# Patient Record
Sex: MALE | Race: BLACK OR AFRICAN AMERICAN | NOT HISPANIC OR LATINO | Employment: OTHER | URBAN - METROPOLITAN AREA
[De-identification: names, ages, dates, MRNs, and addresses within clinical notes are randomized per-mention and may not be internally consistent; named-entity substitution may affect disease eponyms.]

---

## 2017-01-05 ENCOUNTER — DOCUMENTATION ONLY (OUTPATIENT)
Dept: FAMILY MEDICINE | Facility: CLINIC | Age: 77
End: 2017-01-05

## 2017-01-05 NOTE — PROGRESS NOTES
Pre-Visit Chart Review  For Appointment Scheduled on 1/6/2017    Health Maintenance Due   Topic Date Due    TETANUS VACCINE  12/17/1958    Zoster Vaccine  12/17/2000

## 2017-01-06 ENCOUNTER — OFFICE VISIT (OUTPATIENT)
Dept: FAMILY MEDICINE | Facility: CLINIC | Age: 77
End: 2017-01-06
Payer: MEDICARE

## 2017-01-06 VITALS
DIASTOLIC BLOOD PRESSURE: 69 MMHG | TEMPERATURE: 98 F | WEIGHT: 182.75 LBS | SYSTOLIC BLOOD PRESSURE: 121 MMHG | BODY MASS INDEX: 28.68 KG/M2 | HEIGHT: 67 IN | HEART RATE: 85 BPM | OXYGEN SATURATION: 98 %

## 2017-01-06 DIAGNOSIS — Z00.00 ENCOUNTER FOR PREVENTIVE HEALTH EXAMINATION: Primary | ICD-10-CM

## 2017-01-06 DIAGNOSIS — D45 POLYCYTHEMIA VERA: ICD-10-CM

## 2017-01-06 DIAGNOSIS — I10 ESSENTIAL HYPERTENSION: ICD-10-CM

## 2017-01-06 DIAGNOSIS — J84.10 CALCIFIED GRANULOMA OF LUNG: ICD-10-CM

## 2017-01-06 DIAGNOSIS — N40.1 BENIGN PROSTATIC HYPERPLASIA WITH LOWER URINARY TRACT SYMPTOMS, UNSPECIFIED MORPHOLOGY: ICD-10-CM

## 2017-01-06 DIAGNOSIS — K21.9 GASTROESOPHAGEAL REFLUX DISEASE, ESOPHAGITIS PRESENCE NOT SPECIFIED: ICD-10-CM

## 2017-01-06 DIAGNOSIS — R91.8 LUNG MASS: ICD-10-CM

## 2017-01-06 DIAGNOSIS — I70.209 ATHEROSCLEROSIS OF NATIVE ARTERIES OF THE EXTREMITIES, UNSPECIFIED: ICD-10-CM

## 2017-01-06 PROCEDURE — 3074F SYST BP LT 130 MM HG: CPT | Mod: S$GLB,,, | Performed by: PHYSICIAN ASSISTANT

## 2017-01-06 PROCEDURE — 99499 UNLISTED E&M SERVICE: CPT | Mod: S$GLB,,, | Performed by: PHYSICIAN ASSISTANT

## 2017-01-06 PROCEDURE — 99999 PR PBB SHADOW E&M-EST. PATIENT-LVL IV: CPT | Mod: PBBFAC,,, | Performed by: PHYSICIAN ASSISTANT

## 2017-01-06 PROCEDURE — 3078F DIAST BP <80 MM HG: CPT | Mod: S$GLB,,, | Performed by: PHYSICIAN ASSISTANT

## 2017-01-06 PROCEDURE — G0439 PPPS, SUBSEQ VISIT: HCPCS | Mod: S$GLB,,, | Performed by: PHYSICIAN ASSISTANT

## 2017-01-06 NOTE — Clinical Note
Primary Care Providers: Yaniv Escobar MD, MD (General)  Your patient was seen today for a HRA visit. Gap(s) in care (HEDIS gaps) have been identified during this visit that require additional testing and possible follow up.  No orders of the defined types were placed in this encounter.   These orders were placed using [facility] approved protocol and any results will be forwarded to your office for appropriate follow up. I have included a copy of my visit note; please review the note and feel free to contact me with any questions.   Thank you for allowing me to participate in the care of your patients. Corie Sinha PA-C

## 2017-01-06 NOTE — PATIENT INSTRUCTIONS
Counseling and Referral of Other Preventative  (Italic type indicates deductible and co-insurance are waived)    Patient Name: Morro Sharma  Today's Date: 1/6/2017      SERVICE LIMITATIONS RECOMMENDATION    Vaccines    · Pneumococcal (once after 65)    · Influenza (annually)    · Hepatitis B (if medium/high risk)    · Prevnar 13      Hepatitis B medium/high risk factors:       - End-stage renal disease       - Hemophiliacs who received Factor VII or         IX concentrates       - Clients of institutions for the mentally             retarded       - Persons who live in the same house as          a HepB carrier       - Homosexual men       - Illicit injectable drug abusers     Pneumococcal: Done, no repeat necessary     Influenza: Done, repeat in one year     Hepatitis B: N/A Not indicated.     Prevnar 13: Completed, no need to repeat    Prostate cancer screening (annually to age 75)     Prostate specific antigen (PSA) Shared decision making with Provider. Sometimes a co-pay may be required if the patient decides to have this test. The USPSTF no longer recommends prostate cancer screening routinely in medicine: not to follow    Colorectal cancer screening (to age 75)    · Fecal occult blood test (annual)  · Flexible sigmoidoscopy (5y)  · Screening colonoscopy (10y)  · Barium enema   Last done 2014, recommend to repeat every 5  years    Diabetes self-management training (no USPSTF recommendations)  Requires referral by treating physician for patient with diabetes or renal disease. 10 hours of initial DSMT sessions of no less than 30 minutes each in a continuous 12-month period. 2 hours of follow-up DSMT in subsequent years.  N/A Not indicated.    Glaucoma screening (no USPSTF recommendation)  Diabetes mellitus, family history   , age 50 or over    American, age 65 or over  N/A Not indicated.    Medical nutrition therapy for diabetes or renal disease (no recommended schedule)  Requires  referral by treating physician for patient with diabetes or renal disease or kidney transplant within the past 3 years.  Can be provided in same year as diabetes self-management training (DSMT), and CMS recommends medical nutrition therapy take place after DSMT. Up to 3 hours for initial year and 2 hours in subsequent years.  N/A Not indicated.    Cardiovascular screening blood tests (every 5 years)  · Fasting lipid panel  Order as a panel if possible  Done this year, repeat every year    Diabetes screening tests (at least every 3 years, Medicare covers annually or at 6-month intervals for prediabetic patients)  · Fasting blood sugar (FBS) or glucose tolerance test (GTT)  Patient must be diagnosed with one of the following:       - Hypertension       - Dyslipidemia       - Obesity (BMI 30kg/m2)       - Previous elevated impaired FBS or GTT       ... or any two of the following:       - Overweight (BMI 25 but <30)       - Family history of diabetes       - Age 65 or older       - History of gestational diabetes or birth of baby weighing more than 9 pounds  N/A Not indicated.    Abdominal aortic aneurysm screening (once)  · Sonogram   Limited to patients who meet one of the following criteria:       - Men who are 65-75 years old and have smoked more than 100 cigarette in their lifetime       - Anyone with a family history of abdominal aortic aneurysm       - Anyone recommended for screening by the USPSTF  Last done 2014, recommend no further repeat testing, US was normal.    HIV screening (annually for increased risk patients)  · HIV-1 and HIV-2 by EIA, or ELO, rapid antibody test or oral mucosa transudate  Patients must be at increased risk for HIV infection per USPSTF guidelines or pregnant. Tests covered annually for patient at increased risk or as requested by the patient. Pregnant patients may receive up to 3 tests during pregnancy.  Risks discussed, screening is not recommended    Smoking cessation counseling  (up to 8 sessions per year)  Patients must be asymptomatic of tobacco-related conditions to receive as a preventative service.  Former smoker    Subsequent annual wellness visit  At least 12 months since last AWV  Return in one year     The following information is provided to all patients.  This information is to help you find resources for any of the problems found today that may be affecting your health:                Living healthy guide: www.[facility].[place].[facility]      Understanding Diabetes: www.diabetes.org      Eating healthy: www.cdc.gov/healthyweight      [facility] home safety checklist: www.cdc.gov/steadi/patient.html      Agency on Aging: www.goea.[place].[facility]      Alcoholics anonymous (AA): www.aa.org      Physical Activity: www.stepan.nih.gov/md1azwq      Tobacco use: www.quitwithusla.org

## 2017-01-06 NOTE — PROGRESS NOTES
Subjective:       Patient ID: Morro Sharma is here for   Chief Complaint   Patient presents with    HRA       Morro Sharma was seen today in a face to face encounter for a comprehensive Health Risk Assessment. This visit included a review of her total medical record available at the time of this visit.        Past Medical, Family, and Surgical History:      This information was reviewed and reconciled today during this encounter. Medications were reviewed and reconciled today. The active problem list has been reviewed/updated and reconciled today. These active problems are listed in the diagnosis list below.    **See completed HRA forms/Questionnaires/Flowsheets for ROS information.    Physical Exam   Constitutional: He is oriented to person, place, and time. He appears well-developed and well-nourished.   Eyes: EOM are normal. Pupils are equal, round, and reactive to light.   Cardiovascular: Normal rate, regular rhythm, normal heart sounds and intact distal pulses.    No lower ext edema   Pulmonary/Chest: Effort normal and breath sounds normal.   Neurological: He is alert and oriented to person, place, and time.   Skin: Skin is warm and dry.   Psychiatric: He has a normal mood and affect. His behavior is normal.   Vitals reviewed.        Diagnoses (identified today) and assoicated plan for each diagnosis:         Encounter for preventive health examination    Lung mass  Comments:  Stable, CT 6/13: Prior granulomatous infection continued improvement in the scarring and atelectasis in the left base relative to 11/13/12. Cont f/u with PCP.    Essential hypertension  Comments:  Controlled, continue lotrel 5/20 mg daily. DASH diet encouraged.    Benign prostatic hyperplasia with lower urinary tract symptoms, unspecified morphology  Comments:  Stable, continue to monitor.     Polycythemia vera  Comments:  Stable, continue to follow with Hem/Onc every 6 months. Philippe on hydrea 500 mg BID and ASA 81 mg  daily.    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    Advised patient to follow up with tetanus booster and shingles vaccine at insurance approved pharmacy.   Follow up with PCP for annual physical exam in January 2017.

## 2017-01-06 NOTE — MR AVS SNAPSHOT
[facility]  [address]  [place] [address]  Phone: [phone]  Fax: [phone]                  Morro Sharma   2017 1:00 PM   Office Visit    Description:  Male : 1940   Provider:  Corie Sinha PA-C   Department:  [facility]           Reason for Visit     HRA           Diagnoses this Visit        Comments    Encounter for preventive health examination    -  Primary     Lung mass         Essential hypertension         Benign prostatic hyperplasia with lower urinary tract symptoms, unspecified morphology         Polycythemia vera         Gastroesophageal reflux disease, esophagitis presence not specified                To Do List           Future Appointments        Provider Department Dept Phone    2017 8:00 AM Yaniv Escobar MD [facility] [phone]    3/24/2017 10:30 AM [facility] - Lab [phone]    3/28/2017 1:40 PM Xiang Khan NP [facility] H[facility]ology [phone]      Goals (5 Years of Data)     None      [facility] On Call     [facility] On [facility] Nurse [facility] -  Assistance  Registered nurses in the [facility] On Call Center provide clinical advisement, health education, appointment booking, and other advisory services.  Call for this free service at [phone].             Medications           Message regarding Medications     Verify the changes and/or additions to your medication regime listed below are the same as discussed with your clinician today.  If any of these changes or additions are incorrect, please notify your healthcare provider.        STOP taking these medications     opqha-ieqbd-rqhidzs-pramoxine 3.5-500-10,000 mg-unit-unit/g Oint            Verify that the below list of medications is an accurate representation of the medications you are currently taking.  If none reported, the list may be blank. If incorrect, please contact your healthcare provider. Carry this list with  "you in case of emergency.           Current Medications     amlodipine-benazepril 5-20 mg (LOTREL) 5-20 mg per capsule TAKE 1 CAPSULE EVERY DAY    aspirin (ECOTRIN) 81 MG EC tablet Take 81 mg by mouth once daily.     fluticasone (FLONASE) 50 mcg/actuation nasal spray 2 sprays by Each Nare route once daily.    hydroxyurea (HYDREA) 500 mg Cap TAKE 1 CAPSULE TWICE DAILY    xr-rn-BZ-lycopene-lut-#178herb (OCHOA MULTIVITAMIN FOR MEN) 200-175-250 mcg Tab Take 1 tablet by mouth once daily.    NEXIUM 20 mg capsule     tadalafil (CIALIS) 5 MG tablet Take 1 tablet (5 mg total) by mouth daily as needed for Erectile Dysfunction.           Clinical Reference Information           Vital Signs - Last Recorded  Most recent update: 1/6/2017  1:04 PM by Lacey Rdz MA    BP Pulse Temp Ht Wt SpO2    121/69 (BP Location: Right arm, Patient Position: Sitting, BP Method: Automatic) 85 98.2 °F (36.8 °C) (Oral) 5' 7" (1.702 m) 82.9 kg (182 lb 12.2 oz) 98%    BMI                28.62 kg/m2          Blood Pressure          Most Recent Value    BP  121/69      Allergies as of 1/6/2017     No Known Allergies      Immunizations Administered on Date of Encounter - 1/6/2017     None      Instructions      Counseling and Referral of Other Preventative  (Italic type indicates deductible and co-insurance are waived)    Patient Name: Morro Sharma  Today's Date: 1/6/2017      SERVICE LIMITATIONS RECOMMENDATION    Vaccines    · Pneumococcal (once after 65)    · Influenza (annually)    · Hepatitis B (if medium/high risk)    · Prevnar 13      Hepatitis B medium/high risk factors:       - End-stage renal disease       - Hemophiliacs who received Factor VII or         IX concentrates       - Clients of institutions for the mentally             retarded       - Persons who live in the same house as          a HepB carrier       - Homosexual men       - Illicit injectable drug abusers     Pneumococcal: Done, no repeat necessary     Influenza: Done, " repeat in one year     Hepatitis B: N/A Not indicated.     Prevnar 13: Completed, no need to repeat    Prostate cancer screening (annually to age 75)     Prostate specific antigen (PSA) Shared decision making with Provider. Sometimes a co-pay may be required if the patient decides to have this test. The USPSTF no longer recommends prostate cancer screening routinely in medicine: not to follow    Colorectal cancer screening (to age 75)    · Fecal occult blood test (annual)  · Flexible sigmoidoscopy (5y)  · Screening colonoscopy (10y)  · Barium enema   Last done 2014, recommend to repeat every 5  years    Diabetes self-management training (no USPSTF recommendations)  Requires referral by treating physician for patient with diabetes or renal disease. 10 hours of initial DSMT sessions of no less than 30 minutes each in a continuous 12-month period. 2 hours of follow-up DSMT in subsequent years.  N/A Not indicated.    Glaucoma screening (no USPSTF recommendation)  Diabetes mellitus, family history   , age 50 or over    American, age 65 or over  N/A Not indicated.    Medical nutrition therapy for diabetes or renal disease (no recommended schedule)  Requires referral by treating physician for patient with diabetes or renal disease or kidney transplant within the past 3 years.  Can be provided in same year as diabetes self-management training (DSMT), and CMS recommends medical nutrition therapy take place after DSMT. Up to 3 hours for initial year and 2 hours in subsequent years.  N/A Not indicated.    Cardiovascular screening blood tests (every 5 years)  · Fasting lipid panel  Order as a panel if possible  Done this year, repeat every year    Diabetes screening tests (at least every 3 years, Medicare covers annually or at 6-month intervals for prediabetic patients)  · Fasting blood sugar (FBS) or glucose tolerance test (GTT)  Patient must be diagnosed with one of the following:       -  Hypertension       - Dyslipidemia       - Obesity (BMI 30kg/m2)       - Previous elevated impaired FBS or GTT       ... or any two of the following:       - Overweight (BMI 25 but <30)       - Family history of diabetes       - Age 65 or older       - History of gestational diabetes or birth of baby weighing more than 9 pounds  N/A Not indicated.    Abdominal aortic aneurysm screening (once)  · Sonogram   Limited to patients who meet one of the following criteria:       - Men who are 65-75 years old and have smoked more than 100 cigarette in their lifetime       - Anyone with a family history of abdominal aortic aneurysm       - Anyone recommended for screening by the USPSTF  Last done 2014, recommend no further repeat testing, US was normal.    HIV screening (annually for increased risk patients)  · HIV-1 and HIV-2 by EIA, or ELO, rapid antibody test or oral mucosa transudate  Patients must be at increased risk for HIV infection per USPSTF guidelines or pregnant. Tests covered annually for patient at increased risk or as requested by the patient. Pregnant patients may receive up to 3 tests during pregnancy.  Risks discussed, screening is not recommended    Smoking cessation counseling (up to 8 sessions per year)  Patients must be asymptomatic of tobacco-related conditions to receive as a preventative service.  Former smoker    Subsequent annual wellness visit  At least 12 months since last AWV  Return in one year     The following information is provided to all patients.  This information is to help you find resources for any of the problems found today that may be affecting your health:                Living healthy guide: www.[facility].[place].gov      Understanding Diabetes: www.diabetes.org      Eating healthy: www.cdc.gov/healthyweight      CDC home safety checklist: www.cdc.gov/steadi/patient.html      Agency on Aging: www.goea.[place].gov      Alcoholics anonymous (AA): www.aa.org      Physical Activity:  www.stepan.nih.gov/cl6zxbw      Tobacco use: www.quitwithusla.org

## 2017-01-25 ENCOUNTER — DOCUMENTATION ONLY (OUTPATIENT)
Dept: FAMILY MEDICINE | Facility: CLINIC | Age: 77
End: 2017-01-25

## 2017-01-25 NOTE — PROGRESS NOTES
Pre-Visit Chart Review  For Appointment Scheduled on 1-30-17    Health Maintenance Due   Topic Date Due    TETANUS VACCINE  12/17/1958    Zoster Vaccine  12/17/2000

## 2017-01-30 ENCOUNTER — LAB VISIT (OUTPATIENT)
Dept: LAB | Facility: HOSPITAL | Age: 77
End: 2017-01-30
Attending: FAMILY MEDICINE
Payer: MEDICARE

## 2017-01-30 ENCOUNTER — OFFICE VISIT (OUTPATIENT)
Dept: FAMILY MEDICINE | Facility: CLINIC | Age: 77
End: 2017-01-30
Payer: MEDICARE

## 2017-01-30 ENCOUNTER — PATIENT MESSAGE (OUTPATIENT)
Dept: FAMILY MEDICINE | Facility: CLINIC | Age: 77
End: 2017-01-30

## 2017-01-30 VITALS
HEART RATE: 63 BPM | OXYGEN SATURATION: 97 % | RESPIRATION RATE: 16 BRPM | BODY MASS INDEX: 28.41 KG/M2 | WEIGHT: 181 LBS | SYSTOLIC BLOOD PRESSURE: 130 MMHG | HEIGHT: 67 IN | DIASTOLIC BLOOD PRESSURE: 64 MMHG | TEMPERATURE: 98 F

## 2017-01-30 DIAGNOSIS — I10 GOOD HYPERTENSION CONTROL: ICD-10-CM

## 2017-01-30 DIAGNOSIS — K63.5 POLYP OF COLON, UNSPECIFIED PART OF COLON, UNSPECIFIED TYPE: ICD-10-CM

## 2017-01-30 DIAGNOSIS — Z12.5 PROSTATE CANCER SCREENING: ICD-10-CM

## 2017-01-30 DIAGNOSIS — D45 POLYCYTHEMIA VERA: ICD-10-CM

## 2017-01-30 DIAGNOSIS — E53.8 B12 DEFICIENCY: ICD-10-CM

## 2017-01-30 DIAGNOSIS — Z00.00 ANNUAL PHYSICAL EXAM: ICD-10-CM

## 2017-01-30 DIAGNOSIS — Z00.00 ANNUAL PHYSICAL EXAM: Primary | ICD-10-CM

## 2017-01-30 DIAGNOSIS — J84.10 CALCIFIED GRANULOMA OF LUNG: ICD-10-CM

## 2017-01-30 DIAGNOSIS — K21.9 GASTROESOPHAGEAL REFLUX DISEASE, ESOPHAGITIS PRESENCE NOT SPECIFIED: ICD-10-CM

## 2017-01-30 DIAGNOSIS — N40.1 BENIGN PROSTATIC HYPERPLASIA WITH LOWER URINARY TRACT SYMPTOMS, UNSPECIFIED MORPHOLOGY: ICD-10-CM

## 2017-01-30 LAB
ALBUMIN SERPL BCP-MCNC: 4 G/DL
ALP SERPL-CCNC: 75 U/L
ALT SERPL W/O P-5'-P-CCNC: 16 U/L
ANION GAP SERPL CALC-SCNC: 12 MMOL/L
AST SERPL-CCNC: 23 U/L
BASOPHILS # BLD AUTO: 0.05 K/UL
BASOPHILS NFR BLD: 0.9 %
BILIRUB SERPL-MCNC: 1 MG/DL
BUN SERPL-MCNC: 12 MG/DL
CALCIUM SERPL-MCNC: 9.9 MG/DL
CHLORIDE SERPL-SCNC: 107 MMOL/L
CO2 SERPL-SCNC: 22 MMOL/L
COMPLEXED PSA SERPL-MCNC: 1.6 NG/ML
CREAT SERPL-MCNC: 1.5 MG/DL
DIFFERENTIAL METHOD: ABNORMAL
EOSINOPHIL # BLD AUTO: 0.1 K/UL
EOSINOPHIL NFR BLD: 1.1 %
ERYTHROCYTE [DISTWIDTH] IN BLOOD BY AUTOMATED COUNT: 13.4 %
EST. GFR  (AFRICAN AMERICAN): 51.5 ML/MIN/1.73 M^2
EST. GFR  (NON AFRICAN AMERICAN): 44.6 ML/MIN/1.73 M^2
GLUCOSE SERPL-MCNC: 87 MG/DL
HCT VFR BLD AUTO: 40.4 %
HGB BLD-MCNC: 13.4 G/DL
LYMPHOCYTES # BLD AUTO: 0.7 K/UL
LYMPHOCYTES NFR BLD: 12.7 %
MCH RBC QN AUTO: 36.2 PG
MCHC RBC AUTO-ENTMCNC: 33.2 %
MCV RBC AUTO: 109 FL
MONOCYTES # BLD AUTO: 0.2 K/UL
MONOCYTES NFR BLD: 3.6 %
NEUTROPHILS # BLD AUTO: 4.6 K/UL
NEUTROPHILS NFR BLD: 81.5 %
PLATELET # BLD AUTO: 451 K/UL
PMV BLD AUTO: 10.5 FL
POTASSIUM SERPL-SCNC: 4.4 MMOL/L
PROT SERPL-MCNC: 7 G/DL
RBC # BLD AUTO: 3.7 M/UL
SODIUM SERPL-SCNC: 141 MMOL/L
VIT B12 SERPL-MCNC: 1078 PG/ML
WBC # BLD AUTO: 5.6 K/UL

## 2017-01-30 PROCEDURE — 80053 COMPREHEN METABOLIC PANEL: CPT

## 2017-01-30 PROCEDURE — 3075F SYST BP GE 130 - 139MM HG: CPT | Mod: S$GLB,,, | Performed by: FAMILY MEDICINE

## 2017-01-30 PROCEDURE — 3078F DIAST BP <80 MM HG: CPT | Mod: S$GLB,,, | Performed by: FAMILY MEDICINE

## 2017-01-30 PROCEDURE — 99999 PR PBB SHADOW E&M-EST. PATIENT-LVL IV: CPT | Mod: PBBFAC,,, | Performed by: FAMILY MEDICINE

## 2017-01-30 PROCEDURE — 85025 COMPLETE CBC W/AUTO DIFF WBC: CPT

## 2017-01-30 PROCEDURE — 99397 PER PM REEVAL EST PAT 65+ YR: CPT | Mod: S$GLB,,, | Performed by: FAMILY MEDICINE

## 2017-01-30 PROCEDURE — 84153 ASSAY OF PSA TOTAL: CPT

## 2017-01-30 PROCEDURE — 36415 COLL VENOUS BLD VENIPUNCTURE: CPT | Mod: PO

## 2017-01-30 PROCEDURE — 82607 VITAMIN B-12: CPT

## 2017-01-30 PROCEDURE — 99499 UNLISTED E&M SERVICE: CPT | Mod: S$GLB,,, | Performed by: FAMILY MEDICINE

## 2017-01-30 RX ORDER — ALFUZOSIN HYDROCHLORIDE 10 MG/1
10 TABLET, EXTENDED RELEASE ORAL
COMMUNITY
End: 2017-08-17 | Stop reason: SDUPTHER

## 2017-01-30 NOTE — MR AVS SNAPSHOT
[facility]  [address]  [place] [address]  Phone: [phone]  Fax: [phone]                  Morro Sharma   2017 8:00 AM   Office Visit    Description:  Male : 1940   Provider:  Yaniv Escobar MD   Department:  [facility]           Reason for Visit     Annual Exam           Diagnoses this Visit        Comments    Annual physical exam    -  Primary     Good hypertension control         Polycythemia vera         Calcified granuloma of lung         Benign prostatic hyperplasia with lower urinary tract symptoms, unspecified morphology         Gastroesophageal reflux disease, esophagitis presence not specified         Polyp of colon, unspecified part of colon, unspecified type         Prostate cancer screening         B12 deficiency                To Do List           Future Appointments        Provider Department Dept Phone    2017 12:30 PM LAB, [facility] - Lab [phone]    3/24/2017 10:30 AM LAB, [facility] - Lab [phone]    3/28/2017 1:40 PM Xiang Khan NP [facility] Hematology [phone]      Goals (5 Years of Data)     None      [facility]     [facility] -  Assistance  Registered nurses in the [facility] On Call Center provide clinical advisement, health education, appointment booking, and other advisory services.  Call for this free service at [phone].             Medications           Message regarding Medications     Verify the changes and/or additions to your medication regime listed below are the same as discussed with your clinician today.  If any of these changes or additions are incorrect, please notify your healthcare provider.        STOP taking these medications     uf-mo-BZ-lycopene-lut-#178herb (OCHOA MULTIVITAMIN FOR MEN) 200-175-250 mcg Tab Take 1 tablet by mouth once daily.           Verify that the below list of medications is an accurate  "representation of the medications you are currently taking.  If none reported, the list may be blank. If incorrect, please contact your healthcare provider. Carry this list with you in case of emergency.           Current Medications     alfuzosin (UROXATRAL) 10 mg Tb24 Take 10 mg by mouth daily with breakfast.    amlodipine-benazepril 5-20 mg (LOTREL) 5-20 mg per capsule TAKE 1 CAPSULE EVERY DAY    aspirin (ECOTRIN) 81 MG EC tablet Take 81 mg by mouth once daily.     fluticasone (FLONASE) 50 mcg/actuation nasal spray 2 sprays by Each Nare route once daily.    hydroxyurea (HYDREA) 500 mg Cap TAKE 1 CAPSULE TWICE DAILY    NEXIUM 20 mg capsule Take 22.3 mg by mouth once daily.     omega-3 fatty acids-fish oil 300-1,000 mg CpDR Take 1 capsule by mouth once daily.    tadalafil (CIALIS) 5 MG tablet Take 1 tablet (5 mg total) by mouth daily as needed for Erectile Dysfunction.           Clinical Reference Information           Vital Signs - Last Recorded  Most recent update: 1/30/2017  8:25 AM by Yaniv Escobar MD    BP Pulse Temp Resp Ht Wt    130/64 (BP Location: Right arm, Patient Position: Sitting, BP Method: Manual) 63 97.9 °F (36.6 °C) (Oral) 16 5' 6.5" (1.689 m) 82.1 kg (181 lb)    SpO2 BMI             97% 28.78 kg/m2         Blood Pressure          Most Recent Value    BP  130/64      Allergies as of 1/30/2017     No Known Allergies      Immunizations Administered on Date of Encounter - 1/30/2017     None      Orders Placed During Today's Visit     Future Labs/Procedures Expected by Expires    CBC auto differential  1/30/2017 1/31/2018    Comprehensive metabolic panel  1/30/2017 1/31/2018    PSA, Screening  1/30/2017 1/31/2018    Vitamin B12  1/30/2017 3/31/2018      Instructions      Controlling High Blood Pressure  High blood pressure (hypertension) is often called the silent killer. This is because many people who have it dont know it. High blood pressure is defined as 140/90 mm Hg or higher. Know your blood " pressure and remember to check it regularly. Doing so can save your life. Here are some things you can do to help control your blood pressure.    Choose heart-healthy foods  · Select low-salt, low-fat foods. Limit sodium intake to 2,400 mg per day or the amount suggested by your healthcare provider.  · Limit canned, dried, cured, packaged, and fast foods. These can contain a lot of salt.  · Eat 8 to 10 servings of fruits and vegetables every day.  · Choose lean meats, fish, or chicken.  · Eat whole-grain pasta, brown rice, and beans.  · Eat 2 to 3 servings of low-fat or fat-free dairy products.  · Ask your doctor about the DASH eating plan. This plan helps reduce blood pressure.  · When you go to a restaurant, ask that your meal be prepared with no added salt.  Maintain a healthy weight  · Ask your healthcare provider how many calories to eat a day. Then stick to that number.  · Ask your healthcare provider what weight range is healthiest for you. If you are overweight, a weight loss of only 3% to 5% of your body weight can help lower blood pressure. Generally, a good weight loss goal is to lose 10% of your body weight in a year.  · Limit snacks and sweets.  · Get regular exercise.  Get up and get active  · Choose activities you enjoy. Find ones you can do with friends or family. This includes bicycling, dancing, walking, and jogging.  · Park farther away from building entrances.  · Use stairs instead of the elevator.  · When you can, walk or bike instead of driving.  · [place] leaves, garden, or do household repairs.  · Be active at a moderate to vigorous level of physical activity for at least 40 minutes for a minimum of 3 to 4 days a week.   Manage stress  · Make time to relax and enjoy life. Find time to laugh.  · Communicate your concerns with your loved ones and your healthcare provider.  · Visit with family and friends, and keep up with hobbies.  Limit alcohol and quit smoking  · Men should have no more than 2  drinks per day.  · Women should have no more than 1 drink per day.  · Talk with your healthcare provider about quitting smoking. Smoking significantly increases your risk for heart disease and stroke. Ask your healthcare provider about community smoking cessation programs and other options.  Medicines  If lifestyle changes arent enough, your healthcare provider may prescribe high blood pressure medicine. Take all medicines as prescribed. If you have any questions about your medicines, ask your healthcare provider before stopping or changing them.   © 5183-2574 [employer]. [address] [place] [address], [place] [address]. All rights reserved. This information is not intended as a substitute for professional medical care. Always follow your healthcare professional's instructions.

## 2017-01-30 NOTE — PROGRESS NOTES
Subjective:       Patient ID: Morro Sharma is a 76 y.o. male.    Chief Complaint: Annual Exam    HPI Comments: 76 year old male in for annual exam.  He has been having more gerd problems lately and is taking nexium regularly.  He has questions about safety.  He does have his head of bed elevated.  He is followed by hematology for polycythemia vera and it is controlled as of last lab.  He has no other complaints.    Past Medical History:    BPH (benign prostatic hypertrophy)                            Colon polyps                                                  Erectile dysfunction                                          GERD (gastroesophageal reflux disease)                        Hypertension                                                  Pneumothorax on left                                          Polycythemia vera                                             Pulmonary nodules                                             Past Surgical History:    COLONOSCOPY                                      9/15/14         Comment:Dr. Mcdaniels, 5 year recheck    LUNG SURGERY                                                     Comment:LEFT - CHEST TUBE    ESOPHAGOGASTRODUODENOSCOPY                                     Review of patient's family history indicates:    Hypertension                   Mother                    Heart disease                  Mother                    Hypertension                   Maternal Aunt             Heart disease                  Maternal Aunt             Hypertension                   Maternal Uncle            Heart disease                  Maternal Uncle            Early death                    Father                    No Known Problems              Daughter                  No Known Problems              Paternal Aunt             No Known Problems              Paternal Uncle            No Known Problems              Maternal Grandmother      No Known Problems              Maternal  Grandfather      No Known Problems              Paternal Grandmother      No Known Problems              Paternal Grandfather      Prostate cancer                Neg Hx                    Kidney cancer                  Neg Hx                    Cancer                         Neg Hx                    Urolithiasis                   Neg Hx                    Social History    Marital status:              Spouse name:                       Years of education:                 Number of children:               Social History Main Topics    Smoking status: Former Smoker                                                                Packs/day: 1.50      Years: 25.00          Quit date: 10/2/1982    Smokeless status: Never Used                        Alcohol use: No              Drug use: No                Current Outpatient Prescriptions:     alfuzosin (UROXATRAL) 10 mg Tb24, Take 10 mg by mouth daily with breakfast., Disp: , Rfl:     amlodipine-benazepril 5-20 mg (LOTREL) 5-20 mg per capsule, TAKE 1 CAPSULE EVERY DAY, Disp: 90 capsule, Rfl: 3    aspirin (ECOTRIN) 81 MG EC tablet, Take 81 mg by mouth once daily. , Disp: , Rfl:     fluticasone (FLONASE) 50 mcg/actuation nasal spray, 2 sprays by Each Nare route once daily., Disp: 16 g, Rfl: 0    hydroxyurea (HYDREA) 500 mg Cap, TAKE 1 CAPSULE TWICE DAILY, Disp: 180 capsule, Rfl: 1    NEXIUM 20 mg capsule, Take 22.3 mg by mouth once daily. , Disp: , Rfl:     omega-3 fatty acids-fish oil 300-1,000 mg CpDR, Take 1 capsule by mouth once daily., Disp: , Rfl:     tadalafil (CIALIS) 5 MG tablet, Take 1 tablet (5 mg total) by mouth daily as needed for Erectile Dysfunction., Disp: 30 tablet, Rfl: 12    TETANUS VACCINE due on 12/17/1958  Zoster Vaccine due on 12/17/2000      Review of Systems   Constitutional: Negative for activity change, chills, fatigue and fever.   HENT: Negative for congestion, ear pain, rhinorrhea and sore throat.    Eyes: Negative for visual  disturbance.   Respiratory: Negative for cough, chest tightness and shortness of breath.    Cardiovascular: Negative for chest pain and palpitations.   Gastrointestinal: Positive for abdominal pain (gerd). Negative for blood in stool, constipation, diarrhea, nausea and vomiting.   Genitourinary: Negative for difficulty urinating, dysuria and hematuria.   Musculoskeletal: Negative for arthralgias and neck pain.   Skin: Negative for rash.   Neurological: Negative for dizziness and headaches.   Psychiatric/Behavioral: Negative for sleep disturbance.       Objective:      Physical Exam   Constitutional: He is oriented to person, place, and time. He appears well-developed and well-nourished. No distress.   Good blood pressure control  Patient is normal weight with bmi of 28.8.   Weight is increased by 2lb since last visit of 1/27/16.     HENT:   Head: Normocephalic and atraumatic.   Right Ear: External ear normal.   Left Ear: External ear normal.   Nose: Nose normal.   Mouth/Throat: Oropharynx is clear and moist. No oropharyngeal exudate.   Eyes: Conjunctivae and EOM are normal. Pupils are equal, round, and reactive to light. No scleral icterus.   Neck: Normal range of motion. Neck supple. No JVD present. No tracheal deviation present. No thyromegaly present.   Cardiovascular: Normal rate, regular rhythm and normal heart sounds.  Exam reveals no gallop and no friction rub.    No murmur heard.  Pulmonary/Chest: Effort normal and breath sounds normal. No respiratory distress. He has no wheezes. He has no rales. He exhibits no tenderness.   Abdominal: Soft. Bowel sounds are normal. He exhibits no distension and no mass. There is no tenderness. There is no rebound and no guarding. No hernia.   Genitourinary:   Genitourinary Comments: Declined prostate exam but does want psa testing   Musculoskeletal: Normal range of motion. He exhibits no edema.   Lymphadenopathy:     He has no cervical adenopathy.   Neurological: He is  alert and oriented to person, place, and time. He has normal reflexes. He displays normal reflexes. No cranial nerve deficit. He exhibits normal muscle tone.   Skin: Skin is warm and dry. No rash noted. He is not diaphoretic.   Psychiatric: He has a normal mood and affect. His behavior is normal. Judgment and thought content normal.   Nursing note and vitals reviewed.      Assessment:       1. Annual physical exam    2. Good hypertension control    3. Polycythemia vera    4. Calcified granuloma of lung    5. Benign prostatic hyperplasia with lower urinary tract symptoms, unspecified morphology    6. Gastroesophageal reflux disease, esophagitis presence not specified    7. Polyp of colon, unspecified part of colon, unspecified type    8. Prostate cancer screening    9. B12 deficiency    10. BMI 28.0-28.9,adult        Plan:       1. Annual physical exam  - Comprehensive metabolic panel; Future  - CBC auto differential; Future  - PSA, Screening; Future    2. Good hypertension control  No changes needed  - Comprehensive metabolic panel; Future  - CBC auto differential; Future    3. Polycythemia vera  Followed by hematology  - CBC auto differential; Future    4. Calcified granuloma of lung  Scar tissue likely related to prior pneumothorax and chest tube    5. Benign prostatic hyperplasia with lower urinary tract symptoms, unspecified morphology    6. Gastroesophageal reflux disease, esophagitis presence not specified  Gaviscon, okay to use nexium, discussed deficiencies of calcium, magnesium, B12.  Recommend sublingual B12 supplement    7. Polyp of colon, unspecified part of colon, unspecified type  Due for repeat in 2019    8. Prostate cancer screening  - PSA, Screening; Future    9. B12 deficiency  - Vitamin B12; Future    10. BMI 28.0-28.9,adult

## 2017-01-30 NOTE — PATIENT INSTRUCTIONS
Controlling High Blood Pressure  High blood pressure (hypertension) is often called the silent killer. This is because many people who have it dont know it. High blood pressure is defined as 140/90 mm Hg or higher. Know your blood pressure and remember to check it regularly. Doing so can save your life. Here are some things you can do to help control your blood pressure.    Choose heart-healthy foods  · Select low-salt, low-fat foods. Limit sodium intake to 2,400 mg per day or the amount suggested by your healthcare provider.  · Limit canned, dried, cured, packaged, and fast foods. These can contain a lot of salt.  · Eat 8 to 10 servings of fruits and vegetables every day.  · Choose lean meats, fish, or chicken.  · Eat whole-grain pasta, brown rice, and beans.  · Eat 2 to 3 servings of low-fat or fat-free dairy products.  · Ask your doctor about the DASH eating plan. This plan helps reduce blood pressure.  · When you go to a restaurant, ask that your meal be prepared with no added salt.  Maintain a healthy weight  · Ask your healthcare provider how many calories to eat a day. Then stick to that number.  · Ask your healthcare provider what weight range is healthiest for you. If you are overweight, a weight loss of only 3% to 5% of your body weight can help lower blood pressure. Generally, a good weight loss goal is to lose 10% of your body weight in a year.  · Limit snacks and sweets.  · Get regular exercise.  Get up and get active  · Choose activities you enjoy. Find ones you can do with friends or family. This includes bicycling, dancing, walking, and jogging.  · Park farther away from building entrances.  · Use stairs instead of the elevator.  · When you can, walk or bike instead of driving.  · [place] leaves, garden, or do household repairs.  · Be active at a moderate to vigorous level of physical activity for at least 40 minutes for a minimum of 3 to 4 days a week.   Manage stress  · Make time to relax and enjoy  life. Find time to laugh.  · Communicate your concerns with your loved ones and your healthcare provider.  · Visit with family and friends, and keep up with hobbies.  Limit alcohol and quit smoking  · Men should have no more than 2 drinks per day.  · Women should have no more than 1 drink per day.  · Talk with your healthcare provider about quitting smoking. Smoking significantly increases your risk for heart disease and stroke. Ask your healthcare provider about community smoking cessation programs and other options.  Medicines  If lifestyle changes arent enough, your healthcare provider may prescribe high blood pressure medicine. Take all medicines as prescribed. If you have any questions about your medicines, ask your healthcare provider before stopping or changing them.   © 8477-4551 [employer] [address] [place] [address], [place] [address]. All rights reserved. This information is not intended as a substitute for professional medical care. Always follow your healthcare professional's instructions.

## 2017-03-24 ENCOUNTER — LAB VISIT (OUTPATIENT)
Dept: LAB | Facility: HOSPITAL | Age: 77
End: 2017-03-24
Attending: NURSE PRACTITIONER
Payer: MEDICARE

## 2017-03-24 DIAGNOSIS — D45 POLYCYTHEMIA VERA: ICD-10-CM

## 2017-03-24 LAB
ALBUMIN SERPL BCP-MCNC: 4.1 G/DL
ALP SERPL-CCNC: 77 U/L
ALT SERPL W/O P-5'-P-CCNC: 19 U/L
ANION GAP SERPL CALC-SCNC: 8 MMOL/L
AST SERPL-CCNC: 24 U/L
BASOPHILS # BLD AUTO: 0.07 K/UL
BASOPHILS NFR BLD: 1 %
BILIRUB SERPL-MCNC: 1 MG/DL
BUN SERPL-MCNC: 18 MG/DL
CALCIUM SERPL-MCNC: 10 MG/DL
CHLORIDE SERPL-SCNC: 107 MMOL/L
CO2 SERPL-SCNC: 26 MMOL/L
CREAT SERPL-MCNC: 1.4 MG/DL
DIFFERENTIAL METHOD: ABNORMAL
EOSINOPHIL # BLD AUTO: 0.1 K/UL
EOSINOPHIL NFR BLD: 1 %
ERYTHROCYTE [DISTWIDTH] IN BLOOD BY AUTOMATED COUNT: 12.1 %
EST. GFR  (AFRICAN AMERICAN): 56 ML/MIN/1.73 M^2
EST. GFR  (NON AFRICAN AMERICAN): 48.5 ML/MIN/1.73 M^2
GLUCOSE SERPL-MCNC: 95 MG/DL
HCT VFR BLD AUTO: 42.2 %
HGB BLD-MCNC: 14.1 G/DL
LDH SERPL L TO P-CCNC: 284 U/L
LYMPHOCYTES # BLD AUTO: 0.9 K/UL
LYMPHOCYTES NFR BLD: 13.3 %
MCH RBC QN AUTO: 36.2 PG
MCHC RBC AUTO-ENTMCNC: 33.4 %
MCV RBC AUTO: 108 FL
MONOCYTES # BLD AUTO: 0.3 K/UL
MONOCYTES NFR BLD: 4.4 %
NEUTROPHILS # BLD AUTO: 5.4 K/UL
NEUTROPHILS NFR BLD: 80.3 %
PLATELET # BLD AUTO: 440 K/UL
PMV BLD AUTO: 9.4 FL
POTASSIUM SERPL-SCNC: 4.7 MMOL/L
PROT SERPL-MCNC: 7.4 G/DL
RBC # BLD AUTO: 3.9 M/UL
SODIUM SERPL-SCNC: 141 MMOL/L
WBC # BLD AUTO: 6.77 K/UL

## 2017-03-24 PROCEDURE — 85025 COMPLETE CBC W/AUTO DIFF WBC: CPT | Mod: PO

## 2017-03-24 PROCEDURE — 36415 COLL VENOUS BLD VENIPUNCTURE: CPT | Mod: PO

## 2017-03-24 PROCEDURE — 80053 COMPREHEN METABOLIC PANEL: CPT

## 2017-03-24 PROCEDURE — 83615 LACTATE (LD) (LDH) ENZYME: CPT

## 2017-03-28 ENCOUNTER — TELEPHONE (OUTPATIENT)
Dept: HEMATOLOGY/ONCOLOGY | Facility: CLINIC | Age: 77
End: 2017-03-28

## 2017-03-28 NOTE — TELEPHONE ENCOUNTER
"I called pt's mobile number to see what happened, he has not made it here for his appt.  He was at the [facility] on [address] [facility] [address] earlier and was told he could come to his appt at [facility] office. I got a recording that his "mailbox is full and can not accept any messages. Cm  His home number listed is the same as his cell phone. Cm   "

## 2017-03-28 NOTE — TELEPHONE ENCOUNTER
----- Message from Lily Bucio sent at 3/28/2017  2:58 PM CDT -----  Contact: Mrs. Sharma (wife)  Mrs. Simpson called and stated the patient got lost on his way to his appointment today and he wanted the nurse to know. He will call back to r/s.  He can be contacted at [phone].    Thanks,  Lily

## 2017-04-03 ENCOUNTER — OFFICE VISIT (OUTPATIENT)
Dept: HEMATOLOGY/ONCOLOGY | Facility: CLINIC | Age: 77
End: 2017-04-03
Payer: MEDICARE

## 2017-04-03 VITALS
TEMPERATURE: 99 F | SYSTOLIC BLOOD PRESSURE: 133 MMHG | HEIGHT: 67 IN | HEART RATE: 78 BPM | WEIGHT: 178.13 LBS | DIASTOLIC BLOOD PRESSURE: 62 MMHG | BODY MASS INDEX: 27.96 KG/M2 | RESPIRATION RATE: 17 BRPM

## 2017-04-03 DIAGNOSIS — D47.3 ESSENTIAL THROMBOCYTOSIS: ICD-10-CM

## 2017-04-03 DIAGNOSIS — D45 PV (POLYCYTHEMIA VERA): Primary | ICD-10-CM

## 2017-04-03 PROCEDURE — 1126F AMNT PAIN NOTED NONE PRSNT: CPT | Mod: S$GLB,,, | Performed by: NURSE PRACTITIONER

## 2017-04-03 PROCEDURE — 3078F DIAST BP <80 MM HG: CPT | Mod: S$GLB,,, | Performed by: NURSE PRACTITIONER

## 2017-04-03 PROCEDURE — 99999 PR PBB SHADOW E&M-EST. PATIENT-LVL III: CPT | Mod: PBBFAC,,, | Performed by: NURSE PRACTITIONER

## 2017-04-03 PROCEDURE — 99499 UNLISTED E&M SERVICE: CPT | Mod: S$GLB,,, | Performed by: NURSE PRACTITIONER

## 2017-04-03 PROCEDURE — 1160F RVW MEDS BY RX/DR IN RCRD: CPT | Mod: S$GLB,,, | Performed by: NURSE PRACTITIONER

## 2017-04-03 PROCEDURE — 99213 OFFICE O/P EST LOW 20 MIN: CPT | Mod: S$GLB,,, | Performed by: NURSE PRACTITIONER

## 2017-04-03 PROCEDURE — 1157F ADVNC CARE PLAN IN RCRD: CPT | Mod: S$GLB,,, | Performed by: NURSE PRACTITIONER

## 2017-04-03 PROCEDURE — 3075F SYST BP GE 130 - 139MM HG: CPT | Mod: S$GLB,,, | Performed by: NURSE PRACTITIONER

## 2017-04-03 PROCEDURE — 1159F MED LIST DOCD IN RCRD: CPT | Mod: S$GLB,,, | Performed by: NURSE PRACTITIONER

## 2017-04-03 RX ORDER — HYDROXYUREA 500 MG/1
500 CAPSULE ORAL 2 TIMES DAILY
Qty: 180 CAPSULE | Refills: 1 | Status: SHIPPED | OUTPATIENT
Start: 2017-04-03 | End: 2017-11-08 | Stop reason: SDUPTHER

## 2017-04-03 NOTE — MR AVS SNAPSHOT
[facility]  [address] [place] [address] [address] [place] [id number]  Phone: [phone]  Fax: [phone]                  Morro Sharma   4/3/2017 9:40 AM   Office Visit    Description:  Male : 1940   Provider:  Xiang Khan NP   Department:  [facility]           Diagnoses this Visit        Comments    PV (polycythemia vera)    -  Primary            To Do List           Goals (5 Years of Data)     None      Follow-Up and Disposition     Return in about 6 months (around 10/3/2017) for evaluation with interval CBC, CMP, LDH.    Follow-up and Disposition History      [facility]     [facility] On Call Nurse Care Line -  Assistance  Unless otherwise directed by your provider, please contact [facility], our nurse care line that is available for  assistance.     Registered nurses in the [facility] On Call Center provide: appointment scheduling, clinical advisement, health education, and other advisory services.  Call: [phone] (toll free)               Medications           Message regarding Medications     Verify the changes and/or additions to your medication regime listed below are the same as discussed with your clinician today.  If any of these changes or additions are incorrect, please notify your healthcare provider.             Verify that the below list of medications is an accurate representation of the medications you are currently taking.  If none reported, the list may be blank. If incorrect, please contact your healthcare provider. Carry this list with you in case of emergency.           Current Medications     alfuzosin (UROXATRAL) 10 mg Tb24 Take 10 mg by mouth daily with breakfast.    amlodipine-benazepril 5-20 mg (LOTREL) 5-20 mg per capsule TAKE 1 CAPSULE EVERY DAY    aspirin (ECOTRIN) 81 MG EC tablet Take 81 mg by mouth once daily.     fluticasone (FLONASE) 50 mcg/actuation nasal spray 2 sprays by Each Nare route once daily.    NEXIUM 20 mg  "capsule Take 22.3 mg by mouth once daily.     omega-3 fatty acids-fish oil 300-1,000 mg CpDR Take 1 capsule by mouth once daily.    tadalafil (CIALIS) 5 MG tablet Take 1 tablet (5 mg total) by mouth daily as needed for Erectile Dysfunction.    hydroxyurea (HYDREA) 500 mg Cap Take 1 capsule (500 mg total) by mouth 2 (two) times daily.           Clinical Reference Information           Your Vitals Were     BP Pulse Temp Resp Height Weight    133/62 78 98.5 °F (36.9 °C) 17 5' 7" (1.702 m) 80.8 kg (178 lb 2.1 oz)    BMI                27.9 kg/m2          Blood Pressure          Most Recent Value    BP  133/62      Allergies as of 4/3/2017     No Known Allergies      Immunizations Administered on Date of Encounter - 4/3/2017     None      Orders Placed During Today's Visit     Future Labs/Procedures Expected by Expires    CBC w/ DIFF  4/3/2017 6/2/2018    CMP  4/3/2017 6/2/2018    LDH  4/3/2017 6/2/2018      Instructions    z       Language Assistance Services     ATTENTION: Language assistance services are available, free of charge. Please call [phone].      ATENCIÓN: Si habla shanel, tiene a singh disposición servicios gratuitos de asistencia lingüística. Llame al [phone].     [facility] Ý: N?u b?n nói Ti?ng Vi?t, có các d?ch v? h? tr? ngôn ng? mi?n phí dành cho b?n. G?i s? [phone].         [facility] complies with applicable Federal civil rights laws and does not discriminate on the basis of race, color, national origin, age, disability, or sex.        "

## 2017-04-03 NOTE — PROGRESS NOTES
HISTORY OF PRESENT ILLNESS:  This is a 76-year-old black gentleman known to Dr. Cole for polycythemia vera.  He is managed with Hydrea 500 mg twice a day as   well as 81 mg of aspirin.  He presents to the clinic today for his interval   six-month evaluation with no complaints.  He states he is active walking, cutting   grass and watching his diet in attempts to lose weight.  He denies any   difficulties with fevers, chills, drenching night sweats, unexplained weight   loss, painful lymphadenopathy, rashes, pruritus, changes in the color of his   stool/urine , jaundice, chest pain, irregular heartbeat, nausea, vomiting,   constipation, diarrhea, etc.  No other new complaints or pertinent findings on a   10-point review of systems.    PHYSICAL EXAMINATION:  GENERAL:  Well-developed, well-nourished black gentleman in no acute distress.    Alert and oriented x4.  VITAL SIGNS:  Weight 178.1 pounds (decrease of 5.5 pounds in six months),   /62, pulse 78 regular, respirations 17, temp 98.5.  HEENT:  Normocephalic, atraumatic.  Oral mucosa pink and moist.  Lips without   lesions.  Tongue midline.  Oropharynx clear.  Nonicteric sclerae.  NECK:  Supple.  No adenopathy.  HEART:  Regular rate and rhythm without murmur, gallop or rub.  LUNGS:  Clear to auscultation bilaterally.  ABDOMEN:  Soft, nontender and nondistended with positive normoactive bowel   sounds.  No hepatosplenomegaly.  EXTREMITIES:  No cyanosis, clubbing or edema.  Distal pulses are intact.  AXILLAE AND GROIN:  No palpable pathologic lymphadenopathy is appreciated.    LABORATORY:  Dated 03/24/2017:  WBCs 6.77, hemoglobin 14.1, hematocrit 42.2,   platelets 440, differential remarkable for 80.3% grans, 13.3% lymphs.    Chemistry:  Sodium 141, potassium 4.7, chloride 107, CO2 of 26, BUN 18,   creatinine 1.4, EGFR 56, glucose 95, calcium 10.0.  Alk phos and liver enzymes   within normal limits.   (276, 248).    IMPRESSION:  Polycythemia vera - at  target with hematocrit < 45.    PLAN:  1.  We will continue our course of observation and have the patient return to   clinic in six months with interval CBC, CMP and LDH prior.  2.  Refill sent for Hydrea.  3.  The patient will continue Hydrea 500 mg b.i.d. as well as 81 mg of aspirin.  4.  The patient will maintain good daily hydration of 2 L per day.    Assessment/plan reviewed and approved by Dr. Cole.      RENETTA/AUDREY  dd: 04/03/2017 09:38:17 (CDT)  td: 04/03/2017 12:35:31 (CDT)  Doc ID   #[id number]  Job ID #[id number]    CC:

## 2017-05-17 ENCOUNTER — TELEPHONE (OUTPATIENT)
Dept: FAMILY MEDICINE | Facility: CLINIC | Age: 77
End: 2017-05-17

## 2017-05-17 NOTE — TELEPHONE ENCOUNTER
----- Message from Rose Krause sent at 5/16/2017  4:46 PM CDT -----  Patient needs to be seen after being released from [facility] on 5/10/17 for afib. He wants to see the doctor. Please call with availability [phone] or [phone].

## 2017-05-31 ENCOUNTER — DOCUMENTATION ONLY (OUTPATIENT)
Dept: FAMILY MEDICINE | Facility: CLINIC | Age: 77
End: 2017-05-31

## 2017-05-31 NOTE — PROGRESS NOTES
Pre-Visit Chart Review  For Appointment Scheduled on 6-6-17    Health Maintenance Due   Topic Date Due    TETANUS VACCINE  12/17/1958    Zoster Vaccine  12/17/2000

## 2017-06-02 ENCOUNTER — LAB VISIT (OUTPATIENT)
Dept: LAB | Facility: HOSPITAL | Age: 77
End: 2017-06-02
Attending: INTERNAL MEDICINE
Payer: MEDICARE

## 2017-06-02 DIAGNOSIS — I10 ESSENTIAL HYPERTENSION, MALIGNANT: ICD-10-CM

## 2017-06-02 DIAGNOSIS — I48.0 PAROXYSMAL ATRIAL FIBRILLATION: Primary | ICD-10-CM

## 2017-06-02 DIAGNOSIS — J44.89 OBSTRUCTIVE CHRONIC BRONCHITIS WITHOUT EXACERBATION: ICD-10-CM

## 2017-06-02 DIAGNOSIS — I48.0 PAROXYSMAL ATRIAL FIBRILLATION: ICD-10-CM

## 2017-06-02 DIAGNOSIS — E78.5 OTHER AND UNSPECIFIED HYPERLIPIDEMIA: ICD-10-CM

## 2017-06-02 LAB
ALBUMIN SERPL BCP-MCNC: 3.9 G/DL
ALP SERPL-CCNC: 86 U/L
ALT SERPL W/O P-5'-P-CCNC: 25 U/L
ANION GAP SERPL CALC-SCNC: 8 MMOL/L
AST SERPL-CCNC: 26 U/L
BASOPHILS # BLD AUTO: 0.06 K/UL
BASOPHILS NFR BLD: 0.7 %
BILIRUB SERPL-MCNC: 0.9 MG/DL
BUN SERPL-MCNC: 19 MG/DL
CALCIUM SERPL-MCNC: 9.8 MG/DL
CHLORIDE SERPL-SCNC: 107 MMOL/L
CHOLEST/HDLC SERPL: 5.9 {RATIO}
CO2 SERPL-SCNC: 24 MMOL/L
CREAT SERPL-MCNC: 1.5 MG/DL
DIFFERENTIAL METHOD: ABNORMAL
EOSINOPHIL # BLD AUTO: 0.1 K/UL
EOSINOPHIL NFR BLD: 1.1 %
ERYTHROCYTE [DISTWIDTH] IN BLOOD BY AUTOMATED COUNT: 13.9 %
EST. GFR  (AFRICAN AMERICAN): 51.5 ML/MIN/1.73 M^2
EST. GFR  (NON AFRICAN AMERICAN): 44.6 ML/MIN/1.73 M^2
GLUCOSE SERPL-MCNC: 105 MG/DL
HCT VFR BLD AUTO: 45.6 %
HDL/CHOLESTEROL RATIO: 17 %
HDLC SERPL-MCNC: 171 MG/DL
HDLC SERPL-MCNC: 29 MG/DL
HGB BLD-MCNC: 14.7 G/DL
LDLC SERPL CALC-MCNC: 118.2 MG/DL
LYMPHOCYTES # BLD AUTO: 1.2 K/UL
LYMPHOCYTES NFR BLD: 13.7 %
MCH RBC QN AUTO: 34.1 PG
MCHC RBC AUTO-ENTMCNC: 32.2 %
MCV RBC AUTO: 106 FL
MONOCYTES # BLD AUTO: 0.5 K/UL
MONOCYTES NFR BLD: 5.8 %
NEUTROPHILS # BLD AUTO: 6.7 K/UL
NEUTROPHILS NFR BLD: 78.5 %
NONHDLC SERPL-MCNC: 142 MG/DL
PLATELET # BLD AUTO: 476 K/UL
PMV BLD AUTO: 10.6 FL
POTASSIUM SERPL-SCNC: 4.6 MMOL/L
PROT SERPL-MCNC: 7.2 G/DL
RBC # BLD AUTO: 4.31 M/UL
SODIUM SERPL-SCNC: 139 MMOL/L
TRIGL SERPL-MCNC: 119 MG/DL
WBC # BLD AUTO: 8.47 K/UL

## 2017-06-02 PROCEDURE — 36415 COLL VENOUS BLD VENIPUNCTURE: CPT | Mod: PO

## 2017-06-02 PROCEDURE — 85025 COMPLETE CBC W/AUTO DIFF WBC: CPT

## 2017-06-02 PROCEDURE — 80053 COMPREHEN METABOLIC PANEL: CPT

## 2017-06-02 PROCEDURE — 80061 LIPID PANEL: CPT

## 2017-06-06 ENCOUNTER — OFFICE VISIT (OUTPATIENT)
Dept: FAMILY MEDICINE | Facility: CLINIC | Age: 77
End: 2017-06-06
Payer: MEDICARE

## 2017-06-06 VITALS
OXYGEN SATURATION: 96 % | BODY MASS INDEX: 27.96 KG/M2 | RESPIRATION RATE: 12 BRPM | DIASTOLIC BLOOD PRESSURE: 71 MMHG | HEIGHT: 67 IN | SYSTOLIC BLOOD PRESSURE: 135 MMHG | WEIGHT: 178.13 LBS | TEMPERATURE: 98 F | HEART RATE: 60 BPM

## 2017-06-06 DIAGNOSIS — R91.8 PULMONARY NODULES/LESIONS, MULTIPLE: ICD-10-CM

## 2017-06-06 DIAGNOSIS — I48.0 PAF (PAROXYSMAL ATRIAL FIBRILLATION): Primary | ICD-10-CM

## 2017-06-06 DIAGNOSIS — I10 GOOD HYPERTENSION CONTROL: ICD-10-CM

## 2017-06-06 DIAGNOSIS — E04.2 MULTIPLE THYROID NODULES: ICD-10-CM

## 2017-06-06 PROCEDURE — 99999 PR PBB SHADOW E&M-EST. PATIENT-LVL III: CPT | Mod: PBBFAC,,, | Performed by: FAMILY MEDICINE

## 2017-06-06 PROCEDURE — 1126F AMNT PAIN NOTED NONE PRSNT: CPT | Mod: S$GLB,,, | Performed by: FAMILY MEDICINE

## 2017-06-06 PROCEDURE — 99499 UNLISTED E&M SERVICE: CPT | Mod: S$GLB,,, | Performed by: FAMILY MEDICINE

## 2017-06-06 PROCEDURE — 99214 OFFICE O/P EST MOD 30 MIN: CPT | Mod: S$GLB,,, | Performed by: FAMILY MEDICINE

## 2017-06-06 PROCEDURE — 1159F MED LIST DOCD IN RCRD: CPT | Mod: S$GLB,,, | Performed by: FAMILY MEDICINE

## 2017-06-06 RX ORDER — METOPROLOL TARTRATE 50 MG/1
50 TABLET ORAL 2 TIMES DAILY
Qty: 180 TABLET | Refills: 3 | Status: SHIPPED | OUTPATIENT
Start: 2017-06-06 | End: 2017-06-06 | Stop reason: SDUPTHER

## 2017-06-06 RX ORDER — RIVAROXABAN 20 MG/1
20 TABLET, FILM COATED ORAL DAILY
Qty: 30 TABLET | Refills: 3 | Status: SHIPPED | OUTPATIENT
Start: 2017-06-06 | End: 2018-05-24 | Stop reason: DRUGHIGH

## 2017-06-06 RX ORDER — METOPROLOL TARTRATE 50 MG/1
50 TABLET ORAL 2 TIMES DAILY
Qty: 60 TABLET | Refills: 3 | Status: SHIPPED | OUTPATIENT
Start: 2017-06-06 | End: 2018-05-24 | Stop reason: DRUGHIGH

## 2017-06-06 RX ORDER — DIGOXIN 125 UG/1
125 TABLET ORAL DAILY
Qty: 90 TABLET | Refills: 3 | Status: SHIPPED | OUTPATIENT
Start: 2017-06-06 | End: 2017-06-06 | Stop reason: SDUPTHER

## 2017-06-06 RX ORDER — POLYETHYLENE GLYCOL 3350 17 G/17G
17 POWDER, FOR SOLUTION ORAL DAILY
COMMUNITY
End: 2020-03-17 | Stop reason: ALTCHOICE

## 2017-06-06 RX ORDER — MULTIVITAMIN
1 TABLET ORAL DAILY
COMMUNITY
End: 2021-08-28 | Stop reason: ALTCHOICE

## 2017-06-06 RX ORDER — DIGOXIN 125 UG/1
125 TABLET ORAL DAILY
Qty: 30 TABLET | Refills: 3 | Status: SHIPPED | OUTPATIENT
Start: 2017-06-06 | End: 2017-08-17 | Stop reason: SDUPTHER

## 2017-06-06 RX ORDER — DIGOXIN 125 UG/1
TABLET ORAL
Refills: 0 | COMMUNITY
Start: 2017-05-10 | End: 2017-06-06 | Stop reason: SDUPTHER

## 2017-06-06 RX ORDER — RIVAROXABAN 20 MG/1
TABLET, FILM COATED ORAL
Refills: 0 | COMMUNITY
Start: 2017-05-10 | End: 2017-06-06 | Stop reason: SDUPTHER

## 2017-06-06 RX ORDER — RIVAROXABAN 20 MG/1
20 TABLET, FILM COATED ORAL DAILY
Qty: 90 TABLET | Refills: 3 | Status: SHIPPED | OUTPATIENT
Start: 2017-06-06 | End: 2017-06-06 | Stop reason: SDUPTHER

## 2017-06-06 RX ORDER — METOPROLOL TARTRATE 50 MG/1
TABLET ORAL
Refills: 0 | COMMUNITY
Start: 2017-05-10 | End: 2017-06-06 | Stop reason: SDUPTHER

## 2017-06-06 NOTE — PROGRESS NOTES
Subjective:       Patient ID: Morro Sharma is a 76 y.o. male.    Chief Complaint: Hospital Follow Up ([facility])    76 year old male recently hospitalized at [facility] May 7-10 following a syncopal episode and was found to be in Atrial fibrillation with rapid ventricular rate.  He was placed on a cardizem drip and converted to sinus rhythm and was then placed on metoprolol but while undergoing evaluation during a nuclear stress test he went back into atrial fib with rapid rate.  At that point digoxin was added and he was started on xarelto.  During hospitalization and workup multiple 3mm pulmonary nodules were found, he was seen by pulmonary, and the recommendation was to repeat CT chest in one year.  He was also found to have subcentimeter thyroid nodule with normal TSH.  No biopsy was needed.  He feels well but needs refills of his metoprolol, digoxin, and xarleto (taking 20 qd).    Past Medical History:  05/07/2017: Atrial fibrillation      Comment: [facility] records sent to scanning  No date: BPH (benign prostatic hypertrophy)  No date: Colon polyps  No date: Erectile dysfunction  No date: GERD (gastroesophageal reflux disease)  No date: Hypertension  No date: Pneumothorax on left  No date: Polycythemia vera  No date: Pulmonary nodules    Past Surgical History:  9/15/14: COLONOSCOPY      Comment: Dr. Mcdaniels, 5 year recheck  No date: ESOPHAGOGASTRODUODENOSCOPY  No date: LUNG SURGERY      Comment: LEFT - CHEST TUBE    Review of patient's family history indicates:    Hypertension                   Mother                    Heart disease                  Mother                    Hypertension                   Maternal Aunt             Heart disease                  Maternal Aunt             Hypertension                   Maternal Uncle            Heart disease                  Maternal Uncle            Early death                    Father                    No Known Problems              Daughter                  Prostate cancer                 Neg Hx                    Kidney cancer                  Neg Hx                    Cancer                         Neg Hx                    Urolithiasis                   Neg Hx                    Social History    Marital status:              Spouse name:                       Years of education:                 Number of children:               Social History Main Topics    Smoking status: Former Smoker                                                                Packs/day: 1.50      Years: 25.00          Quit date: 10/2/1982    Smokeless tobacco: Never Used                        Alcohol use: No              Drug use: No                Current Outpatient Prescriptions:     alfuzosin (UROXATRAL) 10 mg Tb24, Take 10 mg by mouth daily with breakfast., Disp: , Rfl:     aluminum hydrox-magnesium carb (GAVISCON EXTRA STRENGTH) 254-237.5 mg/5 mL Susp, Take 1 Dose by mouth daily as needed., Disp: , Rfl:     amlodipine-benazepril 5-20 mg (LOTREL) 5-20 mg per capsule, TAKE 1 CAPSULE EVERY DAY, Disp: 90 capsule, Rfl: 3    aspirin (ECOTRIN) 81 MG EC tablet, Take 81 mg by mouth once daily. , Disp: , Rfl:     DIGOX 125 mcg tablet, Take 1 tablet (125 mcg total) by mouth once daily., Disp: 30 tablet, Rfl: 3    esomeprazole magnesium (NEXIUM 24HR) 22.3 mg CpDR, Take 1 tablet by mouth daily as needed., Disp: , Rfl:     fluticasone (FLONASE) 50 mcg/actuation nasal spray, 2 sprays by Each Nare route once daily. (Patient taking differently: 2 sprays by Each Nare route daily as needed. ), Disp: 16 g, Rfl: 0    hydroxyurea (HYDREA) 500 mg Cap, Take 1 capsule (500 mg total) by mouth 2 (two) times daily., Disp: 180 capsule, Rfl: 1    metoprolol tartrate (LOPRESSOR) 50 MG tablet, Take 1 tablet (50 mg total) by mouth 2 (two) times daily., Disp: 60 tablet, Rfl: 3    multivitamin (ONE DAILY MULTIVITAMIN) per tablet, Take 1 tablet by mouth once daily., Disp: , Rfl:     polyethylene glycol (MIRALAX) 17  gram/dose powder, Take 17 g by mouth once daily., Disp: , Rfl:     XARELTO 20 mg Tab, Take 1 tablet (20 mg total) by mouth once daily., Disp: 30 tablet, Rfl: 3    tadalafil (CIALIS) 5 MG tablet, Take 1 tablet (5 mg total) by mouth daily as needed for Erectile Dysfunction., Disp: 30 tablet, Rfl: 12    TETANUS VACCINE due on 12/17/1958-DECLINES  Zoster Vaccine due on 12/17/2000-DECLINES        Review of Systems   Constitutional: Negative for activity change and unexpected weight change.   HENT: Negative for hearing loss, rhinorrhea and trouble swallowing.    Eyes: Negative for discharge and visual disturbance.   Respiratory: Negative for chest tightness and wheezing.    Cardiovascular: Positive for palpitations. Negative for chest pain.   Gastrointestinal: Negative for blood in stool, constipation, diarrhea and vomiting.   Endocrine: Negative for polydipsia and polyuria.   Genitourinary: Negative for difficulty urinating, hematuria and urgency.   Musculoskeletal: Positive for arthralgias. Negative for joint swelling and neck pain.   Neurological: Positive for headaches. Negative for weakness.   Psychiatric/Behavioral: Negative for confusion and dysphoric mood.       Objective:      Physical Exam   Constitutional: He is oriented to person, place, and time. He appears well-developed and well-nourished. No distress.   Good blood pressure control  Patient is normal weight with bmi of 27.9.   Weight is decreased by 2.9lb since last visit of 1/30/17.     HENT:   Head: Normocephalic and atraumatic.   Right Ear: External ear normal.   Left Ear: External ear normal.   Nose: Nose normal.   Mouth/Throat: Oropharynx is clear and moist. No oropharyngeal exudate.   Eyes: EOM are normal. Pupils are equal, round, and reactive to light. No scleral icterus.   Neck: Normal range of motion. Neck supple. No JVD present. No tracheal deviation present. No thyromegaly (no palpable nodules ) present.   Cardiovascular: Normal rate, regular  rhythm and normal heart sounds.  Exam reveals no gallop and no friction rub.    No murmur heard.  Carotid pulses 2+ no bruit   Pulmonary/Chest: Effort normal and breath sounds normal. No stridor. No respiratory distress. He has no wheezes. He has no rales. He exhibits no tenderness.   Abdominal: Soft. Bowel sounds are normal. He exhibits no distension and no mass. There is no tenderness. There is no rebound and no guarding. No hernia.   Lymphadenopathy:     He has no cervical adenopathy.   Neurological: He is alert and oriented to person, place, and time.   Skin: Skin is warm and dry. He is not diaphoretic.   Psychiatric: He has a normal mood and affect. His behavior is normal. Judgment and thought content normal.   Nursing note and vitals reviewed.      Assessment:       1. PAF (paroxysmal atrial fibrillation)    2. Pulmonary nodules/lesions, multiple    3. Multiple thyroid nodules    4. Good hypertension control    5. BMI 27.0-27.9,adult        Plan:       1. PAF (paroxysmal atrial fibrillation)  No changes needed, NSR with good rate.  Sees Dr. Hubbard tomorrow.  - DIGOX 125 mcg tablet; Take 1 tablet (125 mcg total) by mouth once daily.  Dispense: 30 tablet; Refill: 3  - metoprolol tartrate (LOPRESSOR) 50 MG tablet; Take 1 tablet (50 mg total) by mouth 2 (two) times daily.  Dispense: 60 tablet; Refill: 3  - XARELTO 20 mg Tab; Take 1 tablet (20 mg total) by mouth once daily.  Dispense: 30 tablet; Refill: 3    2. Pulmonary nodules/lesions, multiple  Repeat CT chest one year    3. Multiple thyroid nodules  Repeat ultrasound and TSH one year    4. Good hypertension control  No changes needed    5. BMI 27.0-27.9,adult  No changes needed         Patient readiness: acceptance and barriers:none    During the course of the visit the patient was educated and counseled about the following:     Hypertension:   Medication: no change.  Dietary sodium restriction.  Regular aerobic exercise.    Goals: Hypertension: Reduce Blood  Pressure    Did patient meet goals/outcomes: Yes    The following self management tools provided: blood pressure log    Patient Instructions (the written plan) was given to the patient/family.     Time spent with patient: 45 minutes

## 2017-07-17 DIAGNOSIS — R94.31 NONSPECIFIC ABNORMAL ELECTROCARDIOGRAM (ECG) (EKG): ICD-10-CM

## 2017-07-17 DIAGNOSIS — E04.9 UNSPECIFIED NONTOXIC NODULAR GOITER: ICD-10-CM

## 2017-07-17 DIAGNOSIS — R73.9 BLOOD GLUCOSE ELEVATED: ICD-10-CM

## 2017-07-17 DIAGNOSIS — E78.5 OTHER AND UNSPECIFIED HYPERLIPIDEMIA: ICD-10-CM

## 2017-07-17 DIAGNOSIS — I48.0 PAROXYSMAL ATRIAL FIBRILLATION: Primary | ICD-10-CM

## 2017-07-17 DIAGNOSIS — D64.89 OTHER SPECIFIED ANEMIAS: ICD-10-CM

## 2017-07-17 DIAGNOSIS — J44.89 OBSTRUCTIVE CHRONIC BRONCHITIS WITHOUT EXACERBATION: ICD-10-CM

## 2017-07-21 ENCOUNTER — LAB VISIT (OUTPATIENT)
Dept: LAB | Facility: HOSPITAL | Age: 77
End: 2017-07-21
Attending: INTERNAL MEDICINE
Payer: MEDICARE

## 2017-07-21 DIAGNOSIS — J44.89 OBSTRUCTIVE CHRONIC BRONCHITIS WITHOUT EXACERBATION: ICD-10-CM

## 2017-07-21 DIAGNOSIS — D64.89 OTHER SPECIFIED ANEMIAS: Primary | ICD-10-CM

## 2017-07-21 DIAGNOSIS — R94.31 NONSPECIFIC ABNORMAL ELECTROCARDIOGRAM (ECG) (EKG): ICD-10-CM

## 2017-07-21 DIAGNOSIS — I48.0 PAROXYSMAL ATRIAL FIBRILLATION: ICD-10-CM

## 2017-07-21 DIAGNOSIS — E78.5 OTHER AND UNSPECIFIED HYPERLIPIDEMIA: ICD-10-CM

## 2017-07-21 DIAGNOSIS — R73.9 BLOOD GLUCOSE ELEVATED: ICD-10-CM

## 2017-07-21 DIAGNOSIS — N17.8 ACUTE RENAL FAILURE WITH PATHOLOGICAL LESION IN KIDNEY: ICD-10-CM

## 2017-07-21 DIAGNOSIS — D64.89 OTHER SPECIFIED ANEMIAS: ICD-10-CM

## 2017-07-21 LAB — DIGOXIN SERPL-MCNC: 0.9 NG/ML

## 2017-07-21 PROCEDURE — 80162 ASSAY OF DIGOXIN TOTAL: CPT

## 2017-07-21 PROCEDURE — 36415 COLL VENOUS BLD VENIPUNCTURE: CPT | Mod: PO

## 2017-08-17 ENCOUNTER — HOSPITAL ENCOUNTER (OUTPATIENT)
Dept: RADIOLOGY | Facility: CLINIC | Age: 77
Discharge: HOME | End: 2017-08-17
Attending: INTERNAL MEDICINE
Payer: MEDICARE

## 2017-08-17 ENCOUNTER — OFFICE VISIT (OUTPATIENT)
Dept: ENDOCRINOLOGY | Facility: CLINIC | Age: 77
End: 2017-08-17
Payer: MEDICARE

## 2017-08-17 VITALS
WEIGHT: 182.38 LBS | RESPIRATION RATE: 18 BRPM | SYSTOLIC BLOOD PRESSURE: 98 MMHG | HEART RATE: 76 BPM | DIASTOLIC BLOOD PRESSURE: 68 MMHG | HEIGHT: 67 IN | BODY MASS INDEX: 28.63 KG/M2

## 2017-08-17 DIAGNOSIS — R61 DIAPHORESIS: ICD-10-CM

## 2017-08-17 DIAGNOSIS — E04.9 GOITER: Primary | ICD-10-CM

## 2017-08-17 DIAGNOSIS — E04.9 GOITER: ICD-10-CM

## 2017-08-17 PROCEDURE — 3074F SYST BP LT 130 MM HG: CPT | Mod: S$GLB,,, | Performed by: INTERNAL MEDICINE

## 2017-08-17 PROCEDURE — 99999 PR PBB SHADOW E&M-EST. PATIENT-LVL III: CPT | Mod: PBBFAC,,, | Performed by: INTERNAL MEDICINE

## 2017-08-17 PROCEDURE — 76536 US EXAM OF HEAD AND NECK: CPT | Mod: 26,,, | Performed by: RADIOLOGY

## 2017-08-17 PROCEDURE — 99499 UNLISTED E&M SERVICE: CPT | Mod: S$GLB,,, | Performed by: INTERNAL MEDICINE

## 2017-08-17 PROCEDURE — 76536 US EXAM OF HEAD AND NECK: CPT | Mod: TC,PO

## 2017-08-17 PROCEDURE — 3008F BODY MASS INDEX DOCD: CPT | Mod: S$GLB,,, | Performed by: INTERNAL MEDICINE

## 2017-08-17 PROCEDURE — 3078F DIAST BP <80 MM HG: CPT | Mod: S$GLB,,, | Performed by: INTERNAL MEDICINE

## 2017-08-17 PROCEDURE — 1126F AMNT PAIN NOTED NONE PRSNT: CPT | Mod: S$GLB,,, | Performed by: INTERNAL MEDICINE

## 2017-08-17 PROCEDURE — 99204 OFFICE O/P NEW MOD 45 MIN: CPT | Mod: S$GLB,,, | Performed by: INTERNAL MEDICINE

## 2017-08-17 PROCEDURE — 1159F MED LIST DOCD IN RCRD: CPT | Mod: S$GLB,,, | Performed by: INTERNAL MEDICINE

## 2017-08-17 RX ORDER — ATORVASTATIN CALCIUM 40 MG/1
40 TABLET, FILM COATED ORAL NIGHTLY
COMMUNITY
End: 2017-08-17 | Stop reason: SDUPTHER

## 2017-08-17 NOTE — PROGRESS NOTES
CHIEF COMPLAINT: Thyroid Nodules  76 year old being seen as a new patient. States that has been having increased diaphoresis for 10 years approx. States that his cardiologist told him that he had thyroid nodules. Does not appear he had an US. No flushing or wheezing. No difficulty swallowing.       PAST MEDICAL HISTORY/PAST SURGICAL HISTORY:  Reviewed in [facility]    SOCIAL HISTORY: No T/A    FAMILY HISTORY:  No known thyroid disorders. No DM    MEDICATIONS/ALLERGIES: The patient's MedCard has been updated and reviewed.      ROS:   Constitutional: No recent significant weight change  Eyes: No recent visual changes  ENT: No dysphagia  Cardiovascular: Denies current anginal symptoms  Respiratory: Denies current respiratory difficulty  Gastrointestinal: Denies recent bowel disturbances  GenitoUrinary - No dysuria  Skin: No new skin rash  Neurologic: No focal neurologic complaints  Remainder ROS negative        PE:    GENERAL: Well developed, well nourished.  PSYCH:  appropriate mood and affect  EYES:  PERRL, EOM intact.  ENT: Nares patent, oropharynx clear, mucosa pink,   NECK: Supple, trachea midline, Thyroid enlarged and irreg. No distinct nodule palpable.   CHEST: Resp even and unlabored, CTA bilateral.  CARDIAC: RRR, S1, S2 heard, no murmurs, rubs, S3, or S4  ABDOMEN: Soft, non-tender, non-distended;  No organomegaly  VASCULAR:  DP pulses +2/4 bilaterally, no edema  NEURO: Gait steady, CN II-VII grossly intact  SKIN: No areas of breakdown, no acanthosis nigracans.        ASSESSMENT/PLAN:  1. Goiter- Check Thyroid US.     2. Diaphoresis- See labs below. States has been going for many years.     FOLLOWUP  TSH, metanephrines, Thyroid US

## 2017-08-31 ENCOUNTER — PATIENT MESSAGE (OUTPATIENT)
Dept: ENDOCRINOLOGY | Facility: CLINIC | Age: 77
End: 2017-08-31

## 2017-08-31 DIAGNOSIS — R68.89 ABNORMAL ENDOCRINE LABORATORY TEST FINDING: Primary | ICD-10-CM

## 2017-09-01 ENCOUNTER — PATIENT MESSAGE (OUTPATIENT)
Dept: ENDOCRINOLOGY | Facility: CLINIC | Age: 77
End: 2017-09-01

## 2017-09-01 NOTE — TELEPHONE ENCOUNTER
Let him know is metanephrines came back elevated. Will need to do more testing. Will need 24 hour urine metanephrines

## 2017-09-05 ENCOUNTER — LAB VISIT (OUTPATIENT)
Dept: LAB | Facility: HOSPITAL | Age: 77
End: 2017-09-05
Attending: INTERNAL MEDICINE
Payer: MEDICARE

## 2017-09-05 DIAGNOSIS — R68.89 ABNORMAL ENDOCRINE LABORATORY TEST FINDING: ICD-10-CM

## 2017-09-05 PROCEDURE — 83835 ASSAY OF METANEPHRINES: CPT

## 2017-09-08 ENCOUNTER — TELEPHONE (OUTPATIENT)
Dept: ENDOCRINOLOGY | Facility: CLINIC | Age: 77
End: 2017-09-08

## 2017-09-08 DIAGNOSIS — R68.89 ABNORMAL ENDOCRINE LABORATORY TEST FINDING: Primary | ICD-10-CM

## 2017-09-08 LAB
COLLECT DURATION TIME SPEC: NORMAL HR
CREAT 24H UR-MRATE: NORMAL MG/D (ref 800–2100)
CREAT UR-MCNC: 182 MG/DL
METANEPH 24H UR-MCNC: 339 UG/L
METANEPH 24H UR-MRATE: NORMAL UG/D (ref 62–207)
METANEPH+NORMETANEPH UR-IMP: NORMAL
METANEPH/CREAT 24H UR: 186 UG/G CRT (ref 0–300)
NORMETANEPHRINE 24H UR-MCNC: 375 UG/L
NORMETANEPHRINE 24H UR-MRATE: NORMAL UG/D (ref 125–510)
NORMETANEPHRINE/CREAT 24H UR: 206 UG/G CRT (ref 0–400)
SPECIMEN VOL ?TM UR: NORMAL ML

## 2017-09-08 NOTE — TELEPHONE ENCOUNTER
Per pt he did not do the urine sample for 24 hours he did it one time and gave to the lab. He has the container and understands the instructions and will bring to lab when complete. Please enter a new order

## 2017-09-12 ENCOUNTER — LAB VISIT (OUTPATIENT)
Dept: LAB | Facility: HOSPITAL | Age: 77
End: 2017-09-12
Attending: INTERNAL MEDICINE
Payer: MEDICARE

## 2017-09-12 DIAGNOSIS — R68.89 ABNORMAL ENDOCRINE LABORATORY TEST FINDING: ICD-10-CM

## 2017-09-12 PROCEDURE — 83835 ASSAY OF METANEPHRINES: CPT

## 2017-09-15 ENCOUNTER — PATIENT MESSAGE (OUTPATIENT)
Dept: ENDOCRINOLOGY | Facility: CLINIC | Age: 77
End: 2017-09-15

## 2017-09-16 LAB
COLLECT DURATION TIME SPEC: 24 HR
CREAT 24H UR-MRATE: 1881 MG/D (ref 800–2100)
CREAT UR-MCNC: 114 MG/DL
METANEPH 24H UR-MCNC: 222 UG/L
METANEPH 24H UR-MRATE: 366 UG/D (ref 62–207)
METANEPH+NORMETANEPH UR-IMP: ABNORMAL
METANEPH/CREAT 24H UR: 195 UG/G CRT (ref 0–300)
NORMETANEPHRINE 24H UR-MCNC: 279 UG/L
NORMETANEPHRINE 24H UR-MRATE: 460 UG/D (ref 125–510)
NORMETANEPHRINE/CREAT 24H UR: 245 UG/G CRT (ref 0–400)
SPECIMEN VOL ?TM UR: 1650 ML

## 2017-09-18 ENCOUNTER — TELEPHONE (OUTPATIENT)
Dept: ENDOCRINOLOGY | Facility: CLINIC | Age: 77
End: 2017-09-18

## 2017-09-18 DIAGNOSIS — R68.89 ABNORMAL ENDOCRINE LABORATORY TEST FINDING: Primary | ICD-10-CM

## 2017-09-18 NOTE — TELEPHONE ENCOUNTER
Let him know his adrenal test is still coming back abnormal. Will need MRI ABD to check adrenal. Can you verify no metal implants so I can put the order in

## 2017-09-22 ENCOUNTER — TELEPHONE (OUTPATIENT)
Dept: ENDOCRINOLOGY | Facility: CLINIC | Age: 77
End: 2017-09-22

## 2017-09-22 NOTE — TELEPHONE ENCOUNTER
----- Message from Iris Nunn sent at 9/22/2017  3:29 PM CDT -----  Contact: Patient  Morro, patient [phone], returning the nurse's call. Please advise. Thanks

## 2017-09-25 ENCOUNTER — PATIENT MESSAGE (OUTPATIENT)
Dept: HEMATOLOGY/ONCOLOGY | Facility: CLINIC | Age: 77
End: 2017-09-25

## 2017-09-29 ENCOUNTER — HOSPITAL ENCOUNTER (OUTPATIENT)
Dept: RADIOLOGY | Facility: HOSPITAL | Age: 77
Discharge: HOME | End: 2017-09-29
Attending: INTERNAL MEDICINE
Payer: MEDICARE

## 2017-09-29 DIAGNOSIS — R68.89 ABNORMAL ENDOCRINE LABORATORY TEST FINDING: ICD-10-CM

## 2017-09-29 PROCEDURE — 74183 MRI ABD W/O CNTR FLWD CNTR: CPT | Mod: 26,,, | Performed by: RADIOLOGY

## 2017-09-29 PROCEDURE — 25500020 PHARM REV CODE 255: Performed by: INTERNAL MEDICINE

## 2017-09-29 PROCEDURE — 74183 MRI ABD W/O CNTR FLWD CNTR: CPT | Mod: TC

## 2017-09-29 PROCEDURE — A9585 GADOBUTROL INJECTION: HCPCS | Performed by: INTERNAL MEDICINE

## 2017-09-29 RX ORDER — GADOBUTROL 604.72 MG/ML
INJECTION INTRAVENOUS
Status: DISPENSED
Start: 2017-09-29 | End: 2017-09-29

## 2017-09-29 RX ORDER — GADOBUTROL 604.72 MG/ML
8 INJECTION INTRAVENOUS
Status: COMPLETED | OUTPATIENT
Start: 2017-09-29 | End: 2017-09-29

## 2017-09-29 RX ADMIN — GADOBUTROL 8 ML: 604.72 INJECTION INTRAVENOUS at 09:09

## 2017-10-10 ENCOUNTER — TELEPHONE (OUTPATIENT)
Dept: ENDOCRINOLOGY | Facility: CLINIC | Age: 77
End: 2017-10-10

## 2017-10-10 NOTE — TELEPHONE ENCOUNTER
Dr. No please review his meds and let me know if you want any of them held prior to the Clonidine Suppression Test

## 2017-10-11 ENCOUNTER — OFFICE VISIT (OUTPATIENT)
Dept: HEMATOLOGY/ONCOLOGY | Facility: CLINIC | Age: 77
End: 2017-10-11
Payer: MEDICARE

## 2017-10-11 ENCOUNTER — LAB VISIT (OUTPATIENT)
Dept: LAB | Facility: HOSPITAL | Age: 77
End: 2017-10-11
Attending: NURSE PRACTITIONER
Payer: MEDICARE

## 2017-10-11 VITALS
TEMPERATURE: 97 F | DIASTOLIC BLOOD PRESSURE: 75 MMHG | SYSTOLIC BLOOD PRESSURE: 135 MMHG | HEIGHT: 67 IN | RESPIRATION RATE: 18 BRPM | WEIGHT: 178.56 LBS | BODY MASS INDEX: 28.02 KG/M2 | HEART RATE: 50 BPM

## 2017-10-11 DIAGNOSIS — D45 POLYCYTHEMIA VERA: Primary | ICD-10-CM

## 2017-10-11 DIAGNOSIS — D45 PV (POLYCYTHEMIA VERA): ICD-10-CM

## 2017-10-11 LAB
ALBUMIN SERPL BCP-MCNC: 4.2 G/DL
ALP SERPL-CCNC: 76 U/L
ALT SERPL W/O P-5'-P-CCNC: 41 U/L
ANION GAP SERPL CALC-SCNC: 9 MMOL/L
AST SERPL-CCNC: 30 U/L
BASOPHILS # BLD AUTO: 0.08 K/UL
BASOPHILS NFR BLD: 1.3 %
BILIRUB SERPL-MCNC: 0.9 MG/DL
BUN SERPL-MCNC: 16 MG/DL
CALCIUM SERPL-MCNC: 9.9 MG/DL
CHLORIDE SERPL-SCNC: 104 MMOL/L
CO2 SERPL-SCNC: 29 MMOL/L
CREAT SERPL-MCNC: 1.22 MG/DL
DIFFERENTIAL METHOD: ABNORMAL
EOSINOPHIL # BLD AUTO: 0 K/UL
EOSINOPHIL NFR BLD: 0.6 %
ERYTHROCYTE [DISTWIDTH] IN BLOOD BY AUTOMATED COUNT: 13.1 %
EST. GFR  (AFRICAN AMERICAN): >60 ML/MIN/1.73 M^2
EST. GFR  (NON AFRICAN AMERICAN): 57 ML/MIN/1.73 M^2
GLUCOSE SERPL-MCNC: 107 MG/DL
HCT VFR BLD AUTO: 44.3 %
HGB BLD-MCNC: 14.2 G/DL
LDH SERPL L TO P-CCNC: 665 U/L
LYMPHOCYTES # BLD AUTO: 0.9 K/UL
LYMPHOCYTES NFR BLD: 13.8 %
MCH RBC QN AUTO: 34.7 PG
MCHC RBC AUTO-ENTMCNC: 32.1 G/DL
MCV RBC AUTO: 108 FL
MONOCYTES # BLD AUTO: 0.4 K/UL
MONOCYTES NFR BLD: 6.1 %
NEUTROPHILS # BLD AUTO: 4.9 K/UL
NEUTROPHILS NFR BLD: 78.2 %
NRBC BLD-RTO: 0 /100 WBC
PLATELET # BLD AUTO: 384 K/UL
PMV BLD AUTO: 10 FL
POTASSIUM SERPL-SCNC: 4.6 MMOL/L
PROT SERPL-MCNC: 7 G/DL
RBC # BLD AUTO: 4.09 M/UL
SODIUM SERPL-SCNC: 142 MMOL/L
WBC # BLD AUTO: 6.22 K/UL

## 2017-10-11 PROCEDURE — 83615 LACTATE (LD) (LDH) ENZYME: CPT

## 2017-10-11 PROCEDURE — 99499 UNLISTED E&M SERVICE: CPT | Mod: S$GLB,,, | Performed by: NURSE PRACTITIONER

## 2017-10-11 PROCEDURE — 83615 LACTATE (LD) (LDH) ENZYME: CPT | Mod: PN

## 2017-10-11 PROCEDURE — 85025 COMPLETE CBC W/AUTO DIFF WBC: CPT

## 2017-10-11 PROCEDURE — 80053 COMPREHEN METABOLIC PANEL: CPT

## 2017-10-11 PROCEDURE — 85025 COMPLETE CBC W/AUTO DIFF WBC: CPT | Mod: PN

## 2017-10-11 PROCEDURE — 99213 OFFICE O/P EST LOW 20 MIN: CPT | Mod: S$GLB,,, | Performed by: NURSE PRACTITIONER

## 2017-10-11 PROCEDURE — 99999 PR PBB SHADOW E&M-EST. PATIENT-LVL III: CPT | Mod: PBBFAC,,, | Performed by: NURSE PRACTITIONER

## 2017-10-11 PROCEDURE — 80053 COMPREHEN METABOLIC PANEL: CPT | Mod: PN

## 2017-10-11 PROCEDURE — 36415 COLL VENOUS BLD VENIPUNCTURE: CPT | Mod: PN

## 2017-10-11 RX ORDER — ATORVASTATIN CALCIUM 40 MG/1
40 TABLET, FILM COATED ORAL DAILY
COMMUNITY
End: 2019-01-11 | Stop reason: SDUPTHER

## 2017-10-11 RX ORDER — DIGOXIN 125 MCG
125 TABLET ORAL DAILY
COMMUNITY
End: 2018-05-05 | Stop reason: SDUPTHER

## 2017-10-11 NOTE — PROGRESS NOTES
HISTORY OF PRESENT ILLNESS:  This is a 76-year-old black gentleman known to Dr. Cole for polycythemia vera.  He is managed with Hydrea 500 mg twice a day as   well as 81 mg of aspirin.  He presents to the clinic today for his interval   six-month evaluation.  He reports that he was diagnosed with PAF in June/2017 by   Dr. Escobar.  He is off ASA and now on Xarelto.  He remains compliant with Hydrea.   He reports that he developed excessive sweating from his head.  He is seeing Dr. No   for this.  He denies any difficulties with fevers, chills, drenching night sweats, unexplained weight   loss, painful lymphadenopathy, rashes, pruritus, changes in the color of his stool/urine , jaundice,  chest pain, irregular heartbeat, nausea, vomiting, constipation, diarrhea, etc.  No other new   complaints or pertinent findings on a 10-point review of systems.    PHYSICAL EXAMINATION:  GENERAL:  Well-developed, well-nourished black gentleman in no acute distress.    Alert and oriented x4.  VITAL SIGNS:  Weight 178.6 pounds (gain of 1/2 pound in six months),   /75, pulse 50 regular, respirations 18, temp 97.4.  HEENT:  Normocephalic, atraumatic.  Oral mucosa pink and moist.  Lips without   lesions.  Tongue midline.  Oropharynx clear.  Nonicteric sclerae.  NECK:  Supple.  No adenopathy.  HEART:  Regular rate and rhythm without murmur, gallop or rub.  LUNGS:  Clear to auscultation bilaterally.  ABDOMEN:  Soft, nontender and nondistended with positive normoactive bowel   sounds.  No hepatosplenomegaly.  EXTREMITIES:  No cyanosis, clubbing or edema.  Distal pulses are intact.  AXILLAE AND GROIN:  No palpable pathologic lymphadenopathy is appreciated.    LABORATORY:    Lab Results   Component Value Date    WBC 6.22 10/11/2017    HGB 14.2 10/11/2017    HCT 44.3 10/11/2017     (H) 10/11/2017     (H) 10/11/2017     Differential remarkable for 78.2% grans, 13.8% lymph    CMP  Sodium   Date Value Ref Range Status    10/11/2017 142 136 - 145 mmol/L Final     Potassium   Date Value Ref Range Status   10/11/2017 4.6 3.5 - 5.1 mmol/L Final     Chloride   Date Value Ref Range Status   10/11/2017 104 95 - 110 mmol/L Final     CO2   Date Value Ref Range Status   10/11/2017 29 22 - 31 mmol/L Final     Glucose   Date Value Ref Range Status   10/11/2017 107 70 - 110 mg/dL Final     Comment:     The ADA recommends the following guidelines for fasting glucose:  Normal:       less than 100 mg/dL  Prediabetes:  100 mg/dL to 125 mg/dL  Diabetes:     126 mg/dL or higher       BUN, Bld   Date Value Ref Range Status   10/11/2017 16 9 - 21 mg/dL Final     Creatinine   Date Value Ref Range Status   10/11/2017 1.22 0.50 - 1.40 mg/dL Final   10/02/2012 1.3 0.2 - 1.4 mg/dl Final     Calcium   Date Value Ref Range Status   10/11/2017 9.9 8.4 - 10.2 mg/dL Final   10/02/2012 10.2 8.6 - 10.2 mg/dl Final     Total Protein   Date Value Ref Range Status   10/11/2017 7.0 6.0 - 8.4 g/dL Final     Albumin   Date Value Ref Range Status   10/11/2017 4.2 3.5 - 5.2 g/dL Final     Total Bilirubin   Date Value Ref Range Status   10/11/2017 0.9 0.2 - 1.3 mg/dL Final     Alkaline Phosphatase   Date Value Ref Range Status   10/11/2017 76 38 - 145 U/L Final     AST   Date Value Ref Range Status   10/11/2017 30 17 - 59 U/L Final     ALT   Date Value Ref Range Status   10/11/2017 41 10 - 44 U/L Final     Anion Gap   Date Value Ref Range Status   10/11/2017 9 8 - 16 mmol/L Final   10/02/2012 7 5 - 15 meq/L Final     eGFR if    Date Value Ref Range Status   10/11/2017 >60 >60 mL/min/1.73 m^2 Final     eGFR if non    Date Value Ref Range Status   10/11/2017 57 (A) >60 mL/min/1.73 m^2 Final     Comment:     Calculation used to obtain the estimated glomerular filtration  rate (eGFR) is the CKD-EPI equation. Since race is unknown   in our information system, the eGFR values for   -American and Non--American patients are given    for each creatinine result.           IMPRESSION:    1.  Polycythemia vera - at target with hematocrit < 45; at goal, no phlebotomy.  2.  New onset AFib (0606/2017); on Metoprolol, Digoxin & Xarelto. - followed by Dr. Escobar.    PLAN:  1.  Continue our course of observation and have the patient return to   clinic in six months with interval CBC, CMP and LDH prior.  2.  The patient will continue Hydrea 500 mg b.i.d.  Presently on Xarelto.  3.  The patient will maintain good daily hydration of 2 L per day.    Assessment/plan reviewed and approved by Dr. Cole.

## 2017-10-23 NOTE — TELEPHONE ENCOUNTER
I left a message for the patient to call me back re: holding the Metoprolol for 48 hours prior to the Clonidine Suppression Test scheduled for 11/9/17.

## 2017-11-08 ENCOUNTER — PATIENT MESSAGE (OUTPATIENT)
Dept: HEMATOLOGY/ONCOLOGY | Facility: CLINIC | Age: 77
End: 2017-11-08

## 2017-11-08 ENCOUNTER — TELEPHONE (OUTPATIENT)
Dept: HEMATOLOGY/ONCOLOGY | Facility: CLINIC | Age: 77
End: 2017-11-08

## 2017-11-08 ENCOUNTER — TELEPHONE (OUTPATIENT)
Dept: ENDOCRINOLOGY | Facility: CLINIC | Age: 77
End: 2017-11-08

## 2017-11-08 DIAGNOSIS — D47.3 ESSENTIAL THROMBOCYTOSIS: ICD-10-CM

## 2017-11-08 NOTE — TELEPHONE ENCOUNTER
LM reminding pt of tomorrow's appt. Instructions for procedure given. Encouraged to call clinic if there are any concerns or questions

## 2017-11-08 NOTE — TELEPHONE ENCOUNTER
----- Message from Klarissa Ibrahim sent at 11/8/2017 12:53 PM CST -----  Contact: wife, Xi Sharma  Patient called requesting a medication refill. Please see details below.  Medication Name:Hydrea  Medication Strength:500 mg  Preferred pharmacy:[facility]  First time calling about this refill (y/n):y  Call Back Number:[phone]   [facility] - [place], [place] - [address]  [address] [address]  Phone: [phone] Fax: [phone]

## 2017-11-09 ENCOUNTER — CLINICAL SUPPORT (OUTPATIENT)
Dept: ENDOCRINOLOGY | Facility: CLINIC | Age: 77
End: 2017-11-09
Payer: MEDICARE

## 2017-11-09 ENCOUNTER — LAB VISIT (OUTPATIENT)
Dept: LAB | Facility: HOSPITAL | Age: 77
End: 2017-11-09
Attending: INTERNAL MEDICINE
Payer: MEDICARE

## 2017-11-09 DIAGNOSIS — R73.9 BLOOD GLUCOSE ELEVATED: ICD-10-CM

## 2017-11-09 DIAGNOSIS — R61 DIAPHORESIS: Primary | ICD-10-CM

## 2017-11-09 DIAGNOSIS — I13.10 BENIGN HYPERTENSIVE HEART AND RENAL DISEASE: ICD-10-CM

## 2017-11-09 DIAGNOSIS — R94.31 NONSPECIFIC ABNORMAL ELECTROCARDIOGRAM (ECG) (EKG): ICD-10-CM

## 2017-11-09 DIAGNOSIS — E34.9 ANEMIA OF ENDOCRINE DISORDER: Primary | ICD-10-CM

## 2017-11-09 DIAGNOSIS — R61 DIAPHORESIS: ICD-10-CM

## 2017-11-09 DIAGNOSIS — D64.89 ANEMIA OF ENDOCRINE DISORDER: Primary | ICD-10-CM

## 2017-11-09 DIAGNOSIS — E78.5 HYPERLIPEMIA: ICD-10-CM

## 2017-11-09 LAB
ALBUMIN SERPL BCP-MCNC: 3.5 G/DL
ALP SERPL-CCNC: 80 U/L
ALT SERPL W/O P-5'-P-CCNC: 26 U/L
ANION GAP SERPL CALC-SCNC: 6 MMOL/L
AST SERPL-CCNC: 27 U/L
BASOPHILS # BLD AUTO: 0.07 K/UL
BASOPHILS NFR BLD: 1.4 %
BILIRUB SERPL-MCNC: 1.2 MG/DL
BUN SERPL-MCNC: 15 MG/DL
CALCIUM SERPL-MCNC: 9.5 MG/DL
CHLORIDE SERPL-SCNC: 109 MMOL/L
CHOLEST SERPL-MCNC: 90 MG/DL
CHOLEST/HDLC SERPL: 3.6 {RATIO}
CO2 SERPL-SCNC: 27 MMOL/L
CREAT SERPL-MCNC: 1.3 MG/DL
DIFFERENTIAL METHOD: ABNORMAL
DIGOXIN SERPL-MCNC: 0.5 NG/ML
EOSINOPHIL # BLD AUTO: 0 K/UL
EOSINOPHIL NFR BLD: 0.8 %
ERYTHROCYTE [DISTWIDTH] IN BLOOD BY AUTOMATED COUNT: 13.1 %
EST. GFR  (AFRICAN AMERICAN): >60 ML/MIN/1.73 M^2
EST. GFR  (NON AFRICAN AMERICAN): 53 ML/MIN/1.73 M^2
GLUCOSE SERPL-MCNC: 100 MG/DL
HCT VFR BLD AUTO: 39.1 %
HDLC SERPL-MCNC: 25 MG/DL
HDLC SERPL: 27.8 %
HGB BLD-MCNC: 12.4 G/DL
IMM GRANULOCYTES # BLD AUTO: 0.01 K/UL
IMM GRANULOCYTES NFR BLD AUTO: 0.2 %
LDLC SERPL CALC-MCNC: 49.4 MG/DL
LYMPHOCYTES # BLD AUTO: 0.5 K/UL
LYMPHOCYTES NFR BLD: 10.6 %
MCH RBC QN AUTO: 34.3 PG
MCHC RBC AUTO-ENTMCNC: 31.7 G/DL
MCV RBC AUTO: 108 FL
MONOCYTES # BLD AUTO: 0.3 K/UL
MONOCYTES NFR BLD: 4.9 %
NEUTROPHILS # BLD AUTO: 4.2 K/UL
NEUTROPHILS NFR BLD: 82.1 %
NONHDLC SERPL-MCNC: 65 MG/DL
NRBC BLD-RTO: 0 /100 WBC
PLATELET # BLD AUTO: 404 K/UL
PMV BLD AUTO: 10.6 FL
POTASSIUM SERPL-SCNC: 4.3 MMOL/L
PROT SERPL-MCNC: 6.3 G/DL
RBC # BLD AUTO: 3.62 M/UL
SODIUM SERPL-SCNC: 142 MMOL/L
TRIGL SERPL-MCNC: 78 MG/DL
WBC # BLD AUTO: 5.08 K/UL

## 2017-11-09 PROCEDURE — 99999 PR PBB SHADOW E&M-EST. PATIENT-LVL I: CPT | Mod: PBBFAC,,,

## 2017-11-09 PROCEDURE — 80053 COMPREHEN METABOLIC PANEL: CPT

## 2017-11-09 PROCEDURE — 82384 ASSAY THREE CATECHOLAMINES: CPT

## 2017-11-09 PROCEDURE — 80061 LIPID PANEL: CPT

## 2017-11-09 PROCEDURE — 83835 ASSAY OF METANEPHRINES: CPT | Mod: 91

## 2017-11-09 PROCEDURE — 85025 COMPLETE CBC W/AUTO DIFF WBC: CPT

## 2017-11-09 PROCEDURE — 36415 COLL VENOUS BLD VENIPUNCTURE: CPT | Mod: PO

## 2017-11-09 PROCEDURE — 80162 ASSAY OF DIGOXIN TOTAL: CPT

## 2017-11-09 RX ORDER — CLONIDINE HYDROCHLORIDE 0.1 MG/1
0.3 TABLET ORAL
Status: COMPLETED | OUTPATIENT
Start: 2017-11-09 | End: 2017-11-09

## 2017-11-09 RX ORDER — HYDROXYUREA 500 MG/1
500 CAPSULE ORAL 2 TIMES DAILY
Qty: 180 CAPSULE | Refills: 1 | Status: SHIPPED | OUTPATIENT
Start: 2017-11-09 | End: 2017-11-10 | Stop reason: SDUPTHER

## 2017-11-09 RX ADMIN — CLONIDINE HYDROCHLORIDE 0.3 MG: 0.1 TABLET ORAL at 09:11

## 2017-11-09 NOTE — PROGRESS NOTES
Pt arrived in room at 8:05. Verbalized d/c of all morning meds this AM (and d/c metoprolol x 48 hrs). Supine with rest period from 8:15-8:45. Initial 3 sets of vitals 5 min apart were: 122/73, 119/69, 112/72.   IV Intima 22G started in Lt AC at 9:10 with baseline labs drawn. Clonidine 0.3 mg administered at 9:12. Vital signs done q 30 min (starting at 9:25): 131/80, 116/72, 98/64, 95/60, 106/68, 101/65, 111/74. Subsequent Lab draws on the hour at 10:12, then 11:12, then 12:12. At 12:25 IV d/c with tip intact. Pt ambulated out of clinic with belongings and family at side. Instructed on increasing fluids to help B/P reach baseline limits. Instructed on  Fall Precautions. Pt verbalized understanding that Dr. No's office will call to notify of procedure results when available.

## 2017-11-10 DIAGNOSIS — D47.3 ESSENTIAL THROMBOCYTOSIS: ICD-10-CM

## 2017-11-10 RX ORDER — HYDROXYUREA 500 MG/1
500 CAPSULE ORAL 2 TIMES DAILY
Qty: 180 CAPSULE | Refills: 1 | Status: SHIPPED | OUTPATIENT
Start: 2017-11-10 | End: 2018-03-27 | Stop reason: SDUPTHER

## 2017-11-14 LAB
METANEPH FREE SERPL-MCNC: 109 PG/ML
METANEPH FREE SERPL-MCNC: 56 PG/ML
METANEPHS SERPL-MCNC: 100 PG/ML
METANEPHS SERPL-MCNC: 221 PG/ML
NORMETANEPH FREE SERPL-MCNC: 112 PG/ML
NORMETANEPH FREE SERPL-MCNC: 44 PG/ML

## 2017-11-15 LAB
CATECHOLS PLAS-MCNC: 541 PG/ML
DOPAMINE SERPL-MCNC: NORMAL PG/ML
EPINEPH PLAS-MCNC: 94 PG/ML
NOREPINEPH PLAS-MCNC: 447 PG/ML

## 2017-11-16 LAB
CATECHOLS PLAS-MCNC: 116 PG/ML
CATECHOLS PLAS-MCNC: 179 PG/ML
CATECHOLS PLAS-MCNC: 237 PG/ML
DOPAMINE SERPL-MCNC: 12 PG/ML
DOPAMINE SERPL-MCNC: 15 PG/ML
DOPAMINE SERPL-MCNC: 16 PG/ML
EPINEPH PLAS-MCNC: 28 PG/ML
EPINEPH PLAS-MCNC: ABNORMAL PG/ML
EPINEPH PLAS-MCNC: ABNORMAL PG/ML
NOREPINEPH PLAS-MCNC: 100 PG/ML
NOREPINEPH PLAS-MCNC: 164 PG/ML
NOREPINEPH PLAS-MCNC: 197 PG/ML

## 2017-11-17 LAB
METANEPH FREE SERPL-MCNC: 111 PG/ML
METANEPH FREE SERPL-MCNC: 123 PG/ML
METANEPHS SERPL-MCNC: 182 PG/ML
METANEPHS SERPL-MCNC: 227 PG/ML
NORMETANEPH FREE SERPL-MCNC: 104 PG/ML
NORMETANEPH FREE SERPL-MCNC: 71 PG/ML

## 2017-12-11 ENCOUNTER — IMMUNIZATION (OUTPATIENT)
Dept: FAMILY MEDICINE | Facility: CLINIC | Age: 77
End: 2017-12-11
Payer: MEDICARE

## 2017-12-11 PROCEDURE — 90662 IIV NO PRSV INCREASED AG IM: CPT | Mod: S$GLB,,, | Performed by: INTERNAL MEDICINE

## 2017-12-11 PROCEDURE — G0008 ADMIN INFLUENZA VIRUS VAC: HCPCS | Mod: S$GLB,,, | Performed by: INTERNAL MEDICINE

## 2018-01-11 ENCOUNTER — PES CALL (OUTPATIENT)
Dept: ADMINISTRATIVE | Facility: CLINIC | Age: 78
End: 2018-01-11

## 2018-01-15 ENCOUNTER — LAB VISIT (OUTPATIENT)
Dept: LAB | Facility: HOSPITAL | Age: 78
End: 2018-01-15
Attending: INTERNAL MEDICINE
Payer: MEDICARE

## 2018-01-15 DIAGNOSIS — Z79.01 LONG TERM (CURRENT) USE OF ANTICOAGULANTS: ICD-10-CM

## 2018-01-15 DIAGNOSIS — E78.5 HYPERLIPEMIA: ICD-10-CM

## 2018-01-15 DIAGNOSIS — E04.9 ENLARGEMENT OF THYROID: ICD-10-CM

## 2018-01-15 DIAGNOSIS — D64.89 OTHER SPECIFIED ANEMIAS: ICD-10-CM

## 2018-01-15 DIAGNOSIS — N17.8 ACUTE RENAL FAILURE WITH PATHOLOGICAL LESION IN KIDNEY: ICD-10-CM

## 2018-01-15 DIAGNOSIS — I48.0 PAROXYSMAL ATRIAL FIBRILLATION: ICD-10-CM

## 2018-01-15 DIAGNOSIS — I48.0 PAROXYSMAL ATRIAL FIBRILLATION: Primary | ICD-10-CM

## 2018-01-15 LAB
ALBUMIN SERPL BCP-MCNC: 3.8 G/DL
ALP SERPL-CCNC: 93 U/L
ALT SERPL W/O P-5'-P-CCNC: 28 U/L
ANION GAP SERPL CALC-SCNC: 8 MMOL/L
ANISOCYTOSIS BLD QL SMEAR: SLIGHT
AST SERPL-CCNC: 31 U/L
BASOPHILS # BLD AUTO: 0.09 K/UL
BASOPHILS NFR BLD: 1.2 %
BILIRUB SERPL-MCNC: 1.2 MG/DL
BUN SERPL-MCNC: 16 MG/DL
CALCIUM SERPL-MCNC: 10 MG/DL
CHLORIDE SERPL-SCNC: 104 MMOL/L
CO2 SERPL-SCNC: 27 MMOL/L
CREAT SERPL-MCNC: 1.6 MG/DL
DACRYOCYTES BLD QL SMEAR: ABNORMAL
DIFFERENTIAL METHOD: ABNORMAL
DIGOXIN SERPL-MCNC: 0.3 NG/ML
EOSINOPHIL # BLD AUTO: 0.1 K/UL
EOSINOPHIL NFR BLD: 1.1 %
ERYTHROCYTE [DISTWIDTH] IN BLOOD BY AUTOMATED COUNT: 12.2 %
EST. GFR  (AFRICAN AMERICAN): 47.3 ML/MIN/1.73 M^2
EST. GFR  (NON AFRICAN AMERICAN): 40.9 ML/MIN/1.73 M^2
GLUCOSE SERPL-MCNC: 134 MG/DL
HCT VFR BLD AUTO: 44.4 %
HGB BLD-MCNC: 14.2 G/DL
IMM GRANULOCYTES # BLD AUTO: 0.04 K/UL
IMM GRANULOCYTES NFR BLD AUTO: 0.5 %
LYMPHOCYTES # BLD AUTO: 1 K/UL
LYMPHOCYTES NFR BLD: 13.5 %
MCH RBC QN AUTO: 34.1 PG
MCHC RBC AUTO-ENTMCNC: 32 G/DL
MCV RBC AUTO: 107 FL
MONOCYTES # BLD AUTO: 0.3 K/UL
MONOCYTES NFR BLD: 3.9 %
NEUTROPHILS # BLD AUTO: 5.9 K/UL
NEUTROPHILS NFR BLD: 79.8 %
NRBC BLD-RTO: 0 /100 WBC
OVALOCYTES BLD QL SMEAR: ABNORMAL
PLATELET # BLD AUTO: 459 K/UL
PMV BLD AUTO: 10.2 FL
POIKILOCYTOSIS BLD QL SMEAR: SLIGHT
POTASSIUM SERPL-SCNC: 3.9 MMOL/L
PROT SERPL-MCNC: 6.9 G/DL
RBC # BLD AUTO: 4.17 M/UL
SODIUM SERPL-SCNC: 139 MMOL/L
WBC # BLD AUTO: 7.35 K/UL

## 2018-01-15 PROCEDURE — 36415 COLL VENOUS BLD VENIPUNCTURE: CPT | Mod: PO

## 2018-01-15 PROCEDURE — 80053 COMPREHEN METABOLIC PANEL: CPT

## 2018-01-15 PROCEDURE — 85025 COMPLETE CBC W/AUTO DIFF WBC: CPT

## 2018-01-15 PROCEDURE — 80162 ASSAY OF DIGOXIN TOTAL: CPT

## 2018-01-25 ENCOUNTER — OFFICE VISIT (OUTPATIENT)
Dept: FAMILY MEDICINE | Facility: CLINIC | Age: 78
End: 2018-01-25
Payer: MEDICARE

## 2018-01-25 VITALS
WEIGHT: 180 LBS | HEART RATE: 76 BPM | BODY MASS INDEX: 28.25 KG/M2 | TEMPERATURE: 98 F | SYSTOLIC BLOOD PRESSURE: 135 MMHG | HEIGHT: 67 IN | DIASTOLIC BLOOD PRESSURE: 74 MMHG

## 2018-01-25 DIAGNOSIS — R73.03 PRE-DIABETES: ICD-10-CM

## 2018-01-25 DIAGNOSIS — Z79.01 ANTICOAGULANT LONG-TERM USE: ICD-10-CM

## 2018-01-25 DIAGNOSIS — D45 POLYCYTHEMIA VERA: ICD-10-CM

## 2018-01-25 DIAGNOSIS — N40.1 BENIGN PROSTATIC HYPERPLASIA WITH NOCTURIA: ICD-10-CM

## 2018-01-25 DIAGNOSIS — M19.012 PRIMARY OSTEOARTHRITIS OF BOTH SHOULDERS: ICD-10-CM

## 2018-01-25 DIAGNOSIS — N18.30 CKD (CHRONIC KIDNEY DISEASE) STAGE 3, GFR 30-59 ML/MIN: ICD-10-CM

## 2018-01-25 DIAGNOSIS — J84.10 CALCIFIED GRANULOMA OF LUNG: ICD-10-CM

## 2018-01-25 DIAGNOSIS — R35.1 BENIGN PROSTATIC HYPERPLASIA WITH NOCTURIA: ICD-10-CM

## 2018-01-25 DIAGNOSIS — M19.011 PRIMARY OSTEOARTHRITIS OF BOTH SHOULDERS: ICD-10-CM

## 2018-01-25 DIAGNOSIS — I48.0 PAROXYSMAL ATRIAL FIBRILLATION: ICD-10-CM

## 2018-01-25 DIAGNOSIS — Z00.00 ANNUAL PHYSICAL EXAM: Primary | ICD-10-CM

## 2018-01-25 DIAGNOSIS — I70.0 ATHEROSCLEROSIS OF ABDOMINAL AORTA: ICD-10-CM

## 2018-01-25 DIAGNOSIS — I10 ESSENTIAL HYPERTENSION: ICD-10-CM

## 2018-01-25 PROCEDURE — 99499 UNLISTED E&M SERVICE: CPT | Mod: S$GLB,,, | Performed by: NURSE PRACTITIONER

## 2018-01-25 PROCEDURE — 99999 PR PBB SHADOW E&M-EST. PATIENT-LVL IV: CPT | Mod: PBBFAC,,, | Performed by: NURSE PRACTITIONER

## 2018-01-25 PROCEDURE — 99397 PER PM REEVAL EST PAT 65+ YR: CPT | Mod: S$GLB,,, | Performed by: NURSE PRACTITIONER

## 2018-01-25 RX ORDER — ACETAMINOPHEN 500 MG
500 TABLET ORAL 2 TIMES DAILY
Qty: 60 TABLET | Refills: 11 | Status: ON HOLD | COMMUNITY
Start: 2018-01-25 | End: 2021-09-09 | Stop reason: HOSPADM

## 2018-01-25 RX ORDER — ALFUZOSIN HYDROCHLORIDE 10 MG/1
10 TABLET, EXTENDED RELEASE ORAL
COMMUNITY
End: 2018-03-23 | Stop reason: CLARIF

## 2018-01-25 RX ORDER — ASPIRIN 81 MG/1
81 TABLET ORAL DAILY
COMMUNITY
End: 2018-03-23 | Stop reason: ALTCHOICE

## 2018-01-25 NOTE — PROGRESS NOTES
Subjective:       Patient ID: Morro Sharma is a 77 y.o. male.    Chief Complaint: Annual Exam    HPI   Chief Complaint  Chief Complaint   Patient presents with    Annual Exam       HPI  Morro Sharma is a 77 y.o. male with medical diagnoses as listed in the medical history and problem list that presents for his annual exam.  Patient is treated for atrial fibrillation and is taking digoxin and xarelto.  Patient is followed by cardiology Dr. Hubbard who also orders regular blood work.  Also treated for hypertension and blood pressure today is controlled 135/74.   Patient has polycythemia vera and platelet count on 1/15/18 was 459, followed by hematology.  Patient with CKD 3 with most recent GFR 47.3.  Glucose also elevated to 134 with last lab but was not fasting, prior glucose levels between 100 and 108, pre-diabetes. Patient has enlarged prostate followed by Dr. Cleveland with upcoming appointment. Ophthalmologist is Dr. Cordon, wears glasses. BMI today is 28.19 and patient has gained 2 pounds since last visit.     PAST MEDICAL HISTORY:  Past Medical History:   Diagnosis Date    Atrial fibrillation 05/07/2017    [facility] records sent to scanning    BPH (benign prostatic hypertrophy)     Colon polyps     Erectile dysfunction     GERD (gastroesophageal reflux disease)     Hypertension     Pneumothorax on left     Polycythemia vera     Pulmonary nodules        PAST SURGICAL HISTORY:  Past Surgical History:   Procedure Laterality Date    COLONOSCOPY  9/15/14    Dr. Mcdaniels, 5 year recheck    ESOPHAGOGASTRODUODENOSCOPY      LUNG SURGERY      LEFT - CHEST TUBE       SOCIAL HISTORY:  Social History     Social History    Marital status:      Spouse name: N/A    Number of children: N/A    Years of education: N/A     Occupational History    Not on file.     Social History Main Topics    Smoking status: Former Smoker     Packs/day: 1.50     Years: 25.00     Quit date: 10/2/1982    Smokeless tobacco: Never Used     Alcohol use No    Drug use: No    Sexual activity: Not on file     Other Topics Concern    Not on file     Social History Narrative    No narrative on file       FAMILY HISTORY:  Family History   Problem Relation Age of Onset    Hypertension Mother     Heart disease Mother     Hypertension Maternal Aunt     Heart disease Maternal Aunt     Hypertension Maternal Uncle     Heart disease Maternal Uncle     Early death Father     No Known Problems Daughter     Prostate cancer Neg Hx     Kidney cancer Neg Hx     Cancer Neg Hx     Urolithiasis Neg Hx        ALLERGIES AND MEDICATIONS: updated and reviewed.  Review of patient's allergies indicates:  No Known Allergies  Current Outpatient Prescriptions   Medication Sig Dispense Refill    alfuzosin (UROXATRAL) 10 mg Tb24 Take 10 mg by mouth daily with breakfast.      aspirin (ECOTRIN) 81 MG EC tablet Take 81 mg by mouth once daily.      atorvastatin (LIPITOR) 40 MG tablet Take 40 mg by mouth once daily.      digoxin (LANOXIN) 125 mcg tablet Take 125 mcg by mouth once daily.      hydroxyurea (HYDREA) 500 mg Cap Take 1 capsule (500 mg total) by mouth 2 (two) times daily. 180 capsule 1    metoprolol tartrate (LOPRESSOR) 50 MG tablet Take 1 tablet (50 mg total) by mouth 2 (two) times daily. 60 tablet 3    multivitamin (ONE DAILY MULTIVITAMIN) per tablet Take 1 tablet by mouth once daily.      polyethylene glycol (MIRALAX) 17 gram/dose powder Take 17 g by mouth once daily.      XARELTO 20 mg Tab Take 1 tablet (20 mg total) by mouth once daily. 30 tablet 3    acetaminophen (TYLENOL) 500 MG tablet Take 1 tablet (500 mg total) by mouth 2 (two) times daily. For shoulder pain 60 tablet 11     No current facility-administered medications for this visit.      Review of Systems   Constitutional: Negative for activity change, chills, fatigue, fever and unexpected weight change.   HENT: Negative for hearing loss, rhinorrhea and trouble swallowing.    Eyes:  "Negative for discharge and visual disturbance.        Wears glasses and is due for check up.    Respiratory: Negative for cough, chest tightness, shortness of breath and wheezing.    Cardiovascular: Negative for chest pain, palpitations and leg swelling.   Gastrointestinal: Positive for constipation. Negative for abdominal pain, blood in stool, diarrhea and vomiting.        Does have constipation, takes miralax every other day and still has some constipation, advised OK to take every day.   Endocrine: Positive for polyuria. Negative for polydipsia.   Genitourinary: Negative for difficulty urinating, hematuria and urgency.        Nocturia x 3 and frequent urination during the day. Drinks a lot of water   Musculoskeletal: Positive for arthralgias. Negative for joint swelling and neck pain.        Some shoulder and pain to upper arms bilateral   Skin: Negative for rash and wound.   Neurological: Negative for dizziness, weakness, numbness and headaches.   Hematological: Bruises/bleeds easily.        Bleeds a bit easier with xarelto   Psychiatric/Behavioral: Negative for confusion, dysphoric mood, sleep disturbance and suicidal ideas.       Objective:       Vitals:    01/25/18 1129   BP: 135/74   BP Location: Right arm   Patient Position: Sitting   BP Method: Large (Automatic)   Pulse: 76   Temp: 98.1 °F (36.7 °C)   TempSrc: Oral   Weight: 81.6 kg (180 lb)   Height: 5' 7" (1.702 m)     Physical Exam   Constitutional: He is oriented to person, place, and time. Vital signs are normal. He appears well-developed and well-nourished. No distress.   HENT:   Head: Normocephalic and atraumatic.   Right Ear: Tympanic membrane, external ear and ear canal normal.   Left Ear: Tympanic membrane, external ear and ear canal normal.   Nose: Nose normal. No mucosal edema. Right sinus exhibits no maxillary sinus tenderness and no frontal sinus tenderness. Left sinus exhibits no maxillary sinus tenderness and no frontal sinus tenderness. "   Mouth/Throat: Oropharynx is clear and moist and mucous membranes are normal. No oropharyngeal exudate.   Eyes: Conjunctivae and lids are normal. Pupils are equal, round, and reactive to light. Right eye exhibits no discharge. Left eye exhibits no discharge. Right conjunctiva is not injected. Left conjunctiva is not injected.   Neck: Normal range of motion. Neck supple. Normal carotid pulses present. Carotid bruit is not present.   Cardiovascular: Normal rate, regular rhythm, S1 normal, S2 normal, intact distal pulses and normal pulses.    Murmur heard.   Systolic murmur is present with a grade of 2/6   Pulses:       Carotid pulses are 2+ on the right side, and 2+ on the left side.       Radial pulses are 2+ on the right side, and 2+ on the left side.        Posterior tibial pulses are 2+ on the right side, and 2+ on the left side.   2/6 systolic murmur to RUSB. No edema   Pulmonary/Chest: Effort normal and breath sounds normal. No respiratory distress. He has no decreased breath sounds. He has no wheezes. He has no rhonchi. He has no rales.   Abdominal: Soft. Bowel sounds are normal. He exhibits no abdominal bruit, no pulsatile midline mass and no mass. There is no hepatosplenomegaly. There is no tenderness. No hernia.   Musculoskeletal: He exhibits no edema, tenderness or deformity.        Right shoulder: He exhibits pain. He exhibits normal range of motion and no tenderness.        Left shoulder: He exhibits decreased range of motion and pain. He exhibits no tenderness.   Right shoulder with full ROM with some pain with abduction against resistance, Left shoulder with pain with abduction against resistance, decreased ROM with scratch test, able to get thumb just above belt line   Lymphadenopathy:     He has no cervical adenopathy.   Neurological: He is alert and oriented to person, place, and time. He has normal strength.   Skin: Skin is warm, dry and intact. Capillary refill takes less than 2 seconds. No rash  noted. He is not diaphoretic. No pallor.   Psychiatric: He has a normal mood and affect. His speech is normal and behavior is normal. Judgment and thought content normal. His mood appears not anxious. Cognition and memory are normal. He does not exhibit a depressed mood.   Nursing note and vitals reviewed.      Assessment:       1. Annual physical exam    2. Essential hypertension    3. Benign prostatic hyperplasia with nocturia    4. Polycythemia vera    5. Paroxysmal atrial fibrillation    6. Anticoagulant long-term use    7. Primary osteoarthritis of both shoulders    8. CKD (chronic kidney disease) stage 3, GFR 30-59 ml/min    9. Pre-diabetes    10. BMI 28.0-28.9,adult    11. Atherosclerosis of abdominal aorta    12. Calcified granuloma of lung        Plan:   Morro was seen today for annual exam.  Discussed healthy diet, regular exercise, necessary labs, age appropriate cancer screening, and routine vaccinations.  Patient only health maintenance due is Zostavax which would be given at pharmacy and patient has chosen not to have at this time.   Diagnoses and all orders for this visit:    Annual physical exam   Discussed healthy diet, regular exercise, necessary labs, age appropriate cancer screening, and routine vaccinations.     Educational handouts provided. Preventive Guidelines men 65 and older   Essential hypertension   Controlled 135/74, continue current medication and follow up with Dr. Hubbard  Benign prostatic hyperplasia with nocturia  Comments:  Patient has appointment with Dr. Cleveland who will check prostate and PSA  Continue uroxatral 10 mg daily    Polycythemia vera   Continue follow up hematology   Platelet count 459 on 1/15/18  Paroxysmal atrial fibrillation   auscultated sinus rhythm in office, continue digoxin, xarelto   Anticoagulant long-term use   Stable on xarelto  Primary osteoarthritis of both shoulders  -     acetaminophen (TYLENOL) 500 MG tablet; Take 1 tablet (500 mg total) by mouth 2 (two)  times daily. For shoulder pain  - Demonstrated shoulder exercises for patient    CKD (chronic kidney disease) stage 3, GFR 30-59 ml/min   Discussed with patient, advised no OTC NSAIDs  Pre-diabetes   At this time lifestyle and diet changes are recommended.  You should increase exercise and lose weight by eating a portion controlled well-balanced diet.  Avoid concentrated sweets like cookies, cakes, and candy.  Do not drink sugar sweetened soft drinks.  Eat fewer white carbohydrates like potatoes, rice, bread, and pasta.  Choose sweet potatoes, brown rice, whole wheat bread and wheat pasta.     Discussed with patient   Educational handouts provided. Pre-diabetes    BMI 28.0-28.9,adult   Healthy weight for patient age, regular exercise, walking recommended  Atherosclerosis of abdominal aorta  Comments:  CT 9/1/12 result: Aortoiliac vascular calcifications and degenerative changes are seen.    Continue lipitor 40 mg daily, LDL 49.4 on 11/9/17    Calcified granuloma of lung   Stable    Follow-up in about 1 year (around 1/25/2019) for Annual exam.     Patient readiness: acceptance and barriers:none    During the course of the visit the patient was educated and counseled about the following:     Hypertension:   Medication: no change.  Dietary sodium restriction.  Regular aerobic exercise.    Goals: Hypertension: Reduce Blood Pressure    Did patient meet goals/outcomes: Yes    The following self management tools provided: declined    Patient Instructions (the written plan) was given to the patient/family.     Time spent with patient: 45 minutes    Barriers to medications present (no )    Adverse reactions to current medications (no)    Over the counter medications reviewed (Yes)

## 2018-01-25 NOTE — PATIENT INSTRUCTIONS
Prediabetes  You have been diagnosed with prediabetes. This means that the level of sugar (glucose) in your blood is too high. If you have prediabetes, you are at risk for developing type 2 diabetes. Type 2 diabetes is diagnosed when the level of glucose in the blood reaches a certain high level. With prediabetes, it hasnt reached this point yet, but it is higher than normal. It is vital to make lifestyle changes to lower your blood sugar, improve your health, and prevent diabetes. This sheet will tell you more.      Why worry about prediabetes?  Prediabetes is a disease where the bodys cells have trouble using glucose in the blood for energy. As a result, too much glucose stays in the blood and can affect how your heart and blood vessels work. Without changes in diet and lifestyle, the problem can get worse. Once you have type 2 diabetes, it is chronic (ongoing) and needs to be managed for the rest of your life. Diabetes can harm the body and your health by damaging organs, such as your eyes and kidneys. It makes you more likely to have heart disease. And it can damage nerves and blood vessels.  Who is a risk for prediabetes?  The exact cause of prediabetes is not clear. But certain risk factors make a person more likely to have it. These include:  · A family history of type 2 diabetes  · Being overweight  · Being over age 45  · Have hypertension or elevated cholesterol   · Having had gestational diabetes  · Not being physically active  · Being ,  American, , , , or   Diagnosing prediabetes  Prediabetes may have no symptoms or you may have some of the symptoms of diabetes. The diagnosis is made with a blood test. You may have one or more of these blood tests:   · Fasting glucose test. Blood is taken and tested after you have fasted (not eaten) for at least 8 hours. A normal test result is 99 milligrams per deciliter (mg/dL) or lower.  Prediabetes is 100 mg/dL to 125 mg/dL. Diabetes is 126 mg/dL or higher.  · Glucose tolerance test. Your blood sugar is measured before and after you drink a very sugary liquid. A normal test result is 139 milligrams per deciliter (mg/dL) or lower. Prediabetes is 140 mg/dL to 199 mg/dL. Diabetes is 200 mg/dL or higher.  · Hemoglobin A1c (HbA1c). Your HbA1c is normal if it is below 5.7%. Prediabetes is 5.7% to 6.4%. Diabetes is 6.5% or higher.   Treating prediabetes  The best way to treat prediabetes is to lose at least 5% to 7% of your current weight and be more physically active by getting at least 150 minutes a week of physical activity. When sitting for long periods of time, get up for short sessions of light activity every 30 minutes. These changes help the bodys cells use blood sugar better. Even a small amount of weight loss can help. Work with your healthcare provider to make a plan to eat well and be more active. Keep in mind that small changes can add up. Other changes in your lifestyle (or even taking certain medicines, such as metformin) may make you less likely to develop diabetes. Your healthcare provider can talk with you about these.  Follow-up  If it is untreated, prediabetes can turn into diabetes. This is a serious health condition. Take steps to stop this from happening. Follow the treatment plan you have been given. You may have your blood glucose tested again in about 12 to 18 months.  Symptoms of diabetes  Let your healthcare provider know if you have any of the following:  · Always feel very tired  · Feel very thirsty or hungry much of the time  · Have to urinate often  · Lose weight for no reason  · Feel numbness or tingling in your fingers or toes  · Have cuts or bruises that dont seem to heal  · Have blurry vision   Date Last Reviewed: 5/1/2016  © 3716-5824 [employer]. [address] [place] [address], [place] [address]. All rights reserved. This information is not intended as a  substitute for professional medical care. Always follow your healthcare professional's instructions.        Prevention Guidelines, Men Ages 65 and Older  Screening tests and vaccines are an important part of managing your health. Health counseling is essential, too. Below are guidelines for these, for men ages 65 and older. Talk with your healthcare provider to make sure youre up-to-date on what you need.  Screening Who needs it How often   Abdominal aortic aneurysm Men ages 65 to 75 who have ever smoked 1 ultrasound   Alcohol misuse All men in this age group At routine exams   Blood pressure All men in this age group Every 2 years if your blood pressure is less than 120/80 mm Hg; yearly if your systolic blood pressure is 120 to 139 mm Hg, or your diastolic blood pressure reading is 80 to 89 mm Hg   Colorectal cancer All men in this age group Flexible sigmoidoscopy every 5 years, or colonoscopy every 10 years, or double-contrast barium enema every 5 years; yearly fecal occult blood test or fecal immunochemical test; or a stool DNA test as often as your healthcare provider advises; talk with your healthcare provider about which tests are best for you and when you no longer need colonoscopies (generally after age 75)   Depression All men in this age group At routine exams   Type 2 diabetes or prediabetes All adults beginning at age 45 and adults without symptoms at any age who are overweight or obese and have 1 or more other risk factors for diabetes At least every 3 years (yearly if your blood sugar has already begun to rise)   Hepatitis C Men at increased risk for infection - talk with your healthcare provider At routine exams   High cholesterol or triglycerides All men in this age group At least every 5 years   HIV Men at increased risk for infection - talk with your healthcare provider At routine exams   Lung cancer Adults ages 55 to 80 who have smoked Yearly screening in smokers with 30 pack-year history of  smoking or who quit within 15 years   Obesity All men in this age group At routine exams   Prostate cancer All men in this age group, talk to healthcare provider about risks and benefits of digital rectal exam (CHEMA) and prostate-specific antigen (PSA) screening1 At routine exams   Syphilis Men at increased risk for infection - talk with your healthcare provider At routine exams   Tuberculosis Men at increased risk for infection - talk with your healthcare provider Ask your healthcare provider   Vision All men in this age group Every 1 to 2 years; if you have a chronic health condition, ask your healthcare provider if you needs exams more often   Vaccine Who needs it How often   Chickenpox (varicella) All men in this age group who have no record of this infection or vaccine 2 doses; second dose should be given at least 4 weeks after the first dose   Hepatitis A Men at increased risk for infection - talk with your healthcare provider 2 doses given at least 6 months apart   Hepatitis B Men at increased risk for infection - talk with your healthcare provider 3 doses over 6 months; second dose should be given 1 month after the first dose; the third dose should be given at least 2 months after the second dose and at least 4 months after the first dose   Haemophilus influenzae Type B (HIB) Men at increased risk for infection - talk with your healthcare provider 1 to 3 doses   Influenza (flu) All men in this age group  Once a year   Meningococcal Men at increased risk for infection - talk with your healthcare provider 1 or more doses   Pneumococcal conjugate vaccine (PCV13) and pneumococcal polysaccharide vaccine (PPSV23) All men in this age group 1 dose of each vaccine   Tetanus/diphtheria/  pertussis (Td/Tdap) booster All men in this age group Td every 10 years, or Tdap if you will have contact with a child younger than 12 months old   Zoster All men in this age group 1 dose   Counseling Who needs it How often   Diet and  exercise Men who are overweight or obese When diagnosed, and then at routine exams   Fall prevention (exercise, vitamin D supplements) All men in this age group At routine exams   Sexually transmitted infection Men at increased risk for infection - talk with your healthcare provider At routine exams   Use of daily aspirin Men ages 45 to 79 at risk for cardiovascular health problems At routine exams   Use of tobacco and the health effects it can cause All men in this age group Every visit   [address] [place] [address] [facility]   Date Last Reviewed: 2/1/2017 © 2000-2017 [employer]. [address] [place] [address], [place] [address]. All rights reserved. This information is not intended as a substitute for professional medical care. Always follow your healthcare professional's instructions.      Avoid over the counter anti-inflammatory medications like ibuprofen, advil, motrin aleve napoxen and naprosyn.

## 2018-01-30 ENCOUNTER — TELEPHONE (OUTPATIENT)
Dept: HEMATOLOGY/ONCOLOGY | Facility: CLINIC | Age: 78
End: 2018-01-30

## 2018-01-30 NOTE — TELEPHONE ENCOUNTER
----- Message from Sarah Echevarria sent at 1/30/2018 12:24 PM CST -----  Contact: self  Patient [phone] is calling to speak with nurse/Per patient's cardiologist - can patient be taken off the low dose Young Aspirin?/please advise

## 2018-01-30 NOTE — TELEPHONE ENCOUNTER
Spoke with patient, per patient cardiologist would like to know if its ok to stop aspirin  please advise

## 2018-01-31 NOTE — TELEPHONE ENCOUNTER
Returned call to patient, no answer message left. Patient ok to stop aspirin but needs to stay on xarelto

## 2018-01-31 NOTE — TELEPHONE ENCOUNTER
----- Message from Theresa Marshall sent at 1/31/2018 10:27 AM CST -----  Contact: patient   Patient is returning a missed call. Call to pod. Call connected to pod. Warm transferred. Thanks!

## 2018-02-05 ENCOUNTER — PES CALL (OUTPATIENT)
Dept: ADMINISTRATIVE | Facility: CLINIC | Age: 78
End: 2018-02-05

## 2018-02-09 ENCOUNTER — TELEPHONE (OUTPATIENT)
Dept: FAMILY MEDICINE | Facility: CLINIC | Age: 78
End: 2018-02-09

## 2018-02-09 NOTE — TELEPHONE ENCOUNTER
----- Message from Malou Brabour sent at 2/9/2018  7:58 AM CST -----  Wife (Xi) stated patient is experiencing flu-like symptoms/requesting medication be ordered/stated that patient does not want to come in/please call back at [phone] (cellphone)to advise.

## 2018-02-09 NOTE — TELEPHONE ENCOUNTER
Patient had nausea without vomiting and diarrhea yesterday- much improved today- denies cough, fever or body aches- advised on treatment of probable viral illness with clear fluids and bland diet for a couple days then resume regular diet as tolerated.

## 2018-03-23 ENCOUNTER — OFFICE VISIT (OUTPATIENT)
Dept: UROLOGY | Facility: CLINIC | Age: 78
End: 2018-03-23
Payer: MEDICARE

## 2018-03-23 VITALS
TEMPERATURE: 98 F | SYSTOLIC BLOOD PRESSURE: 141 MMHG | DIASTOLIC BLOOD PRESSURE: 82 MMHG | HEART RATE: 77 BPM | BODY MASS INDEX: 28.41 KG/M2 | HEIGHT: 67 IN | WEIGHT: 181 LBS

## 2018-03-23 DIAGNOSIS — N40.0 BENIGN PROSTATIC HYPERPLASIA, UNSPECIFIED WHETHER LOWER URINARY TRACT SYMPTOMS PRESENT: Primary | ICD-10-CM

## 2018-03-23 LAB
BILIRUB SERPL-MCNC: NEGATIVE MG/DL
BLOOD URINE, POC: NEGATIVE
COLOR, POC UA: NORMAL
GLUCOSE UR QL STRIP: NEGATIVE
KETONES UR QL STRIP: NEGATIVE
LEUKOCYTE ESTERASE URINE, POC: NEGATIVE
NITRITE, POC UA: NEGATIVE
PH, POC UA: 5
PROTEIN, POC: NEGATIVE
SPECIFIC GRAVITY, POC UA: 1.01
UROBILINOGEN, POC UA: NEGATIVE

## 2018-03-23 PROCEDURE — 99999 PR PBB SHADOW E&M-EST. PATIENT-LVL III: CPT | Mod: PBBFAC,,, | Performed by: UROLOGY

## 2018-03-23 PROCEDURE — 3077F SYST BP >= 140 MM HG: CPT | Mod: CPTII,S$GLB,, | Performed by: UROLOGY

## 2018-03-23 PROCEDURE — 99214 OFFICE O/P EST MOD 30 MIN: CPT | Mod: 25,S$GLB,, | Performed by: UROLOGY

## 2018-03-23 PROCEDURE — 81002 URINALYSIS NONAUTO W/O SCOPE: CPT | Mod: S$GLB,,, | Performed by: UROLOGY

## 2018-03-23 PROCEDURE — 3079F DIAST BP 80-89 MM HG: CPT | Mod: CPTII,S$GLB,, | Performed by: UROLOGY

## 2018-03-23 RX ORDER — TAMSULOSIN HYDROCHLORIDE 0.4 MG/1
0.4 CAPSULE ORAL DAILY
Qty: 90 CAPSULE | Refills: 3
Start: 2018-03-23 | End: 2019-01-28

## 2018-03-23 NOTE — PROGRESS NOTES
Subjective:       Patient ID: Morro Sharma is a 77 y.o. male.    Chief Complaint:   OFFICE NOTE    CHIEF COMPLAINT:  BPH.    HISTORY OF PRESENT ILLNESS:  Mr. Sharma is a 77-year-old male who has a history   of lower urinary tract symptoms secondary to BPH.  The patient referred that he   is doing well.  At the present time, he is being medicated with tamsulosin 0.4   mg p.o. daily.  The patient referred to have nocturia x1.  No dysuria.  No   hematuria.  The flow is adequate and he feels that he empties the bladder   satisfactorily.  Denies any side effects from the current medications.    FAMILY HISTORY:  A couple of cousins of him have developed prostate cancer.    The past medical history and past surgical histories have changed in May 2017.    He had a fainting episode and was taken to the Emergency Room and he was   diagnosed of having atrial fibrillation and since then, the patient has been   anticoagulated with Xarelto.    The urinalysis today is within normal limits.  The last PSA was on 01/30/2017,   1.6.    The patient is satisfied in the way that Flomax is controlling his lower urinary   tract symptoms.      EOR/HN  dd: 03/23/2018 11:51:53 (CDT)  td: 03/23/2018 23:28:07 (CDT)  Doc ID   #[id number]  Job ID #[id number]    CC:       HPI  Review of Systems   Constitutional: Negative for activity change and appetite change.   HENT: Negative.    Eyes: Negative for discharge.   Respiratory: Negative for cough and shortness of breath.    Cardiovascular: Negative for chest pain and palpitations.   Gastrointestinal: Negative for abdominal distention, abdominal pain, constipation and vomiting.   Genitourinary: Negative for discharge, dysuria, flank pain, frequency, hematuria, testicular pain and urgency.   Musculoskeletal: Negative for arthralgias.   Skin: Negative for rash.   Neurological: Negative for dizziness.   Psychiatric/Behavioral: The patient is not nervous/anxious.        Objective:      Physical Exam    Constitutional: He appears well-developed and well-nourished.   HENT:   Head: Normocephalic.   Eyes: Pupils are equal, round, and reactive to light.   Neck: Normal range of motion.   Cardiovascular: Normal rate.    Pulmonary/Chest: Effort normal.   Abdominal: Soft. He exhibits no distension and no mass. There is no tenderness.   Genitourinary: Rectum normal, prostate normal and penis normal. Rectal exam shows no external hemorrhoid, no mass and no tenderness. Prostate is not enlarged and not tender. Right testis shows no mass and no tenderness. Left testis shows no mass and no tenderness. No discharge found.   Musculoskeletal: Normal range of motion.   Neurological: He is alert.   Skin: Skin is warm.     Psychiatric: He has a normal mood and affect.       Assessment:       1. Benign prostatic hyperplasia, unspecified whether lower urinary tract symptoms present        Plan:       Benign prostatic hyperplasia, unspecified whether lower urinary tract symptoms present  -     POCT URINE DIPSTICK WITHOUT MICROSCOPE  -     Prostate Specific Antigen, Diagnostic; Future; Expected date: 03/23/2018    Other orders  -     tamsulosin (FLOMAX) 0.4 mg Cp24; Take 1 capsule (0.4 mg total) by mouth once daily.  Dispense: 90 capsule; Refill: 3    RTC 1 yr.

## 2018-03-24 ENCOUNTER — LAB VISIT (OUTPATIENT)
Dept: LAB | Facility: HOSPITAL | Age: 78
End: 2018-03-24
Attending: INTERNAL MEDICINE
Payer: MEDICARE

## 2018-03-24 DIAGNOSIS — R94.31 NONSPECIFIC ABNORMAL ELECTROCARDIOGRAM (ECG) (EKG): ICD-10-CM

## 2018-03-24 DIAGNOSIS — E04.9 ENLARGEMENT OF THYROID: ICD-10-CM

## 2018-03-24 DIAGNOSIS — N18.9 CHRONIC KIDNEY DISEASE, UNSPECIFIED: ICD-10-CM

## 2018-03-24 DIAGNOSIS — I48.0 PAROXYSMAL ATRIAL FIBRILLATION: Primary | ICD-10-CM

## 2018-03-24 DIAGNOSIS — I48.0 PAROXYSMAL ATRIAL FIBRILLATION: ICD-10-CM

## 2018-03-24 DIAGNOSIS — E78.5 HYPERLIPEMIA: ICD-10-CM

## 2018-03-24 DIAGNOSIS — D64.89 OTHER SPECIFIED ANEMIAS: ICD-10-CM

## 2018-03-24 LAB
ALBUMIN SERPL BCP-MCNC: 4 G/DL
ALP SERPL-CCNC: 86 U/L
ALT SERPL W/O P-5'-P-CCNC: 31 U/L
ANION GAP SERPL CALC-SCNC: 6 MMOL/L
ANISOCYTOSIS BLD QL SMEAR: SLIGHT
AST SERPL-CCNC: 26 U/L
BASOPHILS # BLD AUTO: 0.11 K/UL
BASOPHILS NFR BLD: 1.5 %
BILIRUB SERPL-MCNC: 1.5 MG/DL
BUN SERPL-MCNC: 12 MG/DL
CALCIUM SERPL-MCNC: 9.8 MG/DL
CHLORIDE SERPL-SCNC: 106 MMOL/L
CHOLEST SERPL-MCNC: 98 MG/DL
CHOLEST/HDLC SERPL: 3.9 {RATIO}
CO2 SERPL-SCNC: 25 MMOL/L
CREAT SERPL-MCNC: 1.2 MG/DL
DACRYOCYTES BLD QL SMEAR: ABNORMAL
DIFFERENTIAL METHOD: ABNORMAL
EOSINOPHIL # BLD AUTO: 0.1 K/UL
EOSINOPHIL NFR BLD: 1.1 %
ERYTHROCYTE [DISTWIDTH] IN BLOOD BY AUTOMATED COUNT: 12.7 %
EST. GFR  (AFRICAN AMERICAN): >60 ML/MIN/1.73 M^2
EST. GFR  (NON AFRICAN AMERICAN): 58 ML/MIN/1.73 M^2
GLUCOSE SERPL-MCNC: 96 MG/DL
HCT VFR BLD AUTO: 45.6 %
HDLC SERPL-MCNC: 25 MG/DL
HDLC SERPL: 25.5 %
HGB BLD-MCNC: 14.5 G/DL
IMM GRANULOCYTES # BLD AUTO: 0.04 K/UL
IMM GRANULOCYTES NFR BLD AUTO: 0.5 %
LDLC SERPL CALC-MCNC: 56.4 MG/DL
LYMPHOCYTES # BLD AUTO: 0.8 K/UL
LYMPHOCYTES NFR BLD: 11.1 %
MCH RBC QN AUTO: 33.9 PG
MCHC RBC AUTO-ENTMCNC: 31.8 G/DL
MCV RBC AUTO: 107 FL
MONOCYTES # BLD AUTO: 0.3 K/UL
MONOCYTES NFR BLD: 4.1 %
NEUTROPHILS # BLD AUTO: 6 K/UL
NEUTROPHILS NFR BLD: 81.7 %
NONHDLC SERPL-MCNC: 73 MG/DL
NRBC BLD-RTO: 0 /100 WBC
OVALOCYTES BLD QL SMEAR: ABNORMAL
PLATELET # BLD AUTO: 436 K/UL
PLATELET BLD QL SMEAR: ABNORMAL
PMV BLD AUTO: 10.5 FL
POIKILOCYTOSIS BLD QL SMEAR: ABNORMAL
POLYCHROMASIA BLD QL SMEAR: ABNORMAL
POTASSIUM SERPL-SCNC: 4.1 MMOL/L
PROT SERPL-MCNC: 6.9 G/DL
RBC # BLD AUTO: 4.28 M/UL
SCHISTOCYTES BLD QL SMEAR: ABNORMAL
SODIUM SERPL-SCNC: 137 MMOL/L
TRIGL SERPL-MCNC: 83 MG/DL
WBC # BLD AUTO: 7.3 K/UL

## 2018-03-24 PROCEDURE — 36415 COLL VENOUS BLD VENIPUNCTURE: CPT | Mod: PO

## 2018-03-24 PROCEDURE — 85025 COMPLETE CBC W/AUTO DIFF WBC: CPT

## 2018-03-24 PROCEDURE — 80053 COMPREHEN METABOLIC PANEL: CPT

## 2018-03-24 PROCEDURE — 80061 LIPID PANEL: CPT

## 2018-03-27 DIAGNOSIS — D47.3 ESSENTIAL THROMBOCYTOSIS: ICD-10-CM

## 2018-03-27 RX ORDER — HYDROXYUREA 500 MG/1
500 CAPSULE ORAL 2 TIMES DAILY
Qty: 180 CAPSULE | Refills: 1 | Status: SHIPPED | OUTPATIENT
Start: 2018-03-27 | End: 2018-11-09 | Stop reason: SDUPTHER

## 2018-04-11 ENCOUNTER — LAB VISIT (OUTPATIENT)
Dept: LAB | Facility: HOSPITAL | Age: 78
End: 2018-04-11
Attending: NURSE PRACTITIONER
Payer: MEDICARE

## 2018-04-11 ENCOUNTER — OFFICE VISIT (OUTPATIENT)
Dept: HEMATOLOGY/ONCOLOGY | Facility: CLINIC | Age: 78
End: 2018-04-11
Payer: MEDICARE

## 2018-04-11 VITALS
HEART RATE: 70 BPM | HEIGHT: 67 IN | TEMPERATURE: 98 F | SYSTOLIC BLOOD PRESSURE: 128 MMHG | BODY MASS INDEX: 28 KG/M2 | WEIGHT: 178.38 LBS | RESPIRATION RATE: 20 BRPM | DIASTOLIC BLOOD PRESSURE: 64 MMHG

## 2018-04-11 DIAGNOSIS — D45 POLYCYTHEMIA VERA: ICD-10-CM

## 2018-04-11 DIAGNOSIS — D45 POLYCYTHEMIA VERA: Primary | ICD-10-CM

## 2018-04-11 LAB
ALBUMIN SERPL BCP-MCNC: 4.2 G/DL
ALP SERPL-CCNC: 88 U/L
ALT SERPL W/O P-5'-P-CCNC: 44 U/L
ANION GAP SERPL CALC-SCNC: 13 MMOL/L
AST SERPL-CCNC: 35 U/L
BASOPHILS # BLD AUTO: 0.1 K/UL
BASOPHILS NFR BLD: 1.4 %
BILIRUB SERPL-MCNC: 0.8 MG/DL
BUN SERPL-MCNC: 15 MG/DL
CALCIUM SERPL-MCNC: 9.8 MG/DL
CHLORIDE SERPL-SCNC: 103 MMOL/L
CO2 SERPL-SCNC: 26 MMOL/L
CREAT SERPL-MCNC: 1.07 MG/DL
DIFFERENTIAL METHOD: ABNORMAL
EOSINOPHIL # BLD AUTO: 0.1 K/UL
EOSINOPHIL NFR BLD: 1 %
ERYTHROCYTE [DISTWIDTH] IN BLOOD BY AUTOMATED COUNT: 12.8 %
EST. GFR  (AFRICAN AMERICAN): >60 ML/MIN/1.73 M^2
EST. GFR  (NON AFRICAN AMERICAN): >60 ML/MIN/1.73 M^2
GLUCOSE SERPL-MCNC: 94 MG/DL
HCT VFR BLD AUTO: 45.2 %
HGB BLD-MCNC: 14.3 G/DL
LDH SERPL L TO P-CCNC: 759 U/L
LYMPHOCYTES # BLD AUTO: 0.8 K/UL
LYMPHOCYTES NFR BLD: 11.2 %
MCH RBC QN AUTO: 33.9 PG
MCHC RBC AUTO-ENTMCNC: 31.6 G/DL
MCV RBC AUTO: 107 FL
MONOCYTES # BLD AUTO: 0.3 K/UL
MONOCYTES NFR BLD: 4.4 %
NEUTROPHILS # BLD AUTO: 5.7 K/UL
NEUTROPHILS NFR BLD: 82 %
NRBC BLD-RTO: 0 /100 WBC
PLATELET # BLD AUTO: 441 K/UL
PMV BLD AUTO: 10.4 FL
POTASSIUM SERPL-SCNC: 3.9 MMOL/L
PROT SERPL-MCNC: 6.9 G/DL
RBC # BLD AUTO: 4.22 M/UL
SODIUM SERPL-SCNC: 142 MMOL/L
WBC # BLD AUTO: 6.99 K/UL

## 2018-04-11 PROCEDURE — 3074F SYST BP LT 130 MM HG: CPT | Mod: CPTII,S$GLB,, | Performed by: NURSE PRACTITIONER

## 2018-04-11 PROCEDURE — 85025 COMPLETE CBC W/AUTO DIFF WBC: CPT

## 2018-04-11 PROCEDURE — 83615 LACTATE (LD) (LDH) ENZYME: CPT | Mod: PN

## 2018-04-11 PROCEDURE — 80053 COMPREHEN METABOLIC PANEL: CPT | Mod: PN

## 2018-04-11 PROCEDURE — 99213 OFFICE O/P EST LOW 20 MIN: CPT | Mod: S$GLB,,, | Performed by: NURSE PRACTITIONER

## 2018-04-11 PROCEDURE — 36415 COLL VENOUS BLD VENIPUNCTURE: CPT | Mod: PN

## 2018-04-11 PROCEDURE — 3078F DIAST BP <80 MM HG: CPT | Mod: CPTII,S$GLB,, | Performed by: NURSE PRACTITIONER

## 2018-04-11 PROCEDURE — 83615 LACTATE (LD) (LDH) ENZYME: CPT

## 2018-04-11 PROCEDURE — 99499 UNLISTED E&M SERVICE: CPT | Mod: S$GLB,,, | Performed by: NURSE PRACTITIONER

## 2018-04-11 PROCEDURE — 99999 PR PBB SHADOW E&M-EST. PATIENT-LVL III: CPT | Mod: PBBFAC,,, | Performed by: NURSE PRACTITIONER

## 2018-04-11 PROCEDURE — 85025 COMPLETE CBC W/AUTO DIFF WBC: CPT | Mod: PN

## 2018-04-11 PROCEDURE — 80053 COMPREHEN METABOLIC PANEL: CPT

## 2018-04-11 NOTE — PROGRESS NOTES
HISTORY OF PRESENT ILLNESS:  This is a 77-year-old black gentleman known to Dr. Cole for polycythemia vera.  He is managed with Hydrea 500 mg twice a day as   well as Plavix (Afib).  To note:  He was diagnosed with PAF in June/2017 by Dr. Escobar.  He presents to the clinic today for interval evaluation with no complaints.  He remains   compliant with Hydrea.  He denies any difficulties with fevers, chills, drenching night sweats,   unexplained weight loss, painful lymphadenopathy, rashes, pruritus, changes in the color   of his stool/urine , jaundice, chest pain, irregular heartbeat, nausea, vomiting, constipation,   diarrhea, etc.  No other new complaints or pertinent findings on a 10-point review of systems.    PHYSICAL EXAMINATION:  GENERAL:  Well-developed, well-nourished black gentleman in no acute distress.    Alert and oriented x4.  VITAL SIGNS:    Wt Readings from Last 3 Encounters:   04/11/18 80.9 kg (178 lb 5.6 oz)   03/23/18 82.1 kg (181 lb)   01/25/18 81.6 kg (180 lb)     Temp Readings from Last 3 Encounters:   04/11/18 97.9 °F (36.6 °C)   03/23/18 97.7 °F (36.5 °C) (Oral)   01/25/18 98.1 °F (36.7 °C) (Oral)     BP Readings from Last 3 Encounters:   04/11/18 128/64   03/23/18 (!) 141/82   01/25/18 135/74     Pulse Readings from Last 3 Encounters:   04/11/18 70   03/23/18 77   01/25/18 76     HEENT:  Normocephalic, atraumatic.  Oral mucosa pink and moist.  Lips without   lesions.  Tongue midline.  Oropharynx clear.  Nonicteric sclerae.  NECK:  Supple.  No adenopathy.  HEART:  Regular rate and rhythm without murmur, gallop or rub.  LUNGS:  Clear to auscultation bilaterally.  ABDOMEN:  Soft, nontender and nondistended with positive normoactive bowel   sounds.  No hepatosplenomegaly.  EXTREMITIES:  No cyanosis, clubbing or edema.  Distal pulses are intact.  AXILLAE AND GROIN:  No palpable pathologic lymphadenopathy is appreciated.    LABORATORY:    Lab Results   Component Value Date    WBC 6.99  04/11/2018    HGB 14.3 04/11/2018    HCT 45.2 04/11/2018     (H) 04/11/2018     (H) 04/11/2018     Differential remarkable for 82% grans, 11.2% lymph    CMP  Sodium   Date Value Ref Range Status   04/11/2018 142 136 - 145 mmol/L Final     Potassium   Date Value Ref Range Status   04/11/2018 3.9 3.5 - 5.1 mmol/L Final     Chloride   Date Value Ref Range Status   04/11/2018 103 95 - 110 mmol/L Final     CO2   Date Value Ref Range Status   04/11/2018 26 22 - 31 mmol/L Final     Glucose   Date Value Ref Range Status   04/11/2018 94 70 - 110 mg/dL Final     Comment:     The ADA recommends the following guidelines for fasting glucose:  Normal:       less than 100 mg/dL  Prediabetes:  100 mg/dL to 125 mg/dL  Diabetes:     126 mg/dL or higher       BUN, Bld   Date Value Ref Range Status   04/11/2018 15 9 - 21 mg/dL Final     Creatinine   Date Value Ref Range Status   04/11/2018 1.07 0.50 - 1.40 mg/dL Final   10/02/2012 1.3 0.2 - 1.4 mg/dl Final     Calcium   Date Value Ref Range Status   04/11/2018 9.8 8.4 - 10.2 mg/dL Final   10/02/2012 10.2 8.6 - 10.2 mg/dl Final     Total Protein   Date Value Ref Range Status   04/11/2018 6.9 6.0 - 8.4 g/dL Final     Albumin   Date Value Ref Range Status   04/11/2018 4.2 3.5 - 5.2 g/dL Final     Total Bilirubin   Date Value Ref Range Status   04/11/2018 0.8 0.2 - 1.3 mg/dL Final     Alkaline Phosphatase   Date Value Ref Range Status   04/11/2018 88 38 - 145 U/L Final     AST   Date Value Ref Range Status   04/11/2018 35 17 - 59 U/L Final     ALT   Date Value Ref Range Status   04/11/2018 44 10 - 44 U/L Final     Anion Gap   Date Value Ref Range Status   04/11/2018 13 8 - 16 mmol/L Final   10/02/2012 7 5 - 15 meq/L Final     eGFR if    Date Value Ref Range Status   04/11/2018 >60 >60 mL/min/1.73 m^2 Final     eGFR if non    Date Value Ref Range Status   04/11/2018 >60 >60 mL/min/1.73 m^2 Final     Comment:     Calculation used to obtain the  estimated glomerular filtration  rate (eGFR) is the CKD-EPI equation.            IMPRESSION:    1.  Polycythemia vera - at target with hematocrit < 45; above goal  2.  New onset AFib (0606/2017); on Metoprolol, Digoxin & Xarelto. - followed by Dr. Escobar.    PLAN:  1.  Patient is to have one unit of whole blood phlebotomized; Rx given.  2.  Continue our course of observation and have the patient return to   clinic in six months with interval CBC, CMP and LDH prior.  3.  Continue Hydrea 500 mg b.i.d.  Presently on Xarelto.  4.  Maintain good daily hydration of 2 L per day.    Assessment/plan reviewed and approved by Dr. Cole.

## 2018-05-06 RX ORDER — DIGOXIN 125 MCG
TABLET ORAL
Qty: 90 TABLET | Refills: 1 | Status: SHIPPED | OUTPATIENT
Start: 2018-05-06 | End: 2018-11-05 | Stop reason: SDUPTHER

## 2018-05-18 ENCOUNTER — PES CALL (OUTPATIENT)
Dept: ADMINISTRATIVE | Facility: CLINIC | Age: 78
End: 2018-05-18

## 2018-05-22 ENCOUNTER — DOCUMENTATION ONLY (OUTPATIENT)
Dept: FAMILY MEDICINE | Facility: CLINIC | Age: 78
End: 2018-05-22

## 2018-05-22 NOTE — PROGRESS NOTES
Pre-Visit Chart Review  For Appointment Scheduled on 05/24/2018    Health Maintenance Due   Topic Date Due    TETANUS VACCINE  12/17/1958    Zoster Vaccine  12/17/2000

## 2018-05-24 ENCOUNTER — OFFICE VISIT (OUTPATIENT)
Dept: FAMILY MEDICINE | Facility: CLINIC | Age: 78
End: 2018-05-24
Payer: MEDICARE

## 2018-05-24 VITALS
HEART RATE: 71 BPM | SYSTOLIC BLOOD PRESSURE: 130 MMHG | DIASTOLIC BLOOD PRESSURE: 70 MMHG | BODY MASS INDEX: 28.79 KG/M2 | WEIGHT: 183.44 LBS | HEIGHT: 67 IN | TEMPERATURE: 98 F

## 2018-05-24 DIAGNOSIS — R35.1 BENIGN PROSTATIC HYPERPLASIA WITH NOCTURIA: ICD-10-CM

## 2018-05-24 DIAGNOSIS — I10 ESSENTIAL HYPERTENSION: ICD-10-CM

## 2018-05-24 DIAGNOSIS — J84.10 CALCIFIED GRANULOMA OF LUNG: ICD-10-CM

## 2018-05-24 DIAGNOSIS — I48.20 CHRONIC ATRIAL FIBRILLATION: ICD-10-CM

## 2018-05-24 DIAGNOSIS — D45 POLYCYTHEMIA VERA: ICD-10-CM

## 2018-05-24 DIAGNOSIS — N40.1 BENIGN PROSTATIC HYPERPLASIA WITH NOCTURIA: ICD-10-CM

## 2018-05-24 DIAGNOSIS — K21.9 GASTROESOPHAGEAL REFLUX DISEASE, ESOPHAGITIS PRESENCE NOT SPECIFIED: ICD-10-CM

## 2018-05-24 DIAGNOSIS — I70.209 ATHEROSCLEROSIS OF NATIVE ARTERY OF EXTREMITY, UNSPECIFIED EXTREMITY, WITH UNSPECIFIED PRESENCE OF CLINICAL MANIFESTATION: ICD-10-CM

## 2018-05-24 DIAGNOSIS — N52.9 ERECTILE DYSFUNCTION, UNSPECIFIED ERECTILE DYSFUNCTION TYPE: ICD-10-CM

## 2018-05-24 DIAGNOSIS — Z00.00 ENCOUNTER FOR PREVENTIVE HEALTH EXAMINATION: Primary | ICD-10-CM

## 2018-05-24 PROBLEM — I48.91 ATRIAL FIBRILLATION: Status: ACTIVE | Noted: 2018-05-24

## 2018-05-24 PROCEDURE — 99999 PR PBB SHADOW E&M-EST. PATIENT-LVL III: CPT | Mod: PBBFAC,,, | Performed by: PHYSICIAN ASSISTANT

## 2018-05-24 PROCEDURE — 3078F DIAST BP <80 MM HG: CPT | Mod: CPTII,S$GLB,, | Performed by: PHYSICIAN ASSISTANT

## 2018-05-24 PROCEDURE — 3075F SYST BP GE 130 - 139MM HG: CPT | Mod: CPTII,S$GLB,, | Performed by: PHYSICIAN ASSISTANT

## 2018-05-24 PROCEDURE — G0439 PPPS, SUBSEQ VISIT: HCPCS | Mod: S$GLB,,, | Performed by: PHYSICIAN ASSISTANT

## 2018-05-24 PROCEDURE — 99499 UNLISTED E&M SERVICE: CPT | Mod: S$GLB,,, | Performed by: PHYSICIAN ASSISTANT

## 2018-05-24 RX ORDER — ALFUZOSIN HYDROCHLORIDE 10 MG/1
10 TABLET, EXTENDED RELEASE ORAL
COMMUNITY
End: 2018-06-05 | Stop reason: SDUPTHER

## 2018-05-24 RX ORDER — METOPROLOL TARTRATE 50 MG/1
50 TABLET ORAL DAILY
COMMUNITY
End: 2019-01-11 | Stop reason: SDUPTHER

## 2018-05-24 NOTE — PROGRESS NOTES
"Morro Sharma presented for a  Medicare AWV and comprehensive Health Risk Assessment today. The following components were reviewed and updated:    · Medical history  · Family History  · Social history  · Allergies and Current Medications  · Health Risk Assessment  · Health Maintenance  · Care Team     ** See Completed Assessments for Annual Wellness Visit within the encounter summary.**       The following assessments were completed:  · Living Situation  · CAGE  · Depression Screening  · Timed Get Up and Go  · Whisper Test  · Cognitive Function Screening  · Nutrition Screening  · ADL Screening  · PAQ Screening    Vitals:    05/24/18 1305   BP: 130/70   BP Location: Right arm   Patient Position: Sitting   BP Method: Large (Manual)   Pulse: 71   Temp: 98.1 °F (36.7 °C)   TempSrc: Oral   Weight: 83.2 kg (183 lb 6.8 oz)   Height: 5' 7" (1.702 m)     Body mass index is 28.73 kg/m².  Physical Exam   Constitutional: He is oriented to person, place, and time. He appears well-developed and well-nourished. No distress.   HENT:   Head: Normocephalic and atraumatic.   Neck: Normal range of motion. Neck supple. No JVD present.   Pulmonary/Chest: Effort normal.   Neurological: He is alert and oriented to person, place, and time.   Skin: He is not diaphoretic.                 Diagnoses and health risks identified today and associated recommendations/orders:    Morro was seen today for health risk assessment.    Diagnoses and all orders for this visit:    Encounter for preventive health examination    Atherosclerosis of native artery of extremity, unspecified extremity, with unspecified presence of clinical manifestation  Comments:  stable, continue to monitor    Calcified granuloma of lung  Comments:  stable, continue to monitor    Polycythemia vera  Comments:  stable, continue to monitor    Chronic atrial fibrillation  Comments:  stable, continue meds    Benign prostatic hyperplasia with nocturia  Comments:  stable, continue to " monitor    Essential hypertension  Comments:  controlled, continue meds    Gastroesophageal reflux disease, esophagitis presence not specified  Comments:  stable, continue meds    Erectile dysfunction, unspecified erectile dysfunction type  Comments:  stable, meds if needed        Provided Morro with a 5-10 year written screening schedule and personal prevention plan. Recommendations were developed using the USPSTF age appropriate recommendations. Education, counseling, and referrals were provided as needed. After Visit Summary printed and given to patient which includes a list of additional screenings\tests needed.    No Follow-up on file.    CLIFF Beard     Patient readiness: acceptance and barriers:none    During the course of the visit the patient was educated and counseled about the following:     Hypertension:   Regular aerobic exercise.    Goals: Hypertension: Reduce Blood Pressure    Did patient meet goals/outcomes: Yes    The following self management tools provided: blood pressure log    Patient Instructions (the written plan) was given to the patient/family.     Time spent with patient: 55 minutes    Barriers to medications present (no )    Adverse reactions to current medications (no)    Over the counter medications reviewed (Yes)

## 2018-05-24 NOTE — Clinical Note
Primary Care Providers: Yaniv Escobar MD, MD (General)  Your patient was seen today for a HRA visit. Gap(s) in care (HEDIS gaps) have been identified during this visit that require additional testing and possible follow up.  No orders of the defined types were placed in this encounter.   These orders were placed using [facility] approved protocol and any results will be forwarded to your office for appropriate follow up. I have included a copy of my visit note; please review the note and feel free to contact me with any questions.   Thank you for allowing me to participate in the care of your patients. CLIFF Beard

## 2018-05-24 NOTE — PATIENT INSTRUCTIONS
Counseling and Referral of Other Preventative  (Italic type indicates deductible and co-insurance are waived)    Patient Name: Morro Sharma  Today's Date: 5/24/2018    Health Maintenance       Date Due Completion Date    TETANUS VACCINE 05/06/2019 (Originally 12/17/1958) ---    Influenza Vaccine 08/01/2018 12/11/2017    Colonoscopy 09/15/2019 9/15/2014 (Done)    Override on 9/15/2014: Done    Override on 3/30/2011: Done (Dr. Mcdaniels, 3-5 year recheck)    Lipid Panel 03/24/2023 3/24/2018        No orders of the defined types were placed in this encounter.    The following information is provided to all patients.  This information is to help you find resources for any of the problems found today that may be affecting your health:                Living healthy guide: www.[facility].[place].gov      Understanding Diabetes: www.diabetes.org      Eating healthy: www.cdc.gov/healthyweight      CDC home safety checklist: www.cdc.gov/steadi/patient.html      Agency on Aging: www.goea.[place].[facility]      Alcoholics anonymous (AA): www.aa.org      Physical Activity: www.stepan.nih.gov/xd0uivv      Tobacco use: www.quitwithusla.org

## 2018-06-07 RX ORDER — ALFUZOSIN HYDROCHLORIDE 10 MG/1
10 TABLET, EXTENDED RELEASE ORAL
Qty: 90 TABLET | Refills: 3 | Status: SHIPPED | OUTPATIENT
Start: 2018-06-07 | End: 2020-02-27 | Stop reason: ALTCHOICE

## 2018-07-23 ENCOUNTER — LAB VISIT (OUTPATIENT)
Dept: LAB | Facility: HOSPITAL | Age: 78
End: 2018-07-23
Attending: INTERNAL MEDICINE
Payer: MEDICARE

## 2018-07-23 DIAGNOSIS — N18.9 CHRONIC KIDNEY DISEASE, UNSPECIFIED: ICD-10-CM

## 2018-07-23 DIAGNOSIS — R94.31 NONSPECIFIC ABNORMAL ELECTROCARDIOGRAM (ECG) (EKG): ICD-10-CM

## 2018-07-23 DIAGNOSIS — Z79.01 LONG TERM (CURRENT) USE OF ANTICOAGULANTS: ICD-10-CM

## 2018-07-23 DIAGNOSIS — E04.9 ENLARGEMENT OF THYROID: ICD-10-CM

## 2018-07-23 DIAGNOSIS — I10 ESSENTIAL HYPERTENSION, MALIGNANT: ICD-10-CM

## 2018-07-23 DIAGNOSIS — D64.9 ANEMIA, UNSPECIFIED: ICD-10-CM

## 2018-07-23 DIAGNOSIS — E78.5 HYPERLIPEMIA: Primary | ICD-10-CM

## 2018-07-23 DIAGNOSIS — I48.0 PAROXYSMAL ATRIAL FIBRILLATION: ICD-10-CM

## 2018-07-23 DIAGNOSIS — E78.5 HYPERLIPEMIA: ICD-10-CM

## 2018-07-23 LAB
ALBUMIN SERPL BCP-MCNC: 4.2 G/DL
ALP SERPL-CCNC: 91 U/L
ALT SERPL W/O P-5'-P-CCNC: 29 U/L
ANION GAP SERPL CALC-SCNC: 12 MMOL/L
AST SERPL-CCNC: 33 U/L
BASOPHILS # BLD AUTO: 0.11 K/UL
BASOPHILS NFR BLD: 1.5 %
BILIRUB SERPL-MCNC: 1.6 MG/DL
BUN SERPL-MCNC: 14 MG/DL
CALCIUM SERPL-MCNC: 10.4 MG/DL
CHLORIDE SERPL-SCNC: 107 MMOL/L
CHOLEST SERPL-MCNC: 82 MG/DL
CHOLEST/HDLC SERPL: 3.6 {RATIO}
CO2 SERPL-SCNC: 22 MMOL/L
CREAT SERPL-MCNC: 1.4 MG/DL
DIFFERENTIAL METHOD: ABNORMAL
DIGOXIN SERPL-MCNC: 0.5 NG/ML
EOSINOPHIL # BLD AUTO: 0.1 K/UL
EOSINOPHIL NFR BLD: 0.8 %
ERYTHROCYTE [DISTWIDTH] IN BLOOD BY AUTOMATED COUNT: 12.9 %
EST. GFR  (AFRICAN AMERICAN): 55.6 ML/MIN/1.73 M^2
EST. GFR  (NON AFRICAN AMERICAN): 48.1 ML/MIN/1.73 M^2
GLUCOSE SERPL-MCNC: 95 MG/DL
HCT VFR BLD AUTO: 51.7 %
HDLC SERPL-MCNC: 23 MG/DL
HDLC SERPL: 28 %
HGB BLD-MCNC: 16.3 G/DL
IMM GRANULOCYTES # BLD AUTO: 0.03 K/UL
IMM GRANULOCYTES NFR BLD AUTO: 0.4 %
LDLC SERPL CALC-MCNC: 43.6 MG/DL
LYMPHOCYTES # BLD AUTO: 0.8 K/UL
LYMPHOCYTES NFR BLD: 10.4 %
MCH RBC QN AUTO: 32.9 PG
MCHC RBC AUTO-ENTMCNC: 31.5 G/DL
MCV RBC AUTO: 104 FL
MONOCYTES # BLD AUTO: 0.3 K/UL
MONOCYTES NFR BLD: 4.7 %
NEUTROPHILS # BLD AUTO: 6 K/UL
NEUTROPHILS NFR BLD: 82.2 %
NONHDLC SERPL-MCNC: 59 MG/DL
NRBC BLD-RTO: 0 /100 WBC
PLATELET # BLD AUTO: 528 K/UL
PLATELET BLD QL SMEAR: ABNORMAL
PMV BLD AUTO: 10.7 FL
POTASSIUM SERPL-SCNC: 4.4 MMOL/L
PROT SERPL-MCNC: 7 G/DL
RBC # BLD AUTO: 4.96 M/UL
SODIUM SERPL-SCNC: 141 MMOL/L
TRIGL SERPL-MCNC: 77 MG/DL
WBC # BLD AUTO: 7.28 K/UL

## 2018-07-23 PROCEDURE — 85025 COMPLETE CBC W/AUTO DIFF WBC: CPT

## 2018-07-23 PROCEDURE — 80053 COMPREHEN METABOLIC PANEL: CPT

## 2018-07-23 PROCEDURE — 36415 COLL VENOUS BLD VENIPUNCTURE: CPT | Mod: PO

## 2018-07-23 PROCEDURE — 80061 LIPID PANEL: CPT

## 2018-07-23 PROCEDURE — 80162 ASSAY OF DIGOXIN TOTAL: CPT

## 2018-07-30 ENCOUNTER — PATIENT MESSAGE (OUTPATIENT)
Dept: FAMILY MEDICINE | Facility: CLINIC | Age: 78
End: 2018-07-30

## 2018-07-30 DIAGNOSIS — N18.9 CHRONIC KIDNEY DISEASE, UNSPECIFIED CKD STAGE: Primary | ICD-10-CM

## 2018-09-13 DIAGNOSIS — I10 ESSENTIAL HYPERTENSION, MALIGNANT: Primary | ICD-10-CM

## 2018-09-13 DIAGNOSIS — N40.0 BENIGN ENLARGEMENT OF PROSTATE: ICD-10-CM

## 2018-09-13 DIAGNOSIS — D45 CHRONIC ERYTHREMIA IN REMISSION: ICD-10-CM

## 2018-09-13 DIAGNOSIS — I48.20 CHRONIC ATRIAL FIBRILLATION: ICD-10-CM

## 2018-09-13 DIAGNOSIS — N18.30 CHRONIC KIDNEY DISEASE, STAGE III (MODERATE): ICD-10-CM

## 2018-09-19 ENCOUNTER — HOSPITAL ENCOUNTER (OUTPATIENT)
Dept: RADIOLOGY | Facility: HOSPITAL | Age: 78
Discharge: HOME | End: 2018-09-19
Attending: INTERNAL MEDICINE
Payer: MEDICARE

## 2018-09-19 DIAGNOSIS — D45 CHRONIC ERYTHREMIA IN REMISSION: ICD-10-CM

## 2018-09-19 PROCEDURE — 76770 US EXAM ABDO BACK WALL COMP: CPT | Mod: 26,,, | Performed by: RADIOLOGY

## 2018-09-19 PROCEDURE — 76770 US EXAM ABDO BACK WALL COMP: CPT | Mod: TC

## 2018-10-11 ENCOUNTER — OFFICE VISIT (OUTPATIENT)
Dept: HEMATOLOGY/ONCOLOGY | Facility: CLINIC | Age: 78
End: 2018-10-11
Payer: MEDICARE

## 2018-10-11 ENCOUNTER — LAB VISIT (OUTPATIENT)
Dept: LAB | Facility: HOSPITAL | Age: 78
End: 2018-10-11
Attending: NURSE PRACTITIONER
Payer: MEDICARE

## 2018-10-11 VITALS
BODY MASS INDEX: 28.41 KG/M2 | HEART RATE: 68 BPM | HEIGHT: 67 IN | RESPIRATION RATE: 18 BRPM | WEIGHT: 181 LBS | DIASTOLIC BLOOD PRESSURE: 87 MMHG | TEMPERATURE: 98 F | SYSTOLIC BLOOD PRESSURE: 148 MMHG

## 2018-10-11 DIAGNOSIS — D45 POLYCYTHEMIA VERA: ICD-10-CM

## 2018-10-11 DIAGNOSIS — D45 POLYCYTHEMIA VERA: Primary | ICD-10-CM

## 2018-10-11 LAB
ALBUMIN SERPL BCP-MCNC: 4.2 G/DL
ALP SERPL-CCNC: 102 U/L
ALT SERPL W/O P-5'-P-CCNC: 40 U/L
ANION GAP SERPL CALC-SCNC: 10 MMOL/L
AST SERPL-CCNC: 28 U/L
BASOPHILS # BLD AUTO: 0.09 K/UL
BASOPHILS NFR BLD: 1.1 %
BILIRUB SERPL-MCNC: 1.1 MG/DL
BUN SERPL-MCNC: 14 MG/DL
CALCIUM SERPL-MCNC: 9.9 MG/DL
CHLORIDE SERPL-SCNC: 103 MMOL/L
CO2 SERPL-SCNC: 27 MMOL/L
CREAT SERPL-MCNC: 1.06 MG/DL
DIFFERENTIAL METHOD: ABNORMAL
EOSINOPHIL # BLD AUTO: 0.1 K/UL
EOSINOPHIL NFR BLD: 0.9 %
ERYTHROCYTE [DISTWIDTH] IN BLOOD BY AUTOMATED COUNT: 13.2 %
EST. GFR  (AFRICAN AMERICAN): >60 ML/MIN/1.73 M^2
EST. GFR  (NON AFRICAN AMERICAN): >60 ML/MIN/1.73 M^2
GLUCOSE SERPL-MCNC: 96 MG/DL
HCT VFR BLD AUTO: 47.8 %
HGB BLD-MCNC: 14.8 G/DL
LDH SERPL L TO P-CCNC: 822 U/L
LYMPHOCYTES # BLD AUTO: 0.9 K/UL
LYMPHOCYTES NFR BLD: 11.9 %
MCH RBC QN AUTO: 32.7 PG
MCHC RBC AUTO-ENTMCNC: 31 G/DL
MCV RBC AUTO: 106 FL
MONOCYTES # BLD AUTO: 0.3 K/UL
MONOCYTES NFR BLD: 3.9 %
NEUTROPHILS # BLD AUTO: 6.5 K/UL
NEUTROPHILS NFR BLD: 82.2 %
NRBC BLD-RTO: 0 /100 WBC
PLATELET # BLD AUTO: 500 K/UL
PMV BLD AUTO: 9.5 FL
POTASSIUM SERPL-SCNC: 3.6 MMOL/L
PROT SERPL-MCNC: 6.9 G/DL
RBC # BLD AUTO: 4.52 M/UL
SODIUM SERPL-SCNC: 140 MMOL/L
WBC # BLD AUTO: 7.89 K/UL

## 2018-10-11 PROCEDURE — 99999 PR PBB SHADOW E&M-EST. PATIENT-LVL IV: CPT | Mod: PBBFAC,,, | Performed by: NURSE PRACTITIONER

## 2018-10-11 PROCEDURE — 3079F DIAST BP 80-89 MM HG: CPT | Mod: CPTII,,, | Performed by: NURSE PRACTITIONER

## 2018-10-11 PROCEDURE — 1101F PT FALLS ASSESS-DOCD LE1/YR: CPT | Mod: CPTII,,, | Performed by: NURSE PRACTITIONER

## 2018-10-11 PROCEDURE — 36415 COLL VENOUS BLD VENIPUNCTURE: CPT | Mod: PN

## 2018-10-11 PROCEDURE — 80053 COMPREHEN METABOLIC PANEL: CPT

## 2018-10-11 PROCEDURE — 80053 COMPREHEN METABOLIC PANEL: CPT | Mod: PN

## 2018-10-11 PROCEDURE — 85025 COMPLETE CBC W/AUTO DIFF WBC: CPT | Mod: PN

## 2018-10-11 PROCEDURE — 83615 LACTATE (LD) (LDH) ENZYME: CPT | Mod: PN

## 2018-10-11 PROCEDURE — 99214 OFFICE O/P EST MOD 30 MIN: CPT | Mod: PBBFAC,PN | Performed by: NURSE PRACTITIONER

## 2018-10-11 PROCEDURE — 85025 COMPLETE CBC W/AUTO DIFF WBC: CPT

## 2018-10-11 PROCEDURE — 83615 LACTATE (LD) (LDH) ENZYME: CPT

## 2018-10-11 PROCEDURE — 99213 OFFICE O/P EST LOW 20 MIN: CPT | Mod: S$PBB,,, | Performed by: NURSE PRACTITIONER

## 2018-10-11 PROCEDURE — 3077F SYST BP >= 140 MM HG: CPT | Mod: CPTII,,, | Performed by: NURSE PRACTITIONER

## 2018-10-11 PROCEDURE — 99499 UNLISTED E&M SERVICE: CPT | Mod: S$GLB,,, | Performed by: NURSE PRACTITIONER

## 2018-10-11 NOTE — PROGRESS NOTES
HISTORY OF PRESENT ILLNESS:  This is a 77-year-old black gentleman known to   Dr. Cole for polycythemia vera.  He is managed with Hydrea 500 mg twice a day as   well as Xarelto (Afib).  To note:  He was diagnosed with PAF in June/2017 by Dr. Escobar.    He presents to the clinic today for interval evaluation with no complaints.  He remains   compliant with Hydrea.  He reports completion of phlebotomy since last visit.  He   denies any difficulties with fevers, chills, drenching night sweats, unexplained weight   loss, painful lymphadenopathy, rashes, pruritus, changes in the color of his stool/urine ,   jaundice, chest pain, irregular heartbeat, nausea, vomiting, constipation, diarrhea, etc.    No other new complaints or pertinent findings on a 10-point review of systems.    PHYSICAL EXAMINATION:  GENERAL:  Well-developed, well-nourished black gentleman in no acute distress.    Alert and oriented x4.  VITAL SIGNS:  Weight:  Gain of 3 pounds in 6 months  Wt Readings from Last 3 Encounters:   10/11/18 82.1 kg (180 lb 16 oz)   05/24/18 83.2 kg (183 lb 6.8 oz)   04/11/18 80.9 kg (178 lb 5.6 oz)     Temp Readings from Last 3 Encounters:   10/11/18 97.9 °F (36.6 °C)   05/24/18 98.1 °F (36.7 °C) (Oral)   04/11/18 97.9 °F (36.6 °C)     BP Readings from Last 3 Encounters:   10/11/18 (!) 148/87   05/24/18 130/70   04/11/18 128/64     Pulse Readings from Last 3 Encounters:   10/11/18 68   05/24/18 71   04/11/18 70     HEENT:  Normocephalic, atraumatic.  Oral mucosa pink and moist.  Lips without   lesions.  Tongue midline.  Oropharynx clear.  Nonicteric sclerae.  NECK:  Supple.  No adenopathy.  HEART:  Regular rate and rhythm without murmur, gallop or rub.  LUNGS:  Clear to auscultation bilaterally.  ABDOMEN:  Soft, nontender and nondistended with positive normoactive bowel   sounds.  No hepatosplenomegaly.  EXTREMITIES:  No cyanosis, clubbing or edema.  Distal pulses are intact.  AXILLAE AND GROIN:  No palpable pathologic  lymphadenopathy is appreciated.    LABORATORY:    Lab Results   Component Value Date    WBC 7.89 10/11/2018    HGB 14.8 10/11/2018    HCT 47.8 10/11/2018     (H) 10/11/2018     (H) 10/11/2018     Differential remarkable for 82.2% grans, 11.9% lymph, 3.9% monos    CMP  Sodium   Date Value Ref Range Status   10/11/2018 140 136 - 145 mmol/L Final     Potassium   Date Value Ref Range Status   10/11/2018 3.6 3.5 - 5.1 mmol/L Final     Chloride   Date Value Ref Range Status   10/11/2018 103 95 - 110 mmol/L Final     CO2   Date Value Ref Range Status   10/11/2018 27 22 - 31 mmol/L Final     Glucose   Date Value Ref Range Status   10/11/2018 96 70 - 110 mg/dL Final     Comment:     The ADA recommends the following guidelines for fasting glucose:  Normal:       less than 100 mg/dL  Prediabetes:  100 mg/dL to 125 mg/dL  Diabetes:     126 mg/dL or higher       BUN, Bld   Date Value Ref Range Status   10/11/2018 14 9 - 21 mg/dL Final     Creatinine   Date Value Ref Range Status   10/11/2018 1.06 0.50 - 1.40 mg/dL Final   10/02/2012 1.3 0.2 - 1.4 mg/dl Final     Calcium   Date Value Ref Range Status   10/11/2018 9.9 8.4 - 10.2 mg/dL Final   10/02/2012 10.2 8.6 - 10.2 mg/dl Final     Total Protein   Date Value Ref Range Status   10/11/2018 6.9 6.0 - 8.4 g/dL Final     Albumin   Date Value Ref Range Status   10/11/2018 4.2 3.5 - 5.2 g/dL Final     Total Bilirubin   Date Value Ref Range Status   10/11/2018 1.1 0.2 - 1.3 mg/dL Final     Alkaline Phosphatase   Date Value Ref Range Status   10/11/2018 102 38 - 145 U/L Final     AST   Date Value Ref Range Status   10/11/2018 28 17 - 59 U/L Final     ALT   Date Value Ref Range Status   10/11/2018 40 10 - 44 U/L Final     Anion Gap   Date Value Ref Range Status   10/11/2018 10 8 - 16 mmol/L Final   10/02/2012 7 5 - 15 meq/L Final     eGFR if    Date Value Ref Range Status   10/11/2018 >60 >60 mL/min/1.73 m^2 Final     eGFR if non    Date  Value Ref Range Status   10/11/2018 >60 >60 mL/min/1.73 m^2 Final     Comment:     Calculation used to obtain the estimated glomerular filtration  rate (eGFR) is the CKD-EPI equation.            IMPRESSION:    1.  Polycythemia vera -  Target hematocrit < 45; above goal  2.  New onset AFib (0606/2017); on Metoprolol, Digoxin & Xarelto. - followed by Dr. Escobar.    PLAN:  1.  Patient is to have one unit of whole blood phlebotomized every other week x 2; Rx given.  2.  Continue our course of observation and have the patient return to clinic in 4 months with   interval CBC, CMP and LDH prior.  3.  Continue Hydrea 500 mg b.i.d.  Presently on Xarelto.  4.  Maintain good daily hydration of 2 L per day, as well as pre & post phlebotomy.    Assessment/plan reviewed and approved by Dr. Vieyra.

## 2018-11-05 RX ORDER — DIGOXIN 125 MCG
TABLET ORAL
Qty: 90 TABLET | Refills: 0 | Status: SHIPPED | OUTPATIENT
Start: 2018-11-05 | End: 2018-12-17 | Stop reason: ALTCHOICE

## 2018-11-09 DIAGNOSIS — D47.3 ESSENTIAL THROMBOCYTOSIS: ICD-10-CM

## 2018-11-09 RX ORDER — HYDROXYUREA 500 MG/1
500 CAPSULE ORAL 2 TIMES DAILY
Qty: 180 CAPSULE | Refills: 1 | Status: SHIPPED | OUTPATIENT
Start: 2018-11-09 | End: 2019-02-27 | Stop reason: SDUPTHER

## 2018-11-14 RX ORDER — DIGOXIN 125 MCG
TABLET ORAL
Qty: 90 TABLET | Refills: 0 | OUTPATIENT
Start: 2018-11-14

## 2018-11-27 ENCOUNTER — DOCUMENTATION ONLY (OUTPATIENT)
Dept: FAMILY MEDICINE | Facility: CLINIC | Age: 78
End: 2018-11-27

## 2018-11-27 ENCOUNTER — PATIENT MESSAGE (OUTPATIENT)
Dept: FAMILY MEDICINE | Facility: CLINIC | Age: 78
End: 2018-11-27

## 2018-11-27 DIAGNOSIS — I48.20 CHRONIC ATRIAL FIBRILLATION: Primary | ICD-10-CM

## 2018-11-27 DIAGNOSIS — I10 ESSENTIAL HYPERTENSION: ICD-10-CM

## 2018-11-27 NOTE — PROGRESS NOTES
Pre-Visit Chart Review  For Appointment Scheduled on 1-28-19    Health Maintenance Due   Topic Date Due    Influenza Vaccine  08/01/2018

## 2018-12-17 ENCOUNTER — OFFICE VISIT (OUTPATIENT)
Dept: CARDIOLOGY | Facility: CLINIC | Age: 78
End: 2018-12-17
Payer: MEDICARE

## 2018-12-17 VITALS
HEIGHT: 67 IN | OXYGEN SATURATION: 97 % | BODY MASS INDEX: 29.1 KG/M2 | WEIGHT: 185.44 LBS | DIASTOLIC BLOOD PRESSURE: 75 MMHG | HEART RATE: 75 BPM | SYSTOLIC BLOOD PRESSURE: 128 MMHG

## 2018-12-17 DIAGNOSIS — I10 ESSENTIAL HYPERTENSION: Primary | ICD-10-CM

## 2018-12-17 DIAGNOSIS — D45 POLYCYTHEMIA VERA: ICD-10-CM

## 2018-12-17 DIAGNOSIS — I48.91 ATRIAL FIBRILLATION, UNSPECIFIED TYPE: ICD-10-CM

## 2018-12-17 DIAGNOSIS — I48.91 ATRIAL FIBRILLATION, UNSPECIFIED TYPE: Primary | ICD-10-CM

## 2018-12-17 PROCEDURE — 1101F PT FALLS ASSESS-DOCD LE1/YR: CPT | Mod: CPTII,S$GLB,, | Performed by: INTERNAL MEDICINE

## 2018-12-17 PROCEDURE — 99999 PR PBB SHADOW E&M-EST. PATIENT-LVL III: CPT | Mod: PBBFAC,,, | Performed by: INTERNAL MEDICINE

## 2018-12-17 PROCEDURE — 99204 OFFICE O/P NEW MOD 45 MIN: CPT | Mod: S$GLB,,, | Performed by: INTERNAL MEDICINE

## 2018-12-17 PROCEDURE — 93000 ELECTROCARDIOGRAM COMPLETE: CPT | Mod: S$GLB,,, | Performed by: INTERNAL MEDICINE

## 2018-12-17 PROCEDURE — 3078F DIAST BP <80 MM HG: CPT | Mod: CPTII,S$GLB,, | Performed by: INTERNAL MEDICINE

## 2018-12-17 PROCEDURE — 3074F SYST BP LT 130 MM HG: CPT | Mod: CPTII,S$GLB,, | Performed by: INTERNAL MEDICINE

## 2018-12-17 NOTE — LETTER
December 17, 2018      Celeste Gonzalez, NP  [address]  [place] [address]           [facility]  [address]  [place] [address]  Phone: [phone]          Patient: Morro Sharma   MR Number: [id number]   YOB: 1940   Date of Visit: 12/17/2018       Dear Celeste Gonzalez:    Thank you for referring Morro Sharma to me for evaluation. Attached you will find relevant portions of my assessment and plan of care.    If you have questions, please do not hesitate to call me. I look forward to following Morro Sharma along with you.    Sincerely,    Ryan Kingston MD    Enclosure  CC:  No Recipients    If you would like to receive this communication electronically, please contact [email or web address] or [phone] to request more information on [employer] access.    For providers and/or their staff who would like to refer a patient to [facility], please contact us through our one-stop-shop provider referral line, [facility], at [phone].    If you feel you have received this communication in error or would no longer like to receive these types of communications, please e-mail [email or web address]

## 2018-12-17 NOTE — PROGRESS NOTES
[facility]    CC:  Paroxysmal atrial fibrillation  Chief Complaint   Patient presents with    Advice Only     A- Fib, hypertension       Patient ID: Morro Sharma is a 78 y.o. male with a past medical history of paroxysmal atrial fibrillation, hypertension  HPI  Patient was previously treated by another cardiologist in the community.  He wanted to establish care in the [facility] system and hence he was here to see me.  He is doing well.  Denies any chest pain, shortness of breath, orthopnea, PND, palpitations, syncope or presyncope.  Today he was in sinus rhythm.    Past Medical History:   Diagnosis Date    Atrial fibrillation 2017    [facility] records sent to scanning    BPH (benign prostatic hypertrophy)     Colon polyps     Erectile dysfunction     GERD (gastroesophageal reflux disease)     Hypertension     Pneumothorax on left     Polycythemia vera     Pulmonary nodules      Past Surgical History:   Procedure Laterality Date    BRONCHOSCOPY N/A 2013    [facility]    COLONOSCOPY  9/15/14    Dr. Mcdaniels, 5 year recheck    ESOPHAGOGASTRODUODENOSCOPY      LUNG SURGERY      LEFT - CHEST TUBE     Social History     Socioeconomic History    Marital status:      Spouse name: Not on file    Number of children: Not on file    Years of education: Not on file    Highest education level: Not on file   Social Needs    Financial resource strain: Not on file    Food insecurity - worry: Not on file    Food insecurity - inability: Not on file    Transportation needs - medical: Not on file    Transportation needs - non-medical: Not on file   Occupational History    Not on file   Tobacco Use    Smoking status: Former Smoker     Packs/day: 1.50     Years: 25.00     Pack years: 37.50     Last attempt to quit: 10/2/1982     Years since quittin.2    Smokeless tobacco: Never Used   Substance and Sexual Activity    Alcohol use: No    Drug  use: No    Sexual activity: Yes   Other Topics Concern    Not on file   Social History Narrative    Not on file     Family History   Problem Relation Age of Onset    Hypertension Mother     Heart disease Mother     Hypertension Maternal Aunt     Heart disease Maternal Aunt     Hypertension Maternal Uncle     Heart disease Maternal Uncle     Early death Father     No Known Problems Daughter     No Known Problems Daughter     No Known Problems Daughter     No Known Problems Daughter     Prostate cancer Neg Hx     Kidney cancer Neg Hx     Cancer Neg Hx     Urolithiasis Neg Hx        Review of patient's allergies indicates:  No Known Allergies       Medication List           Accurate as of 12/17/18  3:47 PM. If you have any questions, ask your nurse or doctor.               CONTINUE taking these medications    acetaminophen 500 MG tablet  Commonly known as:  TYLENOL  Take 1 tablet (500 mg total) by mouth 2 (two) times daily. For shoulder pain     alfuzosin 10 mg Tb24  Commonly known as:  UROXATRAL  Take 1 tablet (10 mg total) by mouth daily with breakfast.     atorvastatin 40 MG tablet  Commonly known as:  LIPITOR     hydroxyurea 500 mg Cap  Commonly known as:  HYDREA  Take 1 capsule (500 mg total) by mouth 2 (two) times daily.     metoprolol tartrate 50 MG tablet  Commonly known as:  LOPRESSOR     MIRALAX 17 gram/dose powder  Generic drug:  polyethylene glycol     ONE DAILY MULTIVITAMIN per tablet  Generic drug:  multivitamin     tamsulosin 0.4 mg Cap  Commonly known as:  FLOMAX  Take 1 capsule (0.4 mg total) by mouth once daily.     XARELTO 15 mg Tab  Generic drug:  rivaroxaban        STOP taking these medications    digoxin 125 mcg tablet  Commonly known as:  LANOXIN  Stopped by:  Ryan Kingston MD            Review of Systems  Constitution: Denies chills, fever, and sweats.  HENT: Denies headaches or blurry vision.  Cardiovascular: Denies chest pain or irregular heart beat.  Respiratory: Denies  "cough or shortness of breath.  Gastrointestinal: Denies abdominal pain, nausea, or vomiting.  Musculoskeletal: Denies muscle cramps.  Neurological: Denies dizziness or focal weakness.  Psychiatric/Behavioral: Normal mental status.  Hematologic/Lymphatic: Denies bleeding problem or easy bruising/bleeding.  Skin: Denies rash or suspicious lesions    Physical Examination  /75 (BP Location: Right arm, Patient Position: Sitting, BP Method: Medium (Automatic))   Pulse 75   Ht 5' 7" (1.702 m)   Wt 84.1 kg (185 lb 6.5 oz)   SpO2 97%   BMI 29.04 kg/m²     Constitutional: No acute distress, conversant  HEENT: Sclera anicteric, Pupils equal, round and reactive to light, extraocular motions intact, Oropharynx clear  Neck: No JVD, no carotid bruits  Cardiovascular: regular rate and rhythm, no murmur, rubs or gallops, normal S1/S2  Pulmonary: Clear to auscultation bilaterally  Abdominal: Abdomen soft, nontender, nondistended, positive bowel sounds  Extremities: No lower extremity edema,   Pulses:  Carotid pulses are 2+ on the right side, and 2+ on the left side.  Radial pulses are 2+ on the right side, and 2+ on the left side.       Skin: No ecchymosis, erythema, or ulcers  Psych: Alert and oriented x 3, appropriate affect  Neuro: CNII-XII intact, no focal deficits    Labs:  Most Recent Data  CBC:   Lab Results   Component Value Date    WBC 7.89 10/11/2018    HGB 14.8 10/11/2018    HCT 47.8 10/11/2018     (H) 10/11/2018     (H) 10/11/2018    RDW 13.2 10/11/2018     BMP:   Lab Results   Component Value Date     10/11/2018    K 3.6 10/11/2018     10/11/2018    CO2 27 10/11/2018    BUN 14 10/11/2018    CREATININE 1.06 10/11/2018    GLU 96 10/11/2018    CALCIUM 9.9 10/11/2018    MG 2.5 11/14/2009    PHOS 2.9 09/19/2018     LFTS;   Lab Results   Component Value Date    PROT 6.9 10/11/2018    ALBUMIN 4.2 10/11/2018    BILITOT 1.1 10/11/2018    AST 28 10/11/2018    ALKPHOS 102 10/11/2018    ALT 40 " 10/11/2018     COAGS:   Lab Results   Component Value Date    INR 1.02 10/02/2012     FLP:   Lab Results   Component Value Date    CHOL 82 (L) 07/23/2018    HDL 23 (L) 07/23/2018    LDLCALC 43.6 (L) 07/23/2018    TRIG 77 07/23/2018    CHOLHDL 28.0 07/23/2018     CARDIAC: No results found for: TROPONINI, CKMB, BNP    Imaging:    EKG:  Normal sinus rhythm.    Echo:CONCLUSIONS     1 - Hyperdynamic left ventricular systolic function (EF 65-70%).     2 - Normal left ventricular diastolic function.     3 - Normal right ventricular systolic function .     4 - The estimated PA systolic pressure is 22 mmHg.       I have personally reviewed these images and echo data    Assessment/Plan:  Morro was seen today for advice only.    Diagnoses and all orders for this visit:    Essential hypertension    Atrial fibrillation, unspecified type  -     EKG 12-lead    Polycythemia vera     chadsvas- 3.    Patient is currently stable from his cardiovascular some perspective.  I have requested all his old notes from Dr. Hubbard's office.  Continue medical management for now.  I have discontinued his digoxin since digoxin is not really helpful in paroxysmal atrial fibrillation.    Follow-up in about 1 year (around 12/17/2019).                Ryan Kingston MD,MRCP,RPVI,FACC,FSCAI.  Interventional Cardiology   Phone [phone]

## 2019-01-07 ENCOUNTER — TELEPHONE (OUTPATIENT)
Dept: FAMILY MEDICINE | Facility: CLINIC | Age: 79
End: 2019-01-07

## 2019-01-07 NOTE — TELEPHONE ENCOUNTER
Patient states his insurnace changed to [employer] and mail order to [facility] list faxed at patient request.

## 2019-01-07 NOTE — TELEPHONE ENCOUNTER
----- Message from Rex Hand sent at 1/7/2019 11:00 AM CST -----  Type:  Patient Call Back    Who Called: pt    Does the patient know what this is regarding?: pt needs a list of his medication(s) sent to [facility]  [phone] fax [phone]  Best Call Back Number:  [phone]  Additional Information:  Please call pt and leave a detailed message if there is no answer.

## 2019-01-09 ENCOUNTER — PATIENT MESSAGE (OUTPATIENT)
Dept: UROLOGY | Facility: CLINIC | Age: 79
End: 2019-01-09

## 2019-01-10 ENCOUNTER — PATIENT MESSAGE (OUTPATIENT)
Dept: FAMILY MEDICINE | Facility: CLINIC | Age: 79
End: 2019-01-10

## 2019-01-11 RX ORDER — METOPROLOL TARTRATE 50 MG/1
50 TABLET ORAL DAILY
Qty: 90 TABLET | Refills: 3 | Status: SHIPPED | OUTPATIENT
Start: 2019-01-11 | End: 2019-01-28

## 2019-01-11 RX ORDER — ATORVASTATIN CALCIUM 40 MG/1
40 TABLET, FILM COATED ORAL DAILY
Qty: 90 TABLET | Refills: 3 | Status: SHIPPED | OUTPATIENT
Start: 2019-01-11 | End: 2020-01-21 | Stop reason: SDUPTHER

## 2019-01-28 ENCOUNTER — DOCUMENTATION ONLY (OUTPATIENT)
Dept: FAMILY MEDICINE | Facility: CLINIC | Age: 79
End: 2019-01-28

## 2019-01-28 ENCOUNTER — LAB VISIT (OUTPATIENT)
Dept: LAB | Facility: HOSPITAL | Age: 79
End: 2019-01-28
Attending: FAMILY MEDICINE
Payer: MEDICARE

## 2019-01-28 ENCOUNTER — OFFICE VISIT (OUTPATIENT)
Dept: FAMILY MEDICINE | Facility: CLINIC | Age: 79
End: 2019-01-28
Attending: FAMILY MEDICINE
Payer: MEDICARE

## 2019-01-28 VITALS
DIASTOLIC BLOOD PRESSURE: 80 MMHG | SYSTOLIC BLOOD PRESSURE: 158 MMHG | WEIGHT: 183.19 LBS | HEART RATE: 74 BPM | RESPIRATION RATE: 20 BRPM | TEMPERATURE: 98 F | HEIGHT: 67 IN | OXYGEN SATURATION: 98 % | BODY MASS INDEX: 28.75 KG/M2

## 2019-01-28 DIAGNOSIS — J84.10 CALCIFIED GRANULOMA OF LUNG: ICD-10-CM

## 2019-01-28 DIAGNOSIS — I10 ESSENTIAL HYPERTENSION: ICD-10-CM

## 2019-01-28 DIAGNOSIS — I48.20 CHRONIC ATRIAL FIBRILLATION: ICD-10-CM

## 2019-01-28 DIAGNOSIS — Z12.5 PROSTATE CANCER SCREENING: ICD-10-CM

## 2019-01-28 DIAGNOSIS — Z00.00 ENCOUNTER FOR PREVENTIVE HEALTH EXAMINATION: Primary | ICD-10-CM

## 2019-01-28 DIAGNOSIS — R35.1 BENIGN PROSTATIC HYPERPLASIA WITH NOCTURIA: ICD-10-CM

## 2019-01-28 DIAGNOSIS — N52.9 ERECTILE DYSFUNCTION, UNSPECIFIED ERECTILE DYSFUNCTION TYPE: ICD-10-CM

## 2019-01-28 DIAGNOSIS — D45 POLYCYTHEMIA VERA: ICD-10-CM

## 2019-01-28 DIAGNOSIS — Z00.00 ENCOUNTER FOR PREVENTIVE HEALTH EXAMINATION: ICD-10-CM

## 2019-01-28 DIAGNOSIS — Z13.6 SCREENING FOR CARDIOVASCULAR CONDITION: ICD-10-CM

## 2019-01-28 DIAGNOSIS — N40.1 BENIGN PROSTATIC HYPERPLASIA WITH NOCTURIA: ICD-10-CM

## 2019-01-28 DIAGNOSIS — I70.209 ATHEROSCLEROSIS OF NATIVE ARTERY OF EXTREMITY, UNSPECIFIED EXTREMITY, WITH UNSPECIFIED PRESENCE OF CLINICAL MANIFESTATION: ICD-10-CM

## 2019-01-28 DIAGNOSIS — K21.9 GASTROESOPHAGEAL REFLUX DISEASE, ESOPHAGITIS PRESENCE NOT SPECIFIED: ICD-10-CM

## 2019-01-28 LAB
ALBUMIN SERPL BCP-MCNC: 4 G/DL
ALP SERPL-CCNC: 86 U/L
ALT SERPL W/O P-5'-P-CCNC: 30 U/L
ANION GAP SERPL CALC-SCNC: 7 MMOL/L
AST SERPL-CCNC: 31 U/L
BASOPHILS # BLD AUTO: 0.07 K/UL
BASOPHILS NFR BLD: 1.2 %
BILIRUB SERPL-MCNC: 1.2 MG/DL
BUN SERPL-MCNC: 11 MG/DL
CALCIUM SERPL-MCNC: 10.4 MG/DL
CHLORIDE SERPL-SCNC: 105 MMOL/L
CHOLEST SERPL-MCNC: 98 MG/DL
CHOLEST/HDLC SERPL: 3.9 {RATIO}
CO2 SERPL-SCNC: 28 MMOL/L
COMPLEXED PSA SERPL-MCNC: 1 NG/ML
CREAT SERPL-MCNC: 1.3 MG/DL
DIFFERENTIAL METHOD: ABNORMAL
EOSINOPHIL # BLD AUTO: 0.1 K/UL
EOSINOPHIL NFR BLD: 1 %
ERYTHROCYTE [DISTWIDTH] IN BLOOD BY AUTOMATED COUNT: 13.2 %
EST. GFR  (AFRICAN AMERICAN): >60 ML/MIN/1.73 M^2
EST. GFR  (NON AFRICAN AMERICAN): 52.3 ML/MIN/1.73 M^2
GLUCOSE SERPL-MCNC: 106 MG/DL
HCT VFR BLD AUTO: 48.8 %
HDLC SERPL-MCNC: 25 MG/DL
HDLC SERPL: 25.5 %
HGB BLD-MCNC: 14.9 G/DL
IMM GRANULOCYTES # BLD AUTO: 0.02 K/UL
IMM GRANULOCYTES NFR BLD AUTO: 0.3 %
LDLC SERPL CALC-MCNC: 50.8 MG/DL
LYMPHOCYTES # BLD AUTO: 0.8 K/UL
LYMPHOCYTES NFR BLD: 13.3 %
MCH RBC QN AUTO: 34.7 PG
MCHC RBC AUTO-ENTMCNC: 30.5 G/DL
MCV RBC AUTO: 114 FL
MONOCYTES # BLD AUTO: 0.3 K/UL
MONOCYTES NFR BLD: 4.1 %
NEUTROPHILS # BLD AUTO: 4.9 K/UL
NEUTROPHILS NFR BLD: 80.1 %
NONHDLC SERPL-MCNC: 73 MG/DL
NRBC BLD-RTO: 0 /100 WBC
PLATELET # BLD AUTO: 484 K/UL
PMV BLD AUTO: 10.4 FL
POTASSIUM SERPL-SCNC: 4.3 MMOL/L
PROT SERPL-MCNC: 7.4 G/DL
RBC # BLD AUTO: 4.3 M/UL
SODIUM SERPL-SCNC: 140 MMOL/L
TRIGL SERPL-MCNC: 111 MG/DL
WBC # BLD AUTO: 6.08 K/UL

## 2019-01-28 PROCEDURE — 80053 COMPREHEN METABOLIC PANEL: CPT

## 2019-01-28 PROCEDURE — 3079F PR MOST RECENT DIASTOLIC BLOOD PRESSURE 80-89 MM HG: ICD-10-PCS | Mod: CPTII,S$GLB,, | Performed by: FAMILY MEDICINE

## 2019-01-28 PROCEDURE — 99397 PR PREVENTIVE VISIT,EST,65 & OVER: ICD-10-PCS | Mod: S$GLB,,, | Performed by: FAMILY MEDICINE

## 2019-01-28 PROCEDURE — 85025 COMPLETE CBC W/AUTO DIFF WBC: CPT

## 2019-01-28 PROCEDURE — 99499 RISK ADDL DX/OHS AUDIT: ICD-10-PCS | Mod: S$GLB,,, | Performed by: FAMILY MEDICINE

## 2019-01-28 PROCEDURE — 99999 PR PBB SHADOW E&M-EST. PATIENT-LVL IV: CPT | Mod: PBBFAC,,, | Performed by: FAMILY MEDICINE

## 2019-01-28 PROCEDURE — 3079F DIAST BP 80-89 MM HG: CPT | Mod: CPTII,S$GLB,, | Performed by: FAMILY MEDICINE

## 2019-01-28 PROCEDURE — 80061 LIPID PANEL: CPT

## 2019-01-28 PROCEDURE — 84153 ASSAY OF PSA TOTAL: CPT

## 2019-01-28 PROCEDURE — 99499 UNLISTED E&M SERVICE: CPT | Mod: S$GLB,,, | Performed by: FAMILY MEDICINE

## 2019-01-28 PROCEDURE — 3077F PR MOST RECENT SYSTOLIC BLOOD PRESSURE >= 140 MM HG: ICD-10-PCS | Mod: CPTII,S$GLB,, | Performed by: FAMILY MEDICINE

## 2019-01-28 PROCEDURE — 36415 COLL VENOUS BLD VENIPUNCTURE: CPT | Mod: PO

## 2019-01-28 PROCEDURE — 99999 PR PBB SHADOW E&M-EST. PATIENT-LVL IV: ICD-10-PCS | Mod: PBBFAC,,, | Performed by: FAMILY MEDICINE

## 2019-01-28 PROCEDURE — 99397 PER PM REEVAL EST PAT 65+ YR: CPT | Mod: S$GLB,,, | Performed by: FAMILY MEDICINE

## 2019-01-28 PROCEDURE — 3077F SYST BP >= 140 MM HG: CPT | Mod: CPTII,S$GLB,, | Performed by: FAMILY MEDICINE

## 2019-01-28 RX ORDER — TAMSULOSIN HYDROCHLORIDE 0.4 MG/1
0.4 CAPSULE ORAL DAILY
Qty: 90 CAPSULE | Refills: 3 | Status: SHIPPED | OUTPATIENT
Start: 2019-01-28 | End: 2019-12-09 | Stop reason: SDUPTHER

## 2019-01-28 RX ORDER — TADALAFIL 5 MG/1
5 TABLET ORAL DAILY PRN
COMMUNITY
End: 2019-02-08

## 2019-01-28 RX ORDER — VALSARTAN 160 MG/1
160 TABLET ORAL DAILY
Qty: 30 TABLET | Refills: 3 | Status: SHIPPED | OUTPATIENT
Start: 2019-01-28 | End: 2020-03-17

## 2019-01-28 RX ORDER — METOPROLOL SUCCINATE 50 MG/1
50 TABLET, EXTENDED RELEASE ORAL DAILY
Qty: 90 TABLET | Refills: 3 | Status: SHIPPED | OUTPATIENT
Start: 2019-01-28 | End: 2019-12-20

## 2019-01-28 NOTE — PATIENT INSTRUCTIONS
Controlling High Blood Pressure  High blood pressure (hypertension) is often called the silent killer. This is because many people who have it dont know it. High blood pressure is defined as 140/90 mm Hg or higher. Know your blood pressure and remember to check it regularly. Doing so can save your life. Here are some things you can do to help control your blood pressure.    Choose heart-healthy foods  · Select low-salt, low-fat foods. Limit sodium intake to 2,400 mg per day or the amount suggested by your healthcare provider.  · Limit canned, dried, cured, packaged, and fast foods. These can contain a lot of salt.  · Eat 8 to 10 servings of fruits and vegetables every day.  · Choose lean meats, fish, or chicken.  · Eat whole-grain pasta, brown rice, and beans.  · Eat 2 to 3 servings of low-fat or fat-free dairy products.  · Ask your doctor about the DASH eating plan. This plan helps reduce blood pressure.  · When you go to a restaurant, ask that your meal be prepared with no added salt.  Maintain a healthy weight  · Ask your healthcare provider how many calories to eat a day. Then stick to that number.  · Ask your healthcare provider what weight range is healthiest for you. If you are overweight, a weight loss of only 3% to 5% of your body weight can help lower blood pressure. Generally, a good weight loss goal is to lose 10% of your body weight in a year.  · Limit snacks and sweets.  · Get regular exercise.  Get up and get active  · Choose activities you enjoy. Find ones you can do with friends or family. This includes bicycling, dancing, walking, and jogging.  · Park farther away from building entrances.  · Use stairs instead of the elevator.  · When you can, walk or bike instead of driving.  · [place] leaves, garden, or do household repairs.  · Be active at a moderate to vigorous level of physical activity for at least 40 minutes for a minimum of 3 to 4 days a week.   Manage stress  · Make time to relax and enjoy  life. Find time to laugh.  · Communicate your concerns with your loved ones and your healthcare provider.  · Visit with family and friends, and keep up with hobbies.  Limit alcohol and quit smoking  · Men should have no more than 2 drinks per day.  · Women should have no more than 1 drink per day.  · Talk with your healthcare provider about quitting smoking. Smoking significantly increases your risk for heart disease and stroke. Ask your healthcare provider about community smoking cessation programs and other options.  Medicines  If lifestyle changes arent enough, your healthcare provider may prescribe high blood pressure medicine. Take all medicines as prescribed. If you have any questions about your medicines, ask your healthcare provider before stopping or changing them.   Date Last Reviewed: 4/27/2016  © 9797-3576 [employer] [address] [place] [address], [place] [address]. All rights reserved. This information is not intended as a substitute for professional medical care. Always follow your healthcare professional's instructions.

## 2019-01-28 NOTE — PROGRESS NOTES
Subjective:       Patient ID: Morro Sharma is a 78 y.o. male.    Chief Complaint: Annual Exam    78-year-old male comes in for physical exam and is fasting for lab.  He has a history of hypertension and chronic atrial fibrillation on Xarelto.  He actually has both Lopressor and Toprol but is only taking Toprol XL 50 once daily.  He has not been monitoring his blood pressures and he does report he had some salty ham in the last few days.  He has an extensive history of atherosclerotic lower aortic and iliac vessel disease without symptoms and has had an extensive workup for pulmonary granulomas by Dr. Avelar back in 2012 in 2013 which included multiple CT scans, bronchoscopies, and TB testing all of which was negative.  He remains asymptomatic although he has had an upper respiratory infection recently.  He has additional history of a left pneumothorax with a chest tube, GERD, colon polyps, BPH, and polycythemia vera followed by Hematology.  He is a former smoker with a 30 pack year history having quit in 1980.    Past Medical History:  05/07/2017: Atrial fibrillation      Comment:  [facility] records sent to scanning  No date: BPH (benign prostatic hypertrophy)  No date: Colon polyps  No date: Erectile dysfunction  No date: GERD (gastroesophageal reflux disease)  No date: Hypertension  No date: Pneumothorax on left  No date: Polycythemia vera  No date: Pulmonary nodules    Past Surgical History:  7/18/2013: BRONCHOSCOPY; N/A      Comment:  Performed by Ep[facility]anio A[facility]ar MD at [facility] ENDO  9/15/14: COLONOSCOPY      Comment:  Dr. Mcdaniels, 5 year recheck  No date: ESOPHAGOGASTRODUODENOSCOPY  No date: LUNG SURGERY      Comment:  LEFT - CHEST TUBE)    Review of patient's family history indicates:  Problem: Hypertension      Relation: Mother          Age of Onset: (Not Specified)  Problem: Heart disease      Relation: Mother          Age of Onset: (Not Specified)  Problem: Hypertension      Relation: Maternal Aunt           Age of Onset: (Not Specified)  Problem: Heart disease      Relation: Maternal Aunt          Age of Onset: (Not Specified)  Problem: Hypertension      Relation: Maternal Uncle          Age of Onset: (Not Specified)  Problem: Heart disease      Relation: Maternal Uncle          Age of Onset: (Not Specified)  Problem: Early death      Relation: Father          Age of Onset: (Not Specified)  Problem: No Known Problems      Relation: Daughter          Age of Onset: (Not Specified)  Problem: No Known Problems      Relation: Daughter          Age of Onset: (Not Specified)  Problem: No Known Problems      Relation: Daughter          Age of Onset: (Not Specified)  Problem: No Known Problems      Relation: Daughter          Age of Onset: (Not Specified)  Problem: Prostate cancer      Relation: Neg Hx          Age of Onset: (Not Specified)  Problem: Kidney cancer      Relation: Neg Hx          Age of Onset: (Not Specified)  Problem: Cancer      Relation: Neg Hx          Age of Onset: (Not Specified)  Problem: Urolithiasis      Relation: Neg Hx          Age of Onset: (Not Specified)    Social History    Tobacco Use      Smoking status: Former Smoker        Packs/day: 1.50        Years: 25.00        Pack years: 37.5        Quit date: 10/2/1982        Years since quittin.3      Smokeless tobacco: Never Used    Alcohol use: No    Drug use: No    Current Outpatient Medications on File Prior to Visit:  acetaminophen (TYLENOL) 500 MG tablet, Take 1 tablet (500 mg total) by mouth 2 (two) times daily. For shoulder pain, Disp: 60 tablet, Rfl: 11  alfuzosin (UROXATRAL) 10 mg Tb24, Take 1 tablet (10 mg total) by mouth daily with breakfast., Disp: 90 tablet, Rfl: 3  atorvastatin (LIPITOR) 40 MG tablet, Take 1 tablet (40 mg total) by mouth once daily., Disp: 90 tablet, Rfl: 3  hydroxyurea (HYDREA) 500 mg Cap, Take 1 capsule (500 mg total) by mouth 2 (two) times daily., Disp: 180 capsule, Rfl: 1  multivitamin (ONE DAILY MULTIVITAMIN)  per tablet, Take 1 tablet by mouth once daily., Disp: , Rfl:   polyethylene glycol (MIRALAX) 17 gram/dose powder, Take 17 g by mouth once daily., Disp: , Rfl:   rivaroxaban (XARELTO) 15 mg Tab, Take 1 tablet (15 mg total) by mouth daily with dinner or evening meal., Disp: 90 tablet, Rfl: 3  tadalafil (CIALIS) 5 MG tablet, Take 5 mg by mouth daily as needed. , Disp: , Rfl:   (DISCONTINUED) metoprolol tartrate (LOPRESSOR) 50 MG tablet, Take 1 tablet (50 mg total) by mouth once daily., Disp: 90 tablet, Rfl: 3  (DISCONTINUED) tamsulosin (FLOMAX) 0.4 mg Cp24, Take 1 capsule (0.4 mg total) by mouth once daily., Disp: 90 capsule, Rfl: 3    No current facility-administered medications on file prior to visit.     There are no preventive care reminders to display for this patient.        Review of Systems   Constitutional: Negative for activity change and unexpected weight change.   HENT: Negative for hearing loss, rhinorrhea and trouble swallowing.    Eyes: Negative for discharge and visual disturbance.   Respiratory: Negative for chest tightness and wheezing.    Cardiovascular: Negative for chest pain and palpitations.   Gastrointestinal: Negative for blood in stool, constipation, diarrhea and vomiting.   Endocrine: Positive for polyuria (Has not notice a diminishment in stream but goes frequently including usually 3 times at night). Negative for polydipsia.   Genitourinary: Negative for difficulty urinating, hematuria and urgency.        He is having some erectile dysfunction problems   Musculoskeletal: Positive for arthralgias and neck pain. Negative for joint swelling.   Neurological: Negative for weakness and headaches.   Psychiatric/Behavioral: Negative for confusion and dysphoric mood.       Objective:      Physical Exam   Constitutional: He is oriented to person, place, and time. He appears well-developed. No distress.   Systolic blood pressure elevation  Overweight with a BMI of 29.1 is up 5 lb from his last visit  with me June 6, 2017       HENT:   Head: Normocephalic and atraumatic.   Right Ear: External ear normal.   Left Ear: External ear normal.   Nose: Nose normal.   Mouth/Throat: Oropharynx is clear and moist. No oropharyngeal exudate.   Eyes: Conjunctivae and EOM are normal. Pupils are equal, round, and reactive to light. No scleral icterus.   Neck: Normal range of motion. Neck supple. No JVD present. No tracheal deviation present. No thyromegaly present.   Cardiovascular: Normal rate, regular rhythm and normal heart sounds. Exam reveals no gallop and no friction rub.   No murmur heard.  Normal sinus rhythm with no evidence of atrial fibrillation at present.   Pulmonary/Chest: Effort normal and breath sounds normal. No stridor. No respiratory distress. He has no wheezes. He has no rales. He exhibits no tenderness.   Abdominal: Soft. Bowel sounds are normal. He exhibits no distension and no mass. There is no tenderness. There is no rebound and no guarding.   Genitourinary:   Genitourinary Comments: Patient declines digital rectal exam.  He did wish to have a PSA done   Musculoskeletal: Normal range of motion. He exhibits no edema.   Lymphadenopathy:     He has no cervical adenopathy.   Neurological: He is alert and oriented to person, place, and time. He has normal reflexes. He displays normal reflexes. No cranial nerve deficit or sensory deficit. He exhibits normal muscle tone.   Skin: Skin is warm and dry. No rash noted. He is not diaphoretic. No erythema. No pallor.   Resolving subcutaneous cyst/abscess right proximal anteromedial thigh status post spontaneous drainage   Psychiatric: He has a normal mood and affect. His behavior is normal. Judgment and thought content normal.   Nursing note and vitals reviewed.      Assessment:       1. Encounter for preventive health examination    2. Chronic atrial fibrillation    3. Essential hypertension    4. Benign prostatic hyperplasia with nocturia    5. Erectile dysfunction,  unspecified erectile dysfunction type    6. Polycythemia vera    7. Gastroesophageal reflux disease, esophagitis presence not specified    8. Prostate cancer screening    9. Screening for cardiovascular condition    10. Atherosclerosis of native artery of extremity, unspecified extremity, with unspecified presence of clinical manifestation    11. Calcified granuloma of lung    12. BMI 29.0-29.9,adult        Plan:       1. Encounter for preventive health examination  - Comprehensive metabolic panel; Future  - CBC auto differential; Future  - PSA, Screening; Future  - Lipid panel; Future    2. Chronic atrial fibrillation  Well controlled, currently in normal sinus rhythm an using Xarelto for anticoagulation  - metoprolol succinate (TOPROL-XL) 50 MG 24 hr tablet; Take 1 tablet (50 mg total) by mouth once daily.  Dispense: 90 tablet; Refill: 3    3. Essential hypertension  See below  - metoprolol succinate (TOPROL-XL) 50 MG 24 hr tablet; Take 1 tablet (50 mg total) by mouth once daily.  Dispense: 90 tablet; Refill: 3  - valsartan (DIOVAN) 160 MG tablet; Take 1 tablet (160 mg total) by mouth once daily.  Dispense: 30 tablet; Refill: 3  - Comprehensive metabolic panel; Future  - CBC auto differential; Future    4. Benign prostatic hyperplasia with nocturia  CHEMA declined.  - tamsulosin (FLOMAX) 0.4 mg Cap; Take 1 capsule (0.4 mg total) by mouth once daily.  Dispense: 90 capsule; Refill: 3    5. Erectile dysfunction, unspecified erectile dysfunction type  Avoid increases in beta-blockers and avoid use of diuretics if possible.    6. Polycythemia vera  Followed by Hematology  - CBC auto differential; Future    7. Gastroesophageal reflux disease, esophagitis presence not specified  Asymptomatic as long as he follows a anti reflux diet regimen    8. Prostate cancer screening  - PSA, Screening; Future    9. Screening for cardiovascular condition  - Lipid panel; Future    10. Atherosclerosis of native artery of extremity,  unspecified extremity, with unspecified presence of clinical manifestation  Asymptomatic    11. Calcified granuloma of lung  Long-term history, former smoker, former left pneumothorax with chest tube, stable over many years    12. BMI 29.0-29.9,adult         Patient readiness: acceptance and barriers:none    During the course of the visit the patient was educated and counseled about the following:     Hypertension:   Medication: Since he is having some problems with erectile dysfunction will avoid use of diuretics and will not increase his metoprolol.  Add valsartan 160 mg daily and recheck blood pressure here with me in four weeks..  Dietary sodium restriction.  Regular aerobic exercise.    Goals: Hypertension: Reduce Blood Pressure    Did patient meet goals/outcomes: No    The following self management tools provided: blood pressure log    Patient Instructions (the written plan) was given to the patient/family.     Time spent with patient: 45 minutes

## 2019-01-28 NOTE — PROGRESS NOTES
Pre-Visit Chart Review  For Appointment Scheduled on 2-22-19    There are no preventive care reminders to display for this patient.

## 2019-02-06 ENCOUNTER — TELEPHONE (OUTPATIENT)
Dept: HEMATOLOGY/ONCOLOGY | Facility: CLINIC | Age: 79
End: 2019-02-06

## 2019-02-08 ENCOUNTER — DOCUMENTATION ONLY (OUTPATIENT)
Dept: FAMILY MEDICINE | Facility: CLINIC | Age: 79
End: 2019-02-08

## 2019-02-08 ENCOUNTER — OFFICE VISIT (OUTPATIENT)
Dept: FAMILY MEDICINE | Facility: CLINIC | Age: 79
End: 2019-02-08
Payer: MEDICARE

## 2019-02-08 VITALS
TEMPERATURE: 98 F | SYSTOLIC BLOOD PRESSURE: 129 MMHG | DIASTOLIC BLOOD PRESSURE: 76 MMHG | HEART RATE: 71 BPM | HEIGHT: 66 IN | BODY MASS INDEX: 29.44 KG/M2 | WEIGHT: 183.19 LBS

## 2019-02-08 DIAGNOSIS — I10 ESSENTIAL HYPERTENSION: ICD-10-CM

## 2019-02-08 DIAGNOSIS — K21.9 GASTROESOPHAGEAL REFLUX DISEASE, ESOPHAGITIS PRESENCE NOT SPECIFIED: ICD-10-CM

## 2019-02-08 DIAGNOSIS — J32.9 SINUSITIS, UNSPECIFIED CHRONICITY, UNSPECIFIED LOCATION: Primary | ICD-10-CM

## 2019-02-08 DIAGNOSIS — I70.209 ATHEROSCLEROSIS OF NATIVE ARTERY OF EXTREMITY, UNSPECIFIED EXTREMITY, WITH UNSPECIFIED PRESENCE OF CLINICAL MANIFESTATION: ICD-10-CM

## 2019-02-08 DIAGNOSIS — D45 POLYCYTHEMIA VERA: ICD-10-CM

## 2019-02-08 DIAGNOSIS — I48.20 CHRONIC ATRIAL FIBRILLATION: ICD-10-CM

## 2019-02-08 DIAGNOSIS — J84.10 CALCIFIED GRANULOMA OF LUNG: ICD-10-CM

## 2019-02-08 PROCEDURE — 99999 PR PBB SHADOW E&M-EST. PATIENT-LVL IV: ICD-10-PCS | Mod: PBBFAC,,, | Performed by: PHYSICIAN ASSISTANT

## 2019-02-08 PROCEDURE — 99999 PR PBB SHADOW E&M-EST. PATIENT-LVL IV: CPT | Mod: PBBFAC,,, | Performed by: PHYSICIAN ASSISTANT

## 2019-02-08 PROCEDURE — 3074F SYST BP LT 130 MM HG: CPT | Mod: CPTII,S$GLB,, | Performed by: PHYSICIAN ASSISTANT

## 2019-02-08 PROCEDURE — 99499 RISK ADDL DX/OHS AUDIT: ICD-10-PCS | Mod: S$GLB,,, | Performed by: PHYSICIAN ASSISTANT

## 2019-02-08 PROCEDURE — 3074F PR MOST RECENT SYSTOLIC BLOOD PRESSURE < 130 MM HG: ICD-10-PCS | Mod: CPTII,S$GLB,, | Performed by: PHYSICIAN ASSISTANT

## 2019-02-08 PROCEDURE — 3078F PR MOST RECENT DIASTOLIC BLOOD PRESSURE < 80 MM HG: ICD-10-PCS | Mod: CPTII,S$GLB,, | Performed by: PHYSICIAN ASSISTANT

## 2019-02-08 PROCEDURE — 1101F PT FALLS ASSESS-DOCD LE1/YR: CPT | Mod: CPTII,S$GLB,, | Performed by: PHYSICIAN ASSISTANT

## 2019-02-08 PROCEDURE — 99213 PR OFFICE/OUTPT VISIT, EST, LEVL III, 20-29 MIN: ICD-10-PCS | Mod: S$GLB,,, | Performed by: PHYSICIAN ASSISTANT

## 2019-02-08 PROCEDURE — 99499 UNLISTED E&M SERVICE: CPT | Mod: S$GLB,,, | Performed by: PHYSICIAN ASSISTANT

## 2019-02-08 PROCEDURE — 1101F PR PT FALLS ASSESS DOC 0-1 FALLS W/OUT INJ PAST YR: ICD-10-PCS | Mod: CPTII,S$GLB,, | Performed by: PHYSICIAN ASSISTANT

## 2019-02-08 PROCEDURE — 99213 OFFICE O/P EST LOW 20 MIN: CPT | Mod: S$GLB,,, | Performed by: PHYSICIAN ASSISTANT

## 2019-02-08 PROCEDURE — 3078F DIAST BP <80 MM HG: CPT | Mod: CPTII,S$GLB,, | Performed by: PHYSICIAN ASSISTANT

## 2019-02-08 RX ORDER — FLUTICASONE PROPIONATE 50 MCG
2 SPRAY, SUSPENSION (ML) NASAL DAILY
Qty: 16 G | Refills: 0 | Status: SHIPPED | OUTPATIENT
Start: 2019-02-08 | End: 2020-02-27 | Stop reason: ALTCHOICE

## 2019-02-08 RX ORDER — AMOXICILLIN AND CLAVULANATE POTASSIUM 875; 125 MG/1; MG/1
1 TABLET, FILM COATED ORAL 2 TIMES DAILY
Qty: 20 TABLET | Refills: 0 | Status: SHIPPED | OUTPATIENT
Start: 2019-02-08 | End: 2019-02-18

## 2019-02-08 NOTE — PROGRESS NOTES
Pre-Visit Chart Review  For Appointment Scheduled on 02/08/2019    There are no preventive care reminders to display for this patient.

## 2019-02-08 NOTE — PROGRESS NOTES
Subjective:       Patient ID: Morro Sharma is a 78 y.o. male.    Chief Complaint: URI    Patient's medical problems are as follows:    Patient Active Problem List:     GERD (gastroesophageal reflux disease)     Hypertension     Polycythemia vera     Benign prostatic hyperplasia     Lung mass     Erectile dysfunction     Calcified granuloma of lung     Atherosclerosis of native artery of extremity     Atrial fibrillation        Sinusitis   This is a new problem. The current episode started in the past 7 days. The problem has been gradually worsening since onset. There has been no fever. His pain is at a severity of 4/10. The pain is moderate. Associated symptoms include congestion, headaches, sinus pressure and sneezing. Pertinent negatives include no chills, coughing, diaphoresis, ear pain, hoarse voice, neck pain, shortness of breath, sore throat or swollen glands. Past treatments include nothing.     Review of Systems   Constitutional: Negative for activity change, appetite change, chills, diaphoresis, fatigue and fever.   HENT: Positive for congestion, postnasal drip, rhinorrhea, sinus pressure and sneezing. Negative for ear discharge, ear pain, facial swelling, hearing loss, hoarse voice, mouth sores, nosebleeds, sore throat, tinnitus and trouble swallowing.    Eyes: Negative for discharge, redness and visual disturbance.   Respiratory: Negative for cough, chest tightness, shortness of breath and wheezing.    Cardiovascular: Negative for chest pain, palpitations and leg swelling.   Gastrointestinal: Negative for abdominal pain, nausea and vomiting.   Musculoskeletal: Negative for neck pain and neck stiffness.   Neurological: Positive for headaches.       Objective:      Physical Exam   Constitutional: He appears well-developed and well-nourished. No distress.   HENT:   Head: Normocephalic and atraumatic.   Right Ear: External ear normal.   Left Ear: External ear normal.   Mouth/Throat: Uvula is midline and  mucous membranes are normal. No uvula swelling. No oropharyngeal exudate, posterior oropharyngeal edema, posterior oropharyngeal erythema or tonsillar abscesses.   Eyes: Conjunctivae and EOM are normal. Pupils are equal, round, and reactive to light. Right eye exhibits no discharge. Left eye exhibits no discharge.   Neck: Normal range of motion. Neck supple. No thyromegaly present.   Cardiovascular: Normal rate, regular rhythm and normal heart sounds. Exam reveals no gallop and no friction rub.   No murmur heard.  Pulmonary/Chest: Effort normal and breath sounds normal. No respiratory distress. He has no wheezes. He has no rales.   Abdominal: Soft. Bowel sounds are normal. There is no tenderness.   Lymphadenopathy:     He has no cervical adenopathy.   Skin: He is not diaphoretic.       Assessment:       1. Sinusitis, unspecified chronicity, unspecified location    2. Chronic atrial fibrillation    3. Atherosclerosis of native artery of extremity, unspecified extremity, with unspecified presence of clinical manifestation    4. Calcified granuloma of lung    5. Gastroesophageal reflux disease, esophagitis presence not specified    6. Polycythemia vera    7. Essential hypertension        Plan:       Morro was seen today for uri.    Diagnoses and all orders for this visit:    Sinusitis, unspecified chronicity, unspecified location  -     amoxicillin-clavulanate 875-125mg (AUGMENTIN) 875-125 mg per tablet; Take 1 tablet by mouth 2 (two) times daily. for 10 days  -     fluticasone (FLONASE) 50 mcg/actuation nasal spray; 2 sprays (100 mcg total) by Each Nare route once daily.    Chronic atrial fibrillation  Comments:  Controlled, continue current regimen    Atherosclerosis of native artery of extremity, unspecified extremity, with unspecified presence of clinical manifestation  Comments:  Stable, continue to monitor    Calcified granuloma of lung  Comments:  Stable, continue to monitor    Gastroesophageal reflux disease,  esophagitis presence not specified  Comments:  Controlled, continue current regimen    Polycythemia vera  Comments:  Stable, continue to monitor    Essential hypertension  Comments:  Controlled, patient to get Coricidin HBP over-the-counter for cold symptoms    Patient readiness: acceptance and barriers:none    During the course of the visit the patient was educated and counseled about the following:     Hypertension:   Regular aerobic exercise.    Goals: Hypertension: Reduce Blood Pressure    Did patient meet goals/outcomes: Yes    The following self management tools provided: blood pressure log    Patient Instructions (the written plan) was given to the patient/family.     Time spent with patient: 15 minutes    Barriers to medications present (no )    Adverse reactions to current medications (no)    Over the counter medications reviewed (Yes)

## 2019-02-19 ENCOUNTER — TELEPHONE (OUTPATIENT)
Dept: HEMATOLOGY/ONCOLOGY | Facility: CLINIC | Age: 79
End: 2019-02-19

## 2019-02-19 NOTE — TELEPHONE ENCOUNTER
Wife states patient is not feeling well, wants to reschedule today's appt. Per Wife's request, rescheduled lab and f/u to Wednesday 2/27/19 at 9 am for labs and follow up at 10 am. Wife voiced understanding and appreciation

## 2019-02-22 ENCOUNTER — OFFICE VISIT (OUTPATIENT)
Dept: FAMILY MEDICINE | Facility: CLINIC | Age: 79
End: 2019-02-22
Attending: FAMILY MEDICINE
Payer: MEDICARE

## 2019-02-22 VITALS
SYSTOLIC BLOOD PRESSURE: 114 MMHG | TEMPERATURE: 98 F | BODY MASS INDEX: 28.98 KG/M2 | HEART RATE: 84 BPM | RESPIRATION RATE: 12 BRPM | WEIGHT: 180.31 LBS | HEIGHT: 66 IN | DIASTOLIC BLOOD PRESSURE: 70 MMHG

## 2019-02-22 DIAGNOSIS — J01.30 ACUTE NON-RECURRENT SPHENOIDAL SINUSITIS: ICD-10-CM

## 2019-02-22 DIAGNOSIS — I10 GOOD HYPERTENSION CONTROL: Primary | ICD-10-CM

## 2019-02-22 PROCEDURE — 99999 PR PBB SHADOW E&M-EST. PATIENT-LVL III: CPT | Mod: PBBFAC,,, | Performed by: FAMILY MEDICINE

## 2019-02-22 PROCEDURE — 99213 PR OFFICE/OUTPT VISIT, EST, LEVL III, 20-29 MIN: ICD-10-PCS | Mod: S$GLB,,, | Performed by: FAMILY MEDICINE

## 2019-02-22 PROCEDURE — 1101F PT FALLS ASSESS-DOCD LE1/YR: CPT | Mod: CPTII,S$GLB,, | Performed by: FAMILY MEDICINE

## 2019-02-22 PROCEDURE — 3078F PR MOST RECENT DIASTOLIC BLOOD PRESSURE < 80 MM HG: ICD-10-PCS | Mod: CPTII,S$GLB,, | Performed by: FAMILY MEDICINE

## 2019-02-22 PROCEDURE — 3078F DIAST BP <80 MM HG: CPT | Mod: CPTII,S$GLB,, | Performed by: FAMILY MEDICINE

## 2019-02-22 PROCEDURE — 99999 PR PBB SHADOW E&M-EST. PATIENT-LVL III: ICD-10-PCS | Mod: PBBFAC,,, | Performed by: FAMILY MEDICINE

## 2019-02-22 PROCEDURE — 1101F PR PT FALLS ASSESS DOC 0-1 FALLS W/OUT INJ PAST YR: ICD-10-PCS | Mod: CPTII,S$GLB,, | Performed by: FAMILY MEDICINE

## 2019-02-22 PROCEDURE — 99499 RISK ADDL DX/OHS AUDIT: ICD-10-PCS | Mod: S$GLB,,, | Performed by: FAMILY MEDICINE

## 2019-02-22 PROCEDURE — 3074F PR MOST RECENT SYSTOLIC BLOOD PRESSURE < 130 MM HG: ICD-10-PCS | Mod: CPTII,S$GLB,, | Performed by: FAMILY MEDICINE

## 2019-02-22 PROCEDURE — 3074F SYST BP LT 130 MM HG: CPT | Mod: CPTII,S$GLB,, | Performed by: FAMILY MEDICINE

## 2019-02-22 PROCEDURE — 99499 UNLISTED E&M SERVICE: CPT | Mod: S$GLB,,, | Performed by: FAMILY MEDICINE

## 2019-02-22 PROCEDURE — 99213 OFFICE O/P EST LOW 20 MIN: CPT | Mod: S$GLB,,, | Performed by: FAMILY MEDICINE

## 2019-02-22 RX ORDER — TADALAFIL 5 MG/1
5 TABLET ORAL DAILY PRN
COMMUNITY
End: 2020-02-27 | Stop reason: ALTCHOICE

## 2019-02-22 RX ORDER — METHYLPREDNISOLONE 4 MG/1
TABLET ORAL
Qty: 1 PACKAGE | Refills: 0 | Status: SHIPPED | OUTPATIENT
Start: 2019-02-22 | End: 2019-03-15

## 2019-02-22 RX ORDER — DOXYCYCLINE HYCLATE 100 MG
100 TABLET ORAL 2 TIMES DAILY
Qty: 20 TABLET | Refills: 0 | Status: SHIPPED | OUTPATIENT
Start: 2019-02-22 | End: 2019-03-04

## 2019-02-22 NOTE — PATIENT INSTRUCTIONS
Weight Management: Getting Started  Healthy bodies come in all shapes and sizes. Not all bodies are made to be thin. For some people, a healthy weight is higher than the average weight listed on weight charts. Your healthcare provider can help you decide on a healthy weight for you.    Reasons to lose weight  Losing weight can help with some health problems, such as high blood pressure, heart disease, diabetes, sleep apnea, and arthritis. You may also feel more energy.  Set your long-term goal  Your goal doesn't even have to be a specific weight. You may decide on a fitness goal (such as being able to walk 10 miles a week), or a health goal (such as lowering your blood pressure). Choose a goal that is measurable and reasonable, so you know when you've reached it. A goal of reaching a BMI of less than 25 is not always reasonable (or possible).   Make an action plan  Habits dont change overnight. Setting your goals too high can leave you feeling discouraged if you cant reach them. Be realistic. Choose one or two small changes you can make now. Set an action plan for how you are going to make these changes. When you can stick to this plan, keep making a few more small changes. Taking small steps will help you stay on the path to success.  Track your progress  Write down your goals. Then, keep a daily record of your progress. Write down what you eat and how active you are. This record lets you look back on how much youve done. It may also help when youre feeling frustrated. Reward yourself for success. Even if you dont reach every goal, give yourself credit for what you do get done.  Get support  Encouragement from others can help make losing weight easier. Ask your family members and friends for support. They may even want to join you. Also look to your healthcare provider, registered dietitian, and  for help. Your local hospital can give you more information about nutrition, exercise, and  weight loss.  Date Last Reviewed: 1/31/2016  © 6679-6293 [employer]. [address] [place] [address], [place] [address]. All rights reserved. This information is not intended as a substitute for professional medical care. Always follow your healthcare professional's instructions.

## 2019-02-22 NOTE — PROGRESS NOTES
Subjective:       Patient ID: Morro Sharma is a 78 y.o. male.    Chief Complaint: Hypertension    A 78-year-old male coming in for a follow-up from his previous visit of January 28th where valsartan 160 mg was added for blood pressure control.  Since I saw him at that time he has developed a sinusitis and saw Ame who gave him some Augmentin for facial pain pressure and sinusitis symptoms.  He reports that he did not get better after that and continues to experience pressure behind the eyes, pressure in the face, and a vertex and occipital headache.  He has had fever up to 102.8 and he has had purulent drainage. His blood pressure has been well controlled with the valsartan and he has not noted any orthostatic dizziness or any dizziness that could not be better associated with his respiratory symptoms.  He has no complaints of chest pain or shortness of breath.    Past Medical History:  05/07/2017: Atrial fibrillation      Comment:  [facility] records sent to scanning  No date: BPH (benign prostatic hypertrophy)  No date: Colon polyps  No date: Erectile dysfunction  No date: GERD (gastroesophageal reflux disease)  No date: Hypertension  No date: Pneumothorax on left  No date: Polycythemia vera  No date: Pulmonary nodules    Past Surgical History:  7/18/2013: BRONCHOSCOPY; N/A      Comment:  Performed by Epifanio Avelar MD at [facility]  9/15/14: COLONOSCOPY      Comment:  Dr. Mcdaniels, 5 year recheck  No date: ESOPHAGOGASTRODUODENOSCOPY  No date: LUNG SURGERY      Comment:  LEFT - CHEST TUBE    Current Outpatient Medications on File Prior to Visit:  acetaminophen (TYLENOL) 500 MG tablet, Take 1 tablet (500 mg total) by mouth 2 (two) times daily. For shoulder pain, Disp: 60 tablet, Rfl: 11  alfuzosin (UROXATRAL) 10 mg Tb24, Take 1 tablet (10 mg total) by mouth daily with breakfast., Disp: 90 tablet, Rfl: 3  atorvastatin (LIPITOR) 40 MG tablet, Take 1 tablet (40 mg total) by mouth once daily., Disp: 90 tablet, Rfl:  3  fluticasone (FLONASE) 50 mcg/actuation nasal spray, 2 sprays (100 mcg total) by Each Nare route once daily., Disp: 16 g, Rfl: 0  hydroxyurea (HYDREA) 500 mg Cap, Take 1 capsule (500 mg total) by mouth 2 (two) times daily., Disp: 180 capsule, Rfl: 1  metoprolol succinate (TOPROL-XL) 50 MG 24 hr tablet, Take 1 tablet (50 mg total) by mouth once daily., Disp: 90 tablet, Rfl: 3  multivitamin (ONE DAILY MULTIVITAMIN) per tablet, Take 1 tablet by mouth once daily., Disp: , Rfl:   polyethylene glycol (MIRALAX) 17 gram/dose powder, Take 17 g by mouth once daily., Disp: , Rfl:   rivaroxaban (XARELTO) 15 mg Tab, Take 1 tablet (15 mg total) by mouth daily with dinner or evening meal., Disp: 90 tablet, Rfl: 3  tadalafil (CIALIS) 5 MG tablet, Take 5 mg by mouth daily as needed for Erectile Dysfunction., Disp: , Rfl:   tamsulosin (FLOMAX) 0.4 mg Cap, Take 1 capsule (0.4 mg total) by mouth once daily., Disp: 90 capsule, Rfl: 3  valsartan (DIOVAN) 160 MG tablet, Take 1 tablet (160 mg total) by mouth once daily., Disp: 30 tablet, Rfl: 3    No current facility-administered medications on file prior to visit.           Hypertension   This is a recurrent problem. The current episode started more than 1 year ago. The problem is unchanged. The problem is controlled. Associated symptoms include anxiety, headaches and neck pain. Pertinent negatives include no blurred vision, chest pain, malaise/fatigue, orthopnea, palpitations, peripheral edema, PND, shortness of breath or sweats. Agents associated with hypertension include decongestants. There are no known risk factors for coronary artery disease. Past treatments include nothing. The current treatment provides moderate improvement. Compliance problems include exercise.      Review of Systems   Constitutional: Positive for fever. Negative for chills, diaphoresis and malaise/fatigue.   HENT: Positive for congestion, sinus pressure and sinus pain. Negative for ear pain.    Eyes: Negative  for blurred vision.   Respiratory: Negative for chest tightness and shortness of breath.    Cardiovascular: Negative for chest pain, palpitations, orthopnea and PND.   Musculoskeletal: Positive for neck pain.   Neurological: Positive for headaches. Negative for dizziness and light-headedness.       Objective:      Physical Exam   Constitutional: He appears well-developed and well-nourished. No distress.   Good blood pressure control  Normal weight with a BMI of 29.6 he is down 2.9 lb from his January 28, 2019 visit with me   HENT:   Head: Normocephalic and atraumatic.   Right Ear: Hearing, external ear and ear canal normal. Tympanic membrane is bulging. Tympanic membrane is not injected and not erythematous. Tympanic membrane mobility is abnormal. No middle ear effusion.   Left Ear: Hearing, external ear and ear canal normal. Tympanic membrane is bulging. Tympanic membrane is not injected and not erythematous. Tympanic membrane mobility is abnormal.  No middle ear effusion.   Nose: Mucosal edema and rhinorrhea present.   Eyes: EOM are normal. Pupils are equal, round, and reactive to light. No scleral icterus.   Neck: Normal range of motion. Neck supple. No JVD present. No tracheal deviation present. No thyromegaly present.   Cardiovascular: Normal rate, regular rhythm and normal heart sounds. Exam reveals no gallop and no friction rub.   No murmur heard.  Pulmonary/Chest: Effort normal and breath sounds normal. No stridor. No respiratory distress. He has no wheezes. He has no rales. He exhibits no tenderness.   Lymphadenopathy:     He has no cervical adenopathy.   Skin: He is not diaphoretic.   Nursing note and vitals reviewed.      Assessment:       1. Good hypertension control    2. Acute non-recurrent sphenoidal sinusitis        Plan:       1. Good hypertension control  Good control with no changes needed, continue the Diovan 160 mg daily    2. Acute non-recurrent sphenoidal sinusitis  Failure of Augmentin.  Will  start doxycycline with a Medrol Dosepak to assist with drainage. Cautioned how to take the doxycycline to avoid GI upset.  - doxycycline (VIBRA-TABS) 100 MG tablet; Take 1 tablet (100 mg total) by mouth 2 (two) times daily. for 10 days  Dispense: 20 tablet; Refill: 0  - methylPREDNISolone (MEDROL DOSEPACK) 4 mg tablet; use as directed  Dispense: 1 Package; Refill: 0

## 2019-02-25 ENCOUNTER — LAB VISIT (OUTPATIENT)
Dept: LAB | Facility: HOSPITAL | Age: 79
End: 2019-02-25
Attending: INTERNAL MEDICINE
Payer: MEDICARE

## 2019-02-25 DIAGNOSIS — D45 CHRONIC ERYTHREMIA IN REMISSION: ICD-10-CM

## 2019-02-25 DIAGNOSIS — N18.30 CHRONIC KIDNEY DISEASE, STAGE III (MODERATE): ICD-10-CM

## 2019-02-25 DIAGNOSIS — N40.0 BENIGN ENLARGEMENT OF PROSTATE: ICD-10-CM

## 2019-02-25 DIAGNOSIS — I48.20 CHRONIC ATRIAL FIBRILLATION: Primary | ICD-10-CM

## 2019-02-25 LAB
ALBUMIN SERPL BCP-MCNC: 4.1 G/DL
ANION GAP SERPL CALC-SCNC: 8 MMOL/L
BASOPHILS # BLD AUTO: 0.06 K/UL
BASOPHILS NFR BLD: 0.6 %
BUN SERPL-MCNC: 16 MG/DL
CALCIUM SERPL-MCNC: 10.3 MG/DL
CHLORIDE SERPL-SCNC: 104 MMOL/L
CO2 SERPL-SCNC: 27 MMOL/L
CREAT SERPL-MCNC: 1.1 MG/DL
DIFFERENTIAL METHOD: ABNORMAL
EOSINOPHIL # BLD AUTO: 0 K/UL
EOSINOPHIL NFR BLD: 0.1 %
ERYTHROCYTE [DISTWIDTH] IN BLOOD BY AUTOMATED COUNT: 12.6 %
EST. GFR  (AFRICAN AMERICAN): >60 ML/MIN/1.73 M^2
EST. GFR  (NON AFRICAN AMERICAN): >60 ML/MIN/1.73 M^2
GLUCOSE SERPL-MCNC: 100 MG/DL
HCT VFR BLD AUTO: 46.2 %
HGB BLD-MCNC: 14.4 G/DL
IMM GRANULOCYTES # BLD AUTO: 0.06 K/UL
IMM GRANULOCYTES NFR BLD AUTO: 0.6 %
LYMPHOCYTES # BLD AUTO: 0.9 K/UL
LYMPHOCYTES NFR BLD: 8.8 %
MCH RBC QN AUTO: 34.5 PG
MCHC RBC AUTO-ENTMCNC: 31.2 G/DL
MCV RBC AUTO: 111 FL
MONOCYTES # BLD AUTO: 0.4 K/UL
MONOCYTES NFR BLD: 4.2 %
NEUTROPHILS # BLD AUTO: 8.7 K/UL
NEUTROPHILS NFR BLD: 85.7 %
NRBC BLD-RTO: 0 /100 WBC
PHOSPHATE SERPL-MCNC: 2.9 MG/DL
PLATELET # BLD AUTO: 510 K/UL
PMV BLD AUTO: 10.6 FL
POTASSIUM SERPL-SCNC: 4.2 MMOL/L
RBC # BLD AUTO: 4.17 M/UL
SODIUM SERPL-SCNC: 139 MMOL/L
WBC # BLD AUTO: 10.17 K/UL

## 2019-02-25 PROCEDURE — 80069 RENAL FUNCTION PANEL: CPT

## 2019-02-25 PROCEDURE — 36415 COLL VENOUS BLD VENIPUNCTURE: CPT | Mod: PO

## 2019-02-25 PROCEDURE — 85025 COMPLETE CBC W/AUTO DIFF WBC: CPT

## 2019-02-27 ENCOUNTER — OFFICE VISIT (OUTPATIENT)
Dept: HEMATOLOGY/ONCOLOGY | Facility: CLINIC | Age: 79
End: 2019-02-27
Payer: MEDICARE

## 2019-02-27 ENCOUNTER — LAB VISIT (OUTPATIENT)
Dept: LAB | Facility: HOSPITAL | Age: 79
End: 2019-02-27
Attending: NURSE PRACTITIONER
Payer: MEDICARE

## 2019-02-27 VITALS
HEIGHT: 66 IN | WEIGHT: 177.69 LBS | BODY MASS INDEX: 28.56 KG/M2 | HEART RATE: 80 BPM | RESPIRATION RATE: 18 BRPM | DIASTOLIC BLOOD PRESSURE: 72 MMHG | SYSTOLIC BLOOD PRESSURE: 119 MMHG | TEMPERATURE: 99 F

## 2019-02-27 DIAGNOSIS — D47.3 ESSENTIAL THROMBOCYTOSIS: ICD-10-CM

## 2019-02-27 DIAGNOSIS — D45 POLYCYTHEMIA VERA: Primary | ICD-10-CM

## 2019-02-27 DIAGNOSIS — D45 POLYCYTHEMIA VERA: ICD-10-CM

## 2019-02-27 LAB
ALBUMIN SERPL BCP-MCNC: 4.5 G/DL
ALP SERPL-CCNC: 83 U/L
ALT SERPL W/O P-5'-P-CCNC: 40 U/L
ANION GAP SERPL CALC-SCNC: 9 MMOL/L
AST SERPL-CCNC: 45 U/L
BASOPHILS # BLD AUTO: 0.07 K/UL
BASOPHILS NFR BLD: 0.7 %
BILIRUB SERPL-MCNC: 1.1 MG/DL
BUN SERPL-MCNC: 18 MG/DL
CALCIUM SERPL-MCNC: 10.1 MG/DL
CHLORIDE SERPL-SCNC: 104 MMOL/L
CO2 SERPL-SCNC: 30 MMOL/L
CREAT SERPL-MCNC: 1.06 MG/DL
DIFFERENTIAL METHOD: ABNORMAL
EOSINOPHIL # BLD AUTO: 0 K/UL
EOSINOPHIL NFR BLD: 0.4 %
ERYTHROCYTE [DISTWIDTH] IN BLOOD BY AUTOMATED COUNT: 12.6 %
EST. GFR  (AFRICAN AMERICAN): >60 ML/MIN/1.73 M^2
EST. GFR  (NON AFRICAN AMERICAN): >60 ML/MIN/1.73 M^2
GLUCOSE SERPL-MCNC: 92 MG/DL
HCT VFR BLD AUTO: 45.7 %
HGB BLD-MCNC: 14.6 G/DL
IMM GRANULOCYTES # BLD AUTO: 0.15 K/UL
IMM GRANULOCYTES NFR BLD AUTO: 1.5 %
LDH SERPL L TO P-CCNC: 887 U/L
LYMPHOCYTES # BLD AUTO: 0.9 K/UL
LYMPHOCYTES NFR BLD: 9.2 %
MCH RBC QN AUTO: 34.8 PG
MCHC RBC AUTO-ENTMCNC: 31.9 G/DL
MCV RBC AUTO: 109 FL
MONOCYTES # BLD AUTO: 0.6 K/UL
MONOCYTES NFR BLD: 5.9 %
NEUTROPHILS # BLD AUTO: 8.4 K/UL
NEUTROPHILS NFR BLD: 82.3 %
NRBC BLD-RTO: 0 /100 WBC
PLATELET # BLD AUTO: 577 K/UL
PMV BLD AUTO: 10.2 FL
POTASSIUM SERPL-SCNC: 4.8 MMOL/L
PROT SERPL-MCNC: 7.6 G/DL
RBC # BLD AUTO: 4.2 M/UL
SODIUM SERPL-SCNC: 143 MMOL/L
WBC # BLD AUTO: 10.21 K/UL

## 2019-02-27 PROCEDURE — 80053 COMPREHEN METABOLIC PANEL: CPT | Mod: PN

## 2019-02-27 PROCEDURE — 1101F PR PT FALLS ASSESS DOC 0-1 FALLS W/OUT INJ PAST YR: ICD-10-PCS | Mod: CPTII,S$GLB,, | Performed by: NURSE PRACTITIONER

## 2019-02-27 PROCEDURE — 36415 COLL VENOUS BLD VENIPUNCTURE: CPT | Mod: PN

## 2019-02-27 PROCEDURE — 83615 LACTATE (LD) (LDH) ENZYME: CPT | Mod: PN

## 2019-02-27 PROCEDURE — 99499 RISK ADDL DX/OHS AUDIT: ICD-10-PCS | Mod: S$GLB,,, | Performed by: NURSE PRACTITIONER

## 2019-02-27 PROCEDURE — 3078F PR MOST RECENT DIASTOLIC BLOOD PRESSURE < 80 MM HG: ICD-10-PCS | Mod: CPTII,S$GLB,, | Performed by: NURSE PRACTITIONER

## 2019-02-27 PROCEDURE — 99999 PR PBB SHADOW E&M-EST. PATIENT-LVL IV: CPT | Mod: PBBFAC,,, | Performed by: NURSE PRACTITIONER

## 2019-02-27 PROCEDURE — 83615 LACTATE (LD) (LDH) ENZYME: CPT

## 2019-02-27 PROCEDURE — 99213 OFFICE O/P EST LOW 20 MIN: CPT | Mod: S$GLB,,, | Performed by: NURSE PRACTITIONER

## 2019-02-27 PROCEDURE — 1101F PT FALLS ASSESS-DOCD LE1/YR: CPT | Mod: CPTII,S$GLB,, | Performed by: NURSE PRACTITIONER

## 2019-02-27 PROCEDURE — 85025 COMPLETE CBC W/AUTO DIFF WBC: CPT

## 2019-02-27 PROCEDURE — 99499 UNLISTED E&M SERVICE: CPT | Mod: S$GLB,,, | Performed by: NURSE PRACTITIONER

## 2019-02-27 PROCEDURE — 3074F PR MOST RECENT SYSTOLIC BLOOD PRESSURE < 130 MM HG: ICD-10-PCS | Mod: CPTII,S$GLB,, | Performed by: NURSE PRACTITIONER

## 2019-02-27 PROCEDURE — 80053 COMPREHEN METABOLIC PANEL: CPT

## 2019-02-27 PROCEDURE — 99999 PR PBB SHADOW E&M-EST. PATIENT-LVL IV: ICD-10-PCS | Mod: PBBFAC,,, | Performed by: NURSE PRACTITIONER

## 2019-02-27 PROCEDURE — 3074F SYST BP LT 130 MM HG: CPT | Mod: CPTII,S$GLB,, | Performed by: NURSE PRACTITIONER

## 2019-02-27 PROCEDURE — 99213 PR OFFICE/OUTPT VISIT, EST, LEVL III, 20-29 MIN: ICD-10-PCS | Mod: S$GLB,,, | Performed by: NURSE PRACTITIONER

## 2019-02-27 PROCEDURE — 85025 COMPLETE CBC W/AUTO DIFF WBC: CPT | Mod: PN

## 2019-02-27 PROCEDURE — 3078F DIAST BP <80 MM HG: CPT | Mod: CPTII,S$GLB,, | Performed by: NURSE PRACTITIONER

## 2019-02-27 RX ORDER — HYDROXYUREA 500 MG/1
500 CAPSULE ORAL 2 TIMES DAILY
Qty: 180 CAPSULE | Refills: 1 | Status: SHIPPED | OUTPATIENT
Start: 2019-02-27 | End: 2019-09-17 | Stop reason: SDUPTHER

## 2019-02-27 NOTE — PROGRESS NOTES
HISTORY OF PRESENT ILLNESS:  This is a 77-year-old black gentleman known to   Dr. Cole for polycythemia vera.  He is managed with Hydrea 500 mg twice a day as   well as Xarelto (Afib).  To note:  He was diagnosed with PAF in June/2017 by Dr. Escobar.    He presents to the clinic today for interval evaluation with no complaints.  He remains   compliant with Hydrea.  He reports completion of phlebotomy since last visit.  He   denies any difficulties with fevers, chills, drenching night sweats, unexplained weight   loss, painful lymphadenopathy, rashes, pruritus, changes in the color of his stool/urine ,   jaundice, chest pain, irregular heartbeat, nausea, vomiting, constipation, diarrhea, etc.    No other new complaints or pertinent findings on a 10-point review of systems.    PHYSICAL EXAMINATION:  GENERAL:  Well-developed, well-nourished black gentleman in no acute distress.    Alert and oriented x4.  VITAL SIGNS:  Weight:  Loss of 3 pounds in 4 months  Wt Readings from Last 3 Encounters:   02/27/19 80.6 kg (177 lb 11.1 oz)   02/22/19 81.8 kg (180 lb 5.4 oz)   02/08/19 83.1 kg (183 lb 3.2 oz)     Temp Readings from Last 3 Encounters:   02/27/19 98.5 °F (36.9 °C)   02/22/19 98.2 °F (36.8 °C) (Oral)   02/08/19 98 °F (36.7 °C) (Oral)     BP Readings from Last 3 Encounters:   02/27/19 119/72   02/22/19 114/70   02/08/19 129/76     Pulse Readings from Last 3 Encounters:   02/27/19 80   02/22/19 84   02/08/19 71     HEENT:  Normocephalic, atraumatic.  Oral mucosa pink and moist.  Lips without   lesions.  Tongue midline.  Oropharynx clear.  Nonicteric sclerae.  NECK:  Supple.  No adenopathy.  HEART:  Regular rate and rhythm without murmur, gallop or rub.  LUNGS:  Clear to auscultation bilaterally.  ABDOMEN:  Soft, nontender and nondistended with positive normoactive bowel   sounds.  No hepatosplenomegaly.  EXTREMITIES:  No cyanosis, clubbing or edema.  Distal pulses are intact.  AXILLAE AND GROIN:  No palpable pathologic  lymphadenopathy is appreciated.    LABORATORY:    Lab Results   Component Value Date    WBC 10.21 02/27/2019    HGB 14.6 02/27/2019    HCT 45.7 02/27/2019     (H) 02/27/2019     (H) 02/27/2019     Differential remarkable for 82.3% grans, 9.2% lymph    CMP  Sodium   Date Value Ref Range Status   02/27/2019 143 136 - 145 mmol/L Final     Potassium   Date Value Ref Range Status   02/27/2019 4.8 3.5 - 5.1 mmol/L Final     Comment:     Specimen slightly hemolyzed     Chloride   Date Value Ref Range Status   02/27/2019 104 95 - 110 mmol/L Final     CO2   Date Value Ref Range Status   02/27/2019 30 22 - 31 mmol/L Final     Glucose   Date Value Ref Range Status   02/27/2019 92 70 - 110 mg/dL Final     Comment:     The ADA recommends the following guidelines for fasting glucose:  Normal:       less than 100 mg/dL  Prediabetes:  100 mg/dL to 125 mg/dL  Diabetes:     126 mg/dL or higher       BUN, Bld   Date Value Ref Range Status   02/27/2019 18 9 - 21 mg/dL Final     Creatinine   Date Value Ref Range Status   02/27/2019 1.06 0.50 - 1.40 mg/dL Final   10/02/2012 1.3 0.2 - 1.4 mg/dl Final     Calcium   Date Value Ref Range Status   02/27/2019 10.1 8.4 - 10.2 mg/dL Final   10/02/2012 10.2 8.6 - 10.2 mg/dl Final     Total Protein   Date Value Ref Range Status   02/27/2019 7.6 6.0 - 8.4 g/dL Final     Albumin   Date Value Ref Range Status   02/27/2019 4.5 3.5 - 5.2 g/dL Final     Total Bilirubin   Date Value Ref Range Status   02/27/2019 1.1 0.2 - 1.3 mg/dL Final     Alkaline Phosphatase   Date Value Ref Range Status   02/27/2019 83 38 - 145 U/L Final     AST   Date Value Ref Range Status   02/27/2019 45 17 - 59 U/L Final     ALT   Date Value Ref Range Status   02/27/2019 40 10 - 44 U/L Final     Anion Gap   Date Value Ref Range Status   02/27/2019 9 8 - 16 mmol/L Final   10/02/2012 7 5 - 15 meq/L Final     eGFR if    Date Value Ref Range Status   02/27/2019 >60 >60 mL/min/1.73 m^2 Final     eGFR if  non    Date Value Ref Range Status   02/27/2019 >60 >60 mL/min/1.73 m^2 Final     Comment:     Calculation used to obtain the estimated glomerular filtration  rate (eGFR) is the CKD-EPI equation.            IMPRESSION:    1.  Polycythemia vera -  Target hematocrit < 45; above goal  2.  New onset AFib (0606/2017); on Metoprolol, Digoxin & Xarelto. - followed by Dr. Escobar; sinus rhythm  3.  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  Patient is to have one unit of whole blood phlebotomized weekly x 2; Rx given.  2.  Continue our course of observation and have the patient return to clinic in 4 months with   interval CBC, CMP and LDH prior.  3.  Continue Hydrea 500 mg b.i.d.  Presently on Xarelto.  4.  Maintain good daily hydration of 2 L per day, as well as pre & post phlebotomy.    Assessment/plan reviewed and approved by Dr. Cole.

## 2019-03-22 ENCOUNTER — OFFICE VISIT (OUTPATIENT)
Dept: CARDIOLOGY | Facility: CLINIC | Age: 79
End: 2019-03-22
Payer: MEDICARE

## 2019-03-22 VITALS
HEIGHT: 67 IN | WEIGHT: 183.88 LBS | BODY MASS INDEX: 28.86 KG/M2 | SYSTOLIC BLOOD PRESSURE: 110 MMHG | DIASTOLIC BLOOD PRESSURE: 60 MMHG | HEART RATE: 82 BPM | OXYGEN SATURATION: 93 %

## 2019-03-22 DIAGNOSIS — D45 POLYCYTHEMIA VERA: ICD-10-CM

## 2019-03-22 DIAGNOSIS — I10 ESSENTIAL HYPERTENSION: ICD-10-CM

## 2019-03-22 DIAGNOSIS — I48.0 PAF (PAROXYSMAL ATRIAL FIBRILLATION): ICD-10-CM

## 2019-03-22 DIAGNOSIS — G47.19 EXCESSIVE DAYTIME SLEEPINESS: ICD-10-CM

## 2019-03-22 DIAGNOSIS — Z79.01 ANTICOAGULANT LONG-TERM USE: ICD-10-CM

## 2019-03-22 DIAGNOSIS — Z87.891 HISTORY OF SMOKING 25-50 PACK YEARS: ICD-10-CM

## 2019-03-22 DIAGNOSIS — E78.5 DYSLIPIDEMIA (HIGH LDL; LOW HDL): ICD-10-CM

## 2019-03-22 DIAGNOSIS — I70.209 ATHEROSCLEROSIS OF NATIVE ARTERY OF EXTREMITY, UNSPECIFIED EXTREMITY, WITH UNSPECIFIED PRESENCE OF CLINICAL MANIFESTATION: Primary | ICD-10-CM

## 2019-03-22 DIAGNOSIS — I48.0 PAROXYSMAL ATRIAL FIBRILLATION: ICD-10-CM

## 2019-03-22 DIAGNOSIS — E65 ABDOMINAL OBESITY: ICD-10-CM

## 2019-03-22 PROCEDURE — 1101F PT FALLS ASSESS-DOCD LE1/YR: CPT | Mod: CPTII,S$GLB,, | Performed by: INTERNAL MEDICINE

## 2019-03-22 PROCEDURE — 99499 UNLISTED E&M SERVICE: CPT | Mod: S$GLB,,, | Performed by: INTERNAL MEDICINE

## 2019-03-22 PROCEDURE — 99499 RISK ADDL DX/OHS AUDIT: ICD-10-PCS | Mod: S$GLB,,, | Performed by: INTERNAL MEDICINE

## 2019-03-22 PROCEDURE — 99215 PR OFFICE/OUTPT VISIT, EST, LEVL V, 40-54 MIN: ICD-10-PCS | Mod: S$GLB,,, | Performed by: INTERNAL MEDICINE

## 2019-03-22 PROCEDURE — 99215 OFFICE O/P EST HI 40 MIN: CPT | Mod: S$GLB,,, | Performed by: INTERNAL MEDICINE

## 2019-03-22 PROCEDURE — 99999 PR PBB SHADOW E&M-EST. PATIENT-LVL III: CPT | Mod: PBBFAC,,, | Performed by: INTERNAL MEDICINE

## 2019-03-22 PROCEDURE — 3078F DIAST BP <80 MM HG: CPT | Mod: CPTII,S$GLB,, | Performed by: INTERNAL MEDICINE

## 2019-03-22 PROCEDURE — 3078F PR MOST RECENT DIASTOLIC BLOOD PRESSURE < 80 MM HG: ICD-10-PCS | Mod: CPTII,S$GLB,, | Performed by: INTERNAL MEDICINE

## 2019-03-22 PROCEDURE — 3074F SYST BP LT 130 MM HG: CPT | Mod: CPTII,S$GLB,, | Performed by: INTERNAL MEDICINE

## 2019-03-22 PROCEDURE — 3074F PR MOST RECENT SYSTOLIC BLOOD PRESSURE < 130 MM HG: ICD-10-PCS | Mod: CPTII,S$GLB,, | Performed by: INTERNAL MEDICINE

## 2019-03-22 PROCEDURE — 99999 PR PBB SHADOW E&M-EST. PATIENT-LVL III: ICD-10-PCS | Mod: PBBFAC,,, | Performed by: INTERNAL MEDICINE

## 2019-03-22 PROCEDURE — 93000 EKG 12-LEAD: ICD-10-PCS | Mod: S$GLB,,, | Performed by: INTERNAL MEDICINE

## 2019-03-22 PROCEDURE — 1101F PR PT FALLS ASSESS DOC 0-1 FALLS W/OUT INJ PAST YR: ICD-10-PCS | Mod: CPTII,S$GLB,, | Performed by: INTERNAL MEDICINE

## 2019-03-22 PROCEDURE — 93000 ELECTROCARDIOGRAM COMPLETE: CPT | Mod: S$GLB,,, | Performed by: INTERNAL MEDICINE

## 2019-03-22 NOTE — PROGRESS NOTES
"Subjective:    Patient ID:  Morro Sharma is a 78 y.o. male who presents for evaluation of 3 month follow-up (hx of a-fib)  On low dose Xarelto 15 mg for past 18 months without clear indication and normal renal function.  PCP: Dr. Escobar, see annually, does labs  Prior cardiologist: Dr. Kingston, last seen 12/2018  Lives with wife, Xi, non-smoker  Retired  for [employer]    Patient is a new patient to me.    Activities: walk twice a week, do yard work once a week, no regular exercise, no limitation.  Nicotine: quit 1982  Alcohol: none  Illicit drugs: none  Cardiac symptoms: none  Home BP: 115/75  Medication compliance: yes  Diet: regular  Caffeine: 1 cpd  Labs: 1/2019, LDL 50.8 on 40 mg of atorvastatin, normal CMP, CBC, urine negative for protein.  Last Echo: 5/2016  Last stress test: 5/2016 at [facility]  Prior cardiovascular angiogram: No  ECG: NSR, right axis, rate 85    AAM here to establish cardiology services. No heart worries.     Dr. Kingston noted "78 y.o. male with a past medical history of paroxysmal atrial fibrillation, hypertension    Patient was previously treated by another cardiologist in the community.  He wanted to establish care in the [facility] system and hence he was here to see me.  He is doing well.  Denies any chest pain, shortness of breath, orthopnea, PND, palpitations, syncope or presyncope.  Today he was in sinus rhythm.    Patient is currently stable from his cardiovascular some perspective.  I have requested all his old notes from Dr. Hubbard's office.  Continue medical management for now.  I have discontinued his digoxin since digoxin is not really helpful in paroxysmal atrial fibrillation.  Follow-up in about 1 year (around 12/17/2019)."    Echo 5/2016            Review of Systems   Constitution: Negative for diaphoresis, fever, weakness, malaise/fatigue, night sweats and weight gain.   HENT: Negative for nosebleeds and tinnitus.    Eyes: Negative for visual disturbance. " "  Cardiovascular: Negative for chest pain, claudication, cyanosis, dyspnea on exertion, irregular heartbeat, leg swelling, near-syncope, orthopnea, palpitations and paroxysmal nocturnal dyspnea.   Respiratory: Negative for cough, shortness of breath, sleep disturbances due to breathing, snoring and wheezing.         Blain score 10, never testd   Endocrine: Negative for polydipsia and polyuria.   Hematologic/Lymphatic: Does not bruise/bleed easily.   Skin: Positive for dry skin and itching. Negative for color change, flushing, nail changes, poor wound healing and suspicious lesions.   Musculoskeletal: Negative for arthritis, falls, gout, joint pain, joint swelling, muscle cramps, muscle weakness and myalgias.   Gastrointestinal: Positive for constipation and heartburn. Negative for hematemesis, hematochezia, melena and nausea.   Genitourinary: Positive for frequency and nocturia.   Neurological: Negative for disturbances in coordination, excessive daytime sleepiness, dizziness, focal weakness, headaches, light-headedness, loss of balance, numbness and vertigo.   Psychiatric/Behavioral: Negative for depression and substance abuse. The patient does not have insomnia and is not nervous/anxious.         Objective:    Physical Exam   Constitutional: He is oriented to person, place, and time. He appears well-developed and well-nourished.   HENT:   Head: Normocephalic.   Eyes: Conjunctivae and EOM are normal. Pupils are equal, round, and reactive to light.   Neck: Normal range of motion. Neck supple. No JVD present. No thyromegaly present.   Circumference 16"   Cardiovascular: Normal rate, regular rhythm and intact distal pulses. Exam reveals no gallop and no friction rub.   Murmur heard.   Medium-pitched machinery early systolic murmur is present with a grade of 2/6 at the upper right sternal border and upper left sternal border.  Pulses:       Carotid pulses are 2+ on the right side, and 2+ on the left side.       " "Radial pulses are 2+ on the right side, and 2+ on the left side.        Femoral pulses are 2+ on the right side, and 2+ on the left side.       Popliteal pulses are 2+ on the right side, and 2+ on the left side.        Dorsalis pedis pulses are 2+ on the right side, and 2+ on the left side.        Posterior tibial pulses are 2+ on the right side, and 2+ on the left side.   Pulmonary/Chest: Effort normal and breath sounds normal. He has no rales. He exhibits no tenderness.   Abdominal: Soft. Bowel sounds are normal. There is no tenderness.   Waist 40", hip 39.5"   Musculoskeletal: Normal range of motion. He exhibits no edema.   Lymphadenopathy:     He has no cervical adenopathy.   Neurological: He is alert and oriented to person, place, and time.   Skin: Skin is warm and dry. No rash noted.         Assessment:       1. Atherosclerosis of native artery of extremity, unspecified extremity, with unspecified presence of clinical manifestation    2. History of smoking 25-50 pack years, quit 1985, 37.5 py    3. Paroxysmal atrial fibrillation, onset 5/2017, CHADS-VAS score 4    4. Anticoagulant long-term use    5. Essential hypertension    6. Polycythemia vera    7. Dyslipidemia (high LDL; low HDL), baseline     8. Excessive daytime sleepiness    9. Abdominal obesity    10. BMI 29.0-29.9,adult         Plan:       Morro was seen today for 3 month follow-up.    Diagnoses and all orders for this visit:    Atherosclerosis of native artery of extremity, unspecified extremity, with unspecified presence of clinical manifestation  -     Echocardiogram stress test with CFD (Cupid Only); Future    History of smoking 25-50 pack years, quit 1985, 37.5 py    Paroxysmal atrial fibrillation, onset 5/2017, CHADS-VAS score 4  -     Echocardiogram stress test with CFD (Cupid Only); Future  -     Holter monitor - 48 hour (Cupid Only); Future  -     rivaroxaban (XARELTO) 20 mg Tab; Take 1 tablet (20 mg total) by mouth daily with dinner " or evening meal.    Anticoagulant long-term use  -     rivaroxaban (XARELTO) 20 mg Tab; Take 1 tablet (20 mg total) by mouth daily with dinner or evening meal.    Essential hypertension  -     Echocardiogram stress test with CFD (Cupid Only); Future    Polycythemia vera    Dyslipidemia (high LDL; low HDL), baseline     Excessive daytime sleepiness  -     Holter monitor - 48 hour (Cupid Only); Future    Abdominal obesity    BMI 29.0-29.9,adult    - All medical issues reviewed, continue current Rx.  - CV status stable, continue current Rx, all medications reviewed, patient acknowledge good understanding.  - Have to do some abdominal exercise to rid belly fat  - Recommend healthy living: no nicotine, moderate alcohol, healthy diet and regular exercise aiming for fitness, and weight control  - Instruction for Mediterranean diet and heart healthy exercise given.  - Check home blood pressure, 2 days weekly, do 2 readings within 5 minutes in AM and PM, keep log for review.  - Weigh twice weekly, try to lose 1-2 lbs per week. Target weight loss of 5%-10%.  - Highly recommend 30 minutes of exercise / activities daily, can have Sunday off, with 2-3 sessions of muscle strengthening weekly. A  would be very helpful.  - Recommend at least annual cardiovascular evaluation in view of patient's significant risk factors.  - Phone review / encourage use of [email or web address]      Total face-to-face time with the patient was 40 minutes and greater than 50% was spent in counseling and coordination of care. The above assessment and plan have been discussed at length. Prior physician's note reviewed. Labs and procedure over the last 6 months reviewed. Problem List updated. Asked to bring in all active medications / pills bottles with next visit.

## 2019-03-22 NOTE — PATIENT INSTRUCTIONS
Recommended Mediterranean dietEating Heart-Healthy Food: Using the DASH Plan  Eating for your heart doesnt have to be hard or boring. You just need to know how to make healthier choices. The DASH eating plan has been developed to help you do just that. DASH stands for Dietary Approaches to Stop Hypertension. It is a plan that has been proven to be healthier for your heart and to lower your risk for high blood pressure. It can also help lower your risk for cancer, heart disease, osteoporosis, and diabetes.  Choosing from Each Food Group  Choose foods from each of the food groups below each day. Try to get the recommended number of servings for each food group. The serving numbers are based on a diet of 2,000 calories a day. Talk to your doctor if youre unsure about your calorie needs.  Grains   Servings: 7-8 a day  A serving is:  · 1 slice bread  · 1 ounce dry cereal  · half a cup cooked rice or pasta  Best choices: Whole grains and any grains high in fiber.  Vegetables   Servings: 4-5 a day  A serving is:  · 1 cup raw leafy vegetable  · Half a cup cooked vegetable  · Three-quarter cup vegetable juice  Best choices: Fresh or frozen vegetable prepared without too much added salt or fat.    Fruits   Servings: 4-5 a day  A serving is:  · Three-quarter cup fruit juice  · 1 medium fruit  · One-quarter cup dried fruit  · One-half cup fresh, frozen, or canned fruit  Best choices: A variety of fresh fruits of different colors. Whole fruits are a much better choice than fruit juices.  Low-fat or Fat Free Dairy   Servings: 2-3 a day  A serving is:  · 8 ounces milk  · 1 cup yogurt  · One and a half ounces cheese  Best choices: Skim or 1% milk, low-fat or fat free yogurt or buttermilk, and low-fat cheeses.       Meat, Poultry, Fish   Servings: 2 or fewer a day  A serving is:  · 3 ounces cooked meat, poultry, or fish  Best choices: Lean meats and fish. Trim away visible fat. Broil, roast, or boil instead of frying. Remove skin  from poultry before eating.  Nuts, Seeds, Beans   Servings: 4-5 a week  A serving is:  · One third cup nuts (or one and a half ounces)  · 2 tablespoons sunflower seeds  · Half a cup cooked beans  Best choices: Dry roasted nuts with no salt added, lentils, kidney beans, garbanzo beans, and whole rahman beans.    Fats and Oils   Servings: 2 a day  A serving is:  · 1 teaspoon vegetable oil  · 1 teaspoon soft margarine  · 1 tablespoon low-fat mayonnaise  · 1 teaspoon regular mayonnaise  · 2 tablespoons light salad dressing  · 1 tablespoon regular salad dressing  Best choices: Monounsaturated and polyunsaturated fats such as olive, canola, or safflower oil.  Sweets   Servings: 5 a week or fewer  A serving is:  · 1 tablespoon sugar, maple syrup, or honey  · 1 tablespoon jam or jelly  · 1 half-ounce jelly beans (about 15)  · 8 ounces lemonade  Best choices: Dried fruit can be a satisfying sweet. Choose low-fat sweets when possible. And watch your serving sizes!       Aerobic Exercise for a Healthy Heart  Exercise is a lot more than an energy booster and a stress reliever. It also strengthens your heart muscle, lowers your blood pressure and blood cholesterol, and burns calories.      Remember, some activity is better than none.     Choose an Aerobic Activity  Choose a nonstop activity that makes your heart and lungs work harder than they do when you rest or walk normally. This aerobic exercise can improve the way your heart and other muscles use oxygen. Make it fun by exercising with a friend and choosing an activity you enjoy. Here are some ideas:  · Walking  · Swimming  · Bicycling  · Stair climbing  · Dancing  · Jogging  Exercise Regularly  If you havent been exercising regularly,  get your doctors okay first. Then start slowly.  Here are some tips:  · Begin exercising 3 times a week for 5-10 minutes at a time.  · When you feel comfortable, add a few minutes each week.  · Slowly build up to exercising 3-4 times each  week for 20-40 minutes. Aim for a total of 150 or more minutes a week.  · Be sure to carry your nitroglycerin with you when you exercise.  · If you get angina when youre exercising, stop what youre doing, take your nitroglycerin, and call your doctor.  © 6215-3860 [employer], [address] [place] [address], [place] [address]. All rights reserved. This information is not intended as a substitute for professional medical care. Always follow your healthcare professional's instructions.    Losing Weight (Cardiovascular)  Excess weight is a major risk factor for heart disease. Losing weight may help keep your arteries open so that your heart can get the oxygen-rich blood it needs. Weight loss can also help lower your blood pressure and reduce your risk for diabetes. All in all, losing weight makes you healthier.          Exercise with a friend. When activity is fun, you're more likely to stick with it.        Calories and Weight Loss  Calories are the fuel your body burns for energy. You get the calories you need from the food you eat. For healthy weight loss, women should eat at least 1,200 calories a day, men at least 1,500.    When you eat more calories than you need, your body stores the extra calories as fat. One pound of fat equals 3,500 calories.    To lose weight, try to burn 500 calories a day more than you eat. To do this, eat 250 calories less each day. Add activity to burn the other 250 calories. Walking 21/2 miles burns about 250 calories.    Eat a variety of healthy foods. Its the best way to make calories count.     Tips for losing weight:  Drink 8 to 10 glasses of water a day.    Dont skip meals. Instead, eat smaller portions.       Brisk Activity Is Best  Brisk activity gets your heart pumping faster. It makes your heart healthier. Its also the best way to burn calories. In fact, your body may keep burning calories for hours after you stop a brisk activity.    Begin by walking 10 minutes most  days.    Add more time and speed to your walk. Build up as you feel able.    Try to walk briskly at least 30 minutes most days. If needed, you can break this into 2 shorter sessions.     Check off the ideas below that you could try to make your day more active:    Take the stairs instead of the elevator.    Park your car farther away and walk.    Ride a bike to work or to the store.    Walk laps around the mall.  Understanding Atrial Fibrillation    An arrhythmia is any problem with the speed or pattern of the heartbeat. Atrial fibrillation (AFib) is a common type of arrhythmia. It causes fast, chaotic electrical signals in the atria. This leads to poor functioning of the heart. It also affects how much blood your heart can pump out to the body.  Afib may occur once in a while and go away on its own. Or it may continue for longer periods and need treatment.  AFib can lead to serious problems, such as stroke. Your healthcare provider will need to monitor and manage it.  What happens during atrial fibrillation?   The heart has an electrical system that sends signals to control the heartbeat. As signals move through the heart, they tell the hearts upper chambers (atria) and lower chambers (ventricles) when to squeeze (contract) and relax. This lets blood move through the heart and out to the body and lungs.  With AFib, the atria receive abnormal signals. This causes them to contract in a fast and irregular way, and out of sync with the ventricles. When this happens, the atria also have a harder time moving blood into the ventricles. Blood may then pool in the atria, which increases the risk for blood clots and stroke. The ventricles also may contract too quickly and irregularly. As a result, they may not pump blood to the body and lungs as well as they should. This can weaken the heart muscle over time and cause heart failure.  What causes atrial fibrillation?  AFib is more common in older adults. It has many  possible causes including:  · Coronary artery disease  · Heart valve disease  · Heart attack  · Heart surgery  · High blood pressure  · Thyroid disease  · Diabetes  · Lung disease  · Sleep apnea  · Heavy alcohol use  In some cases of AFib, doctors do not know the cause.  What are the symptoms of atrial fibrillation?  AFib may or may not cause symptoms. If symptoms do occur, they may include:  · A fast, pounding, irregular heartbeat  · Shortness of breath  · Tiredness  · Dizziness or fainting  · Chest pain  How is atrial fibrillation treated?  Treatments for AFib can include any of the options below.  · Medicines. You may be prescribed:  ¨ Heart rate medicines to help slow down the heartbeat  ¨ Heart rhythm medicines to help the heart beat more regularly  ¨ Anti-clotting medicines to help reduce the risk for blood clots and stroke  · Electrical cardioversion. Your healthcare provider uses special pads or paddles to send one or more brief electrical shocks to the heart. This can help reset the heartbeat to normal.  · Ablation. Long, thin tubes called catheters are threaded through a blood vessel to the heart. There, the catheters send out hot or cold energy to the areas causing the abnormal signals. This energy destroys the problem tissue or cells. This improves the chances that your heart will stay in normal rhythm without using medicines. If your heart rate and rhythm cant be controlled, you may need ablation and a pacemaker. These will help control the heart rate and regularity of the heartbeat.  · Surgery. During surgery, your healthcare provider may use different methods to create scar tissue in the areas of the heart causing the abnormal signals. The scar tissue disrupts the abnormal signals and may stop AFib from occurring.  What are the complications of atrial fibrillation?  These can include:  · Blood clots  · Stroke  · Heart failure. This problem occurs when the heart muscle weakens so much that it can no  longer pump blood well.  When should I call my healthcare provider?  Call your healthcare provider right away if you have any of these:  · Symptoms that dont get better with treatment, or get worse  · New symptoms   Date Last Reviewed: 5/1/2016 © 2000-2017 [employer]. [address] [place] [id number]. All rights reserved. This information is not intended as a substitute for professional medical care. Always follow your healthcare professional's instructions.        Discharge Instructions for Atrial Fibrillation  You have been diagnosed with an abnormal heart rhythm called atrial fibrillation. With this condition, your hearts 2 upper chambers quiver rather than squeeze the blood out in a normal pattern. This leads to an irregular and sometimes rapid heartbeat. Some people will develop associated symptoms such as a flip-flopping heartbeat, chest pain, lightheadedness, or shortness of breath. Other people may have no symptoms at all. Atrial fibrillation is serious because it affects the hearts ability to fill with blood as it should. Blood clots may form. This increases the risk for stroke. Untreated atrial fibrillation can also lead to heart failure. Atrial fibrillation can be controlled. With treatment, most people with atrial fibrillation lead normal lives.  Treatment options  Recommended treatment for atrial fibrillation depends on your age, symptoms, how long you have had atrial fibrillation, and other factors. You will have a complete evaluation to find out if you have any abnormalities that caused your heart to go into atrial fibrillation. This might be blocked heart arteries or a thyroid problem. Your doctor will assess your particular case and discuss choices with you.  Treatment choices may include:  · Treating an underlying disorder that puts you at risk for atrial fibrillation. For example, correcting an abnormal thyroid or electrolyte problem, or treating a blocked heart  artery.  · Restoring a normal heart rhythm with an electrical shock (cardioversion) or with an antiarrhythmic medicine (chemical cardioversion)  · Using medicine to control your heart rate in atrial fibrillation.  · Preventing the risk for blood clot and stroke using blood-thinning medicines. Your doctor will tell you what he or she recommends. Choices may include aspirin, clopidogrel, warfarin, dabigatran, rivaroxaban, apixaban, and edoxaban.  · Doing catheter ablation or a surgical maze procedure. These use different methods to destroy certain areas of heart tissue. This interrupts the electrical signals causing atrial fibrillation. One of these procedures may be a choice when medicines do not work, or as an alternative to long-term medicine.  · Other treatment choices may be recommended for you by your doctor.  Managing risk factors for stroke and preventing heart failure are important parts of any treatment plan for atrial fibrillation.  Home care  · Take your medicines exactly as directed. Dont skip doses.  · Work with your doctor to find the right medicines and doses for you.  · Learn to take your own pulse. Keep a record of your results. Ask your doctor which pulse rates mean that you need medical attention. Slowing your pulse is often the goal of treatment. Ask your doctor if its OK for you to use an automatic machine to check your pulse at home. Sometimes these machines dont count the pulse correctly when you have atrial fibrillation.  · Limit your intake of coffee, tea, cola, and other beverages with caffeine. Talk with your doctor about whether you should eliminate caffeine.  · Avoid over-the-counter medicines that have caffeine in them.  · Let your doctor know what medicines you take, including prescription and over-the-counter medicines, as well as any supplements. They interfere with some medicines given for atrial fibrillation.  · Ask your doctor about whether you can drink alcohol. Some people  need to avoid alcohol to better treat atrial fibrillation. If you are taking blood-thinner medicines, alcohol may interfere with them by increasing their effect.  · Never take stimulants such as amphetamines or cocaine. These drugs can speed up your heart rate and trigger atrial fibrillation.  Follow-up care  Follow up with your doctor, or as advised.     When should I call my healthcare provider  Call your healthcare provider right away if you have any of the following:  · Weakness  · Dizziness  · Fainting  · Fatigue  · Shortness of breath  · Chest pain with increased activity  · A change in the usual regularity of your heartbeat, or an unusually fast heartbeat   Date Last Reviewed: 4/23/2016 © 2000-2017 [employer]. [address] [place] [address], [place] [address]. All rights reserved. This information is not intended as a substitute for professional medical care. Always follow your healthcare professional's instructions.        Obstructive Sleep Apnea  Obstructive sleep apnea is a condition that causes your air passages to become narrowed or blocked during sleep. As a result, breathing stops for short periods. Your body wakes up enough for breathing to begin again, though you don't remember it. The cycle of stopped breathing and brief awakenings can repeat dozens of times a night. This prevents the body from getting to the deeper stages of sleep that are needed for good rest and may cause your body's oxygen level to fall.  Signs of sleep apnea include loud snoring, noisy breathing, and gasping sounds during sleep. Daytime symptoms include waking up tired after a full night's sleep, waking up with headaches, feeling very sleepy or falling asleep during the day, and having problems with memory or concentration.  Risk factors for sleep apnea include:  · Being overweight  · Being a man, or a woman in menopause  · Smoking  · Using alcohol or sedating medicines  · Having enlarged structures in the nose or  throat  Home care  Lifestyle changes that can help treat snoring and sleep apnea include the following:  · If you are overweight, lose weight. Talk to your healthcare provider about a weight-loss plan for you.  · Avoid alcohol for 3 to 4 hours before bedtime. Avoid sedating medications. Ask your healthcare provider about the medicines you take.  · If you smoke, talk to your healthcare provider about ways to quit.  · Sleep on your side. This can help prevent gravity from pulling relaxed throat tissues into your breathing passages.  · If you have allergies or sinus problems that block your nose, ask your healthcare provider for help.  Follow-up care  Follow up with your healthcare provider, or as advised. A diagnosis of sleep apnea is made with a sleep study. Your healthcare provider can tell you more about this test.  When to seek medical advice  Sleep apnea can make you more likely to have certain health problems. These include high blood pressure, heart attack, stroke, and sexual dysfunction. If you have sleep apnea, talk to your healthcare provider about the best treatments for you.  Date Last Reviewed: 4/1/2017 © 2000-2017 [employer]. [address] [place] [address]. All rights reserved. This information is not intended as a substitute for professional medical care. Always follow your healthcare professional's instructions.        Snoring and Sleep Apnea: Notes for a Partner    Snoring and sleep apnea affect your life, as well as your partners. You can help in the treatment of the problem. Be supportive. Encourage your partner both to get treatment and to make the adjustments needed to follow through.  Adjusting to changes  Your partners treatment may involve making changes to certain life habits. You can help your partner make and stick with these changes. For example:  · Support and even join your partners exercise program.  · Be supportive if your partner gets CPAP (continuous  positive airway pressure). He or she may feel self-conscious at first. Remind your partner to expect adjustments to CPAP before it feels just right.  · Consider joining a snoring and sleep apnea support group.  Go along to see the healthcare provider  You can give the healthcare provider the best account of your partners nighttime breathing and snoring patterns. Try to go along to healthcare providers appointments. If you cant go, write notes for your partner to give to the healthcare provider. Describe your partners snoring and sleep breathing patterns in detail.  Tips for sleeping with a snorer  Until treatment takes care of your partners snoring:  · Try to go to bed first. It may help if youre already asleep when your partner starts to snore.  · Wear earplugs to bed. A fan or other source of background noise may also help drown out snoring.   Date Last Reviewed: 8/10/2015  © 2374-6655 [employer], [employer]. [address] [place] [address], [place] [address]. All rights reserved. This information is not intended as a substitute for professional medical care. Always follow your healthcare professional's instructions.

## 2019-03-25 DIAGNOSIS — I48.0 PAF (PAROXYSMAL ATRIAL FIBRILLATION): Primary | ICD-10-CM

## 2019-03-26 ENCOUNTER — TELEPHONE (OUTPATIENT)
Dept: CARDIOLOGY | Facility: CLINIC | Age: 79
End: 2019-03-26

## 2019-03-26 NOTE — TELEPHONE ENCOUNTER
----- Message from Malou Barbour sent at 3/26/2019 12:12 PM CDT -----  Type: Needs Medical Advice    Who Called:  Wife (Xi)  Best Call Back Number: [phone]  Additional Information: Wife requesting to speak with nurse concerning patient's medication: Xarelto/has question/please call back to advise.

## 2019-03-26 NOTE — TELEPHONE ENCOUNTER
Called and spoke with Ms. Sharma, he stated that he has about 10 days left of his Xarelto 15mg, and he would like to know if he can finish those then start taking the 20mg when it is time to refill his medication. I advised him I would ask Dr. Stanley and get back with him. He verbalized understanding. No further issues noted.

## 2019-03-27 NOTE — TELEPHONE ENCOUNTER
Advised Mr. Sharma that Dr. Stanley stated it was fine for him to finish taking his Xarelto 15mg Rx then start the new Rx of 20mg. He verbalized understanding. No further issues noted.

## 2019-04-05 ENCOUNTER — CLINICAL SUPPORT (OUTPATIENT)
Dept: CARDIOLOGY | Facility: CLINIC | Age: 79
End: 2019-04-05
Attending: INTERNAL MEDICINE
Payer: MEDICARE

## 2019-04-05 ENCOUNTER — TELEPHONE (OUTPATIENT)
Dept: CARDIOLOGY | Facility: CLINIC | Age: 79
End: 2019-04-05

## 2019-04-05 VITALS — WEIGHT: 183.88 LBS | HEIGHT: 66 IN | BODY MASS INDEX: 29.55 KG/M2

## 2019-04-05 DIAGNOSIS — I10 ESSENTIAL HYPERTENSION: ICD-10-CM

## 2019-04-05 DIAGNOSIS — I48.0 PAROXYSMAL ATRIAL FIBRILLATION: ICD-10-CM

## 2019-04-05 DIAGNOSIS — G47.19 EXCESSIVE DAYTIME SLEEPINESS: ICD-10-CM

## 2019-04-05 DIAGNOSIS — I70.209 ATHEROSCLEROSIS OF NATIVE ARTERY OF EXTREMITY, UNSPECIFIED EXTREMITY, WITH UNSPECIFIED PRESENCE OF CLINICAL MANIFESTATION: ICD-10-CM

## 2019-04-05 LAB
ASCENDING AORTA: 2.58 CM
AV INDEX (PROSTH): 0.46
AV MEAN GRADIENT: 6.36 MMHG
AV PEAK GRADIENT: 10.37 MMHG
AV VALVE AREA: 1.43 CM2
AV VELOCITY RATIO: 0.59
BSA FOR ECHO PROCEDURE: 1.97 M2
CV ECHO LV RWT: 0.32 CM
CV STRESS BASE HR: 77 BPM
DIASTOLIC BLOOD PRESSURE: 83 MMHG
DOP CALC AO PEAK VEL: 1.61 M/S
DOP CALC AO VTI: 39.11 CM
DOP CALC LVOT AREA: 3.14 CM2
DOP CALC LVOT DIAMETER: 2 CM
DOP CALC LVOT PEAK VEL: 0.95 M/S
DOP CALC LVOT STROKE VOLUME: 55.99 CM3
DOP CALCLVOT PEAK VEL VTI: 17.83 CM
E WAVE DECELERATION TIME: 215.28 MSEC
E/A RATIO: 0.81
ECHO LV POSTERIOR WALL: 0.7 CM (ref 0.6–1.1)
FRACTIONAL SHORTENING: 39 % (ref 28–44)
INTERVENTRICULAR SEPTUM: 0.79 CM (ref 0.6–1.1)
IVRT: 0.13 MSEC
LA MAJOR: 4.88 CM
LA MINOR: 4.9 CM
LA WIDTH: 3.57 CM
LEFT ATRIUM SIZE: 3.68 CM
LEFT ATRIUM VOLUME INDEX: 28.3 ML/M2
LEFT ATRIUM VOLUME: 54.61 CM3
LEFT INTERNAL DIMENSION IN SYSTOLE: 2.64 CM (ref 2.1–4)
LEFT VENTRICLE DIASTOLIC VOLUME INDEX: 44.52 ML/M2
LEFT VENTRICLE DIASTOLIC VOLUME: 85.92 ML
LEFT VENTRICLE MASS INDEX: 50.9 G/M2
LEFT VENTRICLE SYSTOLIC VOLUME INDEX: 13.2 ML/M2
LEFT VENTRICLE SYSTOLIC VOLUME: 25.56 ML
LEFT VENTRICULAR INTERNAL DIMENSION IN DIASTOLE: 4.36 CM (ref 3.5–6)
LEFT VENTRICULAR MASS: 98.21 G
MV PEAK A VEL: 0.91 M/S
MV PEAK E VEL: 0.74 M/S
OHS CV CPX 1 MINUTE RECOVERY HEART RATE: 113 BPM
OHS CV CPX 85 PERCENT MAX PREDICTED HEART RATE MALE: 121
OHS CV CPX ESTIMATED METS: 11
OHS CV CPX MAX PREDICTED HEART RATE: 142
OHS CV CPX PATIENT IS FEMALE: 0
OHS CV CPX PATIENT IS MALE: 1
OHS CV CPX PEAK DIASTOLIC BLOOD PRESSURE: 75 MMHG
OHS CV CPX PEAK HEAR RATE: 141 BPM
OHS CV CPX PEAK RATE PRESSURE PRODUCT: NORMAL
OHS CV CPX PEAK SYSTOLIC BLOOD PRESSURE: 147 MMHG
OHS CV CPX PERCENT MAX PREDICTED HEART RATE ACHIEVED: 99
OHS CV CPX PERCENT TARGET HEART RATE ACHIEVED: 99.3
OHS CV CPX RATE PRESSURE PRODUCT PRESENTING: 9933
OHS CV CPX TARGET HEART RATE: 142
PISA TR MAX VEL: 2.52 M/S
PULM VEIN S/D RATIO: 1.22
PV PEAK D VEL: 0.45 M/S
PV PEAK S VEL: 0.55 M/S
PV PEAK VELOCITY: 0.81 CM/S
RA MAJOR: 4.17 CM
RA PRESSURE: 3 MMHG
RA WIDTH: 3.79 CM
RIGHT VENTRICULAR END-DIASTOLIC DIMENSION: 2.11 CM
SINUS: 2.39 CM
STJ: 2.37 CM
STRESS ECHO POST EXERCISE DUR MIN: 6 MIN
STRESS ECHO POST EXERCISE DUR SEC: 45
SYSTOLIC BLOOD PRESSURE: 129 MMHG
TR MAX PG: 25.4 MMHG
TRICUSPID ANNULAR PLANE SYSTOLIC EXCURSION: 2.15 CM
TV REST PULMONARY ARTERY PRESSURE: 28 MMHG

## 2019-04-05 PROCEDURE — 93351 ECHOCARDIOGRAM STRESS TEST WITH COLOR FLOW DOPPLER (CUPID ONLY): ICD-10-PCS | Mod: S$GLB,,, | Performed by: INTERNAL MEDICINE

## 2019-04-05 PROCEDURE — 93224 HOLTER MONITOR - 48 HOUR (CUPID ONLY): ICD-10-PCS | Mod: S$GLB,,, | Performed by: INTERNAL MEDICINE

## 2019-04-05 PROCEDURE — 99999 PR PBB SHADOW E&M-EST. PATIENT-LVL I: ICD-10-PCS | Mod: PBBFAC,,,

## 2019-04-05 PROCEDURE — 93224 XTRNL ECG REC UP TO 48 HRS: CPT | Mod: S$GLB,,, | Performed by: INTERNAL MEDICINE

## 2019-04-05 PROCEDURE — 93351 STRESS TTE COMPLETE: CPT | Mod: S$GLB,,, | Performed by: INTERNAL MEDICINE

## 2019-04-05 PROCEDURE — 99999 PR PBB SHADOW E&M-EST. PATIENT-LVL I: CPT | Mod: PBBFAC,,,

## 2019-04-05 NOTE — TELEPHONE ENCOUNTER
All placed to Mr. Sharma in regards to his xarelto. No answer left message to return call.     **Per Dr. Stanley Doubt the rash is due to the Xeralto but for his comfort and go back til rash is cleared and then try again with 20 mg.

## 2019-04-05 NOTE — TELEPHONE ENCOUNTER
Mr. Sharma was here today for his stress test, and stated that since increasing his xarelto 20 mg he broke out in a rash on his head, and was itching. He stated if it is okay with Dr. Stanley he would like to go back on xarelto 15mg. I advised him I would send a message to Dr. Stanley, and call him back. He verbalized understanding. No further issues noted.

## 2019-04-30 LAB
OHS CV EVENT MONITOR DAY: 0
OHS CV HOLTER LENGTH DECIMAL HOURS: 47.98
OHS CV HOLTER LENGTH HOURS: 47
OHS CV HOLTER LENGTH MINUTES: 59

## 2019-06-06 ENCOUNTER — OFFICE VISIT (OUTPATIENT)
Dept: FAMILY MEDICINE | Facility: CLINIC | Age: 79
End: 2019-06-06
Payer: MEDICARE

## 2019-06-06 VITALS
WEIGHT: 185.19 LBS | HEIGHT: 66 IN | DIASTOLIC BLOOD PRESSURE: 85 MMHG | SYSTOLIC BLOOD PRESSURE: 138 MMHG | BODY MASS INDEX: 29.76 KG/M2 | HEART RATE: 74 BPM | TEMPERATURE: 98 F

## 2019-06-06 DIAGNOSIS — R61 HYPERHIDROSIS: ICD-10-CM
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
## 2019-06-06 PROCEDURE — 3079F DIAST BP 80-89 MM HG: CPT | Mod: CPTII,S$GLB,, | Performed by: PHYSICIAN ASSISTANT

## 2019-06-06 PROCEDURE — 99213 PR OFFICE/OUTPT VISIT, EST, LEVL III, 20-29 MIN: ICD-10-PCS | Mod: S$GLB,,, | Performed by: PHYSICIAN ASSISTANT

## 2019-06-06 PROCEDURE — 1101F PT FALLS ASSESS-DOCD LE1/YR: CPT | Mod: CPTII,S$GLB,, | Performed by: PHYSICIAN ASSISTANT

## 2019-06-06 PROCEDURE — 3075F PR MOST RECENT SYSTOLIC BLOOD PRESS GE 130-139MM HG: ICD-10-PCS | Mod: CPTII,S$GLB,, | Performed by: PHYSICIAN ASSISTANT

## 2019-06-06 PROCEDURE — 1101F PR PT FALLS ASSESS DOC 0-1 FALLS W/OUT INJ PAST YR: ICD-10-PCS | Mod: CPTII,S$GLB,, | Performed by: PHYSICIAN ASSISTANT

## 2019-06-06 PROCEDURE — 99213 OFFICE O/P EST LOW 20 MIN: CPT | Mod: S$GLB,,, | Performed by: PHYSICIAN ASSISTANT

## 2019-06-06 PROCEDURE — 3075F SYST BP GE 130 - 139MM HG: CPT | Mod: CPTII,S$GLB,, | Performed by: PHYSICIAN ASSISTANT

## 2019-06-06 PROCEDURE — 3079F PR MOST RECENT DIASTOLIC BLOOD PRESSURE 80-89 MM HG: ICD-10-PCS | Mod: CPTII,S$GLB,, | Performed by: PHYSICIAN ASSISTANT

## 2019-06-06 PROCEDURE — 99999 PR PBB SHADOW E&M-EST. PATIENT-LVL V: CPT | Mod: PBBFAC,,, | Performed by: PHYSICIAN ASSISTANT

## 2019-06-06 PROCEDURE — 99999 PR PBB SHADOW E&M-EST. PATIENT-LVL V: ICD-10-PCS | Mod: PBBFAC,,, | Performed by: PHYSICIAN ASSISTANT

## 2019-06-06 RX ORDER — TRIAMCINOLONE ACETONIDE 1 MG/G
CREAM TOPICAL 2 TIMES DAILY
Qty: 80 G | Refills: 0 | Status: SHIPPED | OUTPATIENT
Start: 2019-06-06 | End: 2019-06-12 | Stop reason: SDUPTHER

## 2019-06-06 NOTE — PROGRESS NOTES
Subjective:       Patient ID: Morro Sharma is a 78 y.o. male.    Chief Complaint: Excessive Sweating (head) and Insect Bite    Patient presents for evaluation of 2 unrelated issues.  First patient complains of excessive sweating from the scalp only.  He states that he has had this issue for at least 15 years.  He states that the issue seems to be getting worse.  He states that he goes outside for only a short period of time and develops significant perspiration from the scalp.  He states that he is not responding from any other areas on the body.  He often has to use a towel or changes closed due to the perspiration.  Patient has had evaluation for this in the past.   He is also complaining of swelling and itching after mosquito bites.  He was bitten by several mosquitos a few weeks ago.  He had significant swelling of bilateral upper extremities after the insect bites.  He was most recently bitten on the left ear.  He had some significant swelling to the ear.  He has used an over-the-counter anti-itch cream and some prescription cream that his daughter had.  He attempts to wear long sleeves as often as he can.  He does not use any type of insect repellent  Patients patient medical/surgical, social and family histories have been reviewed       Review of Systems   Constitutional: Positive for diaphoresis. Negative for activity change, appetite change, chills, fatigue, fever and unexpected weight change.   HENT: Negative for hearing loss and trouble swallowing.    Eyes: Negative for discharge and visual disturbance.   Respiratory: Negative for chest tightness and wheezing.    Cardiovascular: Negative for chest pain and palpitations.   Gastrointestinal: Negative for blood in stool, constipation, diarrhea and vomiting.   Endocrine: Negative for polydipsia and polyuria.   Genitourinary: Negative for difficulty urinating, hematuria and urgency.   Musculoskeletal: Negative for joint swelling and neck pain.   Skin:  Positive for rash.   Neurological: Negative for weakness and headaches.   Psychiatric/Behavioral: Negative for confusion and dysphoric mood.       Objective:      Physical Exam   Constitutional: He appears well-developed and well-nourished. He is cooperative. No distress.   HENT:   Head: Normocephalic and atraumatic.   Eyes: Conjunctivae and EOM are normal. No scleral icterus.   Neck: Carotid bruit is not present.   Cardiovascular: Normal rate, regular rhythm and normal heart sounds.   Pulmonary/Chest: Effort normal and breath sounds normal.   Abdominal: Soft. Bowel sounds are normal. There is no tenderness.   Musculoskeletal:        Right lower leg: He exhibits no edema.        Left lower leg: He exhibits no edema.   Neurological: He is alert.   Skin:        Vitals reviewed.      Assessment:       1. Insect bite, initial encounter    2. Hyperhidrosis        Plan:       Morro was seen today for excessive sweating and insect bite.    Diagnoses and all orders for this visit:    Insect bite, initial encounter  -     triamcinolone acetonide 0.1% (KENALOG) 0.1 % cream; Apply topically 2 (two) times daily.    Hyperhidrosis, scalp only   -   Thyroid & adrenal Work up done per endocrinolgy 2017 no etiology     Ambulatory referral to Dermatology    Follow with PCP for routine health maintenance
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
## 2019-06-13 RX ORDER — TRIAMCINOLONE ACETONIDE 1 MG/G
CREAM TOPICAL
Qty: 80 G | Refills: 0 | Status: ON HOLD | OUTPATIENT
Start: 2019-06-13 | End: 2020-12-23 | Stop reason: HOSPADM

## 2019-06-13 NOTE — TELEPHONE ENCOUNTER
I received a refill request for triamcinolone.  This prescription was just sent in 1 week ago.  Does he really need a refill at this point.  That seems excessive

## 2019-06-13 NOTE — TELEPHONE ENCOUNTER
Spoke to pt.   Pt stated he did not need another refill of the kenalog cream, as he just picked up rx last week.

## 2019-06-27 ENCOUNTER — LAB VISIT (OUTPATIENT)
Dept: LAB | Facility: HOSPITAL | Age: 79
End: 2019-06-27
Attending: NURSE PRACTITIONER
Payer: MEDICARE

## 2019-06-27 ENCOUNTER — TELEPHONE (OUTPATIENT)
Dept: DERMATOLOGY | Facility: CLINIC | Age: 79
End: 2019-06-27

## 2019-06-27 ENCOUNTER — OFFICE VISIT (OUTPATIENT)
Dept: HEMATOLOGY/ONCOLOGY | Facility: CLINIC | Age: 79
End: 2019-06-27
Payer: MEDICARE

## 2019-06-27 VITALS
SYSTOLIC BLOOD PRESSURE: 133 MMHG | HEART RATE: 66 BPM | RESPIRATION RATE: 16 BRPM | BODY MASS INDEX: 29.23 KG/M2 | HEIGHT: 66 IN | TEMPERATURE: 98 F | DIASTOLIC BLOOD PRESSURE: 75 MMHG | OXYGEN SATURATION: 97 % | WEIGHT: 181.88 LBS

## 2019-06-27 DIAGNOSIS — D45 POLYCYTHEMIA VERA: ICD-10-CM

## 2019-06-27 DIAGNOSIS — D75.838 SECONDARY THROMBOCYTOSIS: ICD-10-CM

## 2019-06-27 DIAGNOSIS — D45 POLYCYTHEMIA VERA: Primary | ICD-10-CM

## 2019-06-27 LAB
ALBUMIN SERPL BCP-MCNC: 4.5 G/DL (ref 3.5–5.2)
ALP SERPL-CCNC: 92 U/L (ref 38–145)
ALT SERPL W/O P-5'-P-CCNC: 49 U/L (ref 10–44)
ANION GAP SERPL CALC-SCNC: 10 MMOL/L (ref 8–16)
AST SERPL-CCNC: 38 U/L (ref 17–59)
BASOPHILS # BLD AUTO: 0.09 K/UL (ref 0–0.2)
BASOPHILS NFR BLD: 0.8 % (ref 0–1.9)
BILIRUB SERPL-MCNC: 0.9 MG/DL (ref 0.2–1.3)
BUN SERPL-MCNC: 19 MG/DL (ref 9–21)
CALCIUM SERPL-MCNC: 9.8 MG/DL (ref 8.4–10.2)
CHLORIDE SERPL-SCNC: 104 MMOL/L (ref 95–110)
CO2 SERPL-SCNC: 26 MMOL/L (ref 22–31)
CREAT SERPL-MCNC: 1.32 MG/DL (ref 0.5–1.4)
DIFFERENTIAL METHOD: ABNORMAL
EOSINOPHIL # BLD AUTO: 0.1 K/UL (ref 0–0.5)
EOSINOPHIL NFR BLD: 0.7 % (ref 0–8)
ERYTHROCYTE [DISTWIDTH] IN BLOOD BY AUTOMATED COUNT: 12.6 % (ref 11.5–14.5)
EST. GFR  (AFRICAN AMERICAN): 59 ML/MIN/1.73 M^2
EST. GFR  (NON AFRICAN AMERICAN): 51 ML/MIN/1.73 M^2
GLUCOSE SERPL-MCNC: 96 MG/DL (ref 70–110)
HCT VFR BLD AUTO: 50 % (ref 40–54)
HGB BLD-MCNC: 16 G/DL (ref 14–18)
IMM GRANULOCYTES # BLD AUTO: 0.08 K/UL (ref 0–0.04)
IMM GRANULOCYTES NFR BLD AUTO: 0.7 % (ref 0–0.5)
LDH SERPL L TO P-CCNC: 764 U/L (ref 313–618)
LYMPHOCYTES # BLD AUTO: 1.1 K/UL (ref 1–4.8)
LYMPHOCYTES NFR BLD: 10.2 % (ref 18–48)
MCH RBC QN AUTO: 32.9 PG (ref 27–31)
MCHC RBC AUTO-ENTMCNC: 32 G/DL (ref 32–36)
MCV RBC AUTO: 103 FL (ref 82–98)
MONOCYTES # BLD AUTO: 0.5 K/UL (ref 0.3–1)
MONOCYTES NFR BLD: 4.2 % (ref 4–15)
NEUTROPHILS # BLD AUTO: 9.1 K/UL (ref 1.8–7.7)
NEUTROPHILS NFR BLD: 83.4 % (ref 38–73)
NRBC BLD-RTO: 0 /100 WBC
PLATELET # BLD AUTO: 409 K/UL (ref 150–350)
PMV BLD AUTO: 9.6 FL (ref 9.2–12.9)
POTASSIUM SERPL-SCNC: 4 MMOL/L (ref 3.5–5.1)
PROT SERPL-MCNC: 7.1 G/DL (ref 6–8.4)
RBC # BLD AUTO: 4.86 M/UL (ref 4.6–6.2)
SODIUM SERPL-SCNC: 140 MMOL/L (ref 136–145)
WBC # BLD AUTO: 10.87 K/UL (ref 3.9–12.7)

## 2019-06-27 PROCEDURE — 83615 LACTATE (LD) (LDH) ENZYME: CPT | Mod: PN

## 2019-06-27 PROCEDURE — 83615 LACTATE (LD) (LDH) ENZYME: CPT

## 2019-06-27 PROCEDURE — 3075F PR MOST RECENT SYSTOLIC BLOOD PRESS GE 130-139MM HG: ICD-10-PCS | Mod: CPTII,S$GLB,, | Performed by: NURSE PRACTITIONER

## 2019-06-27 PROCEDURE — 1101F PT FALLS ASSESS-DOCD LE1/YR: CPT | Mod: CPTII,S$GLB,, | Performed by: NURSE PRACTITIONER

## 2019-06-27 PROCEDURE — 99999 PR PBB SHADOW E&M-EST. PATIENT-LVL IV: CPT | Mod: PBBFAC,,, | Performed by: NURSE PRACTITIONER

## 2019-06-27 PROCEDURE — 80053 COMPREHEN METABOLIC PANEL: CPT

## 2019-06-27 PROCEDURE — 99499 RISK ADDL DX/OHS AUDIT: ICD-10-PCS | Mod: S$GLB,,, | Performed by: NURSE PRACTITIONER

## 2019-06-27 PROCEDURE — 3078F DIAST BP <80 MM HG: CPT | Mod: CPTII,S$GLB,, | Performed by: NURSE PRACTITIONER

## 2019-06-27 PROCEDURE — 99499 UNLISTED E&M SERVICE: CPT | Mod: S$GLB,,, | Performed by: NURSE PRACTITIONER

## 2019-06-27 PROCEDURE — 3075F SYST BP GE 130 - 139MM HG: CPT | Mod: CPTII,S$GLB,, | Performed by: NURSE PRACTITIONER

## 2019-06-27 PROCEDURE — 36415 COLL VENOUS BLD VENIPUNCTURE: CPT | Mod: PN

## 2019-06-27 PROCEDURE — 85025 COMPLETE CBC W/AUTO DIFF WBC: CPT

## 2019-06-27 PROCEDURE — 1101F PR PT FALLS ASSESS DOC 0-1 FALLS W/OUT INJ PAST YR: ICD-10-PCS | Mod: CPTII,S$GLB,, | Performed by: NURSE PRACTITIONER

## 2019-06-27 PROCEDURE — 3078F PR MOST RECENT DIASTOLIC BLOOD PRESSURE < 80 MM HG: ICD-10-PCS | Mod: CPTII,S$GLB,, | Performed by: NURSE PRACTITIONER

## 2019-06-27 PROCEDURE — 99999 PR PBB SHADOW E&M-EST. PATIENT-LVL IV: ICD-10-PCS | Mod: PBBFAC,,, | Performed by: NURSE PRACTITIONER

## 2019-06-27 PROCEDURE — 85025 COMPLETE CBC W/AUTO DIFF WBC: CPT | Mod: PN

## 2019-06-27 PROCEDURE — 99213 PR OFFICE/OUTPT VISIT, EST, LEVL III, 20-29 MIN: ICD-10-PCS | Mod: S$GLB,,, | Performed by: NURSE PRACTITIONER

## 2019-06-27 PROCEDURE — 99213 OFFICE O/P EST LOW 20 MIN: CPT | Mod: S$GLB,,, | Performed by: NURSE PRACTITIONER

## 2019-06-27 PROCEDURE — 80053 COMPREHEN METABOLIC PANEL: CPT | Mod: PN

## 2019-06-27 NOTE — PROGRESS NOTES
HISTORY OF PRESENT ILLNESS:  This is a 78-year-old black gentleman known to   Dr. Cole for polycythemia vera.  He is managed with Hydrea 500 mg twice a day as   well as Xarelto (Afib).  To note:  He was diagnosed with PAF in June/2017 by Dr. Escobar.    He presents to the clinic today with his wife for interval evaluation with no complaints.    He remains compliant with Hydrea.  He reports completion of both phlebotomies since   his last visit.  He denies any difficulties with fevers, chills, drenching night sweats,   unexplained weight loss, painful lymphadenopathy, rashes, pruritus, changes in the   color of his stool/urine , jaundice, chest pain, irregular heartbeat, nausea, vomiting,   constipation, diarrhea, etc.  No other new complaints or pertinent findings on a   10-point review of systems.    PHYSICAL EXAMINATION:  GENERAL:  Well-developed, well-nourished black gentleman in no acute distress.    Alert and oriented x4.  VITAL SIGNS:  Weight: Gain of 4 pounds in 4 months  Wt Readings from Last 3 Encounters:   06/27/19 82.5 kg (181 lb 14.1 oz)   06/06/19 84 kg (185 lb 3 oz)   04/05/19 83.4 kg (183 lb 13.8 oz)     Temp Readings from Last 3 Encounters:   06/27/19 98.2 °F (36.8 °C) (Oral)   06/06/19 98 °F (36.7 °C) (Oral)   02/27/19 98.5 °F (36.9 °C)     BP Readings from Last 3 Encounters:   06/27/19 133/75   06/06/19 138/85   03/22/19 110/60     Pulse Readings from Last 3 Encounters:   06/27/19 66   06/06/19 74   03/22/19 82     HEENT:  Normocephalic, atraumatic.  Oral mucosa pink and moist.  Lips without   lesions.  Tongue midline.  Oropharynx clear.  Nonicteric sclerae.  NECK:  Supple.  No adenopathy.  HEART:  Regular rate and rhythm without murmur, gallop or rub.  LUNGS:  Clear to auscultation bilaterally.  NL respiratory effort.  ABDOMEN:  Soft, nontender and nondistended with positive normoactive bowel   sounds.  No hepatosplenomegaly.  EXTREMITIES:  No cyanosis, clubbing or edema.  Distal pulses are  intact.  AXILLAE AND GROIN:  No palpable pathologic lymphadenopathy is appreciated.    LABORATORY:    Lab Results   Component Value Date    WBC 10.87 06/27/2019    HGB 16.0 06/27/2019    HCT 50.0 06/27/2019     (H) 06/27/2019     (H) 06/27/2019     Differential remarkable for 83.4% grans, 10.2% lymph    CMP  Sodium   Date Value Ref Range Status   06/27/2019 140 136 - 145 mmol/L Final     Potassium   Date Value Ref Range Status   06/27/2019 4.0 3.5 - 5.1 mmol/L Final     Chloride   Date Value Ref Range Status   06/27/2019 104 95 - 110 mmol/L Final     CO2   Date Value Ref Range Status   06/27/2019 26 22 - 31 mmol/L Final     Glucose   Date Value Ref Range Status   06/27/2019 96 70 - 110 mg/dL Final     Comment:     The ADA recommends the following guidelines for fasting glucose:  Normal:       less than 100 mg/dL  Prediabetes:  100 mg/dL to 125 mg/dL  Diabetes:     126 mg/dL or higher       BUN, Bld   Date Value Ref Range Status   06/27/2019 19 9 - 21 mg/dL Final     Creatinine   Date Value Ref Range Status   06/27/2019 1.32 0.50 - 1.40 mg/dL Final   10/02/2012 1.3 0.2 - 1.4 mg/dl Final     Calcium   Date Value Ref Range Status   06/27/2019 9.8 8.4 - 10.2 mg/dL Final   10/02/2012 10.2 8.6 - 10.2 mg/dl Final     Total Protein   Date Value Ref Range Status   06/27/2019 7.1 6.0 - 8.4 g/dL Final     Albumin   Date Value Ref Range Status   06/27/2019 4.5 3.5 - 5.2 g/dL Final     Total Bilirubin   Date Value Ref Range Status   06/27/2019 0.9 0.2 - 1.3 mg/dL Final     Alkaline Phosphatase   Date Value Ref Range Status   06/27/2019 92 38 - 145 U/L Final     AST   Date Value Ref Range Status   06/27/2019 38 17 - 59 U/L Final     ALT   Date Value Ref Range Status   06/27/2019 49 (H) 10 - 44 U/L Final     Anion Gap   Date Value Ref Range Status   06/27/2019 10 8 - 16 mmol/L Final   10/02/2012 7 5 - 15 meq/L Final     eGFR if    Date Value Ref Range Status   06/27/2019 59 (A) >60 mL/min/1.73 m^2  Final     eGFR if non    Date Value Ref Range Status   06/27/2019 51 (A) >60 mL/min/1.73 m^2 Final     Comment:     Calculation used to obtain the estimated glomerular filtration  rate (eGFR) is the CKD-EPI equation.            IMPRESSION:    1.  Polycythemia vera -  Target hematocrit < 45; above goal  2.  New onset AFib (0606/2017); on Metoprolol, Digoxin & Xarelto. - followed by Dr. Escobar; sinus rhythm  3.  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  Patient is to have one unit of whole blood phlebotomized at least every other week x 3; Rx given.  2.  Continue our course of observation and have the patient return to clinic in 3 months with   interval CBC, CMP and LDH prior.  3.  Continue Hydrea 500 mg b.i.d.  Presently on Xarelto.  4.  Maintain good daily hydration of 2 L per day, as well as pre & post phlebotomy.    Assessment/plan reviewed and approved by Dr. Cole.

## 2019-06-28 ENCOUNTER — INITIAL CONSULT (OUTPATIENT)
Dept: DERMATOLOGY | Facility: CLINIC | Age: 79
End: 2019-06-28
Payer: MEDICARE

## 2019-06-28 VITALS — BODY MASS INDEX: 29.23 KG/M2 | WEIGHT: 181.88 LBS | HEIGHT: 66 IN

## 2019-06-28 DIAGNOSIS — R61 HYPERHIDROSIS: Primary | ICD-10-CM

## 2019-06-28 PROCEDURE — 1101F PR PT FALLS ASSESS DOC 0-1 FALLS W/OUT INJ PAST YR: ICD-10-PCS | Mod: CPTII,S$GLB,, | Performed by: DERMATOLOGY

## 2019-06-28 PROCEDURE — 99999 PR PBB SHADOW E&M-EST. PATIENT-LVL III: CPT | Mod: PBBFAC,,, | Performed by: DERMATOLOGY

## 2019-06-28 PROCEDURE — 99201 PR OFFICE/OUTPT VISIT,NEW,LEVL I: ICD-10-PCS | Mod: S$GLB,,, | Performed by: DERMATOLOGY

## 2019-06-28 PROCEDURE — 1101F PT FALLS ASSESS-DOCD LE1/YR: CPT | Mod: CPTII,S$GLB,, | Performed by: DERMATOLOGY

## 2019-06-28 PROCEDURE — 99201 PR OFFICE/OUTPT VISIT,NEW,LEVL I: CPT | Mod: S$GLB,,, | Performed by: DERMATOLOGY

## 2019-06-28 PROCEDURE — 99999 PR PBB SHADOW E&M-EST. PATIENT-LVL III: ICD-10-PCS | Mod: PBBFAC,,, | Performed by: DERMATOLOGY

## 2019-06-28 RX ORDER — GLYCOPYRROLATE 1 MG/1
1 TABLET ORAL 3 TIMES DAILY
Qty: 90 TABLET | Refills: 0 | Status: SHIPPED | OUTPATIENT
Start: 2019-06-28 | End: 2019-07-24 | Stop reason: SDUPTHER

## 2019-06-28 NOTE — LETTER
June 28, 2019      CLIFF Pedersen  [address] [place] [address]           [facility]  [address] [place] [address] [place] [address]  Phone: [phone]          Patient: Morro Sharma   MR Number: [id number]   YOB: 1940   Date of Visit: 6/28/2019       Dear Naz Hunter:    Thank you for referring Morro Sharma to me for evaluation. Attached you will find relevant portions of my assessment and plan of care.    If you have questions, please do not hesitate to call me. I look forward to following Morro Sharma along with you.    Sincerely,    Adenike Frausto MD    Enclosure  CC:  No Recipients    If you would like to receive this communication electronically, please contact [email or web address] or [phone] to request more information on [facility] access.    For providers and/or their staff who would like to refer a patient to [facility], please contact us through our one-stop-shop provider referral line, [facility] , at [phone].    If you feel you have received this communication in error or would no longer like to receive these types of communications, please e-mail [email or web address]

## 2019-06-28 NOTE — PROGRESS NOTES
Subjective:       Patient ID:  Morro Sharma is a 78 y.o. male who presents for   Chief Complaint   Patient presents with    Excessive Sweating     Scalp, several years     Initial visit  C/o excessive sweating form scalp, happens in heat/sun exposed setting, ceases in A/C setting    No flushing, no palpitations  Past endocrinologist evaluation,           Excessive Sweating  - Initial  Affected locations: scalp  Duration: Several years.  Signs and Symptoms: sweating.  Severity: mild  Timing: constant  Aggravated by: nothing  Relieving factors/Treatments tried: nothing  Improvement on treatment: no relief        Review of Systems   Constitutional: Negative for fever, chills and fatigue.   Skin: Positive for itching and wears hat. Negative for daily sunscreen use and activity-related sunscreen use.   Hematologic/Lymphatic: Does not bruise/bleed easily.        Objective:    Physical Exam   Constitutional: He appears well-developed and well-nourished. No distress.   Neurological: He is alert and oriented to person, place, and time. He is not disoriented.   Psychiatric: He has a normal mood and affect.   Skin:   Areas Examined (abnormalities noted in diagram):   Scalp / Hair Palpated and Inspected              Diagram Legend     Erythematous scaling macule/papule c/w actinic keratosis       Vascular papule c/w angioma      Pigmented verrucoid papule/plaque c/w seborrheic keratosis      Yellow umbilicated papule c/w sebaceous hyperplasia      Irregularly shaped tan macule c/w lentigo     1-2 mm smooth white papules consistent with Milia      Movable subcutaneous cyst with punctum c/w epidermal inclusion cyst      Subcutaneous movable cyst c/w pilar cyst      Firm pink to brown papule c/w dermatofibroma      Pedunculated fleshy papule(s) c/w skin tag(s)      Evenly pigmented macule c/w junctional nevus     Mildly variegated pigmented, slightly irregular-bordered macule c/w mildly atypical nevus      Flesh colored to  evenly pigmented papule c/w intradermal nevus       Pink pearly papule/plaque c/w basal cell carcinoma      Erythematous hyperkeratotic cursted plaque c/w SCC      Surgical scar with no sign of skin cancer recurrence      Open and closed comedones      Inflammatory papules and pustules      Verrucoid papule consistent consistent with wart     Erythematous eczematous patches and plaques     Dystrophic onycholytic nail with subungual debris c/w onychomycosis     Umbilicated papule    Erythematous-base heme-crusted tan verrucoid plaque consistent with inflamed seborrheic keratosis     Erythematous Silvery Scaling Plaque c/w Psoriasis     See annotation      Assessment / Plan:        Hyperhidrosis  -     glycopyrrolate (ROBINUL) 1 mg Tab; Take 1 tablet (1 mg total) by mouth 3 (three) times daily.  Dispense: 90 tablet; Refill: 0    start with one tab PO QHS x 2 weeks, increase to BID if further control needed.   focal, scalp only, in appropriate setting (heat), no associated concerning symptoms, past endocrine evaluation wnl    discussed side effects of dry eyes, dry mouth, dry mucosa, headaches, overheating , and abdominal pain.  Do not exercise in excessive heat.    Will call in one month to gauge efficacy and ideal dose. Will refill then           No follow-ups on file.

## 2019-07-24 ENCOUNTER — TELEPHONE (OUTPATIENT)
Dept: DERMATOLOGY | Facility: CLINIC | Age: 79
End: 2019-07-24

## 2019-07-24 DIAGNOSIS — R61 HYPERHIDROSIS: ICD-10-CM

## 2019-07-25 RX ORDER — GLYCOPYRROLATE 1 MG/1
1 TABLET ORAL 3 TIMES DAILY
Qty: 60 TABLET | Refills: 0 | Status: SHIPPED | OUTPATIENT
Start: 2019-07-25 | End: 2019-08-09 | Stop reason: SDUPTHER

## 2019-07-25 NOTE — TELEPHONE ENCOUNTER
I called patient to see how he was doing on the medication and to see how many times a day he was needing to take it.  He states that he is taking it 2 times a day and is seeing good improvement.  I spoke with [facility] and told them he was taking it BID but could take TID if needed.

## 2019-07-25 NOTE — TELEPHONE ENCOUNTER
----- Message from Santos Powers sent at 7/25/2019  1:13 PM CDT -----  Contact: Angelica-[facility]-[phone]  ..Type:  Pharmacy Calling to Clarify an RX    Name of Caller:Angelica  Pharmacy Name:[facility]  Prescription Name:Glycopyrrolate  What do they need to clarify?:dosage  Best Call Back Number:[phone]  Additional Information:

## 2019-08-09 DIAGNOSIS — R61 HYPERHIDROSIS: ICD-10-CM

## 2019-08-09 RX ORDER — GLYCOPYRROLATE 1 MG/1
TABLET ORAL
Qty: 90 TABLET | Refills: 5 | Status: SHIPPED | OUTPATIENT
Start: 2019-08-09 | End: 2020-01-27

## 2019-09-17 DIAGNOSIS — D47.3 ESSENTIAL THROMBOCYTOSIS: ICD-10-CM

## 2019-09-17 RX ORDER — HYDROXYUREA 500 MG/1
500 CAPSULE ORAL 2 TIMES DAILY
Qty: 180 CAPSULE | Refills: 1 | Status: SHIPPED | OUTPATIENT
Start: 2019-09-17 | End: 2020-03-09 | Stop reason: SDUPTHER

## 2019-09-19 ENCOUNTER — TELEPHONE (OUTPATIENT)
Dept: HEMATOLOGY/ONCOLOGY | Facility: CLINIC | Age: 79
End: 2019-09-19

## 2019-09-19 NOTE — TELEPHONE ENCOUNTER
Spoke w pt re appt 9/27/19.  DARLIN Khan NP will be out of office this day, and appt needs to be moved.  Pt verbalized understanding.  Appt changed to 10/1/19, pt verbalized understanding of changes and appreciation.

## 2019-10-01 ENCOUNTER — OFFICE VISIT (OUTPATIENT)
Dept: HEMATOLOGY/ONCOLOGY | Facility: CLINIC | Age: 79
End: 2019-10-01
Payer: MEDICARE

## 2019-10-01 ENCOUNTER — LAB VISIT (OUTPATIENT)
Dept: LAB | Facility: HOSPITAL | Age: 79
End: 2019-10-01
Attending: NURSE PRACTITIONER
Payer: MEDICARE

## 2019-10-01 VITALS
WEIGHT: 182.56 LBS | RESPIRATION RATE: 16 BRPM | TEMPERATURE: 98 F | SYSTOLIC BLOOD PRESSURE: 153 MMHG | DIASTOLIC BLOOD PRESSURE: 74 MMHG | HEIGHT: 66 IN | HEART RATE: 69 BPM | BODY MASS INDEX: 29.34 KG/M2 | OXYGEN SATURATION: 99 %

## 2019-10-01 DIAGNOSIS — I10 ESSENTIAL HYPERTENSION: ICD-10-CM

## 2019-10-01 DIAGNOSIS — D45 POLYCYTHEMIA VERA: ICD-10-CM

## 2019-10-01 DIAGNOSIS — D45 POLYCYTHEMIA VERA: Primary | ICD-10-CM

## 2019-10-01 LAB
ALBUMIN SERPL BCP-MCNC: 4.4 G/DL (ref 3.5–5.2)
ALP SERPL-CCNC: 79 U/L (ref 38–145)
ALT SERPL W/O P-5'-P-CCNC: 38 U/L (ref 10–44)
ANION GAP SERPL CALC-SCNC: 8 MMOL/L (ref 8–16)
AST SERPL-CCNC: 43 U/L (ref 17–59)
BASOPHILS # BLD AUTO: 0.09 K/UL (ref 0–0.2)
BASOPHILS NFR BLD: 1 % (ref 0–1.9)
BILIRUB SERPL-MCNC: 0.9 MG/DL (ref 0.2–1.3)
BUN SERPL-MCNC: 15 MG/DL (ref 9–21)
CALCIUM SERPL-MCNC: 10.1 MG/DL (ref 8.4–10.2)
CHLORIDE SERPL-SCNC: 104 MMOL/L (ref 95–110)
CO2 SERPL-SCNC: 29 MMOL/L (ref 22–31)
CREAT SERPL-MCNC: 1.25 MG/DL (ref 0.5–1.4)
DACRYOCYTES BLD QL SMEAR: ABNORMAL
DIFFERENTIAL METHOD: ABNORMAL
EOSINOPHIL # BLD AUTO: 0.1 K/UL (ref 0–0.5)
EOSINOPHIL NFR BLD: 0.7 % (ref 0–8)
ERYTHROCYTE [DISTWIDTH] IN BLOOD BY AUTOMATED COUNT: 13.4 % (ref 11.5–14.5)
EST. GFR  (AFRICAN AMERICAN): >60 ML/MIN/1.73 M^2
EST. GFR  (NON AFRICAN AMERICAN): 55 ML/MIN/1.73 M^2
GLUCOSE SERPL-MCNC: 95 MG/DL (ref 70–110)
HCT VFR BLD AUTO: 43.7 % (ref 40–54)
HGB BLD-MCNC: 13.6 G/DL (ref 14–18)
IMM GRANULOCYTES # BLD AUTO: 0.05 K/UL (ref 0–0.04)
IMM GRANULOCYTES NFR BLD AUTO: 0.5 % (ref 0–0.5)
LDH SERPL L TO P-CCNC: 714 U/L (ref 313–618)
LYMPHOCYTES # BLD AUTO: 1.1 K/UL (ref 1–4.8)
LYMPHOCYTES NFR BLD: 12 % (ref 18–48)
MCH RBC QN AUTO: 31.3 PG (ref 27–31)
MCHC RBC AUTO-ENTMCNC: 31.1 G/DL (ref 32–36)
MCV RBC AUTO: 101 FL (ref 82–98)
MONOCYTES # BLD AUTO: 0.4 K/UL (ref 0.3–1)
MONOCYTES NFR BLD: 4.4 % (ref 4–15)
NEUTROPHILS # BLD AUTO: 7.6 K/UL (ref 1.8–7.7)
NEUTROPHILS NFR BLD: 81.4 % (ref 38–73)
NRBC BLD-RTO: 0 /100 WBC
OVALOCYTES BLD QL SMEAR: ABNORMAL
PLATELET # BLD AUTO: 289 K/UL (ref 150–350)
PLATELET BLD QL SMEAR: ABNORMAL
PMV BLD AUTO: 9.9 FL (ref 9.2–12.9)
POIKILOCYTOSIS BLD QL SMEAR: SLIGHT
POLYCHROMASIA BLD QL SMEAR: ABNORMAL
POTASSIUM SERPL-SCNC: 4.2 MMOL/L (ref 3.5–5.1)
PROT SERPL-MCNC: 7.3 G/DL (ref 6–8.4)
RBC # BLD AUTO: 4.34 M/UL (ref 4.6–6.2)
SODIUM SERPL-SCNC: 141 MMOL/L (ref 136–145)
WBC # BLD AUTO: 9.34 K/UL (ref 3.9–12.7)

## 2019-10-01 PROCEDURE — 99999 PR PBB SHADOW E&M-EST. PATIENT-LVL V: CPT | Mod: PBBFAC,,, | Performed by: NURSE PRACTITIONER

## 2019-10-01 PROCEDURE — 80053 COMPREHEN METABOLIC PANEL: CPT

## 2019-10-01 PROCEDURE — 1101F PR PT FALLS ASSESS DOC 0-1 FALLS W/OUT INJ PAST YR: ICD-10-PCS | Mod: CPTII,S$GLB,, | Performed by: NURSE PRACTITIONER

## 2019-10-01 PROCEDURE — 3077F SYST BP >= 140 MM HG: CPT | Mod: CPTII,S$GLB,, | Performed by: NURSE PRACTITIONER

## 2019-10-01 PROCEDURE — 80053 COMPREHEN METABOLIC PANEL: CPT | Mod: PN

## 2019-10-01 PROCEDURE — 99499 UNLISTED E&M SERVICE: CPT | Mod: S$GLB,,, | Performed by: NURSE PRACTITIONER

## 2019-10-01 PROCEDURE — 99213 PR OFFICE/OUTPT VISIT, EST, LEVL III, 20-29 MIN: ICD-10-PCS | Mod: S$GLB,,, | Performed by: NURSE PRACTITIONER

## 2019-10-01 PROCEDURE — 99499 RISK ADDL DX/OHS AUDIT: ICD-10-PCS | Mod: S$GLB,,, | Performed by: NURSE PRACTITIONER

## 2019-10-01 PROCEDURE — 85025 COMPLETE CBC W/AUTO DIFF WBC: CPT

## 2019-10-01 PROCEDURE — 3078F PR MOST RECENT DIASTOLIC BLOOD PRESSURE < 80 MM HG: ICD-10-PCS | Mod: CPTII,S$GLB,, | Performed by: NURSE PRACTITIONER

## 2019-10-01 PROCEDURE — 83615 LACTATE (LD) (LDH) ENZYME: CPT | Mod: PN

## 2019-10-01 PROCEDURE — 1101F PT FALLS ASSESS-DOCD LE1/YR: CPT | Mod: CPTII,S$GLB,, | Performed by: NURSE PRACTITIONER

## 2019-10-01 PROCEDURE — 99213 OFFICE O/P EST LOW 20 MIN: CPT | Mod: S$GLB,,, | Performed by: NURSE PRACTITIONER

## 2019-10-01 PROCEDURE — 99999 PR PBB SHADOW E&M-EST. PATIENT-LVL V: ICD-10-PCS | Mod: PBBFAC,,, | Performed by: NURSE PRACTITIONER

## 2019-10-01 PROCEDURE — 3078F DIAST BP <80 MM HG: CPT | Mod: CPTII,S$GLB,, | Performed by: NURSE PRACTITIONER

## 2019-10-01 PROCEDURE — 83615 LACTATE (LD) (LDH) ENZYME: CPT

## 2019-10-01 PROCEDURE — 36415 COLL VENOUS BLD VENIPUNCTURE: CPT | Mod: PN

## 2019-10-01 PROCEDURE — 85025 COMPLETE CBC W/AUTO DIFF WBC: CPT | Mod: PN

## 2019-10-01 PROCEDURE — 3077F PR MOST RECENT SYSTOLIC BLOOD PRESSURE >= 140 MM HG: ICD-10-PCS | Mod: CPTII,S$GLB,, | Performed by: NURSE PRACTITIONER

## 2019-10-01 NOTE — PROGRESS NOTES
HISTORY OF PRESENT ILLNESS:  This is a 78-year-old black gentleman known to   Dr. Cole for polycythemia vera.  He is managed with Hydrea 500 mg twice a day as   well as Xarelto (Afib).  To note:  He was diagnosed with PAF in June/2017 by Dr. Escobar.    He presents to the clinic today for interval evaluation with no complaints.  He reports   completing all 3 phlebotomies post last exam.  He remains compliant with Hydrea.    He denies any difficulties with fevers, chills, drenching night sweats, unexplained weight   loss, painful lymphadenopathy, rashes, pruritus, changes in the color of his stool/urine,   jaundice, chest pain, irregular heartbeat, nausea, vomiting, constipation, diarrhea, etc.    No other new complaints or pertinent findings on a 10-point review of systems.    PHYSICAL EXAMINATION:  GENERAL:  Well-developed, well-nourished black gentleman in no acute distress.    Alert and oriented x4.  VITAL SIGNS:  Weight: stable  Wt Readings from Last 3 Encounters:   10/01/19 82.8 kg (182 lb 8.7 oz)   06/28/19 82.5 kg (181 lb 14.1 oz)   06/27/19 82.5 kg (181 lb 14.1 oz)     Temp Readings from Last 3 Encounters:   10/01/19 98.3 °F (36.8 °C) (Oral)   06/27/19 98.2 °F (36.8 °C) (Oral)   06/06/19 98 °F (36.7 °C) (Oral)     BP Readings from Last 3 Encounters:   10/01/19 (!) 153/74   06/27/19 133/75   06/06/19 138/85     Pulse Readings from Last 3 Encounters:   10/01/19 69   06/27/19 66   06/06/19 74     HEENT:  Normocephalic, atraumatic.  Oral mucosa pink and moist.  Lips without   lesions.  Tongue midline.  Oropharynx clear.  Nonicteric sclerae.  NECK:  Supple.  No adenopathy.  HEART:  Regular rate and rhythm without murmur, gallop or rub.  LUNGS:  Clear to auscultation bilaterally.  NL respiratory effort.  ABDOMEN:  Soft, nontender and nondistended with positive normoactive bowel   sounds.  No hepatosplenomegaly.  EXTREMITIES:  No cyanosis, clubbing or edema.  Distal pulses are intact.  AXILLAE AND GROIN:  No  palpable pathologic lymphadenopathy is appreciated.    LABORATORY:    Lab Results   Component Value Date    WBC 9.34 10/01/2019    RBC 4.34 (L) 10/01/2019    HGB 13.6 (L) 10/01/2019    HCT 43.7 10/01/2019     (H) 10/01/2019    MCH 31.3 (H) 10/01/2019    MCHC 31.1 (L) 10/01/2019    RDW 13.4 10/01/2019     10/01/2019    MPV 9.9 10/01/2019    GRAN 7.6 10/01/2019    GRAN 81.4 (H) 10/01/2019    LYMPH 1.1 10/01/2019    LYMPH 12.0 (L) 10/01/2019    MONO 0.4 10/01/2019    MONO 4.4 10/01/2019    EOS 0.1 10/01/2019    BASO 0.09 10/01/2019    EOSINOPHIL 0.7 10/01/2019    BASOPHIL 1.0 10/01/2019     CMP  Sodium   Date Value Ref Range Status   10/01/2019 141 136 - 145 mmol/L Final     Potassium   Date Value Ref Range Status   10/01/2019 4.2 3.5 - 5.1 mmol/L Final     Chloride   Date Value Ref Range Status   10/01/2019 104 95 - 110 mmol/L Final     CO2   Date Value Ref Range Status   10/01/2019 29 22 - 31 mmol/L Final     Glucose   Date Value Ref Range Status   10/01/2019 95 70 - 110 mg/dL Final     Comment:     The ADA recommends the following guidelines for fasting glucose:  Normal:       less than 100 mg/dL  Prediabetes:  100 mg/dL to 125 mg/dL  Diabetes:     126 mg/dL or higher       BUN, Bld   Date Value Ref Range Status   10/01/2019 15 9 - 21 mg/dL Final     Creatinine   Date Value Ref Range Status   10/01/2019 1.25 0.50 - 1.40 mg/dL Final   10/02/2012 1.3 0.2 - 1.4 mg/dl Final     Calcium   Date Value Ref Range Status   10/01/2019 10.1 8.4 - 10.2 mg/dL Final   10/02/2012 10.2 8.6 - 10.2 mg/dl Final     Total Protein   Date Value Ref Range Status   10/01/2019 7.3 6.0 - 8.4 g/dL Final     Albumin   Date Value Ref Range Status   10/01/2019 4.4 3.5 - 5.2 g/dL Final     Total Bilirubin   Date Value Ref Range Status   10/01/2019 0.9 0.2 - 1.3 mg/dL Final     Alkaline Phosphatase   Date Value Ref Range Status   10/01/2019 79 38 - 145 U/L Final     AST   Date Value Ref Range Status   10/01/2019 43 17 - 59 U/L Final      ALT   Date Value Ref Range Status   10/01/2019 38 10 - 44 U/L Final     Anion Gap   Date Value Ref Range Status   10/01/2019 8 8 - 16 mmol/L Final   10/02/2012 7 5 - 15 meq/L Final     eGFR if    Date Value Ref Range Status   10/01/2019 >60 >60 mL/min/1.73 m^2 Final     eGFR if non    Date Value Ref Range Status   10/01/2019 55 (A) >60 mL/min/1.73 m^2 Final     Comment:     Calculation used to obtain the estimated glomerular filtration  rate (eGFR) is the CKD-EPI equation.            IMPRESSION:    1.  Polycythemia vera -  Target hematocrit < 45; at goal  2.  AFib (0606/2017); on Metoprolol, Digoxin & Xarelto. - followed by Dr. Escobar; sinus rhythm  3.  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  HTN - follows with Dr. Escobar    PLAN:  1.  As patient Hct is < 45; no phlebotomy.  2.  Continue our course of observation and have the patient return to clinic in 3 months with    interval CBC, CMP and LDH prior.  3.  Continue Hydrea 500 mg b.i.d.  Presently on Xarelto.  4.  Maintain good daily hydration of 2 L per day.    Assessment/plan reviewed and approved by Dr. Cole.

## 2019-12-02 ENCOUNTER — LAB VISIT (OUTPATIENT)
Dept: LAB | Facility: HOSPITAL | Age: 79
End: 2019-12-02
Attending: INTERNAL MEDICINE
Payer: MEDICARE

## 2019-12-02 DIAGNOSIS — N40.0 BENIGN ENLARGEMENT OF PROSTATE: ICD-10-CM

## 2019-12-02 DIAGNOSIS — I10 ESSENTIAL HYPERTENSION, MALIGNANT: ICD-10-CM

## 2019-12-02 DIAGNOSIS — I48.20 CHRONIC ATRIAL FIBRILLATION: Primary | ICD-10-CM

## 2019-12-02 DIAGNOSIS — D45 CHRONIC ERYTHREMIA IN REMISSION: ICD-10-CM

## 2019-12-02 DIAGNOSIS — N18.30 CHRONIC KIDNEY DISEASE, STAGE III (MODERATE): ICD-10-CM

## 2019-12-02 LAB
ALBUMIN SERPL BCP-MCNC: 4 G/DL (ref 3.5–5.2)
ANION GAP SERPL CALC-SCNC: 9 MMOL/L (ref 8–16)
BASOPHILS # BLD AUTO: 0.1 K/UL (ref 0–0.2)
BASOPHILS NFR BLD: 1.4 % (ref 0–1.9)
BUN SERPL-MCNC: 13 MG/DL (ref 8–23)
CALCIUM SERPL-MCNC: 10.2 MG/DL (ref 8.7–10.5)
CHLORIDE SERPL-SCNC: 104 MMOL/L (ref 95–110)
CO2 SERPL-SCNC: 27 MMOL/L (ref 23–29)
CREAT SERPL-MCNC: 1.3 MG/DL (ref 0.5–1.4)
DIFFERENTIAL METHOD: ABNORMAL
EOSINOPHIL # BLD AUTO: 0.1 K/UL (ref 0–0.5)
EOSINOPHIL NFR BLD: 1.1 % (ref 0–8)
ERYTHROCYTE [DISTWIDTH] IN BLOOD BY AUTOMATED COUNT: 14.4 % (ref 11.5–14.5)
EST. GFR  (AFRICAN AMERICAN): >60 ML/MIN/1.73 M^2
EST. GFR  (NON AFRICAN AMERICAN): 52.3 ML/MIN/1.73 M^2
GLUCOSE SERPL-MCNC: 103 MG/DL (ref 70–110)
HCT VFR BLD AUTO: 49.1 % (ref 40–54)
HGB BLD-MCNC: 14.9 G/DL (ref 14–18)
IMM GRANULOCYTES # BLD AUTO: 0.04 K/UL (ref 0–0.04)
IMM GRANULOCYTES NFR BLD AUTO: 0.5 % (ref 0–0.5)
LYMPHOCYTES # BLD AUTO: 0.9 K/UL (ref 1–4.8)
LYMPHOCYTES NFR BLD: 12.8 % (ref 18–48)
MCH RBC QN AUTO: 31.9 PG (ref 27–31)
MCHC RBC AUTO-ENTMCNC: 30.3 G/DL (ref 32–36)
MCV RBC AUTO: 105 FL (ref 82–98)
MONOCYTES # BLD AUTO: 0.4 K/UL (ref 0.3–1)
MONOCYTES NFR BLD: 5.7 % (ref 4–15)
NEUTROPHILS # BLD AUTO: 5.7 K/UL (ref 1.8–7.7)
NEUTROPHILS NFR BLD: 78.5 % (ref 38–73)
NRBC BLD-RTO: 0 /100 WBC
PHOSPHATE SERPL-MCNC: 3.1 MG/DL (ref 2.7–4.5)
PLATELET # BLD AUTO: 390 K/UL (ref 150–350)
PMV BLD AUTO: 10.4 FL (ref 9.2–12.9)
POTASSIUM SERPL-SCNC: 4.7 MMOL/L (ref 3.5–5.1)
RBC # BLD AUTO: 4.67 M/UL (ref 4.6–6.2)
SODIUM SERPL-SCNC: 140 MMOL/L (ref 136–145)
WBC # BLD AUTO: 7.32 K/UL (ref 3.9–12.7)

## 2019-12-02 PROCEDURE — 36415 COLL VENOUS BLD VENIPUNCTURE: CPT | Mod: PO

## 2019-12-02 PROCEDURE — 80069 RENAL FUNCTION PANEL: CPT

## 2019-12-02 PROCEDURE — 85025 COMPLETE CBC W/AUTO DIFF WBC: CPT

## 2019-12-09 DIAGNOSIS — R35.1 BENIGN PROSTATIC HYPERPLASIA WITH NOCTURIA: ICD-10-CM

## 2019-12-09 DIAGNOSIS — N40.1 BENIGN PROSTATIC HYPERPLASIA WITH NOCTURIA: ICD-10-CM

## 2019-12-09 RX ORDER — TAMSULOSIN HYDROCHLORIDE 0.4 MG/1
CAPSULE ORAL
Qty: 90 CAPSULE | Refills: 3 | Status: SHIPPED | OUTPATIENT
Start: 2019-12-09 | End: 2020-02-27 | Stop reason: SDUPTHER

## 2019-12-20 DIAGNOSIS — I48.20 CHRONIC ATRIAL FIBRILLATION: ICD-10-CM

## 2019-12-20 DIAGNOSIS — I10 ESSENTIAL HYPERTENSION: ICD-10-CM

## 2019-12-20 RX ORDER — METOPROLOL SUCCINATE 50 MG/1
TABLET, EXTENDED RELEASE ORAL
Qty: 90 TABLET | Refills: 0 | Status: SHIPPED | OUTPATIENT
Start: 2019-12-20 | End: 2020-03-16

## 2019-12-20 NOTE — TELEPHONE ENCOUNTER
It has been almost a year since I last saw him.  He needs to schedule a follow-up visit and checkup

## 2020-01-06 ENCOUNTER — OFFICE VISIT (OUTPATIENT)
Dept: HEMATOLOGY/ONCOLOGY | Facility: CLINIC | Age: 80
End: 2020-01-06
Payer: MEDICARE

## 2020-01-06 ENCOUNTER — LAB VISIT (OUTPATIENT)
Dept: LAB | Facility: HOSPITAL | Age: 80
End: 2020-01-06
Attending: NURSE PRACTITIONER
Payer: MEDICARE

## 2020-01-06 VITALS
SYSTOLIC BLOOD PRESSURE: 143 MMHG | OXYGEN SATURATION: 97 % | RESPIRATION RATE: 18 BRPM | BODY MASS INDEX: 29.94 KG/M2 | WEIGHT: 186.31 LBS | TEMPERATURE: 98 F | DIASTOLIC BLOOD PRESSURE: 83 MMHG | HEART RATE: 67 BPM | HEIGHT: 66 IN

## 2020-01-06 DIAGNOSIS — D45 POLYCYTHEMIA VERA: Primary | ICD-10-CM

## 2020-01-06 DIAGNOSIS — I10 ESSENTIAL HYPERTENSION: ICD-10-CM

## 2020-01-06 DIAGNOSIS — D45 POLYCYTHEMIA VERA: ICD-10-CM

## 2020-01-06 LAB
ALBUMIN SERPL BCP-MCNC: 4.2 G/DL (ref 3.5–5.2)
ALP SERPL-CCNC: 95 U/L (ref 38–145)
ALT SERPL W/O P-5'-P-CCNC: 39 U/L (ref 10–44)
ANION GAP SERPL CALC-SCNC: 8 MMOL/L (ref 8–16)
AST SERPL-CCNC: 30 U/L (ref 17–59)
BASOPHILS # BLD AUTO: 0.08 K/UL (ref 0–0.2)
BASOPHILS NFR BLD: 1.2 % (ref 0–1.9)
BILIRUB SERPL-MCNC: 0.7 MG/DL (ref 0.2–1.3)
BUN SERPL-MCNC: 16 MG/DL (ref 9–21)
CALCIUM SERPL-MCNC: 9.8 MG/DL (ref 8.4–10.2)
CHLORIDE SERPL-SCNC: 104 MMOL/L (ref 95–110)
CO2 SERPL-SCNC: 28 MMOL/L (ref 22–31)
CREAT SERPL-MCNC: 1.28 MG/DL (ref 0.5–1.4)
DIFFERENTIAL METHOD: ABNORMAL
EOSINOPHIL # BLD AUTO: 0.1 K/UL (ref 0–0.5)
EOSINOPHIL NFR BLD: 0.9 % (ref 0–8)
ERYTHROCYTE [DISTWIDTH] IN BLOOD BY AUTOMATED COUNT: 14.5 % (ref 11.5–14.5)
EST. GFR  (AFRICAN AMERICAN): >60 ML/MIN/1.73 M^2
EST. GFR  (NON AFRICAN AMERICAN): 53 ML/MIN/1.73 M^2
GLUCOSE SERPL-MCNC: 140 MG/DL (ref 70–110)
HCT VFR BLD AUTO: 46.9 % (ref 40–54)
HGB BLD-MCNC: 14.8 G/DL (ref 14–18)
IMM GRANULOCYTES # BLD AUTO: 0.06 K/UL (ref 0–0.04)
IMM GRANULOCYTES NFR BLD AUTO: 0.9 % (ref 0–0.5)
LDH SERPL L TO P-CCNC: 796 U/L (ref 313–618)
LYMPHOCYTES # BLD AUTO: 1 K/UL (ref 1–4.8)
LYMPHOCYTES NFR BLD: 14.1 % (ref 18–48)
MCH RBC QN AUTO: 32.6 PG (ref 27–31)
MCHC RBC AUTO-ENTMCNC: 31.6 G/DL (ref 32–36)
MCV RBC AUTO: 103 FL (ref 82–98)
MONOCYTES # BLD AUTO: 0.3 K/UL (ref 0.3–1)
MONOCYTES NFR BLD: 4.7 % (ref 4–15)
NEUTROPHILS # BLD AUTO: 5.4 K/UL (ref 1.8–7.7)
NEUTROPHILS NFR BLD: 78.2 % (ref 38–73)
NRBC BLD-RTO: 0 /100 WBC
PLATELET # BLD AUTO: 487 K/UL (ref 150–350)
PMV BLD AUTO: 10.9 FL (ref 9.2–12.9)
POTASSIUM SERPL-SCNC: 4.4 MMOL/L (ref 3.5–5.1)
PROT SERPL-MCNC: 7 G/DL (ref 6–8.4)
RBC # BLD AUTO: 4.54 M/UL (ref 4.6–6.2)
SODIUM SERPL-SCNC: 140 MMOL/L (ref 136–145)
WBC # BLD AUTO: 6.95 K/UL (ref 3.9–12.7)

## 2020-01-06 PROCEDURE — 99213 PR OFFICE/OUTPT VISIT, EST, LEVL III, 20-29 MIN: ICD-10-PCS | Mod: S$GLB,,, | Performed by: NURSE PRACTITIONER

## 2020-01-06 PROCEDURE — 99999 PR PBB SHADOW E&M-EST. PATIENT-LVL V: CPT | Mod: PBBFAC,,, | Performed by: NURSE PRACTITIONER

## 2020-01-06 PROCEDURE — 80053 COMPREHEN METABOLIC PANEL: CPT | Mod: PN

## 2020-01-06 PROCEDURE — 85025 COMPLETE CBC W/AUTO DIFF WBC: CPT

## 2020-01-06 PROCEDURE — 36415 COLL VENOUS BLD VENIPUNCTURE: CPT | Mod: PN

## 2020-01-06 PROCEDURE — 80053 COMPREHEN METABOLIC PANEL: CPT

## 2020-01-06 PROCEDURE — 83615 LACTATE (LD) (LDH) ENZYME: CPT | Mod: PN

## 2020-01-06 PROCEDURE — 99213 OFFICE O/P EST LOW 20 MIN: CPT | Mod: S$GLB,,, | Performed by: NURSE PRACTITIONER

## 2020-01-06 PROCEDURE — 1101F PT FALLS ASSESS-DOCD LE1/YR: CPT | Mod: CPTII,S$GLB,, | Performed by: NURSE PRACTITIONER

## 2020-01-06 PROCEDURE — 3079F DIAST BP 80-89 MM HG: CPT | Mod: CPTII,S$GLB,, | Performed by: NURSE PRACTITIONER

## 2020-01-06 PROCEDURE — 1126F AMNT PAIN NOTED NONE PRSNT: CPT | Mod: S$GLB,,, | Performed by: NURSE PRACTITIONER

## 2020-01-06 PROCEDURE — 1159F MED LIST DOCD IN RCRD: CPT | Mod: S$GLB,,, | Performed by: NURSE PRACTITIONER

## 2020-01-06 PROCEDURE — 1101F PR PT FALLS ASSESS DOC 0-1 FALLS W/OUT INJ PAST YR: ICD-10-PCS | Mod: CPTII,S$GLB,, | Performed by: NURSE PRACTITIONER

## 2020-01-06 PROCEDURE — 3077F PR MOST RECENT SYSTOLIC BLOOD PRESSURE >= 140 MM HG: ICD-10-PCS | Mod: CPTII,S$GLB,, | Performed by: NURSE PRACTITIONER

## 2020-01-06 PROCEDURE — 3079F PR MOST RECENT DIASTOLIC BLOOD PRESSURE 80-89 MM HG: ICD-10-PCS | Mod: CPTII,S$GLB,, | Performed by: NURSE PRACTITIONER

## 2020-01-06 PROCEDURE — 83615 LACTATE (LD) (LDH) ENZYME: CPT

## 2020-01-06 PROCEDURE — 1159F PR MEDICATION LIST DOCUMENTED IN MEDICAL RECORD: ICD-10-PCS | Mod: S$GLB,,, | Performed by: NURSE PRACTITIONER

## 2020-01-06 PROCEDURE — 1126F PR PAIN SEVERITY QUANTIFIED, NO PAIN PRESENT: ICD-10-PCS | Mod: S$GLB,,, | Performed by: NURSE PRACTITIONER

## 2020-01-06 PROCEDURE — 85025 COMPLETE CBC W/AUTO DIFF WBC: CPT | Mod: PN

## 2020-01-06 PROCEDURE — 3077F SYST BP >= 140 MM HG: CPT | Mod: CPTII,S$GLB,, | Performed by: NURSE PRACTITIONER

## 2020-01-06 PROCEDURE — 99999 PR PBB SHADOW E&M-EST. PATIENT-LVL V: ICD-10-PCS | Mod: PBBFAC,,, | Performed by: NURSE PRACTITIONER

## 2020-01-06 NOTE — PROGRESS NOTES
HISTORY OF PRESENT ILLNESS:  This is a 79-year-old black gentleman known to Dr. Cole for polycythemia vera.  He is managed with Hydrea 500 mg twice a day as well as Xarelto (Afib).  To note:  He was diagnosed with PAF in June/2017 by Dr. Escobar.    He presents to the clinic today for interval evaluation with no complaints.  To note:  He did not require phlebotomy on his last visit.  He remains compliant with Hydrea.  He denies any difficulties with fevers, chills, drenching night sweats, unexplained weight   loss, painful lymphadenopathy, rashes, pruritus, changes in the color of his stool/urine, jaundice, chest pain, irregular heartbeat, nausea, vomiting, constipation, diarrhea, etc.  No other new complaints or pertinent findings on a 10-point review of systems.    PHYSICAL EXAMINATION:  GENERAL:  Well-developed, well-nourished black gentleman in no acute distress.  Alert and oriented x4.  VITAL SIGNS:  Weight: Gain of 3 1/2 pounds in 3 months  Wt Readings from Last 3 Encounters:   01/06/20 84.5 kg (186 lb 4.6 oz)   10/01/19 82.8 kg (182 lb 8.7 oz)   06/28/19 82.5 kg (181 lb 14.1 oz)     Temp Readings from Last 3 Encounters:   01/06/20 97.9 °F (36.6 °C) (Oral)   10/01/19 98.3 °F (36.8 °C) (Oral)   06/27/19 98.2 °F (36.8 °C) (Oral)     BP Readings from Last 3 Encounters:   01/06/20 (!) 143/83   10/01/19 (!) 153/74   06/27/19 133/75     Pulse Readings from Last 3 Encounters:   01/06/20 67   10/01/19 69   06/27/19 66     HEENT:  Normocephalic, atraumatic.  Oral mucosa pink and moist.  Lips without lesions.  Tongue midline.  Oropharynx clear.  Nonicteric sclerae.  NECK:  Supple.  No adenopathy.  HEART:  Regular rate and rhythm without murmur, gallop or rub.  LUNGS:  Clear to auscultation bilaterally.  NL respiratory effort.  ABDOMEN:  Soft, nontender and nondistended with positive normoactive bowel sounds.  No hepatosplenomegaly.  EXTREMITIES:  No cyanosis, clubbing or edema.  Distal pulses are intact.  AXILLAE AND  GROIN:  No palpable pathologic lymphadenopathy is appreciated.    LABORATORY:    Lab Results   Component Value Date    WBC 6.95 01/06/2020    RBC 4.54 (L) 01/06/2020    HGB 14.8 01/06/2020    HCT 46.9 01/06/2020     (H) 01/06/2020    MCH 32.6 (H) 01/06/2020    MCHC 31.6 (L) 01/06/2020    RDW 14.5 01/06/2020     (H) 01/06/2020    MPV 10.9 01/06/2020    GRAN 5.4 01/06/2020    GRAN 78.2 (H) 01/06/2020    LYMPH 1.0 01/06/2020    LYMPH 14.1 (L) 01/06/2020    MONO 0.3 01/06/2020    MONO 4.7 01/06/2020    EOS 0.1 01/06/2020    BASO 0.08 01/06/2020    EOSINOPHIL 0.9 01/06/2020    BASOPHIL 1.2 01/06/2020     CMP  Sodium   Date Value Ref Range Status   01/06/2020 140 136 - 145 mmol/L Final     Potassium   Date Value Ref Range Status   01/06/2020 4.4 3.5 - 5.1 mmol/L Final     Chloride   Date Value Ref Range Status   01/06/2020 104 95 - 110 mmol/L Final     CO2   Date Value Ref Range Status   01/06/2020 28 22 - 31 mmol/L Final     Glucose   Date Value Ref Range Status   01/06/2020 140 (H) 70 - 110 mg/dL Final     Comment:     The ADA recommends the following guidelines for fasting glucose:  Normal:       less than 100 mg/dL  Prediabetes:  100 mg/dL to 125 mg/dL  Diabetes:     126 mg/dL or higher       BUN, Bld   Date Value Ref Range Status   01/06/2020 16 9 - 21 mg/dL Final     Creatinine   Date Value Ref Range Status   01/06/2020 1.28 0.50 - 1.40 mg/dL Final   10/02/2012 1.3 0.2 - 1.4 mg/dl Final     Calcium   Date Value Ref Range Status   01/06/2020 9.8 8.4 - 10.2 mg/dL Final   10/02/2012 10.2 8.6 - 10.2 mg/dl Final     Total Protein   Date Value Ref Range Status   01/06/2020 7.0 6.0 - 8.4 g/dL Final     Albumin   Date Value Ref Range Status   01/06/2020 4.2 3.5 - 5.2 g/dL Final     Total Bilirubin   Date Value Ref Range Status   01/06/2020 0.7 0.2 - 1.3 mg/dL Final     Alkaline Phosphatase   Date Value Ref Range Status   01/06/2020 95 38 - 145 U/L Final     AST   Date Value Ref Range Status   01/06/2020 30 17  - 59 U/L Final     ALT   Date Value Ref Range Status   01/06/2020 39 10 - 44 U/L Final     Anion Gap   Date Value Ref Range Status   01/06/2020 8 8 - 16 mmol/L Final   10/02/2012 7 5 - 15 meq/L Final     eGFR if    Date Value Ref Range Status   01/06/2020 >60 >60 mL/min/1.73 m^2 Final     eGFR if non    Date Value Ref Range Status   01/06/2020 53 (A) >60 mL/min/1.73 m^2 Final     Comment:     Calculation used to obtain the estimated glomerular filtration  rate (eGFR) is the CKD-EPI equation.            IMPRESSION:    1.  Polycythemia vera -  Target hematocrit < 45; Not at goal  2.  AFib (0606/2017); on Metoprolol, Digoxin & Xarelto. - followed by Dr. Escobar; sinus rhythm  3.  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  HTN - follows with Dr. Escobar    PLAN:  1.  As patient Hct is > 45; arrange for one whole unit of blood (500 ml) to be phlebotomized x 2.  2.  Continue our course of observation and have the patient return to clinic in 3 months with interval CBC, CMP and LDH prior.  3.  Continue Hydrea 500 mg b.i.d.  Presently on Xarelto.  4.  Maintain good daily hydration of 2 L per day.    Assessment/plan reviewed and approved by Dr. Cole.

## 2020-01-21 ENCOUNTER — TELEPHONE (OUTPATIENT)
Dept: DERMATOLOGY | Facility: CLINIC | Age: 80
End: 2020-01-21

## 2020-01-21 RX ORDER — ATORVASTATIN CALCIUM 40 MG/1
40 TABLET, FILM COATED ORAL DAILY
Qty: 90 TABLET | Refills: 0 | Status: SHIPPED | OUTPATIENT
Start: 2020-01-21 | End: 2020-01-28 | Stop reason: SDUPTHER

## 2020-01-21 NOTE — TELEPHONE ENCOUNTER
Spoke with patient, advised there is only one other medicatin that would treat hyperhidrosis but more than likely it will not be covered either.  Recommended by Dr Frausto that he use the good rx card which will give lower cash pay prices at different pharmacies.  He states he has a card and will find it and let us know.  Advised I can give him another one if not and we can send the medicine to pharmacy of his choice.

## 2020-01-21 NOTE — TELEPHONE ENCOUNTER
----- Message from Keven Muller sent at 1/21/2020  1:27 PM CST -----  Contact: Wife, Xi Layne want to inform office medicare want pay for Glycopyrrol 1mg can you call something else in please call back at [phone]    Case number [id number]

## 2020-01-27 DIAGNOSIS — R61 HYPERHIDROSIS: ICD-10-CM

## 2020-01-27 RX ORDER — GLYCOPYRROLATE 1 MG/1
TABLET ORAL
Qty: 90 TABLET | Refills: 5 | Status: SHIPPED | OUTPATIENT
Start: 2020-01-27 | End: 2020-03-26 | Stop reason: SDUPTHER

## 2020-01-29 RX ORDER — ATORVASTATIN CALCIUM 40 MG/1
40 TABLET, FILM COATED ORAL DAILY
Qty: 90 TABLET | Refills: 0 | Status: SHIPPED | OUTPATIENT
Start: 2020-01-29 | End: 2020-03-12 | Stop reason: SDUPTHER

## 2020-02-17 ENCOUNTER — PATIENT MESSAGE (OUTPATIENT)
Dept: UROLOGY | Facility: CLINIC | Age: 80
End: 2020-02-17

## 2020-02-17 ENCOUNTER — DOCUMENTATION ONLY (OUTPATIENT)
Dept: FAMILY MEDICINE | Facility: CLINIC | Age: 80
End: 2020-02-17

## 2020-02-17 NOTE — PROGRESS NOTES
Pre-Visit Chart Review  For Appointment Scheduled on 3-17-20    Health Maintenance Due   Topic Date Due    TETANUS VACCINE  12/17/1958

## 2020-02-18 ENCOUNTER — PATIENT MESSAGE (OUTPATIENT)
Dept: UROLOGY | Facility: CLINIC | Age: 80
End: 2020-02-18

## 2020-02-26 ENCOUNTER — PATIENT OUTREACH (OUTPATIENT)
Dept: ADMINISTRATIVE | Facility: OTHER | Age: 80
End: 2020-02-26

## 2020-02-27 ENCOUNTER — OFFICE VISIT (OUTPATIENT)
Dept: UROLOGY | Facility: CLINIC | Age: 80
End: 2020-02-27
Payer: MEDICARE

## 2020-02-27 VITALS
HEIGHT: 67 IN | TEMPERATURE: 98 F | BODY MASS INDEX: 28.16 KG/M2 | WEIGHT: 179.44 LBS | HEART RATE: 75 BPM | DIASTOLIC BLOOD PRESSURE: 78 MMHG | SYSTOLIC BLOOD PRESSURE: 134 MMHG

## 2020-02-27 DIAGNOSIS — N52.9 ERECTILE DYSFUNCTION, UNSPECIFIED ERECTILE DYSFUNCTION TYPE: ICD-10-CM

## 2020-02-27 DIAGNOSIS — N40.1 BENIGN PROSTATIC HYPERPLASIA WITH NOCTURIA: Primary | ICD-10-CM

## 2020-02-27 DIAGNOSIS — R35.1 BENIGN PROSTATIC HYPERPLASIA WITH NOCTURIA: Primary | ICD-10-CM

## 2020-02-27 LAB
BILIRUB SERPL-MCNC: NORMAL MG/DL
BLOOD URINE, POC: NORMAL
COLOR, POC UA: NORMAL
GLUCOSE UR QL STRIP: NORMAL
KETONES UR QL STRIP: NORMAL
LEUKOCYTE ESTERASE URINE, POC: NORMAL
NITRITE, POC UA: NORMAL
PH, POC UA: 6
PROTEIN, POC: NORMAL
SPECIFIC GRAVITY, POC UA: 1.01
UROBILINOGEN, POC UA: NORMAL

## 2020-02-27 PROCEDURE — 99999 PR PBB SHADOW E&M-EST. PATIENT-LVL IV: CPT | Mod: PBBFAC,,, | Performed by: NURSE PRACTITIONER

## 2020-02-27 PROCEDURE — 81002 POCT URINE DIPSTICK WITHOUT MICROSCOPE: ICD-10-PCS | Mod: S$GLB,,, | Performed by: NURSE PRACTITIONER

## 2020-02-27 PROCEDURE — 1159F PR MEDICATION LIST DOCUMENTED IN MEDICAL RECORD: ICD-10-PCS | Mod: S$GLB,,, | Performed by: NURSE PRACTITIONER

## 2020-02-27 PROCEDURE — 3078F PR MOST RECENT DIASTOLIC BLOOD PRESSURE < 80 MM HG: ICD-10-PCS | Mod: CPTII,S$GLB,, | Performed by: NURSE PRACTITIONER

## 2020-02-27 PROCEDURE — 99213 OFFICE O/P EST LOW 20 MIN: CPT | Mod: 25,S$GLB,, | Performed by: NURSE PRACTITIONER

## 2020-02-27 PROCEDURE — 1101F PR PT FALLS ASSESS DOC 0-1 FALLS W/OUT INJ PAST YR: ICD-10-PCS | Mod: CPTII,S$GLB,, | Performed by: NURSE PRACTITIONER

## 2020-02-27 PROCEDURE — 3075F PR MOST RECENT SYSTOLIC BLOOD PRESS GE 130-139MM HG: ICD-10-PCS | Mod: CPTII,S$GLB,, | Performed by: NURSE PRACTITIONER

## 2020-02-27 PROCEDURE — 1159F MED LIST DOCD IN RCRD: CPT | Mod: S$GLB,,, | Performed by: NURSE PRACTITIONER

## 2020-02-27 PROCEDURE — 99999 PR PBB SHADOW E&M-EST. PATIENT-LVL IV: ICD-10-PCS | Mod: PBBFAC,,, | Performed by: NURSE PRACTITIONER

## 2020-02-27 PROCEDURE — 3075F SYST BP GE 130 - 139MM HG: CPT | Mod: CPTII,S$GLB,, | Performed by: NURSE PRACTITIONER

## 2020-02-27 PROCEDURE — 81002 URINALYSIS NONAUTO W/O SCOPE: CPT | Mod: S$GLB,,, | Performed by: NURSE PRACTITIONER

## 2020-02-27 PROCEDURE — 1101F PT FALLS ASSESS-DOCD LE1/YR: CPT | Mod: CPTII,S$GLB,, | Performed by: NURSE PRACTITIONER

## 2020-02-27 PROCEDURE — 99213 PR OFFICE/OUTPT VISIT, EST, LEVL III, 20-29 MIN: ICD-10-PCS | Mod: 25,S$GLB,, | Performed by: NURSE PRACTITIONER

## 2020-02-27 PROCEDURE — 3078F DIAST BP <80 MM HG: CPT | Mod: CPTII,S$GLB,, | Performed by: NURSE PRACTITIONER

## 2020-02-27 RX ORDER — TADALAFIL 5 MG/1
5 TABLET ORAL DAILY
Qty: 30 TABLET | Refills: 11 | Status: SHIPPED | OUTPATIENT
Start: 2020-02-27 | End: 2020-04-07 | Stop reason: ALTCHOICE

## 2020-02-27 RX ORDER — TAMSULOSIN HYDROCHLORIDE 0.4 MG/1
1 CAPSULE ORAL DAILY
Qty: 90 CAPSULE | Refills: 3 | Status: SHIPPED | OUTPATIENT
Start: 2020-02-27 | End: 2021-03-02

## 2020-02-27 NOTE — PATIENT INSTRUCTIONS
BPH (Enlarged Prostate)  The prostate is a gland at the base of the bladder. As some men get older, the prostate may get bigger in size. This problem is called benign prostatic hyperplasia (BPH). BPH puts pressure on the urethra. This is the tube that carries urine from the bladder to the penis. It may interfere with the flow of urine. It may also keep the bladder from emptying fully.    Symptoms of BPH include trouble starting urination and feeling as though the bladder isnt emptying all the way. It also includes a weak urine stream, dribbling and leaking of urine, and frequent and urgent urination (especially at night). BPH can increase the risk of urinary infections. It can also block off urine flow completely. If this occurs, a thin tube (catheter) may be passed into the bladder to help drain urine.  If symptoms are mild, no treatment may be needed right now. If symptoms are more severe, treatment is likely needed. The goal of treatment is to improve urine flow and reduce symptoms. Treatments can include medicine and procedures. Your healthcare provider will discuss treatment options with you as needed.  Home care  The following guidelines will help you care for yourself at home:  · Urinate as soon as you feel the urge. Don't try to hold your urine.  · Don't limit your fluid intake during the day. Drink 6 to 8 glasses of water or liquids a day. This prevents bacteria from building up in the bladder.  · Avoid drinking fluids after dinner to help reduce urination during the night.  · Avoid medicines that can worsen your symptoms. These include certain cold and allergy medicines and antidepressants. Diuretics used for high blood pressure can also worsen symptoms. Talk to your doctor about the medicines you take. Other choices may work better for you.  Prostate cancer screening  BPH does not increase the risk of prostate cancer. But because prostate cancer is a common cancer in men, screening is sometimes  recommended. This may help detect the cancer in its early stages when treatment is most effective. Factors that can increase the risk of prostate cancer include being -American or having a father or brother who had prostate cancer. A high-fat diet may also increase the risk of prostate cancer. Talk to your healthcare provider to see whether you should be screened for prostate cancer.  Follow-up care  Follow up with your healthcare provider, or as advised  To learn more, go to:  · National Kidney & Urologic Diseases Information Clearinghouse  kidney.niddk.nih.gov, 794.664.9161  When to seek medical advice  Call your healthcare provider right away if any of these occur:  · Fever of 100.4°F (38.0°C) or higher, or as advised  · Unable to pass urine for 8 hours  · Increasing pressure or pain in your bladder (lower abdomen)  · Blood in the urine  · Increasing low back pain, not related to injury  · Symptoms of urinary infection (increased urge to urinate, burning when passing urine, foul-smelling urine)  Date Last Reviewed: 7/1/2016  © 0281-7137 [employer]. [address] [place] [address]. All rights reserved. This information is not intended as a substitute for professional medical care. Always follow your healthcare professional's instructions.        Tamsulosin capsules  What is this medicine?  TAMSULOSIN (truong NINO steven sin) is used to treat enlargement of the prostate gland in men, a condition called benign prostatic hyperplasia or BPH. It is not for use in women. It works by relaxing muscles in the prostate and bladder neck. This improves urine flow and reduces BPH symptoms.  How should I use this medicine?  Take this medicine by mouth about 30 minutes after the same meal every day. Follow the directions on the prescription label. Swallow the capsules whole with a glass of water. Do not crush, chew, or open capsules. Do not take your medicine more often than directed. Do not stop taking  your medicine unless your doctor tells you to.  Talk to your pediatrician regarding the use of this medicine in children. Special care may be needed.  What side effects may I notice from receiving this medicine?  Side effects that you should report to your doctor or health care professional as soon as possible:  · allergic reactions like skin rash or itching, hives, swelling of the lips, mouth, tongue, or throat  · breathing problems  · change in vision  · feeling faint or lightheaded  · irregular heartbeat  · prolonged or painful erection  · weakness  Side effects that usually do not require medical attention (report to your doctor or health care professional if they continue or are bothersome):  · back pain  · change in sex drive or performance  · constipation, nausea or vomiting  · cough  · drowsy  · runny or stuffy nose  · trouble sleeping  What may interact with this medicine?  · cimetidine  · fluoxetine  · ketoconazole  · medicines for erectile disfunction like sildenafil, tadalafil, vardenafil  · medicines for high blood pressure  · other alpha-blockers like alfuzosin, doxazosin, phentolamine, phenoxybenzamine, prazosin, terazosin  · warfarin  What if I miss a dose?  If you miss a dose, take it as soon as you can. If it is almost time for your next dose, take only that dose. Do not take double or extra doses. If you stop taking your medicine for several days or more, ask your doctor or health care professional what dose you should start back on.  Where should I keep my medicine?  Keep out of the reach of children.  Store at room temperature between 15 and 30 degrees C (59 and 86 degrees F). Throw away any unused medicine after the expiration date.  What should I tell my health care provider before I take this medicine?  They need to know if you have any of the following conditions:  · advanced kidney disease  · advanced liver disease  · low blood pressure  · prostate cancer  · an unusual or allergic reaction  to tamsulosin, sulfa drugs, other medicines, foods, dyes, or preservatives  · pregnant or trying to get pregnant  · breast-feeding  What should I watch for while using this medicine?  Visit your doctor or health care professional for regular check ups. You will need lab work done before you start this medicine and regularly while you are taking it. Check your blood pressure as directed. Ask your health care professional what your blood pressure should be, and when you should contact him or her.  This medicine may make you feel dizzy or lightheaded. This is more likely to happen after the first dose, after an increase in dose, or during hot weather or exercise. Drinking alcohol and taking some medicines can make this worse. Do not drive, use machinery, or do anything that needs mental alertness until you know how this medicine affects you. Do not sit or stand up quickly. If you begin to feel dizzy, sit down until you feel better. These effects can decrease once your body adjusts to the medicine.  Contact your doctor or health care professional right away if you have an erection that lasts longer than 4 hours or if it becomes painful. This may be a sign of a serious problem and must be treated right away to prevent permanent damage.  If you are thinking of having cataract surgery, tell your eye surgeon that you have taken this medicine.  NOTE:This sheet is a summary. It may not cover all possible information. If you have questions about this medicine, talk to your doctor, pharmacist, or health care provider. Copyright© 2017 [employer]        Tadalafil tablets (Cialis)  What is this medicine?  TADALAFIL (kel DA la omar) is used to treat erection problems in men. It is also used for enlargement of the prostate gland in men, a condition called benign prostatic hyperplasia or BPH. This medicine improves urine flow and reduces BPH symptoms. This medicine can also treat both erection problems and BPH when they occur  together.  How should I use this medicine?  Take this medicine by mouth with a glass of water. Follow the directions on the prescription label. You may take this medicine with or without meals. When this medicine is used for erection problems, your doctor may prescribe it to be taken once daily or as needed. If you are taking the medicine as needed, you may be able to have sexual activity 30 minutes after taking it and for up to 36 hours after taking it. Whether you are taking the medicine as needed or once daily, you should not take more than one dose per day. If you are taking this medicine for symptoms of benign prostatic hyperplasia (BPH) or to treat both BPH and an erection problem, take the dose once daily at about the same time each day. Do not take your medicine more often than directed.  Talk to your pediatrician regarding the use of this medicine in children. Special care may be needed.  What side effects may I notice from receiving this medicine?  Side effects that you should report to your doctor or health care professional as soon as possible:  · allergic reactions like skin rash, itching or hives, swelling of the face, lips, or tongue  · breathing problems  · changes in hearing  · changes in vision  · chest pain  · fast, irregular heartbeat  · prolonged or painful erection  · seizures  Side effects that usually do not require medical attention (report to your doctor or health care professional if they continue or are bothersome):  · back pain  · dizziness  · flushing  · headache  · indigestion  · muscle aches  · nausea  · stuffy or runny nose  What may interact with this medicine?  Do not take this medicine with any of the following medications:  · nitrates like amyl nitrite, isosorbide dinitrate, isosorbide mononitrate, nitroglycerin  · other medicines for erectile dysfunction like avanafil, sildenafil, vardenafil  · other tadalafil products (Adcirca)  · riociguat  This medicine may also interact with  the following medications:  · certain drugs for high blood pressure  · certain drugs for the treatment of HIV infection or AIDS  · certain drugs used for fungal or yeast infections, like fluconazole, itraconazole, ketoconazole, and voriconazole  · certain drugs used for seizures like carbamazepine, phenytoin, and phenobarbital  · grapefruit juice  · macrolide antibiotics like clarithromycin, erythromycin, troleandomycin  · medicines for prostate problems  · rifabutin, rifampin or rifapentine  What if I miss a dose?  If you are taking this medicine as needed for erection problems, this does not apply. If you miss a dose while taking this medicine once daily for an erection problem, benign prostatic hyperplasia, or both, take it as soon as you remember, but do not take more than one dose per day.  Where should I keep my medicine?  Keep out of the reach of children.  Store at room temperature between 15 and 30 degrees C (59 and 86 degrees F). Throw away any unused medicine after the expiration date.  What should I tell my health care provider before I take this medicine?  They need to know if you have any of these conditions:  · bleeding disorders  · eye or vision problems, including a rare inherited eye disease called retinitis pigmentosa  · anatomical deformation of the penis, Peyronie's disease, or history of priapism (painful and prolonged erection)  · heart disease, angina, a history of heart attack, irregular heart beats, or other heart problems  · high or low blood pressure  · history of blood diseases, like sickle cell anemia or leukemia  · history of stomach bleeding  · kidney disease  · liver disease  · stroke  · an unusual or allergic reaction to tadalafil, other medicines, foods, dyes, or preservatives  · pregnant or trying to get pregnant  · breast-feeding  What should I watch for while using this medicine?  If you notice any changes in your vision while taking this drug, call your doctor or health care  professional as soon as possible. Stop using this medicine and call your health care provider right away if you have a loss of sight in one or both eyes.  Contact your doctor or health care professional right away if the erection lasts longer than 4 hours or if it becomes painful. This may be a sign of serious problem and must be treated right away to prevent permanent damage.  If you experience symptoms of nausea, dizziness, chest pain or arm pain upon initiation of sexual activity after taking this medicine, you should refrain from further activity and call your doctor or health care professional as soon as possible.  Do not drink alcohol to excess (examples, 5 glasses of wine or 5 shots of whiskey) when taking this medicine. When taken in excess, alcohol can increase your chances of getting a headache or getting dizzy, increasing your heart rate or lowering your blood pressure.  Using this medicine does not protect you or your partner against HIV infection (the virus that causes AIDS) or other sexually transmitted diseases.  NOTE:This sheet is a summary. It may not cover all possible information. If you have questions about this medicine, talk to your doctor, pharmacist, or health care provider. Copyright© 2017 [employer]

## 2020-02-27 NOTE — PROGRESS NOTES
[facility] Note  Staff: GABY Meyer    PCP: Dr. Yaniv Escobar    Chief Complaint: Annual prostate exam    Subjective:        HPI: Morro Sharma is a 79 y.o. male presents today for annual prostate examination and medication refill.  He is accompanied today by his wife to office visit.    Pt was last seen by Dr. Cleveland on 03/23/2018 for BPH f/up visit.  He has been out of his bladder/prostate meds for the past six months.    HISTORY OF PRESENT ILLNESS:  Mr. Sharma is a 79-year-old male who has a history   of lower urinary tract symptoms secondary to BPH.  The patient referred that he   is doing well.  At the present time, he is being medicated with tamsulosin 0.4   mg p.o. daily.  The patient referred to have nocturia x1.  No dysuria.  No   hematuria.  The flow is adequate and he feels that he empties the bladder   satisfactorily.  Denies any side effects from the current medications.     FAMILY HISTORY:  A couple of cousins of him have developed prostate cancer.     The past medical history and past surgical histories have changed in May 2017.    He had a fainting episode and was taken to the Emergency Room and he was   diagnosed of having atrial fibrillation and since then, the patient has been   anticoagulated with Xarelto.     The urinalysis today is within normal limits.  The last PSA was on 01/30/2017,   1.6.     The patient is satisfied in the way that Flomax is controlling his lower urinary   tract symptoms.    Last PSA Screening:   Lab Results   Component Value Date    PSA 1.0 01/28/2019    PSA 1.6 01/30/2017    PSA 2.6 01/20/2016    PSADIAG 1.1 03/24/2018    PSADIAG 1.4 09/13/2016    PSADIAG 1.8 02/18/2015     REVIEW OF SYSTEMS:  A comprehensive 10 system review was performed and is negative except as noted above in HPI    PMHx:  Past Medical History:   Diagnosis Date    Atrial fibrillation 05/07/2017    [facility]    BPH (benign prostatic hypertrophy)      Colon polyps     Erectile dysfunction     GERD (gastroesophageal reflux disease)     Hypertension     Pneumothorax on left     Polycythemia vera     Pulmonary nodules      PSHx:  Past Surgical History:   Procedure Laterality Date    COLONOSCOPY  9/15/14    Dr. Mcdaniels, 5 year recheck    ESOPHAGOGASTRODUODENOSCOPY      LUNG SURGERY      LEFT - CHEST TUBE     Allergies:  Patient has no known allergies.    Medications: reviewed   Objective:     Vitals:    02/27/20 1510   BP: 134/78   Pulse: 75   Temp: 97.7 °F (36.5 °C)     General:WDWN in NAD  Eyes: PERRLA, normal conjunctiva  Respiratory: no increased work on breathing, clear to auscultation  Cardiovascular: regular rate and rhythm. No obvious extremity edema.  GI: palpation of masses. No tenderness. No hepatosplenomegaly to palpation.  Musculoskeletal: normal range of motion of bilateral upper extremities. Normal muscle strength and tone.  Skin: no obvious rashes or lesions. No tightening of skin noted.  Neurologic: CN grossly normal. Normal sensation.   Psychiatric: awake, alert and oriented x 3. Mood and affect normal. Cooperative.    LABS REVIEW:  UA today:  WNL  Assessment:       1. Benign prostatic hyperplasia with nocturia    2. Erectile dysfunction, unspecified erectile dysfunction type          Plan:     Medications refilled for this pt during ov today, since he has now been out of his BPH meds for at least 6 months.    Draw PSA level prior to next ov-orders are in Epic.    F/u with me in 6-8 weeks for CHEMA exam and to review PSA lab results at that time.  Pt and family verbalized understanding.    [facility]: Active    Sharlene Zimmerman, PREETHI-BON

## 2020-03-09 DIAGNOSIS — D47.3 ESSENTIAL THROMBOCYTOSIS: ICD-10-CM

## 2020-03-09 RX ORDER — HYDROXYUREA 500 MG/1
500 CAPSULE ORAL 2 TIMES DAILY
Qty: 180 CAPSULE | Refills: 1 | Status: SHIPPED | OUTPATIENT
Start: 2020-03-09 | End: 2020-04-14 | Stop reason: SDUPTHER

## 2020-03-11 ENCOUNTER — PATIENT OUTREACH (OUTPATIENT)
Dept: ADMINISTRATIVE | Facility: OTHER | Age: 80
End: 2020-03-11

## 2020-03-12 ENCOUNTER — OFFICE VISIT (OUTPATIENT)
Dept: CARDIOLOGY | Facility: CLINIC | Age: 80
End: 2020-03-12
Payer: MEDICARE

## 2020-03-12 VITALS
HEART RATE: 82 BPM | SYSTOLIC BLOOD PRESSURE: 153 MMHG | WEIGHT: 185.44 LBS | OXYGEN SATURATION: 93 % | DIASTOLIC BLOOD PRESSURE: 80 MMHG | BODY MASS INDEX: 29.1 KG/M2 | HEIGHT: 67 IN

## 2020-03-12 DIAGNOSIS — I70.209 ATHEROSCLEROSIS OF NATIVE ARTERY OF EXTREMITY, UNSPECIFIED EXTREMITY, WITH UNSPECIFIED PRESENCE OF CLINICAL MANIFESTATION: ICD-10-CM

## 2020-03-12 DIAGNOSIS — D45 POLYCYTHEMIA VERA: ICD-10-CM

## 2020-03-12 DIAGNOSIS — E65 ABDOMINAL OBESITY: ICD-10-CM

## 2020-03-12 DIAGNOSIS — I48.0 PAROXYSMAL ATRIAL FIBRILLATION: ICD-10-CM

## 2020-03-12 DIAGNOSIS — I48.0 PAF (PAROXYSMAL ATRIAL FIBRILLATION): Primary | ICD-10-CM

## 2020-03-12 DIAGNOSIS — E78.5 DYSLIPIDEMIA (HIGH LDL; LOW HDL): ICD-10-CM

## 2020-03-12 DIAGNOSIS — R79.89 ELEVATED TSH: ICD-10-CM

## 2020-03-12 DIAGNOSIS — M79.603 MUSCULOSKELETAL PAIN OF UPPER EXTREMITY, UNSPECIFIED LATERALITY: ICD-10-CM

## 2020-03-12 DIAGNOSIS — G47.19 EXCESSIVE DAYTIME SLEEPINESS: ICD-10-CM

## 2020-03-12 DIAGNOSIS — I10 ESSENTIAL HYPERTENSION: ICD-10-CM

## 2020-03-12 DIAGNOSIS — Z79.01 ANTICOAGULANT LONG-TERM USE: ICD-10-CM

## 2020-03-12 PROCEDURE — 1159F PR MEDICATION LIST DOCUMENTED IN MEDICAL RECORD: ICD-10-PCS | Mod: S$GLB,,, | Performed by: INTERNAL MEDICINE

## 2020-03-12 PROCEDURE — 1126F AMNT PAIN NOTED NONE PRSNT: CPT | Mod: S$GLB,,, | Performed by: INTERNAL MEDICINE

## 2020-03-12 PROCEDURE — 1159F MED LIST DOCD IN RCRD: CPT | Mod: S$GLB,,, | Performed by: INTERNAL MEDICINE

## 2020-03-12 PROCEDURE — 99999 PR PBB SHADOW E&M-EST. PATIENT-LVL III: CPT | Mod: PBBFAC,,, | Performed by: INTERNAL MEDICINE

## 2020-03-12 PROCEDURE — 1101F PR PT FALLS ASSESS DOC 0-1 FALLS W/OUT INJ PAST YR: ICD-10-PCS | Mod: CPTII,S$GLB,, | Performed by: INTERNAL MEDICINE

## 2020-03-12 PROCEDURE — 1101F PT FALLS ASSESS-DOCD LE1/YR: CPT | Mod: CPTII,S$GLB,, | Performed by: INTERNAL MEDICINE

## 2020-03-12 PROCEDURE — 99499 RISK ADDL DX/OHS AUDIT: ICD-10-PCS | Mod: S$GLB,,, | Performed by: INTERNAL MEDICINE

## 2020-03-12 PROCEDURE — 99215 PR OFFICE/OUTPT VISIT, EST, LEVL V, 40-54 MIN: ICD-10-PCS | Mod: S$GLB,,, | Performed by: INTERNAL MEDICINE

## 2020-03-12 PROCEDURE — 3079F PR MOST RECENT DIASTOLIC BLOOD PRESSURE 80-89 MM HG: ICD-10-PCS | Mod: CPTII,S$GLB,, | Performed by: INTERNAL MEDICINE

## 2020-03-12 PROCEDURE — 93000 ELECTROCARDIOGRAM COMPLETE: CPT | Mod: S$GLB,,, | Performed by: INTERNAL MEDICINE

## 2020-03-12 PROCEDURE — 1126F PR PAIN SEVERITY QUANTIFIED, NO PAIN PRESENT: ICD-10-PCS | Mod: S$GLB,,, | Performed by: INTERNAL MEDICINE

## 2020-03-12 PROCEDURE — 3077F PR MOST RECENT SYSTOLIC BLOOD PRESSURE >= 140 MM HG: ICD-10-PCS | Mod: CPTII,S$GLB,, | Performed by: INTERNAL MEDICINE

## 2020-03-12 PROCEDURE — 3079F DIAST BP 80-89 MM HG: CPT | Mod: CPTII,S$GLB,, | Performed by: INTERNAL MEDICINE

## 2020-03-12 PROCEDURE — 99499 UNLISTED E&M SERVICE: CPT | Mod: S$GLB,,, | Performed by: INTERNAL MEDICINE

## 2020-03-12 PROCEDURE — 93000 EKG 12-LEAD: ICD-10-PCS | Mod: S$GLB,,, | Performed by: INTERNAL MEDICINE

## 2020-03-12 PROCEDURE — 3077F SYST BP >= 140 MM HG: CPT | Mod: CPTII,S$GLB,, | Performed by: INTERNAL MEDICINE

## 2020-03-12 PROCEDURE — 99999 PR PBB SHADOW E&M-EST. PATIENT-LVL III: ICD-10-PCS | Mod: PBBFAC,,, | Performed by: INTERNAL MEDICINE

## 2020-03-12 PROCEDURE — 99215 OFFICE O/P EST HI 40 MIN: CPT | Mod: S$GLB,,, | Performed by: INTERNAL MEDICINE

## 2020-03-12 RX ORDER — ATORVASTATIN CALCIUM 40 MG/1
40 TABLET, FILM COATED ORAL DAILY
Qty: 90 TABLET | Refills: 3 | Status: SHIPPED | OUTPATIENT
Start: 2020-03-12 | End: 2020-03-16 | Stop reason: SDUPTHER

## 2020-03-12 NOTE — PATIENT INSTRUCTIONS
Discharge Instructions: Taking Your Blood Pressure  Blood pressure is the force of blood as it moves from the heart through the blood vessels. You can take your own blood pressure reading using a digital monitor. Take readings as often as your healthcare provider instructs. Take your readings each time in the same way, using the same arm. Here are guidelines for taking your blood pressure.  The American Heart Association (AHA) recommends purchasing a blood pressure monitor that is validated and approved by the Association for the Advancement of Medical Instrumentation, the [place] Hypertension Society, and the International Protocol for the Validation of Automated BP Measuring Devices. If the blood pressure monitor is for a senior adult, a pregnant woman, or a child, make certain it is validated for use with such a population. For the most reliable readings, the AHA recommends an automatic, cuff-style, upper arm (bicep) monitor. The readings from finger and wrist monitors are not as reliable as the upper arm monitor.        Step 1. Relax    · Wait at least a half hour after smoking, eating, or exercising. Do not drink coffee, tea, soda, or other caffeinated beverages before checking your blood pressure.   · Sit comfortably at a table. Place the monitor near you.  · Rest for a few minutes before you begin.        Step 2. Wrap the cuff    · Place your arm on the table, palm up. Put your arm in a position that is level with your heart. Wrap the cuff around your upper arm, about an inch above your elbow. Its best to wrap the cuff on bare skin, not over clothing.  · Make sure your cuff fits. If it doesnt wrap around your upper arm, order a larger cuff. A cuff that is too large or too small can result in an inaccurate blood pressure reading.           Step 3. Inflate the cuff    · Pump the cuff until the scale reads 200. If you have a self-inflating cuff, push the button that starts the pump.  · The cuff will  tighten, then loosen.  · The numbers will change. When they stop changing, your blood pressure reading will appear.  · If you get a reading that is too high or too low for you, relax for a few minutes. Then do the test again.    Step 4. Write down the results  · Write down your blood pressure numbers. Maxwell the date and time. Keep your results in one place, such as a notebook.  · Remove the cuff from your arm. Turn off the machine.  · Take the readings with you to your medical appointments.  · If you start a new blood pressure medicine, or change a blood pressure medicine dose, note the day you started the new drug or dosage on your blood pressure recording sheet. This will help your healthcare provider monitor the effect of medication changes.     Date Last Reviewed: 4/27/2016  © 9100-3542 "[employer]". [address] [place] [address]. All rights reserved. This information is not intended as a substitute for professional medical care. Always follow your healthcare professional's instructions.        Discharge Instructions: Taking Your Blood Pressure  Blood pressure is the force of blood as it moves from the heart through the blood vessels. You can take your own blood pressure reading using a digital monitor. Take readings as often as your healthcare provider instructs. Take your readings each time in the same way, using the same arm. Here are guidelines for taking your blood pressure.  The American Heart Association (AHA) recommends purchasing a blood pressure monitor that is validated and approved by the Association for the Advancement of Medical Instrumentation, the [place] Hypertension Society, and the International Protocol for the Validation of Automated BP Measuring Devices. If the blood pressure monitor is for a senior adult, a pregnant woman, or a child, make certain it is validated for use with such a population. For the most reliable readings, the AHA recommends an automatic,  cuff-style, upper arm (bicep) monitor. The readings from finger and wrist monitors are not as reliable as the upper arm monitor.        Step 1. Relax    · Wait at least a half hour after smoking, eating, or exercising. Do not drink coffee, tea, soda, or other caffeinated beverages before checking your blood pressure.   · Sit comfortably at a table. Place the monitor near you.  · Rest for a few minutes before you begin.        Step 2. Wrap the cuff    · Place your arm on the table, palm up. Put your arm in a position that is level with your heart. Wrap the cuff around your upper arm, about an inch above your elbow. Its best to wrap the cuff on bare skin, not over clothing.  · Make sure your cuff fits. If it doesnt wrap around your upper arm, order a larger cuff. A cuff that is too large or too small can result in an inaccurate blood pressure reading.           Step 3. Inflate the cuff    · Pump the cuff until the scale reads 200. If you have a self-inflating cuff, push the button that starts the pump.  · The cuff will tighten, then loosen.  · The numbers will change. When they stop changing, your blood pressure reading will appear.  · If you get a reading that is too high or too low for you, relax for a few minutes. Then do the test again.    Step 4. Write down the results  · Write down your blood pressure numbers. Maxwell the date and time. Keep your results in one place, such as a notebook.  · Remove the cuff from your arm. Turn off the machine.  · Take the readings with you to your medical appointments.  · If you start a new blood pressure medicine, or change a blood pressure medicine dose, note the day you started the new drug or dosage on your blood pressure recording sheet. This will help your healthcare provider monitor the effect of medication changes.     Date Last Reviewed: 4/27/2016  © 9442-0091 [employer]. [address] [place] [address], [place] [address]. All rights reserved. This information is  not intended as a substitute for professional medical care. Always follow your healthcare professional's instructions.      Recommended Mediterranean dietEating Heart-Healthy Food: Using the DASH Plan  Eating for your heart doesnt have to be hard or boring. You just need to know how to make healthier choices. The DASH eating plan has been developed to help you do just that. DASH stands for Dietary Approaches to Stop Hypertension. It is a plan that has been proven to be healthier for your heart and to lower your risk for high blood pressure. It can also help lower your risk for cancer, heart disease, osteoporosis, and diabetes.  Choosing from Each Food Group  Choose foods from each of the food groups below each day. Try to get the recommended number of servings for each food group. The serving numbers are based on a diet of 2,000 calories a day. Talk to your doctor if youre unsure about your calorie needs.  Grains   Servings: 7-8 a day  A serving is:  · 1 slice bread  · 1 ounce dry cereal  · half a cup cooked rice or pasta  Best choices: Whole grains and any grains high in fiber.  Vegetables   Servings: 4-5 a day  A serving is:  · 1 cup raw leafy vegetable  · Half a cup cooked vegetable  · Three-quarter cup vegetable juice  Best choices: Fresh or frozen vegetable prepared without too much added salt or fat.    Fruits   Servings: 4-5 a day  A serving is:  · Three-quarter cup fruit juice  · 1 medium fruit  · One-quarter cup dried fruit  · One-half cup fresh, frozen, or canned fruit  Best choices: A variety of fresh fruits of different colors. Whole fruits are a much better choice than fruit juices.  Low-fat or Fat Free Dairy   Servings: 2-3 a day  A serving is:  · 8 ounces milk  · 1 cup yogurt  · One and a half ounces cheese  Best choices: Skim or 1% milk, low-fat or fat free yogurt or buttermilk, and low-fat cheeses.       Meat, Poultry, Fish   Servings: 2 or fewer a day  A serving is:  · 3 ounces cooked meat,  poultry, or fish  Best choices: Lean meats and fish. Trim away visible fat. Broil, roast, or boil instead of frying. Remove skin from poultry before eating.  Nuts, Seeds, Beans   Servings: 4-5 a week  A serving is:  · One third cup nuts (or one and a half ounces)  · 2 tablespoons sunflower seeds  · Half a cup cooked beans  Best choices: Dry roasted nuts with no salt added, lentils, kidney beans, garbanzo beans, and whole rahman beans.    Fats and Oils   Servings: 2 a day  A serving is:  · 1 teaspoon vegetable oil  · 1 teaspoon soft margarine  · 1 tablespoon low-fat mayonnaise  · 1 teaspoon regular mayonnaise  · 2 tablespoons light salad dressing  · 1 tablespoon regular salad dressing  Best choices: Monounsaturated and polyunsaturated fats such as olive, canola, or safflower oil.  Sweets   Servings: 5 a week or fewer  A serving is:  · 1 tablespoon sugar, maple syrup, or honey  · 1 tablespoon jam or jelly  · 1 half-ounce jelly beans (about 15)  · 8 ounces lemonade  Best choices: Dried fruit can be a satisfying sweet. Choose low-fat sweets when possible. And watch your serving sizes!       Aerobic Exercise for a Healthy Heart  Exercise is a lot more than an energy booster and a stress reliever. It also strengthens your heart muscle, lowers your blood pressure and blood cholesterol, and burns calories.      Remember, some activity is better than none.     Choose an Aerobic Activity  Choose a nonstop activity that makes your heart and lungs work harder than they do when you rest or walk normally. This aerobic exercise can improve the way your heart and other muscles use oxygen. Make it fun by exercising with a friend and choosing an activity you enjoy. Here are some ideas:  · Walking  · Swimming  · Bicycling  · Stair climbing  · Dancing  · Jogging  Exercise Regularly  If you havent been exercising regularly,  get your doctors okay first. Then start slowly.  Here are some tips:  · Begin exercising 3 times a week for  5-10 minutes at a time.  · When you feel comfortable, add a few minutes each week.  · Slowly build up to exercising 3-4 times each week for 20-40 minutes. Aim for a total of 150 or more minutes a week.  · Be sure to carry your nitroglycerin with you when you exercise.  · If you get angina when youre exercising, stop what youre doing, take your nitroglycerin, and call your doctor.  © 8973-6658 [employer] [facility], [address] [place] [address], [place] [address]. All rights reserved. This information is not intended as a substitute for professional medical care. Always follow your healthcare professional's instructions.    Losing Weight (Cardiovascular)  Excess weight is a major risk factor for heart disease. Losing weight may help keep your arteries open so that your heart can get the oxygen-rich blood it needs. Weight loss can also help lower your blood pressure and reduce your risk for diabetes. All in all, losing weight makes you healthier.          Exercise with a friend. When activity is fun, you're more likely to stick with it.        Calories and Weight Loss  Calories are the fuel your body burns for energy. You get the calories you need from the food you eat. For healthy weight loss, women should eat at least 1,200 calories a day, men at least 1,500.    When you eat more calories than you need, your body stores the extra calories as fat. One pound of fat equals 3,500 calories.    To lose weight, try to burn 500 calories a day more than you eat. To do this, eat 250 calories less each day. Add activity to burn the other 250 calories. Walking 21/2 miles burns about 250 calories.    Eat a variety of healthy foods. Its the best way to make calories count.     Tips for losing weight:  Drink 8 to 10 glasses of water a day.    Dont skip meals. Instead, eat smaller portions.       Brisk Activity Is Best  Brisk activity gets your heart pumping faster. It makes your heart healthier. Its also the best way to burn  calories. In fact, your body may keep burning calories for hours after you stop a brisk activity.    Begin by walking 10 minutes most days.    Add more time and speed to your walk. Build up as you feel able.    Try to walk briskly at least 30 minutes most days. If needed, you can break this into 2 shorter sessions.     Check off the ideas below that you could try to make your day more active:    Take the stairs instead of the elevator.    Park your car farther away and walk.    Ride a bike to work or to the store.    Walk laps around the mall.

## 2020-03-12 NOTE — PROGRESS NOTES
Subjective:    Patient ID:  Morro Sharma is a 79 y.o. male who presents for evaluation of Annual Exam (BP)  For PAF on NOAC, test results   PCP: Dr. Escobar, see annually, does labs  Prior cardiologist: Dr. Kingston, last seen 12/2018  Hematologist: Xiang Khan, DIMPLE, Fabio  Lives with wife, Xi, here with patient,  20 years, non-smoker  Retired  for [employer]    Health literacy: High  Activities: walk twice a week, do yard work once a week, no regular exercise, no limitation. To start gym Monday  Nicotine: quit 1982, 50+ py  Alcohol: none  Illicit drugs: none  Cardiac symptoms: none  Home BP: 145/75  Medication compliance: yes  Diet: regular  Caffeine: 2 cpd  Labs: 1/2019, LDL 50.8 on 40 mg of atorvastatin, normal CMP, CBC, urine negative for protein.  Lab Results   Component Value Date    TSH 4.088 (H) 08/17/2017        Lab Results   Component Value Date    HGBA1C 5.7 08/14/2007       Lab Results   Component Value Date    WBC 6.95 01/06/2020    HGB 14.8 01/06/2020    HCT 46.9 01/06/2020     (H) 01/06/2020     (H) 01/06/2020       CMP  Sodium   Date Value Ref Range Status   01/06/2020 140 136 - 145 mmol/L Final     Potassium   Date Value Ref Range Status   01/06/2020 4.4 3.5 - 5.1 mmol/L Final     Chloride   Date Value Ref Range Status   01/06/2020 104 95 - 110 mmol/L Final     CO2   Date Value Ref Range Status   01/06/2020 28 22 - 31 mmol/L Final     Glucose   Date Value Ref Range Status   01/06/2020 140 (H) 70 - 110 mg/dL Final     Comment:     The ADA recommends the following guidelines for fasting glucose:  Normal:       less than 100 mg/dL  Prediabetes:  100 mg/dL to 125 mg/dL  Diabetes:     126 mg/dL or higher       BUN, Bld   Date Value Ref Range Status   01/06/2020 16 9 - 21 mg/dL Final     Creatinine   Date Value Ref Range Status   01/06/2020 1.28 0.50 - 1.40 mg/dL Final   10/02/2012 1.3 0.2 - 1.4 mg/dl Final     Calcium   Date Value Ref Range Status   01/06/2020 9.8  "8.4 - 10.2 mg/dL Final   10/02/2012 10.2 8.6 - 10.2 mg/dl Final     Total Protein   Date Value Ref Range Status   01/06/2020 7.0 6.0 - 8.4 g/dL Final     Albumin   Date Value Ref Range Status   01/06/2020 4.2 3.5 - 5.2 g/dL Final     Total Bilirubin   Date Value Ref Range Status   01/06/2020 0.7 0.2 - 1.3 mg/dL Final     Alkaline Phosphatase   Date Value Ref Range Status   01/06/2020 95 38 - 145 U/L Final     AST   Date Value Ref Range Status   01/06/2020 30 17 - 59 U/L Final     ALT   Date Value Ref Range Status   01/06/2020 39 10 - 44 U/L Final     Anion Gap   Date Value Ref Range Status   01/06/2020 8 8 - 16 mmol/L Final   10/02/2012 7 5 - 15 meq/L Final     eGFR if    Date Value Ref Range Status   01/06/2020 >60 >60 mL/min/1.73 m^2 Final     eGFR if non    Date Value Ref Range Status   01/06/2020 53 (A) >60 mL/min/1.73 m^2 Final     Comment:     Calculation used to obtain the estimated glomerular filtration  rate (eGFR) is the CKD-EPI equation.        @labrcntip(troponini)@  No results found for: BNP}   Lab Results   Component Value Date    CHOL 98 (L) 01/28/2019    CHOL 82 (L) 07/23/2018    CHOL 98 (L) 03/24/2018     Lab Results   Component Value Date    HDL 25 (L) 01/28/2019    HDL 23 (L) 07/23/2018    HDL 25 (L) 03/24/2018     Lab Results   Component Value Date    LDLCALC 50.8 (L) 01/28/2019    LDLCALC 43.6 (L) 07/23/2018    LDLCALC 56.4 (L) 03/24/2018     Lab Results   Component Value Date    TRIG 111 01/28/2019    TRIG 77 07/23/2018    TRIG 83 03/24/2018     Lab Results   Component Value Date    CHOLHDL 25.5 01/28/2019    CHOLHDL 28.0 07/23/2018    CHOLHDL 25.5 03/24/2018      Last Echo: 4/2019 SE  Last stress test: 4/2019  Prior cardiovascular angiogram: No  ECG: NSR, right axis, rate 88, IRBBB  Fundoscopic exam: 12/2019, no retinopathy    In 3/2019:  [facility] here to establish cardiology services. No heart worries.     Dr. Kingston noted "78 y.o. male with a past medical history " "of paroxysmal atrial fibrillation, hypertension    Patient was previously treated by another cardiologist in the community.  He wanted to establish care in the [facility] system and hence he was here to see me.  He is doing well.  Denies any chest pain, shortness of breath, orthopnea, PND, palpitations, syncope or presyncope.  Today he was in sinus rhythm.    Patient is currently stable from his cardiovascular some perspective.  I have requested all his old notes from Dr. Hubbard's office.  Continue medical management for now.  I have discontinued his digoxin since digoxin is not really helpful in paroxysmal atrial fibrillation.  Follow-up in about 1 year (around 12/17/2019)."      HPI comments: in 3/2020, return for annual review. No heart worries. Some concern of bilateral bicep achyness and soreness over the past year, worse with lifting. Occasional limited left arm raising.     Stress Echo 4/2019 - Normal left ventricular systolic function. The estimated ejection fraction is 65%  Normal LV diastolic function.  No wall motion abnormalities.  Normal right ventricular systolic function.  Mild mitral sclerosis.  There is mild leaflet calcification of the Mitral Valve.  Mild mitral regurgitation.  Mild tricuspid regurgitation.  Normal central venous pressure (3 mm Hg).  The estimated PA systolic pressure is 28 mm Hg  The stress echo portion of this study is negative for myocardial ischemia.  The patient reported no symptoms during the stress test.  There were no arrhythmias during stress.  Overall, the patient's exercise capacity was excellent tolerance. Functional capacity of 11 METS.  The test was stopped secondary to fatigue.  The EKG portion of this study is negative for myocardial ischemia.    Holter - Predominant Rhythm Sinus rhythm with heart rates varying between 53 and 129 bpm with an average of 80 bpm  There were very rare PVCs totalling 59  There were occasional PACs totalling 708  There were 2, 4 beat atrial " runs. No sustained arrhythmias  The diary was not available to review    Review of Systems   Constitution: Positive for malaise/fatigue. Negative for diaphoresis, fever, night sweats and weight gain.   HENT: Negative for nosebleeds and tinnitus.    Eyes: Negative for visual disturbance.   Cardiovascular: Negative for chest pain, claudication, cyanosis, dyspnea on exertion, irregular heartbeat, leg swelling, near-syncope, orthopnea, palpitations and paroxysmal nocturnal dyspnea.   Respiratory: Positive for snoring. Negative for cough, shortness of breath, sleep disturbances due to breathing and wheezing.         Marengo score 10 today 8, awaken refreshed   Endocrine: Negative for polydipsia and polyuria.   Hematologic/Lymphatic: Does not bruise/bleed easily.   Skin: Positive for dry skin and itching. Negative for color change, flushing, nail changes, poor wound healing and suspicious lesions.   Musculoskeletal: Positive for joint pain and stiffness. Negative for arthritis, falls, gout, joint swelling, muscle cramps, muscle weakness and myalgias.   Gastrointestinal: Positive for bloating, constipation and heartburn. Negative for hematemesis, hematochezia, melena and nausea.   Genitourinary: Positive for frequency and nocturia.   Neurological: Positive for excessive daytime sleepiness. Negative for disturbances in coordination, dizziness, focal weakness, headaches, light-headedness, loss of balance, numbness, vertigo and weakness.   Psychiatric/Behavioral: Negative for depression and substance abuse. The patient does not have insomnia and is not nervous/anxious.    Allergic/Immunologic: Positive for environmental allergies.        Objective:    Physical Exam   Constitutional: He is oriented to person, place, and time. He appears well-developed and well-nourished.   HENT:   Head: Normocephalic.   Eyes: Pupils are equal, round, and reactive to light. Conjunctivae and EOM are normal.   Neck: Normal range of motion. Neck  "supple. No JVD present. No thyromegaly present.   Circumference 16"   Cardiovascular: Normal rate, regular rhythm, normal heart sounds and intact distal pulses. Exam reveals no gallop and no friction rub.   No murmur heard.  Pulses:       Carotid pulses are 2+ on the right side, and 2+ on the left side.       Radial pulses are 2+ on the right side, and 2+ on the left side.        Femoral pulses are 2+ on the right side, and 2+ on the left side.       Popliteal pulses are 2+ on the right side, and 2+ on the left side.        Dorsalis pedis pulses are 2+ on the right side, and 2+ on the left side.        Posterior tibial pulses are 2+ on the right side, and 2+ on the left side.   Pulmonary/Chest: Effort normal and breath sounds normal. He has no rales. He exhibits no tenderness.   Diminished breath sounds and prolong expiration.   Abdominal: Soft. Bowel sounds are normal. There is no tenderness.   Waist 40" up to 41.5", hip 39.5"   Musculoskeletal: Normal range of motion. He exhibits no edema.   Lymphadenopathy:     He has no cervical adenopathy.   Neurological: He is alert and oriented to person, place, and time.   Skin: Skin is warm and dry. No rash noted.         Assessment:       1. PAF (paroxysmal atrial fibrillation)    2. Polycythemia vera    3. Elevated TSH    4. Abdominal obesity    5. Essential hypertension    6. Atherosclerosis of native artery of extremity, unspecified extremity, with unspecified presence of clinical manifestation    7. Paroxysmal atrial fibrillation, onset 5/2017, CHADS-VAS score 4    8. Anticoagulant long-term use    9. Dyslipidemia (high LDL; low HDL), baseline     10. Excessive daytime sleepiness    11. Musculoskeletal pain of upper extremity, unspecified laterality         Plan:       Morro was seen today for annual exam.    Diagnoses and all orders for this visit:    PAF (paroxysmal atrial fibrillation)  -     EKG 12-lead  -     TSH; Future  -     T4, free; " Future    Polycythemia vera    Elevated TSH  -     TSH; Future  -     T4, free; Future    Abdominal obesity    Essential hypertension    Atherosclerosis of native artery of extremity, unspecified extremity, with unspecified presence of clinical manifestation  -     Lipid panel; Future  -     atorvastatin (LIPITOR) 40 MG tablet; Take 1 tablet (40 mg total) by mouth once daily. Must keep appointment on 03/18/2020. This will be the LAST Rx if visit is not kept.    Paroxysmal atrial fibrillation, onset 5/2017, CHADS-VAS score 4  -     rivaroxaban (XARELTO) 20 mg Tab; Take 1 tablet (20 mg total) by mouth daily with dinner or evening meal.    Anticoagulant long-term use  -     rivaroxaban (XARELTO) 20 mg Tab; Take 1 tablet (20 mg total) by mouth daily with dinner or evening meal.    Dyslipidemia (high LDL; low HDL), baseline   -     Lipid panel; Future  -     atorvastatin (LIPITOR) 40 MG tablet; Take 1 tablet (40 mg total) by mouth once daily. Must keep appointment on 03/18/2020. This will be the LAST Rx if visit is not kept.    Excessive daytime sleepiness    Musculoskeletal pain of upper extremity, unspecified laterality  -     Ambulatory referral/consult to Physical Medicine Rehab; Future    - All medical issues reviewed, continue current Rx.  - CV status stable, all medications reviewed, patient acknowledge good understanding.  - Have to do some abdominal exercise to rid belly fat  - Recommend healthy living: no nicotine, moderate alcohol, healthy diet and regular exercise aiming for fitness, and weight control  - Need good exercise program, 4 key elements: 1. Aerobic (walking, swimming, dancing, jogging, biking, etc, 2. Muscle strengthening / resistance exercise, need to do 2-3 times weekly, 3. Stretching daily, good stretch takes a whole  total minute. 4. Balance exercise daily.  - Instruction for Mediterranean diet and heart healthy exercise given.  - Check home blood pressure, 2 days weekly, do 2 readings  within 5 minutes in AM and PM, keep log for review with provider. Goal is resting < 130/80  - Weigh twice weekly, try to lose 1-2 lbs per week. Target weight loss of 5%-10%.  - Have to do some abdominal exercise to rid belly fat  - Highly recommend 30 minutes of exercise / activities daily, can have Sunday off, with 2-3 sessions of muscle strengthening weekly. A  would be very helpful.  - Recommend at least annual cardiovascular evaluation in view of patient's significant risk factors.  - Phone review / encourage use of [facility]      Greater than 50% was spent in counseling and coordination of care. The above assessment and plan have been discussed at length. Prior physician's note reviewed. Labs and procedure over the last 6 months reviewed. Problem List updated. Asked to bring in all active medications / pills bottles with next visit.

## 2020-03-12 NOTE — PROGRESS NOTES
" Patient ID:  Morro Sharma is a 79 y.o. male who presents for {Misc; evaluation/follow-up:67809::"follow-up"} of Annual Exam (BP)      Health literacy: {DESC; LOW/MEDIUM/HIGH:44481} High  Activities:  Yard work  Nicotine: former, quit   Alcohol: none  Illicit drugs: none  Cardiac symptoms: none  Home BP: occasional  Medication compliance: yes  Diet: regular, diabetic, Mediterranean regular  Caffeine: 1 cpd  Labs:   Last Echo: 2019  Last stress test: 2019  Cardiovascular angiogram:   EC2019  Fundoscopic exam:     No flowsheet data found.      HPI    ROS     Objective:    Physical Exam      Assessment:       1. PAF (paroxysmal atrial fibrillation)         Plan:         PAF (paroxysmal atrial fibrillation)  -     EKG 12-lead                "

## 2020-03-13 ENCOUNTER — TELEPHONE (OUTPATIENT)
Dept: ORTHOPEDICS | Facility: CLINIC | Age: 80
End: 2020-03-13

## 2020-03-16 DIAGNOSIS — E78.5 DYSLIPIDEMIA (HIGH LDL; LOW HDL): ICD-10-CM

## 2020-03-16 DIAGNOSIS — I48.20 CHRONIC ATRIAL FIBRILLATION: ICD-10-CM

## 2020-03-16 DIAGNOSIS — I10 ESSENTIAL HYPERTENSION: ICD-10-CM

## 2020-03-16 DIAGNOSIS — I70.209 ATHEROSCLEROSIS OF NATIVE ARTERY OF EXTREMITY, UNSPECIFIED EXTREMITY, WITH UNSPECIFIED PRESENCE OF CLINICAL MANIFESTATION: ICD-10-CM

## 2020-03-16 RX ORDER — ATORVASTATIN CALCIUM 40 MG/1
40 TABLET, FILM COATED ORAL DAILY
Qty: 90 TABLET | Refills: 3 | Status: SHIPPED | OUTPATIENT
Start: 2020-03-16 | End: 2020-04-14

## 2020-03-16 RX ORDER — METOPROLOL SUCCINATE 50 MG/1
TABLET, EXTENDED RELEASE ORAL
Qty: 90 TABLET | Refills: 0 | Status: SHIPPED | OUTPATIENT
Start: 2020-03-16 | End: 2020-03-17 | Stop reason: SDUPTHER

## 2020-03-17 ENCOUNTER — OFFICE VISIT (OUTPATIENT)
Dept: FAMILY MEDICINE | Facility: CLINIC | Age: 80
End: 2020-03-17
Attending: FAMILY MEDICINE
Payer: MEDICARE

## 2020-03-17 VITALS
OXYGEN SATURATION: 96 % | BODY MASS INDEX: 28.41 KG/M2 | WEIGHT: 181 LBS | SYSTOLIC BLOOD PRESSURE: 138 MMHG | RESPIRATION RATE: 12 BRPM | HEIGHT: 67 IN | TEMPERATURE: 98 F | HEART RATE: 91 BPM | DIASTOLIC BLOOD PRESSURE: 72 MMHG

## 2020-03-17 DIAGNOSIS — J84.10 CALCIFIED GRANULOMA OF LUNG: ICD-10-CM

## 2020-03-17 DIAGNOSIS — K21.9 GASTROESOPHAGEAL REFLUX DISEASE, ESOPHAGITIS PRESENCE NOT SPECIFIED: ICD-10-CM

## 2020-03-17 DIAGNOSIS — D45 POLYCYTHEMIA VERA: ICD-10-CM

## 2020-03-17 DIAGNOSIS — N40.1 BENIGN PROSTATIC HYPERPLASIA WITH NOCTURIA: ICD-10-CM

## 2020-03-17 DIAGNOSIS — R35.1 BENIGN PROSTATIC HYPERPLASIA WITH NOCTURIA: ICD-10-CM

## 2020-03-17 DIAGNOSIS — I48.0 PAROXYSMAL ATRIAL FIBRILLATION: ICD-10-CM

## 2020-03-17 DIAGNOSIS — Z79.01 ANTICOAGULANT LONG-TERM USE: ICD-10-CM

## 2020-03-17 DIAGNOSIS — I48.20 CHRONIC ATRIAL FIBRILLATION: ICD-10-CM

## 2020-03-17 DIAGNOSIS — E78.5 DYSLIPIDEMIA (HIGH LDL; LOW HDL): ICD-10-CM

## 2020-03-17 DIAGNOSIS — Z00.00 ENCOUNTER FOR PREVENTIVE HEALTH EXAMINATION: Primary | ICD-10-CM

## 2020-03-17 DIAGNOSIS — M79.603 MUSCULOSKELETAL PAIN OF UPPER EXTREMITY, UNSPECIFIED LATERALITY: ICD-10-CM

## 2020-03-17 DIAGNOSIS — I10 ESSENTIAL HYPERTENSION: ICD-10-CM

## 2020-03-17 PROCEDURE — 99999 PR PBB SHADOW E&M-EST. PATIENT-LVL IV: CPT | Mod: PBBFAC,,, | Performed by: FAMILY MEDICINE

## 2020-03-17 PROCEDURE — 3075F PR MOST RECENT SYSTOLIC BLOOD PRESS GE 130-139MM HG: ICD-10-PCS | Mod: CPTII,S$GLB,, | Performed by: FAMILY MEDICINE

## 2020-03-17 PROCEDURE — 99499 RISK ADDL DX/OHS AUDIT: ICD-10-PCS | Mod: S$GLB,,, | Performed by: FAMILY MEDICINE

## 2020-03-17 PROCEDURE — 3078F PR MOST RECENT DIASTOLIC BLOOD PRESSURE < 80 MM HG: ICD-10-PCS | Mod: CPTII,S$GLB,, | Performed by: FAMILY MEDICINE

## 2020-03-17 PROCEDURE — 99214 PR OFFICE/OUTPT VISIT, EST, LEVL IV, 30-39 MIN: ICD-10-PCS | Mod: S$GLB,,, | Performed by: FAMILY MEDICINE

## 2020-03-17 PROCEDURE — 3075F SYST BP GE 130 - 139MM HG: CPT | Mod: CPTII,S$GLB,, | Performed by: FAMILY MEDICINE

## 2020-03-17 PROCEDURE — 99499 UNLISTED E&M SERVICE: CPT | Mod: S$GLB,,, | Performed by: FAMILY MEDICINE

## 2020-03-17 PROCEDURE — 3078F DIAST BP <80 MM HG: CPT | Mod: CPTII,S$GLB,, | Performed by: FAMILY MEDICINE

## 2020-03-17 PROCEDURE — 99999 PR PBB SHADOW E&M-EST. PATIENT-LVL IV: ICD-10-PCS | Mod: PBBFAC,,, | Performed by: FAMILY MEDICINE

## 2020-03-17 PROCEDURE — 99214 OFFICE O/P EST MOD 30 MIN: CPT | Mod: S$GLB,,, | Performed by: FAMILY MEDICINE

## 2020-03-17 RX ORDER — METOPROLOL SUCCINATE 50 MG/1
50 TABLET, EXTENDED RELEASE ORAL DAILY
Qty: 90 TABLET | Refills: 3 | Status: SHIPPED | OUTPATIENT
Start: 2020-03-17 | End: 2021-03-02

## 2020-03-17 NOTE — PROGRESS NOTES
Subjective:       Patient ID: Morro Sharma is a 79 y.o. male.    Chief Complaint: Medication Refill and Gastroesophageal Reflux    [age] year old male coming in for physical exam.  He is being followed for BPH by Urology who has ordered a PSA not yet done, is also being followed for atrial fibrillation by Cardiology who has ordered a lipid panel and a repeat TSH also not yet done.  The TSH was a recheck of a previous one done several years ago with a mild elevation, borderline hypothyroid.  He recently had a CMP and CBC with Xiang Morin in Hematology who follows him for polycythemia vera.  He is schedule for another CMP and CBC April 6th and we will try to tie the lipid, TSH, and PSA to that lab in get everything done at one time.  He feels well and has no active complaints at this time.  He has been seeing physical therapy for bilateral upper arm pain describing pain more in the mid arm over the muscle area not tendon or arthritis.  He also states that he is having pain over the thighs that is similar to what he has in the arms and is noted to be on Lipitor.  The physical therapy so far is not helping much with his discomfort.  He has no radicular symptoms and no neuralgia or neuropathy symptoms.  He has no fever and no respiratory illness.    Past Medical History:  05/07/2017: Atrial fibrillation      Comment:  [facility] records sent to scanning  No date: BPH (benign prostatic hypertrophy)  No date: Colon polyps  No date: Erectile dysfunction  No date: GERD (gastroesophageal reflux disease)  No date: Hypertension  No date: Pneumothorax on left  No date: Polycythemia vera  No date: Pulmonary nodules    Past Surgical History:  9/15/14: COLONOSCOPY      Comment:  Dr. Mcdaniels, 5 year recheck  No date: ESOPHAGOGASTRODUODENOSCOPY  No date: LUNG SURGERY      Comment:  LEFT - CHEST TUBE    Review of patient's family history indicates:  Problem: Hypertension      Relation: Mother          Age of Onset: (Not  Specified)  Problem: Heart disease      Relation: Mother          Age of Onset: (Not Specified)  Problem: Hypertension      Relation: Maternal Aunt          Age of Onset: (Not Specified)  Problem: Heart disease      Relation: Maternal Aunt          Age of Onset: (Not Specified)  Problem: Hypertension      Relation: Maternal Uncle          Age of Onset: (Not Specified)  Problem: Heart disease      Relation: Maternal Uncle          Age of Onset: (Not Specified)  Problem: Early death      Relation: Father          Age of Onset: (Not Specified)  Problem: No Known Problems      Relation: Daughter          Age of Onset: (Not Specified)  Problem: No Known Problems      Relation: Daughter          Age of Onset: (Not Specified)  Problem: No Known Problems      Relation: Daughter          Age of Onset: (Not Specified)  Problem: No Known Problems      Relation: Daughter          Age of Onset: (Not Specified)  Problem: Prostate cancer      Relation: Neg Hx          Age of Onset: (Not Specified)  Problem: Kidney cancer      Relation: Neg Hx          Age of Onset: (Not Specified)  Problem: Cancer      Relation: Neg Hx          Age of Onset: (Not Specified)  Problem: Urolithiasis      Relation: Neg Hx          Age of Onset: (Not Specified)  Problem: Melanoma      Relation: Neg Hx          Age of Onset: (Not Specified)  Problem: Psoriasis      Relation: Neg Hx          Age of Onset: (Not Specified)  Problem: Lupus      Relation: Neg Hx          Age of Onset: (Not Specified)  Problem: Eczema      Relation: Neg Hx          Age of Onset: (Not Specified)    Social History    Tobacco Use      Smoking status: Former Smoker        Packs/day: 1.50        Years: 25.00        Pack years: 37.5        Quit date: 10/2/1982        Years since quittin.4      Smokeless tobacco: Never Used    Alcohol use: No      Frequency: Monthly or less      Drinks per session: 1 or 2      Binge frequency: Never    Drug use: No    Current Outpatient  Medications on File Prior to Visit:  acetaminophen (TYLENOL) 500 MG tablet, Take 1 tablet (500 mg total) by mouth 2 (two) times daily. For shoulder pain, Disp: 60 tablet, Rfl: 11  atorvastatin (LIPITOR) 40 MG tablet, Take 1 tablet (40 mg total) by mouth once daily., Disp: 90 tablet, Rfl: 3  glycopyrrolate (ROBINUL) 1 mg Tab, TAKE ONE TABLET BY MOUTH TWICE DAILY. MAY TAKE UP TO THREE TIMES DAILY IF needed, Disp: 90 tablet, Rfl: 5  hydroxyurea (HYDREA) 500 mg Cap, Take 1 capsule (500 mg total) by mouth 2 (two) times daily., Disp: 180 capsule, Rfl: 1  multivitamin (ONE DAILY MULTIVITAMIN) per tablet, Take 1 tablet by mouth once daily., Disp: , Rfl:   psyllium husk (METAMUCIL ORAL), Take 1 capsule by mouth once daily., Disp: , Rfl:   rivaroxaban (XARELTO) 20 mg Tab, Take 1 tablet (20 mg total) by mouth daily with dinner or evening meal., Disp: 90 tablet, Rfl: 3  tamsulosin (FLOMAX) 0.4 mg Cap, Take 1 capsule (0.4 mg total) by mouth once daily., Disp: 90 capsule, Rfl: 3  triamcinolone acetonide 0.1% (KENALOG) 0.1 % cream, APPLY TO AFFECTED AREA TWICE DAILY (Patient taking differently: Apply topically as needed. ), Disp: 80 g, Rfl: 0  (DISCONTINUED) metoprolol succinate (TOPROL-XL) 50 MG 24 hr tablet, TAKE ONE TABLET BY MOUTH ONCE DAILY, Disp: 90 tablet, Rfl: 0  tadalafil (CIALIS) 5 MG tablet, Take 1 tablet (5 mg total) by mouth once daily. (Patient not taking: Reported on 3/17/2020), Disp: 30 tablet, Rfl: 11  (DISCONTINUED) polyethylene glycol (MIRALAX) 17 gram/dose powder, Take 17 g by mouth once daily., Disp: , Rfl:   (DISCONTINUED) valsartan (DIOVAN) 160 MG tablet, Take 1 tablet (160 mg total) by mouth once daily., Disp: 30 tablet, Rfl: 3    No current facility-administered medications on file prior to visit.     TETANUS VACCINE due on 12/17/1958  Shingles Vaccine(1 of 2) due on 12/17/1990      Review of Systems   Constitutional: Negative for activity change, chills, fatigue and fever.   HENT: Negative for congestion,  ear pain, rhinorrhea and sore throat.    Eyes: Negative for visual disturbance.   Respiratory: Negative for cough, chest tightness and shortness of breath.    Cardiovascular: Negative for chest pain and palpitations.   Gastrointestinal: Negative for abdominal pain, blood in stool, constipation, diarrhea, nausea and vomiting.   Genitourinary: Negative for difficulty urinating, dysuria and hematuria.   Musculoskeletal: Positive for myalgias (Proximal upper and lower extremities). Negative for arthralgias and neck pain.   Skin: Negative for rash.   Neurological: Negative for dizziness and headaches.   Psychiatric/Behavioral: Negative for sleep disturbance.       Objective:      Physical Exam   Constitutional: He is oriented to person, place, and time. He appears well-developed and well-nourished. No distress.   Good blood pressure control  Normal weight with a BMI of 28.4 he is up 0.7 lb from his February 22, 2019 visit   HENT:   Head: Normocephalic and atraumatic.   Right Ear: External ear normal.   Left Ear: External ear normal.   Nose: Nose normal.   Mouth/Throat: Oropharynx is clear and moist. No oropharyngeal exudate.   Eyes: Pupils are equal, round, and reactive to light. Conjunctivae and EOM are normal. No scleral icterus.   Neck: Normal range of motion. Neck supple. No JVD present. No tracheal deviation present. No thyromegaly present.   Cardiovascular: Normal rate, regular rhythm and normal heart sounds. Exam reveals no gallop and no friction rub.   No murmur heard.  Carotid pulses 2+ no bruit   Pulmonary/Chest: Effort normal and breath sounds normal. No stridor. No respiratory distress. He has no wheezes. He has no rales. He exhibits no tenderness.   Abdominal: Soft. Bowel sounds are normal. He exhibits no distension and no mass. There is no tenderness. There is no rebound and no guarding. No hernia.   Genitourinary:   Genitourinary Comments: Declined exam, he will be following up with Sharlene Bronson in the  near future   Musculoskeletal: Normal range of motion. He exhibits no edema.   Lymphadenopathy:     He has no cervical adenopathy.   Neurological: He is alert and oriented to person, place, and time. He has normal reflexes. He displays normal reflexes. No cranial nerve deficit or sensory deficit. He exhibits normal muscle tone.   Skin: Skin is warm and dry. No rash noted. He is not diaphoretic. No erythema.   Psychiatric: He has a normal mood and affect. His behavior is normal. Thought content normal.   Nursing note and vitals reviewed.      Assessment:       1. Encounter for preventive health examination    2. Essential hypertension    3. Paroxysmal atrial fibrillation, onset 5/2017, CHADS-VAS score 4    4. Dyslipidemia (high LDL; low HDL), baseline     5. Calcified granuloma of lung    6. Musculoskeletal pain of upper extremity, unspecified laterality    7. Benign prostatic hyperplasia with nocturia    8. Chronic atrial fibrillation    9. Gastroesophageal reflux disease, esophagitis presence not specified    10. Polycythemia vera    11. Anticoagulant long-term use    12. BMI 28.0-28.9,adult        Plan:       1. Encounter for preventive health examination  Normal exam    2. Essential hypertension  Good control, no changes needed  - metoprolol succinate (TOPROL-XL) 50 MG 24 hr tablet; Take 1 tablet (50 mg total) by mouth once daily.  Dispense: 90 tablet; Refill: 3    3. Paroxysmal atrial fibrillation, onset 5/2017, CHADS-VAS score 4  Currently normal sinus rhythm continue metoprolol    4. Dyslipidemia (high LDL; low HDL), baseline   Recommended that patient hold his atorvastatin for two weeks and see if his myalgias symptoms improve, then resume taking it and see if they recur.  If so we can try another agent such as Crestor or possibly pravastatin otherwise await lab results ordered by Dr. de souza    5. Calcified granuloma of lung  Stable    6. Musculoskeletal pain of upper extremity, unspecified  laterality  See above    7. Benign prostatic hyperplasia with nocturia  Stable, followed by urology    8. Chronic atrial fibrillation  - metoprolol succinate (TOPROL-XL) 50 MG 24 hr tablet; Take 1 tablet (50 mg total) by mouth once daily.  Dispense: 90 tablet; Refill: 3    9. Gastroesophageal reflux disease, esophagitis presence not specified  Asymptomatic currently    10. Polycythemia vera  Lab Results   Component Value Date    WBC 6.95 01/06/2020    HGB 14.8 01/06/2020    HCT 46.9 01/06/2020     (H) 01/06/2020     (H) 01/06/2020       Followed by hematology    11. Anticoagulant long-term use  Stable with no bleeding problems    12. BMI 28.0-28.9,adult  Good weight with no changes needed

## 2020-03-26 ENCOUNTER — PATIENT MESSAGE (OUTPATIENT)
Dept: DERMATOLOGY | Facility: CLINIC | Age: 80
End: 2020-03-26

## 2020-03-26 DIAGNOSIS — R61 HYPERHIDROSIS: ICD-10-CM

## 2020-03-26 RX ORDER — GLYCOPYRROLATE 1 MG/1
TABLET ORAL
Qty: 90 TABLET | Refills: 5 | Status: ON HOLD | OUTPATIENT
Start: 2020-03-26 | End: 2020-12-23 | Stop reason: HOSPADM

## 2020-03-30 ENCOUNTER — TELEPHONE (OUTPATIENT)
Dept: HEMATOLOGY/ONCOLOGY | Facility: CLINIC | Age: 80
End: 2020-03-30

## 2020-03-30 NOTE — TELEPHONE ENCOUNTER
Spoke w pt.  Pt is to discuss w wife, and attempt to download and set up [place] nabila on phone, for virtual visit.  PT is to call clinic, and let us know if he can do a virtual visit or telephone call.

## 2020-04-03 ENCOUNTER — PATIENT MESSAGE (OUTPATIENT)
Dept: FAMILY MEDICINE | Facility: CLINIC | Age: 80
End: 2020-04-03

## 2020-04-03 DIAGNOSIS — Z79.01 ANTICOAGULANT LONG-TERM USE: ICD-10-CM

## 2020-04-03 DIAGNOSIS — I48.0 PAROXYSMAL ATRIAL FIBRILLATION: ICD-10-CM

## 2020-04-03 NOTE — TELEPHONE ENCOUNTER
High would suggest that we switch to Crestor which tends to be a little bit less prone to side effects and start it every other day for the 1st month or two then increase to daily if no problems at that point

## 2020-04-06 ENCOUNTER — PATIENT OUTREACH (OUTPATIENT)
Dept: ADMINISTRATIVE | Facility: OTHER | Age: 80
End: 2020-04-06

## 2020-04-07 ENCOUNTER — OFFICE VISIT (OUTPATIENT)
Dept: UROLOGY | Facility: CLINIC | Age: 80
End: 2020-04-07
Payer: MEDICARE

## 2020-04-07 DIAGNOSIS — R35.1 BENIGN PROSTATIC HYPERPLASIA WITH NOCTURIA: Primary | ICD-10-CM

## 2020-04-07 DIAGNOSIS — N40.1 BENIGN PROSTATIC HYPERPLASIA WITH NOCTURIA: Primary | ICD-10-CM

## 2020-04-07 DIAGNOSIS — N52.9 ERECTILE DYSFUNCTION, UNSPECIFIED ERECTILE DYSFUNCTION TYPE: ICD-10-CM

## 2020-04-07 PROCEDURE — 1159F MED LIST DOCD IN RCRD: CPT | Mod: ,,, | Performed by: NURSE PRACTITIONER

## 2020-04-07 PROCEDURE — 99213 OFFICE O/P EST LOW 20 MIN: CPT | Mod: 95,,, | Performed by: NURSE PRACTITIONER

## 2020-04-07 PROCEDURE — 1159F PR MEDICATION LIST DOCUMENTED IN MEDICAL RECORD: ICD-10-PCS | Mod: ,,, | Performed by: NURSE PRACTITIONER

## 2020-04-07 PROCEDURE — 1101F PR PT FALLS ASSESS DOC 0-1 FALLS W/OUT INJ PAST YR: ICD-10-PCS | Mod: CPTII,,, | Performed by: NURSE PRACTITIONER

## 2020-04-07 PROCEDURE — 1101F PT FALLS ASSESS-DOCD LE1/YR: CPT | Mod: CPTII,,, | Performed by: NURSE PRACTITIONER

## 2020-04-07 PROCEDURE — 99213 PR OFFICE/OUTPT VISIT, EST, LEVL III, 20-29 MIN: ICD-10-PCS | Mod: 95,,, | Performed by: NURSE PRACTITIONER

## 2020-04-07 RX ORDER — TADALAFIL 20 MG/1
20 TABLET ORAL
Qty: 6 TABLET | Refills: 4 | Status: SHIPPED | OUTPATIENT
Start: 2020-04-07 | End: 2020-04-10 | Stop reason: SDUPTHER

## 2020-04-07 NOTE — PROGRESS NOTES
[facility] Note  Staff: GABY Meyer    PCP: Dr. Yaniv Escobar    This is a telemedicine visit performed during the COVID 19 isolation    The patient location is: Home  The chief complaint leading to consultation is: BPH w/LUTS and Erectile dysfunction history-F/UP  Visit type: Virtual visit with synchronous audio and video  Total time spent with patient: 10-15 minutes  Each patient to whom he or she provides medical services by telemedicine is:  (1) informed of the relationship between the physician and patient and the respective role of any other health care provider with respect to management of the patient; and (2) notified that he or she may decline to receive medical services by telemedicine and may withdraw from such care at any time.  Reason for telemed visit: Covid Concern      Chief Complaint: [id number]-dk-[id number] with LUTS and ED issues    Subjective:        HPI: Morro Sharma is a 79 y.o. male presents today via virtual visit for routine recheck of his lower urinary tract symptoms.       Pt was last seen by Dr. Cleveland on 03/23/2018 for BPH f/up visit.      During last ov with me on 3/2/2020 he was out of his bladder/prostate meds for the past six months prior.  Therefore after last visit I restarted pt on Flomax 0.4 mg one tablet daily in evening and he is currently doing well with this medication at this time.     HISTORY OF PRESENT ILLNESS:  Mr. Sharma is a 79-year-old male who has a history   of lower urinary tract symptoms secondary to BPH.  The patient referred that he   is doing well.  At the present time, he is being medicated with tamsulosin 0.4   mg p.o. daily.  The patient referred to have nocturia x2.  No dysuria.  No gross  hematuria.  The flow is adequate and he feels that he empties the bladder   satisfactorily.  Denies any side effects from the current medications.     FAMILY HISTORY:  A couple of cousins of him have developed prostate cancer.     The past  medical history and past surgical histories have changed in May 2017.    He had a fainting episode and was taken to the Emergency Room and he was   diagnosed of having atrial fibrillation and since then, the patient has been   anticoagulated with Xarelto.    The last PSA was on 01/30/2017, 1.6.     The patient is satisfied in the way that Flomax is controlling his lower urinary   tract symptoms with no problems noted.    Pt does mention today that he never heard from pharmacy or his RX insurance company in regards to the Cialis prn for erectile dysfunction issues at this time.     REVIEW OF SYSTEMS:  A comprehensive 10 system review was performed and is negative except as noted above in HPI    PMHx:  Past Medical History:   Diagnosis Date    Atrial fibrillation 05/07/2017    [facility] sent to scanning    BPH (benign prostatic hypertrophy)     Colon polyps     Erectile dysfunction     GERD (gastroesophageal reflux disease)     Hypertension     Pneumothorax on left     Polycythemia vera     Pulmonary nodules      PSHx:  Past Surgical History:   Procedure Laterality Date    COLONOSCOPY  9/15/14    Dr. Mcdaniels, 5 year recheck    ESOPHAGOGASTRODUODENOSCOPY      LUNG SURGERY      LEFT - CHEST TUBE     Allergies:  Patient has no known allergies.    Medications: reviewed   Objective:   There were no vitals filed for this visit.    General:WDWN in NAD  Eyes: PERRLA, normal conjunctiva  Respiratory: no increased work on breathing, clear to auscultation  Cardiovascular: regular rate and rhythm. No obvious extremity edema.  GI: palpation of masses. No tenderness. No hepatosplenomegaly to palpation.  Musculoskeletal: normal range of motion of bilateral upper extremities. Normal muscle strength and tone.  Skin: no obvious rashes or lesions. No tightening of skin noted.  Neurologic: CN grossly normal. Normal sensation.   Psychiatric: awake, alert and oriented x 3. Mood and affect normal. Cooperative.    Assessment:        1. Benign prostatic hyperplasia with nocturia    2. Erectile dysfunction, unspecified erectile dysfunction type          Plan:   BPH with LUTS, ED issues:    Pt will continue Flomax 0.4 mg one tablet daily as previously prescribed.  New RX of Cialis 20 mg prn for ED issues sent through to pt's pharmacy at this time.  Benefits, risks and side affects were thoroughly explained to pt today during vv with all questions answered.    F/u with me in 3 months for CHEMA and PVR check, with PSA level to be performed prior to visit.    This patient was evaluated during the COVID-19 pandemic. Work up/disposition may be affected by resource limitations and concerns regarding nosocomial infections.        [facility]: Active    Sharleen Zimmerman, PREETHI-C

## 2020-04-09 ENCOUNTER — TELEPHONE (OUTPATIENT)
Dept: UROLOGY | Facility: CLINIC | Age: 80
End: 2020-04-09

## 2020-04-09 NOTE — TELEPHONE ENCOUNTER
Called pt to inform about Cialis Rx. Pt did not answer so I left a message for him to call the office back.

## 2020-04-09 NOTE — TELEPHONE ENCOUNTER
I talked to pt and he said he would do the prescription to [facility] and he would pay for the pills individually

## 2020-04-09 NOTE — TELEPHONE ENCOUNTER
"Please call and inform pt of following:    [facility] DENIED the Cialis prescription at this time and other ED meds are not covered through his plan at this time.    However, another option is:  I can send prescription through as "out of pocket" fee it through a pharmacy in [place] called [facility].  The pharmacy charges around $9.75 per pill for Cialis 20 mg.  You would take this as needed only not daily.    Therefore a 30-Day prescription would be $97.50 or you can just tell us how many pills you would like per month and I will be more than happy to send it in for him.    Just let me know and I will put in prescription for him if he is interested.  Thanks.  "

## 2020-04-09 NOTE — PROGRESS NOTES
Prior Authorization completed by me this am through [employer] in regards to pt's RX-Cialis 20 mg prn ED.    We will contact pt once we get the results.

## 2020-04-10 RX ORDER — TADALAFIL 20 MG/1
20 TABLET ORAL
Qty: 10 TABLET | Refills: 4 | Status: ON HOLD | OUTPATIENT
Start: 2020-04-10 | End: 2020-12-23 | Stop reason: HOSPADM

## 2020-04-13 ENCOUNTER — PATIENT MESSAGE (OUTPATIENT)
Dept: FAMILY MEDICINE | Facility: CLINIC | Age: 80
End: 2020-04-13

## 2020-04-13 DIAGNOSIS — E78.5 HYPERLIPIDEMIA, UNSPECIFIED HYPERLIPIDEMIA TYPE: Primary | ICD-10-CM

## 2020-04-14 DIAGNOSIS — D47.3 ESSENTIAL THROMBOCYTOSIS: ICD-10-CM

## 2020-04-14 RX ORDER — HYDROXYUREA 500 MG/1
500 CAPSULE ORAL 2 TIMES DAILY
Qty: 180 CAPSULE | Refills: 1 | Status: ON HOLD | OUTPATIENT
Start: 2020-04-14 | End: 2020-05-15 | Stop reason: HOSPADM

## 2020-04-14 RX ORDER — ROSUVASTATIN CALCIUM 20 MG/1
20 TABLET, COATED ORAL EVERY OTHER DAY
Qty: 45 TABLET | Refills: 1 | Status: SHIPPED | OUTPATIENT
Start: 2020-04-14 | End: 2020-07-21 | Stop reason: DRUGHIGH

## 2020-04-14 NOTE — TELEPHONE ENCOUNTER
Done, take every other day for the 1st three months.  I have an order in for a lipid and liver panel to be done at that time.  If no problems with muscle pain and if cholesterol control is not quite satisfactory we may go to every day at that time.

## 2020-05-05 ENCOUNTER — PATIENT MESSAGE (OUTPATIENT)
Dept: FAMILY MEDICINE | Facility: CLINIC | Age: 80
End: 2020-05-05

## 2020-05-05 ENCOUNTER — PATIENT MESSAGE (OUTPATIENT)
Dept: ADMINISTRATIVE | Facility: HOSPITAL | Age: 80
End: 2020-05-05

## 2020-05-06 ENCOUNTER — HOSPITAL ENCOUNTER (OUTPATIENT)
Dept: RADIOLOGY | Facility: CLINIC | Age: 80
Discharge: HOME | End: 2020-05-06
Attending: FAMILY MEDICINE
Payer: MEDICARE

## 2020-05-06 ENCOUNTER — OFFICE VISIT (OUTPATIENT)
Dept: FAMILY MEDICINE | Facility: CLINIC | Age: 80
End: 2020-05-06
Attending: FAMILY MEDICINE
Payer: MEDICARE

## 2020-05-06 ENCOUNTER — DOCUMENTATION ONLY (OUTPATIENT)
Dept: FAMILY MEDICINE | Facility: CLINIC | Age: 80
End: 2020-05-06

## 2020-05-06 VITALS
OXYGEN SATURATION: 98 % | TEMPERATURE: 99 F | SYSTOLIC BLOOD PRESSURE: 144 MMHG | HEIGHT: 67 IN | HEART RATE: 99 BPM | BODY MASS INDEX: 27.75 KG/M2 | RESPIRATION RATE: 12 BRPM | WEIGHT: 176.81 LBS | DIASTOLIC BLOOD PRESSURE: 68 MMHG

## 2020-05-06 DIAGNOSIS — Z87.891 HISTORY OF SMOKING 25-50 PACK YEARS: ICD-10-CM

## 2020-05-06 DIAGNOSIS — Z79.01 ANTICOAGULANT LONG-TERM USE: ICD-10-CM

## 2020-05-06 DIAGNOSIS — R06.02 SHORTNESS OF BREATH: Primary | ICD-10-CM

## 2020-05-06 DIAGNOSIS — E78.5 DYSLIPIDEMIA (HIGH LDL; LOW HDL): ICD-10-CM

## 2020-05-06 DIAGNOSIS — I48.0 PAROXYSMAL ATRIAL FIBRILLATION: ICD-10-CM

## 2020-05-06 DIAGNOSIS — R06.02 SHORTNESS OF BREATH: ICD-10-CM

## 2020-05-06 DIAGNOSIS — I10 ESSENTIAL HYPERTENSION: ICD-10-CM

## 2020-05-06 DIAGNOSIS — R42 DIZZINESS: ICD-10-CM

## 2020-05-06 DIAGNOSIS — R53.83 FATIGUE, UNSPECIFIED TYPE: ICD-10-CM

## 2020-05-06 DIAGNOSIS — D45 POLYCYTHEMIA VERA: ICD-10-CM

## 2020-05-06 DIAGNOSIS — R23.1 PALE: ICD-10-CM

## 2020-05-06 DIAGNOSIS — J84.10 CALCIFIED GRANULOMA OF LUNG: ICD-10-CM

## 2020-05-06 PROCEDURE — 71046 X-RAY EXAM CHEST 2 VIEWS: CPT | Mod: 26,,, | Performed by: RADIOLOGY

## 2020-05-06 PROCEDURE — 1101F PT FALLS ASSESS-DOCD LE1/YR: CPT | Mod: CPTII,S$GLB,, | Performed by: FAMILY MEDICINE

## 2020-05-06 PROCEDURE — 3077F PR MOST RECENT SYSTOLIC BLOOD PRESSURE >= 140 MM HG: ICD-10-PCS | Mod: CPTII,S$GLB,, | Performed by: FAMILY MEDICINE

## 2020-05-06 PROCEDURE — 1159F PR MEDICATION LIST DOCUMENTED IN MEDICAL RECORD: ICD-10-PCS | Mod: S$GLB,,, | Performed by: FAMILY MEDICINE

## 2020-05-06 PROCEDURE — 3077F SYST BP >= 140 MM HG: CPT | Mod: CPTII,S$GLB,, | Performed by: FAMILY MEDICINE

## 2020-05-06 PROCEDURE — 1126F AMNT PAIN NOTED NONE PRSNT: CPT | Mod: S$GLB,,, | Performed by: FAMILY MEDICINE

## 2020-05-06 PROCEDURE — 1159F MED LIST DOCD IN RCRD: CPT | Mod: S$GLB,,, | Performed by: FAMILY MEDICINE

## 2020-05-06 PROCEDURE — 3078F DIAST BP <80 MM HG: CPT | Mod: CPTII,S$GLB,, | Performed by: FAMILY MEDICINE

## 2020-05-06 PROCEDURE — 99499 RISK ADDL DX/OHS AUDIT: ICD-10-PCS | Mod: S$GLB,,, | Performed by: FAMILY MEDICINE

## 2020-05-06 PROCEDURE — 99499 UNLISTED E&M SERVICE: CPT | Mod: S$GLB,,, | Performed by: FAMILY MEDICINE

## 2020-05-06 PROCEDURE — 99214 OFFICE O/P EST MOD 30 MIN: CPT | Mod: S$GLB,,, | Performed by: FAMILY MEDICINE

## 2020-05-06 PROCEDURE — 3078F PR MOST RECENT DIASTOLIC BLOOD PRESSURE < 80 MM HG: ICD-10-PCS | Mod: CPTII,S$GLB,, | Performed by: FAMILY MEDICINE

## 2020-05-06 PROCEDURE — 71046 X-RAY EXAM CHEST 2 VIEWS: CPT | Mod: TC,FY,PO

## 2020-05-06 PROCEDURE — 99214 PR OFFICE/OUTPT VISIT, EST, LEVL IV, 30-39 MIN: ICD-10-PCS | Mod: S$GLB,,, | Performed by: FAMILY MEDICINE

## 2020-05-06 PROCEDURE — 71046 XR CHEST PA AND LATERAL: ICD-10-PCS | Mod: 26,,, | Performed by: RADIOLOGY

## 2020-05-06 PROCEDURE — 99999 PR PBB SHADOW E&M-EST. PATIENT-LVL IV: CPT | Mod: PBBFAC,,, | Performed by: FAMILY MEDICINE

## 2020-05-06 PROCEDURE — 99999 PR PBB SHADOW E&M-EST. PATIENT-LVL IV: ICD-10-PCS | Mod: PBBFAC,,, | Performed by: FAMILY MEDICINE

## 2020-05-06 PROCEDURE — 1101F PR PT FALLS ASSESS DOC 0-1 FALLS W/OUT INJ PAST YR: ICD-10-PCS | Mod: CPTII,S$GLB,, | Performed by: FAMILY MEDICINE

## 2020-05-06 PROCEDURE — 1126F PR PAIN SEVERITY QUANTIFIED, NO PAIN PRESENT: ICD-10-PCS | Mod: S$GLB,,, | Performed by: FAMILY MEDICINE

## 2020-05-06 RX ORDER — ATORVASTATIN CALCIUM 40 MG/1
TABLET, FILM COATED ORAL
COMMUNITY
Start: 2020-04-20 | End: 2020-07-21

## 2020-05-06 NOTE — PATIENT INSTRUCTIONS
Controlling High Blood Pressure  High blood pressure (hypertension) is often called the silent killer. This is because many people who have it dont know it. High blood pressure is defined as 140/90 mm Hg or higher. Know your blood pressure and remember to check it regularly. Doing so can save your life. Here are some things you can do to help control your blood pressure.    Choose heart-healthy foods  · Select low-salt, low-fat foods. Limit sodium intake to 2,400 mg per day or the amount suggested by your healthcare provider.  · Limit canned, dried, cured, packaged, and fast foods. These can contain a lot of salt.  · Eat 8 to 10 servings of fruits and vegetables every day.  · Choose lean meats, fish, or chicken.  · Eat whole-grain pasta, brown rice, and beans.  · Eat 2 to 3 servings of low-fat or fat-free dairy products.  · Ask your doctor about the DASH eating plan. This plan helps reduce blood pressure.  · When you go to a restaurant, ask that your meal be prepared with no added salt.  Maintain a healthy weight  · Ask your healthcare provider how many calories to eat a day. Then stick to that number.  · Ask your healthcare provider what weight range is healthiest for you. If you are overweight, a weight loss of only 3% to 5% of your body weight can help lower blood pressure. Generally, a good weight loss goal is to lose 10% of your body weight in a year.  · Limit snacks and sweets.  · Get regular exercise.  Get up and get active  · Choose activities you enjoy. Find ones you can do with friends or family. This includes bicycling, dancing, walking, and jogging.  · Park farther away from building entrances.  · Use stairs instead of the elevator.  · When you can, walk or bike instead of driving.  · [place] leaves, garden, or do household repairs.  · Be active at a moderate to vigorous level of physical activity for at least 40 minutes for a minimum of 3 to 4 days a week.   Manage stress  · Make time to relax and enjoy  life. Find time to laugh.  · Communicate your concerns with your loved ones and your healthcare provider.  · Visit with family and friends, and keep up with hobbies.  Limit alcohol and quit smoking  · Men should have no more than 2 drinks per day.  · Women should have no more than 1 drink per day.  · Talk with your healthcare provider about quitting smoking. Smoking significantly increases your risk for heart disease and stroke. Ask your healthcare provider about community smoking cessation programs and other options.  Medicines  If lifestyle changes arent enough, your healthcare provider may prescribe high blood pressure medicine. Take all medicines as prescribed. If you have any questions about your medicines, ask your healthcare provider before stopping or changing them.   Date Last Reviewed: 4/27/2016  © 7156-9590 [employer]. [address] [place] [address], [place] [address]. All rights reserved. This information is not intended as a substitute for professional medical care. Always follow your healthcare professional's instructions.

## 2020-05-06 NOTE — PROGRESS NOTES
Pre-Visit Chart Review  For Appointment Scheduled on 5-6-20    Health Maintenance Due   Topic Date Due    TETANUS VACCINE  12/17/1958

## 2020-05-06 NOTE — PROGRESS NOTES
Subjective:       Patient ID: Morro Sharma is a 79 y.o. male.    Chief Complaint: Leg Pain; Shortness of Breath (when wearing mask doing yard work ); and Dizziness    79-year-old male comes in originally for some left leg pain but that has now resolved.  He reports he has been feeling off balance but has not fallen with a sensation of dizziness but not vertigo associated with some shortness of breath but no chest pain or tightness.  He does yd work frequently and usually wears a mask when he does so but lately he has noted increased shortness of breath with use of the mask.  He feels is if he has some difficulty inhaling like there was a restriction on the lungs.  He use to see Dr. Avelar where he was followed for some stable pulmonary nodules and some pulmonary fibrosis that was actually reported to be resolving but has not been evaluated in quite some time.  His last CT scan was in 2012.  He has additional history of atrial fibrillation, hypertension, polycythemia vera with occasional phlebotomies, a left pneumothorax with a chest tube placement, reflux, colon polyps, BPH and hyperlipidemia.  He was taking Crestor for his cholesterol and stop taking it a few weeks ago which was when he had improvement in the leg pain.  He is not taking it currently.  He has no fever chills or night sweats, no chest pain, no diaphoresis, no unusual weakness.  He has not noted any change in bowel habits but he has been having some black stool occasionally.  He has no urinary changes with no blood in the urine no cloudiness, no smoking color and no odor.  He is a former smoker having quit 35 years ago with about a 37 pack year smoking history prior to that.    Past Medical History:  05/07/2017: Atrial fibrillation      Comment:  [facility] records sent to scanning  No date: BPH (benign prostatic hypertrophy)  No date: Colon polyps  No date: Erectile dysfunction  No date: GERD (gastroesophageal reflux disease)  No date: Hypertension  No  date: Pneumothorax on left  No date: Polycythemia vera  No date: Pulmonary nodules    Past Surgical History:  9/15/14: COLONOSCOPY      Comment:  Dr. Mcdaniels, 5 year recheck  No date: ESOPHAGOGASTRODUODENOSCOPY  No date: LUNG SURGERY      Comment:  LEFT - CHEST TUBE    Review of patient's family history indicates:  Problem: Hypertension      Relation: Mother          Age of Onset: (Not Specified)  Problem: Heart disease      Relation: Mother          Age of Onset: (Not Specified)  Problem: Hypertension      Relation: Maternal Aunt          Age of Onset: (Not Specified)  Problem: Heart disease      Relation: Maternal Aunt          Age of Onset: (Not Specified)  Problem: Hypertension      Relation: Maternal Uncle          Age of Onset: (Not Specified)  Problem: Heart disease      Relation: Maternal Uncle          Age of Onset: (Not Specified)  Problem: Early death      Relation: Father          Age of Onset: (Not Specified)  Problem: No Known Problems      Relation: Daughter          Age of Onset: (Not Specified)  Problem: No Known Problems      Relation: Daughter          Age of Onset: (Not Specified)  Problem: No Known Problems      Relation: Daughter          Age of Onset: (Not Specified)  Problem: No Known Problems      Relation: Daughter          Age of Onset: (Not Specified)  Problem: Prostate cancer      Relation: Neg Hx          Age of Onset: (Not Specified)  Problem: Kidney cancer      Relation: Neg Hx          Age of Onset: (Not Specified)  Problem: Cancer      Relation: Neg Hx          Age of Onset: (Not Specified)  Problem: Urolithiasis      Relation: Neg Hx          Age of Onset: (Not Specified)  Problem: Melanoma      Relation: Neg Hx          Age of Onset: (Not Specified)  Problem: Psoriasis      Relation: Neg Hx          Age of Onset: (Not Specified)  Problem: Lupus      Relation: Neg Hx          Age of Onset: (Not Specified)  Problem: Eczema      Relation: Neg Hx          Age of Onset: (Not  Specified)    Social History    Tobacco Use      Smoking status: Former Smoker        Packs/day: 1.50        Years: 25.00        Pack years: 37.5        Quit date: 10/2/1982        Years since quittin.6      Smokeless tobacco: Never Used    Alcohol use: No      Frequency: Monthly or less      Drinks per session: 1 or 2      Binge frequency: Never    Drug use: No    Current Outpatient Medications on File Prior to Visit:  acetaminophen (TYLENOL) 500 MG tablet, Take 1 tablet (500 mg total) by mouth 2 (two) times daily. For shoulder pain (Patient taking differently: Take 500 mg by mouth 2 (two) times daily as needed. For shoulder pain), Disp: 60 tablet, Rfl: 11  glycopyrrolate (ROBINUL) 1 mg Tab, TAKE ONE TABLET BY MOUTH TWICE DAILY. MAY TAKE UP TO THREE TIMES DAILY IF needed, Disp: 90 tablet, Rfl: 5  hydroxyurea (HYDREA) 500 mg Cap, Take 1 capsule (500 mg total) by mouth 2 (two) times daily., Disp: 180 capsule, Rfl: 1  metoprolol succinate (TOPROL-XL) 50 MG 24 hr tablet, Take 1 tablet (50 mg total) by mouth once daily., Disp: 90 tablet, Rfl: 3  multivitamin (ONE DAILY MULTIVITAMIN) per tablet, Take 1 tablet by mouth once daily., Disp: , Rfl:   psyllium husk (METAMUCIL ORAL), Take 1 capsule by mouth once daily., Disp: , Rfl:   rivaroxaban (XARELTO) 20 mg Tab, Take 1 tablet (20 mg total) by mouth daily with dinner or evening meal., Disp: 90 tablet, Rfl: 3  tadalafiL (CIALIS) 20 MG Tab, Take 1 tablet (20 mg total) by mouth as needed., Disp: 10 tablet, Rfl: 4  tamsulosin (FLOMAX) 0.4 mg Cap, Take 1 capsule (0.4 mg total) by mouth once daily., Disp: 90 capsule, Rfl: 3  triamcinolone acetonide 0.1% (KENALOG) 0.1 % cream, APPLY TO AFFECTED AREA TWICE DAILY (Patient taking differently: Apply topically as needed. ), Disp: 80 g, Rfl: 0  atorvastatin (LIPITOR) 40 MG tablet, , Disp: , Rfl:   rosuvastatin (CRESTOR) 20 MG tablet, Take 1 tablet (20 mg total) by mouth every other day. (Patient not taking: Reported on 2020),  Disp: 45 tablet, Rfl: 1    No current facility-administered medications on file prior to visit.             Review of Systems   Constitutional: Negative for activity change, appetite change, chills, diaphoresis, fever and unexpected weight change.   HENT: Negative for congestion, postnasal drip, rhinorrhea, sinus pressure and sinus pain.    Respiratory: Positive for shortness of breath. Negative for cough, chest tightness and wheezing.    Cardiovascular: Negative for chest pain and palpitations.   Gastrointestinal: Negative for blood in stool (No gross blood but he does have black stool at times), constipation, diarrhea, nausea and vomiting.   Genitourinary: Negative for dysuria, frequency and hematuria.   Musculoskeletal: Negative for arthralgias, joint swelling and myalgias.   Skin: Negative for color change and rash.   Neurological: Positive for dizziness and light-headedness. Negative for tremors, seizures, syncope, facial asymmetry, speech difficulty, weakness, numbness and headaches.   Psychiatric/Behavioral: Negative for dysphoric mood and sleep disturbance. The patient is not nervous/anxious.        Objective:      Physical Exam   Constitutional: He is oriented to person, place, and time. He appears well-developed and well-nourished. No distress.   Mildly elevated systolic blood pressure  Normal weight with a BMI of 27.7 is down 4.2 lb from his visit on March 17, 2020   HENT:   Head: Normocephalic and atraumatic.   Right Ear: External ear normal.   Left Ear: External ear normal.   Nose: Nose normal.   Mouth/Throat: Oropharynx is clear and moist. No oropharyngeal exudate.   Oral mucosa looks a little bit pale   Eyes: Pupils are equal, round, and reactive to light. Conjunctivae and EOM are normal. No scleral icterus.   No excessive pallor of conjunctiva   Neck: Normal range of motion. Neck supple. No JVD present. No tracheal deviation present. No thyromegaly present.   Cardiovascular: Normal rate, regular  rhythm and normal heart sounds. Exam reveals no gallop and no friction rub.   No murmur heard.  Borderline tachycardia, sinus rhythm with no sign of atrial fibrillation  Carotid pulses 2+ without bruit     Pulmonary/Chest: Effort normal and breath sounds normal. No stridor. No respiratory distress. He has no wheezes. He has no rales. He exhibits no tenderness.   Abdominal: Soft. Bowel sounds are normal. He exhibits no distension and no mass. There is no tenderness. There is no rebound and no guarding. No hernia.   Musculoskeletal: Normal range of motion. He exhibits no edema or deformity.   Lymphadenopathy:     He has no cervical adenopathy.   Neurological: He is alert and oriented to person, place, and time. He displays normal reflexes. No cranial nerve deficit or sensory deficit. He exhibits normal muscle tone. Coordination normal.   Skin: Skin is warm and dry. No rash noted. He is not diaphoretic. No erythema.   Psychiatric: He has a normal mood and affect. His behavior is normal. Judgment and thought content normal.   Nursing note and vitals reviewed.      Assessment:       1. Shortness of breath    2. Dizziness    3. Pale    4. Fatigue, unspecified type    5. Paroxysmal atrial fibrillation, onset 5/2017, CHADS-VAS score 4    6. Essential hypertension    7. Calcified granuloma of lung    8. Dyslipidemia (high LDL; low HDL), baseline     9. Anticoagulant long-term use    10. Polycythemia vera    11. History of smoking 25-50 pack years, quit 1985, 37.5 py    12. BMI 27.0-27.9,adult        Plan:       1. Shortness of breath  Symptoms may be suggestive of pulmonary fibrosis, will check chest x-ray and lab and consider CT of chest if needed  - Comprehensive metabolic panel; Future  - TSH; Future  - CBC auto differential; Future  - X-Ray Chest PA And Lateral; Future    2. Dizziness  Not vertigo, describes it more as a lack of balance  - Comprehensive metabolic panel; Future  - TSH; Future  - CBC auto  differential; Future    3. Pale  - CBC auto differential; Future    4. Fatigue, unspecified type  - Comprehensive metabolic panel; Future  - TSH; Future  - CBC auto differential; Future    5. Paroxysmal atrial fibrillation, onset 5/2017, CHADS-VAS score 4  Currently normal sinus rhythm    6. Essential hypertension  Mildly elevated systolic pressure.  Will not intervene at this point because of his complaints of dizziness and imbalance until evaluation is completed    7. Calcified granuloma of lung  Old, stable but may need repeat CT scanning last scan recorded is in 2012    8. Dyslipidemia (high LDL; low HDL), baseline   Await lab results  - Lipid Panel; Future    9. Anticoagulant long-term use  Continue for the time being, may have to hold if anemic or any sign of GI bleeding    10. Polycythemia vera  Await labs  - CBC auto differential; Future    11. History of smoking 25-50 pack years, quit 1985, 37.5 py  Not a candidate for low-dose CT scanning greater than 15 years since quitting and past the age range  - X-Ray Chest PA And Lateral; Future    12. BMI 27.0-27.9,adult  Good weight with no changes needed

## 2020-05-13 ENCOUNTER — TELEPHONE (OUTPATIENT)
Dept: FAMILY MEDICINE | Facility: CLINIC | Age: 80
End: 2020-05-13

## 2020-05-13 ENCOUNTER — HOSPITAL ENCOUNTER (INPATIENT)
Facility: HOSPITAL | Age: 80
LOS: 2 days | Discharge: HOME | DRG: 810 | End: 2020-05-15
Attending: EMERGENCY MEDICINE | Admitting: HOSPITALIST
Payer: MEDICARE

## 2020-05-13 ENCOUNTER — LAB VISIT (OUTPATIENT)
Dept: LAB | Facility: HOSPITAL | Age: 80
End: 2020-05-13
Attending: FAMILY MEDICINE
Payer: MEDICARE

## 2020-05-13 DIAGNOSIS — R42 DIZZINESS: ICD-10-CM

## 2020-05-13 DIAGNOSIS — R53.83 FATIGUE, UNSPECIFIED TYPE: ICD-10-CM

## 2020-05-13 DIAGNOSIS — E78.5 DYSLIPIDEMIA (HIGH LDL; LOW HDL): ICD-10-CM

## 2020-05-13 DIAGNOSIS — D61.818 PANCYTOPENIA: ICD-10-CM

## 2020-05-13 DIAGNOSIS — R23.1 PALE: ICD-10-CM

## 2020-05-13 DIAGNOSIS — D45 POLYCYTHEMIA VERA: ICD-10-CM

## 2020-05-13 DIAGNOSIS — R06.02 SHORTNESS OF BREATH: ICD-10-CM

## 2020-05-13 DIAGNOSIS — R06.02 SOB (SHORTNESS OF BREATH): ICD-10-CM

## 2020-05-13 DIAGNOSIS — D64.9 ANEMIA, UNSPECIFIED TYPE: Primary | ICD-10-CM

## 2020-05-13 LAB
ABO + RH BLD: NORMAL
ALBUMIN SERPL BCP-MCNC: 3.7 G/DL (ref 3.5–5.2)
ALBUMIN SERPL BCP-MCNC: 3.9 G/DL (ref 3.5–5.2)
ALP SERPL-CCNC: 127 U/L (ref 55–135)
ALP SERPL-CCNC: 127 U/L (ref 55–135)
ALT SERPL W/O P-5'-P-CCNC: 10 U/L (ref 10–44)
ALT SERPL W/O P-5'-P-CCNC: 14 U/L (ref 10–44)
ANION GAP SERPL CALC-SCNC: 9 MMOL/L (ref 8–16)
ANION GAP SERPL CALC-SCNC: 9 MMOL/L (ref 8–16)
ANISOCYTOSIS BLD QL SMEAR: SLIGHT
APTT BLDCRRT: >150 SEC (ref 21–32)
AST SERPL-CCNC: 15 U/L (ref 10–40)
AST SERPL-CCNC: 18 U/L (ref 10–40)
BASOPHILS # BLD AUTO: 0.01 K/UL (ref 0–0.2)
BASOPHILS # BLD AUTO: 0.01 K/UL (ref 0–0.2)
BASOPHILS NFR BLD: 0.5 % (ref 0–1.9)
BASOPHILS NFR BLD: 0.5 % (ref 0–1.9)
BILIRUB SERPL-MCNC: 1.3 MG/DL (ref 0.1–1)
BILIRUB SERPL-MCNC: 1.4 MG/DL (ref 0.1–1)
BLD GP AB SCN CELLS X3 SERPL QL: NORMAL
BUN SERPL-MCNC: 16 MG/DL (ref 8–23)
BUN SERPL-MCNC: 16 MG/DL (ref 8–23)
CALCIUM SERPL-MCNC: 9.2 MG/DL (ref 8.7–10.5)
CALCIUM SERPL-MCNC: 9.4 MG/DL (ref 8.7–10.5)
CHLORIDE SERPL-SCNC: 106 MMOL/L (ref 95–110)
CHLORIDE SERPL-SCNC: 109 MMOL/L (ref 95–110)
CHOLEST SERPL-MCNC: 97 MG/DL (ref 120–199)
CHOLEST/HDLC SERPL: 5.4 {RATIO} (ref 2–5)
CO2 SERPL-SCNC: 20 MMOL/L (ref 23–29)
CO2 SERPL-SCNC: 21 MMOL/L (ref 23–29)
CREAT SERPL-MCNC: 1.2 MG/DL (ref 0.5–1.4)
CREAT SERPL-MCNC: 1.3 MG/DL (ref 0.5–1.4)
DACRYOCYTES BLD QL SMEAR: ABNORMAL
DIFFERENTIAL METHOD: ABNORMAL
DIFFERENTIAL METHOD: ABNORMAL
EOSINOPHIL # BLD AUTO: 0 K/UL (ref 0–0.5)
EOSINOPHIL # BLD AUTO: 0 K/UL (ref 0–0.5)
EOSINOPHIL NFR BLD: 0.5 % (ref 0–8)
EOSINOPHIL NFR BLD: 1 % (ref 0–8)
ERYTHROCYTE [DISTWIDTH] IN BLOOD BY AUTOMATED COUNT: 17.3 % (ref 11.5–14.5)
ERYTHROCYTE [DISTWIDTH] IN BLOOD BY AUTOMATED COUNT: 17.6 % (ref 11.5–14.5)
EST. GFR  (AFRICAN AMERICAN): 60 ML/MIN/1.73 M^2
EST. GFR  (AFRICAN AMERICAN): >60 ML/MIN/1.73 M^2
EST. GFR  (NON AFRICAN AMERICAN): 52 ML/MIN/1.73 M^2
EST. GFR  (NON AFRICAN AMERICAN): 57.2 ML/MIN/1.73 M^2
GLUCOSE SERPL-MCNC: 120 MG/DL (ref 70–110)
GLUCOSE SERPL-MCNC: 137 MG/DL (ref 70–110)
HCT VFR BLD AUTO: 18.5 % (ref 40–54)
HCT VFR BLD AUTO: 19.9 % (ref 40–54)
HDLC SERPL-MCNC: 18 MG/DL (ref 40–75)
HDLC SERPL: 18.6 % (ref 20–50)
HGB BLD-MCNC: 5.5 G/DL (ref 14–18)
HGB BLD-MCNC: 5.6 G/DL (ref 14–18)
HYPOCHROMIA BLD QL SMEAR: ABNORMAL
IMM GRANULOCYTES # BLD AUTO: 0.06 K/UL (ref 0–0.04)
IMM GRANULOCYTES # BLD AUTO: 0.06 K/UL (ref 0–0.04)
IMM GRANULOCYTES NFR BLD AUTO: 2.7 % (ref 0–0.5)
IMM GRANULOCYTES NFR BLD AUTO: 3 % (ref 0–0.5)
INR PPP: 1.1 (ref 0.8–1.2)
LDLC SERPL CALC-MCNC: 62.6 MG/DL (ref 63–159)
LYMPHOCYTES # BLD AUTO: 0.7 K/UL (ref 1–4.8)
LYMPHOCYTES # BLD AUTO: 0.9 K/UL (ref 1–4.8)
LYMPHOCYTES NFR BLD: 33 % (ref 18–48)
LYMPHOCYTES NFR BLD: 40 % (ref 18–48)
MCH RBC QN AUTO: 31.3 PG (ref 27–31)
MCH RBC QN AUTO: 32.9 PG (ref 27–31)
MCHC RBC AUTO-ENTMCNC: 27.6 G/DL (ref 32–36)
MCHC RBC AUTO-ENTMCNC: 30.3 G/DL (ref 32–36)
MCV RBC AUTO: 109 FL (ref 82–98)
MCV RBC AUTO: 113 FL (ref 82–98)
MONOCYTES # BLD AUTO: 0.2 K/UL (ref 0.3–1)
MONOCYTES # BLD AUTO: 0.3 K/UL (ref 0.3–1)
MONOCYTES NFR BLD: 14.3 % (ref 4–15)
MONOCYTES NFR BLD: 8.2 % (ref 4–15)
NEUTROPHILS # BLD AUTO: 1 K/UL (ref 1.8–7.7)
NEUTROPHILS # BLD AUTO: 1.1 K/UL (ref 1.8–7.7)
NEUTROPHILS NFR BLD: 48.1 % (ref 38–73)
NEUTROPHILS NFR BLD: 48.2 % (ref 38–73)
NONHDLC SERPL-MCNC: 79 MG/DL
NRBC BLD-RTO: 15 /100 WBC
NRBC BLD-RTO: 8 /100 WBC
OVALOCYTES BLD QL SMEAR: ABNORMAL
PLATELET # BLD AUTO: 111 K/UL (ref 150–350)
PLATELET # BLD AUTO: 126 K/UL (ref 150–350)
PLATELET BLD QL SMEAR: ABNORMAL
PMV BLD AUTO: 11.8 FL (ref 9.2–12.9)
PMV BLD AUTO: 12.8 FL (ref 9.2–12.9)
POIKILOCYTOSIS BLD QL SMEAR: SLIGHT
POLYCHROMASIA BLD QL SMEAR: ABNORMAL
POTASSIUM SERPL-SCNC: 4.2 MMOL/L (ref 3.5–5.1)
POTASSIUM SERPL-SCNC: 4.5 MMOL/L (ref 3.5–5.1)
PROT SERPL-MCNC: 7 G/DL (ref 6–8.4)
PROT SERPL-MCNC: 7.3 G/DL (ref 6–8.4)
PROTHROMBIN TIME: 12.4 SEC (ref 9–12.5)
RBC # BLD AUTO: 1.7 M/UL (ref 4.6–6.2)
RBC # BLD AUTO: 1.76 M/UL (ref 4.6–6.2)
SARS-COV-2 RDRP RESP QL NAA+PROBE: NEGATIVE
SODIUM SERPL-SCNC: 135 MMOL/L (ref 136–145)
SODIUM SERPL-SCNC: 139 MMOL/L (ref 136–145)
T4 FREE SERPL-MCNC: 0.71 NG/DL (ref 0.71–1.51)
TRIGL SERPL-MCNC: 82 MG/DL (ref 30–150)
TSH SERPL DL<=0.005 MIU/L-ACNC: 4.56 UIU/ML (ref 0.4–4)
WBC # BLD AUTO: 2.03 K/UL (ref 3.9–12.7)
WBC # BLD AUTO: 2.2 K/UL (ref 3.9–12.7)

## 2020-05-13 PROCEDURE — 84439 ASSAY OF FREE THYROXINE: CPT

## 2020-05-13 PROCEDURE — 80053 COMPREHEN METABOLIC PANEL: CPT | Mod: 91

## 2020-05-13 PROCEDURE — 25500020 PHARM REV CODE 255

## 2020-05-13 PROCEDURE — 25000003 PHARM REV CODE 250: Performed by: HOSPITALIST

## 2020-05-13 PROCEDURE — 85025 COMPLETE CBC W/AUTO DIFF WBC: CPT

## 2020-05-13 PROCEDURE — 99285 EMERGENCY DEPT VISIT HI MDM: CPT | Mod: 25

## 2020-05-13 PROCEDURE — 86901 BLOOD TYPING SEROLOGIC RH(D): CPT

## 2020-05-13 PROCEDURE — 85610 PROTHROMBIN TIME: CPT

## 2020-05-13 PROCEDURE — 36415 COLL VENOUS BLD VENIPUNCTURE: CPT | Mod: PO

## 2020-05-13 PROCEDURE — 36430 TRANSFUSION BLD/BLD COMPNT: CPT

## 2020-05-13 PROCEDURE — 36415 COLL VENOUS BLD VENIPUNCTURE: CPT

## 2020-05-13 PROCEDURE — 93005 ELECTROCARDIOGRAM TRACING: CPT

## 2020-05-13 PROCEDURE — U0002 COVID-19 LAB TEST NON-CDC: HCPCS

## 2020-05-13 PROCEDURE — 36000 PLACE NEEDLE IN VEIN: CPT

## 2020-05-13 PROCEDURE — 85730 THROMBOPLASTIN TIME PARTIAL: CPT

## 2020-05-13 PROCEDURE — 86920 COMPATIBILITY TEST SPIN: CPT

## 2020-05-13 PROCEDURE — 80053 COMPREHEN METABOLIC PANEL: CPT

## 2020-05-13 PROCEDURE — 84443 ASSAY THYROID STIM HORMONE: CPT

## 2020-05-13 PROCEDURE — 80061 LIPID PANEL: CPT

## 2020-05-13 PROCEDURE — P9016 RBC LEUKOCYTES REDUCED: HCPCS

## 2020-05-13 PROCEDURE — 85025 COMPLETE CBC W/AUTO DIFF WBC: CPT | Mod: 91

## 2020-05-13 PROCEDURE — 11000001 HC ACUTE MED/SURG PRIVATE ROOM

## 2020-05-13 RX ORDER — POTASSIUM CHLORIDE 20 MEQ/15ML
40 SOLUTION ORAL
Status: DISCONTINUED | OUTPATIENT
Start: 2020-05-13 | End: 2020-05-15 | Stop reason: HOSPADM

## 2020-05-13 RX ORDER — AMOXICILLIN 250 MG
1 CAPSULE ORAL 2 TIMES DAILY
Status: DISCONTINUED | OUTPATIENT
Start: 2020-05-13 | End: 2020-05-15 | Stop reason: HOSPADM

## 2020-05-13 RX ORDER — TALC
9 POWDER (GRAM) TOPICAL NIGHTLY PRN
Status: DISCONTINUED | OUTPATIENT
Start: 2020-05-13 | End: 2020-05-15 | Stop reason: HOSPADM

## 2020-05-13 RX ORDER — POTASSIUM CHLORIDE 20 MEQ/15ML
60 SOLUTION ORAL
Status: DISCONTINUED | OUTPATIENT
Start: 2020-05-13 | End: 2020-05-15 | Stop reason: HOSPADM

## 2020-05-13 RX ORDER — IBUPROFEN 200 MG
24 TABLET ORAL
Status: DISCONTINUED | OUTPATIENT
Start: 2020-05-13 | End: 2020-05-15 | Stop reason: HOSPADM

## 2020-05-13 RX ORDER — TAMSULOSIN HYDROCHLORIDE 0.4 MG/1
1 CAPSULE ORAL DAILY
Status: DISCONTINUED | OUTPATIENT
Start: 2020-05-14 | End: 2020-05-15 | Stop reason: HOSPADM

## 2020-05-13 RX ORDER — IBUPROFEN 200 MG
16 TABLET ORAL
Status: DISCONTINUED | OUTPATIENT
Start: 2020-05-13 | End: 2020-05-15 | Stop reason: HOSPADM

## 2020-05-13 RX ORDER — GLUCAGON 1 MG
1 KIT INJECTION
Status: DISCONTINUED | OUTPATIENT
Start: 2020-05-13 | End: 2020-05-15 | Stop reason: HOSPADM

## 2020-05-13 RX ORDER — SODIUM CHLORIDE 0.9 % (FLUSH) 0.9 %
10 SYRINGE (ML) INJECTION
Status: DISCONTINUED | OUTPATIENT
Start: 2020-05-13 | End: 2020-05-15 | Stop reason: HOSPADM

## 2020-05-13 RX ORDER — ATORVASTATIN CALCIUM 40 MG/1
40 TABLET, FILM COATED ORAL DAILY
Status: DISCONTINUED | OUTPATIENT
Start: 2020-05-14 | End: 2020-05-15 | Stop reason: HOSPADM

## 2020-05-13 RX ORDER — LANOLIN ALCOHOL/MO/W.PET/CERES
800 CREAM (GRAM) TOPICAL
Status: DISCONTINUED | OUTPATIENT
Start: 2020-05-13 | End: 2020-05-15 | Stop reason: HOSPADM

## 2020-05-13 RX ORDER — ONDANSETRON 2 MG/ML
8 INJECTION INTRAMUSCULAR; INTRAVENOUS EVERY 8 HOURS PRN
Status: DISCONTINUED | OUTPATIENT
Start: 2020-05-13 | End: 2020-05-15 | Stop reason: HOSPADM

## 2020-05-13 RX ORDER — HYDROCODONE BITARTRATE AND ACETAMINOPHEN 500; 5 MG/1; MG/1
TABLET ORAL
Status: DISCONTINUED | OUTPATIENT
Start: 2020-05-13 | End: 2020-05-15 | Stop reason: HOSPADM

## 2020-05-13 RX ORDER — ACETAMINOPHEN 500 MG
500 TABLET ORAL 2 TIMES DAILY
Status: DISCONTINUED | OUTPATIENT
Start: 2020-05-13 | End: 2020-05-15 | Stop reason: HOSPADM

## 2020-05-13 RX ORDER — METOPROLOL SUCCINATE 50 MG/1
50 TABLET, EXTENDED RELEASE ORAL DAILY
Status: DISCONTINUED | OUTPATIENT
Start: 2020-05-14 | End: 2020-05-15 | Stop reason: HOSPADM

## 2020-05-13 RX ORDER — ACETAMINOPHEN 500 MG
1000 TABLET ORAL EVERY 6 HOURS PRN
Status: DISCONTINUED | OUTPATIENT
Start: 2020-05-13 | End: 2020-05-15 | Stop reason: HOSPADM

## 2020-05-13 RX ADMIN — IOHEXOL 1000 ML: 9 SOLUTION ORAL at 09:05

## 2020-05-13 RX ADMIN — IOHEXOL 75 ML: 350 INJECTION, SOLUTION INTRAVENOUS at 09:05

## 2020-05-13 RX ADMIN — SODIUM CHLORIDE: 9 INJECTION, SOLUTION INTRAVENOUS at 11:05

## 2020-05-13 RX ADMIN — SENNOSIDES AND DOCUSATE SODIUM 1 TABLET: 8.6; 5 TABLET ORAL at 09:05

## 2020-05-13 NOTE — ASSESSMENT & PLAN NOTE
Patient is chronically on statin.will continue for now. Monitor clinically. Last LDL was   Lab Results   Component Value Date    LDLCALC 50.8 (L) 01/28/2019

## 2020-05-13 NOTE — ASSESSMENT & PLAN NOTE
Patient on long-term rivaroxaban.  Given current pancytopenia, will discontinue rivaroxaban for now and monitor closely.

## 2020-05-13 NOTE — HPI
Patient is a 79-year-old  male with a past medical history significant for hypertension, atrial fibrillation on long-term rivaroxaban, polycythemia vera on Hydrea for the past 14 years, and BPH who presents to the hospital for increase fatigue, and headache.  Patient denies any night sweats but notes that he did have a unintentional weight loss over the past 2 months.  In the emergency department, the patient was found to have pancytopenia.  He states he had made no changes in his medication for Hydrea over the past 14 years, and he is routinely seen by hematology for management of this issue.  Patient denies any nausea, vomiting, constipation.  Does state that he has had some dark stools over the past week.  Denies any hemoptysis or hematemesis.  Patient states that he has been compliant with all of his oral medications.

## 2020-05-13 NOTE — SUBJECTIVE & OBJECTIVE
Past Medical History:   Diagnosis Date    Atrial fibrillation 05/07/2017    [facility] records sent to scanning    BPH (benign prostatic hypertrophy)     Colon polyps     Erectile dysfunction     GERD (gastroesophageal reflux disease)     Hypertension     Pneumothorax on left     Polycythemia vera     Pulmonary nodules        Past Surgical History:   Procedure Laterality Date    COLONOSCOPY  9/15/14    Dr. Mcdaniels, 5 year recheck    ESOPHAGOGASTRODUODENOSCOPY      LUNG SURGERY      LEFT - CHEST TUBE       Review of patient's allergies indicates:   Allergen Reactions    Crestor [rosuvastatin] Other (See Comments)     Leg pain        No current facility-administered medications on file prior to encounter.      Current Outpatient Medications on File Prior to Encounter   Medication Sig    acetaminophen (TYLENOL) 500 MG tablet Take 1 tablet (500 mg total) by mouth 2 (two) times daily. For shoulder pain (Patient taking differently: Take 500 mg by mouth 2 (two) times daily as needed. For shoulder pain)    atorvastatin (LIPITOR) 40 MG tablet     glycopyrrolate (ROBINUL) 1 mg Tab TAKE ONE TABLET BY MOUTH TWICE DAILY. MAY TAKE UP TO THREE TIMES DAILY IF needed    hydroxyurea (HYDREA) 500 mg Cap Take 1 capsule (500 mg total) by mouth 2 (two) times daily.    metoprolol succinate (TOPROL-XL) 50 MG 24 hr tablet Take 1 tablet (50 mg total) by mouth once daily.    multivitamin (ONE DAILY MULTIVITAMIN) per tablet Take 1 tablet by mouth once daily.    psyllium husk (METAMUCIL ORAL) Take 1 capsule by mouth once daily.    rivaroxaban (XARELTO) 20 mg Tab Take 1 tablet (20 mg total) by mouth daily with dinner or evening meal.    rosuvastatin (CRESTOR) 20 MG tablet Take 1 tablet (20 mg total) by mouth every other day. (Patient not taking: Reported on 5/6/2020)    tadalafiL (CIALIS) 20 MG Tab Take 1 tablet (20 mg total) by mouth as needed.    tamsulosin (FLOMAX) 0.4 mg Cap Take 1 capsule (0.4 mg total) by mouth once  daily.    triamcinolone acetonide 0.1% (KENALOG) 0.1 % cream APPLY TO AFFECTED AREA TWICE DAILY (Patient taking differently: Apply topically as needed. )     Family History     Problem Relation (Age of Onset)    Early death Father    Heart disease Mother, Maternal Aunt, Maternal Uncle    Hypertension Mother, Maternal Aunt, Maternal Uncle    No Known Problems Daughter, Daughter, Daughter, Daughter        Tobacco Use    Smoking status: Former Smoker     Packs/day: 1.50     Years: 25.00     Pack years: 37.50     Last attempt to quit: 10/2/1982     Years since quittin.6    Smokeless tobacco: Never Used   Substance and Sexual Activity    Alcohol use: No     Frequency: Monthly or less     Drinks per session: 1 or 2     Binge frequency: Never    Drug use: No    Sexual activity: Yes     Review of Systems   Constitutional: Positive for activity change, fatigue and unexpected weight change. Negative for fever.   HENT: Negative for ear discharge, facial swelling and sore throat.    Eyes: Negative for photophobia, pain and visual disturbance.   Respiratory: Positive for shortness of breath. Negative for apnea and cough.    Cardiovascular: Negative for chest pain and leg swelling.   Gastrointestinal: Negative for abdominal pain and blood in stool.   Endocrine: Negative for cold intolerance and heat intolerance.   Musculoskeletal: Negative for back pain and gait problem.   Skin: Negative for pallor and rash.   Neurological: Positive for headaches. Negative for speech difficulty.   Psychiatric/Behavioral: Negative for confusion, hallucinations and suicidal ideas.   All other systems reviewed and are negative.    Objective:     Vital Signs (Most Recent):  Temp: 98.7 °F (37.1 °C) (20 1558)  Pulse: 85 (20 1801)  Resp: 16 (20 1555)  BP: 136/67 (20 1801)  SpO2: 98 % (20 1801) Vital Signs (24h Range):  Temp:  [98.7 °F (37.1 °C)] 98.7 °F (37.1 °C)  Pulse:  [] 85  Resp:  [16] 16  SpO2:  [96  %-99 %] 98 %  BP: (136-176)/(67-75) 136/67     Weight: 82.1 kg (181 lb)  Body mass index is 28.35 kg/m².    Physical Exam   Constitutional: He is oriented to person, place, and time. He appears well-developed and well-nourished. No distress.   HENT:   Head: Normocephalic.   Mouth/Throat: Oropharynx is clear and moist.   Eyes: Conjunctivae are normal. Right eye exhibits no discharge. Left eye exhibits no discharge. No scleral icterus.   Neck: No JVD present.   Cardiovascular: Normal rate, regular rhythm, normal heart sounds and intact distal pulses. Exam reveals no friction rub.   No murmur heard.  Pulmonary/Chest: Effort normal and breath sounds normal. No respiratory distress. He has no wheezes.   Abdominal: Soft. Bowel sounds are normal. He exhibits no distension. There is no tenderness.   No hepatomegaly or splenomegaly noted   Musculoskeletal: Normal range of motion. He exhibits no edema.   No lymphadenopathy noted in the cervical or axillary regions.   Lymphadenopathy:     He has no cervical adenopathy.   Neurological: He is alert and oriented to person, place, and time. He has normal reflexes.   Skin: No rash noted. He is not diaphoretic. No erythema.   Psychiatric: He has a normal mood and affect. His behavior is normal.   Nursing note and vitals reviewed.          Significant Labs: All pertinent labs within the past 24 hours have been reviewed.    Significant Imaging: I have reviewed all pertinent imaging results/findings within the past 24 hours.

## 2020-05-13 NOTE — ASSESSMENT & PLAN NOTE
Chronic, controlled.  Latest blood pressure and vitals reviewed-   Temp:  [98.7 °F (37.1 °C)]   Pulse:  []   Resp:  [16]   BP: (136-176)/(67-75)   SpO2:  [96 %-99 %] .   Home meds for hypertension were reviewed and noted below. Hospital anti-hypertensive changes were made as shown below.  Hypertension Medications             metoprolol succinate (TOPROL-XL) 50 MG 24 hr tablet Take 1 tablet (50 mg total) by mouth once daily.        Will utilize p.r.n. blood pressure medication only if patient's blood pressure greater than  180/110 and he develops symptoms such as worsening chest pain or shortness of breath.

## 2020-05-13 NOTE — TELEPHONE ENCOUNTER
Received call from Bonnie Guerra in lab- hgb 5.5 and hct 19.9- verbal report given to Dr. Escobar-patient notified to go to the emergency room to be admitted.

## 2020-05-13 NOTE — ASSESSMENT & PLAN NOTE
Patient with Paroxysmal atrial fibrillation which is controlled currently with Beta Blocker. [id number] score 4. Anticoagulation not indicated due to Thrombocytopenia and anemia.

## 2020-05-13 NOTE — ED PROVIDER NOTES
Encounter Date: 5/13/2020    SCRIBE #1 NOTE: Geoff CARRANZA , wilmer scribing for, and in the presence of, Dr. Villafana.       History     Chief Complaint   Patient presents with    Abnormal Lab     Pt states he has routine labs drawn this AM; Reports receiving call from PCP telling him to report to ED for a possible blood transfusion       Time seen by provider: 4:03 PM on 05/13/2020    Morro Sharma is a 79 y.o. male with PMHx colon polyps, GERD, BPH, HTN, pulmonary nodules, and a-fib who presents to the ED with abnormal labs. The patient states he has routine labs drawn this AM. The patient's PCP told the patient to report to the ED for a possible blood transfusion. The patient notes that he is tired, SOB, and pale. SOB is worse with activity. He also notes having blood in his stool 2 weeks ago, but normal now. He denies vomiting, cp, fever, cough, and congestion. He states his last colonoscopy was within the last 6 months. The patient currently taking Xarelto and Tylenol. Other PSHx includes lung surgery and esophagogastroduodenoscopy. Drug allergy of Crestor noted.     The history is provided by the patient.     Review of patient's allergies indicates:   Allergen Reactions    Crestor [rosuvastatin] Other (See Comments)     Leg pain      Past Medical History:   Diagnosis Date    Atrial fibrillation 05/07/2017    [facility] records sent to scanning    BPH (benign prostatic hypertrophy)     Colon polyps     Erectile dysfunction     GERD (gastroesophageal reflux disease)     Hypertension     Pneumothorax on left     Polycythemia vera     Pulmonary nodules      Past Surgical History:   Procedure Laterality Date    COLONOSCOPY  9/15/14    Dr. Mcdaniels, 5 year recheck    ESOPHAGOGASTRODUODENOSCOPY      LUNG SURGERY      LEFT - CHEST TUBE     Family History   Problem Relation Age of Onset    Hypertension Mother     Heart disease Mother     Hypertension Maternal Aunt     Heart disease Maternal Aunt      Hypertension Maternal Uncle     Heart disease Maternal Uncle     Early death Father     No Known Problems Daughter     No Known Problems Daughter     No Known Problems Daughter     No Known Problems Daughter     Prostate cancer Neg Hx     Kidney cancer Neg Hx     Cancer Neg Hx     Urolithiasis Neg Hx     Melanoma Neg Hx     Psoriasis Neg Hx     Lupus Neg Hx     Eczema Neg Hx      Social History     Tobacco Use    Smoking status: Former Smoker     Packs/day: 1.50     Years: 25.00     Pack years: 37.50     Last attempt to quit: 10/2/1982     Years since quittin.6    Smokeless tobacco: Never Used   Substance Use Topics    Alcohol use: No     Frequency: Monthly or less     Drinks per session: 1 or 2     Binge frequency: Never    Drug use: No     Review of Systems   Constitutional: Negative for fever.   HENT: Negative for congestion and sore throat.    Respiratory: Positive for shortness of breath. Negative for cough.    Cardiovascular: Negative for chest pain.   Gastrointestinal: Positive for blood in stool. Negative for nausea and vomiting.   Genitourinary: Negative for dysuria.   Musculoskeletal: Negative for back pain.   Skin: Positive for pallor. Negative for rash.   Neurological: Negative for weakness.   Hematological: Does not bruise/bleed easily.       Physical Exam     Initial Vitals   BP Pulse Resp Temp SpO2   20 1555 20 1555 20 1555 20 1558 20 1555   (!) 142/67 96 16 98.7 °F (37.1 °C) 99 %      MAP       --                Physical Exam    Nursing note and vitals reviewed.  Constitutional: He appears well-developed and well-nourished. He is not diaphoretic. No distress.   HENT:   Head: Normocephalic and atraumatic.   Eyes: EOM are normal. Pupils are equal, round, and reactive to light.   Neck: Normal range of motion. Neck supple.   Cardiovascular: Regular rhythm, normal heart sounds and intact distal pulses. Tachycardia present.  Exam reveals no gallop and no  friction rub.    No murmur heard.  Pulmonary/Chest: Breath sounds normal. No respiratory distress. He has no wheezes. He has no rhonchi. He has no rales.   Abdominal: Soft. Bowel sounds are normal. There is no tenderness. There is no rebound and no guarding.   Musculoskeletal: Normal range of motion.   Neurological: He is alert and oriented to person, place, and time.   Skin: Skin is warm. There is pallor.   Psychiatric: He has a normal mood and affect. His behavior is normal. Judgment and thought content normal.         ED Course   Procedures  Labs Reviewed   CBC W/ AUTO DIFFERENTIAL - Abnormal; Notable for the following components:       Result Value    WBC 2.03 (*)     RBC 1.70 (*)     Hemoglobin 5.6 (*)     Hematocrit 18.5 (*)     Mean Corpuscular Volume 109 (*)     Mean Corpuscular Hemoglobin 32.9 (*)     Mean Corpuscular Hemoglobin Conc 30.3 (*)     RDW 17.3 (*)     Platelets 126 (*)     Immature Granulocytes 3.0 (*)     Gran # (ANC) 1.0 (*)     Immature Grans (Abs) 0.06 (*)     Lymph # 0.7 (*)     nRBC 15 (*)     Platelet Estimate Decreased (*)     All other components within normal limits   COMPREHENSIVE METABOLIC PANEL - Abnormal; Notable for the following components:    Sodium 135 (*)     CO2 20 (*)     Glucose 137 (*)     Total Bilirubin 1.4 (*)     eGFR if non  52 (*)     All other components within normal limits   APTT - Abnormal; Notable for the following components:    aPTT >150.0 (*)     All other components within normal limits    Narrative:       APTT critical result(s) called and verbal readback obtained from   Mayela Madison by BTBHUPINDER 05/13/2020 18:54   PROTIME-INR   SARS-COV-2 RNA AMPLIFICATION, QUAL   TYPE & SCREEN          Imaging Results    None          Medical Decision Making:   Initial Assessment:   79-year-old male presented with multiple complaints.  Differential Diagnosis:   My differential diagnosis includes severe anemia, GI bleed, coagulopathy.   Clinical Tests:   Lab  Tests: Ordered and Reviewed  ED Management:  The patient was emergently evaluated in the emergency department, his evaluation was significant for an elderly male with a benign abdominal exam.  The patient also has Hemoccult negative brown stool noted on examination as well.  The patient's labs were significant for pancytopenia.  I will admit him to the hospitalist service for further care.  The case was discussed with the hospitalist on call, Dr. Mcgill.  He has accepted the patient for admission.            Scribe Attestation:   Scribe #1: I performed the above scribed service and the documentation accurately describes the services I performed. I attest to the accuracy of the note.           I, Dr. Venkatesh Villafana, personally performed the services described in this documentation. All medical record entries made by the scribe were at my direction and in my presence.  I have reviewed the chart and agree that the record reflects my personal performance and is accurate and complete. Venkatesh Villafana MD.  7:10 PM 05/13/2020                  Clinical Impression:       ICD-10-CM ICD-9-CM   1. Anemia, unspecified type D64.9 285.9   2. SOB (shortness of breath) R06.02 786.05   3. Pancytopenia D61.818 284.19         Disposition:   Disposition: Admitted     ED Disposition Condition    Admit                           Venkatesh Villafana MD  05/13/20 1912

## 2020-05-13 NOTE — ASSESSMENT & PLAN NOTE
Etiology remains unclear.  Potentially related to either infectious versus oncologic source.  Patient does have unintentional weight loss.  Will check CT chest abdomen pelvis, and transfuse 2 units packed red blood cells.  Consult with Hematology/Oncology.

## 2020-05-13 NOTE — H&P
[facility] Medicine  History & Physical    Patient Name: Morro Sharma  MRN: [id number]  Admission Date: 5/13/2020  Attending Physician: Severo Mcgill MD  Primary Care Provider: Yaniv Escobar MD         Patient information was obtained from patient, past medical records and ER records.     Subjective:     Principal Problem:Pancytopenia    Chief Complaint:   Chief Complaint   Patient presents with    Abnormal Lab     Pt states he has routine labs drawn this AM; Reports receiving call from PCP telling him to report to ED for a possible blood transfusion        HPI: Patient is a 79-year-old  male with a past medical history significant for hypertension, atrial fibrillation on long-term rivaroxaban, polycythemia vera on Hydrea for the past 14 years, and BPH who presents to the hospital for increase fatigue, and headache.  Patient denies any night sweats but notes that he did have a unintentional weight loss over the past 2 months.  In the emergency department, the patient was found to have pancytopenia.  He states he had made no changes in his medication for Hydrea over the past 14 years, and he is routinely seen by hematology for management of this issue.  Patient denies any nausea, vomiting, constipation.  Does state that he has had some dark stools over the past week.  Denies any hemoptysis or hematemesis.  Patient states that he has been compliant with all of his oral medications.    Past Medical History:   Diagnosis Date    Atrial fibrillation 05/07/2017    [facility] records sent to scanning    BPH (benign prostatic hypertrophy)     Colon polyps     Erectile dysfunction     GERD (gastroesophageal reflux disease)     Hypertension     Pneumothorax on left     Polycythemia vera     Pulmonary nodules        Past Surgical History:   Procedure Laterality Date    COLONOSCOPY  9/15/14    Dr. Mcdaniels, 5 year recheck    ESOPHAGOGASTRODUODENOSCOPY      LUNG SURGERY       LEFT - CHEST TUBE       Review of patient's allergies indicates:   Allergen Reactions    Crestor [rosuvastatin] Other (See Comments)     Leg pain        No current facility-administered medications on file prior to encounter.      Current Outpatient Medications on File Prior to Encounter   Medication Sig    acetaminophen (TYLENOL) 500 MG tablet Take 1 tablet (500 mg total) by mouth 2 (two) times daily. For shoulder pain (Patient taking differently: Take 500 mg by mouth 2 (two) times daily as needed. For shoulder pain)    atorvastatin (LIPITOR) 40 MG tablet     glycopyrrolate (ROBINUL) 1 mg Tab TAKE ONE TABLET BY MOUTH TWICE DAILY. MAY TAKE UP TO THREE TIMES DAILY IF needed    hydroxyurea (HYDREA) 500 mg Cap Take 1 capsule (500 mg total) by mouth 2 (two) times daily.    metoprolol succinate (TOPROL-XL) 50 MG 24 hr tablet Take 1 tablet (50 mg total) by mouth once daily.    multivitamin (ONE DAILY MULTIVITAMIN) per tablet Take 1 tablet by mouth once daily.    psyllium husk (METAMUCIL ORAL) Take 1 capsule by mouth once daily.    rivaroxaban (XARELTO) 20 mg Tab Take 1 tablet (20 mg total) by mouth daily with dinner or evening meal.    rosuvastatin (CRESTOR) 20 MG tablet Take 1 tablet (20 mg total) by mouth every other day. (Patient not taking: Reported on 5/6/2020)    tadalafiL (CIALIS) 20 MG Tab Take 1 tablet (20 mg total) by mouth as needed.    tamsulosin (FLOMAX) 0.4 mg Cap Take 1 capsule (0.4 mg total) by mouth once daily.    triamcinolone acetonide 0.1% (KENALOG) 0.1 % cream APPLY TO AFFECTED AREA TWICE DAILY (Patient taking differently: Apply topically as needed. )     Family History     Problem Relation (Age of Onset)    Early death Father    Heart disease Mother, Maternal Aunt, Maternal Uncle    Hypertension Mother, Maternal Aunt, Maternal Uncle    No Known Problems Daughter, Daughter, Daughter, Daughter        Tobacco Use    Smoking status: Former Smoker     Packs/day: 1.50     Years: 25.00      Pack years: 37.50     Last attempt to quit: 10/2/1982     Years since quittin.6    Smokeless tobacco: Never Used   Substance and Sexual Activity    Alcohol use: No     Frequency: Monthly or less     Drinks per session: 1 or 2     Binge frequency: Never    Drug use: No    Sexual activity: Yes     Review of Systems   Constitutional: Positive for activity change, fatigue and unexpected weight change. Negative for fever.   HENT: Negative for ear discharge, facial swelling and sore throat.    Eyes: Negative for photophobia, pain and visual disturbance.   Respiratory: Positive for shortness of breath. Negative for apnea and cough.    Cardiovascular: Negative for chest pain and leg swelling.   Gastrointestinal: Negative for abdominal pain and blood in stool.   Endocrine: Negative for cold intolerance and heat intolerance.   Musculoskeletal: Negative for back pain and gait problem.   Skin: Negative for pallor and rash.   Neurological: Positive for headaches. Negative for speech difficulty.   Psychiatric/Behavioral: Negative for confusion, hallucinations and suicidal ideas.   All other systems reviewed and are negative.    Objective:     Vital Signs (Most Recent):  Temp: 98.7 °F (37.1 °C) (20 1558)  Pulse: 85 (20 1801)  Resp: 16 (20 1555)  BP: 136/67 (20 1801)  SpO2: 98 % (20 1801) Vital Signs (24h Range):  Temp:  [98.7 °F (37.1 °C)] 98.7 °F (37.1 °C)  Pulse:  [] 85  Resp:  [16] 16  SpO2:  [96 %-99 %] 98 %  BP: (136-176)/(67-75) 136/67     Weight: 82.1 kg (181 lb)  Body mass index is 28.35 kg/m².    Physical Exam   Constitutional: He is oriented to person, place, and time. He appears well-developed and well-nourished. No distress.   HENT:   Head: Normocephalic.   Mouth/Throat: Oropharynx is clear and moist.   Eyes: Conjunctivae are normal. Right eye exhibits no discharge. Left eye exhibits no discharge. No scleral icterus.   Neck: No JVD present.   Cardiovascular: Normal rate,  regular rhythm, normal heart sounds and intact distal pulses. Exam reveals no friction rub.   No murmur heard.  Pulmonary/Chest: Effort normal and breath sounds normal. No respiratory distress. He has no wheezes.   Abdominal: Soft. Bowel sounds are normal. He exhibits no distension. There is no tenderness.   No hepatomegaly or splenomegaly noted   Musculoskeletal: Normal range of motion. He exhibits no edema.   No lymphadenopathy noted in the cervical or axillary regions.   Lymphadenopathy:     He has no cervical adenopathy.   Neurological: He is alert and oriented to person, place, and time. He has normal reflexes.   Skin: No rash noted. He is not diaphoretic. No erythema.   Psychiatric: He has a normal mood and affect. His behavior is normal.   Nursing note and vitals reviewed.          Significant Labs: All pertinent labs within the past 24 hours have been reviewed.    Significant Imaging: I have reviewed all pertinent imaging results/findings within the past 24 hours.    Assessment/Plan:     * Pancytopenia  Etiology remains unclear.  Potentially related to either infectious versus oncologic source.  Patient does have unintentional weight loss.  Will check CT chest abdomen pelvis, and transfuse 2 units packed red blood cells.  Consult with Hematology/Oncology.      Anticoagulant long-term use  Patient on long-term rivaroxaban.  Given current pancytopenia, will discontinue rivaroxaban for now and monitor closely.      Dyslipidemia (high LDL; low HDL), baseline    Patient is chronically on statin.will continue for now. Monitor clinically. Last LDL was   Lab Results   Component Value Date    LDLCALC 50.8 (L) 01/28/2019            Paroxysmal atrial fibrillation, onset 5/2017, CHADS-VAS score 4  Patient with Paroxysmal atrial fibrillation which is controlled currently with Beta Blocker. [id number] score 4. Anticoagulation not indicated due to Thrombocytopenia and anemia.        Calcified granuloma of  lung  Noted      Benign prostatic hyperplasia  Continue Flomax.      Polycythemia vera  Patient on Hydrea chronically.  Will hold for now secondary to pancytopenia.      Hypertension  Chronic, controlled.  Latest blood pressure and vitals reviewed-   Temp:  [98.7 °F (37.1 °C)]   Pulse:  []   Resp:  [16]   BP: (136-176)/(67-75)   SpO2:  [96 %-99 %] .   Home meds for hypertension were reviewed and noted below. Hospital anti-hypertensive changes were made as shown below.  Hypertension Medications             metoprolol succinate (TOPROL-XL) 50 MG 24 hr tablet Take 1 tablet (50 mg total) by mouth once daily.        Will utilize p.r.n. blood pressure medication only if patient's blood pressure greater than  180/110 and he develops symptoms such as worsening chest pain or shortness of breath.        GERD (gastroesophageal reflux disease)  Continue PPI        VTE Risk Mitigation (From admission, onward)    None             Severo Mcgill MD  Department of Hospital Medicine   [facility]

## 2020-05-14 LAB
ALBUMIN SERPL BCP-MCNC: 3.6 G/DL (ref 3.5–5.2)
ALP SERPL-CCNC: 126 U/L (ref 55–135)
ALT SERPL W/O P-5'-P-CCNC: 12 U/L (ref 10–44)
ANION GAP SERPL CALC-SCNC: 11 MMOL/L (ref 8–16)
ANISOCYTOSIS BLD QL SMEAR: SLIGHT
AST SERPL-CCNC: 18 U/L (ref 10–40)
BASOPHILS NFR BLD: 0 % (ref 0–1.9)
BILIRUB DIRECT SERPL-MCNC: 0.4 MG/DL (ref 0.1–0.3)
BILIRUB SERPL-MCNC: 4.4 MG/DL (ref 0.1–1)
BLD PROD TYP BPU: NORMAL
BLD PROD TYP BPU: NORMAL
BLOOD UNIT EXPIRATION DATE: NORMAL
BLOOD UNIT EXPIRATION DATE: NORMAL
BLOOD UNIT TYPE CODE: 7300
BLOOD UNIT TYPE CODE: 7300
BLOOD UNIT TYPE: NORMAL
BLOOD UNIT TYPE: NORMAL
BUN SERPL-MCNC: 12 MG/DL (ref 8–23)
CALCIUM SERPL-MCNC: 9.3 MG/DL (ref 8.7–10.5)
CHLORIDE SERPL-SCNC: 108 MMOL/L (ref 95–110)
CO2 SERPL-SCNC: 18 MMOL/L (ref 23–29)
CODING SYSTEM: NORMAL
CODING SYSTEM: NORMAL
CREAT SERPL-MCNC: 1 MG/DL (ref 0.5–1.4)
DACRYOCYTES BLD QL SMEAR: ABNORMAL
DIFFERENTIAL METHOD: ABNORMAL
DISPENSE STATUS: NORMAL
DISPENSE STATUS: NORMAL
EOSINOPHIL NFR BLD: 1 % (ref 0–8)
ERYTHROCYTE [DISTWIDTH] IN BLOOD BY AUTOMATED COUNT: 21.2 % (ref 11.5–14.5)
EST. GFR  (AFRICAN AMERICAN): >60 ML/MIN/1.73 M^2
EST. GFR  (NON AFRICAN AMERICAN): >60 ML/MIN/1.73 M^2
FOLATE SERPL-MCNC: 16 NG/ML (ref 4–24)
GLUCOSE SERPL-MCNC: 123 MG/DL (ref 70–110)
HAPTOGLOB SERPL-MCNC: 187 MG/DL (ref 30–250)
HCT VFR BLD AUTO: 27.8 % (ref 40–54)
HGB BLD-MCNC: 8.8 G/DL (ref 14–18)
HIV1+2 IGG SERPL QL IA.RAPID: NORMAL
IMM GRANULOCYTES # BLD AUTO: ABNORMAL K/UL
IMM GRANULOCYTES NFR BLD AUTO: ABNORMAL %
IRON SERPL-MCNC: 230 UG/DL (ref 45–160)
LDH SERPL L TO P-CCNC: 365 U/L (ref 110–260)
LYMPHOCYTES NFR BLD: 33 % (ref 18–48)
MAGNESIUM SERPL-MCNC: 2.2 MG/DL (ref 1.6–2.6)
MCH RBC QN AUTO: 31.8 PG (ref 27–31)
MCHC RBC AUTO-ENTMCNC: 31.7 G/DL (ref 32–36)
MCV RBC AUTO: 100 FL (ref 82–98)
MONOCYTES NFR BLD: 6 % (ref 4–15)
NEUTROPHILS NFR BLD: 60 % (ref 38–73)
NRBC BLD-RTO: 10 /100 WBC
NUM UNITS TRANS PACKED RBC: NORMAL
NUM UNITS TRANS PACKED RBC: NORMAL
OVALOCYTES BLD QL SMEAR: ABNORMAL
PHOSPHATE SERPL-MCNC: 3 MG/DL (ref 2.7–4.5)
PLATELET # BLD AUTO: 101 K/UL (ref 150–350)
PLATELET BLD QL SMEAR: ABNORMAL
PMV BLD AUTO: 11.7 FL (ref 9.2–12.9)
POIKILOCYTOSIS BLD QL SMEAR: SLIGHT
POLYCHROMASIA BLD QL SMEAR: ABNORMAL
POTASSIUM SERPL-SCNC: 4.6 MMOL/L (ref 3.5–5.1)
PROT SERPL-MCNC: 7 G/DL (ref 6–8.4)
RBC # BLD AUTO: 2.77 M/UL (ref 4.6–6.2)
SATURATED IRON: 81 % (ref 20–50)
SODIUM SERPL-SCNC: 137 MMOL/L (ref 136–145)
TOTAL IRON BINDING CAPACITY: 284 UG/DL (ref 250–450)
TRANSFERRIN SERPL-MCNC: 192 MG/DL (ref 200–375)
VIT B12 SERPL-MCNC: 585 PG/ML (ref 210–950)
WBC # BLD AUTO: 2.26 K/UL (ref 3.9–12.7)

## 2020-05-14 PROCEDURE — 84100 ASSAY OF PHOSPHORUS: CPT

## 2020-05-14 PROCEDURE — 25000003 PHARM REV CODE 250: Performed by: HOSPITALIST

## 2020-05-14 PROCEDURE — 99223 PR INITIAL HOSPITAL CARE,LEVL III: ICD-10-PCS | Mod: ,,, | Performed by: INTERNAL MEDICINE

## 2020-05-14 PROCEDURE — 99223 1ST HOSP IP/OBS HIGH 75: CPT | Mod: ,,, | Performed by: INTERNAL MEDICINE

## 2020-05-14 PROCEDURE — 80053 COMPREHEN METABOLIC PANEL: CPT

## 2020-05-14 PROCEDURE — 86703 HIV-1/HIV-2 1 RESULT ANTBDY: CPT

## 2020-05-14 PROCEDURE — 83010 ASSAY OF HAPTOGLOBIN QUANT: CPT

## 2020-05-14 PROCEDURE — 80074 ACUTE HEPATITIS PANEL: CPT

## 2020-05-14 PROCEDURE — 82607 VITAMIN B-12: CPT

## 2020-05-14 PROCEDURE — 85027 COMPLETE CBC AUTOMATED: CPT

## 2020-05-14 PROCEDURE — 82746 ASSAY OF FOLIC ACID SERUM: CPT

## 2020-05-14 PROCEDURE — P9016 RBC LEUKOCYTES REDUCED: HCPCS

## 2020-05-14 PROCEDURE — 36415 COLL VENOUS BLD VENIPUNCTURE: CPT

## 2020-05-14 PROCEDURE — 85007 BL SMEAR W/DIFF WBC COUNT: CPT

## 2020-05-14 PROCEDURE — 11000001 HC ACUTE MED/SURG PRIVATE ROOM

## 2020-05-14 PROCEDURE — 83615 LACTATE (LD) (LDH) ENZYME: CPT

## 2020-05-14 PROCEDURE — 82248 BILIRUBIN DIRECT: CPT

## 2020-05-14 PROCEDURE — 83735 ASSAY OF MAGNESIUM: CPT

## 2020-05-14 PROCEDURE — 83540 ASSAY OF IRON: CPT

## 2020-05-14 RX ADMIN — ATORVASTATIN CALCIUM 40 MG: 40 TABLET, FILM COATED ORAL at 08:05

## 2020-05-14 RX ADMIN — Medication 9 MG: at 08:05

## 2020-05-14 RX ADMIN — SENNOSIDES AND DOCUSATE SODIUM 1 TABLET: 8.6; 5 TABLET ORAL at 08:05

## 2020-05-14 RX ADMIN — TAMSULOSIN HYDROCHLORIDE 0.4 MG: 0.4 CAPSULE ORAL at 08:05

## 2020-05-14 RX ADMIN — ACETAMINOPHEN 500 MG: 500 TABLET ORAL at 08:05

## 2020-05-14 RX ADMIN — METOPROLOL SUCCINATE 50 MG: 50 TABLET, EXTENDED RELEASE ORAL at 08:05

## 2020-05-14 RX ADMIN — ACETAMINOPHEN 1000 MG: 500 TABLET ORAL at 08:05

## 2020-05-14 NOTE — MEDICAL/APP STUDENT
Chief Complaint:   Pancytopenia    History of Presenting Illness:  Mr. Sharma is an extremely pleasant 80 y/o male, with PMHx of polycythemia vera, BPH, Afib with CHADVASC score of 4, on Xarelto, GERD, HTN and HLD. Over the last couple of months, the patient reports increasing fatigue, shortness of breath with activity, lightheadedness, restless legs at night, paleness, chills, and decreased appetite with 3 lb weight loss. He denies N/V, hemoptysis, hematemesis, but does note occasional black stools over the last 1-2 weeks. Of note, he had a colonoscopy done ~6 months prior that was normal.     Hospital Course  He received 2U pRBC and is feeling much better. Able to ambulate and appetite is improved.    Past Medical History:  Past Medical History:   Diagnosis Date    Atrial fibrillation 2017    [facility] records sent to scanning    BPH (benign prostatic hypertrophy)     Colon polyps     Erectile dysfunction     GERD (gastroesophageal reflux disease)     Hypertension     Pneumothorax on left     Polycythemia vera     Pulmonary nodules        Past Surgical History:  Past Surgical History:   Procedure Laterality Date    COLONOSCOPY  9/15/14    Dr. Mcdaniels, 5 year recheck    ESOPHAGOGASTRODUODENOSCOPY      LUNG SURGERY      LEFT - CHEST TUBE       Social History:  Social History     Socioeconomic History    Marital status:      Spouse name: Not on file    Number of children: Not on file    Years of education: Not on file    Highest education level: Not on file   Occupational History    Not on file   Social Needs    Financial resource strain: Not very hard    Food insecurity:     Worry: Never true     Inability: Never true    Transportation needs:     Medical: No     Non-medical: No   Tobacco Use    Smoking status: Former Smoker     Packs/day: 1.50     Years: 25.00     Pack years: 37.50     Last attempt to quit: 10/2/1982     Years since quittin.6    Smokeless tobacco: Never Used    Substance and Sexual Activity    Alcohol use: No     Frequency: Monthly or less     Drinks per session: 1 or 2     Binge frequency: Never    Drug use: No    Sexual activity: Yes   Lifestyle    Physical activity:     Days per week: 0 days     Minutes per session: 20 min    Stress: Not at all   Relationships    Social connections:     Talks on phone: Once a week     Gets together: Once a week     Attends Anabaptist service: Not on file     Active member of club or organization: Yes     Attends meetings of clubs or organizations: More than 4 times per year     Relationship status:    Other Topics Concern    Not on file   Social History Narrative    Not on file       Allergies:  Review of patient's allergies indicates:   Allergen Reactions    Crestor [rosuvastatin] Other (See Comments)     Leg pain        Medications:    Current Facility-Administered Medications:     0.9%  NaCl infusion (for blood administration), , Intravenous, Q24H PRN, Severo Mcgill MD    acetaminophen tablet 1,000 mg, 1,000 mg, Oral, Q6H PRN, Severo Mcgill MD, 1,000 mg at 05/14/20 0849    acetaminophen tablet 500 mg, 500 mg, Oral, BID, Severo Mcgill MD    atorvastatin tablet 40 mg, 40 mg, Oral, Daily, Severo Mcgill MD, 40 mg at 05/14/20 0849    dextrose 50% injection 12.5 g, 12.5 g, Intravenous, PRN, Severo Mcgill MD    dextrose 50% injection 25 g, 25 g, Intravenous, PRN, Severo Mcgill MD    glucagon (human recombinant) injection 1 mg, 1 mg, Intramuscular, PRN, Severo Mcgill MD    glucose chewable tablet 16 g, 16 g, Oral, PRN, Severo Mcgill MD    glucose chewable tablet 24 g, 24 g, Oral, PRN, Severo Mcgill MD    magnesium oxide tablet 800 mg, 800 mg, Oral, PRN, Severo Mcgill MD    magnesium oxide tablet 800 mg, 800 mg, Oral, PRN, Severo Mcgill MD    melatonin tablet 9 mg, 9 mg, Oral, Nightly PRN, Severo Mcgill MD    metoprolol succinate (TOPROL-XL) 24 hr tablet 50 mg, 50 mg, Oral, Daily, Severo Mcgill MD,  50 mg at 05/14/20 0849    ondansetron injection 8 mg, 8 mg, Intravenous, Q8H PRN, Severo Mcgill MD    potassium chloride 10% oral solution 40 mEq, 40 mEq, Oral, PRN, Severo Mcgill MD    potassium chloride 10% oral solution 60 mEq, 60 mEq, Oral, PRN, Severo Mcgill MD    senna-docusate 8.6-50 mg per tablet 1 tablet, 1 tablet, Oral, BID, Severo Mcgill MD, 1 tablet at 05/14/20 0849    sodium chloride 0.9% flush 10 mL, 10 mL, Intravenous, PRN, Severo Mcgill MD    tamsulosin 24 hr capsule 0.4 mg, 1 capsule, Oral, Daily, Severo Mcgill MD, 0.4 mg at 05/14/20 0849    Review of Systems:  Review of Systems   Constitutional: Positive for chills, malaise/fatigue and weight loss. Negative for diaphoresis and fever.   HENT: Negative.    Eyes: Negative.    Respiratory: Positive for shortness of breath. Negative for cough, hemoptysis, sputum production and wheezing.    Cardiovascular: Positive for palpitations. Negative for chest pain, orthopnea, claudication, leg swelling and PND.   Gastrointestinal: Positive for constipation and melena. Negative for abdominal pain, diarrhea, nausea and vomiting.   Genitourinary: Negative.    Musculoskeletal: Negative.    Skin: Negative.    Neurological: Positive for dizziness and weakness. Negative for tingling, tremors, sensory change, speech change, focal weakness, seizures, loss of consciousness and headaches.   Endo/Heme/Allergies: Negative.    Psychiatric/Behavioral: Negative.         Vitals:  Vitals:    05/14/20 1105   BP: (!) 148/65   Pulse: 75   Resp: 18   Temp: 98.1 °F (36.7 °C)       Physical Exam:  Physical Exam   Constitutional: He is oriented to person, place, and time. He appears well-developed and well-nourished. No distress.   HENT:   Head: Normocephalic and atraumatic.   Eyes:   Conjunctival pallor, bilaterally    Neck: Normal range of motion. Neck supple.   Cardiovascular: Normal rate, regular rhythm, normal heart sounds and intact distal pulses.   Pulmonary/Chest:  Effort normal and breath sounds normal. No stridor. No respiratory distress. He has no wheezes. He has no rales. He exhibits no tenderness.   Abdominal: Soft. Bowel sounds are normal. He exhibits no distension and no mass. There is no tenderness. There is no rebound and no guarding.   Musculoskeletal: Normal range of motion.   Neurological: He is alert and oriented to person, place, and time.   Skin: He is not diaphoretic. There is pallor.   Psychiatric: He has a normal mood and affect. His behavior is normal. Judgment and thought content normal.       Labs:  Recent Labs   Lab 05/14/20  0850   WBC 2.26*   RBC 2.77*   HGB 8.8*   HCT 27.8*   *   *   MCH 31.8*   MCHC 31.7*     Recent Labs   Lab 05/14/20  0851   CALCIUM 9.3   PROT 7.0      K 4.6   CO2 18*      BUN 12   CREATININE 1.0   ALKPHOS 126   ALT 12   AST 18   BILITOT 4.4*     Recent Labs   Lab 05/14/20  0851   ALT 12   AST 18   ALKPHOS 126   BILITOT 4.4*   PROT 7.0   ALBUMIN 3.6     Recent Labs   Lab 05/13/20  1650   INR 1.1   APTT >150.0*       Imaging:  Imaging Results    None         Assessment:  Mr. Sharma, 80 y/o M, presents with pancytopenia of unknown etiology.    Plan:  Pancytopenia:   -Unclear etiology; patient takes Hydrourea for PV, which is known to cause myelosuppression. However, this medication is not new, so this etiology is less likely.  -CT Chest/Abdo/Pelvis demonstrated unchanged pulmonary nodules with possible prostatomegaly but otherwise was unremarkable  -Will work up infectious vs. Oncological causes, with HIV and Hep B/C panels  -Consult Heme/Onc for possible bone marrow biopsy    Anemia:  -Hgb on admission was 5.6 and improved to 8.8 following 2U of pRBC; bilirubin increased to 4.4  -Check daily CBC   -Will order haptoglobin, LDH, unconjugated vs. Conjugated bilirubin to evaluate for hemolytic anemia  -, will check folate/B12  -Iron studies to determine if there is active bleeding; unlikely to be GIB, as  recent colonoscopy 6 months prior normal    Polycythemia Vera:  -Chronic; will hold hydroxyurea, as this may be contributing to his myelosuppression    Atrial Fibrillation:  -Chronic condition, with CHADVASC score of 4  -Due to unknown source of bleeding and relatively low risk of acute stroke, will hold Xarelto for now    DVT Prophylaxis:  -Holding Xarelto due to risk of bleeding  -SCD/TEDs     BPH:  -CT Chest/Abdo/Pelvis demonstrated prostatomegaly  -Continue Flomax 0.4mg    Hypertension:  -Chronic condition; continue tropolol XL 50mg daily     05/14/2020 This chart was completed by the Medical Student, Ashley Allen MS4

## 2020-05-14 NOTE — PROGRESS NOTES
[facility] Hospital Medicine  Progress Note    Patient Name: Morro Sharma  MRN: [id number]  Patient Class: IP- Inpatient   Admission Date: 5/13/2020  Length of Stay: 1 days  Attending Physician: Severo Mcgill MD  Primary Care Provider: Yaniv Escobar MD        Subjective:     Principal Problem:Pancytopenia        HPI:  Patient is a 79-year-old  male with a past medical history significant for hypertension, atrial fibrillation on long-term rivaroxaban, polycythemia vera on Hydrea for the past 14 years, and BPH who presents to the hospital for increase fatigue, and headache.  Patient denies any night sweats but notes that he did have a unintentional weight loss over the past 2 months.  In the emergency department, the patient was found to have pancytopenia.  He states he had made no changes in his medication for Hydrea over the past 14 years, and he is routinely seen by hematology for management of this issue.  Patient denies any nausea, vomiting, constipation.  Does state that he has had some dark stools over the past week.  Denies any hemoptysis or hematemesis.  Patient states that he has been compliant with all of his oral medications.    Overview/Hospital Course:  No notes on file    Interval History:  Patient seen and examined.  Feels better after administration of blood products.  Currently waiting for Hematology consult.  Plan of care discussed with the patient and the nurse at the bedside in detail.    Review of Systems   Constitutional: Positive for fatigue. Negative for chills and fever.   Respiratory: Positive for shortness of breath. Negative for cough.    Cardiovascular: Negative for chest pain and leg swelling.   Gastrointestinal: Negative for abdominal pain, nausea and vomiting.   Musculoskeletal: Negative for back pain.   Neurological: Negative for weakness.   Psychiatric/Behavioral: Negative for confusion. The patient is not nervous/anxious.    All other systems  reviewed and are negative.    Objective:     Vital Signs (Most Recent):  Temp: 98.1 °F (36.7 °C) (05/14/20 1511)  Pulse: 75 (05/14/20 1511)  Resp: 18 (05/14/20 1511)  BP: 124/71 (05/14/20 1511)  SpO2: 99 % (05/14/20 1511) Vital Signs (24h Range):  Temp:  [97.5 °F (36.4 °C)-98.1 °F (36.7 °C)] 98.1 °F (36.7 °C)  Pulse:  [] 75  Resp:  [16-20] 18  SpO2:  [93 %-100 %] 99 %  BP: (114-176)/(61-88) 124/71     Weight: 78.2 kg (172 lb 6.4 oz)  Body mass index is 27 kg/m².    Intake/Output Summary (Last 24 hours) at 5/14/2020 1609  Last data filed at 5/14/2020 0800  Gross per 24 hour   Intake 866.67 ml   Output 1440 ml   Net -573.33 ml      Physical Exam   Constitutional: He appears well-developed and well-nourished.   Eyes: Pupils are equal, round, and reactive to light. EOM are normal.   Neck: No JVD present.   Cardiovascular: Normal rate, regular rhythm, normal heart sounds and intact distal pulses.   Pulmonary/Chest: Effort normal and breath sounds normal.   Abdominal: Soft. Bowel sounds are normal. He exhibits no distension. There is no tenderness.   Musculoskeletal: He exhibits no edema or deformity.   Lymphadenopathy:     He has no cervical adenopathy.   Skin: No rash noted. No pallor.   Nursing note and vitals reviewed.      Significant Labs: All pertinent labs within the past 24 hours have been reviewed.    Significant Imaging: I have reviewed all pertinent imaging results/findings within the past 24 hours.      Assessment/Plan:      * Pancytopenia  Etiology remains unclear.  Potentially related to either infectious versus oncologic source.  Patient does have unintentional weight loss.  CT chest abdomen pelvis shows no evidence of occult malignancy.  Patient is status post 2 units of packed red blood cells on 05/13.  Will follow-up with hematology and defer to them for further evaluation and management of this disease process.      Anticoagulant long-term use  Patient on long-term rivaroxaban.  Given current  pancytopenia, will discontinue rivaroxaban for now and monitor closely.      Dyslipidemia (high LDL; low HDL), baseline    Patient is chronically on statin.will continue for now. Monitor clinically. Last LDL was   Lab Results   Component Value Date    LDLCALC 50.8 (L) 01/28/2019            Paroxysmal atrial fibrillation, onset 5/2017, CHADS-VAS score 4  Patient with Paroxysmal atrial fibrillation which is controlled currently with Beta Blocker. [id number] score 4. Anticoagulation not indicated due to Thrombocytopenia and anemia.        Calcified granuloma of lung  Noted      Benign prostatic hyperplasia  Continue Flomax.      Polycythemia vera  Patient on Hydrea chronically.  Will hold for now secondary to pancytopenia.      Hypertension  Chronic, controlled.  Latest blood pressure and vitals reviewed-   Temp:  [97.5 °F (36.4 °C)-98.1 °F (36.7 °C)]   Pulse:  []   Resp:  [16-20]   BP: (114-176)/(61-88)   SpO2:  [93 %-100 %] .   Home meds for hypertension were reviewed and noted below. Hospital anti-hypertensive changes were made as shown below.  Hypertension Medications             metoprolol succinate (TOPROL-XL) 50 MG 24 hr tablet Take 1 tablet (50 mg total) by mouth once daily.        Will utilize p.r.n. blood pressure medication only if patient's blood pressure greater than  180/110 and he develops symptoms such as worsening chest pain or shortness of breath.        GERD (gastroesophageal reflux disease)  Continue PPI      VTE Risk Mitigation (From admission, onward)         Ordered     IP VTE HIGH RISK PATIENT  Once      05/13/20 2006     Place sequential compression device  Until discontinued      05/13/20 2006     Reason for No Pharmacological VTE Prophylaxis  Once     Question:  Reasons:  Answer:  Thrombocytopenia    05/13/20 2006     Place SIMI hose  Until discontinued      05/13/20 2006                      Severo Mcgill MD  Department of Hospital Medicine   [facility]

## 2020-05-14 NOTE — SUBJECTIVE & OBJECTIVE
Interval History:  Patient seen and examined.  Feels better after administration of blood products.  Currently waiting for Hematology consult.  Plan of care discussed with the patient and the nurse at the bedside in detail.    Review of Systems   Constitutional: Positive for fatigue. Negative for chills and fever.   Respiratory: Positive for shortness of breath. Negative for cough.    Cardiovascular: Negative for chest pain and leg swelling.   Gastrointestinal: Negative for abdominal pain, nausea and vomiting.   Musculoskeletal: Negative for back pain.   Neurological: Negative for weakness.   Psychiatric/Behavioral: Negative for confusion. The patient is not nervous/anxious.    All other systems reviewed and are negative.    Objective:     Vital Signs (Most Recent):  Temp: 98.1 °F (36.7 °C) (05/14/20 1511)  Pulse: 75 (05/14/20 1511)  Resp: 18 (05/14/20 1511)  BP: 124/71 (05/14/20 1511)  SpO2: 99 % (05/14/20 1511) Vital Signs (24h Range):  Temp:  [97.5 °F (36.4 °C)-98.1 °F (36.7 °C)] 98.1 °F (36.7 °C)  Pulse:  [] 75  Resp:  [16-20] 18  SpO2:  [93 %-100 %] 99 %  BP: (114-176)/(61-88) 124/71     Weight: 78.2 kg (172 lb 6.4 oz)  Body mass index is 27 kg/m².    Intake/Output Summary (Last 24 hours) at 5/14/2020 1609  Last data filed at 5/14/2020 0800  Gross per 24 hour   Intake 866.67 ml   Output 1440 ml   Net -573.33 ml      Physical Exam   Constitutional: He appears well-developed and well-nourished.   Eyes: Pupils are equal, round, and reactive to light. EOM are normal.   Neck: No JVD present.   Cardiovascular: Normal rate, regular rhythm, normal heart sounds and intact distal pulses.   Pulmonary/Chest: Effort normal and breath sounds normal.   Abdominal: Soft. Bowel sounds are normal. He exhibits no distension. There is no tenderness.   Musculoskeletal: He exhibits no edema or deformity.   Lymphadenopathy:     He has no cervical adenopathy.   Skin: No rash noted. No pallor.   Nursing note and vitals  reviewed.      Significant Labs: All pertinent labs within the past 24 hours have been reviewed.    Significant Imaging: I have reviewed all pertinent imaging results/findings within the past 24 hours.

## 2020-05-14 NOTE — ASSESSMENT & PLAN NOTE
Chronic, controlled.  Latest blood pressure and vitals reviewed-   Temp:  [97.5 °F (36.4 °C)-98.1 °F (36.7 °C)]   Pulse:  []   Resp:  [16-20]   BP: (114-176)/(61-88)   SpO2:  [93 %-100 %] .   Home meds for hypertension were reviewed and noted below. Hospital anti-hypertensive changes were made as shown below.  Hypertension Medications             metoprolol succinate (TOPROL-XL) 50 MG 24 hr tablet Take 1 tablet (50 mg total) by mouth once daily.        Will utilize p.r.n. blood pressure medication only if patient's blood pressure greater than  180/110 and he develops symptoms such as worsening chest pain or shortness of breath.

## 2020-05-14 NOTE — PLAN OF CARE
Cm completed the assessment with pt via phone.  Pt is independent in care and lives with spouse.   Wife is MONYA.  PCP is Dr. Escobar.  Insurance verified as PHN.  Pt denies diabetes, dialysis and coumadin.  Pt is on Xarelto.  Disposition:  Pt will discharge to home.  No needs verbalized at this time.         05/14/20 1144   Discharge Assessment   Assessment Type Discharge Planning Assessment   Confirmed/corrected address and phone number on facesheet? Yes   Assessment information obtained from? Patient   Communicated expected length of stay with patient/caregiver yes   Prior to hospitilization cognitive status: Alert/Oriented   Prior to hospitalization functional status: Independent   Current cognitive status: Alert/Oriented   Current Functional Status: Independent   Facility Arrived From: home   Lives With spouse   Able to Return to Prior Arrangements yes   Is patient able to care for self after discharge? Yes   Who are your caregiver(s) and their phone number(s)? spouse- Xi - [phone]   Patient's perception of discharge disposition home health   Readmission Within the Last 30 Days no previous admission in last 30 days   Patient currently being followed by outpatient case management? Yes   If yes, name of outpatient case management following: insurance company assigned oupatient case management   Patient currently receives any other outside agency services? No   Equipment Currently Used at Home none   Do you have any problems affording any of your prescribed medications? No   Is the patient taking medications as prescribed? yes   Does the patient have transportation home? Yes   Transportation Anticipated family or friend will provide   Dialysis Name and Scheduled days na   Does the patient receive services at the Coumadin Clinic? No  (Pt is on Xarelto)   Discharge Plan A Home with family   Discharge Plan B Home with family   DME Needed Upon Discharge  none   Patient/Family in Agreement with Plan yes

## 2020-05-14 NOTE — PLAN OF CARE
Pt alert and oriented. Cont b and b. States he feels much better. Ambulates to bathroom. No c/o of pain. Call light in reach.

## 2020-05-14 NOTE — ASSESSMENT & PLAN NOTE
Etiology remains unclear.  Potentially related to either infectious versus oncologic source.  Patient does have unintentional weight loss.  CT chest abdomen pelvis shows no evidence of occult malignancy.  Patient is status post 2 units of packed red blood cells on 05/13.  Will follow-up with hematology and defer to them for further evaluation and management of this disease process.

## 2020-05-14 NOTE — PLAN OF CARE
POC reviewed, verbalized understanding, AAO x 4, Cardiac monitored SR, VSS, no complaints of pain, first unit of PRBC completed no adverse signs or symptoms noted, second unit is currently infusing, bed in low position, bed alarm set, call light within reach, SR up x 2, instructed to call for assistance, patient has been up to the bathroom with standby assist, free from falls, safety maintained, will continue to monitor and round.

## 2020-05-15 VITALS
BODY MASS INDEX: 27.44 KG/M2 | TEMPERATURE: 99 F | RESPIRATION RATE: 18 BRPM | HEIGHT: 67 IN | OXYGEN SATURATION: 94 % | SYSTOLIC BLOOD PRESSURE: 120 MMHG | HEART RATE: 74 BPM | WEIGHT: 174.81 LBS | DIASTOLIC BLOOD PRESSURE: 70 MMHG

## 2020-05-15 DIAGNOSIS — D45 POLYCYTHEMIA VERA: Primary | ICD-10-CM

## 2020-05-15 LAB
ALBUMIN SERPL BCP-MCNC: 3.3 G/DL (ref 3.5–5.2)
ALP SERPL-CCNC: 115 U/L (ref 55–135)
ALT SERPL W/O P-5'-P-CCNC: 11 U/L (ref 10–44)
ANION GAP SERPL CALC-SCNC: 8 MMOL/L (ref 8–16)
ANISOCYTOSIS BLD QL SMEAR: ABNORMAL
AST SERPL-CCNC: 14 U/L (ref 10–40)
BASOPHILS # BLD AUTO: ABNORMAL K/UL (ref 0–0.2)
BASOPHILS NFR BLD: 0 % (ref 0–1.9)
BILIRUB SERPL-MCNC: 1.7 MG/DL (ref 0.1–1)
BUN SERPL-MCNC: 13 MG/DL (ref 8–23)
CALCIUM SERPL-MCNC: 8.9 MG/DL (ref 8.7–10.5)
CHLORIDE SERPL-SCNC: 108 MMOL/L (ref 95–110)
CO2 SERPL-SCNC: 22 MMOL/L (ref 23–29)
CREAT SERPL-MCNC: 1 MG/DL (ref 0.5–1.4)
DACRYOCYTES BLD QL SMEAR: ABNORMAL
DIFFERENTIAL METHOD: ABNORMAL
EOSINOPHIL # BLD AUTO: ABNORMAL K/UL (ref 0–0.5)
EOSINOPHIL NFR BLD: 3 % (ref 0–8)
ERYTHROCYTE [DISTWIDTH] IN BLOOD BY AUTOMATED COUNT: 21.2 % (ref 11.5–14.5)
EST. GFR  (AFRICAN AMERICAN): >60 ML/MIN/1.73 M^2
EST. GFR  (NON AFRICAN AMERICAN): >60 ML/MIN/1.73 M^2
GLUCOSE SERPL-MCNC: 97 MG/DL (ref 70–110)
HAV IGM SERPL QL IA: NEGATIVE
HBV CORE IGM SERPL QL IA: NEGATIVE
HBV SURFACE AG SERPL QL IA: NEGATIVE
HCT VFR BLD AUTO: 26.9 % (ref 40–54)
HCV AB SERPL QL IA: NEGATIVE
HGB BLD-MCNC: 8.3 G/DL (ref 14–18)
IMM GRANULOCYTES # BLD AUTO: ABNORMAL K/UL (ref 0–0.04)
IMM GRANULOCYTES NFR BLD AUTO: ABNORMAL % (ref 0–0.5)
LYMPHOCYTES # BLD AUTO: ABNORMAL K/UL (ref 1–4.8)
LYMPHOCYTES NFR BLD: 26 % (ref 18–48)
MAGNESIUM SERPL-MCNC: 2.3 MG/DL (ref 1.6–2.6)
MCH RBC QN AUTO: 30.9 PG (ref 27–31)
MCHC RBC AUTO-ENTMCNC: 30.9 G/DL (ref 32–36)
MCV RBC AUTO: 100 FL (ref 82–98)
METAMYELOCYTES NFR BLD MANUAL: 2 %
MONOCYTES # BLD AUTO: ABNORMAL K/UL (ref 0.3–1)
MONOCYTES NFR BLD: 6 % (ref 4–15)
NEUTROPHILS NFR BLD: 57 % (ref 38–73)
NEUTS BAND NFR BLD MANUAL: 6 %
NRBC BLD-RTO: 10 /100 WBC
OVALOCYTES BLD QL SMEAR: ABNORMAL
PHOSPHATE SERPL-MCNC: 3.6 MG/DL (ref 2.7–4.5)
PLATELET # BLD AUTO: 82 K/UL (ref 150–350)
PLATELET BLD QL SMEAR: ABNORMAL
PMV BLD AUTO: 9.7 FL (ref 9.2–12.9)
POIKILOCYTOSIS BLD QL SMEAR: ABNORMAL
POLYCHROMASIA BLD QL SMEAR: ABNORMAL
POTASSIUM SERPL-SCNC: 4.2 MMOL/L (ref 3.5–5.1)
PROT SERPL-MCNC: 6.5 G/DL (ref 6–8.4)
RBC # BLD AUTO: 2.69 M/UL (ref 4.6–6.2)
SODIUM SERPL-SCNC: 138 MMOL/L (ref 136–145)
WBC # BLD AUTO: 2.05 K/UL (ref 3.9–12.7)

## 2020-05-15 PROCEDURE — 25000003 PHARM REV CODE 250: Performed by: RADIOLOGY

## 2020-05-15 PROCEDURE — 88305 TISSUE EXAM BY PATHOLOGIST: CPT | Mod: 26,,, | Performed by: PATHOLOGY

## 2020-05-15 PROCEDURE — 80053 COMPREHEN METABOLIC PANEL: CPT

## 2020-05-15 PROCEDURE — 88184 FLOWCYTOMETRY/ TC 1 MARKER: CPT | Performed by: PATHOLOGY

## 2020-05-15 PROCEDURE — 84100 ASSAY OF PHOSPHORUS: CPT

## 2020-05-15 PROCEDURE — 88189 PR  FLOWCYTOMETRY/READ, 16 & > MARKERS: ICD-10-PCS | Mod: ,,, | Performed by: PATHOLOGY

## 2020-05-15 PROCEDURE — 88342 IMHCHEM/IMCYTCHM 1ST ANTB: CPT | Performed by: PATHOLOGY

## 2020-05-15 PROCEDURE — 88305 TISSUE EXAM BY PATHOLOGIST: CPT | Mod: 59 | Performed by: PATHOLOGY

## 2020-05-15 PROCEDURE — 88342 CHG IMMUNOCYTOCHEMISTRY: ICD-10-PCS | Mod: 26,,, | Performed by: PATHOLOGY

## 2020-05-15 PROCEDURE — 88305 TISSUE EXAM BY PATHOLOGIST: ICD-10-PCS | Mod: 26,,, | Performed by: PATHOLOGY

## 2020-05-15 PROCEDURE — 88311 DECALCIFY TISSUE: CPT | Performed by: PATHOLOGY

## 2020-05-15 PROCEDURE — 85097 PR  BONE MARROW,SMEAR INTERPRETATION: ICD-10-PCS | Mod: 59,,, | Performed by: PATHOLOGY

## 2020-05-15 PROCEDURE — 88313 SPECIAL STAINS GROUP 2: CPT | Mod: 26,,, | Performed by: PATHOLOGY

## 2020-05-15 PROCEDURE — 88189 FLOWCYTOMETRY/READ 16 & >: CPT | Mod: ,,, | Performed by: PATHOLOGY

## 2020-05-15 PROCEDURE — 88313 SPECIAL STAINS GROUP 2: CPT | Mod: 59 | Performed by: PATHOLOGY

## 2020-05-15 PROCEDURE — 88313 PR  SPECIAL STAINS,GROUP II: ICD-10-PCS | Mod: 26,,, | Performed by: PATHOLOGY

## 2020-05-15 PROCEDURE — 83735 ASSAY OF MAGNESIUM: CPT

## 2020-05-15 PROCEDURE — 63600175 PHARM REV CODE 636 W HCPCS: Performed by: RADIOLOGY

## 2020-05-15 PROCEDURE — 85007 BL SMEAR W/DIFF WBC COUNT: CPT

## 2020-05-15 PROCEDURE — 88342 IMHCHEM/IMCYTCHM 1ST ANTB: CPT | Mod: 26,,, | Performed by: PATHOLOGY

## 2020-05-15 PROCEDURE — 85097 BONE MARROW INTERPRETATION: CPT | Mod: 59,,, | Performed by: PATHOLOGY

## 2020-05-15 PROCEDURE — 88185 FLOWCYTOMETRY/TC ADD-ON: CPT | Performed by: PATHOLOGY

## 2020-05-15 PROCEDURE — 36415 COLL VENOUS BLD VENIPUNCTURE: CPT

## 2020-05-15 PROCEDURE — 85027 COMPLETE CBC AUTOMATED: CPT

## 2020-05-15 RX ORDER — FENTANYL CITRATE 50 UG/ML
25 INJECTION, SOLUTION INTRAMUSCULAR; INTRAVENOUS
Status: DISCONTINUED | OUTPATIENT
Start: 2020-05-15 | End: 2020-05-15 | Stop reason: HOSPADM

## 2020-05-15 RX ORDER — MIDAZOLAM HYDROCHLORIDE 1 MG/ML
1 INJECTION INTRAMUSCULAR; INTRAVENOUS
Status: DISCONTINUED | OUTPATIENT
Start: 2020-05-15 | End: 2020-05-15 | Stop reason: HOSPADM

## 2020-05-15 RX ORDER — LIDOCAINE HYDROCHLORIDE 10 MG/ML
10 INJECTION, SOLUTION EPIDURAL; INFILTRATION; INTRACAUDAL; PERINEURAL ONCE
Status: COMPLETED | OUTPATIENT
Start: 2020-05-15 | End: 2020-05-15

## 2020-05-15 RX ADMIN — FENTANYL CITRATE 25 MCG: 50 INJECTION, SOLUTION INTRAMUSCULAR; INTRAVENOUS at 02:05

## 2020-05-15 RX ADMIN — MIDAZOLAM 1 MG: 1 INJECTION INTRAMUSCULAR; INTRAVENOUS at 02:05

## 2020-05-15 RX ADMIN — LIDOCAINE HYDROCHLORIDE 100 MG: 10 INJECTION, SOLUTION EPIDURAL; INFILTRATION; INTRACAUDAL; PERINEURAL at 02:05

## 2020-05-15 NOTE — INTERVAL H&P NOTE
The patient has been examined and the H&P has been reviewed:    I concur with the findings and no changes have occurred since H&P was written.     No new c/o on ROS.  RRR and normal BS on exam.  Off anticoags x 3 days.  Plan for CT guided bone marrow biopsy with mod sedation.    Active Hospital Problems    Diagnosis  POA    *Pancytopenia [D61.818]  Yes    Anticoagulant long-term use [Z79.01]  Not Applicable    Dyslipidemia (high LDL; low HDL), baseline  [E78.5]  Yes    Paroxysmal atrial fibrillation, onset 5/2017, CHADS-VAS score 4 [I48.0]  Yes    Calcified granuloma of lung [J84.10]  Yes     Seen on CT chest 11/2012      Hypertension [I10]  Yes    Polycythemia vera [D45]  Yes    Benign prostatic hyperplasia [N40.0]  Yes    GERD (gastroesophageal reflux disease) [K21.9]  Yes      Resolved Hospital Problems   No resolved problems to display.

## 2020-05-15 NOTE — MEDICAL/APP STUDENT
Chief Complaint:   Pancytopenia    History of Presenting Illness:  Mr. Sharma is an extremely pleasant 78 y/o male, with PMHx of polycythemia vera, BPH, Afib with CHADVASC score of 4, on Xarelto, GERD, HTN and HLD. Over the last couple of months, the patient reports increasing fatigue, shortness of breath with activity, lightheadedness, restless legs at night, paleness, chills, and decreased appetite with 3 lb weight loss. He denies N/V, hemoptysis, hematemesis, but does note occasional black stools over the last 1-2 weeks. Of note, he had a colonoscopy done ~6 months prior that was normal. Denies smoking or an alcohol use.    Hospital Course  5/14: He received 2U pRBC and is feeling much better. Able to ambulate and appetite is improved.  5/15: No acute events overnight. Pt reports feeling much better, ready to go home after bone marrow Bx.     Past Medical History:  Past Medical History:   Diagnosis Date    Atrial fibrillation 05/07/2017    [facility] records sent to scanning    BPH (benign prostatic hypertrophy)     Colon polyps     Erectile dysfunction     GERD (gastroesophageal reflux disease)     Hypertension     Pneumothorax on left     Polycythemia vera     Pulmonary nodules        Past Surgical History:  Past Surgical History:   Procedure Laterality Date    COLONOSCOPY  9/15/14    Dr. Mcdaniels, 5 year recheck    ESOPHAGOGASTRODUODENOSCOPY      LUNG SURGERY      LEFT - CHEST TUBE       Social History:  Social History     Socioeconomic History    Marital status:      Spouse name: Not on file    Number of children: Not on file    Years of education: Not on file    Highest education level: Not on file   Occupational History    Not on file   Social Needs    Financial resource strain: Not very hard    Food insecurity:     Worry: Never true     Inability: Never true    Transportation needs:     Medical: No     Non-medical: No   Tobacco Use    Smoking status: Former Smoker     Packs/day: 1.50      Years: 25.00     Pack years: 37.50     Last attempt to quit: 10/2/1982     Years since quittin.6    Smokeless tobacco: Never Used   Substance and Sexual Activity    Alcohol use: No     Frequency: Monthly or less     Drinks per session: 1 or 2     Binge frequency: Never    Drug use: No    Sexual activity: Yes   Lifestyle    Physical activity:     Days per week: 0 days     Minutes per session: 20 min    Stress: Not at all   Relationships    Social connections:     Talks on phone: Once a week     Gets together: Once a week     Attends Congregation service: Not on file     Active member of club or organization: Yes     Attends meetings of clubs or organizations: More than 4 times per year     Relationship status:    Other Topics Concern    Not on file   Social History Narrative    Not on file       Allergies:  Review of patient's allergies indicates:   Allergen Reactions    Crestor [rosuvastatin] Other (See Comments)     Leg pain        Medications:    Current Facility-Administered Medications:     0.9%  NaCl infusion (for blood administration), , Intravenous, Q24H PRN, Severo Mcgill MD    acetaminophen tablet 1,000 mg, 1,000 mg, Oral, Q6H PRN, Severo Mcgill MD, 1,000 mg at 20 0849    acetaminophen tablet 500 mg, 500 mg, Oral, BID, Severo Mcgill MD, 500 mg at 20 2040    atorvastatin tablet 40 mg, 40 mg, Oral, Daily, Severo Mcgill MD, 40 mg at 20 0849    dextrose 50% injection 12.5 g, 12.5 g, Intravenous, PRN, Severo Mcgill MD    dextrose 50% injection 25 g, 25 g, Intravenous, PRN, Severo Mcgill MD    glucagon (human recombinant) injection 1 mg, 1 mg, Intramuscular, PRN, Severo Mcgill MD    glucose chewable tablet 16 g, 16 g, Oral, PRN, Severo Mcgill MD    glucose chewable tablet 24 g, 24 g, Oral, PRN, Severo Mcgill MD    magnesium oxide tablet 800 mg, 800 mg, Oral, PRN, Severo Mcgill MD    magnesium oxide tablet 800 mg, 800 mg, Oral, PRN, Severo Mcgill MD     melatonin tablet 9 mg, 9 mg, Oral, Nightly PRN, Severo Mcgill MD, 9 mg at 05/14/20 2040    metoprolol succinate (TOPROL-XL) 24 hr tablet 50 mg, 50 mg, Oral, Daily, Severo Mcgill MD, 50 mg at 05/14/20 0849    ondansetron injection 8 mg, 8 mg, Intravenous, Q8H PRN, Severo Mcgill MD    potassium chloride 10% oral solution 40 mEq, 40 mEq, Oral, PRN, Severo Mcgill MD    potassium chloride 10% oral solution 60 mEq, 60 mEq, Oral, PRN, Severo Mcgill MD    senna-docusate 8.6-50 mg per tablet 1 tablet, 1 tablet, Oral, BID, Severo Mcgill MD, 1 tablet at 05/14/20 2040    sodium chloride 0.9% flush 10 mL, 10 mL, Intravenous, PRN, Severo Mcgill MD    tamsulosin 24 hr capsule 0.4 mg, 1 capsule, Oral, Daily, Severo Mcgill MD, 0.4 mg at 05/14/20 0849    Review of Systems:  Review of Systems   Constitutional: Positive for chills, malaise/fatigue and weight loss. Negative for diaphoresis and fever.   HENT: Negative.    Eyes: Negative.    Respiratory: Positive for shortness of breath. Negative for cough, hemoptysis, sputum production and wheezing.    Cardiovascular: Positive for palpitations. Negative for chest pain, orthopnea, claudication, leg swelling and PND.   Gastrointestinal: Positive for constipation and melena. Negative for abdominal pain, diarrhea, nausea and vomiting.   Genitourinary: Negative.    Musculoskeletal: Negative.    Skin: Negative.    Neurological: Positive for dizziness and weakness. Negative for tingling, tremors, sensory change, speech change, focal weakness, seizures, loss of consciousness and headaches.   Endo/Heme/Allergies: Negative.    Psychiatric/Behavioral: Negative.         Vitals:  Vitals:    05/15/20 0727   BP: 122/72   Pulse: 71   Resp: 16   Temp: 98.1 °F (36.7 °C)       Physical Exam:  Physical Exam   Constitutional: He is oriented to person, place, and time. He appears well-developed and well-nourished. No distress.   HENT:   Head: Normocephalic and atraumatic.   Eyes:    Conjunctival pallor, bilaterally    Neck: Normal range of motion. Neck supple.   Cardiovascular: Normal rate, regular rhythm, normal heart sounds and intact distal pulses.   Pulmonary/Chest: Effort normal and breath sounds normal. No stridor. No respiratory distress. He has no wheezes. He has no rales. He exhibits no tenderness.   Abdominal: Soft. Bowel sounds are normal. He exhibits no distension and no mass. There is no tenderness. There is no rebound and no guarding.   Musculoskeletal: Normal range of motion.   Neurological: He is alert and oriented to person, place, and time.   Skin: He is not diaphoretic. There is pallor.   Psychiatric: He has a normal mood and affect. His behavior is normal. Judgment and thought content normal.       Labs:  Recent Labs   Lab 05/15/20  0522   WBC 2.05*   RBC 2.69*   HGB 8.3*   HCT 26.9*   PLT 82*   *   MCH 30.9   MCHC 30.9*     Iron: 230  TIBC: 284  Saturated Iron: 81%  Transferrin: 192  Folate: 16  B12: 585  Haptoglobin: 187  LD: 365    Recent Labs   Lab 05/15/20  0522   CALCIUM 8.9   PROT 6.5      K 4.2   CO2 22*      BUN 13   CREATININE 1.0   ALKPHOS 115   ALT 11   AST 14   BILITOT 1.7*     Recent Labs   Lab 05/15/20  0522   ALT 11   AST 14   ALKPHOS 115   BILITOT 1.7*   PROT 6.5   ALBUMIN 3.3*     No results for input(s): PT, INR, APTT in the last 24 hours.    Imaging:  Imaging Results    None         Assessment:  Mr. Sharma, 80 y/o M, presents with pancytopenia of unknown etiology.    Plan:  Pancytopenia:   -Unclear etiology; patient takes Hydrourea for PV, which is known to cause myelosuppression. However, this medication is not new, so this etiology is less likely.  -CT Chest/Abdo/Pelvis demonstrated unchanged pulmonary nodules with possible prostatomegaly but otherwise was unremarkable  -Infectious workup thus far negative, with non-reactive HIV  -Heme/Onc consulted, and bone marrow biopsy scheduled for today with outpatient follow-up with   [facility] once patient is d/c; CBC in 1 week to trend pancytopenia    Hemolytic Anemia:  -Hgb on admission was 5.6; Hgb 8.3 now, with bili 1.7  -Haptoglobin WNL, but elevated LD and indirect bilirubin, consistent with hemolytic anemia  -Folate/B12 WNL; Heme/Onc advised patient to start folic acid supplementation  -Repeat CBC in 1 week prior to outpatient heme/onc f/u    Polycythemia Vera:  -Chronic; will hold hydroxyurea, as this may be contributing to his myelosuppression    Atrial Fibrillation:  -Chronic condition, with CHADVASC score of 4  -Due to unknown source of bleeding and relatively low risk of acute stroke, will hold Xarelto for now    DVT Prophylaxis:  -Holding Xarelto due to risk of bleeding  -SCD/TEDs     BPH:  -CT Chest/Abdo/Pelvis demonstrated prostatomegaly  -Continue Flomax 0.4mg    Hypertension:  -Chronic condition; continue tropolol XL 50mg daily     05/15/2020 This chart was completed by the Medical Student, Ashley Allen MS4

## 2020-05-15 NOTE — PLAN OF CARE
05/15/20 1405   Final Note   Assessment Type Final Discharge Note   Anticipated Discharge Disposition Home   Hospital Follow Up  Appt(s) scheduled? Yes

## 2020-05-15 NOTE — HOSPITAL COURSE
Patient was admitted for pancytopenia and symptomatic anemia.  He underwent evaluation with imaging of CT of his chest abdomen pelvis, as well as comprehensive lab evaluation.  Hematology was consulted.  Etiology of the patient's pancytopenia was likely felt to be secondary to hydroxyurea.  Hydroxyurea was discontinued and patient was transfused 2 units of packed red blood cells.  Per request of Hematology, the patient underwent bone marrow biopsy in the hospital.  The patient's rivaroxaban was discontinued secondary to thrombocytopenia and anemia, and it was held after discharge.  Patient improved symptomatically after transfusion of packed red blood cells, and was discharged home after bone marrow biopsy was performed.  He will have follow-up with primary care and Hematology.

## 2020-05-15 NOTE — DISCHARGE INSTRUCTIONS
Thank you for choosing [facility] for your medical care. The primary doctor who is taking care of you at the time of your discharge is Severo Mcgill MD.     You were admitted to the hospital with Pancytopenia.     Please note your discharge instructions, including diet/activity restrictions, follow-up appointments, and medication changes.  If you have any questions about your medical issues, prescriptions, or any other questions, please feel free to contact the [facility] Medicine Dept at [phone] and we will help.    If you are previously with Home health, outpatient PT/OT or under a therapy program, you are cleared to return to those programs.    Please direct all long term medication refills and follow up to your primary care provider, Yaniv Escobar MD. Thank you again for letting us take care of your health care needs.    Please note the following discharge instructions per your discharging physician-  Hold your xarelto until seen by hematology and after follow up labs

## 2020-05-15 NOTE — CONSULTS
HPI    79 years old male with long history of polycythemia vera on hydroxyurea 500 mg p.o. b.i.d. for more than 14 years ago as well as on Xarelto with diagnosis of PFA in 2017.  There is no change in medication in the past years.  Last known follow-up with Hematology Clinic in 2020 with Bill Otoole.  He did not have any complaint or concern since recently in which was was notified by primary care doctor to asking to come to the ED for concerning of abnormal blood counts..  On 2020 routine blood count shows hemoglobin 5.5 W BC of 2.0 and platelets of 111 K. prior to that on 2020 patient had normal CBC.        In-hospital patient received 2 units packed red blood cells.  He is asymptomatic.  He denies any chest pain shortness breath.  Patient denies any abdominal pain nausea vomiting or diarrhea.  No fever no chills.  No weight loss.  No drenching night sweats.  Positive finding including fatigue and shortness of breath.    Past Medical History:   Diagnosis Date    Atrial fibrillation 2017    [facility] sent to scanning    BPH (benign prostatic hypertrophy)     Colon polyps     Erectile dysfunction     GERD (gastroesophageal reflux disease)     Hypertension     Pneumothorax on left     Polycythemia vera     Pulmonary nodules      Social History     Socioeconomic History    Marital status:      Spouse name: Not on file    Number of children: Not on file    Years of education: Not on file    Highest education level: Not on file   Occupational History    Not on file   Social Needs    Financial resource strain: Not very hard    Food insecurity:     Worry: Never true     Inability: Never true    Transportation needs:     Medical: No     Non-medical: No   Tobacco Use    Smoking status: Former Smoker     Packs/day: 1.50     Years: 25.00     Pack years: 37.50     Last attempt to quit: 10/2/1982     Years since quittin.6    Smokeless tobacco: Never Used   Substance and  Sexual Activity    Alcohol use: No     Frequency: Monthly or less     Drinks per session: 1 or 2     Binge frequency: Never    Drug use: No    Sexual activity: Yes   Lifestyle    Physical activity:     Days per week: 0 days     Minutes per session: 20 min    Stress: Not at all   Relationships    Social connections:     Talks on phone: Once a week     Gets together: Once a week     Attends Christianity service: Not on file     Active member of club or organization: Yes     Attends meetings of clubs or organizations: More than 4 times per year     Relationship status:    Other Topics Concern    Not on file   Social History Narrative    Not on file     Review of patient's allergies indicates:   Allergen Reactions    Crestor [rosuvastatin] Other (See Comments)     Leg pain      Family History   Problem Relation Age of Onset    Hypertension Mother     Heart disease Mother     Hypertension Maternal Aunt     Heart disease Maternal Aunt     Hypertension Maternal Uncle     Heart disease Maternal Uncle     Early death Father     No Known Problems Daughter     No Known Problems Daughter     No Known Problems Daughter     No Known Problems Daughter     Prostate cancer Neg Hx     Kidney cancer Neg Hx     Cancer Neg Hx     Urolithiasis Neg Hx     Melanoma Neg Hx     Psoriasis Neg Hx     Lupus Neg Hx     Eczema Neg Hx      Physical exam  Vitals:    05/14/20 1917   BP: 131/66   Pulse: 75   Resp: 18   Temp: 98.5 °F (36.9 °C)     General:  Patient is awake alert no acute distress  HENT:  Normocephalic atraumatic pupils equal round reactive to light extraocular muscle intact  CV:  Regular rate  Lung:  Good air movement  Abdomen:  Soft nontender nondistended bowel sounds x4  Extremities:  Move all 4 extremity distal pulses intact  Neuro:  Cranial nerves 2-12 grossly intact sensorimotor grossly intact no focal deficit  Skin:  No rash no lesions  :  Deferred\  Psych:  Normal affect\    CMP  Sodium   Date  Value Ref Range Status   05/14/2020 137 136 - 145 mmol/L Final     Potassium   Date Value Ref Range Status   05/14/2020 4.6 3.5 - 5.1 mmol/L Final     Chloride   Date Value Ref Range Status   05/14/2020 108 95 - 110 mmol/L Final     CO2   Date Value Ref Range Status   05/14/2020 18 (L) 23 - 29 mmol/L Final     Glucose   Date Value Ref Range Status   05/14/2020 123 (H) 70 - 110 mg/dL Final     BUN, Bld   Date Value Ref Range Status   05/14/2020 12 8 - 23 mg/dL Final     Creatinine   Date Value Ref Range Status   05/14/2020 1.0 0.5 - 1.4 mg/dL Final   10/02/2012 1.3 0.2 - 1.4 mg/dl Final     Calcium   Date Value Ref Range Status   05/14/2020 9.3 8.7 - 10.5 mg/dL Final   10/02/2012 10.2 8.6 - 10.2 mg/dl Final     Total Protein   Date Value Ref Range Status   05/14/2020 7.0 6.0 - 8.4 g/dL Final     Albumin   Date Value Ref Range Status   05/14/2020 3.6 3.5 - 5.2 g/dL Final     Total Bilirubin   Date Value Ref Range Status   05/14/2020 4.4 (H) 0.1 - 1.0 mg/dL Final     Comment:     For infants and newborns, interpretation of results should be based  on gestational age, weight and in agreement with clinical  observations.  Premature Infant recommended reference ranges:  Up to 24 hours.............<8.0 mg/dL  Up to 48 hours............<12.0 mg/dL  3-5 days..................<15.0 mg/dL  6-29 days.................<15.0 mg/dL       Alkaline Phosphatase   Date Value Ref Range Status   05/14/2020 126 55 - 135 U/L Final     AST   Date Value Ref Range Status   05/14/2020 18 10 - 40 U/L Final     ALT   Date Value Ref Range Status   05/14/2020 12 10 - 44 U/L Final     Anion Gap   Date Value Ref Range Status   05/14/2020 11 8 - 16 mmol/L Final   10/02/2012 7 5 - 15 meq/L Final     eGFR if    Date Value Ref Range Status   05/14/2020 >60 >60 mL/min/1.73 m^2 Final     eGFR if non    Date Value Ref Range Status   05/14/2020 >60 >60 mL/min/1.73 m^2 Final     Comment:     Calculation used to obtain the  estimated glomerular filtration  rate (eGFR) is the CKD-EPI equation.        Lab Results   Component Value Date    WBC 2.26 (L) 05/14/2020    HGB 8.8 (L) 05/14/2020    HCT 27.8 (L) 05/14/2020     (H) 05/14/2020     (L) 05/14/2020       Impression CT chest abdomen pelvis with contrast 05/13/2020       1. No acute findings chest abdomen or pelvis including no lymphadenopathy.  2. Unchanged bilateral pulmonary nodules favored benign given length of stability.  3. Pulmonary emphysema.  4. Prostatomegaly.  Correlate with PSA.     Assessment plan    [] Pancytopenia suspect marrow involvement secondary to hydroxyurea  > hold hydroxyurea to further noticed  > outpatient follow-up with Dr. Cole  > advocating in-hospital bone marrow biopsy prior to discharge.  Orders in placed.  Will also add fish for MDS on marrow specimen  > anticipation of CT-guided bone marrow biopsy  > Start on folic acid daily    [] PFA on Xarelto daily  > hold Xarelto in anticipation of CT-guided bone marrow biopsy.    [] Peripheral blood smear examination  > I have personally reviewed the peripheral blood smear examination there is no evidence of platelet clumping, as well as cell fragmentation.  There is no obvious evidence of abnormal WBC on  peripheral blood smear screening.  RBC shapesize in numerous variation, at this point I do not know prior to RBC transfusion vs post RBC transfusion      Hematology oncology will continue follow on the case

## 2020-05-15 NOTE — NURSING
IV x2 removed with tip intact. Tele removed. Orders reviewed with pt.  No bleeding noted to puncture sites. Wife called to come  pt.

## 2020-05-15 NOTE — NURSING
Patient transferred via bed from [facility] to CT 2.  Consents obtained by Radiologist   and Time out complete. Bone Marrow biopsy performed by Dr. Sánchez. Versed 2 mg and Fentanyl 50 mcg were administered IV- Pathology tech, Jeferson present, received specimens and verified acceptable sample.  Vital signs were monitored and remained stable for duration of procedure- pt tolerated procedure well- Bandaid applied to bilateral lower back CDI- Report called to Stan, primary nurse. Notified nurse, Dr. Sánchez had to attempt both left and right posterior iliac crests for bone marrow aspirate.  pt transported via bed back to room for recovery.

## 2020-05-15 NOTE — NURSING
Rounds made, pt appears comfortable, no complaint verbalized, no distress noted, expressed desire to go home after biopsy, educated about expectation of procedure.

## 2020-05-15 NOTE — PLAN OF CARE
POC reviewed with patient, patient verbalized understanding, AAOX4, VSS, room air, no c/o pain or sob, NPO since midnight, tele and safety maintained, patient visualized, appears sleep, respirations even and unlabored, bed in lowest position, side rails up X2, call light and table within reach, no distress noted, will continue to monitor.

## 2020-05-15 NOTE — H&P (VIEW-ONLY)
HPI    79 years old male with long history of polycythemia vera on hydroxyurea 500 mg p.o. b.i.d. for more than 14 years ago as well as on Xarelto with diagnosis of PFA in 2017.  There is no change in medication in the past years.  Last known follow-up with [facility]ology [facility] in 2020 with Bill Otoole.  He did not have any complaint or concern since recently in which was was notified by primary care doctor to asking to come to the ED for concerning of abnormal blood counts..  On 2020 routine blood count shows hemoglobin 5.5 W BC of 2.0 and platelets of 111 K. prior to that on 2020 patient had normal CBC.        In-hospital patient received 2 units packed red blood cells.  He is asymptomatic.  He denies any chest pain shortness breath.  Patient denies any abdominal pain nausea vomiting or diarrhea.  No fever no chills.  No weight loss.  No drenching night sweats.  Positive finding including fatigue and shortness of breath.    Past Medical History:   Diagnosis Date    Atrial fibrillation 2017    [facility] records sent to scanning    BPH (benign prostatic hypertrophy)     Colon polyps     Erectile dysfunction     GERD (gastroesophageal reflux disease)     Hypertension     Pneumothorax on left     Polycythemia vera     Pulmonary nodules      Social History     Socioeconomic History    Marital status:      Spouse name: Not on file    Number of children: Not on file    Years of education: Not on file    Highest education level: Not on file   Occupational History    Not on file   Social Needs    Financial resource strain: Not very hard    Food insecurity:     Worry: Never true     Inability: Never true    Transportation needs:     Medical: No     Non-medical: No   Tobacco Use    Smoking status: Former Smoker     Packs/day: 1.50     Years: 25.00     Pack years: 37.50     Last attempt to quit: 10/2/1982     Years since quittin.6    Smokeless tobacco: Never Used   Substance and  Sexual Activity    Alcohol use: No     Frequency: Monthly or less     Drinks per session: 1 or 2     Binge frequency: Never    Drug use: No    Sexual activity: Yes   Lifestyle    Physical activity:     Days per week: 0 days     Minutes per session: 20 min    Stress: Not at all   Relationships    Social connections:     Talks on phone: Once a week     Gets together: Once a week     Attends Gnosticism service: Not on file     Active member of club or organization: Yes     Attends meetings of clubs or organizations: More than 4 times per year     Relationship status:    Other Topics Concern    Not on file   Social History Narrative    Not on file     Review of patient's allergies indicates:   Allergen Reactions    Crestor [rosuvastatin] Other (See Comments)     Leg pain      Family History   Problem Relation Age of Onset    Hypertension Mother     Heart disease Mother     Hypertension Maternal Aunt     Heart disease Maternal Aunt     Hypertension Maternal Uncle     Heart disease Maternal Uncle     Early death Father     No Known Problems Daughter     No Known Problems Daughter     No Known Problems Daughter     No Known Problems Daughter     Prostate cancer Neg Hx     Kidney cancer Neg Hx     Cancer Neg Hx     Urolithiasis Neg Hx     Melanoma Neg Hx     Psoriasis Neg Hx     Lupus Neg Hx     Eczema Neg Hx      Physical exam  Vitals:    05/14/20 1917   BP: 131/66   Pulse: 75   Resp: 18   Temp: 98.5 °F (36.9 °C)     General:  Patient is awake alert no acute distress  HENT:  Normocephalic atraumatic pupils equal round reactive to light extraocular muscle intact  CV:  Regular rate  Lung:  Good air movement  Abdomen:  Soft nontender nondistended bowel sounds x4  Extremities:  Move all 4 extremity distal pulses intact  Neuro:  Cranial nerves 2-12 grossly intact sensorimotor grossly intact no focal deficit  Skin:  No rash no lesions  :  Deferred\  Psych:  Normal affect\    CMP  Sodium   Date  Value Ref Range Status   05/14/2020 137 136 - 145 mmol/L Final     Potassium   Date Value Ref Range Status   05/14/2020 4.6 3.5 - 5.1 mmol/L Final     Chloride   Date Value Ref Range Status   05/14/2020 108 95 - 110 mmol/L Final     CO2   Date Value Ref Range Status   05/14/2020 18 (L) 23 - 29 mmol/L Final     Glucose   Date Value Ref Range Status   05/14/2020 123 (H) 70 - 110 mg/dL Final     BUN, Bld   Date Value Ref Range Status   05/14/2020 12 8 - 23 mg/dL Final     Creatinine   Date Value Ref Range Status   05/14/2020 1.0 0.5 - 1.4 mg/dL Final   10/02/2012 1.3 0.2 - 1.4 mg/dl Final     Calcium   Date Value Ref Range Status   05/14/2020 9.3 8.7 - 10.5 mg/dL Final   10/02/2012 10.2 8.6 - 10.2 mg/dl Final     Total Protein   Date Value Ref Range Status   05/14/2020 7.0 6.0 - 8.4 g/dL Final     Albumin   Date Value Ref Range Status   05/14/2020 3.6 3.5 - 5.2 g/dL Final     Total Bilirubin   Date Value Ref Range Status   05/14/2020 4.4 (H) 0.1 - 1.0 mg/dL Final     Comment:     For infants and newborns, interpretation of results should be based  on gestational age, weight and in agreement with clinical  observations.  Premature Infant recommended reference ranges:  Up to 24 hours.............<8.0 mg/dL  Up to 48 hours............<12.0 mg/dL  3-5 days..................<15.0 mg/dL  6-29 days.................<15.0 mg/dL       Alkaline Phosphatase   Date Value Ref Range Status   05/14/2020 126 55 - 135 U/L Final     AST   Date Value Ref Range Status   05/14/2020 18 10 - 40 U/L Final     ALT   Date Value Ref Range Status   05/14/2020 12 10 - 44 U/L Final     Anion Gap   Date Value Ref Range Status   05/14/2020 11 8 - 16 mmol/L Final   10/02/2012 7 5 - 15 meq/L Final     eGFR if    Date Value Ref Range Status   05/14/2020 >60 >60 mL/min/1.73 m^2 Final     eGFR if non    Date Value Ref Range Status   05/14/2020 >60 >60 mL/min/1.73 m^2 Final     Comment:     Calculation used to obtain the  estimated glomerular filtration  rate (eGFR) is the CKD-EPI equation.        Lab Results   Component Value Date    WBC 2.26 (L) 05/14/2020    HGB 8.8 (L) 05/14/2020    HCT 27.8 (L) 05/14/2020     (H) 05/14/2020     (L) 05/14/2020       Impression CT chest abdomen pelvis with contrast 05/13/2020       1. No acute findings chest abdomen or pelvis including no lymphadenopathy.  2. Unchanged bilateral pulmonary nodules favored benign given length of stability.  3. Pulmonary emphysema.  4. Prostatomegaly.  Correlate with PSA.     Assessment plan    [] Pancytopenia suspect marrow involvement secondary to hydroxyurea  > hold hydroxyurea to further noticed  > outpatient follow-up with Dr. Cole  > advocating in-hospital bone marrow biopsy prior to discharge.  Orders in placed.  Will also add fish for MDS on marrow specimen  > anticipation of CT-guided bone marrow biopsy  > Start on folic acid daily    [] PFA on Xarelto daily  > hold Xarelto in anticipation of CT-guided bone marrow biopsy.    [] Peripheral blood smear examination  > I have personally reviewed the peripheral blood smear examination there is no evidence of platelet clumping, as well as cell fragmentation.  There is no obvious evidence of abnormal WBC on  peripheral blood smear screening.  RBC shapesize in numerous variation, at this point I do not know prior to RBC transfusion vs post RBC transfusion      Hematology oncology will continue follow on the case

## 2020-05-15 NOTE — OP NOTE
Radiology Post-Procedure Note    Pre Op Diagnosis: Anemia  Post Op Diagnosis: Same    Procedure: CT guided bone biopsy    Procedure performed by: MD Gonzalo    Written Informed Consent Obtained: Yes  Specimen Removed: YES marrow aspirate and core  Estimated Blood Loss: Minimal    Findings:   Technically successful bone marrow biopsy.    Patient tolerated procedure well.    Mitchell Sánchez MD  Department of Radiology

## 2020-05-15 NOTE — PLAN OF CARE
Hospital follow up scheduled with PCP. AVS updated.      05/15/20 7159   Discharge Assessment   Assessment Type Discharge Planning Reassessment

## 2020-05-16 NOTE — DISCHARGE SUMMARY
[facility] Hospital Medicine  Discharge Summary      Patient Name: Morro Sharma  MRN: [id number]  Admission Date: 5/13/2020  Hospital Length of Stay: 2 days  Discharge Date and Time: 5/15/2020  6:09 PM  Attending Physician: No att. providers found   Discharging Provider: Severo Mcgill MD  Primary Care Provider: Yaniv Escobar MD      HPI:   Patient is a 79-year-old  male with a past medical history significant for hypertension, atrial fibrillation on long-term rivaroxaban, polycythemia vera on Hydrea for the past 14 years, and BPH who presents to the hospital for increase fatigue, and headache.  Patient denies any night sweats but notes that he did have a unintentional weight loss over the past 2 months.  In the emergency department, the patient was found to have pancytopenia.  He states he had made no changes in his medication for Hydrea over the past 14 years, and he is routinely seen by hematology for management of this issue.  Patient denies any nausea, vomiting, constipation.  Does state that he has had some dark stools over the past week.  Denies any hemoptysis or hematemesis.  Patient states that he has been compliant with all of his oral medications.    * No surgery found *      Hospital Course:   Patient was admitted for pancytopenia and symptomatic anemia.  He underwent evaluation with imaging of CT of his chest abdomen pelvis, as well as comprehensive lab evaluation.  Hematology was consulted.  Etiology of the patient's pancytopenia was likely felt to be secondary to hydroxyurea.  Hydroxyurea was discontinued and patient was transfused 2 units of packed red blood cells.  Per request of Hematology, the patient underwent bone marrow biopsy in the hospital.  The patient's rivaroxaban was discontinued secondary to thrombocytopenia and anemia, and it was held after discharge.  Patient improved symptomatically after transfusion of packed red blood cells, and was discharged  home after bone marrow biopsy was performed.  He will have follow-up with primary care and Hematology.     Consults:   Consults (From admission, onward)        Status Ordering Provider     Inpatient consult to Hematology  Once     Provider:  Bryon James MD    Completed ART CRAWFORD          No new Assessment & Plan notes have been filed under this hospital service since the last note was generated.  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    Specialty:  Family Medicine  Contact information:  [address] [facility] [address] [phone]             Bryon James MD In 2 weeks.    Specialty:  Hematology and Oncology  Contact information:  [address] [address] [phone]                 Patient Instructions:      CBC auto differential   Standing Status: Future Standing Exp. Date: 07/14/21     Diet Adult Regular     Notify your health care provider if you experience any of the following:  temperature >100.4     Notify your health care provider if you experience any of the following:  difficulty breathing or increased cough     Notify your health care provider if you experience any of the following:  increased confusion or weakness     Activity  as tolerated       Significant Diagnostic Studies:     Pending Diagnostic Studies:     Procedure Component Value Units Date/Time    Chromosome Analysis, Bone Marrow Left Posterior Iliac Crest [[id number]] Collected:  05/15/20 1529    Order Status:  Sent Lab Status:  In process Updated:  05/15/20 1530    Specimen:  Bone Marrow     Heme Disorders DNA/RNA Hold, Bone Marrow [[id number]] Collected:  05/15/20 1528    Order Status:  Sent Lab Status:  In process Updated:  05/15/20 1529    Specimen:  Bone Marrow     Leukemia/Lymphoma Screen - Bone Marrow Left Posterior Iliac Crest (x2 attempts left pos iliac crest & x2 attempts right pos iliac crest) [[id number]] Collected:  05/15/20 1528    Order Status:  Sent Lab Status:  In process Updated:  05/15/20 1639    Specimen:  Bone Marrow     Specimen to Pathology, Bone Marrow Aspiration/Biopsy [[id number]] Collected:  05/15/20 1527    Order Status:  Sent Lab Status:  In process Updated:  05/15/20 1634         Medications:  Reconciled Home Medications:      Medication List      CHANGE how you take these medications    acetaminophen 500 MG tablet  Commonly known as:  TYLENOL  Take 1 tablet (500 mg total) by mouth 2 (two) times daily. For shoulder pain  What changed:    · when to take this  · reasons to take this     triamcinolone acetonide 0.1% 0.1 % cream  Commonly known as:  KENALOG  APPLY TO AFFECTED AREA TWICE DAILY  What changed:    · how much to take  · how to take this  · when to take this  · reasons to take this        CONTINUE taking these medications    atorvastatin 40 MG tablet  Commonly known as:  LIPITOR     glycopyrrolate 1 mg Tab  Commonly known as:  ROBINUL  TAKE ONE TABLET BY MOUTH TWICE DAILY. MAY TAKE UP TO THREE TIMES DAILY IF needed     METAMUCIL ORAL  Take 1 capsule by mouth once daily.     metoprolol succinate 50 MG 24 hr tablet  Commonly known as:  TOPROL-XL  Take 1 tablet (50 mg total) by mouth once daily.     ONE DAILY MULTIVITAMIN per tablet  Generic drug:   multivitamin  Take 1 tablet by mouth once daily.     rosuvastatin 20 MG tablet  Commonly known as:  CRESTOR  Take 1 tablet (20 mg total) by mouth every other day.     tadalafiL 20 MG Tab  Commonly known as:  CIALIS  Take 1 tablet (20 mg total) by mouth as needed.     tamsulosin 0.4 mg Cap  Commonly known as:  FLOMAX  Take 1 capsule (0.4 mg total) by mouth once daily.        STOP taking these medications    hydroxyurea 500 mg Cap  Commonly known as:  HYDREA     rivaroxaban 20 mg Tab  Commonly known as:  XARELTO            Indwelling Lines/Drains at time of discharge:   Lines/Drains/Airways     None                 Time spent on the discharge of patient: 34 minutes  Patient was seen and examined on the date of discharge and determined to be suitable for discharge.         Severo Mcgill MD  Department of Hospital Medicine  [facility]

## 2020-05-18 ENCOUNTER — TELEPHONE (OUTPATIENT)
Dept: FAMILY MEDICINE | Facility: CLINIC | Age: 80
End: 2020-05-18

## 2020-05-18 ENCOUNTER — PATIENT OUTREACH (OUTPATIENT)
Dept: ADMINISTRATIVE | Facility: CLINIC | Age: 80
End: 2020-05-18

## 2020-05-18 ENCOUNTER — TELEPHONE (OUTPATIENT)
Dept: MEDSURG UNIT | Facility: HOSPITAL | Age: 80
End: 2020-05-18

## 2020-05-18 DIAGNOSIS — E03.9 ACQUIRED HYPOTHYROIDISM: Primary | ICD-10-CM

## 2020-05-18 RX ORDER — LEVOTHYROXINE SODIUM 50 UG/1
50 TABLET ORAL
Qty: 30 TABLET | Refills: 3 | Status: SHIPPED | OUTPATIENT
Start: 2020-05-18 | End: 2020-09-08

## 2020-05-18 NOTE — PATIENT INSTRUCTIONS
Red Blood Cell Count  Does this test have other names?  RBC count, erythrocyte count  What is this test?  This test measures the number of red blood cells, or erythrocytes, in your blood. Red blood cells play a critical role in moving oxygen from your lungs to the rest of your body and returning carbon dioxide to your lungs to be exhaled.  A red blood cell count is typically done as part of a complete blood count (CBC). This is a screening test to check for a variety of medical conditions.  Why do I need this test?  You may have this test if you have symptoms such as weakness or tiredness during a general checkup. You may also have this test to look for specific health problems, such as internal bleeding, anemia, kidney disease, and certain cancers. You may also need this test if your healthcare provider wants to watch any of these health problems.  What other tests might I have along with this test?  A red blood cell count is often part of a complete blood count. This means that other components of your blood, such as white blood cells and platelets, are also measured.  If your healthcare provider suspects you have a particular illness, he or she may also order other tests needed for making a diagnosis.  What do my test results mean?  Many things may affect your lab test results. These include the method each lab uses to do the test. Even if your test results are different from the normal value, you may not have a problem. To learn what the results mean for you, talk with your healthcare provider.  An RBC count is measured in millions per cubic millimeter (million/mm3). Normal values may vary slightly among different laboratories. One example of normal values is:  · 3.6 to 5.0 million/mm3 for women  · 4.2 to 5.4 million/mm3 for men  Your healthcare provider can supply normal reference values.  An RBC that's lower than normal can be a sign of many health problems, including:  · Anemia  · Vitamin B-12 or folate  deficiency  · Stomach ulcers  · Bleeding  · Lupus erythematosus  · Hodgkin lymphoma  · Leukemia  · Multiple myeloma  How is this test done?  The test requires a blood sample, which is drawn through a needle from a vein in your arm.  Does this test pose any risks?  Taking a blood sample with a needle carries risks that include bleeding, infection, bruising, or feeling dizzy. When the needle pricks your arm, you may feel a slight stinging sensation or pain. Afterward, the site may be slightly sore.  What might affect my test results?  Your RBC count could be affected by:  · Dehydration  · Overhydration  · Stress  · Altitude  · Pregnancy  · Some medications  · Your position when the blood is drawn  How do I get ready for this test?  Your healthcare provider may suggest that you avoid:  · Exercising heavily  · Being stressed  · Becoming dehydrated  · Drinking alcohol  · Taking certain medicines  Be sure your provider knows about all medicines, herbs, vitamins, and supplements you are taking. This includes medicines that don't need a prescription and any illicit drugs you may use.   © 8496-8897 [employer]. [address] [place] [address], [place] [address]. All rights reserved. This information is not intended as a substitute for professional medical care. Always follow your healthcare professional's instructions.

## 2020-05-18 NOTE — TELEPHONE ENCOUNTER
Patient was informed. If you want to send in new rx for thyroid dose - send to his [facility] on [address]  He has an apt with Ms Jermain 5-22-20 for hospital follow up. When will he need follow up lab?

## 2020-05-18 NOTE — TELEPHONE ENCOUNTER
Synthroid 50 mcg daily [facility].    TSH order in to be done in two months.  He has labs scheduled for Dr. de souza on June 15th that includes a thyroid level but that would be too soon to be accurate.

## 2020-05-18 NOTE — TELEPHONE ENCOUNTER
----- Message from Yaniv Escobar MD sent at 5/14/2020  1:28 PM CDT -----  The thyroid is borderline low but it would be difficult to separate thyroid symptoms from those caused by the anemia.  Too much thyroid could stimulate the heart so if we add some it will have to be a low dose until the anemia is improved.  He is still in the hospital currently.    The blood sugar was in the prediabetic range but this was not a fasting test.  The bilirubin was up which may be related to the anemia and increased blood turnover-possibly a suggestion of some hemolysis but its not that bad.    The cholesterol levels are very good with no changes needed.    Results sent by email

## 2020-05-19 LAB
BODY SITE - BONE MARROW: NORMAL
CLINICAL DIAGNOSIS - BONE MARROW: NORMAL
FLOW CYTOMETRY ANTIBODIES ANALYZED - BONE MARROW: NORMAL
FLOW CYTOMETRY COMMENT - BONE MARROW: NORMAL
FLOW CYTOMETRY INTERPRETATION - BONE MARROW: NORMAL

## 2020-05-20 LAB
COMMENT: NORMAL
FINAL PATHOLOGIC DIAGNOSIS: NORMAL
GROSS: NORMAL
MICROSCOPIC EXAM: NORMAL

## 2020-05-22 ENCOUNTER — OFFICE VISIT (OUTPATIENT)
Dept: FAMILY MEDICINE | Facility: CLINIC | Age: 80
End: 2020-05-22
Payer: MEDICARE

## 2020-05-22 ENCOUNTER — LAB VISIT (OUTPATIENT)
Dept: LAB | Facility: HOSPITAL | Age: 80
End: 2020-05-22
Attending: NURSE PRACTITIONER
Payer: MEDICARE

## 2020-05-22 VITALS
HEIGHT: 67 IN | BODY MASS INDEX: 27.61 KG/M2 | OXYGEN SATURATION: 96 % | DIASTOLIC BLOOD PRESSURE: 64 MMHG | TEMPERATURE: 97 F | SYSTOLIC BLOOD PRESSURE: 118 MMHG | HEART RATE: 87 BPM | WEIGHT: 175.94 LBS

## 2020-05-22 DIAGNOSIS — E03.9 ACQUIRED HYPOTHYROIDISM: ICD-10-CM

## 2020-05-22 DIAGNOSIS — D45 POLYCYTHEMIA VERA: ICD-10-CM

## 2020-05-22 DIAGNOSIS — R35.1 BENIGN PROSTATIC HYPERPLASIA WITH NOCTURIA: ICD-10-CM

## 2020-05-22 DIAGNOSIS — E78.5 HYPERLIPIDEMIA, UNSPECIFIED HYPERLIPIDEMIA TYPE: ICD-10-CM

## 2020-05-22 DIAGNOSIS — E66.3 OVERWEIGHT (BMI 25.0-29.9): ICD-10-CM

## 2020-05-22 DIAGNOSIS — K21.9 GASTROESOPHAGEAL REFLUX DISEASE, ESOPHAGITIS PRESENCE NOT SPECIFIED: ICD-10-CM

## 2020-05-22 DIAGNOSIS — N40.1 BENIGN PROSTATIC HYPERPLASIA WITH NOCTURIA: ICD-10-CM

## 2020-05-22 DIAGNOSIS — I48.0 PAROXYSMAL ATRIAL FIBRILLATION: ICD-10-CM

## 2020-05-22 DIAGNOSIS — D61.818 PANCYTOPENIA: Primary | ICD-10-CM

## 2020-05-22 DIAGNOSIS — D61.818 PANCYTOPENIA: ICD-10-CM

## 2020-05-22 DIAGNOSIS — I10 ESSENTIAL HYPERTENSION: ICD-10-CM

## 2020-05-22 LAB
ALBUMIN SERPL BCP-MCNC: 3.3 G/DL (ref 3.5–5.2)
ALP SERPL-CCNC: 114 U/L (ref 55–135)
ALT SERPL W/O P-5'-P-CCNC: 16 U/L (ref 10–44)
ANION GAP SERPL CALC-SCNC: 9 MMOL/L (ref 8–16)
ANISOCYTOSIS BLD QL SMEAR: ABNORMAL
AST SERPL-CCNC: 15 U/L (ref 10–40)
BASOPHILS # BLD AUTO: ABNORMAL K/UL (ref 0–0.2)
BASOPHILS NFR BLD: 0 % (ref 0–1.9)
BILIRUB SERPL-MCNC: 0.9 MG/DL (ref 0.1–1)
BUN SERPL-MCNC: 12 MG/DL (ref 8–23)
CALCIUM SERPL-MCNC: 9.1 MG/DL (ref 8.7–10.5)
CHLORIDE SERPL-SCNC: 109 MMOL/L (ref 95–110)
CO2 SERPL-SCNC: 22 MMOL/L (ref 23–29)
CREAT SERPL-MCNC: 1.2 MG/DL (ref 0.5–1.4)
DIFFERENTIAL METHOD: ABNORMAL
EOSINOPHIL # BLD AUTO: ABNORMAL K/UL (ref 0–0.5)
EOSINOPHIL NFR BLD: 0 % (ref 0–8)
ERYTHROCYTE [DISTWIDTH] IN BLOOD BY AUTOMATED COUNT: 18.7 % (ref 11.5–14.5)
EST. GFR  (AFRICAN AMERICAN): >60 ML/MIN/1.73 M^2
EST. GFR  (NON AFRICAN AMERICAN): 57.2 ML/MIN/1.73 M^2
GIANT PLATELETS BLD QL SMEAR: PRESENT
GLUCOSE SERPL-MCNC: 162 MG/DL (ref 70–110)
HCT VFR BLD AUTO: 24.4 % (ref 40–54)
HGB BLD-MCNC: 7.2 G/DL (ref 14–18)
HYPOCHROMIA BLD QL SMEAR: ABNORMAL
IMM GRANULOCYTES # BLD AUTO: ABNORMAL K/UL (ref 0–0.04)
IMM GRANULOCYTES NFR BLD AUTO: ABNORMAL % (ref 0–0.5)
LYMPHOCYTES # BLD AUTO: ABNORMAL K/UL (ref 1–4.8)
LYMPHOCYTES NFR BLD: 31 % (ref 18–48)
MCH RBC QN AUTO: 30.5 PG (ref 27–31)
MCHC RBC AUTO-ENTMCNC: 29.5 G/DL (ref 32–36)
MCV RBC AUTO: 103 FL (ref 82–98)
METAMYELOCYTES NFR BLD MANUAL: 2 %
MONOCYTES # BLD AUTO: ABNORMAL K/UL (ref 0.3–1)
MONOCYTES NFR BLD: 10 % (ref 4–15)
NEUTROPHILS NFR BLD: 56 % (ref 38–73)
NEUTS BAND NFR BLD MANUAL: 1 %
NRBC BLD-RTO: 5 /100 WBC
OVALOCYTES BLD QL SMEAR: ABNORMAL
PLATELET # BLD AUTO: 101 K/UL (ref 150–350)
PLATELET BLD QL SMEAR: ABNORMAL
PMV BLD AUTO: 11.9 FL (ref 9.2–12.9)
POIKILOCYTOSIS BLD QL SMEAR: SLIGHT
POLYCHROMASIA BLD QL SMEAR: ABNORMAL
POTASSIUM SERPL-SCNC: 4.2 MMOL/L (ref 3.5–5.1)
PROT SERPL-MCNC: 6.8 G/DL (ref 6–8.4)
RBC # BLD AUTO: 2.36 M/UL (ref 4.6–6.2)
SCHISTOCYTES BLD QL SMEAR: ABNORMAL
SODIUM SERPL-SCNC: 140 MMOL/L (ref 136–145)
WBC # BLD AUTO: 2.47 K/UL (ref 3.9–12.7)

## 2020-05-22 PROCEDURE — 99499 UNLISTED E&M SERVICE: CPT | Mod: S$GLB,,, | Performed by: NURSE PRACTITIONER

## 2020-05-22 PROCEDURE — 99495 TRANSJ CARE MGMT MOD F2F 14D: CPT | Mod: S$GLB,,, | Performed by: NURSE PRACTITIONER

## 2020-05-22 PROCEDURE — 99495 TCM SERVICES (MODERATE COMPLEXITY): ICD-10-PCS | Mod: S$GLB,,, | Performed by: NURSE PRACTITIONER

## 2020-05-22 PROCEDURE — 99999 PR PBB SHADOW E&M-EST. PATIENT-LVL IV: CPT | Mod: PBBFAC,,, | Performed by: NURSE PRACTITIONER

## 2020-05-22 PROCEDURE — 99999 PR PBB SHADOW E&M-EST. PATIENT-LVL IV: ICD-10-PCS | Mod: PBBFAC,,, | Performed by: NURSE PRACTITIONER

## 2020-05-22 PROCEDURE — 36415 COLL VENOUS BLD VENIPUNCTURE: CPT | Mod: PO

## 2020-05-22 PROCEDURE — 85007 BL SMEAR W/DIFF WBC COUNT: CPT

## 2020-05-22 PROCEDURE — 80053 COMPREHEN METABOLIC PANEL: CPT

## 2020-05-22 PROCEDURE — 85027 COMPLETE CBC AUTOMATED: CPT

## 2020-05-22 PROCEDURE — 99499 RISK ADDL DX/OHS AUDIT: ICD-10-PCS | Mod: S$GLB,,, | Performed by: NURSE PRACTITIONER

## 2020-05-22 NOTE — PROGRESS NOTES
Transitional Care Note  Subjective:       Patient ID: Morro Sharma is a 79 y.o. male.  Chief Complaint: Transitional Care (pancytopenia)      Admission Date: 5/13/2020  Hospital Length of Stay: 2 days  Discharge Date and Time: 5/15/2020  6:09 PM    Family and/or Caretaker present at visit?  No.  Diagnostic tests reviewed/disposition: I have reviewed all completed as well as pending diagnostic tests at the time of discharge.  Disease/illness education: Pancytopenia,Anemia, Paroxysmal atrial fibrillation, anticoagulant long-term use, polycythemia vera, Hypertension, GERD  Home health/community services discussion/referrals: Patient does not have home health established from hospital visit.  They do not need home health.  If needed, we will set up home health for the patient.   Establishment or re-establishment of referral orders for community resources: No other necessary community resources.   Discussion with other health care providers: No discussion with other health care providers necessary.       Patient is a 79-year-old  male with a past medical history significant for hypertension, atrial fibrillation on long-term rivaroxaban, polycythemia vera on Hydrea for the past 14 years, and BPH presents today for Hospital follow up. Patient was sent to the ER on 5/13/20 by his PCP- for Hemoglobin of 5.5, WBC 2.20, RBC 1.76, , patient was seen by  for SOB,Dizzness and fatigue.    Patient was admitted to the hospital for pancytopenia and symptomatic anemia.  He underwent evaluation with imaging of CT of his chest abdomen pelvis, as well as comprehensive lab evaluation.  Hematology was consulted.  Etiology of the patient's pancytopenia was likely felt to be secondary to hydroxyurea.  Hydroxyurea was discontinued and patient was transfused 2 units of packed red blood cells.  Per request of Hematology, the patient underwent bone marrow biopsy in the hospital.  The patient's  rivaroxaban was discontinued secondary to thrombocytopenia and anemia, and it was held after discharge.  Patient improved symptomatically after transfusion of packed red blood cells, and was discharged home after bone marrow biopsy was performed.     At today's visit patient denies SOB, Dizziness, blood in stool or urine, Chest pain and fatigue. Patient is scheduled to see Hem/Onc-Erika on 06/05/2020. 5/15/20Labs reviewed: H/H 8.3/26.9, RBC 2.69.       Review of Systems   Constitutional: Negative for fatigue and unexpected weight change.   HENT: Negative for trouble swallowing.    Eyes: Negative for visual disturbance.   Respiratory: Negative for shortness of breath.    Cardiovascular: Negative for chest pain, palpitations and leg swelling.   Gastrointestinal: Negative for blood in stool.   Genitourinary: Negative for hematuria.   Skin: Negative for rash.   Allergic/Immunologic: Negative for immunocompromised state.   Neurological: Negative for dizziness and headaches.   Hematological: Does not bruise/bleed easily.   Psychiatric/Behavioral: Negative for agitation. The patient is not nervous/anxious.        Objective:      Physical Exam   Constitutional: He is oriented to person, place, and time. He appears well-developed and well-nourished.   Eyes: Pupils are equal, round, and reactive to light. Conjunctivae and EOM are normal.   Neck: Normal range of motion. Neck supple.   Cardiovascular: Normal rate, regular rhythm, normal heart sounds and intact distal pulses.   Pulmonary/Chest: Effort normal and breath sounds normal.   Abdominal: Soft. Bowel sounds are normal.   Musculoskeletal: Normal range of motion.   Neurological: He is alert and oriented to person, place, and time.   Skin: Skin is warm and dry.   Psychiatric: He has a normal mood and affect. His behavior is normal. Judgment and thought content normal.       Assessment:       1. Pancytopenia    2. Polycythemia vera    3. Essential hypertension    4. Acquired  "hypothyroidism    5. Hyperlipidemia, unspecified hyperlipidemia type    6. Benign prostatic hyperplasia with nocturia    7. Paroxysmal atrial fibrillation, onset 5/2017, CHADS-VAS score 4    8. Gastroesophageal reflux disease, esophagitis presence not specified    9. Overweight (BMI 25.0-29.9)        Plan:     Morro was seen today for transitional care.    Diagnoses and all orders for this visit:    Pancytopenia  -     CBC W/ AUTO DIFFERENTIAL; Future  -     Comprehensive metabolic panel; Future  Patient is scheduled to see Hem/Onc-Erika on 06/05/2020. 5/15/20Labs reviewed: H/H 8.3/26.9, RBC 2.69.     Polycythemia vera  -     CBC W/ AUTO DIFFERENTIAL; Future  -     Comprehensive metabolic panel; Future    Essential hypertension  Stable, continue medication  Follow a low sodium diet  BP Readings from Last 3 Encounters:   05/22/20 118/64   05/15/20 120/70   05/06/20 (!) 144/68     Acquired hypothyroidism  Stable, continue medication  Hyperlipidemia, unspecified hyperlipidemia type  Patient has an statin intolerance.  The cholesterol levels are very good with no changes needed.  Benign prostatic hyperplasia with nocturia  Stable, continue Flomax  Paroxysmal atrial fibrillation, onset 5/2017, CHADS-VAS score 4  Stable, continue medication    Overweight (BMI 25.0-29.9)  Counseled patient on his ideal body weight, health consequences of being obese and current recommendations including weekly exercise and a heart healthy diet.  Current BMI is:Estimated body mass index is 27.55 kg/m² as calculated from the following:    Height as of this encounter: 5' 7" (1.702 m).    Weight as of this encounter: 79.8 kg (175 lb 14.8 oz)..  Patient is aware that ideal BMI < 25 or Weight in (lb) to have BMI = 25: 159.3.           "

## 2020-05-22 NOTE — PATIENT INSTRUCTIONS

## 2020-05-29 ENCOUNTER — LAB VISIT (OUTPATIENT)
Dept: PRIMARY CARE CLINIC | Facility: CLINIC | Age: 80
End: 2020-05-29
Payer: MEDICARE

## 2020-05-29 ENCOUNTER — TELEPHONE (OUTPATIENT)
Dept: FAMILY MEDICINE | Facility: CLINIC | Age: 80
End: 2020-05-29

## 2020-05-29 DIAGNOSIS — R05.9 COUGH: Primary | ICD-10-CM

## 2020-05-29 PROCEDURE — U0003 INFECTIOUS AGENT DETECTION BY NUCLEIC ACID (DNA OR RNA); SEVERE ACUTE RESPIRATORY SYNDROME CORONAVIRUS 2 (SARS-COV-2) (CORONAVIRUS DISEASE [COVID-19]), AMPLIFIED PROBE TECHNIQUE, MAKING USE OF HIGH THROUGHPUT TECHNOLOGIES AS DESCRIBED BY CMS-2020-01-R: HCPCS

## 2020-05-29 NOTE — TELEPHONE ENCOUNTER
I spoke to the patient who stated he has had cramping off and on when he started Crestor. Patient stated the last 2 days it has been really bad. Please advise.

## 2020-05-29 NOTE — TELEPHONE ENCOUNTER
----- Message from Yvetteyesica Frost sent at 5/29/2020 11:28 AM CDT -----  Contact: Patient  Type: Needs Medical Advice  Who Called: pt  Symptoms (please be specific):  Cramping in lower legs  How long has patient had these symptoms:  2 days  Pharmacy name and phone #:    [id number] - [place] - [address] AT [facility] [address]  [address] [place] [address]  Phone: [phone] Fax: [phone]  Best Call Back Number: [phone]  Additional Information:Patient is [email or web address] to speak with a nurse in regards to the lower parts of his legs cramping really bad.Please call back and advise.

## 2020-05-31 LAB — SARS-COV-2 RNA RESP QL NAA+PROBE: NOT DETECTED

## 2020-06-03 ENCOUNTER — TELEPHONE (OUTPATIENT)
Dept: PHARMACY | Facility: CLINIC | Age: 80
End: 2020-06-03

## 2020-06-03 ENCOUNTER — LAB VISIT (OUTPATIENT)
Dept: LAB | Facility: HOSPITAL | Age: 80
End: 2020-06-03
Attending: INTERNAL MEDICINE
Payer: MEDICARE

## 2020-06-03 ENCOUNTER — OFFICE VISIT (OUTPATIENT)
Dept: HEMATOLOGY/ONCOLOGY | Facility: CLINIC | Age: 80
End: 2020-06-03
Payer: MEDICARE

## 2020-06-03 VITALS
TEMPERATURE: 98 F | WEIGHT: 170.63 LBS | RESPIRATION RATE: 14 BRPM | DIASTOLIC BLOOD PRESSURE: 66 MMHG | BODY MASS INDEX: 26.78 KG/M2 | OXYGEN SATURATION: 100 % | HEIGHT: 67 IN | SYSTOLIC BLOOD PRESSURE: 141 MMHG | HEART RATE: 93 BPM

## 2020-06-03 DIAGNOSIS — D64.9 SYMPTOMATIC ANEMIA: ICD-10-CM

## 2020-06-03 DIAGNOSIS — Z79.01 ANTICOAGULANT LONG-TERM USE: ICD-10-CM

## 2020-06-03 DIAGNOSIS — D61.818 PANCYTOPENIA: ICD-10-CM

## 2020-06-03 DIAGNOSIS — D45 POLYCYTHEMIA VERA: Primary | ICD-10-CM

## 2020-06-03 DIAGNOSIS — I10 ESSENTIAL HYPERTENSION: ICD-10-CM

## 2020-06-03 DIAGNOSIS — I48.0 PAROXYSMAL ATRIAL FIBRILLATION: ICD-10-CM

## 2020-06-03 DIAGNOSIS — D64.9 SYMPTOMATIC ANEMIA: Primary | ICD-10-CM

## 2020-06-03 LAB
ABO + RH BLD: NORMAL
BLD GP AB SCN CELLS X3 SERPL QL: NORMAL

## 2020-06-03 PROCEDURE — 1101F PR PT FALLS ASSESS DOC 0-1 FALLS W/OUT INJ PAST YR: ICD-10-PCS | Mod: CPTII,S$GLB,, | Performed by: INTERNAL MEDICINE

## 2020-06-03 PROCEDURE — 3078F PR MOST RECENT DIASTOLIC BLOOD PRESSURE < 80 MM HG: ICD-10-PCS | Mod: CPTII,S$GLB,, | Performed by: INTERNAL MEDICINE

## 2020-06-03 PROCEDURE — 1101F PT FALLS ASSESS-DOCD LE1/YR: CPT | Mod: CPTII,S$GLB,, | Performed by: INTERNAL MEDICINE

## 2020-06-03 PROCEDURE — 1159F MED LIST DOCD IN RCRD: CPT | Mod: S$GLB,,, | Performed by: INTERNAL MEDICINE

## 2020-06-03 PROCEDURE — 1159F PR MEDICATION LIST DOCUMENTED IN MEDICAL RECORD: ICD-10-PCS | Mod: S$GLB,,, | Performed by: INTERNAL MEDICINE

## 2020-06-03 PROCEDURE — 86901 BLOOD TYPING SEROLOGIC RH(D): CPT

## 2020-06-03 PROCEDURE — 99215 PR OFFICE/OUTPT VISIT, EST, LEVL V, 40-54 MIN: ICD-10-PCS | Mod: S$GLB,,, | Performed by: INTERNAL MEDICINE

## 2020-06-03 PROCEDURE — 36415 COLL VENOUS BLD VENIPUNCTURE: CPT | Mod: PN

## 2020-06-03 PROCEDURE — 99999 PR PBB SHADOW E&M-EST. PATIENT-LVL IV: CPT | Mod: PBBFAC,,, | Performed by: INTERNAL MEDICINE

## 2020-06-03 PROCEDURE — 1126F PR PAIN SEVERITY QUANTIFIED, NO PAIN PRESENT: ICD-10-PCS | Mod: S$GLB,,, | Performed by: INTERNAL MEDICINE

## 2020-06-03 PROCEDURE — 86901 BLOOD TYPING SEROLOGIC RH(D): CPT | Mod: PN

## 2020-06-03 PROCEDURE — 1126F AMNT PAIN NOTED NONE PRSNT: CPT | Mod: S$GLB,,, | Performed by: INTERNAL MEDICINE

## 2020-06-03 PROCEDURE — 3078F DIAST BP <80 MM HG: CPT | Mod: CPTII,S$GLB,, | Performed by: INTERNAL MEDICINE

## 2020-06-03 PROCEDURE — 3077F PR MOST RECENT SYSTOLIC BLOOD PRESSURE >= 140 MM HG: ICD-10-PCS | Mod: CPTII,S$GLB,, | Performed by: INTERNAL MEDICINE

## 2020-06-03 PROCEDURE — 99215 OFFICE O/P EST HI 40 MIN: CPT | Mod: S$GLB,,, | Performed by: INTERNAL MEDICINE

## 2020-06-03 PROCEDURE — 3077F SYST BP >= 140 MM HG: CPT | Mod: CPTII,S$GLB,, | Performed by: INTERNAL MEDICINE

## 2020-06-03 PROCEDURE — 99999 PR PBB SHADOW E&M-EST. PATIENT-LVL IV: ICD-10-PCS | Mod: PBBFAC,,, | Performed by: INTERNAL MEDICINE

## 2020-06-03 RX ORDER — FUROSEMIDE 10 MG/ML
20 INJECTION INTRAMUSCULAR; INTRAVENOUS ONCE
Status: CANCELLED | OUTPATIENT
Start: 2020-06-03

## 2020-06-03 RX ORDER — DIPHENHYDRAMINE HCL 25 MG
25 CAPSULE ORAL ONCE
Status: CANCELLED | OUTPATIENT
Start: 2020-06-03

## 2020-06-03 RX ORDER — HYDROCODONE BITARTRATE AND ACETAMINOPHEN 500; 5 MG/1; MG/1
TABLET ORAL ONCE
Status: CANCELLED | OUTPATIENT
Start: 2020-06-03 | End: 2020-06-03

## 2020-06-03 RX ORDER — ACETAMINOPHEN 325 MG/1
650 TABLET ORAL ONCE
Status: CANCELLED | OUTPATIENT
Start: 2020-06-03

## 2020-06-03 NOTE — Clinical Note
2 units of packed cells at earliest convenience.Start Jakafi 10 mg twice daily.Permanently discontinue Hydrea.Return to clinic 2 weeks following start of Jakafi interval CBC, CMP, and ferritin.

## 2020-06-03 NOTE — TELEPHONE ENCOUNTER
LVM for callback to inform patient that [facility] received prescription for Jakafi and prior authorization is required.  [facility] will be back in touch once insurance determination is received.

## 2020-06-03 NOTE — PROGRESS NOTES
History of present illness:  The patient is a 79-year-old white gentleman well known to me for polycythemia vera which has been managed with Hydrea and intermittent phlebotomy.  Patient was recently hospitalized and found to have developed pancytopenia.  Hemoglobin was in the 5s and responded well to transfusion of 2 units of packed red blood cells.  Interval bone marrow has been obtained and reveals fibrotic/spent bone marrow.  Blast percentage was not increased.  Patient remains symptomatic in terms of his anemia.  He returns to clinic to review results of bone marrow biopsy to further plan care.  Hydrea has been held.  No other new complaints for pertinent findings on a 14 point review systems.    Physical examination:  Well-developed, well-nourished, white male in no acute distress, who has a weight of 170.5 lb  VITAL SIGNS: Documented  and reviewed this visit.  HEENT: Normocephalic, atraumatic. Oral mucosa pink and moist. Lips without   lesions. Tongue midline. Oropharynx clear. Nonicteric sclerae.   NECK: Supple, no adenopathy. No carotid bruits, thyromegaly or thyroid nodule.   HEART: Regular rate and rhythm without murmur, gallop or rub.   LUNGS: Clear to auscultation bilaterally. Normal respiratory effort.   ABDOMEN: Soft, nontender, nondistended with positive normoactive bowel sounds,   no hepatosplenomegaly.   EXTREMITIES: No cyanosis, clubbing or edema. Distal pulses are intact.   AXILLAE AND GROIN: No palpable pathologic lymphadenopathy is appreciated.   SKIN: Intact/turgor normal   NEUROLOGIC: Cranial nerves II-XII grossly intact. Motor: Good muscle bulk and   tone. Strength/sensory 5/5 throughout. Gait stable.     Laboratory:  Studies performed 05/22/2020.  White count 2.5, hemoglobin 7.2, hematocrit 24.4, platelets 101.  Differential remarkable for 56% granulocytes, 31% lymphocytes, 10% monocytes, 1% bands, 2% metamyelocytes, and 5 nucleated red cells.  Serum iron 230, TIBC 284, saturated iron  81%.  B12 585 folate 16.  Haptoglobin 187.  Sodium 140, potassium 4.2, chloride 109, CO2 22, BUN 12, creatinine 1.2, glucose 162, calcium 9.1, liver function test within limits, GFR is 57.    Bone marrow aspiration and biopsy:  BONE MARROW, LEFT ILIAC CREST, ASPIRATE, CLOT, AND CORE BIOPSY:  --Fibrotic marrow, diffuse moderate fibrosis, showing background decreased trilineage hematopoiesis, see  comment  --CD34 positive blasts estimated at 2-3%, see comment  --No increase in stainable iron  COMMENT: Concomitantly submitted flow cytometric analysis is a limited study in that the specimen  received clotted. There is a cluster of myeloid blasts present representing approximately 5% of total events  which shows expression of CD13 and CD34 with partial expression of CD64 and CD33 (42%). Background  B-cells are polytypic and T-cells are immunophenotypically unremarkable.  This marrow core biopsy is adequate for review and shows a fibrotic marrow background with scattered  background trilineage hematopoiesis which appears decreased overall. Overall cellularity is difficult to  estimate given the background, but appears overall decreased. There are trilineage elements present with  developing myeloid and erythroid cells present as well as a few megakaryocytes with some in clusters.  Clinical correlation and correlation with any available cytogenetic and molecular studies is required in this  patient with a reported known history of treated polycythemia vera.    Impression:  1.  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  2.  Pancytopenia secondary to number 1.  3.  Hypertension  4.  Atrial fibrillation/chronic anticoagulation.    Plan:  1.  Permanently discontinue hydroxyurea.  2.  Continue current anticoagulation/medical management of hypertension.  3.  Transfuse 2 units of packed red blood cells for palliation of anemia.  4.  Begin Jakafi 10 mg twice daily.  5.  Return to clinic in 2 weeks with interval CBC, CMP, and  ferritin.    This note was created using voice recognition software and may contain grammatical errors.

## 2020-06-04 NOTE — TELEPHONE ENCOUNTER
DOCUMENTATION ONLY:  Prior authorization for Jakafi 10 mg Tablet #60/30 approved from 06/04/2020 to 12/04/2020  Case ID: [id number]    Co-pay: $2,200.83    Patient Assistance IS required. Sending to the financial assistance team to investigate assistance options. Chery RHOADES

## 2020-06-04 NOTE — TELEPHONE ENCOUNTER
Contacted patient to notify that Audreyi is approved by insurance with a $2200.83 copay and discussed andrea for copay assistance. Patient voiced understanding and gives permission to move forward with assistance. [facility] will be back in touch once assistance is complete.

## 2020-06-05 NOTE — TELEPHONE ENCOUNTER
FOR DOCUMENTATION ONLY:  Financial Assistance for Jakafi is approved from 3/7/20 to 6/4/21  Source: [employer]  BIN: [id number]  JELENAN: AUGUSTINE  Id: [id number]  GRP: [id number]  Sharad Amount: $4193

## 2020-06-05 NOTE — TELEPHONE ENCOUNTER
Initial Jakafi consult completed on .  Jakafi will be shipped on  to arrive at patient's home on  via [employer]. $0.00copay.  Patient plans to start Jakafi on 6/10. Patient will call to let us know of any changes. Address confirmed. Confirmed 2 patient identifiers - name and . Therapy Appropriate     Patient was counseled on the administration directions:  - Take 1 tablet (10 mg) by mouth twice daily with or without food  - Avoid grapefruit juice     Patient was counseled on the following possible side effects, which include, but are not limited to:   -dizziness, headache, bruising, fatigue, itching, diarrhea, anemia, nausea, increased risk of infection  -Patient directed to contact provider for more severe side effects such as: Signs of allergic reaction, infection, and/or bleeding, shortness of breath, extreme fatigue, swelling, mental changes- ER precautions advised     DDIs: Medication list reviewed.   - Cat C: Ruxolitinib may enhance the bradycardic effect of Bradycardia-Causing Agents (Toprol) - monitor HR  Off Crestor     Patient confirmed understanding. Patient did not have additional questions. Consultation included: indication; goals of treatment; administration; storage and handling; side effects; how to handle side effects; the importance of compliance; how to handle missed doses; the importance of laboratory monitoring; the importance of keeping all follow up appointments.  Patient understands to report any medication changes to [facility] and provider. All questions answered and addressed to patients satisfaction. I will f/u with patient in 1 week from start, [facility] to contact patient in 3 weeks for refills.

## 2020-06-05 NOTE — TELEPHONE ENCOUNTER
Jakafi is approved by the patient's insurance with $0.00 copay. We will reach out to the patient to notify of the approval, offer consultation, and schedule a delivery of the medication. Sending a staff message to Dr Cole's nurse Jessica Leon regarding Jakafi approval.

## 2020-06-15 ENCOUNTER — LAB VISIT (OUTPATIENT)
Dept: LAB | Facility: HOSPITAL | Age: 80
End: 2020-06-15
Attending: NURSE PRACTITIONER
Payer: MEDICARE

## 2020-06-15 ENCOUNTER — OFFICE VISIT (OUTPATIENT)
Dept: HEMATOLOGY/ONCOLOGY | Facility: CLINIC | Age: 80
End: 2020-06-15
Payer: MEDICARE

## 2020-06-15 ENCOUNTER — TELEPHONE (OUTPATIENT)
Dept: PHARMACY | Facility: CLINIC | Age: 80
End: 2020-06-15

## 2020-06-15 VITALS
BODY MASS INDEX: 26.71 KG/M2 | RESPIRATION RATE: 13 BRPM | DIASTOLIC BLOOD PRESSURE: 71 MMHG | SYSTOLIC BLOOD PRESSURE: 123 MMHG | OXYGEN SATURATION: 99 % | HEART RATE: 75 BPM | WEIGHT: 170.19 LBS | TEMPERATURE: 98 F | HEIGHT: 67 IN

## 2020-06-15 DIAGNOSIS — I10 ESSENTIAL HYPERTENSION: ICD-10-CM

## 2020-06-15 DIAGNOSIS — R79.89 ELEVATED TSH: ICD-10-CM

## 2020-06-15 DIAGNOSIS — Z79.01 ANTICOAGULANT LONG-TERM USE: ICD-10-CM

## 2020-06-15 DIAGNOSIS — D64.9 SYMPTOMATIC ANEMIA: ICD-10-CM

## 2020-06-15 DIAGNOSIS — I48.0 PAROXYSMAL ATRIAL FIBRILLATION: ICD-10-CM

## 2020-06-15 DIAGNOSIS — E78.5 DYSLIPIDEMIA (HIGH LDL; LOW HDL): ICD-10-CM

## 2020-06-15 DIAGNOSIS — D45 POLYCYTHEMIA VERA: ICD-10-CM

## 2020-06-15 DIAGNOSIS — I48.0 PAF (PAROXYSMAL ATRIAL FIBRILLATION): ICD-10-CM

## 2020-06-15 DIAGNOSIS — I70.209 ATHEROSCLEROSIS OF NATIVE ARTERY OF EXTREMITY, UNSPECIFIED EXTREMITY, WITH UNSPECIFIED PRESENCE OF CLINICAL MANIFESTATION: ICD-10-CM

## 2020-06-15 DIAGNOSIS — D64.9 SYMPTOMATIC ANEMIA: Primary | ICD-10-CM

## 2020-06-15 DIAGNOSIS — D61.818 PANCYTOPENIA: ICD-10-CM

## 2020-06-15 LAB
ABO + RH BLD: NORMAL
ALBUMIN SERPL BCP-MCNC: 4 G/DL (ref 3.5–5.2)
ALP SERPL-CCNC: 151 U/L (ref 38–145)
ALT SERPL W/O P-5'-P-CCNC: 25 U/L (ref 0–50)
ANION GAP SERPL CALC-SCNC: 7 MMOL/L (ref 8–16)
ANISOCYTOSIS BLD QL SMEAR: SLIGHT
AST SERPL-CCNC: 31 U/L (ref 17–59)
BASOPHILS # BLD AUTO: ABNORMAL K/UL (ref 0–0.2)
BASOPHILS NFR BLD: 3 % (ref 0–1.9)
BILIRUB SERPL-MCNC: 0.8 MG/DL (ref 0.2–1.3)
BLD GP AB SCN CELLS X3 SERPL QL: NORMAL
BUN SERPL-MCNC: 13 MG/DL (ref 9–21)
CALCIUM SERPL-MCNC: 9.5 MG/DL (ref 8.4–10.2)
CHLORIDE SERPL-SCNC: 107 MMOL/L (ref 95–110)
CHOLEST SERPL-MCNC: 151 MG/DL (ref 120–199)
CHOLEST/HDLC SERPL: 8.9 {RATIO} (ref 2–5)
CO2 SERPL-SCNC: 25 MMOL/L (ref 22–31)
CREAT SERPL-MCNC: 1.1 MG/DL (ref 0.5–1.4)
DACRYOCYTES BLD QL SMEAR: ABNORMAL
DIFFERENTIAL METHOD: ABNORMAL
EOSINOPHIL # BLD AUTO: ABNORMAL K/UL (ref 0–0.5)
EOSINOPHIL NFR BLD: 0 % (ref 0–8)
ERYTHROCYTE [DISTWIDTH] IN BLOOD BY AUTOMATED COUNT: 17.1 % (ref 11.5–14.5)
EST. GFR  (AFRICAN AMERICAN): >60 ML/MIN/1.73 M^2
EST. GFR  (NON AFRICAN AMERICAN): >60 ML/MIN/1.73 M^2
FERRITIN SERPL-MCNC: 437 NG/ML (ref 18–464)
GLUCOSE SERPL-MCNC: 131 MG/DL (ref 70–110)
HCT VFR BLD AUTO: 27.9 % (ref 40–54)
HDLC SERPL-MCNC: 17 MG/DL (ref 40–75)
HDLC SERPL: 11.3 % (ref 20–50)
HGB BLD-MCNC: 8.5 G/DL (ref 14–18)
HYPOCHROMIA BLD QL SMEAR: ABNORMAL
IMM GRANULOCYTES # BLD AUTO: ABNORMAL K/UL (ref 0–0.04)
IMM GRANULOCYTES NFR BLD AUTO: ABNORMAL % (ref 0–0.5)
LDH SERPL L TO P-CCNC: 969 U/L (ref 313–618)
LDLC SERPL CALC-MCNC: 88.4 MG/DL (ref 63–159)
LYMPHOCYTES # BLD AUTO: ABNORMAL K/UL (ref 1–4.8)
LYMPHOCYTES NFR BLD: 28 % (ref 18–48)
MCH RBC QN AUTO: 29.2 PG (ref 27–31)
MCHC RBC AUTO-ENTMCNC: 30.5 G/DL (ref 32–36)
MCV RBC AUTO: 96 FL (ref 82–98)
MONOCYTES # BLD AUTO: ABNORMAL K/UL (ref 0.3–1)
MONOCYTES NFR BLD: 10 % (ref 4–15)
NEUTROPHILS # BLD AUTO: 1.5 K/UL (ref 1.8–7.7)
NEUTROPHILS NFR BLD: 52 % (ref 38–73)
NEUTS BAND NFR BLD MANUAL: 4 %
NONHDLC SERPL-MCNC: 134 MG/DL
NRBC BLD-RTO: 13 /100 WBC
OVALOCYTES BLD QL SMEAR: ABNORMAL
PLATELET # BLD AUTO: 48 K/UL (ref 150–350)
PMV BLD AUTO: 11.2 FL (ref 9.2–12.9)
POIKILOCYTOSIS BLD QL SMEAR: SLIGHT
POLYCHROMASIA BLD QL SMEAR: ABNORMAL
POTASSIUM SERPL-SCNC: 4.5 MMOL/L (ref 3.5–5.1)
PROT SERPL-MCNC: 7.1 G/DL (ref 6–8.4)
RBC # BLD AUTO: 2.91 M/UL (ref 4.6–6.2)
SODIUM SERPL-SCNC: 139 MMOL/L (ref 136–145)
T4 FREE SP9 P CHAL SERPL-SCNC: 0.92 NG/DL (ref 0.78–2.19)
TRIGL SERPL-MCNC: 228 MG/DL (ref 30–150)
TSH SERPL DL<=0.005 MIU/L-ACNC: 1.34 UIU/ML (ref 0.4–4)
WBC # BLD AUTO: 2.63 K/UL (ref 3.9–12.7)
WBC OTHER NFR BLD MANUAL: 3 %

## 2020-06-15 PROCEDURE — 99213 OFFICE O/P EST LOW 20 MIN: CPT | Mod: S$GLB,,, | Performed by: INTERNAL MEDICINE

## 2020-06-15 PROCEDURE — 1126F AMNT PAIN NOTED NONE PRSNT: CPT | Mod: S$GLB,,, | Performed by: INTERNAL MEDICINE

## 2020-06-15 PROCEDURE — 99213 PR OFFICE/OUTPT VISIT, EST, LEVL III, 20-29 MIN: ICD-10-PCS | Mod: S$GLB,,, | Performed by: INTERNAL MEDICINE

## 2020-06-15 PROCEDURE — 3074F SYST BP LT 130 MM HG: CPT | Mod: CPTII,S$GLB,, | Performed by: INTERNAL MEDICINE

## 2020-06-15 PROCEDURE — 1159F MED LIST DOCD IN RCRD: CPT | Mod: S$GLB,,, | Performed by: INTERNAL MEDICINE

## 2020-06-15 PROCEDURE — 80061 LIPID PANEL: CPT | Mod: PN

## 2020-06-15 PROCEDURE — 84443 ASSAY THYROID STIM HORMONE: CPT

## 2020-06-15 PROCEDURE — 3078F DIAST BP <80 MM HG: CPT | Mod: CPTII,S$GLB,, | Performed by: INTERNAL MEDICINE

## 2020-06-15 PROCEDURE — 84443 ASSAY THYROID STIM HORMONE: CPT | Mod: PN

## 2020-06-15 PROCEDURE — 86901 BLOOD TYPING SEROLOGIC RH(D): CPT

## 2020-06-15 PROCEDURE — 85007 BL SMEAR W/DIFF WBC COUNT: CPT | Mod: PN

## 2020-06-15 PROCEDURE — 83615 LACTATE (LD) (LDH) ENZYME: CPT

## 2020-06-15 PROCEDURE — 36415 COLL VENOUS BLD VENIPUNCTURE: CPT | Mod: PN

## 2020-06-15 PROCEDURE — 80053 COMPREHEN METABOLIC PANEL: CPT | Mod: PN

## 2020-06-15 PROCEDURE — 1101F PR PT FALLS ASSESS DOC 0-1 FALLS W/OUT INJ PAST YR: ICD-10-PCS | Mod: CPTII,S$GLB,, | Performed by: INTERNAL MEDICINE

## 2020-06-15 PROCEDURE — 3074F PR MOST RECENT SYSTOLIC BLOOD PRESSURE < 130 MM HG: ICD-10-PCS | Mod: CPTII,S$GLB,, | Performed by: INTERNAL MEDICINE

## 2020-06-15 PROCEDURE — 84439 ASSAY OF FREE THYROXINE: CPT | Mod: PN

## 2020-06-15 PROCEDURE — 85027 COMPLETE CBC AUTOMATED: CPT | Mod: PN

## 2020-06-15 PROCEDURE — 1159F PR MEDICATION LIST DOCUMENTED IN MEDICAL RECORD: ICD-10-PCS | Mod: S$GLB,,, | Performed by: INTERNAL MEDICINE

## 2020-06-15 PROCEDURE — 82728 ASSAY OF FERRITIN: CPT | Mod: PN

## 2020-06-15 PROCEDURE — 80061 LIPID PANEL: CPT

## 2020-06-15 PROCEDURE — 1101F PT FALLS ASSESS-DOCD LE1/YR: CPT | Mod: CPTII,S$GLB,, | Performed by: INTERNAL MEDICINE

## 2020-06-15 PROCEDURE — 84439 ASSAY OF FREE THYROXINE: CPT

## 2020-06-15 PROCEDURE — 80053 COMPREHEN METABOLIC PANEL: CPT

## 2020-06-15 PROCEDURE — 99999 PR PBB SHADOW E&M-EST. PATIENT-LVL IV: ICD-10-PCS | Mod: PBBFAC,,, | Performed by: INTERNAL MEDICINE

## 2020-06-15 PROCEDURE — 3078F PR MOST RECENT DIASTOLIC BLOOD PRESSURE < 80 MM HG: ICD-10-PCS | Mod: CPTII,S$GLB,, | Performed by: INTERNAL MEDICINE

## 2020-06-15 PROCEDURE — 82728 ASSAY OF FERRITIN: CPT

## 2020-06-15 PROCEDURE — 85007 BL SMEAR W/DIFF WBC COUNT: CPT

## 2020-06-15 PROCEDURE — 1126F PR PAIN SEVERITY QUANTIFIED, NO PAIN PRESENT: ICD-10-PCS | Mod: S$GLB,,, | Performed by: INTERNAL MEDICINE

## 2020-06-15 PROCEDURE — 86901 BLOOD TYPING SEROLOGIC RH(D): CPT | Mod: PN

## 2020-06-15 PROCEDURE — 83615 LACTATE (LD) (LDH) ENZYME: CPT | Mod: PN

## 2020-06-15 PROCEDURE — 85027 COMPLETE CBC AUTOMATED: CPT

## 2020-06-15 PROCEDURE — 99999 PR PBB SHADOW E&M-EST. PATIENT-LVL IV: CPT | Mod: PBBFAC,,, | Performed by: INTERNAL MEDICINE

## 2020-06-15 NOTE — TELEPHONE ENCOUNTER
LVM with patient to discuss refill for Jakafi. 5mg BID dose is approved through insurance and $0.00 copay. Will need to verify if patient is currently holdin Jakafi therapy due to decreased platelets and when he will start reduced dose of Jakafi.

## 2020-06-15 NOTE — TELEPHONE ENCOUNTER
Patient transferred to me to discuss reduced Jakafi dose and to set up shipment. Patient states he was never holding Jakafi and that his MD instructed him today to reduce Jakafi dose. Patient inquired of proper disposal of remaining 10 mg tablets - provided facilities that will accept the medication for disposal. Discussed with patient that his new dose is Jakafi 5 mg - 1 tablet BID. Will ship medication on 6/15 for the patient to receive on 6/16. $0.00 copay. Address confirmed. No changes in medications, allergies, or health conditions. Patient has no questions or concerns at this time. He is aware to contact [facility] if he needs anything.

## 2020-06-15 NOTE — PROGRESS NOTES
History of present illness:  The patient is a 79-year-old white gentleman well known to me for polycythemia vera which has been managed with Hydrea and intermittent phlebotomy.  Patient was recently hospitalized and found to have developed pancytopenia.  Hemoglobin was in the 5s and responded well to transfusion of 2 units of packed red blood cells.  Interval bone marrow has been obtained and reveals fibrotic/spent bone marrow.  Blast percentage was not increased.  Patient has started Jakafi 10 mg twice daily and returns to clinic to review interval lab.  He is tolerating medication without difficulty.  No other new complaints for pertinent findings on a 14 point review systems.    Physical examination:  Well-developed, well-nourished, white male in no acute distress, who has a weight of 170.5 lb (stable).  VITAL SIGNS: Documented  and reviewed this visit.  HEENT: Normocephalic, atraumatic. Oral mucosa pink and moist. Lips without   lesions. Tongue midline. Oropharynx clear. Nonicteric sclerae.   NECK: Supple, no adenopathy. No carotid bruits, thyromegaly or thyroid nodule.   HEART: Regular rate and rhythm without murmur, gallop or rub.   LUNGS: Clear to auscultation bilaterally. Normal respiratory effort.   ABDOMEN: Soft, nontender, nondistended with positive normoactive bowel sounds,   no hepatosplenomegaly.   EXTREMITIES: No cyanosis, clubbing or edema. Distal pulses are intact.   AXILLAE AND GROIN: No palpable pathologic lymphadenopathy is appreciated.   SKIN: Intact/turgor normal   NEUROLOGIC: Cranial nerves II-XII grossly intact. Motor: Good muscle bulk and   tone. Strength/sensory 5/5 throughout. Gait stable.     Laboratory:  White count 2.6, hemoglobin 8.5, hematocrit 27.9, platelets 48, absolute neutrophil count is 1500.  Ferritin 437.  Sodium 139, potassium 4.5, chloride 107, CO2 25, BUN 13, creatinine 1.1, glucose 131, calcium 9.5, alkaline phosphatase 151, the remainder liver function test within normal  limits, LDH is 969.  TSH 1.340, free T4 0.92.    Impression:  1.  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  2.  Pancytopenia secondary to number 1.  3.  Worsening platelet count since initiation of Jakafi.  4.  Atrial fibrillation/chronic anticoagulation.    Plan:  1.  Decreased Jakafi dosing of 5 mg twice daily.  2.  Return to clinic in 2 weeks with interval CBC, CMP, LDH, and magnesium.    This note was created using voice recognition software and may contain grammatical errors.

## 2020-06-21 ENCOUNTER — PATIENT OUTREACH (OUTPATIENT)
Dept: ADMINISTRATIVE | Facility: OTHER | Age: 80
End: 2020-06-21

## 2020-06-22 ENCOUNTER — OFFICE VISIT (OUTPATIENT)
Dept: PHYSICAL MEDICINE AND REHAB | Facility: CLINIC | Age: 80
End: 2020-06-22
Payer: MEDICARE

## 2020-06-22 ENCOUNTER — OFFICE VISIT (OUTPATIENT)
Dept: PRIMARY CARE CLINIC | Facility: CLINIC | Age: 80
End: 2020-06-22
Payer: MEDICARE

## 2020-06-22 VITALS
SYSTOLIC BLOOD PRESSURE: 178 MMHG | DIASTOLIC BLOOD PRESSURE: 78 MMHG | HEART RATE: 84 BPM | TEMPERATURE: 98 F | RESPIRATION RATE: 18 BRPM | OXYGEN SATURATION: 99 %

## 2020-06-22 VITALS
DIASTOLIC BLOOD PRESSURE: 73 MMHG | HEART RATE: 78 BPM | SYSTOLIC BLOOD PRESSURE: 141 MMHG | HEIGHT: 67 IN | WEIGHT: 170 LBS | BODY MASS INDEX: 26.68 KG/M2

## 2020-06-22 DIAGNOSIS — U07.1 COVID-19: Primary | ICD-10-CM

## 2020-06-22 DIAGNOSIS — M79.603 MUSCULOSKELETAL PAIN OF UPPER EXTREMITY, UNSPECIFIED LATERALITY: ICD-10-CM

## 2020-06-22 DIAGNOSIS — I10 HYPERTENSION, UNSPECIFIED TYPE: ICD-10-CM

## 2020-06-22 PROCEDURE — 99499 UNLISTED E&M SERVICE: CPT | Mod: S$GLB,,, | Performed by: PHYSICAL MEDICINE & REHABILITATION

## 2020-06-22 PROCEDURE — 1159F PR MEDICATION LIST DOCUMENTED IN MEDICAL RECORD: ICD-10-PCS | Mod: S$GLB,,, | Performed by: PHYSICIAN ASSISTANT

## 2020-06-22 PROCEDURE — U0003 INFECTIOUS AGENT DETECTION BY NUCLEIC ACID (DNA OR RNA); SEVERE ACUTE RESPIRATORY SYNDROME CORONAVIRUS 2 (SARS-COV-2) (CORONAVIRUS DISEASE [COVID-19]), AMPLIFIED PROBE TECHNIQUE, MAKING USE OF HIGH THROUGHPUT TECHNOLOGIES AS DESCRIBED BY CMS-2020-01-R: HCPCS

## 2020-06-22 PROCEDURE — 99203 PR OFFICE/OUTPT VISIT, NEW, LEVL III, 30-44 MIN: ICD-10-PCS | Mod: S$GLB,,, | Performed by: PHYSICIAN ASSISTANT

## 2020-06-22 PROCEDURE — 1101F PR PT FALLS ASSESS DOC 0-1 FALLS W/OUT INJ PAST YR: ICD-10-PCS | Mod: CPTII,S$GLB,, | Performed by: PHYSICIAN ASSISTANT

## 2020-06-22 PROCEDURE — 3078F DIAST BP <80 MM HG: CPT | Mod: CPTII,S$GLB,, | Performed by: PHYSICIAN ASSISTANT

## 2020-06-22 PROCEDURE — 1159F MED LIST DOCD IN RCRD: CPT | Mod: S$GLB,,, | Performed by: PHYSICIAN ASSISTANT

## 2020-06-22 PROCEDURE — 99203 OFFICE O/P NEW LOW 30 MIN: CPT | Mod: S$GLB,,, | Performed by: PHYSICIAN ASSISTANT

## 2020-06-22 PROCEDURE — 1101F PT FALLS ASSESS-DOCD LE1/YR: CPT | Mod: CPTII,S$GLB,, | Performed by: PHYSICIAN ASSISTANT

## 2020-06-22 PROCEDURE — 99499 NO LOS: ICD-10-PCS | Mod: S$GLB,,, | Performed by: PHYSICAL MEDICINE & REHABILITATION

## 2020-06-22 PROCEDURE — 3077F PR MOST RECENT SYSTOLIC BLOOD PRESSURE >= 140 MM HG: ICD-10-PCS | Mod: CPTII,S$GLB,, | Performed by: PHYSICIAN ASSISTANT

## 2020-06-22 PROCEDURE — 99999 PR PBB SHADOW E&M-EST. PATIENT-LVL III: ICD-10-PCS | Mod: PBBFAC,,, | Performed by: PHYSICAL MEDICINE & REHABILITATION

## 2020-06-22 PROCEDURE — 3077F SYST BP >= 140 MM HG: CPT | Mod: CPTII,S$GLB,, | Performed by: PHYSICIAN ASSISTANT

## 2020-06-22 PROCEDURE — 99999 PR PBB SHADOW E&M-EST. PATIENT-LVL III: CPT | Mod: PBBFAC,,, | Performed by: PHYSICAL MEDICINE & REHABILITATION

## 2020-06-22 PROCEDURE — 3078F PR MOST RECENT DIASTOLIC BLOOD PRESSURE < 80 MM HG: ICD-10-PCS | Mod: CPTII,S$GLB,, | Performed by: PHYSICIAN ASSISTANT

## 2020-06-22 NOTE — PROGRESS NOTES
Subjective:        Time seen by provider: 11:21 AM on 06/22/2020    Morro Sharma is a 79 y.o. male with HTN who presents for an evaluation of possible COVID-19. He complains of fatigue and a mild cough for the last 4-5 days. The patient states he told the nurse he had a cough this morning just before his PT appointment and was referred here for a test before continuing his PT treatment. The patient denies exposure to sick contacts, fever, sore throat, difficulty breathing, or any other symptoms at this time. No pertinent PSHx.     Review of Systems   Constitutional: Positive for fatigue. Negative for activity change, appetite change and fever.   HENT: Negative for congestion, rhinorrhea and sore throat.    Respiratory: Positive for cough. Negative for chest tightness, shortness of breath and wheezing.    Cardiovascular: Negative for chest pain and palpitations.   Gastrointestinal: Negative for diarrhea, nausea and vomiting.   Musculoskeletal: Negative for arthralgias and myalgias.   Skin: Negative for rash.   Neurological: Negative for weakness, light-headedness and headaches.       Objective:      Physical Exam  Vitals signs and nursing note reviewed.   Constitutional:       General: He is not in acute distress.     Appearance: He is well-developed. He is not diaphoretic.   HENT:      Head: Normocephalic and atraumatic.      Nose: Nose normal.      Mouth/Throat:      Pharynx: Oropharynx is clear. Uvula midline. No oropharyngeal exudate or posterior oropharyngeal erythema.   Eyes:      Conjunctiva/sclera: Conjunctivae normal.   Neck:      Musculoskeletal: Normal range of motion.   Cardiovascular:      Rate and Rhythm: Normal rate and regular rhythm.      Heart sounds: Normal heart sounds. No murmur.   Pulmonary:      Effort: Pulmonary effort is normal. No respiratory distress.      Breath sounds: Normal breath sounds. No wheezing.   Musculoskeletal: Normal range of motion.   Skin:     General: Skin is warm and dry.    Neurological:      Mental Status: He is alert and oriented to person, place, and time.         Assessment and Plan:      Diagnoses and all orders for this visit:    COVID-19  -     COVID-19 Routine Screening  - Discharge home and await results.   - Return to clinic or ED for new or worsening symptoms.   - Follow-up with PCP as needed.    Hypertension, unspecified type    - Your blood pressure is elevated. Take your blood pressure daily and follow up with your primary care provider. For worsening symptoms, chest pain, shortness of breath, increased abdominal pain, high-grade fever, stroke, or stroke like symptoms, immediately go to the nearest Emergency Room or call 911 as soon as possible.         Scribe Attestation:   I, Melanie Osborn, am scribing for, and in the presence of, Elida Umana PA-C. I performed the above scribed service and the documentation accurately describes the services I performed. I attest to the accuracy of the note.    I, Elida Umana PA-C, personally performed the services described in this documentation. All medical record entries made by the scribe were at my direction and in my presence.  I have reviewed the chart and agree that the record reflects my personal performance and is accurate and complete. Elida Umana PA-C.  2:34 PM 06/22/2020

## 2020-06-22 NOTE — PATIENT INSTRUCTIONS
Instructions for Patients with Confirmed or Suspected COVID-19    If you are awaiting your test result, you will either be called or it will be released to the patient portal.  If you have any questions about your test, please visit [email or web address] or call our COVID-19 information line at [phone].      Preventing the Spread of Coronavirus Disease 2019 (COVID-19) in Homes and Residential Communities -- Patients     Prevention steps for people with confirmed or suspected COVID-19 (including persons under investigation) who do not need to be hospitalized and people with confirmed COVID-19 who were hospitalized and determined to be medically stable to go home.    Stay home except to get medical care.    Separate yourself from other people and animals in your home.    Call ahead before visiting your doctor.    Wear a face mask.    Cover your coughs and sneezes.    Clean your hands often.    Avoid sharing personal household items.    Clean all high-touch surfaces every day.    Monitor your symptoms. Seek prompt medical attention if your illness is worsening (e.g., difficulty breathing). Before seeking care, call your healthcare provider.    If you have a medical emergency and must call 911, notify the dispatcher that you have or are being evaluated for COVID-19. If possible, put on a face mask before emergency medical services arrive.    Use the following symptom-based strategy to return to normal activity following a suspected or confirmed case of COVID-19. Continue isolation until:   o At least 3 days (72 hours) have passed since recovery defined as resolution of fever without the use of fever-reducing medications and improvement in respiratory symptoms (e.g. cough, shortness of breath), and   o At least 10 days have passed since symptoms first appeared.     Precautions for household members, intimate partners and caregivers in a non-healthcare setting of a patient with symptomatic  laboratory-confirmed COVID-19 or a patient under investigation.     Household members, intimate partners and caregivers in a non-healthcare setting may have close contact with a person with symptomatic, laboratory-confirmed COVID-19 or a person under investigation. Close contacts should monitor their health; they should call their healthcare provider right away if they develop symptoms suggestive of COVID-19 (e.g., fever, cough, shortness of breath). Close contacts should also follow these recommendations:      Make sure that you understand and can help the patient follow their healthcare providers instructions for medication(s) and care. You should help the patient with basic needs in the home and provide support for getting groceries, prescriptions, and other personal needs.    Monitor the patients symptoms. If the patient is getting sicker, call his or her healthcare provider and tell them that the patient has laboratory-confirmed COVID-19. This will help the healthcare providers office take steps to keep people in the office or waiting room from getting infected. Ask the healthcare provider to call the local or [facility] health department for additional guidance. If the patient has a medical emergency and you need to call 911, notify the dispatch personnel that the patient has or is being evaluated for COVID-19.    Household members should stay in another room or be  from the patient as much as possible. Household members should use a separate bedroom and bathroom, if available.    Prohibit visitors who do not have an essential need to be in the home.    Household members should care for any pets. Do not handle pets or other animals while sick.    Make sure that shared spaces in the home have good air flow, such as by an air conditioner.    Perform hand hygiene frequently. Wash your hands often with soap and water for at least 20 seconds or use an alcohol-based hand  that contains 60 to  95% alcohol, covering all surfaces of your hands and rubbing them together until they feel dry. Soap and water are preferred if hands are visibly dirty.    Avoid touching your eyes, nose and mouth with unwashed hands.    The patient should wear a face mask when you are around other people. If the patient is not able to wear a face mask (for example, because it causes trouble breathing), you, as the caregiver, should wear a mask when you are in the same room as the patient.    Wear a disposable face mask and gloves when you touch or have contact with the patients blood, stool or body fluids, such as saliva, sputum, nasal mucus, vomit and urine.   o Throw out disposable face masks and gloves after using them. Do not reuse.   o When removing personal protective equipment, first remove and dispose of gloves. Then, immediately clean your hands with soap and water or alcohol-based hand . Next, remove and dispose of face mask, and immediately clean your hands again with soap and water or alcohol-based hand .    Avoid sharing household items with the patient. You should not share dishes, drinking glasses, cups, eating utensils, towels, bedding or other items. After the patient uses these items, you should wash them thoroughly (see below Wash laundry thoroughly).    Clean all high-touch surfaces, such as counters, tabletops, doorknobs, bathroom fixtures, toilets, phones, keyboards, tablets and bedside tables, every day. Also, clean any surfaces that may have blood, stool or body fluids on them.   o Use a household cleaning spray or wipe, according to the label instructions. Labels contain instructions for safe and effective use of the cleaning product including precautions you should take when applying the product, such as wearing gloves and making sure you have good ventilation during use of the product.    Wash laundry thoroughly.   o Immediately remove and wash clothes or bedding that have  blood, stool or body fluids on them.  o Wear disposable gloves while handling soiled items and keep soiled items away from your body. Clean your hands (with soap and water or an alcohol-based hand ) immediately after removing your gloves.   o Read and follow directions on labels of laundry or clothing items and detergent. In general, using a normal laundry detergent according to washing machine instructions and dry thoroughly using the warmest temperatures recommended on the clothing label.    Place all used disposable gloves, face masks and other contaminated items in a lined container before disposing of them with other household waste. Clean your hands (with soap and water or an alcohol-based hand ) immediately after handling these items. Soap and water should be used preferentially if hands are visibly dirty.   Discuss any additional questions with your state or local health department

## 2020-06-22 NOTE — LETTER
June 25, 2020      Koffi Stanley MD  [address] [facility]  [address]  [place] [address]           [place] [facility]  [address] [place] [address] [address]  [place] [address]  Phone: [phone]  Fax: [phone]          Patient: Morro Sharma   MR Number: [id number]   YOB: 1940   Date of Visit: 6/22/2020       Dear Dr. Koffi Stanley:    Thank you for referring Morro Sharma to me for evaluation. Attached you will find relevant portions of my assessment and plan of care.    If you have questions, please do not hesitate to call me. I look forward to following Morro Sharma along with you.    Sincerely,    Nicole Rascon,     Enclosure  CC:  No Recipients    If you would like to receive this communication electronically, please contact [email or web address] or [phone] to request more information on [employer] Link access.    For providers and/or their staff who would like to refer a patient to [facility], please contact us through our one-stop-shop provider referral line, [facility] , at [phone].    If you feel you have received this communication in error or would no longer like to receive these types of communications, please e-mail [email or web address]

## 2020-06-24 LAB — SARS-COV-2 RNA RESP QL NAA+PROBE: NOT DETECTED

## 2020-06-24 NOTE — TELEPHONE ENCOUNTER
"Contacted patient for Jakafi 7 day touchbase.    Therapy start date: Patient originally began therapy on Wednesday, 6/10/2020 with a dose of Jakafi 10 mg BID. Patient's dose was reduced to Jakafi 5 mg BID, which patient began on Wednesday, 6/17/2020. Already discussed proper disposal of 10 mg tablets.   Dosing, how taking: Currently taking Jakafi 5 mg - 1 tablet BID. Denies missed doses.   Storage: Stores medication in container at room temperature.   Handling: Does handle medication directly, but washes hand afterwards. Aware of handling precautions.  Side effects: None  Pain: 0/10 - denies pain.  Appetite: Eats 3 meals/day (small breakfast and average-size lunch and dinner) and snacks during the day.   Energy, fatigue: Able to complete daily and social activities - "energy is back to normal level".    Patient reports having a cough over the last few days. He discussed with MD at therapy appt on Monday, 6/22. They sent him for COVID-19 testing, which came back negative. The patient states his cough has since improved when he started taking Mucinex. He denies any other signs of infection - fever and achy chills. Patient is aware to reach out to MD if cough returns/worsens and if fever and achy chills present as it could be a sign of infection.     Patient has no questions or concerns at this time. He is aware to contact [facility] if he needs anything.       "

## 2020-06-25 ENCOUNTER — CLINICAL SUPPORT (OUTPATIENT)
Dept: URGENT CARE | Facility: CLINIC | Age: 80
End: 2020-06-25
Payer: MEDICARE

## 2020-06-25 VITALS
SYSTOLIC BLOOD PRESSURE: 142 MMHG | OXYGEN SATURATION: 97 % | DIASTOLIC BLOOD PRESSURE: 78 MMHG | WEIGHT: 174 LBS | RESPIRATION RATE: 12 BRPM | HEART RATE: 90 BPM | BODY MASS INDEX: 27.25 KG/M2 | TEMPERATURE: 98 F

## 2020-06-25 DIAGNOSIS — J00 ACUTE NASOPHARYNGITIS: Primary | ICD-10-CM

## 2020-06-25 PROCEDURE — 99204 OFFICE O/P NEW MOD 45 MIN: CPT | Mod: S$GLB,,, | Performed by: NURSE PRACTITIONER

## 2020-06-25 PROCEDURE — 99204 PR OFFICE/OUTPT VISIT, NEW, LEVL IV, 45-59 MIN: ICD-10-PCS | Mod: S$GLB,,, | Performed by: NURSE PRACTITIONER

## 2020-06-25 RX ORDER — DEXCHLORPHENIRAMINE MALEATE, DEXTROMETHORPHAN HBR, PHENYLEPHRINE HCL 1; 10; 5 MG/5ML; MG/5ML; MG/5ML
5 SYRUP ORAL EVERY 4 HOURS PRN
Qty: 120 ML | Refills: 0 | Status: SHIPPED | OUTPATIENT
Start: 2020-06-25 | End: 2020-10-28 | Stop reason: SDUPTHER

## 2020-06-25 NOTE — PATIENT INSTRUCTIONS
Viral Upper Respiratory Illness (Adult)  You have a viral upper respiratory illness (URI), which is another term for the common cold. This illness is contagious during the first few days. It is spread through the air by coughing and sneezing. It may also be spread by direct contact (touching the sick person and then touching your own eyes, nose, or mouth). Frequent handwashing will decrease risk of spread. Most viral illnesses go away within 7 to 10 days with rest and simple home remedies. Sometimes the illness may last for several weeks. Antibiotics will not kill a virus, and they are generally not prescribed for this condition.    Home care  · If symptoms are severe, rest at home for the first 2 to 3 days. When you resume activity, don't let yourself get too tired.  · Avoid being exposed to cigarette smoke (yours or others).  · You may use acetaminophen or ibuprofen to control pain and fever, unless another medicine was prescribed. (Note: If you have chronic liver or kidney disease, have ever had a stomach ulcer or gastrointestinal bleeding, or are taking blood-thinning medicines, talk with your healthcare provider before using these medicines.) Aspirin should never be given to anyone under 18 years of age who is ill with a viral infection or fever. It may cause severe liver or brain damage.  · Your appetite may be poor, so a light diet is fine. Avoid dehydration by drinking 6 to 8 glasses of fluids per day (water, soft drinks, juices, tea, or soup). Extra fluids will help loosen secretions in the nose and lungs.  · Over-the-counter cold medicines will not shorten the length of time youre sick, but they may be helpful for the following symptoms: cough, sore throat, and nasal and sinus congestion. (Note: Do not use decongestants if you have high blood pressure.)  Follow-up care  Follow up with your healthcare provider, or as advised.  When to seek medical advice  Call your healthcare provider right away if any  of these occur:  · Cough with lots of colored sputum (mucus)  · Severe headache; face, neck, or ear pain  · Difficulty swallowing due to throat pain  · Fever of 100.4°F (38°C)  Call 911, or get immediate medical care  Call emergency services right away if any of these occur:  · Chest pain, shortness of breath, wheezing, or difficulty breathing  · Coughing up blood  · Inability to swallow due to throat pain  Date Last Reviewed: 9/13/2015  © 5451-8166 [employer]. [address] [place] [address]. All rights reserved. This information is not intended as a substitute for professional medical care. Always follow your healthcare professional's instructions.

## 2020-06-25 NOTE — PROGRESS NOTES
"Subjective:       Patient ID: Morro Sharma is a 79 y.o. male.    Vitals:  weight is 78.9 kg (174 lb). His temperature is 97.9 °F (36.6 °C). His blood pressure is 142/78 (abnormal) and his pulse is 90. His respiration is 12 and oxygen saturation is 97%.     Chief Complaint: Cough    Patient complains of a productive cough for 4 days. Reports he was sent for covid testing on 6/22/20 from his physical therapist and informed to follow up with urgent care. Patient's covid test came back negative. Reports his cough is "much much" better now, but he just wanted to be checked out. Denies fever, sob, difficulty breathing, chest pain, nausea, vomiting, diarrhea. Reports "I'm feeling great now."    Cough  This is a new problem. The current episode started in the past 7 days ( 5 days ). The problem has been gradually worsening. The problem occurs constantly. The cough is productive of sputum. Pertinent negatives include no chest pain, chills, fever, headaches, myalgias, rash, sore throat or shortness of breath. Nothing aggravates the symptoms. Treatments tried: mucinex  The treatment provided mild relief.       Constitution: Negative for chills, fatigue and fever.   HENT: Negative for congestion and sore throat.    Neck: Negative for painful lymph nodes.   Cardiovascular: Negative for chest pain and leg swelling.   Eyes: Negative for double vision and blurred vision.   Respiratory: Positive for cough. Negative for shortness of breath.    Gastrointestinal: Negative for abdominal pain, nausea, vomiting and diarrhea.   Genitourinary: Negative for dysuria, frequency and urgency.   Musculoskeletal: Negative for joint pain, joint swelling, muscle cramps and muscle ache.   Skin: Negative for color change, pale and rash.   Allergic/Immunologic: Negative for seasonal allergies.   Neurological: Negative for dizziness, history of vertigo, light-headedness, passing out and headaches.   Hematologic/Lymphatic: Negative for swollen lymph " nodes, easy bruising/bleeding and history of blood clots. Does not bruise/bleed easily.   Psychiatric/Behavioral: Negative for nervous/anxious, sleep disturbance and depression. The patient is not nervous/anxious.        Objective:      Physical Exam   Constitutional: He is oriented to person, place, and time. He appears well-developed. He is cooperative.  Non-toxic appearance. He does not appear ill. No distress.   HENT:   Head: Normocephalic and atraumatic.   Right Ear: Hearing, tympanic membrane, external ear and ear canal normal.   Left Ear: Hearing, tympanic membrane, external ear and ear canal normal.   Nose: Nose normal. No mucosal edema, rhinorrhea or nasal deformity. No epistaxis. Right sinus exhibits no maxillary sinus tenderness and no frontal sinus tenderness. Left sinus exhibits no maxillary sinus tenderness and no frontal sinus tenderness.   Mouth/Throat: Uvula is midline, oropharynx is clear and moist and mucous membranes are normal. No trismus in the jaw. Normal dentition. No uvula swelling. No posterior oropharyngeal erythema.   Eyes: Conjunctivae and lids are normal. Right eye exhibits no discharge. Left eye exhibits no discharge. No scleral icterus.   Neck: Trachea normal, normal range of motion, full passive range of motion without pain and phonation normal. Neck supple.   Cardiovascular: Normal rate, regular rhythm, normal heart sounds and normal pulses.   Pulmonary/Chest: Effort normal and breath sounds normal. No respiratory distress.   Abdominal: Soft. Normal appearance and bowel sounds are normal. He exhibits no distension, no pulsatile midline mass and no mass. There is no abdominal tenderness.   Musculoskeletal: Normal range of motion.         General: No deformity.   Neurological: He is alert and oriented to person, place, and time. He exhibits normal muscle tone. Coordination normal. GCS eye subscore is 4. GCS verbal subscore is 5. GCS motor subscore is 6.   Skin: Skin is warm, dry,  intact, not diaphoretic and not pale.   Psychiatric: His speech is normal and behavior is normal. Judgment and thought content normal.   Nursing note and vitals reviewed.        Assessment:       1. Acute nasopharyngitis        Plan:        The patient appears to have a viral upper respiratory infection with a viral syndrome.  Based upon the history and physical exam the patient does not appear to have a serious bacterial infection such as pneumonia, sepsis, otitis media, bacterial sinusitis, strep pharyngitis, parapharyngeal or peritonsillar abscess, meningitis.  I do not think the patient needs antibiotics as their illness likely has a viral etiology.  Patient appears very well and I have given specific return precautions to the patient and/or family members.  I have instructed the patient to hydrate, take over the counter medications and follow up with their regular doctor or the one provided.    Acute nasopharyngitis    Other orders  -     dexchlorphen-phenylephrine-DM (POLYTUSSIN DM) 1-5-10 mg/5 mL Syrp; Take 5 mLs by mouth every 4 (four) hours as needed.  Dispense: 120 mL; Refill: 0

## 2020-06-29 ENCOUNTER — OFFICE VISIT (OUTPATIENT)
Dept: HEMATOLOGY/ONCOLOGY | Facility: CLINIC | Age: 80
End: 2020-06-29
Payer: MEDICARE

## 2020-06-29 ENCOUNTER — LAB VISIT (OUTPATIENT)
Dept: LAB | Facility: HOSPITAL | Age: 80
End: 2020-06-29
Attending: INTERNAL MEDICINE
Payer: MEDICARE

## 2020-06-29 VITALS
HEIGHT: 67 IN | TEMPERATURE: 98 F | WEIGHT: 174.19 LBS | SYSTOLIC BLOOD PRESSURE: 133 MMHG | HEART RATE: 81 BPM | OXYGEN SATURATION: 99 % | DIASTOLIC BLOOD PRESSURE: 74 MMHG | RESPIRATION RATE: 12 BRPM | BODY MASS INDEX: 27.34 KG/M2

## 2020-06-29 DIAGNOSIS — D64.9 SYMPTOMATIC ANEMIA: ICD-10-CM

## 2020-06-29 DIAGNOSIS — D45 POLYCYTHEMIA VERA: ICD-10-CM

## 2020-06-29 DIAGNOSIS — D61.818 PANCYTOPENIA: Primary | ICD-10-CM

## 2020-06-29 DIAGNOSIS — D61.818 PANCYTOPENIA: ICD-10-CM

## 2020-06-29 LAB
ABO + RH BLD: NORMAL
ALBUMIN SERPL BCP-MCNC: 4.2 G/DL (ref 3.5–5.2)
ALP SERPL-CCNC: 143 U/L (ref 38–145)
ALT SERPL W/O P-5'-P-CCNC: 17 U/L (ref 0–50)
ANION GAP SERPL CALC-SCNC: 7 MMOL/L (ref 8–16)
ANISOCYTOSIS BLD QL SMEAR: SLIGHT
AST SERPL-CCNC: 28 U/L (ref 17–59)
BASOPHILS NFR BLD: 1 % (ref 0–1.9)
BILIRUB SERPL-MCNC: 1 MG/DL (ref 0.2–1.3)
BLD GP AB SCN CELLS X3 SERPL QL: NORMAL
BUN SERPL-MCNC: 13 MG/DL (ref 9–21)
CALCIUM SERPL-MCNC: 9.6 MG/DL (ref 8.4–10.2)
CHLORIDE SERPL-SCNC: 106 MMOL/L (ref 95–110)
CO2 SERPL-SCNC: 26 MMOL/L (ref 22–31)
CREAT SERPL-MCNC: 1.06 MG/DL (ref 0.5–1.4)
DACRYOCYTES BLD QL SMEAR: ABNORMAL
DIFFERENTIAL METHOD: ABNORMAL
EOSINOPHIL NFR BLD: 0 % (ref 0–8)
ERYTHROCYTE [DISTWIDTH] IN BLOOD BY AUTOMATED COUNT: 17.2 % (ref 11.5–14.5)
EST. GFR  (AFRICAN AMERICAN): >60 ML/MIN/1.73 M^2
EST. GFR  (NON AFRICAN AMERICAN): >60 ML/MIN/1.73 M^2
GLUCOSE SERPL-MCNC: 88 MG/DL (ref 70–110)
HCT VFR BLD AUTO: 25.2 % (ref 40–54)
HGB BLD-MCNC: 7.5 G/DL (ref 14–18)
HYPOCHROMIA BLD QL SMEAR: ABNORMAL
IMM GRANULOCYTES # BLD AUTO: ABNORMAL K/UL (ref 0–0.04)
IMM GRANULOCYTES NFR BLD AUTO: ABNORMAL % (ref 0–0.5)
LDH SERPL L TO P-CCNC: 1046 U/L (ref 313–618)
LYMPHOCYTES NFR BLD: 32 % (ref 18–48)
MAGNESIUM SERPL-MCNC: 2.2 MG/DL (ref 1.6–2.6)
MCH RBC QN AUTO: 28.4 PG (ref 27–31)
MCHC RBC AUTO-ENTMCNC: 29.8 G/DL (ref 32–36)
MCV RBC AUTO: 96 FL (ref 82–98)
METAMYELOCYTES NFR BLD MANUAL: 4 %
MONOCYTES NFR BLD: 10 % (ref 4–15)
MYELOCYTES NFR BLD MANUAL: 4 %
NEUTROPHILS # BLD AUTO: 1.6 K/UL (ref 1.8–7.7)
NEUTROPHILS NFR BLD: 40 % (ref 38–73)
NEUTS BAND NFR BLD MANUAL: 7 %
NRBC BLD-RTO: 16 /100 WBC
OVALOCYTES BLD QL SMEAR: ABNORMAL
PLATELET # BLD AUTO: 38 K/UL (ref 150–350)
PLATELET BLD QL SMEAR: ABNORMAL
PMV BLD AUTO: ABNORMAL FL (ref 9.2–12.9)
POIKILOCYTOSIS BLD QL SMEAR: SLIGHT
POLYCHROMASIA BLD QL SMEAR: ABNORMAL
POTASSIUM SERPL-SCNC: 4.1 MMOL/L (ref 3.5–5.1)
PROT SERPL-MCNC: 7.3 G/DL (ref 6–8.4)
RBC # BLD AUTO: 2.64 M/UL (ref 4.6–6.2)
SODIUM SERPL-SCNC: 139 MMOL/L (ref 136–145)
WBC # BLD AUTO: 3.4 K/UL (ref 3.9–12.7)
WBC OTHER NFR BLD MANUAL: 2 %

## 2020-06-29 PROCEDURE — 3075F PR MOST RECENT SYSTOLIC BLOOD PRESS GE 130-139MM HG: ICD-10-PCS | Mod: CPTII,S$GLB,, | Performed by: NURSE PRACTITIONER

## 2020-06-29 PROCEDURE — 1101F PR PT FALLS ASSESS DOC 0-1 FALLS W/OUT INJ PAST YR: ICD-10-PCS | Mod: CPTII,S$GLB,, | Performed by: NURSE PRACTITIONER

## 2020-06-29 PROCEDURE — 1101F PT FALLS ASSESS-DOCD LE1/YR: CPT | Mod: CPTII,S$GLB,, | Performed by: NURSE PRACTITIONER

## 2020-06-29 PROCEDURE — 1159F MED LIST DOCD IN RCRD: CPT | Mod: S$GLB,,, | Performed by: NURSE PRACTITIONER

## 2020-06-29 PROCEDURE — 83735 ASSAY OF MAGNESIUM: CPT

## 2020-06-29 PROCEDURE — 85007 BL SMEAR W/DIFF WBC COUNT: CPT

## 2020-06-29 PROCEDURE — 3075F SYST BP GE 130 - 139MM HG: CPT | Mod: CPTII,S$GLB,, | Performed by: NURSE PRACTITIONER

## 2020-06-29 PROCEDURE — 99999 PR PBB SHADOW E&M-EST. PATIENT-LVL IV: ICD-10-PCS | Mod: PBBFAC,,, | Performed by: NURSE PRACTITIONER

## 2020-06-29 PROCEDURE — 83615 LACTATE (LD) (LDH) ENZYME: CPT | Mod: PN

## 2020-06-29 PROCEDURE — 99999 PR PBB SHADOW E&M-EST. PATIENT-LVL IV: CPT | Mod: PBBFAC,,, | Performed by: NURSE PRACTITIONER

## 2020-06-29 PROCEDURE — 86850 RBC ANTIBODY SCREEN: CPT | Mod: PN

## 2020-06-29 PROCEDURE — 1126F PR PAIN SEVERITY QUANTIFIED, NO PAIN PRESENT: ICD-10-PCS | Mod: S$GLB,,, | Performed by: NURSE PRACTITIONER

## 2020-06-29 PROCEDURE — 83615 LACTATE (LD) (LDH) ENZYME: CPT

## 2020-06-29 PROCEDURE — 86850 RBC ANTIBODY SCREEN: CPT

## 2020-06-29 PROCEDURE — 83735 ASSAY OF MAGNESIUM: CPT | Mod: PN

## 2020-06-29 PROCEDURE — 1159F PR MEDICATION LIST DOCUMENTED IN MEDICAL RECORD: ICD-10-PCS | Mod: S$GLB,,, | Performed by: NURSE PRACTITIONER

## 2020-06-29 PROCEDURE — 3078F PR MOST RECENT DIASTOLIC BLOOD PRESSURE < 80 MM HG: ICD-10-PCS | Mod: CPTII,S$GLB,, | Performed by: NURSE PRACTITIONER

## 2020-06-29 PROCEDURE — 99213 OFFICE O/P EST LOW 20 MIN: CPT | Mod: S$GLB,,, | Performed by: NURSE PRACTITIONER

## 2020-06-29 PROCEDURE — 85027 COMPLETE CBC AUTOMATED: CPT | Mod: PN

## 2020-06-29 PROCEDURE — 85007 BL SMEAR W/DIFF WBC COUNT: CPT | Mod: PN

## 2020-06-29 PROCEDURE — 1126F AMNT PAIN NOTED NONE PRSNT: CPT | Mod: S$GLB,,, | Performed by: NURSE PRACTITIONER

## 2020-06-29 PROCEDURE — 80053 COMPREHEN METABOLIC PANEL: CPT

## 2020-06-29 PROCEDURE — 36415 COLL VENOUS BLD VENIPUNCTURE: CPT | Mod: PN

## 2020-06-29 PROCEDURE — 85027 COMPLETE CBC AUTOMATED: CPT

## 2020-06-29 PROCEDURE — 3078F DIAST BP <80 MM HG: CPT | Mod: CPTII,S$GLB,, | Performed by: NURSE PRACTITIONER

## 2020-06-29 PROCEDURE — 80053 COMPREHEN METABOLIC PANEL: CPT | Mod: PN

## 2020-06-29 PROCEDURE — 99213 PR OFFICE/OUTPT VISIT, EST, LEVL III, 20-29 MIN: ICD-10-PCS | Mod: S$GLB,,, | Performed by: NURSE PRACTITIONER

## 2020-06-29 NOTE — PROGRESS NOTES
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  Patient was recently hospitalized and found to have developed pancytopenia.  Hemoglobin was in the 5s and responded well to transfusion of 2 units of packed red blood cells.  Interval bone marrow was obtained and revealed fibrotic/diffuse moderate bone marrow (spent phase).  Blast percentage was not increased.  Patient has started Jakafi 10 mg twice daily and reduced 2 weeks ago to 5 mg bid due to thrombocytopenia.      He returns to clinic to review interval lab.  He is tolerating medication without difficulty. He denies any difficulties with fatigue, weakness, dyspnea, chest pain, irregular heart rate, bleeding, bruising, etc.   No other new complaints for pertinent findings on a 14 point review systems.    Physical examination:  GENERAL:  Well-developed, well-nourished, black male in no acute distress.  VITAL SIGNS:   Vitals:    06/29/20 1343   BP: 133/74   BP Location: Left arm   Patient Position: Sitting   Pulse: 81   Resp: 12   Temp: 98.4 °F (36.9 °C)   TempSrc: Temporal   SpO2: 99%   Weight: 79 kg (174 lb 2.6 oz)   Height: 5' 7" (1.702 m)       HEENT: Normocephalic, atraumatic. Oral mucosa pink and moist. Lips without lesions. Tongue midline. Oropharynx clear. Nonicteric sclerae.   NECK: Supple, no adenopathy. No carotid bruits, thyromegaly or thyroid nodule.   HEART: Regular rate and rhythm without murmur, gallop or rub.   LUNGS: Clear to auscultation bilaterally. Normal respiratory effort.   ABDOMEN: Soft, nontender, nondistended with positive normoactive bowel sounds, no hepatosplenomegaly.   EXTREMITIES: No cyanosis, clubbing or edema. Distal pulses are intact.   AXILLAE AND GROIN: No palpable pathologic lymphadenopathy is appreciated.   SKIN: Intact/turgor normal   NEUROLOGIC: Cranial nerves II-XII grossly intact. Motor: Good " muscle bulk and tone. Strength/sensory 5/5 throughout. Gait stable.     Laboratory:  Lab Results   Component Value Date    WBC 3.40 (L) 06/29/2020    RBC 2.64 (L) 06/29/2020    HGB 7.5 (L) 06/29/2020    HCT 25.2 (L) 06/29/2020    MCV 96 06/29/2020    MCH 28.4 06/29/2020    MCHC 29.8 (L) 06/29/2020    RDW 17.2 (H) 06/29/2020    PLT 38 (LL) 06/29/2020    MPV SEE COMMENT 06/29/2020    GRAN 1.6 (L) 06/29/2020    GRAN 40.0 06/29/2020    LYMPH 32.0 06/29/2020    MONO 10.0 06/29/2020    EOS CANCELED 06/15/2020    BASO CANCELED 06/15/2020    EOSINOPHIL 0.0 06/29/2020    BASOPHIL 1.0 06/29/2020     ANC = 1598     CMP  Sodium   Date Value Ref Range Status   06/29/2020 139 136 - 145 mmol/L Final     Potassium   Date Value Ref Range Status   06/29/2020 4.1 3.5 - 5.1 mmol/L Final     Chloride   Date Value Ref Range Status   06/29/2020 106 95 - 110 mmol/L Final     CO2   Date Value Ref Range Status   06/29/2020 26 22 - 31 mmol/L Final     Glucose   Date Value Ref Range Status   06/29/2020 88 70 - 110 mg/dL Final     Comment:     The ADA recommends the following guidelines for fasting glucose:  Normal:       less than 100 mg/dL  Prediabetes:  100 mg/dL to 125 mg/dL  Diabetes:     126 mg/dL or higher       BUN, Bld   Date Value Ref Range Status   06/29/2020 13 9 - 21 mg/dL Final     Creatinine   Date Value Ref Range Status   06/29/2020 1.06 0.50 - 1.40 mg/dL Final   10/02/2012 1.3 0.2 - 1.4 mg/dl Final     Calcium   Date Value Ref Range Status   06/29/2020 9.6 8.4 - 10.2 mg/dL Final   10/02/2012 10.2 8.6 - 10.2 mg/dl Final     Total Protein   Date Value Ref Range Status   06/29/2020 7.3 6.0 - 8.4 g/dL Final     Albumin   Date Value Ref Range Status   06/29/2020 4.2 3.5 - 5.2 g/dL Final     Total Bilirubin   Date Value Ref Range Status   06/29/2020 1.0 0.2 - 1.3 mg/dL Final     Alkaline Phosphatase   Date Value Ref Range Status   06/29/2020 143 38 - 145 U/L Final     AST   Date Value Ref Range Status   06/29/2020 28 17 - 59 U/L  Final     ALT   Date Value Ref Range Status   06/29/2020 17 0 - 50 U/L Final     Anion Gap   Date Value Ref Range Status   06/29/2020 7 (L) 8 - 16 mmol/L Final   10/02/2012 7 5 - 15 meq/L Final     eGFR if    Date Value Ref Range Status   06/29/2020 >60 >60 mL/min/1.73 m^2 Final     eGFR if non    Date Value Ref Range Status   06/29/2020 >60 >60 mL/min/1.73 m^2 Final     Comment:     Calculation used to obtain the estimated glomerular filtration  rate (eGFR) is the CKD-EPI equation.        LDH:  1046  Magnesium:  2.2    Impression:  1.  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  2.  Leukopenia  3.  Thrombocytopenia - worsening on decreased dosage  4.  Atrial fibrillation/chronic anticoagulation.    Plan:  After discussion and review of Deevn with Dr. Tafoya:  HOLD Deven & have patient return in 1 week to Dr. Cole with CBC prior.      This note was created using voice recognition software and may contain grammatical errors.

## 2020-07-01 ENCOUNTER — TELEPHONE (OUTPATIENT)
Dept: HEMATOLOGY/ONCOLOGY | Facility: CLINIC | Age: 80
End: 2020-07-01

## 2020-07-01 NOTE — TELEPHONE ENCOUNTER
Returned call to patient, gave patient lab and follow up on Wednesday 7/8/20 at 12 noon and 1 pm, respectively.   Reinforced that patient is NOT to take the Jakafi pill until after seeing Dr. Cole next week. Patient voiced understanding and appreciation

## 2020-07-01 NOTE — TELEPHONE ENCOUNTER
----- Message from Edelmira Beasley sent at 7/1/2020  1:46 PM CDT -----  Contact: pt  Type: Needs Medical Advice    Who Called:  Pt  Best Call Back Number: [phone]  Additional Information: Requesting a call back regarding pt wanted to know if the doctor wanted to do a follow up with him on Monday or Tuesday   Please Advise ---Thank you

## 2020-07-08 ENCOUNTER — OFFICE VISIT (OUTPATIENT)
Dept: HEMATOLOGY/ONCOLOGY | Facility: CLINIC | Age: 80
End: 2020-07-08
Payer: MEDICARE

## 2020-07-08 ENCOUNTER — LAB VISIT (OUTPATIENT)
Dept: LAB | Facility: HOSPITAL | Age: 80
End: 2020-07-08
Attending: INTERNAL MEDICINE
Payer: MEDICARE

## 2020-07-08 ENCOUNTER — TELEPHONE (OUTPATIENT)
Dept: PHARMACY | Facility: CLINIC | Age: 80
End: 2020-07-08

## 2020-07-08 VITALS
BODY MASS INDEX: 26.73 KG/M2 | SYSTOLIC BLOOD PRESSURE: 145 MMHG | WEIGHT: 170.63 LBS | DIASTOLIC BLOOD PRESSURE: 80 MMHG | HEART RATE: 91 BPM | OXYGEN SATURATION: 97 % | TEMPERATURE: 98 F

## 2020-07-08 DIAGNOSIS — D64.9 SYMPTOMATIC ANEMIA: Primary | ICD-10-CM

## 2020-07-08 DIAGNOSIS — D61.818 PANCYTOPENIA: ICD-10-CM

## 2020-07-08 DIAGNOSIS — D45 POLYCYTHEMIA VERA: Primary | ICD-10-CM

## 2020-07-08 DIAGNOSIS — D45 POLYCYTHEMIA VERA: ICD-10-CM

## 2020-07-08 LAB
ABO + RH BLD: NORMAL
ANISOCYTOSIS BLD QL SMEAR: SLIGHT
BASOPHILS NFR BLD: 2 % (ref 0–1.9)
BLD GP AB SCN CELLS X3 SERPL QL: NORMAL
DACRYOCYTES BLD QL SMEAR: ABNORMAL
DIFFERENTIAL METHOD: ABNORMAL
EOSINOPHIL NFR BLD: 1 % (ref 0–8)
ERYTHROCYTE [DISTWIDTH] IN BLOOD BY AUTOMATED COUNT: 17.9 % (ref 11.5–14.5)
HCT VFR BLD AUTO: 23 % (ref 40–54)
HGB BLD-MCNC: 6.7 G/DL (ref 14–18)
HYPOCHROMIA BLD QL SMEAR: ABNORMAL
IMM GRANULOCYTES # BLD AUTO: ABNORMAL K/UL (ref 0–0.04)
IMM GRANULOCYTES NFR BLD AUTO: ABNORMAL % (ref 0–0.5)
LYMPHOCYTES NFR BLD: 28 % (ref 18–48)
MCH RBC QN AUTO: 27.5 PG (ref 27–31)
MCHC RBC AUTO-ENTMCNC: 29.1 G/DL (ref 32–36)
MCV RBC AUTO: 94 FL (ref 82–98)
METAMYELOCYTES NFR BLD MANUAL: 3 %
MONOCYTES NFR BLD: 4 % (ref 4–15)
MYELOCYTES NFR BLD MANUAL: 6 %
NEUTROPHILS # BLD AUTO: 2 K/UL (ref 1.8–7.7)
NEUTROPHILS NFR BLD: 39 % (ref 38–73)
NEUTS BAND NFR BLD MANUAL: 11 %
NRBC BLD-RTO: 34 /100 WBC
PLATELET # BLD AUTO: 31 K/UL (ref 150–350)
PLATELET BLD QL SMEAR: ABNORMAL
PMV BLD AUTO: ABNORMAL FL (ref 9.2–12.9)
POIKILOCYTOSIS BLD QL SMEAR: SLIGHT
POLYCHROMASIA BLD QL SMEAR: ABNORMAL
RBC # BLD AUTO: 2.44 M/UL (ref 4.6–6.2)
WBC # BLD AUTO: 3.97 K/UL (ref 3.9–12.7)
WBC OTHER NFR BLD MANUAL: 6 %

## 2020-07-08 PROCEDURE — 99499 UNLISTED E&M SERVICE: CPT | Mod: S$GLB,,, | Performed by: INTERNAL MEDICINE

## 2020-07-08 PROCEDURE — 1101F PR PT FALLS ASSESS DOC 0-1 FALLS W/OUT INJ PAST YR: ICD-10-PCS | Mod: CPTII,S$GLB,, | Performed by: INTERNAL MEDICINE

## 2020-07-08 PROCEDURE — 1126F PR PAIN SEVERITY QUANTIFIED, NO PAIN PRESENT: ICD-10-PCS | Mod: S$GLB,,, | Performed by: INTERNAL MEDICINE

## 2020-07-08 PROCEDURE — 3079F DIAST BP 80-89 MM HG: CPT | Mod: CPTII,S$GLB,, | Performed by: INTERNAL MEDICINE

## 2020-07-08 PROCEDURE — 86901 BLOOD TYPING SEROLOGIC RH(D): CPT

## 2020-07-08 PROCEDURE — 99214 PR OFFICE/OUTPT VISIT, EST, LEVL IV, 30-39 MIN: ICD-10-PCS | Mod: S$GLB,,, | Performed by: INTERNAL MEDICINE

## 2020-07-08 PROCEDURE — 85027 COMPLETE CBC AUTOMATED: CPT

## 2020-07-08 PROCEDURE — 85027 COMPLETE CBC AUTOMATED: CPT | Mod: PN

## 2020-07-08 PROCEDURE — 99999 PR PBB SHADOW E&M-EST. PATIENT-LVL IV: CPT | Mod: PBBFAC,,, | Performed by: INTERNAL MEDICINE

## 2020-07-08 PROCEDURE — 3079F PR MOST RECENT DIASTOLIC BLOOD PRESSURE 80-89 MM HG: ICD-10-PCS | Mod: CPTII,S$GLB,, | Performed by: INTERNAL MEDICINE

## 2020-07-08 PROCEDURE — 1101F PT FALLS ASSESS-DOCD LE1/YR: CPT | Mod: CPTII,S$GLB,, | Performed by: INTERNAL MEDICINE

## 2020-07-08 PROCEDURE — 1159F PR MEDICATION LIST DOCUMENTED IN MEDICAL RECORD: ICD-10-PCS | Mod: S$GLB,,, | Performed by: INTERNAL MEDICINE

## 2020-07-08 PROCEDURE — 99999 PR PBB SHADOW E&M-EST. PATIENT-LVL IV: ICD-10-PCS | Mod: PBBFAC,,, | Performed by: INTERNAL MEDICINE

## 2020-07-08 PROCEDURE — 36415 COLL VENOUS BLD VENIPUNCTURE: CPT | Mod: PN

## 2020-07-08 PROCEDURE — 85007 BL SMEAR W/DIFF WBC COUNT: CPT

## 2020-07-08 PROCEDURE — 1159F MED LIST DOCD IN RCRD: CPT | Mod: S$GLB,,, | Performed by: INTERNAL MEDICINE

## 2020-07-08 PROCEDURE — 1126F AMNT PAIN NOTED NONE PRSNT: CPT | Mod: S$GLB,,, | Performed by: INTERNAL MEDICINE

## 2020-07-08 PROCEDURE — 86850 RBC ANTIBODY SCREEN: CPT | Mod: PN

## 2020-07-08 PROCEDURE — 3077F PR MOST RECENT SYSTOLIC BLOOD PRESSURE >= 140 MM HG: ICD-10-PCS | Mod: CPTII,S$GLB,, | Performed by: INTERNAL MEDICINE

## 2020-07-08 PROCEDURE — 99499 RISK ADDL DX/OHS AUDIT: ICD-10-PCS | Mod: S$GLB,,, | Performed by: INTERNAL MEDICINE

## 2020-07-08 PROCEDURE — 85007 BL SMEAR W/DIFF WBC COUNT: CPT | Mod: PN

## 2020-07-08 PROCEDURE — 3077F SYST BP >= 140 MM HG: CPT | Mod: CPTII,S$GLB,, | Performed by: INTERNAL MEDICINE

## 2020-07-08 PROCEDURE — 99214 OFFICE O/P EST MOD 30 MIN: CPT | Mod: S$GLB,,, | Performed by: INTERNAL MEDICINE

## 2020-07-08 RX ORDER — FUROSEMIDE 10 MG/ML
20 INJECTION INTRAMUSCULAR; INTRAVENOUS ONCE
Status: CANCELLED | OUTPATIENT
Start: 2020-07-08

## 2020-07-08 RX ORDER — HYDROCODONE BITARTRATE AND ACETAMINOPHEN 500; 5 MG/1; MG/1
TABLET ORAL ONCE
Status: CANCELLED | OUTPATIENT
Start: 2020-07-08 | End: 2020-07-08

## 2020-07-08 RX ORDER — DIPHENHYDRAMINE HCL 25 MG
25 CAPSULE ORAL ONCE
Status: CANCELLED | OUTPATIENT
Start: 2020-07-08

## 2020-07-08 RX ORDER — ACETAMINOPHEN 325 MG/1
650 TABLET ORAL ONCE
Status: CANCELLED | OUTPATIENT
Start: 2020-07-08

## 2020-07-08 NOTE — Clinical Note
Transfuse 2 units of packed red blood cells.  Continue to hold Jakafi.  Return to clinic in 2 weeks with interval CBC, CMP, LDH, and magnesium.

## 2020-07-08 NOTE — PROGRESS NOTES
History of present illness:  The patient is a 79-year-old white gentleman well known to me for polycythemia vera which has been managed with Hydrea and intermittent phlebotomy.  Patient was recently hospitalized and found to have developed pancytopenia.  Hemoglobin was in the 5s and responded well to transfusion of 2 units of packed red blood cells.  Interval bone marrow has been obtained and reveals fibrotic/spent bone marrow.  Blast percentage was not increased.  Patient has started Jakafi 10 mg twice daily, developed thrombocytopenia, and had dose reduced to 5 mg twice daily.  Unfortunately, patient's platelet count continued to fall Jakafi was eventually held.  Patient returns to review interval CBC.  Patient is fatigued and dyspneic on exertion.  No signs of blood loss.  No other new complaints for pertinent findings on a 14 point review systems.    Physical examination:  Well-developed, well-nourished, white male in no acute distress, who has a weight of 174 lb (increased by 3.5 lb).  VITAL SIGNS: Documented  and reviewed this visit.  HEENT: Normocephalic, atraumatic. Oral mucosa pink and moist. Lips without   lesions. Tongue midline. Oropharynx clear. Nonicteric sclerae.   NECK: Supple, no adenopathy. No carotid bruits, thyromegaly or thyroid nodule.   HEART: Regular rate and rhythm without murmur, gallop or rub.   LUNGS: Clear to auscultation bilaterally. Normal respiratory effort.   ABDOMEN: Soft, nontender, nondistended with positive normoactive bowel sounds,   no hepatosplenomegaly.   EXTREMITIES: No cyanosis, clubbing or edema. Distal pulses are intact.   AXILLAE AND GROIN: No palpable pathologic lymphadenopathy is appreciated.   SKIN: Intact/turgor normal   NEUROLOGIC: Cranial nerves II-XII grossly intact. Motor: Good muscle bulk and   tone. Strength/sensory 5/5 throughout. Gait stable.     Laboratory:  White count 4, hemoglobin 6.7, hematocrit 23, platelets 31, absolute neutrophil count 2000.  Patient  exhibits 6% circulating blasts.    Bone marrow aspiration and biopsy:  Study performed in May was remarkable for severe fibrosis in approximately 5% blasts.  Flow cytometry was negative.  Chromosomes and FISH panel remain on resulted in epic.    Impression:  1.  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  2.  Pancytopenia secondary to number 1.  3.  Worsening platelet count since initiation of Jakafi.  4.  Atrial fibrillation/chronic anticoagulation.    Plan:  1.  Hold Jakafi.  2.  Transfuse 2 units packed cells.  3.  Return to clinic in 2 weeks with interval CBC, CMP, LDH, magnesium.    This note was created using voice recognition software and may contain grammatical errors.

## 2020-07-20 ENCOUNTER — LAB VISIT (OUTPATIENT)
Dept: LAB | Facility: HOSPITAL | Age: 80
End: 2020-07-20
Attending: FAMILY MEDICINE
Payer: MEDICARE

## 2020-07-20 DIAGNOSIS — E78.5 HYPERLIPIDEMIA, UNSPECIFIED HYPERLIPIDEMIA TYPE: ICD-10-CM

## 2020-07-20 DIAGNOSIS — E03.9 ACQUIRED HYPOTHYROIDISM: ICD-10-CM

## 2020-07-20 LAB
ALBUMIN SERPL BCP-MCNC: 3.4 G/DL (ref 3.5–5.2)
ALP SERPL-CCNC: 135 U/L (ref 55–135)
ALT SERPL W/O P-5'-P-CCNC: 13 U/L (ref 10–44)
AST SERPL-CCNC: 15 U/L (ref 10–40)
BILIRUB DIRECT SERPL-MCNC: 0.4 MG/DL (ref 0.1–0.3)
BILIRUB SERPL-MCNC: 0.9 MG/DL (ref 0.1–1)
CHOLEST SERPL-MCNC: 157 MG/DL (ref 120–199)
CHOLEST/HDLC SERPL: 7.9 {RATIO} (ref 2–5)
HDLC SERPL-MCNC: 20 MG/DL (ref 40–75)
HDLC SERPL: 12.7 % (ref 20–50)
LDLC SERPL CALC-MCNC: 112.8 MG/DL (ref 63–159)
NONHDLC SERPL-MCNC: 137 MG/DL
PROT SERPL-MCNC: 7.2 G/DL (ref 6–8.4)
TRIGL SERPL-MCNC: 121 MG/DL (ref 30–150)
TSH SERPL DL<=0.005 MIU/L-ACNC: 2.06 UIU/ML (ref 0.4–4)

## 2020-07-20 PROCEDURE — 80076 HEPATIC FUNCTION PANEL: CPT

## 2020-07-20 PROCEDURE — 84443 ASSAY THYROID STIM HORMONE: CPT

## 2020-07-20 PROCEDURE — 80061 LIPID PANEL: CPT

## 2020-07-20 PROCEDURE — 36415 COLL VENOUS BLD VENIPUNCTURE: CPT | Mod: PO

## 2020-07-21 RX ORDER — ROSUVASTATIN CALCIUM 40 MG/1
40 TABLET, COATED ORAL NIGHTLY
Qty: 90 TABLET | Refills: 3 | Status: SHIPPED | OUTPATIENT
Start: 2020-07-21 | End: 2021-01-28 | Stop reason: SINTOL

## 2020-07-21 NOTE — TELEPHONE ENCOUNTER
----- Message from Yaniv Escobar MD sent at 7/20/2020  8:50 PM CDT -----  His thyroid level is normal, it is almost right in the middle of the normal range.  No changes are needed here.    The cholesterol level continues to climb, is he taking the Crestor?  There is no sign of problems with the liver.    Result sent by e-mail

## 2020-07-22 ENCOUNTER — TELEPHONE (OUTPATIENT)
Dept: FAMILY MEDICINE | Facility: CLINIC | Age: 80
End: 2020-07-22

## 2020-07-22 ENCOUNTER — LAB VISIT (OUTPATIENT)
Dept: LAB | Facility: HOSPITAL | Age: 80
End: 2020-07-22
Attending: INTERNAL MEDICINE
Payer: MEDICARE

## 2020-07-22 ENCOUNTER — OFFICE VISIT (OUTPATIENT)
Dept: HEMATOLOGY/ONCOLOGY | Facility: CLINIC | Age: 80
End: 2020-07-22
Payer: MEDICARE

## 2020-07-22 VITALS
OXYGEN SATURATION: 99 % | HEART RATE: 86 BPM | SYSTOLIC BLOOD PRESSURE: 161 MMHG | TEMPERATURE: 98 F | WEIGHT: 172.81 LBS | DIASTOLIC BLOOD PRESSURE: 80 MMHG | RESPIRATION RATE: 18 BRPM | BODY MASS INDEX: 27.07 KG/M2

## 2020-07-22 DIAGNOSIS — Z79.01 ANTICOAGULANT LONG-TERM USE: ICD-10-CM

## 2020-07-22 DIAGNOSIS — D61.818 PANCYTOPENIA: ICD-10-CM

## 2020-07-22 DIAGNOSIS — D45 POLYCYTHEMIA VERA: Primary | ICD-10-CM

## 2020-07-22 DIAGNOSIS — I48.0 PAROXYSMAL ATRIAL FIBRILLATION: ICD-10-CM

## 2020-07-22 DIAGNOSIS — D64.9 SYMPTOMATIC ANEMIA: ICD-10-CM

## 2020-07-22 DIAGNOSIS — D45 POLYCYTHEMIA VERA: ICD-10-CM

## 2020-07-22 LAB
ABO + RH BLD: NORMAL
ALBUMIN SERPL BCP-MCNC: 4.1 G/DL (ref 3.5–5.2)
ALP SERPL-CCNC: 127 U/L (ref 38–145)
ALT SERPL W/O P-5'-P-CCNC: 15 U/L (ref 0–50)
ANION GAP SERPL CALC-SCNC: 5 MMOL/L (ref 8–16)
ANISOCYTOSIS BLD QL SMEAR: SLIGHT
AST SERPL-CCNC: 26 U/L (ref 17–59)
BASOPHILS # BLD AUTO: ABNORMAL K/UL (ref 0–0.2)
BASOPHILS NFR BLD: 3 % (ref 0–1.9)
BILIRUB SERPL-MCNC: 0.9 MG/DL (ref 0.2–1.3)
BLD GP AB SCN CELLS X3 SERPL QL: NORMAL
BUN SERPL-MCNC: 14 MG/DL (ref 9–21)
CALCIUM SERPL-MCNC: 9.6 MG/DL (ref 8.4–10.2)
CHLORIDE SERPL-SCNC: 107 MMOL/L (ref 95–110)
CO2 SERPL-SCNC: 27 MMOL/L (ref 22–31)
CREAT SERPL-MCNC: 1.09 MG/DL (ref 0.5–1.4)
DACRYOCYTES BLD QL SMEAR: ABNORMAL
DIFFERENTIAL METHOD: ABNORMAL
EOSINOPHIL # BLD AUTO: ABNORMAL K/UL (ref 0–0.5)
EOSINOPHIL NFR BLD: 3 % (ref 0–8)
ERYTHROCYTE [DISTWIDTH] IN BLOOD BY AUTOMATED COUNT: 16.5 % (ref 11.5–14.5)
EST. GFR  (AFRICAN AMERICAN): >60 ML/MIN/1.73 M^2
EST. GFR  (NON AFRICAN AMERICAN): >60 ML/MIN/1.73 M^2
GLUCOSE SERPL-MCNC: 97 MG/DL (ref 70–110)
HCT VFR BLD AUTO: 31.2 % (ref 40–54)
HGB BLD-MCNC: 9.3 G/DL (ref 14–18)
HYPOCHROMIA BLD QL SMEAR: ABNORMAL
IMM GRANULOCYTES # BLD AUTO: ABNORMAL K/UL (ref 0–0.04)
IMM GRANULOCYTES NFR BLD AUTO: ABNORMAL % (ref 0–0.5)
LDH SERPL L TO P-CCNC: 973 U/L (ref 313–618)
LYMPHOCYTES # BLD AUTO: ABNORMAL K/UL (ref 1–4.8)
LYMPHOCYTES NFR BLD: 27 % (ref 18–48)
MAGNESIUM SERPL-MCNC: 2.2 MG/DL (ref 1.6–2.6)
MCH RBC QN AUTO: 27.4 PG (ref 27–31)
MCHC RBC AUTO-ENTMCNC: 29.8 G/DL (ref 32–36)
MCV RBC AUTO: 92 FL (ref 82–98)
METAMYELOCYTES NFR BLD MANUAL: 5 %
MONOCYTES # BLD AUTO: ABNORMAL K/UL (ref 0.3–1)
MONOCYTES NFR BLD: 5 % (ref 4–15)
MYELOCYTES NFR BLD MANUAL: 2 %
NEUTROPHILS # BLD AUTO: 1.6 K/UL (ref 1.8–7.7)
NEUTROPHILS NFR BLD: 45 % (ref 38–73)
NEUTS BAND NFR BLD MANUAL: 6 %
NRBC BLD-RTO: 11 /100 WBC
OVALOCYTES BLD QL SMEAR: ABNORMAL
PLATELET # BLD AUTO: 45 K/UL (ref 150–350)
PLATELET BLD QL SMEAR: ABNORMAL
PMV BLD AUTO: ABNORMAL FL (ref 9.2–12.9)
POIKILOCYTOSIS BLD QL SMEAR: SLIGHT
POLYCHROMASIA BLD QL SMEAR: ABNORMAL
POTASSIUM SERPL-SCNC: 4.2 MMOL/L (ref 3.5–5.1)
PROT SERPL-MCNC: 7.5 G/DL (ref 6–8.4)
RBC # BLD AUTO: 3.4 M/UL (ref 4.6–6.2)
SODIUM SERPL-SCNC: 139 MMOL/L (ref 136–145)
WBC # BLD AUTO: 3.1 K/UL (ref 3.9–12.7)
WBC OTHER NFR BLD MANUAL: 4 %

## 2020-07-22 PROCEDURE — 99213 OFFICE O/P EST LOW 20 MIN: CPT | Mod: S$GLB,,, | Performed by: INTERNAL MEDICINE

## 2020-07-22 PROCEDURE — 1101F PR PT FALLS ASSESS DOC 0-1 FALLS W/OUT INJ PAST YR: ICD-10-PCS | Mod: CPTII,S$GLB,, | Performed by: INTERNAL MEDICINE

## 2020-07-22 PROCEDURE — 83735 ASSAY OF MAGNESIUM: CPT | Mod: PN

## 2020-07-22 PROCEDURE — 99999 PR PBB SHADOW E&M-EST. PATIENT-LVL IV: CPT | Mod: PBBFAC,,, | Performed by: INTERNAL MEDICINE

## 2020-07-22 PROCEDURE — 85007 BL SMEAR W/DIFF WBC COUNT: CPT

## 2020-07-22 PROCEDURE — 1101F PT FALLS ASSESS-DOCD LE1/YR: CPT | Mod: CPTII,S$GLB,, | Performed by: INTERNAL MEDICINE

## 2020-07-22 PROCEDURE — 85007 BL SMEAR W/DIFF WBC COUNT: CPT | Mod: PN

## 2020-07-22 PROCEDURE — 1126F AMNT PAIN NOTED NONE PRSNT: CPT | Mod: S$GLB,,, | Performed by: INTERNAL MEDICINE

## 2020-07-22 PROCEDURE — 3077F SYST BP >= 140 MM HG: CPT | Mod: CPTII,S$GLB,, | Performed by: INTERNAL MEDICINE

## 2020-07-22 PROCEDURE — 86850 RBC ANTIBODY SCREEN: CPT | Mod: PN

## 2020-07-22 PROCEDURE — 86850 RBC ANTIBODY SCREEN: CPT

## 2020-07-22 PROCEDURE — 3079F PR MOST RECENT DIASTOLIC BLOOD PRESSURE 80-89 MM HG: ICD-10-PCS | Mod: CPTII,S$GLB,, | Performed by: INTERNAL MEDICINE

## 2020-07-22 PROCEDURE — 99213 PR OFFICE/OUTPT VISIT, EST, LEVL III, 20-29 MIN: ICD-10-PCS | Mod: S$GLB,,, | Performed by: INTERNAL MEDICINE

## 2020-07-22 PROCEDURE — 83615 LACTATE (LD) (LDH) ENZYME: CPT | Mod: PN

## 2020-07-22 PROCEDURE — 1159F MED LIST DOCD IN RCRD: CPT | Mod: S$GLB,,, | Performed by: INTERNAL MEDICINE

## 2020-07-22 PROCEDURE — 80053 COMPREHEN METABOLIC PANEL: CPT

## 2020-07-22 PROCEDURE — 85027 COMPLETE CBC AUTOMATED: CPT

## 2020-07-22 PROCEDURE — 85027 COMPLETE CBC AUTOMATED: CPT | Mod: PN

## 2020-07-22 PROCEDURE — 1126F PR PAIN SEVERITY QUANTIFIED, NO PAIN PRESENT: ICD-10-PCS | Mod: S$GLB,,, | Performed by: INTERNAL MEDICINE

## 2020-07-22 PROCEDURE — 83735 ASSAY OF MAGNESIUM: CPT

## 2020-07-22 PROCEDURE — 80053 COMPREHEN METABOLIC PANEL: CPT | Mod: PN

## 2020-07-22 PROCEDURE — 36415 COLL VENOUS BLD VENIPUNCTURE: CPT | Mod: PN

## 2020-07-22 PROCEDURE — 3079F DIAST BP 80-89 MM HG: CPT | Mod: CPTII,S$GLB,, | Performed by: INTERNAL MEDICINE

## 2020-07-22 PROCEDURE — 3077F PR MOST RECENT SYSTOLIC BLOOD PRESSURE >= 140 MM HG: ICD-10-PCS | Mod: CPTII,S$GLB,, | Performed by: INTERNAL MEDICINE

## 2020-07-22 PROCEDURE — 1159F PR MEDICATION LIST DOCUMENTED IN MEDICAL RECORD: ICD-10-PCS | Mod: S$GLB,,, | Performed by: INTERNAL MEDICINE

## 2020-07-22 PROCEDURE — 83615 LACTATE (LD) (LDH) ENZYME: CPT

## 2020-07-22 PROCEDURE — 99999 PR PBB SHADOW E&M-EST. PATIENT-LVL IV: ICD-10-PCS | Mod: PBBFAC,,, | Performed by: INTERNAL MEDICINE

## 2020-07-22 NOTE — TELEPHONE ENCOUNTER
Patient notified he should get rid of the Atorvastatin and Rosuvastatin 20 mg so he does not get mixed up and take them by accident. He should be taking Rosuvastatin 40mg daily.

## 2020-07-22 NOTE — TELEPHONE ENCOUNTER
----- Message from Rose Krause sent at 7/22/2020  4:06 PM CDT -----  Regarding: advice  Contact: Wife/Xi/[phone] (home)  Type: Needs Medical Advice  Who Called:  Wife/Xi/[phone] (home)     Additional Information: Needs Hien to call them so that she can go over his medication list to see which medications that patient is supposed to be taking. Please call to advise.

## 2020-07-22 NOTE — Clinical Note
No blood products at this time.  Resume Jakafi 5 mg daily.  Return to clinic in 1 week with interval CBC

## 2020-07-22 NOTE — PROGRESS NOTES
History of present illness:  The patient is a 79-year-old white gentleman well known to me for polycythemia vera which has been managed with Hydrea and intermittent phlebotomy.  Patient was recently hospitalized and found to have developed pancytopenia.  Hemoglobin was in the 5s and responded well to transfusion of 2 units of packed red blood cells.  Interval bone marrow has been obtained and reveals fibrotic/spent bone marrow.  Blast percentage was not increased.  Patient has started Jakafi 10 mg twice daily, developed thrombocytopenia, and had dose reduced to 5 mg twice daily.  Unfortunately, patient's platelet count continued to fall Jakafi was eventually held.  Patient returns to review interval CBC.  Patient is fatigued and dyspneic on exertion.  No signs of blood loss.  No other new complaints for pertinent findings on a 14 point review systems.    Physical examination:  Well-developed, well-nourished, white male in no acute distress, who has a weight of 174 lb (increased by 3.5 lb).  VITAL SIGNS: Documented  and reviewed this visit.  HEENT: Normocephalic, atraumatic. Oral mucosa pink and moist. Lips without   lesions. Tongue midline. Oropharynx clear. Nonicteric sclerae.   NECK: Supple, no adenopathy. No carotid bruits, thyromegaly or thyroid nodule.   HEART: Regular rate and rhythm without murmur, gallop or rub.   LUNGS: Clear to auscultation bilaterally. Normal respiratory effort.   ABDOMEN: Soft, nontender, nondistended with positive normoactive bowel sounds,   no hepatosplenomegaly.   EXTREMITIES: No cyanosis, clubbing or edema. Distal pulses are intact.   AXILLAE AND GROIN: No palpable pathologic lymphadenopathy is appreciated.   SKIN: Intact/turgor normal   NEUROLOGIC: Cranial nerves II-XII grossly intact. Motor: Good muscle bulk and   tone. Strength/sensory 5/5 throughout. Gait stable.     Laboratory:  White count 4, hemoglobin 6.7, hematocrit 23, platelets 31, absolute neutrophil count 2000.  Patient  exhibits 6% circulating blasts.    Bone marrow aspiration and biopsy:  Study performed in May was remarkable for severe fibrosis in approximately 5% blasts.  Flow cytometry was negative.  Chromosomes and FISH panel remain unresulted in epic.    Impression:  1.  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  2.  Pancytopenia secondary to number 1.  3.  Worsening platelet count since initiation of Jakafi-improved since therapy held.  4.  Atrial fibrillation/chronic anticoagulation.  5.  Anemia improved following transfusion.    Plan:  1.  Resume Jakafi 5 mg daily.  2.  Return to clinic in 1 week with interval CBC.    This note was created using voice recognition software and may contain grammatical errors.

## 2020-07-23 LAB — PATH REV BLD -IMP: NORMAL

## 2020-07-27 DIAGNOSIS — D45 POLYCYTHEMIA VERA: ICD-10-CM

## 2020-07-28 DIAGNOSIS — D75.838 SECONDARY THROMBOCYTOSIS: ICD-10-CM

## 2020-07-28 DIAGNOSIS — D64.9 SYMPTOMATIC ANEMIA: Primary | ICD-10-CM

## 2020-07-29 ENCOUNTER — LAB VISIT (OUTPATIENT)
Dept: LAB | Facility: HOSPITAL | Age: 80
End: 2020-07-29
Attending: INTERNAL MEDICINE
Payer: MEDICARE

## 2020-07-29 ENCOUNTER — OFFICE VISIT (OUTPATIENT)
Dept: HEMATOLOGY/ONCOLOGY | Facility: CLINIC | Age: 80
End: 2020-07-29
Payer: MEDICARE

## 2020-07-29 VITALS
TEMPERATURE: 98 F | BODY MASS INDEX: 26.64 KG/M2 | WEIGHT: 169.75 LBS | DIASTOLIC BLOOD PRESSURE: 67 MMHG | RESPIRATION RATE: 12 BRPM | HEIGHT: 67 IN | SYSTOLIC BLOOD PRESSURE: 128 MMHG | OXYGEN SATURATION: 98 % | HEART RATE: 78 BPM

## 2020-07-29 DIAGNOSIS — I48.0 PAROXYSMAL ATRIAL FIBRILLATION: ICD-10-CM

## 2020-07-29 DIAGNOSIS — D64.9 SYMPTOMATIC ANEMIA: ICD-10-CM

## 2020-07-29 DIAGNOSIS — D75.838 SECONDARY THROMBOCYTOSIS: ICD-10-CM

## 2020-07-29 DIAGNOSIS — D45 POLYCYTHEMIA VERA: ICD-10-CM

## 2020-07-29 DIAGNOSIS — Z79.01 ANTICOAGULANT LONG-TERM USE: ICD-10-CM

## 2020-07-29 DIAGNOSIS — D45 POLYCYTHEMIA VERA: Primary | ICD-10-CM

## 2020-07-29 DIAGNOSIS — D61.818 PANCYTOPENIA: ICD-10-CM

## 2020-07-29 DIAGNOSIS — D64.9 SYMPTOMATIC ANEMIA: Primary | ICD-10-CM

## 2020-07-29 LAB
ABO + RH BLD: NORMAL
ANISOCYTOSIS BLD QL SMEAR: SLIGHT
BASOPHILS NFR BLD: 1 % (ref 0–1.9)
BLASTS NFR BLD MANUAL: 6 %
BLD GP AB SCN CELLS X3 SERPL QL: NORMAL
DACRYOCYTES BLD QL SMEAR: ABNORMAL
DIFFERENTIAL METHOD: ABNORMAL
EOSINOPHIL NFR BLD: 3 % (ref 0–8)
ERYTHROCYTE [DISTWIDTH] IN BLOOD BY AUTOMATED COUNT: 16.8 % (ref 11.5–14.5)
HCT VFR BLD AUTO: 30.9 % (ref 40–54)
HGB BLD-MCNC: 9.4 G/DL (ref 14–18)
HYPOCHROMIA BLD QL SMEAR: ABNORMAL
IMM GRANULOCYTES # BLD AUTO: ABNORMAL K/UL (ref 0–0.04)
IMM GRANULOCYTES NFR BLD AUTO: ABNORMAL % (ref 0–0.5)
LYMPHOCYTES NFR BLD: 29 % (ref 18–48)
MCH RBC QN AUTO: 27.6 PG (ref 27–31)
MCHC RBC AUTO-ENTMCNC: 30.4 G/DL (ref 32–36)
MCV RBC AUTO: 91 FL (ref 82–98)
METAMYELOCYTES NFR BLD MANUAL: 6 %
MONOCYTES NFR BLD: 12 % (ref 4–15)
MYELOCYTES NFR BLD MANUAL: 4 %
NEUTROPHILS # BLD AUTO: 1.2 K/UL (ref 1.8–7.7)
NEUTROPHILS NFR BLD: 32 % (ref 38–73)
NEUTS BAND NFR BLD MANUAL: 7 %
NRBC BLD-RTO: 10 /100 WBC
OVALOCYTES BLD QL SMEAR: ABNORMAL
PLATELET # BLD AUTO: 93 K/UL (ref 150–350)
PMV BLD AUTO: 12.1 FL (ref 9.2–12.9)
POIKILOCYTOSIS BLD QL SMEAR: SLIGHT
POLYCHROMASIA BLD QL SMEAR: ABNORMAL
RBC # BLD AUTO: 3.41 M/UL (ref 4.6–6.2)
WBC # BLD AUTO: 3.12 K/UL (ref 3.9–12.7)
WBC OTHER NFR BLD MANUAL: 0 %

## 2020-07-29 PROCEDURE — 3078F PR MOST RECENT DIASTOLIC BLOOD PRESSURE < 80 MM HG: ICD-10-PCS | Mod: CPTII,S$GLB,, | Performed by: INTERNAL MEDICINE

## 2020-07-29 PROCEDURE — 1126F PR PAIN SEVERITY QUANTIFIED, NO PAIN PRESENT: ICD-10-PCS | Mod: S$GLB,,, | Performed by: INTERNAL MEDICINE

## 2020-07-29 PROCEDURE — 3074F SYST BP LT 130 MM HG: CPT | Mod: CPTII,S$GLB,, | Performed by: INTERNAL MEDICINE

## 2020-07-29 PROCEDURE — 99213 PR OFFICE/OUTPT VISIT, EST, LEVL III, 20-29 MIN: ICD-10-PCS | Mod: S$GLB,,, | Performed by: INTERNAL MEDICINE

## 2020-07-29 PROCEDURE — 36415 COLL VENOUS BLD VENIPUNCTURE: CPT | Mod: PN

## 2020-07-29 PROCEDURE — 85007 BL SMEAR W/DIFF WBC COUNT: CPT | Mod: PN

## 2020-07-29 PROCEDURE — 3074F PR MOST RECENT SYSTOLIC BLOOD PRESSURE < 130 MM HG: ICD-10-PCS | Mod: CPTII,S$GLB,, | Performed by: INTERNAL MEDICINE

## 2020-07-29 PROCEDURE — 86850 RBC ANTIBODY SCREEN: CPT

## 2020-07-29 PROCEDURE — 86850 RBC ANTIBODY SCREEN: CPT | Mod: PN

## 2020-07-29 PROCEDURE — 1101F PR PT FALLS ASSESS DOC 0-1 FALLS W/OUT INJ PAST YR: ICD-10-PCS | Mod: CPTII,S$GLB,, | Performed by: INTERNAL MEDICINE

## 2020-07-29 PROCEDURE — 1101F PT FALLS ASSESS-DOCD LE1/YR: CPT | Mod: CPTII,S$GLB,, | Performed by: INTERNAL MEDICINE

## 2020-07-29 PROCEDURE — 85007 BL SMEAR W/DIFF WBC COUNT: CPT

## 2020-07-29 PROCEDURE — 1126F AMNT PAIN NOTED NONE PRSNT: CPT | Mod: S$GLB,,, | Performed by: INTERNAL MEDICINE

## 2020-07-29 PROCEDURE — 1159F MED LIST DOCD IN RCRD: CPT | Mod: S$GLB,,, | Performed by: INTERNAL MEDICINE

## 2020-07-29 PROCEDURE — 99999 PR PBB SHADOW E&M-EST. PATIENT-LVL IV: ICD-10-PCS | Mod: PBBFAC,,, | Performed by: INTERNAL MEDICINE

## 2020-07-29 PROCEDURE — 85027 COMPLETE CBC AUTOMATED: CPT | Mod: PN

## 2020-07-29 PROCEDURE — 85027 COMPLETE CBC AUTOMATED: CPT

## 2020-07-29 PROCEDURE — 1159F PR MEDICATION LIST DOCUMENTED IN MEDICAL RECORD: ICD-10-PCS | Mod: S$GLB,,, | Performed by: INTERNAL MEDICINE

## 2020-07-29 PROCEDURE — 99999 PR PBB SHADOW E&M-EST. PATIENT-LVL IV: CPT | Mod: PBBFAC,,, | Performed by: INTERNAL MEDICINE

## 2020-07-29 PROCEDURE — 3078F DIAST BP <80 MM HG: CPT | Mod: CPTII,S$GLB,, | Performed by: INTERNAL MEDICINE

## 2020-07-29 PROCEDURE — 99213 OFFICE O/P EST LOW 20 MIN: CPT | Mod: S$GLB,,, | Performed by: INTERNAL MEDICINE

## 2020-07-29 NOTE — PROGRESS NOTES
History of present illness:  The patient is a 79-year-old white gentleman well known to me for polycythemia vera which has been managed with Hydrea and intermittent phlebotomy.  Patient was recently hospitalized and found to have developed pancytopenia.  Hemoglobin was in the 5s and responded well to transfusion of 2 units of packed red blood cells.  Interval bone marrow has been obtained and reveals fibrotic/spent bone marrow.  Blast percentage was not increased.  Patient has started Jakafi 10 mg twice daily, developed thrombocytopenia, and had dose reduced to 5 mg twice daily.  Unfortunately, patient's platelet count continued to fall Jakafi was eventually held.  Patient is now on Jakafi 5 mg daily and returns to review interval lab.  No other new complaints for pertinent findings on a 10 point review systems.    Physical examination:  Well-developed, well-nourished, white male in no acute distress, who has a weight of 1969.5 lb (decreased by 4.5 lb).  VITAL SIGNS: Documented  and reviewed this visit.  HEENT: Normocephalic, atraumatic. Oral mucosa pink and moist. Lips without   lesions. Tongue midline. Oropharynx clear. Nonicteric sclerae.   NECK: Supple, no adenopathy. No carotid bruits, thyromegaly or thyroid nodule.   HEART: Regular rate and rhythm without murmur, gallop or rub.   LUNGS: Clear to auscultation bilaterally. Normal respiratory effort.   ABDOMEN: Soft, nontender, nondistended with positive normoactive bowel sounds,   no hepatosplenomegaly.   EXTREMITIES: No cyanosis, clubbing or edema. Distal pulses are intact.     Laboratory:  White count 3.1, hemoglobin 9.4, hematocrit 30.9, platelets 93, absolute neutrophil count 1200.    Impression:  1.  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  2.  Pancytopenia secondary to number 1.  3.  Worsening platelet count since initiation of Jakafi-improved since therapy held.  4.  Atrial fibrillation/chronic anticoagulation.  5.  Anemia improved following  transfusion.    Plan:  1.  Continue Jakafi 5 mg daily.  2.  Return to clinic in 2 weeks with interval CBC.    This note was created using voice recognition software and may contain grammatical errors.

## 2020-08-05 ENCOUNTER — TELEPHONE (OUTPATIENT)
Dept: PHARMACY | Facility: CLINIC | Age: 80
End: 2020-08-05

## 2020-08-12 ENCOUNTER — OFFICE VISIT (OUTPATIENT)
Dept: HEMATOLOGY/ONCOLOGY | Facility: CLINIC | Age: 80
End: 2020-08-12
Payer: MEDICARE

## 2020-08-12 ENCOUNTER — TELEPHONE (OUTPATIENT)
Dept: PHARMACY | Facility: CLINIC | Age: 80
End: 2020-08-12

## 2020-08-12 ENCOUNTER — LAB VISIT (OUTPATIENT)
Dept: LAB | Facility: HOSPITAL | Age: 80
End: 2020-08-12
Attending: INTERNAL MEDICINE
Payer: MEDICARE

## 2020-08-12 VITALS
OXYGEN SATURATION: 99 % | SYSTOLIC BLOOD PRESSURE: 124 MMHG | BODY MASS INDEX: 26.9 KG/M2 | DIASTOLIC BLOOD PRESSURE: 72 MMHG | TEMPERATURE: 98 F | RESPIRATION RATE: 20 BRPM | WEIGHT: 171.75 LBS | HEART RATE: 82 BPM

## 2020-08-12 DIAGNOSIS — D64.9 SYMPTOMATIC ANEMIA: ICD-10-CM

## 2020-08-12 DIAGNOSIS — D45 POLYCYTHEMIA VERA: Primary | ICD-10-CM

## 2020-08-12 DIAGNOSIS — D45 POLYCYTHEMIA VERA: ICD-10-CM

## 2020-08-12 DIAGNOSIS — D61.818 PANCYTOPENIA: ICD-10-CM

## 2020-08-12 LAB
ABO + RH BLD: NORMAL
ANISOCYTOSIS BLD QL SMEAR: SLIGHT
BASOPHILS NFR BLD: 0 % (ref 0–1.9)
BLASTS NFR BLD MANUAL: 8 %
BLD GP AB SCN CELLS X3 SERPL QL: NORMAL
DACRYOCYTES BLD QL SMEAR: ABNORMAL
DIFFERENTIAL METHOD: ABNORMAL
EOSINOPHIL NFR BLD: 3 % (ref 0–8)
ERYTHROCYTE [DISTWIDTH] IN BLOOD BY AUTOMATED COUNT: 17.3 % (ref 11.5–14.5)
GIANT PLATELETS BLD QL SMEAR: PRESENT
HCT VFR BLD AUTO: 30.1 % (ref 40–54)
HGB BLD-MCNC: 8.9 G/DL (ref 14–18)
HYPOCHROMIA BLD QL SMEAR: ABNORMAL
IMM GRANULOCYTES # BLD AUTO: ABNORMAL K/UL (ref 0–0.04)
IMM GRANULOCYTES NFR BLD AUTO: ABNORMAL % (ref 0–0.5)
LYMPHOCYTES NFR BLD: 30 % (ref 18–48)
MCH RBC QN AUTO: 26.5 PG (ref 27–31)
MCHC RBC AUTO-ENTMCNC: 29.6 G/DL (ref 32–36)
MCV RBC AUTO: 90 FL (ref 82–98)
METAMYELOCYTES NFR BLD MANUAL: 4 %
MONOCYTES NFR BLD: 11 % (ref 4–15)
MYELOCYTES NFR BLD MANUAL: 7 %
NEUTROPHILS # BLD AUTO: 1.2 K/UL (ref 1.8–7.7)
NEUTROPHILS NFR BLD: 31 % (ref 38–73)
NEUTS BAND NFR BLD MANUAL: 6 %
NRBC BLD-RTO: 13 /100 WBC
OVALOCYTES BLD QL SMEAR: ABNORMAL
PLATELET # BLD AUTO: 80 K/UL (ref 150–350)
PMV BLD AUTO: 10.3 FL (ref 9.2–12.9)
POIKILOCYTOSIS BLD QL SMEAR: SLIGHT
POLYCHROMASIA BLD QL SMEAR: ABNORMAL
RBC # BLD AUTO: 3.36 M/UL (ref 4.6–6.2)
WBC # BLD AUTO: 3.35 K/UL (ref 3.9–12.7)

## 2020-08-12 PROCEDURE — 86901 BLOOD TYPING SEROLOGIC RH(D): CPT | Mod: PN

## 2020-08-12 PROCEDURE — 99999 PR PBB SHADOW E&M-EST. PATIENT-LVL IV: CPT | Mod: PBBFAC,,, | Performed by: INTERNAL MEDICINE

## 2020-08-12 PROCEDURE — 1126F AMNT PAIN NOTED NONE PRSNT: CPT | Mod: S$GLB,,, | Performed by: INTERNAL MEDICINE

## 2020-08-12 PROCEDURE — 36415 COLL VENOUS BLD VENIPUNCTURE: CPT | Mod: PN

## 2020-08-12 PROCEDURE — 85007 BL SMEAR W/DIFF WBC COUNT: CPT

## 2020-08-12 PROCEDURE — 85027 COMPLETE CBC AUTOMATED: CPT | Mod: PN

## 2020-08-12 PROCEDURE — 3074F SYST BP LT 130 MM HG: CPT | Mod: CPTII,S$GLB,, | Performed by: INTERNAL MEDICINE

## 2020-08-12 PROCEDURE — 99999 PR PBB SHADOW E&M-EST. PATIENT-LVL IV: ICD-10-PCS | Mod: PBBFAC,,, | Performed by: INTERNAL MEDICINE

## 2020-08-12 PROCEDURE — 3078F DIAST BP <80 MM HG: CPT | Mod: CPTII,S$GLB,, | Performed by: INTERNAL MEDICINE

## 2020-08-12 PROCEDURE — 1159F MED LIST DOCD IN RCRD: CPT | Mod: S$GLB,,, | Performed by: INTERNAL MEDICINE

## 2020-08-12 PROCEDURE — 1101F PR PT FALLS ASSESS DOC 0-1 FALLS W/OUT INJ PAST YR: ICD-10-PCS | Mod: CPTII,S$GLB,, | Performed by: INTERNAL MEDICINE

## 2020-08-12 PROCEDURE — 85027 COMPLETE CBC AUTOMATED: CPT

## 2020-08-12 PROCEDURE — 1101F PT FALLS ASSESS-DOCD LE1/YR: CPT | Mod: CPTII,S$GLB,, | Performed by: INTERNAL MEDICINE

## 2020-08-12 PROCEDURE — 3078F PR MOST RECENT DIASTOLIC BLOOD PRESSURE < 80 MM HG: ICD-10-PCS | Mod: CPTII,S$GLB,, | Performed by: INTERNAL MEDICINE

## 2020-08-12 PROCEDURE — 99213 OFFICE O/P EST LOW 20 MIN: CPT | Mod: S$GLB,,, | Performed by: INTERNAL MEDICINE

## 2020-08-12 PROCEDURE — 1159F PR MEDICATION LIST DOCUMENTED IN MEDICAL RECORD: ICD-10-PCS | Mod: S$GLB,,, | Performed by: INTERNAL MEDICINE

## 2020-08-12 PROCEDURE — 99213 PR OFFICE/OUTPT VISIT, EST, LEVL III, 20-29 MIN: ICD-10-PCS | Mod: S$GLB,,, | Performed by: INTERNAL MEDICINE

## 2020-08-12 PROCEDURE — 86901 BLOOD TYPING SEROLOGIC RH(D): CPT

## 2020-08-12 PROCEDURE — 3074F PR MOST RECENT SYSTOLIC BLOOD PRESSURE < 130 MM HG: ICD-10-PCS | Mod: CPTII,S$GLB,, | Performed by: INTERNAL MEDICINE

## 2020-08-12 PROCEDURE — 85007 BL SMEAR W/DIFF WBC COUNT: CPT | Mod: PN

## 2020-08-12 PROCEDURE — 1126F PR PAIN SEVERITY QUANTIFIED, NO PAIN PRESENT: ICD-10-PCS | Mod: S$GLB,,, | Performed by: INTERNAL MEDICINE

## 2020-08-12 NOTE — PROGRESS NOTES
History of present illness:  The patient is a 79-year-old white gentleman well known to me for polycythemia vera which has been managed with Hydrea and intermittent phlebotomy.  Patient was recently hospitalized and found to have developed pancytopenia.  Hemoglobin was in the 5s and responded well to transfusion of 2 units of packed red blood cells.  Interval bone marrow has been obtained and reveals fibrotic/spent bone marrow.  Blast percentage was not increased.  Patient has started Jakafi 10 mg twice daily, developed thrombocytopenia, and had dose reduced to 5 mg twice daily.  Unfortunately, patient's platelet count continued to fall Jakafi was eventually held.  Patient is now on Jakafi 5 mg daily and returns to review interval lab.  Patient feels as though he has good energy and has no complaints since resumption of Jakafi.  No other new complaints for pertinent findings on a 10 point review systems.    Physical examination:  Well-developed, well-nourished, white male in no acute distress, who has a weight of 171.5 lb (increased by 2 lb).  VITAL SIGNS: Documented  and reviewed this visit.  HEENT: Normocephalic, atraumatic. Oral mucosa pink and moist. Lips without   lesions. Tongue midline. Oropharynx clear. Nonicteric sclerae.   NECK: Supple, no adenopathy. No carotid bruits, thyromegaly or thyroid nodule.   HEART: Regular rate and rhythm without murmur, gallop or rub.   LUNGS: Clear to auscultation bilaterally. Normal respiratory effort.   ABDOMEN: Soft, nontender, nondistended with positive normoactive bowel sounds,   no hepatosplenomegaly.   EXTREMITIES: No cyanosis, clubbing or edema. Distal pulses are intact.     Laboratory:  White count 3.4, hemoglobin 8.9, hematocrit 30.1, platelets 80.    Impression:  1.  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  2.  Pancytopenia secondary to number 1.  3.  Worsening platelet count since initiation of Jakafi-improved since therapy held.  4.  Atrial fibrillation/chronic  anticoagulation.  5.  Anemia improved following transfusion.    Plan:  1.  Continue Jakafi 5 mg daily.  2.  Return to clinic in 4 weeks with interval CBC, CMP, LDH, and magnesium.    This note was created using voice recognition software and may contain grammatical errors.

## 2020-08-25 DIAGNOSIS — D64.9 SYMPTOMATIC ANEMIA: Primary | ICD-10-CM

## 2020-09-03 ENCOUNTER — LAB VISIT (OUTPATIENT)
Dept: LAB | Facility: HOSPITAL | Age: 80
End: 2020-09-03
Attending: INTERNAL MEDICINE
Payer: MEDICARE

## 2020-09-03 DIAGNOSIS — D45 CHRONIC ERYTHREMIA IN REMISSION: ICD-10-CM

## 2020-09-03 DIAGNOSIS — I48.20 CHRONIC ATRIAL FIBRILLATION: Primary | ICD-10-CM

## 2020-09-03 DIAGNOSIS — I10 ESSENTIAL HYPERTENSION, MALIGNANT: ICD-10-CM

## 2020-09-03 DIAGNOSIS — N40.0 BENIGN ENLARGEMENT OF PROSTATE: ICD-10-CM

## 2020-09-03 DIAGNOSIS — N18.30 CHRONIC KIDNEY DISEASE, STAGE III (MODERATE): ICD-10-CM

## 2020-09-03 LAB
ALBUMIN SERPL BCP-MCNC: 3.8 G/DL (ref 3.5–5.2)
ANION GAP SERPL CALC-SCNC: 7 MMOL/L (ref 8–16)
ANISOCYTOSIS BLD QL SMEAR: SLIGHT
BASOPHILS # BLD AUTO: ABNORMAL K/UL (ref 0–0.2)
BASOPHILS NFR BLD: 0 % (ref 0–1.9)
BILIRUB UR QL STRIP: NEGATIVE
BUN SERPL-MCNC: 11 MG/DL (ref 8–23)
CALCIUM SERPL-MCNC: 9.3 MG/DL (ref 8.7–10.5)
CHLORIDE SERPL-SCNC: 107 MMOL/L (ref 95–110)
CLARITY UR REFRACT.AUTO: CLEAR
CO2 SERPL-SCNC: 25 MMOL/L (ref 23–29)
COLOR UR AUTO: YELLOW
CREAT SERPL-MCNC: 1.2 MG/DL (ref 0.5–1.4)
CREAT UR-MCNC: 167 MG/DL (ref 23–375)
DACRYOCYTES BLD QL SMEAR: ABNORMAL
DIFFERENTIAL METHOD: ABNORMAL
EOSINOPHIL # BLD AUTO: ABNORMAL K/UL (ref 0–0.5)
EOSINOPHIL NFR BLD: 3 % (ref 0–8)
ERYTHROCYTE [DISTWIDTH] IN BLOOD BY AUTOMATED COUNT: 18.6 % (ref 11.5–14.5)
EST. GFR  (AFRICAN AMERICAN): >60 ML/MIN/1.73 M^2
EST. GFR  (NON AFRICAN AMERICAN): 57.2 ML/MIN/1.73 M^2
GLUCOSE SERPL-MCNC: 107 MG/DL (ref 70–110)
GLUCOSE UR QL STRIP: NEGATIVE
HCT VFR BLD AUTO: 30.4 % (ref 40–54)
HGB BLD-MCNC: 8.5 G/DL (ref 14–18)
HGB UR QL STRIP: NEGATIVE
HYPOCHROMIA BLD QL SMEAR: ABNORMAL
IMM GRANULOCYTES # BLD AUTO: ABNORMAL K/UL (ref 0–0.04)
IMM GRANULOCYTES NFR BLD AUTO: ABNORMAL % (ref 0–0.5)
KETONES UR QL STRIP: NEGATIVE
LEUKOCYTE ESTERASE UR QL STRIP: NEGATIVE
LYMPHOCYTES # BLD AUTO: ABNORMAL K/UL (ref 1–4.8)
LYMPHOCYTES NFR BLD: 33 % (ref 18–48)
MCH RBC QN AUTO: 25.9 PG (ref 27–31)
MCHC RBC AUTO-ENTMCNC: 28 G/DL (ref 32–36)
MCV RBC AUTO: 93 FL (ref 82–98)
METAMYELOCYTES NFR BLD MANUAL: 1 %
MONOCYTES # BLD AUTO: ABNORMAL K/UL (ref 0.3–1)
MONOCYTES NFR BLD: 5 % (ref 4–15)
MYELOCYTES NFR BLD MANUAL: 6 %
NEUTROPHILS NFR BLD: 43 % (ref 38–73)
NEUTS BAND NFR BLD MANUAL: 4 %
NITRITE UR QL STRIP: NEGATIVE
NRBC BLD-RTO: 13 /100 WBC
OVALOCYTES BLD QL SMEAR: ABNORMAL
PATH REV BLD -IMP: NORMAL
PH UR STRIP: 5 [PH] (ref 5–8)
PHOSPHATE SERPL-MCNC: 3.4 MG/DL (ref 2.7–4.5)
PLATELET # BLD AUTO: 44 K/UL (ref 150–350)
PMV BLD AUTO: ABNORMAL FL (ref 9.2–12.9)
POIKILOCYTOSIS BLD QL SMEAR: SLIGHT
POLYCHROMASIA BLD QL SMEAR: ABNORMAL
POTASSIUM SERPL-SCNC: 4 MMOL/L (ref 3.5–5.1)
PROT UR QL STRIP: NEGATIVE
PROT UR-MCNC: 18 MG/DL (ref 0–15)
PROT/CREAT UR: 0.11 MG/G{CREAT} (ref 0–0.2)
RBC # BLD AUTO: 3.28 M/UL (ref 4.6–6.2)
SODIUM SERPL-SCNC: 139 MMOL/L (ref 136–145)
SP GR UR STRIP: 1.01 (ref 1–1.03)
URN SPEC COLLECT METH UR: NORMAL
WBC # BLD AUTO: 3.41 K/UL (ref 3.9–12.7)
WBC OTHER NFR BLD MANUAL: 5 %

## 2020-09-03 PROCEDURE — 85007 BL SMEAR W/DIFF WBC COUNT: CPT

## 2020-09-03 PROCEDURE — 85027 COMPLETE CBC AUTOMATED: CPT

## 2020-09-03 PROCEDURE — 81003 URINALYSIS AUTO W/O SCOPE: CPT

## 2020-09-03 PROCEDURE — 85060 PATHOLOGIST REVIEW: ICD-10-PCS | Mod: ,,, | Performed by: PATHOLOGY

## 2020-09-03 PROCEDURE — 82570 ASSAY OF URINE CREATININE: CPT

## 2020-09-03 PROCEDURE — 80069 RENAL FUNCTION PANEL: CPT

## 2020-09-03 PROCEDURE — 85060 BLOOD SMEAR INTERPRETATION: CPT | Mod: ,,, | Performed by: PATHOLOGY

## 2020-09-03 PROCEDURE — 36415 COLL VENOUS BLD VENIPUNCTURE: CPT | Mod: PO

## 2020-09-04 ENCOUNTER — TELEPHONE (OUTPATIENT)
Dept: PHARMACY | Facility: CLINIC | Age: 80
End: 2020-09-04

## 2020-09-05 DIAGNOSIS — E03.9 ACQUIRED HYPOTHYROIDISM: ICD-10-CM

## 2020-09-05 NOTE — TELEPHONE ENCOUNTER
No new care gaps identified.  Powered by [employer]. Reference number: [id number]. 9/05/2020 3:35:39 AM JAQUELINET

## 2020-09-08 RX ORDER — LEVOTHYROXINE SODIUM 50 UG/1
TABLET ORAL
Qty: 90 TABLET | Refills: 1 | Status: SHIPPED | OUTPATIENT
Start: 2020-09-08 | End: 2021-03-04

## 2020-09-08 NOTE — PROGRESS NOTES
Refill Authorization Note    is requesting a refill authorization.    Brief assessment and rationale for refill: APPROVE: prr          Medication Therapy Plan: CDMR. approve     Medication reconciliation completed: No                    Comments:      Orders Placed This Encounter    levothyroxine (SYNTHROID) 50 MCG tablet      Requested Prescriptions   Signed Prescriptions Disp Refills    levothyroxine (SYNTHROID) 50 MCG tablet 90 tablet 1     Sig: TAKE 1 TABLET BY MOUTH BEFORE BREAKFAST       Endocrinology:  Hypothyroid Agents Failed - 9/8/2020 11:37 AM        Failed - Manual Review: FT4 is not required if last TSH is WNL.        Passed - Patient is at least 18 years old        Passed - Office visit in past 12 months or future 90 days.     Recent Outpatient Visits            3 weeks ago Polycythemia vera    [facility] Chu Cole MD    1 month ago Polycythemia vera    [facility] Chu Cole MD    1 month ago Polycythemia vera    [facility] Chu Cole MD    2 months ago Polycythemia vera    [facility] Chu Cole MD    2 months ago Pancytopenia    [facility] Xiang Khan NP          Future Appointments              Tomorrow LAB, [facility]    Tomorrow Chu Cole MD [facility], [facility]                Passed - TSH in normal range and within 360 days     TSH   Date Value Ref Range Status   07/20/2020 2.058 0.400 - 4.000 uIU/mL Final   06/15/2020 1.340 0.400 - 4.000 uIU/mL Final     Comment:     Warning:  Heterophilic antibodies in serum or plasma of   certain individuals are known to cause interference with   immunoassays. These antibodies may be present in blood samples   from individuals regularly exposed to animal or who have been   treated with animal  products.   Patients taking very high Biotin doses of >300 mcg/day may   cause a negative bias in this assay.     2020 4.559 (H) 0.400 - 4.000 uIU/mL Final              Passed - T4 free within 1080 days     Free T4   Date Value Ref Range Status   06/15/2020 0.92 0.78 - 2.19 ng/dL Final     Comment:     Performance of this assay has not been established with    specimens.  When interpretating FT4 results, note the potential   effects of certain drugs on the free-hormone equilibrium. Thyroid   hormone autoantibodies in serum or plasma samples may cause   interference in immunoassays  WARNING: Heterophilic antibodies in the serum or plasma of   certain individuals are known to cause interference with   immunoassays. These antibodies may be present in blood samples   from individuals regularly exposed to animals or who have been   treated with animal serum products.      2020 0.71 0.71 - 1.51 ng/dL Final   2017 0.82 0.71 - 1.51 ng/dL Final                  Appointments  past 12m or future 3m with PCP    Date Provider   Last Visit   2020 Yaniv Escobar MD   Next Visit   Visit date not found Yaniv Escobar MD   ED visits in past 90 days: 0     Note composed:11:38 AM 2020

## 2020-09-09 ENCOUNTER — OFFICE VISIT (OUTPATIENT)
Dept: HEMATOLOGY/ONCOLOGY | Facility: CLINIC | Age: 80
End: 2020-09-09
Payer: MEDICARE

## 2020-09-09 ENCOUNTER — LAB VISIT (OUTPATIENT)
Dept: LAB | Facility: HOSPITAL | Age: 80
End: 2020-09-09
Attending: INTERNAL MEDICINE
Payer: MEDICARE

## 2020-09-09 VITALS
OXYGEN SATURATION: 98 % | HEIGHT: 67 IN | SYSTOLIC BLOOD PRESSURE: 126 MMHG | WEIGHT: 171.75 LBS | TEMPERATURE: 99 F | HEART RATE: 64 BPM | BODY MASS INDEX: 26.96 KG/M2 | RESPIRATION RATE: 20 BRPM | DIASTOLIC BLOOD PRESSURE: 68 MMHG

## 2020-09-09 DIAGNOSIS — I48.0 PAROXYSMAL ATRIAL FIBRILLATION: ICD-10-CM

## 2020-09-09 DIAGNOSIS — D64.9 SYMPTOMATIC ANEMIA: Primary | ICD-10-CM

## 2020-09-09 DIAGNOSIS — D64.9 SYMPTOMATIC ANEMIA: ICD-10-CM

## 2020-09-09 DIAGNOSIS — D61.818 PANCYTOPENIA: ICD-10-CM

## 2020-09-09 DIAGNOSIS — D45 POLYCYTHEMIA VERA: Primary | ICD-10-CM

## 2020-09-09 DIAGNOSIS — Z79.01 ANTICOAGULANT LONG-TERM USE: ICD-10-CM

## 2020-09-09 DIAGNOSIS — D45 POLYCYTHEMIA VERA: ICD-10-CM

## 2020-09-09 LAB
ABO + RH BLD: NORMAL
ALBUMIN SERPL BCP-MCNC: 4.3 G/DL (ref 3.5–5.2)
ALP SERPL-CCNC: 129 U/L (ref 38–145)
ALT SERPL W/O P-5'-P-CCNC: 15 U/L (ref 0–50)
ANION GAP SERPL CALC-SCNC: 4 MMOL/L (ref 8–16)
ANISOCYTOSIS BLD QL SMEAR: SLIGHT
AST SERPL-CCNC: 29 U/L (ref 17–59)
BASO STIPL BLD QL SMEAR: ABNORMAL
BASOPHILS NFR BLD: 0 % (ref 0–1.9)
BILIRUB SERPL-MCNC: 0.9 MG/DL (ref 0.2–1.3)
BLD GP AB SCN CELLS X3 SERPL QL: NORMAL
BUN SERPL-MCNC: 17 MG/DL (ref 9–21)
CALCIUM SERPL-MCNC: 10.1 MG/DL (ref 8.4–10.2)
CHLORIDE SERPL-SCNC: 106 MMOL/L (ref 95–110)
CO2 SERPL-SCNC: 27 MMOL/L (ref 22–31)
CREAT SERPL-MCNC: 1.13 MG/DL (ref 0.5–1.4)
DACRYOCYTES BLD QL SMEAR: ABNORMAL
DIFFERENTIAL METHOD: ABNORMAL
EOSINOPHIL NFR BLD: 1 % (ref 0–8)
ERYTHROCYTE [DISTWIDTH] IN BLOOD BY AUTOMATED COUNT: 18.4 % (ref 11.5–14.5)
EST. GFR  (AFRICAN AMERICAN): >60 ML/MIN/1.73 M^2
EST. GFR  (NON AFRICAN AMERICAN): >60 ML/MIN/1.73 M^2
GLUCOSE SERPL-MCNC: 111 MG/DL (ref 70–110)
HCT VFR BLD AUTO: 28.9 % (ref 40–54)
HGB BLD-MCNC: 8.5 G/DL (ref 14–18)
HYPOCHROMIA BLD QL SMEAR: ABNORMAL
IMM GRANULOCYTES # BLD AUTO: ABNORMAL K/UL (ref 0–0.04)
IMM GRANULOCYTES NFR BLD AUTO: ABNORMAL % (ref 0–0.5)
LDH SERPL L TO P-CCNC: 989 U/L (ref 313–618)
LYMPHOCYTES NFR BLD: 15 % (ref 18–48)
MAGNESIUM SERPL-MCNC: 2.1 MG/DL (ref 1.6–2.6)
MCH RBC QN AUTO: 26.3 PG (ref 27–31)
MCHC RBC AUTO-ENTMCNC: 29.4 G/DL (ref 32–36)
MCV RBC AUTO: 90 FL (ref 82–98)
METAMYELOCYTES NFR BLD MANUAL: 5 %
MONOCYTES NFR BLD: 8 % (ref 4–15)
MYELOCYTES NFR BLD MANUAL: 2 %
NEUTROPHILS # BLD AUTO: 2.1 K/UL (ref 1.8–7.7)
NEUTROPHILS NFR BLD: 61 % (ref 38–73)
NEUTS BAND NFR BLD MANUAL: 6 %
NRBC BLD-RTO: 12 /100 WBC
OVALOCYTES BLD QL SMEAR: ABNORMAL
PLATELET # BLD AUTO: 40 K/UL (ref 150–350)
PMV BLD AUTO: ABNORMAL FL (ref 9.2–12.9)
POIKILOCYTOSIS BLD QL SMEAR: SLIGHT
POLYCHROMASIA BLD QL SMEAR: ABNORMAL
POTASSIUM SERPL-SCNC: 4.2 MMOL/L (ref 3.5–5.1)
PROT SERPL-MCNC: 7.6 G/DL (ref 6–8.4)
RBC # BLD AUTO: 3.23 M/UL (ref 4.6–6.2)
SCHISTOCYTES BLD QL SMEAR: ABNORMAL
SODIUM SERPL-SCNC: 137 MMOL/L (ref 136–145)
WBC # BLD AUTO: 3.11 K/UL (ref 3.9–12.7)
WBC OTHER NFR BLD MANUAL: 2 %

## 2020-09-09 PROCEDURE — 80053 COMPREHEN METABOLIC PANEL: CPT

## 2020-09-09 PROCEDURE — 99999 PR PBB SHADOW E&M-EST. PATIENT-LVL IV: CPT | Mod: PBBFAC,,, | Performed by: INTERNAL MEDICINE

## 2020-09-09 PROCEDURE — 99213 OFFICE O/P EST LOW 20 MIN: CPT | Mod: S$GLB,,, | Performed by: INTERNAL MEDICINE

## 2020-09-09 PROCEDURE — 1159F PR MEDICATION LIST DOCUMENTED IN MEDICAL RECORD: ICD-10-PCS | Mod: S$GLB,,, | Performed by: INTERNAL MEDICINE

## 2020-09-09 PROCEDURE — 1101F PT FALLS ASSESS-DOCD LE1/YR: CPT | Mod: CPTII,S$GLB,, | Performed by: INTERNAL MEDICINE

## 2020-09-09 PROCEDURE — 99213 PR OFFICE/OUTPT VISIT, EST, LEVL III, 20-29 MIN: ICD-10-PCS | Mod: S$GLB,,, | Performed by: INTERNAL MEDICINE

## 2020-09-09 PROCEDURE — 3078F DIAST BP <80 MM HG: CPT | Mod: CPTII,S$GLB,, | Performed by: INTERNAL MEDICINE

## 2020-09-09 PROCEDURE — 86901 BLOOD TYPING SEROLOGIC RH(D): CPT | Mod: PN

## 2020-09-09 PROCEDURE — 83735 ASSAY OF MAGNESIUM: CPT | Mod: PN

## 2020-09-09 PROCEDURE — 85007 BL SMEAR W/DIFF WBC COUNT: CPT | Mod: PN

## 2020-09-09 PROCEDURE — 36415 COLL VENOUS BLD VENIPUNCTURE: CPT | Mod: PN

## 2020-09-09 PROCEDURE — 83615 LACTATE (LD) (LDH) ENZYME: CPT | Mod: PN

## 2020-09-09 PROCEDURE — 83735 ASSAY OF MAGNESIUM: CPT

## 2020-09-09 PROCEDURE — 83615 LACTATE (LD) (LDH) ENZYME: CPT

## 2020-09-09 PROCEDURE — 85027 COMPLETE CBC AUTOMATED: CPT | Mod: PN

## 2020-09-09 PROCEDURE — 85027 COMPLETE CBC AUTOMATED: CPT

## 2020-09-09 PROCEDURE — 99999 PR PBB SHADOW E&M-EST. PATIENT-LVL IV: ICD-10-PCS | Mod: PBBFAC,,, | Performed by: INTERNAL MEDICINE

## 2020-09-09 PROCEDURE — 3078F PR MOST RECENT DIASTOLIC BLOOD PRESSURE < 80 MM HG: ICD-10-PCS | Mod: CPTII,S$GLB,, | Performed by: INTERNAL MEDICINE

## 2020-09-09 PROCEDURE — 3074F PR MOST RECENT SYSTOLIC BLOOD PRESSURE < 130 MM HG: ICD-10-PCS | Mod: CPTII,S$GLB,, | Performed by: INTERNAL MEDICINE

## 2020-09-09 PROCEDURE — 1101F PR PT FALLS ASSESS DOC 0-1 FALLS W/OUT INJ PAST YR: ICD-10-PCS | Mod: CPTII,S$GLB,, | Performed by: INTERNAL MEDICINE

## 2020-09-09 PROCEDURE — 1126F PR PAIN SEVERITY QUANTIFIED, NO PAIN PRESENT: ICD-10-PCS | Mod: S$GLB,,, | Performed by: INTERNAL MEDICINE

## 2020-09-09 PROCEDURE — 80053 COMPREHEN METABOLIC PANEL: CPT | Mod: PN

## 2020-09-09 PROCEDURE — 1126F AMNT PAIN NOTED NONE PRSNT: CPT | Mod: S$GLB,,, | Performed by: INTERNAL MEDICINE

## 2020-09-09 PROCEDURE — 86901 BLOOD TYPING SEROLOGIC RH(D): CPT

## 2020-09-09 PROCEDURE — 3074F SYST BP LT 130 MM HG: CPT | Mod: CPTII,S$GLB,, | Performed by: INTERNAL MEDICINE

## 2020-09-09 PROCEDURE — 85007 BL SMEAR W/DIFF WBC COUNT: CPT

## 2020-09-09 PROCEDURE — 1159F MED LIST DOCD IN RCRD: CPT | Mod: S$GLB,,, | Performed by: INTERNAL MEDICINE

## 2020-09-09 NOTE — PROGRESS NOTES
History of present illness:  The patient is a 79-year-old white gentleman well known to me for polycythemia vera which has been managed with Hydrea and intermittent phlebotomy.  Patient was recently hospitalized and found to have developed pancytopenia.  Hemoglobin was in the 5s and responded well to transfusion of 2 units of packed red blood cells.  Interval bone marrow has been obtained and reveals fibrotic/spent bone marrow.  Blast percentage was not increased.  Patient has started Jakafi 10 mg twice daily, developed thrombocytopenia, and had dose reduced to 5 mg twice daily.  Unfortunately, patient's platelet count continued to fall Jakafi was eventually held.  Patient is now on Jakafi 5 mg daily and returns to review interval lab.  Patient feels as though he has good energy and has no complaints since resumption of Jakafi.  No other new complaints for pertinent findings on a 10 point review systems.    Physical examination:  Well-developed, well-nourished, white male in no acute distress, who has a weight of 171.5 lb (increased by 2 lb).  VITAL SI table GNS: Documented  and reviewed this visit.  HEENT: Normocephalic, atraumatic. Oral mucosa pink and moist. Lips without   lesions. Tongue midline. Oropharynx clear. Nonicteric sclerae.   NECK: Supple, no adenopathy. No carotid bruits, thyromegaly or thyroid nodule.   HEART: Regular rate and rhythm without murmur, gallop or rub.   LUNGS: Clear to auscultation bilaterally. Normal respiratory effort.   ABDOMEN: Soft, nontender, nondistended with positive normoactive bowel sounds,   no hepatosplenomegaly.   EXTREMITIES: No cyanosis, clubbing or edema. Distal pulses are intact.     Laboratory:  White count 3.1, hemoglobin 8.5, hematocrit 28.9, platelets 40, absolute neutrophil count is 2100.  Sodium 137, potassium 4.2, chloride 106, CO2 27, BUN 17, creatinine 1.1, glucose 111, calcium 10.1, liver function test within normal limits, , GFR is greater than  60.    Impression:  1.  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  2.  Pancytopenia secondary to number 1.  3.  Atrial fibrillation/currently not receiving chronic anticoagulation.    Plan:  1.  Continue Jakafi 5 mg daily.  2.  Return to clinic in 4 weeks with interval CBC, CMP, LDH, and magnesium.    This note was created using voice recognition software and may contain grammatical errors.

## 2020-09-11 ENCOUNTER — TELEPHONE (OUTPATIENT)
Dept: PHARMACY | Facility: CLINIC | Age: 80
End: 2020-09-11

## 2020-09-28 ENCOUNTER — PATIENT MESSAGE (OUTPATIENT)
Dept: HEMATOLOGY/ONCOLOGY | Facility: CLINIC | Age: 80
End: 2020-09-28

## 2020-10-05 DIAGNOSIS — E78.5 HYPERLIPIDEMIA, UNSPECIFIED HYPERLIPIDEMIA TYPE: ICD-10-CM

## 2020-10-05 NOTE — TELEPHONE ENCOUNTER
No new care gaps identified.  Powered by [employer]. Reference number: [id number]. 10/05/2020 3:35:41 AM   JAQUELINET

## 2020-10-06 RX ORDER — ROSUVASTATIN CALCIUM 20 MG/1
TABLET, COATED ORAL
Qty: 45 TABLET | Refills: 1 | OUTPATIENT
Start: 2020-10-06

## 2020-10-06 NOTE — PROGRESS NOTES
Refill Authorization Note   Morro Sharma is requesting a refill authorization.              Medication Therapy Plan: CDMR; Crestor dose adjusted to 40 mg 7/20; Refused request for Crestor 20 mg  Medication reconciliation completed: No      Comments:   Pended Medication(s)       Requested Prescriptions     Refused Prescriptions Disp Refills    rosuvastatin (CRESTOR) 20 MG tablet [Pharmacy Med Name: ROSUVASTATIN 20MG TABLETS] 45 tablet 1     Sig: TAKE 1 TABLET(20 MG) BY MOUTH EVERY OTHER DAY     Refused By: JIA YEPEZ     Reason for Refusal: Refill not appropriate        Duplicate Pended Encounter(s)/ Last Prescribed Details:      Authorizing Provider: Yaniv Escobar MD NANCY #:  [id number] NPI:  [id number]    Ordering User:  Yaniv Escobar MD               Pharmacy:  [facility] [id number] [facility] [address]   NANCY #:  [id number]   Pharmacy Comments: --          Fill quantity remaining: -- Fill quantity used: -- Next fill due: --       Outpatient Medication Detail     Disp Refills Start End CARLOS   rosuvastatin (CRESTOR) 40 MG Tab 90 tablet 3 7/21/2020 7/21/2021 --   Sig - Route: Take 1 tablet (40 mg total) by mouth every evening. - Oral   Sent to pharmacy as: rosuvastatin (CRESTOR) 40 MG Tab   Class: Normal   Notes to Pharmacy: PHARMACY PLEASE NOTE THIS IN DOSE INCREASE FROM 20MG TO 40MG   Order: [id number]   Date/Time Signed: 7/21/2020 19:25       E-Prescribing Status: Receipt confirmed by pharmacy (7/21/2020  7:25 PM CDT)   Ordering Encounter Report    Associated Reports   View Encounter                Note composed:9:56 AM 10/06/2020

## 2020-10-07 ENCOUNTER — PATIENT MESSAGE (OUTPATIENT)
Dept: FAMILY MEDICINE | Facility: CLINIC | Age: 80
End: 2020-10-07

## 2020-10-07 ENCOUNTER — LAB VISIT (OUTPATIENT)
Dept: LAB | Facility: HOSPITAL | Age: 80
End: 2020-10-07
Attending: INTERNAL MEDICINE
Payer: MEDICARE

## 2020-10-07 ENCOUNTER — OFFICE VISIT (OUTPATIENT)
Dept: HEMATOLOGY/ONCOLOGY | Facility: CLINIC | Age: 80
End: 2020-10-07
Payer: MEDICARE

## 2020-10-07 VITALS
HEIGHT: 67 IN | BODY MASS INDEX: 26.96 KG/M2 | OXYGEN SATURATION: 99 % | DIASTOLIC BLOOD PRESSURE: 74 MMHG | TEMPERATURE: 98 F | WEIGHT: 171.75 LBS | RESPIRATION RATE: 18 BRPM | HEART RATE: 72 BPM | SYSTOLIC BLOOD PRESSURE: 133 MMHG

## 2020-10-07 DIAGNOSIS — D45 POLYCYTHEMIA VERA: ICD-10-CM

## 2020-10-07 DIAGNOSIS — I48.0 PAROXYSMAL ATRIAL FIBRILLATION: ICD-10-CM

## 2020-10-07 DIAGNOSIS — Z79.01 ANTICOAGULANT LONG-TERM USE: ICD-10-CM

## 2020-10-07 DIAGNOSIS — D61.818 PANCYTOPENIA: ICD-10-CM

## 2020-10-07 DIAGNOSIS — D64.9 SYMPTOMATIC ANEMIA: ICD-10-CM

## 2020-10-07 DIAGNOSIS — D64.9 SYMPTOMATIC ANEMIA: Primary | ICD-10-CM

## 2020-10-07 LAB
ABO + RH BLD: NORMAL
ALBUMIN SERPL BCP-MCNC: 4.1 G/DL (ref 3.5–5.2)
ALP SERPL-CCNC: 121 U/L (ref 38–145)
ALT SERPL W/O P-5'-P-CCNC: 19 U/L (ref 0–50)
ANION GAP SERPL CALC-SCNC: 7 MMOL/L (ref 8–16)
ANISOCYTOSIS BLD QL SMEAR: SLIGHT
AST SERPL-CCNC: 30 U/L (ref 17–59)
BASOPHILS NFR BLD: 2 % (ref 0–1.9)
BILIRUB SERPL-MCNC: 1.1 MG/DL (ref 0.2–1.3)
BLASTS NFR BLD MANUAL: 8 %
BLD GP AB SCN CELLS X3 SERPL QL: NORMAL
BUN SERPL-MCNC: 13 MG/DL (ref 9–21)
CALCIUM SERPL-MCNC: 9.5 MG/DL (ref 8.4–10.2)
CHLORIDE SERPL-SCNC: 105 MMOL/L (ref 95–110)
CO2 SERPL-SCNC: 28 MMOL/L (ref 22–31)
CREAT SERPL-MCNC: 1.11 MG/DL (ref 0.5–1.4)
DACRYOCYTES BLD QL SMEAR: ABNORMAL
DIFFERENTIAL METHOD: ABNORMAL
EOSINOPHIL NFR BLD: 0 % (ref 0–8)
ERYTHROCYTE [DISTWIDTH] IN BLOOD BY AUTOMATED COUNT: 18.7 % (ref 11.5–14.5)
EST. GFR  (AFRICAN AMERICAN): >60 ML/MIN/1.73 M^2
EST. GFR  (NON AFRICAN AMERICAN): >60 ML/MIN/1.73 M^2
GIANT PLATELETS BLD QL SMEAR: PRESENT
GLUCOSE SERPL-MCNC: 143 MG/DL (ref 70–110)
HCT VFR BLD AUTO: 26.6 % (ref 40–54)
HGB BLD-MCNC: 7.4 G/DL (ref 14–18)
HYPOCHROMIA BLD QL SMEAR: ABNORMAL
IMM GRANULOCYTES # BLD AUTO: ABNORMAL K/UL (ref 0–0.04)
IMM GRANULOCYTES NFR BLD AUTO: ABNORMAL % (ref 0–0.5)
LDH SERPL L TO P-CCNC: 1171 U/L (ref 313–618)
LYMPHOCYTES NFR BLD: 22 % (ref 18–48)
MAGNESIUM SERPL-MCNC: 2.2 MG/DL (ref 1.6–2.6)
MCH RBC QN AUTO: 25 PG (ref 27–31)
MCHC RBC AUTO-ENTMCNC: 27.8 G/DL (ref 32–36)
MCV RBC AUTO: 90 FL (ref 82–98)
METAMYELOCYTES NFR BLD MANUAL: 3 %
MONOCYTES NFR BLD: 8 % (ref 4–15)
MYELOCYTES NFR BLD MANUAL: 7 %
NEUTROPHILS # BLD AUTO: 1.5 K/UL (ref 1.8–7.7)
NEUTROPHILS NFR BLD: 45 % (ref 38–73)
NEUTS BAND NFR BLD MANUAL: 5 %
NRBC BLD-RTO: 13 /100 WBC
OVALOCYTES BLD QL SMEAR: ABNORMAL
PLATELET # BLD AUTO: 42 K/UL (ref 150–350)
PMV BLD AUTO: ABNORMAL FL (ref 9.2–12.9)
POIKILOCYTOSIS BLD QL SMEAR: ABNORMAL
POLYCHROMASIA BLD QL SMEAR: ABNORMAL
POTASSIUM SERPL-SCNC: 4.1 MMOL/L (ref 3.5–5.1)
PROT SERPL-MCNC: 7.4 G/DL (ref 6–8.4)
RBC # BLD AUTO: 2.96 M/UL (ref 4.6–6.2)
SCHISTOCYTES BLD QL SMEAR: ABNORMAL
SODIUM SERPL-SCNC: 140 MMOL/L (ref 136–145)
WBC # BLD AUTO: 2.96 K/UL (ref 3.9–12.7)

## 2020-10-07 PROCEDURE — 85027 COMPLETE CBC AUTOMATED: CPT | Mod: PN

## 2020-10-07 PROCEDURE — 80053 COMPREHEN METABOLIC PANEL: CPT | Mod: PN

## 2020-10-07 PROCEDURE — 3078F DIAST BP <80 MM HG: CPT | Mod: CPTII,S$GLB,, | Performed by: INTERNAL MEDICINE

## 2020-10-07 PROCEDURE — 86901 BLOOD TYPING SEROLOGIC RH(D): CPT

## 2020-10-07 PROCEDURE — 99214 OFFICE O/P EST MOD 30 MIN: CPT | Mod: S$GLB,,, | Performed by: INTERNAL MEDICINE

## 2020-10-07 PROCEDURE — 85007 BL SMEAR W/DIFF WBC COUNT: CPT

## 2020-10-07 PROCEDURE — 3075F PR MOST RECENT SYSTOLIC BLOOD PRESS GE 130-139MM HG: ICD-10-PCS | Mod: CPTII,S$GLB,, | Performed by: INTERNAL MEDICINE

## 2020-10-07 PROCEDURE — 99999 PR PBB SHADOW E&M-EST. PATIENT-LVL V: ICD-10-PCS | Mod: PBBFAC,,, | Performed by: INTERNAL MEDICINE

## 2020-10-07 PROCEDURE — 3075F SYST BP GE 130 - 139MM HG: CPT | Mod: CPTII,S$GLB,, | Performed by: INTERNAL MEDICINE

## 2020-10-07 PROCEDURE — 99999 PR PBB SHADOW E&M-EST. PATIENT-LVL V: CPT | Mod: PBBFAC,,, | Performed by: INTERNAL MEDICINE

## 2020-10-07 PROCEDURE — 1101F PT FALLS ASSESS-DOCD LE1/YR: CPT | Mod: CPTII,S$GLB,, | Performed by: INTERNAL MEDICINE

## 2020-10-07 PROCEDURE — 83735 ASSAY OF MAGNESIUM: CPT | Mod: PN

## 2020-10-07 PROCEDURE — 85027 COMPLETE CBC AUTOMATED: CPT

## 2020-10-07 PROCEDURE — 1159F PR MEDICATION LIST DOCUMENTED IN MEDICAL RECORD: ICD-10-PCS | Mod: S$GLB,,, | Performed by: INTERNAL MEDICINE

## 2020-10-07 PROCEDURE — 83735 ASSAY OF MAGNESIUM: CPT

## 2020-10-07 PROCEDURE — 83615 LACTATE (LD) (LDH) ENZYME: CPT

## 2020-10-07 PROCEDURE — 1126F PR PAIN SEVERITY QUANTIFIED, NO PAIN PRESENT: ICD-10-PCS | Mod: S$GLB,,, | Performed by: INTERNAL MEDICINE

## 2020-10-07 PROCEDURE — 1159F MED LIST DOCD IN RCRD: CPT | Mod: S$GLB,,, | Performed by: INTERNAL MEDICINE

## 2020-10-07 PROCEDURE — 36415 COLL VENOUS BLD VENIPUNCTURE: CPT | Mod: PN

## 2020-10-07 PROCEDURE — 86901 BLOOD TYPING SEROLOGIC RH(D): CPT | Mod: PN

## 2020-10-07 PROCEDURE — 85007 BL SMEAR W/DIFF WBC COUNT: CPT | Mod: PN

## 2020-10-07 PROCEDURE — 83615 LACTATE (LD) (LDH) ENZYME: CPT | Mod: PN

## 2020-10-07 PROCEDURE — 80053 COMPREHEN METABOLIC PANEL: CPT

## 2020-10-07 PROCEDURE — 1126F AMNT PAIN NOTED NONE PRSNT: CPT | Mod: S$GLB,,, | Performed by: INTERNAL MEDICINE

## 2020-10-07 PROCEDURE — 1101F PR PT FALLS ASSESS DOC 0-1 FALLS W/OUT INJ PAST YR: ICD-10-PCS | Mod: CPTII,S$GLB,, | Performed by: INTERNAL MEDICINE

## 2020-10-07 PROCEDURE — 99214 PR OFFICE/OUTPT VISIT, EST, LEVL IV, 30-39 MIN: ICD-10-PCS | Mod: S$GLB,,, | Performed by: INTERNAL MEDICINE

## 2020-10-07 PROCEDURE — 3078F PR MOST RECENT DIASTOLIC BLOOD PRESSURE < 80 MM HG: ICD-10-PCS | Mod: CPTII,S$GLB,, | Performed by: INTERNAL MEDICINE

## 2020-10-07 RX ORDER — HYDROCODONE BITARTRATE AND ACETAMINOPHEN 500; 5 MG/1; MG/1
TABLET ORAL ONCE
Status: CANCELLED | OUTPATIENT
Start: 2020-10-07 | End: 2020-10-07

## 2020-10-07 RX ORDER — ACETAMINOPHEN 325 MG/1
650 TABLET ORAL ONCE
Status: CANCELLED | OUTPATIENT
Start: 2020-10-07

## 2020-10-07 RX ORDER — DIPHENHYDRAMINE HCL 25 MG
25 CAPSULE ORAL ONCE
Status: CANCELLED | OUTPATIENT
Start: 2020-10-07

## 2020-10-07 RX ORDER — FUROSEMIDE 10 MG/ML
20 INJECTION INTRAMUSCULAR; INTRAVENOUS ONCE
Status: CANCELLED | OUTPATIENT
Start: 2020-10-07

## 2020-10-07 NOTE — PROGRESS NOTES
History of present illness:  The patient is a 79-year-old white gentleman well known to me for polycythemia vera which has been managed with Hydrea and intermittent phlebotomy.  Patient was recently hospitalized and found to have developed pancytopenia.  Hemoglobin was in the 5s and responded well to transfusion of 2 units of packed red blood cells.  Interval bone marrow has been obtained and reveals fibrotic/spent bone marrow.  Blast percentage was not increased.  Patient has started Jakafi 10 mg twice daily, developed thrombocytopenia, and had dose reduced to 5 mg twice daily.  Unfortunately, patient's platelet count continued to fall Jakafi was eventually held.  Patient is now on Jakafi 5 mg daily and returns to review interval lab.  Patient feels more fatigued than he did at the time of last office evaluation.  He reports dyspnea with exertion.  No other new complaints for pertinent findings on a 14 point review systems.    Physical examination:  Well-developed, well-nourished, white male in no acute distress, who has a weight of 171.5 lb (stable).  VITAL SIGNS: Documented  and reviewed this visit.  HEENT: Normocephalic, atraumatic. Oral mucosa pink and moist. Lips without   lesions. Tongue midline. Oropharynx clear. Nonicteric sclerae.   NECK: Supple, no adenopathy. No carotid bruits, thyromegaly or thyroid nodule.   HEART: Regular rate and rhythm without murmur, gallop or rub.   LUNGS: Clear to auscultation bilaterally. Normal respiratory effort.   ABDOMEN: Soft, nontender, nondistended with positive normoactive bowel sounds,   no hepatosplenomegaly.   EXTREMITIES: No cyanosis, clubbing or edema. Distal pulses are intact.     Laboratory:  White count 2.3, hemoglobin 7.4, hematocrit 26.6, platelets 42.  Differential is remarkable for 45% granulocytes, 22% lymphs, 8% monos, 2% basophils, 5% bands, 3% metamyelocytes, 7% myelocytes, 8% blasts, and 13 nucleated red cells.  ANC is 1500.  Sodium 140, potassium 4.1,  chloride 105, CO2 28, BUN 13, creatinine 1.1, glucose 143, calcium 9.5, magnesium 2.2, liver function test within normal limits, GFR greater than 60, LDH 1171.    Impression:  1.  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  2.  Pancytopenia secondary to number 1.  3.  Atrial fibrillation/currently not receiving chronic anticoagulation.  4.  Symptomatic anemia.    Plan:  1.  Continue Jakafi 5 mg daily.  2.  Return to clinic in 4 weeks with interval CBC, CMP, LDH, and magnesium.  3.  Transfuse 2 units of packed red blood cells at earliest convenience with typical pre meds and Lasix between units.    This note was created using voice recognition software and may contain grammatical errors.

## 2020-10-09 NOTE — TELEPHONE ENCOUNTER
I recommend that he stop the Crestor for two weeks and see if the pain resolves.  If pain is caused by a statin, two weeks off the medication should result in near complete if not complete resolution of the problem.  If he stops the Crestor and the pain does not stop, it was not caused by the Crestor.    Patient notified by e-mail

## 2020-10-15 ENCOUNTER — TELEPHONE (OUTPATIENT)
Dept: PHARMACY | Facility: CLINIC | Age: 80
End: 2020-10-15

## 2020-10-19 ENCOUNTER — LAB VISIT (OUTPATIENT)
Dept: LAB | Facility: HOSPITAL | Age: 80
End: 2020-10-19
Attending: NURSE PRACTITIONER
Payer: MEDICARE

## 2020-10-19 ENCOUNTER — OFFICE VISIT (OUTPATIENT)
Dept: FAMILY MEDICINE | Facility: CLINIC | Age: 80
End: 2020-10-19
Payer: MEDICARE

## 2020-10-19 VITALS
HEIGHT: 67 IN | BODY MASS INDEX: 27.75 KG/M2 | TEMPERATURE: 97 F | SYSTOLIC BLOOD PRESSURE: 142 MMHG | DIASTOLIC BLOOD PRESSURE: 70 MMHG | WEIGHT: 176.81 LBS | HEART RATE: 72 BPM

## 2020-10-19 DIAGNOSIS — M25.50 ARTHRALGIA, UNSPECIFIED JOINT: ICD-10-CM

## 2020-10-19 DIAGNOSIS — M79.10 MYALGIA: ICD-10-CM

## 2020-10-19 DIAGNOSIS — M25.511 PAIN OF BOTH SHOULDER JOINTS: ICD-10-CM

## 2020-10-19 DIAGNOSIS — M25.512 PAIN OF BOTH SHOULDER JOINTS: ICD-10-CM

## 2020-10-19 DIAGNOSIS — M25.50 ARTHRALGIA, UNSPECIFIED JOINT: Primary | ICD-10-CM

## 2020-10-19 LAB
CK SERPL-CCNC: 81 U/L (ref 20–200)
RHEUMATOID FACT SERPL-ACNC: <10 IU/ML (ref 0–15)
URATE SERPL-MCNC: 4.3 MG/DL (ref 3.4–7)

## 2020-10-19 PROCEDURE — 3077F SYST BP >= 140 MM HG: CPT | Mod: CPTII,S$GLB,, | Performed by: NURSE PRACTITIONER

## 2020-10-19 PROCEDURE — 99214 OFFICE O/P EST MOD 30 MIN: CPT | Mod: S$GLB,,, | Performed by: NURSE PRACTITIONER

## 2020-10-19 PROCEDURE — 82550 ASSAY OF CK (CPK): CPT

## 2020-10-19 PROCEDURE — 1125F PR PAIN SEVERITY QUANTIFIED, PAIN PRESENT: ICD-10-PCS | Mod: S$GLB,,, | Performed by: NURSE PRACTITIONER

## 2020-10-19 PROCEDURE — 1101F PT FALLS ASSESS-DOCD LE1/YR: CPT | Mod: CPTII,S$GLB,, | Performed by: NURSE PRACTITIONER

## 2020-10-19 PROCEDURE — 1159F PR MEDICATION LIST DOCUMENTED IN MEDICAL RECORD: ICD-10-PCS | Mod: S$GLB,,, | Performed by: NURSE PRACTITIONER

## 2020-10-19 PROCEDURE — 3078F PR MOST RECENT DIASTOLIC BLOOD PRESSURE < 80 MM HG: ICD-10-PCS | Mod: CPTII,S$GLB,, | Performed by: NURSE PRACTITIONER

## 2020-10-19 PROCEDURE — 1159F MED LIST DOCD IN RCRD: CPT | Mod: S$GLB,,, | Performed by: NURSE PRACTITIONER

## 2020-10-19 PROCEDURE — 99214 PR OFFICE/OUTPT VISIT, EST, LEVL IV, 30-39 MIN: ICD-10-PCS | Mod: S$GLB,,, | Performed by: NURSE PRACTITIONER

## 2020-10-19 PROCEDURE — 86038 ANTINUCLEAR ANTIBODIES: CPT

## 2020-10-19 PROCEDURE — 36415 COLL VENOUS BLD VENIPUNCTURE: CPT | Mod: PO

## 2020-10-19 PROCEDURE — 86431 RHEUMATOID FACTOR QUANT: CPT

## 2020-10-19 PROCEDURE — 3078F DIAST BP <80 MM HG: CPT | Mod: CPTII,S$GLB,, | Performed by: NURSE PRACTITIONER

## 2020-10-19 PROCEDURE — 1101F PR PT FALLS ASSESS DOC 0-1 FALLS W/OUT INJ PAST YR: ICD-10-PCS | Mod: CPTII,S$GLB,, | Performed by: NURSE PRACTITIONER

## 2020-10-19 PROCEDURE — 99999 PR PBB SHADOW E&M-EST. PATIENT-LVL IV: CPT | Mod: PBBFAC,,, | Performed by: NURSE PRACTITIONER

## 2020-10-19 PROCEDURE — 86235 NUCLEAR ANTIGEN ANTIBODY: CPT | Mod: 59

## 2020-10-19 PROCEDURE — 84550 ASSAY OF BLOOD/URIC ACID: CPT

## 2020-10-19 PROCEDURE — 3077F PR MOST RECENT SYSTOLIC BLOOD PRESSURE >= 140 MM HG: ICD-10-PCS | Mod: CPTII,S$GLB,, | Performed by: NURSE PRACTITIONER

## 2020-10-19 PROCEDURE — 1125F AMNT PAIN NOTED PAIN PRSNT: CPT | Mod: S$GLB,,, | Performed by: NURSE PRACTITIONER

## 2020-10-19 PROCEDURE — 86039 ANTINUCLEAR ANTIBODIES (ANA): CPT

## 2020-10-19 PROCEDURE — 99999 PR PBB SHADOW E&M-EST. PATIENT-LVL IV: ICD-10-PCS | Mod: PBBFAC,,, | Performed by: NURSE PRACTITIONER

## 2020-10-19 NOTE — PROGRESS NOTES
This dictation has been generated using Modal Fluency Dictation some phonetic errors may occur. Please contact author for clarification if needed.     Problem List Items Addressed This Visit     None      Visit Diagnoses     Arthralgia, unspecified joint    -  Primary    Relevant Orders    CK    Rheumatoid Factor    CHANCE Screen w/Reflex    Uric Acid    Pain of both shoulder joints        Myalgia        Relevant Orders    CK    Rheumatoid Factor    CHANCE Screen w/Reflex    Uric Acid          Orders Placed This Encounter    CK    Rheumatoid Factor    CHANCE Screen w/Reflex    Uric Acid     JOINT PAINS AND MYALGIA RULE OUT rhabdomyolysis, arthritic and gout a source for pain.  Symptoms improved today.    Follow up in about 1 week (around 10/26/2020), or if symptoms worsen or fail to improve.    ________________________________________________________________  ________________________________________________________________      Chief Complaint   Patient presents with    Myalgias     History of present illness  This 79 y.o. presents today for complaint of sudden onset of joint pains about 2 weeks ago.  Did have some muscle pain at that time.  Notes some stiffness in the joints in difficulty moving.  Did affect the right knee and his shoulders.  Notes other joints involved.  Had muscle pain.  No med changes.  Onset was sudden.  No history of injury.  Describes pain as sharp.  Also notes the muscle achiness.  Denies any change in urine color.  No low back pain.  Limited review of systems negative  No fever chills malaise sore throat cough shortness of breath nausea vomiting diarrhea or abdominal pain.  Past medical social surgical history reviewed.  Patient new to me.  Follows with in the clinic.  Past Medical History:   Diagnosis Date    Atrial fibrillation 05/07/2017    [facility] records sent to scanning    BPH (benign prostatic hypertrophy)     Colon polyps     Erectile dysfunction     GERD (gastroesophageal reflux  disease)     Hypertension     Pneumothorax on left     Polycythemia vera     Pulmonary nodules        Past Surgical History:   Procedure Laterality Date    COLONOSCOPY  9/15/14    Dr. Mcdaniels, 5 year recheck    ESOPHAGOGASTRODUODENOSCOPY      LUNG SURGERY      LEFT - CHEST TUBE       Family History   Problem Relation Age of Onset    Hypertension Mother     Heart disease Mother     Hypertension Maternal Aunt     Heart disease Maternal Aunt     Hypertension Maternal Uncle     Heart disease Maternal Uncle     Early death Father     No Known Problems Daughter     No Known Problems Daughter     No Known Problems Daughter     No Known Problems Daughter     Prostate cancer Neg Hx     Kidney cancer Neg Hx     Cancer Neg Hx     Urolithiasis Neg Hx     Melanoma Neg Hx     Psoriasis Neg Hx     Lupus Neg Hx     Eczema Neg Hx        Social History     Socioeconomic History    Marital status:      Spouse name: Not on file    Number of children: Not on file    Years of education: Not on file    Highest education level: Not on file   Occupational History    Not on file   Social Needs    Financial resource strain: Not very hard    Food insecurity     Worry: Never true     Inability: Never true    Transportation needs     Medical: No     Non-medical: No   Tobacco Use    Smoking status: Former Smoker     Packs/day: 1.50     Years: 25.00     Pack years: 37.50     Quit date: 10/2/1982     Years since quittin.0    Smokeless tobacco: Never Used   Substance and Sexual Activity    Alcohol use: No     Frequency: Monthly or less     Drinks per session: 1 or 2     Binge frequency: Never    Drug use: No    Sexual activity: Yes   Lifestyle    Physical activity     Days per week: 0 days     Minutes per session: 20 min    Stress: Not at all   Relationships    Social connections     Talks on phone: Once a week     Gets together: Once a week     Attends [employer] service: Not on file     Active  member of club or organization: Yes     Attends meetings of clubs or organizations: More than 4 times per year     Relationship status:    Other Topics Concern    Not on file   Social History Narrative    Not on file       Current Outpatient Medications   Medication Sig Dispense Refill    acetaminophen (TYLENOL) 500 MG tablet Take 1 tablet (500 mg total) by mouth 2 (two) times daily. For shoulder pain (Patient taking differently: Take 500 mg by mouth 2 (two) times daily as needed. For shoulder pain) 60 tablet 11    glycopyrrolate (ROBINUL) 1 mg Tab TAKE ONE TABLET BY MOUTH TWICE DAILY. MAY TAKE UP TO THREE TIMES DAILY IF needed 90 tablet 5    levothyroxine (SYNTHROID) 50 MCG tablet TAKE 1 TABLET BY MOUTH BEFORE BREAKFAST 90 tablet 1    metoprolol succinate (TOPROL-XL) 50 MG 24 hr tablet Take 1 tablet (50 mg total) by mouth once daily. 90 tablet 3    multivitamin (ONE DAILY MULTIVITAMIN) per tablet Take 1 tablet by mouth once daily.      psyllium husk (METAMUCIL ORAL) Take 1 capsule by mouth once daily.      tadalafiL (CIALIS) 20 MG Tab Take 1 tablet (20 mg total) by mouth as needed. 10 tablet 4    tamsulosin (FLOMAX) 0.4 mg Cap Take 1 capsule (0.4 mg total) by mouth once daily. 90 capsule 3    triamcinolone acetonide 0.1% (KENALOG) 0.1 % cream APPLY TO AFFECTED AREA TWICE DAILY 80 g 0    dexchlorphen-phenylephrine-DM (POLYTUSSIN DM) 1-5-10 mg/5 mL Syrp Take 5 mLs by mouth every 4 (four) hours as needed. 120 mL 0    rosuvastatin (CRESTOR) 40 MG Tab Take 1 tablet (40 mg total) by mouth every evening. (Patient not taking: Reported on 10/19/2020) 90 tablet 3     No current facility-administered medications for this visit.        Review of patient's allergies indicates:  No Known Allergies    Physical examination  Vitals Reviewed\  Vitals:    10/19/20 0902   BP: (!) 142/70   Pulse: 72   Temp: 97 °F (36.1 °C)     Weight: 80.2 kg (176 lb 12.9 oz)    Gen. Well-dressed well-nourished   Skin warm dry and  intact.  No rashes noted.  Neck is supple without adenopathy  Chest.  Respirations are even unlabored.  No cough.  Speaking in full sentences no evidence of shortness of breath.  Neuro. Awake alert oriented x4.  Normal judgment and cognition noted.  Extremities no clubbing cyanosis or edema noted.     Call or return to clinic prn if these symptoms worsen or fail to improve as anticipated.

## 2020-10-20 LAB
ANA PATTERN 1: NORMAL
ANA SER QL IF: POSITIVE
ANA TITR SER IF: NORMAL {TITER}

## 2020-10-22 LAB
ANTI SM ANTIBODY: 0.06 RATIO (ref 0–0.99)
ANTI SM/RNP ANTIBODY: 0.08 RATIO (ref 0–0.99)
ANTI-SM INTERPRETATION: NEGATIVE
ANTI-SM/RNP INTERPRETATION: NEGATIVE
ANTI-SSA ANTIBODY: 0.05 RATIO (ref 0–0.99)
ANTI-SSA INTERPRETATION: NEGATIVE
ANTI-SSB ANTIBODY: 0.07 RATIO (ref 0–0.99)
ANTI-SSB INTERPRETATION: NEGATIVE
DSDNA AB SER-ACNC: NORMAL [IU]/ML

## 2020-10-23 ENCOUNTER — PATIENT MESSAGE (OUTPATIENT)
Dept: FAMILY MEDICINE | Facility: CLINIC | Age: 80
End: 2020-10-23

## 2020-10-23 DIAGNOSIS — R76.8 POSITIVE ANA (ANTINUCLEAR ANTIBODY): ICD-10-CM

## 2020-10-23 DIAGNOSIS — M25.50 ARTHRALGIA, UNSPECIFIED JOINT: Primary | ICD-10-CM

## 2020-10-28 ENCOUNTER — OFFICE VISIT (OUTPATIENT)
Dept: FAMILY MEDICINE | Facility: CLINIC | Age: 80
End: 2020-10-28
Payer: MEDICARE

## 2020-10-28 VITALS
SYSTOLIC BLOOD PRESSURE: 136 MMHG | HEIGHT: 67 IN | DIASTOLIC BLOOD PRESSURE: 76 MMHG | TEMPERATURE: 98 F | OXYGEN SATURATION: 98 % | WEIGHT: 173.5 LBS | BODY MASS INDEX: 27.23 KG/M2 | HEART RATE: 73 BPM

## 2020-10-28 DIAGNOSIS — I48.0 PAROXYSMAL ATRIAL FIBRILLATION: ICD-10-CM

## 2020-10-28 DIAGNOSIS — I10 ESSENTIAL HYPERTENSION: ICD-10-CM

## 2020-10-28 DIAGNOSIS — R76.8 POSITIVE ANA (ANTINUCLEAR ANTIBODY): ICD-10-CM

## 2020-10-28 DIAGNOSIS — M25.50 ARTHRALGIA, UNSPECIFIED JOINT: Primary | ICD-10-CM

## 2020-10-28 PROCEDURE — 3075F PR MOST RECENT SYSTOLIC BLOOD PRESS GE 130-139MM HG: ICD-10-PCS | Mod: CPTII,S$GLB,, | Performed by: NURSE PRACTITIONER

## 2020-10-28 PROCEDURE — 99999 PR PBB SHADOW E&M-EST. PATIENT-LVL V: CPT | Mod: PBBFAC,,, | Performed by: NURSE PRACTITIONER

## 2020-10-28 PROCEDURE — 1159F PR MEDICATION LIST DOCUMENTED IN MEDICAL RECORD: ICD-10-PCS | Mod: S$GLB,,, | Performed by: NURSE PRACTITIONER

## 2020-10-28 PROCEDURE — 99499 RISK ADDL DX/OHS AUDIT: ICD-10-PCS | Mod: S$GLB,,, | Performed by: NURSE PRACTITIONER

## 2020-10-28 PROCEDURE — 99499 UNLISTED E&M SERVICE: CPT | Mod: S$GLB,,, | Performed by: NURSE PRACTITIONER

## 2020-10-28 PROCEDURE — 1159F MED LIST DOCD IN RCRD: CPT | Mod: S$GLB,,, | Performed by: NURSE PRACTITIONER

## 2020-10-28 PROCEDURE — 3078F DIAST BP <80 MM HG: CPT | Mod: CPTII,S$GLB,, | Performed by: NURSE PRACTITIONER

## 2020-10-28 PROCEDURE — 99213 PR OFFICE/OUTPT VISIT, EST, LEVL III, 20-29 MIN: ICD-10-PCS | Mod: S$GLB,,, | Performed by: NURSE PRACTITIONER

## 2020-10-28 PROCEDURE — 3075F SYST BP GE 130 - 139MM HG: CPT | Mod: CPTII,S$GLB,, | Performed by: NURSE PRACTITIONER

## 2020-10-28 PROCEDURE — 99213 OFFICE O/P EST LOW 20 MIN: CPT | Mod: S$GLB,,, | Performed by: NURSE PRACTITIONER

## 2020-10-28 PROCEDURE — 1101F PT FALLS ASSESS-DOCD LE1/YR: CPT | Mod: CPTII,S$GLB,, | Performed by: NURSE PRACTITIONER

## 2020-10-28 PROCEDURE — 1125F AMNT PAIN NOTED PAIN PRSNT: CPT | Mod: S$GLB,,, | Performed by: NURSE PRACTITIONER

## 2020-10-28 PROCEDURE — 1125F PR PAIN SEVERITY QUANTIFIED, PAIN PRESENT: ICD-10-PCS | Mod: S$GLB,,, | Performed by: NURSE PRACTITIONER

## 2020-10-28 PROCEDURE — 1101F PR PT FALLS ASSESS DOC 0-1 FALLS W/OUT INJ PAST YR: ICD-10-PCS | Mod: CPTII,S$GLB,, | Performed by: NURSE PRACTITIONER

## 2020-10-28 PROCEDURE — 3078F PR MOST RECENT DIASTOLIC BLOOD PRESSURE < 80 MM HG: ICD-10-PCS | Mod: CPTII,S$GLB,, | Performed by: NURSE PRACTITIONER

## 2020-10-28 PROCEDURE — 99999 PR PBB SHADOW E&M-EST. PATIENT-LVL V: ICD-10-PCS | Mod: PBBFAC,,, | Performed by: NURSE PRACTITIONER

## 2020-10-28 RX ORDER — DEXCHLORPHENIRAMINE MALEATE, DEXTROMETHORPHAN HBR, PHENYLEPHRINE HCL 1; 10; 5 MG/5ML; MG/5ML; MG/5ML
5 SYRUP ORAL EVERY 4 HOURS PRN
Qty: 120 ML | Refills: 0 | Status: ON HOLD | OUTPATIENT
Start: 2020-10-28 | End: 2020-12-23 | Stop reason: HOSPADM

## 2020-10-28 NOTE — PROGRESS NOTES
This dictation has been generated using Modal Fluency Dictation some phonetic errors may occur. Please contact author for clarification if needed.     Problem List Items Addressed This Visit     Hypertension    Paroxysmal atrial fibrillation, onset 5/2017, CHADS-VAS score 4    Relevant Orders    Ambulatory referral/consult to Cardiology      Other Visit Diagnoses     Arthralgia, unspecified joint    -  Primary    Positive CHANCE (antinuclear antibody)              Orders Placed This Encounter    Ambulatory referral/consult to Cardiology    dexchlorphen-phenylephrine-DM (POLYTUSSIN DM) 1-5-10 mg/5 mL Syrp     JOINT PAINS AND MYALGIA RULED OUT rhabdomyolysis. Checked arthritic and gout a source for pain.  Positive CHANCE without abnormal reflex rheumatology after the first of the year.   PAF needs new cards. Provider moved.   Cough. rx as above    Follow up in about 20 weeks (around 3/17/2021).    ________________________________________________________________  ________________________________________________________________      Chief Complaint   Patient presents with    Follow-up     1 week      History of present illness  This 79 y.o. presents today for complaint of reviewed arthralgia panel.  Positive CHANCE with speckled pattern but negative reflex panel.  Intermittent joint pains noted will follow-up with routine rheumatology appointment.  He has proximal atrial fibrillation and needs to see Cardiology.  Dr. Stanley is working out of [place] and will have him follow-up with Dr. Martinez for group.  Discussed follow-up appointment with primary care in March of next year.  Reviewed all labs from last visit with patient.  Occasional productive cough.  Ask for refill of medicine.  Cough is productive of clear sputum.  Symptoms worse at night and in the morning.  Denies postnasal drip symptoms.  LOV sudden onset of joint pains about 2 weeks ago.  Did have some muscle pain at that time.  Notes some stiffness in the joints in  difficulty moving.  Did affect the right knee and his shoulders.  Notes other joints involved.  Had muscle pain.  No med changes.  Onset was sudden.  No history of injury.  Describes pain as sharp.  Also notes the muscle achiness.  Denies any change in urine color.  No low back pain.  Limited review of systems negative  No fever chills malaise sore throat cough shortness of breath nausea vomiting diarrhea or abdominal pain.  Past medical social surgical history reviewed.  Patient new to me.  Follows with in the clinic.  Past Medical History:   Diagnosis Date    Atrial fibrillation 05/07/2017    [facility] records sent to scanning    BPH (benign prostatic hypertrophy)     Colon polyps     Erectile dysfunction     GERD (gastroesophageal reflux disease)     Hypertension     Pneumothorax on left     Polycythemia vera     Pulmonary nodules        Past Surgical History:   Procedure Laterality Date    COLONOSCOPY  9/15/14    Dr. Mcdaniels, 5 year recheck    ESOPHAGOGASTRODUODENOSCOPY      LUNG SURGERY      LEFT - CHEST TUBE       Family History   Problem Relation Age of Onset    Hypertension Mother     Heart disease Mother     Hypertension Maternal Aunt     Heart disease Maternal Aunt     Hypertension Maternal Uncle     Heart disease Maternal Uncle     Early death Father     No Known Problems Daughter     No Known Problems Daughter     No Known Problems Daughter     No Known Problems Daughter     Prostate cancer Neg Hx     Kidney cancer Neg Hx     Cancer Neg Hx     Urolithiasis Neg Hx     Melanoma Neg Hx     Psoriasis Neg Hx     Lupus Neg Hx     Eczema Neg Hx        Social History     Socioeconomic History    Marital status:      Spouse name: Not on file    Number of children: Not on file    Years of education: Not on file    Highest education level: Not on file   Occupational History    Not on file   Social Needs    Financial resource strain: Not very hard    Food insecurity     Worry:  Never true     Inability: Never true    Transportation needs     Medical: No     Non-medical: No   Tobacco Use    Smoking status: Former Smoker     Packs/day: 1.50     Years: 25.00     Pack years: 37.50     Quit date: 10/2/1982     Years since quittin.0    Smokeless tobacco: Never Used   Substance and Sexual Activity    Alcohol use: No     Frequency: Monthly or less     Drinks per session: 1 or 2     Binge frequency: Never    Drug use: No    Sexual activity: Yes   Lifestyle    Physical activity     Days per week: 0 days     Minutes per session: 20 min    Stress: Not at all   Relationships    Social connections     Talks on phone: Once a week     Gets together: Once a week     Attends Zoroastrian service: Not on file     Active member of club or organization: Yes     Attends meetings of clubs or organizations: More than 4 times per year     Relationship status:    Other Topics Concern    Not on file   Social History Narrative    Not on file       Current Outpatient Medications   Medication Sig Dispense Refill    acetaminophen (TYLENOL) 500 MG tablet Take 1 tablet (500 mg total) by mouth 2 (two) times daily. For shoulder pain (Patient taking differently: Take 500 mg by mouth 2 (two) times daily as needed. For shoulder pain) 60 tablet 11    glycopyrrolate (ROBINUL) 1 mg Tab TAKE ONE TABLET BY MOUTH TWICE DAILY. MAY TAKE UP TO THREE TIMES DAILY IF needed 90 tablet 5    levothyroxine (SYNTHROID) 50 MCG tablet TAKE 1 TABLET BY MOUTH BEFORE BREAKFAST 90 tablet 1    metoprolol succinate (TOPROL-XL) 50 MG 24 hr tablet Take 1 tablet (50 mg total) by mouth once daily. 90 tablet 3    multivitamin (ONE DAILY MULTIVITAMIN) per tablet Take 1 tablet by mouth once daily.      psyllium husk (METAMUCIL ORAL) Take 1 capsule by mouth once daily.      rosuvastatin (CRESTOR) 40 MG Tab Take 1 tablet (40 mg total) by mouth every evening. 90 tablet 3    tadalafiL (CIALIS) 20 MG Tab Take 1 tablet (20 mg total) by  mouth as needed. 10 tablet 4    tamsulosin (FLOMAX) 0.4 mg Cap Take 1 capsule (0.4 mg total) by mouth once daily. 90 capsule 3    triamcinolone acetonide 0.1% (KENALOG) 0.1 % cream APPLY TO AFFECTED AREA TWICE DAILY 80 g 0    dexchlorphen-phenylephrine-DM (POLYTUSSIN DM) 1-5-10 mg/5 mL Syrp Take 5 mLs by mouth every 4 (four) hours as needed. 120 mL 0     No current facility-administered medications for this visit.        Review of patient's allergies indicates:  No Known Allergies    Physical examination  Vitals Reviewed\  Vitals:    10/28/20 0856   BP: 136/76   Pulse: 73   Temp: 97.5 °F (36.4 °C)     Weight: 78.7 kg (173 lb 8 oz)    Gen. Well-dressed well-nourished   Skin warm dry and intact.  No rashes noted.  Neck is supple without adenopathy  Chest.  Respirations are even unlabored.  No cough.  Speaking in full sentences no evidence of shortness of breath.  Neuro. Awake alert oriented x4.  Normal judgment and cognition noted.  Extremities no clubbing cyanosis or edema noted.     Call or return to clinic prn if these symptoms worsen or fail to improve as anticipated.

## 2020-11-04 ENCOUNTER — LAB VISIT (OUTPATIENT)
Dept: LAB | Facility: HOSPITAL | Age: 80
End: 2020-11-04
Attending: INTERNAL MEDICINE
Payer: MEDICARE

## 2020-11-04 ENCOUNTER — OFFICE VISIT (OUTPATIENT)
Dept: HEMATOLOGY/ONCOLOGY | Facility: CLINIC | Age: 80
End: 2020-11-04
Payer: MEDICARE

## 2020-11-04 VITALS
HEIGHT: 67 IN | TEMPERATURE: 99 F | OXYGEN SATURATION: 99 % | BODY MASS INDEX: 27.34 KG/M2 | DIASTOLIC BLOOD PRESSURE: 77 MMHG | WEIGHT: 174.19 LBS | RESPIRATION RATE: 18 BRPM | SYSTOLIC BLOOD PRESSURE: 140 MMHG | HEART RATE: 76 BPM

## 2020-11-04 DIAGNOSIS — D45 POLYCYTHEMIA VERA: Primary | ICD-10-CM

## 2020-11-04 DIAGNOSIS — I48.0 PAROXYSMAL ATRIAL FIBRILLATION: ICD-10-CM

## 2020-11-04 DIAGNOSIS — D61.818 PANCYTOPENIA: ICD-10-CM

## 2020-11-04 DIAGNOSIS — D64.9 SYMPTOMATIC ANEMIA: Primary | ICD-10-CM

## 2020-11-04 DIAGNOSIS — D45 POLYCYTHEMIA VERA: ICD-10-CM

## 2020-11-04 DIAGNOSIS — Z79.01 ANTICOAGULANT LONG-TERM USE: ICD-10-CM

## 2020-11-04 DIAGNOSIS — D64.9 SYMPTOMATIC ANEMIA: ICD-10-CM

## 2020-11-04 LAB
ABO + RH BLD: NORMAL
ALBUMIN SERPL BCP-MCNC: 4 G/DL (ref 3.5–5.2)
ALP SERPL-CCNC: 140 U/L (ref 38–145)
ALT SERPL W/O P-5'-P-CCNC: 20 U/L (ref 0–50)
ANION GAP SERPL CALC-SCNC: 6 MMOL/L (ref 8–16)
ANISOCYTOSIS BLD QL SMEAR: SLIGHT
AST SERPL-CCNC: 28 U/L (ref 17–59)
BASOPHILS NFR BLD: 3 % (ref 0–1.9)
BILIRUB SERPL-MCNC: 1.2 MG/DL (ref 0.2–1.3)
BLASTS NFR BLD MANUAL: 8 %
BLD GP AB SCN CELLS X3 SERPL QL: NORMAL
BUN SERPL-MCNC: 14 MG/DL (ref 9–21)
CALCIUM SERPL-MCNC: 9.6 MG/DL (ref 8.4–10.2)
CHLORIDE SERPL-SCNC: 106 MMOL/L (ref 95–110)
CO2 SERPL-SCNC: 28 MMOL/L (ref 22–31)
CREAT SERPL-MCNC: 1.09 MG/DL (ref 0.5–1.4)
DACRYOCYTES BLD QL SMEAR: ABNORMAL
DIFFERENTIAL METHOD: ABNORMAL
EOSINOPHIL NFR BLD: 1 % (ref 0–8)
ERYTHROCYTE [DISTWIDTH] IN BLOOD BY AUTOMATED COUNT: 17.4 % (ref 11.5–14.5)
EST. GFR  (AFRICAN AMERICAN): >60 ML/MIN/1.73 M^2
EST. GFR  (NON AFRICAN AMERICAN): >60 ML/MIN/1.73 M^2
GLUCOSE SERPL-MCNC: 144 MG/DL (ref 70–110)
HCT VFR BLD AUTO: 32.3 % (ref 40–54)
HGB BLD-MCNC: 9.7 G/DL (ref 14–18)
IMM GRANULOCYTES # BLD AUTO: ABNORMAL K/UL (ref 0–0.04)
IMM GRANULOCYTES NFR BLD AUTO: ABNORMAL % (ref 0–0.5)
LDH SERPL L TO P-CCNC: 1023 U/L (ref 313–618)
LYMPHOCYTES NFR BLD: 17 % (ref 18–48)
MAGNESIUM SERPL-MCNC: 2 MG/DL (ref 1.6–2.6)
MCH RBC QN AUTO: 26.8 PG (ref 27–31)
MCHC RBC AUTO-ENTMCNC: 30 G/DL (ref 32–36)
MCV RBC AUTO: 89 FL (ref 82–98)
METAMYELOCYTES NFR BLD MANUAL: 1 %
MONOCYTES NFR BLD: 1 % (ref 4–15)
MYELOCYTES NFR BLD MANUAL: 4 %
NEUTROPHILS # BLD AUTO: 2.6 K/UL (ref 1.8–7.7)
NEUTROPHILS NFR BLD: 58 % (ref 38–73)
NEUTS BAND NFR BLD MANUAL: 7 %
NRBC BLD-RTO: 8 /100 WBC
OVALOCYTES BLD QL SMEAR: ABNORMAL
PLATELET # BLD AUTO: 41 K/UL (ref 150–350)
PLATELET BLD QL SMEAR: ABNORMAL
PMV BLD AUTO: ABNORMAL FL (ref 9.2–12.9)
POIKILOCYTOSIS BLD QL SMEAR: SLIGHT
POLYCHROMASIA BLD QL SMEAR: ABNORMAL
POTASSIUM SERPL-SCNC: 4 MMOL/L (ref 3.5–5.1)
PROT SERPL-MCNC: 7.4 G/DL (ref 6–8.4)
RBC # BLD AUTO: 3.62 M/UL (ref 4.6–6.2)
SODIUM SERPL-SCNC: 140 MMOL/L (ref 136–145)
WBC # BLD AUTO: 4.02 K/UL (ref 3.9–12.7)

## 2020-11-04 PROCEDURE — 99213 PR OFFICE/OUTPT VISIT, EST, LEVL III, 20-29 MIN: ICD-10-PCS | Mod: S$GLB,,, | Performed by: INTERNAL MEDICINE

## 2020-11-04 PROCEDURE — 1159F MED LIST DOCD IN RCRD: CPT | Mod: S$GLB,,, | Performed by: INTERNAL MEDICINE

## 2020-11-04 PROCEDURE — 1159F PR MEDICATION LIST DOCUMENTED IN MEDICAL RECORD: ICD-10-PCS | Mod: S$GLB,,, | Performed by: INTERNAL MEDICINE

## 2020-11-04 PROCEDURE — 99999 PR PBB SHADOW E&M-EST. PATIENT-LVL IV: ICD-10-PCS | Mod: PBBFAC,,, | Performed by: INTERNAL MEDICINE

## 2020-11-04 PROCEDURE — 80053 COMPREHEN METABOLIC PANEL: CPT | Mod: PN

## 2020-11-04 PROCEDURE — 83615 LACTATE (LD) (LDH) ENZYME: CPT | Mod: PN

## 2020-11-04 PROCEDURE — 3077F SYST BP >= 140 MM HG: CPT | Mod: CPTII,S$GLB,, | Performed by: INTERNAL MEDICINE

## 2020-11-04 PROCEDURE — 85007 BL SMEAR W/DIFF WBC COUNT: CPT | Mod: PN

## 2020-11-04 PROCEDURE — 83735 ASSAY OF MAGNESIUM: CPT

## 2020-11-04 PROCEDURE — 85027 COMPLETE CBC AUTOMATED: CPT

## 2020-11-04 PROCEDURE — 36415 COLL VENOUS BLD VENIPUNCTURE: CPT | Mod: PN

## 2020-11-04 PROCEDURE — 80053 COMPREHEN METABOLIC PANEL: CPT

## 2020-11-04 PROCEDURE — 85007 BL SMEAR W/DIFF WBC COUNT: CPT

## 2020-11-04 PROCEDURE — 1101F PT FALLS ASSESS-DOCD LE1/YR: CPT | Mod: CPTII,S$GLB,, | Performed by: INTERNAL MEDICINE

## 2020-11-04 PROCEDURE — 86850 RBC ANTIBODY SCREEN: CPT

## 2020-11-04 PROCEDURE — 1126F AMNT PAIN NOTED NONE PRSNT: CPT | Mod: S$GLB,,, | Performed by: INTERNAL MEDICINE

## 2020-11-04 PROCEDURE — 3077F PR MOST RECENT SYSTOLIC BLOOD PRESSURE >= 140 MM HG: ICD-10-PCS | Mod: CPTII,S$GLB,, | Performed by: INTERNAL MEDICINE

## 2020-11-04 PROCEDURE — 86850 RBC ANTIBODY SCREEN: CPT | Mod: PN

## 2020-11-04 PROCEDURE — 99999 PR PBB SHADOW E&M-EST. PATIENT-LVL IV: CPT | Mod: PBBFAC,,, | Performed by: INTERNAL MEDICINE

## 2020-11-04 PROCEDURE — 1101F PR PT FALLS ASSESS DOC 0-1 FALLS W/OUT INJ PAST YR: ICD-10-PCS | Mod: CPTII,S$GLB,, | Performed by: INTERNAL MEDICINE

## 2020-11-04 PROCEDURE — 85027 COMPLETE CBC AUTOMATED: CPT | Mod: PN

## 2020-11-04 PROCEDURE — 99213 OFFICE O/P EST LOW 20 MIN: CPT | Mod: S$GLB,,, | Performed by: INTERNAL MEDICINE

## 2020-11-04 PROCEDURE — 83615 LACTATE (LD) (LDH) ENZYME: CPT

## 2020-11-04 PROCEDURE — 3078F DIAST BP <80 MM HG: CPT | Mod: CPTII,S$GLB,, | Performed by: INTERNAL MEDICINE

## 2020-11-04 PROCEDURE — 3078F PR MOST RECENT DIASTOLIC BLOOD PRESSURE < 80 MM HG: ICD-10-PCS | Mod: CPTII,S$GLB,, | Performed by: INTERNAL MEDICINE

## 2020-11-04 PROCEDURE — 1126F PR PAIN SEVERITY QUANTIFIED, NO PAIN PRESENT: ICD-10-PCS | Mod: S$GLB,,, | Performed by: INTERNAL MEDICINE

## 2020-11-04 PROCEDURE — 83735 ASSAY OF MAGNESIUM: CPT | Mod: PN

## 2020-11-04 NOTE — PROGRESS NOTES
History of present illness:  The patient is a 79-year-old white gentleman well known to me for polycythemia vera which has been managed with Hydrea and intermittent phlebotomy.  Patient was recently hospitalized and found to have developed pancytopenia.  Hemoglobin was in the 5s and responded well to transfusion of 2 units of packed red blood cells.  Interval bone marrow has been obtained and reveals fibrotic/spent bone marrow.  Blast percentage was not increased.  Patient has started Jakafi 10 mg twice daily, developed thrombocytopenia, and had dose reduced to 5 mg twice daily.  Unfortunately, patient's platelet count continued to fall Jakafi was eventually held.  Patient is now on Jakafi 5 mg daily and returns to review interval lab.  Patient feels well and is not experiencing dyspnea following most recent transfusion.  Patient does note occasional headaches which are relieved by Tylenol.  No other new complaints for pertinent findings on a 14 point review systems.    Physical examination:  Well-developed, well-nourished, black male in no acute distress, who has a weight of 174 lb (increased by 2.5 lb).  VITAL SIGNS: Documented  and reviewed this visit.  HEENT: Normocephalic, atraumatic. Oral mucosa pink and moist. Lips without   lesions. Tongue midline. Oropharynx clear. Nonicteric sclerae.   NECK: Supple, no adenopathy. No carotid bruits, thyromegaly or thyroid nodule.   HEART: Regular rate and rhythm without murmur, gallop or rub.   LUNGS: Clear to auscultation bilaterally. Normal respiratory effort.   ABDOMEN: Soft, nontender, nondistended with positive normoactive bowel sounds,   no hepatosplenomegaly.   EXTREMITIES: No cyanosis, clubbing or edema. Distal pulses are intact.     Laboratory:  White count 4, hemoglobin 9.7, hematocrit 32.3, platelets 41, absolute neutrophil count 2600.  Sodium 140, potassium 4, chloride 106, CO2 28, BUN 14, creatinine 1.1, glucose 144, calcium 9.6, magnesium 2, liver function  test within normal limits, LDH 1023, GFR is greater than 60.    Impression:  1.  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  2.  Pancytopenia secondary to number 1.  3.  Atrial fibrillation/currently not receiving chronic anticoagulation.    Plan:  1.  Continue Jakafi 5 mg daily.  2.  Return to clinic in 4 weeks with interval CBC.    This note was created using voice recognition software and may contain grammatical errors.

## 2020-11-17 ENCOUNTER — SPECIALTY PHARMACY (OUTPATIENT)
Dept: PHARMACY | Facility: CLINIC | Age: 80
End: 2020-11-17

## 2020-11-17 NOTE — TELEPHONE ENCOUNTER
Specialty Pharmacy - Refill Coordination    Specialty Medication Orders Linked to Encounter      Most Recent Value   Medication #1  ruxolitinib (JAKAFI) 5 mg Tab (Order#[id number], Rx#[id number])          Refill Questions - Documented Responses      Most Recent Value   Relationship to patient of person spoken to?  Self   HIPAA/medical authority confirmed?  Yes   Any changes in contact preferences or allowed representatives?  No   Has the patient had any insurance changes?  No   Has the patient had any changes to specialty medication, dose, or instructions?  No   Has the patient started taking any new medications, herbals, or supplements?  No   Has the patient been diagnosed with any new medical conditions?  No   Does the patient have any new allergies to medications or foods?  No   Does the patient have any concerns about side effects?  No   Can the patient store medication/sharps container properly (at the correct temperature, away from children/pets, etc.)?  Yes   Can the patient call emergency services (911) in the event of an emergency?  Yes   Does the patient have any concerns or questions about taking or administering this medication as prescribed?  No   How many doses did the patient miss in the past 4 weeks or since the last fill?  0   How many doses does the patient have on hand?  5   How many days does the patient report on hand quantity will last?  5   Does the number of doses/days supply remaining match pharmacy expected amounts?  Yes   Does the patient feel that this medication is effective?  Yes   How will the patient receive the medication?  Mail   When does the patient need to receive the medication?  11/21/20   Shipping Address  Home   Address in [facility] confirmed and updated if neccessary?  Yes   Expected Copay ($)  0   Is the patient able to afford the medication copay?  Yes   Payment Method  zero copay   Days supply of Refill  30   Would patient like to speak to a pharmacist?  No   Do  you want to trigger an intervention?  No   Do you want to trigger an additional referral task?  No   Refill activity completed?  Yes   Refill activity plan  Refill scheduled   Shipment/Pickup Date:  11/18/20          Current Outpatient Medications   Medication Sig    acetaminophen (TYLENOL) 500 MG tablet Take 1 tablet (500 mg total) by mouth 2 (two) times daily. For shoulder pain (Patient taking differently: Take 500 mg by mouth 2 (two) times daily as needed. For shoulder pain)    dexchlorphen-phenylephrine-DM (POLYTUSSIN DM) 1-5-10 mg/5 mL Syrp Take 5 mLs by mouth every 4 (four) hours as needed.    glycopyrrolate (ROBINUL) 1 mg Tab TAKE ONE TABLET BY MOUTH TWICE DAILY. MAY TAKE UP TO THREE TIMES DAILY IF needed    levothyroxine (SYNTHROID) 50 MCG tablet TAKE 1 TABLET BY MOUTH BEFORE BREAKFAST    metoprolol succinate (TOPROL-XL) 50 MG 24 hr tablet Take 1 tablet (50 mg total) by mouth once daily.    multivitamin (ONE DAILY MULTIVITAMIN) per tablet Take 1 tablet by mouth once daily.    psyllium husk (METAMUCIL ORAL) Take 1 capsule by mouth once daily.    rosuvastatin (CRESTOR) 40 MG Tab Take 1 tablet (40 mg total) by mouth every evening.    ruxolitinib (JAKAFI) 5 mg Tab Take 5 mg by mouth once daily.    tadalafiL (CIALIS) 20 MG Tab Take 1 tablet (20 mg total) by mouth as needed.    tamsulosin (FLOMAX) 0.4 mg Cap Take 1 capsule (0.4 mg total) by mouth once daily.    triamcinolone acetonide 0.1% (KENALOG) 0.1 % cream APPLY TO AFFECTED AREA TWICE DAILY   Last reviewed on 11/17/2020 10:02 AM by Gilbert Li    Review of patient's allergies indicates:  No Known Allergies Last reviewed on  11/17/2020 10:02 AM by Gilbert Li      Tasks added this encounter   No tasks added.   Tasks due within next 3 months   11/13/2020 - Refill Call  11/26/2020 - Clinical - Follow Up Assesement (90 day)     Gilbert Li  [facility]  [address] [place] [address]  [id number]  Phone: [phone]  Fax: [phone]

## 2020-12-02 ENCOUNTER — OFFICE VISIT (OUTPATIENT)
Dept: HEMATOLOGY/ONCOLOGY | Facility: CLINIC | Age: 80
End: 2020-12-02
Payer: MEDICARE

## 2020-12-02 ENCOUNTER — LAB VISIT (OUTPATIENT)
Dept: LAB | Facility: HOSPITAL | Age: 80
End: 2020-12-02
Attending: INTERNAL MEDICINE
Payer: MEDICARE

## 2020-12-02 ENCOUNTER — SPECIALTY PHARMACY (OUTPATIENT)
Dept: PHARMACY | Facility: CLINIC | Age: 80
End: 2020-12-02

## 2020-12-02 VITALS
HEART RATE: 64 BPM | SYSTOLIC BLOOD PRESSURE: 148 MMHG | OXYGEN SATURATION: 100 % | WEIGHT: 173.06 LBS | HEIGHT: 67 IN | DIASTOLIC BLOOD PRESSURE: 81 MMHG | TEMPERATURE: 98 F | BODY MASS INDEX: 27.16 KG/M2 | RESPIRATION RATE: 18 BRPM

## 2020-12-02 DIAGNOSIS — I48.0 PAROXYSMAL ATRIAL FIBRILLATION: ICD-10-CM

## 2020-12-02 DIAGNOSIS — D64.9 SYMPTOMATIC ANEMIA: ICD-10-CM

## 2020-12-02 DIAGNOSIS — D61.818 PANCYTOPENIA: ICD-10-CM

## 2020-12-02 DIAGNOSIS — D45 POLYCYTHEMIA VERA: Primary | ICD-10-CM

## 2020-12-02 DIAGNOSIS — Z79.01 ANTICOAGULANT LONG-TERM USE: ICD-10-CM

## 2020-12-02 DIAGNOSIS — D45 POLYCYTHEMIA VERA: ICD-10-CM

## 2020-12-02 LAB
ABO + RH BLD: NORMAL
ANISOCYTOSIS BLD QL SMEAR: ABNORMAL
BASOPHILS NFR BLD: 2 % (ref 0–1.9)
BLD GP AB SCN CELLS X3 SERPL QL: NORMAL
DACRYOCYTES BLD QL SMEAR: ABNORMAL
DIFFERENTIAL METHOD: ABNORMAL
EOSINOPHIL NFR BLD: 2 % (ref 0–8)
ERYTHROCYTE [DISTWIDTH] IN BLOOD BY AUTOMATED COUNT: 18.5 % (ref 11.5–14.5)
HCT VFR BLD AUTO: 30 % (ref 40–54)
HGB BLD-MCNC: 8.6 G/DL (ref 14–18)
IMM GRANULOCYTES # BLD AUTO: ABNORMAL K/UL (ref 0–0.04)
IMM GRANULOCYTES NFR BLD AUTO: ABNORMAL % (ref 0–0.5)
LYMPHOCYTES NFR BLD: 35 % (ref 18–48)
MCH RBC QN AUTO: 25.8 PG (ref 27–31)
MCHC RBC AUTO-ENTMCNC: 28.7 G/DL (ref 32–36)
MCV RBC AUTO: 90 FL (ref 82–98)
METAMYELOCYTES NFR BLD MANUAL: 2 %
MONOCYTES NFR BLD: 5 % (ref 4–15)
MYELOCYTES NFR BLD MANUAL: 1 %
NEUTROPHILS # BLD AUTO: 2.1 K/UL (ref 1.8–7.7)
NEUTROPHILS NFR BLD: 43 % (ref 38–73)
NEUTS BAND NFR BLD MANUAL: 2 %
NRBC BLD-RTO: 16 /100 WBC
OVALOCYTES BLD QL SMEAR: ABNORMAL
PLATELET # BLD AUTO: 29 K/UL (ref 150–350)
PLATELET BLD QL SMEAR: ABNORMAL
PMV BLD AUTO: ABNORMAL FL (ref 9.2–12.9)
POIKILOCYTOSIS BLD QL SMEAR: SLIGHT
POLYCHROMASIA BLD QL SMEAR: ABNORMAL
RBC # BLD AUTO: 3.33 M/UL (ref 4.6–6.2)
WBC # BLD AUTO: 4.58 K/UL (ref 3.9–12.7)
WBC OTHER NFR BLD MANUAL: 8 %

## 2020-12-02 PROCEDURE — 99213 OFFICE O/P EST LOW 20 MIN: CPT | Mod: S$GLB,,, | Performed by: INTERNAL MEDICINE

## 2020-12-02 PROCEDURE — 3079F DIAST BP 80-89 MM HG: CPT | Mod: CPTII,S$GLB,, | Performed by: INTERNAL MEDICINE

## 2020-12-02 PROCEDURE — 3077F SYST BP >= 140 MM HG: CPT | Mod: CPTII,S$GLB,, | Performed by: INTERNAL MEDICINE

## 2020-12-02 PROCEDURE — 3288F FALL RISK ASSESSMENT DOCD: CPT | Mod: CPTII,S$GLB,, | Performed by: INTERNAL MEDICINE

## 2020-12-02 PROCEDURE — 99999 PR PBB SHADOW E&M-EST. PATIENT-LVL IV: ICD-10-PCS | Mod: PBBFAC,,, | Performed by: INTERNAL MEDICINE

## 2020-12-02 PROCEDURE — 85007 BL SMEAR W/DIFF WBC COUNT: CPT

## 2020-12-02 PROCEDURE — 1101F PT FALLS ASSESS-DOCD LE1/YR: CPT | Mod: CPTII,S$GLB,, | Performed by: INTERNAL MEDICINE

## 2020-12-02 PROCEDURE — 1159F MED LIST DOCD IN RCRD: CPT | Mod: S$GLB,,, | Performed by: INTERNAL MEDICINE

## 2020-12-02 PROCEDURE — 99999 PR PBB SHADOW E&M-EST. PATIENT-LVL IV: CPT | Mod: PBBFAC,,, | Performed by: INTERNAL MEDICINE

## 2020-12-02 PROCEDURE — 3077F PR MOST RECENT SYSTOLIC BLOOD PRESSURE >= 140 MM HG: ICD-10-PCS | Mod: CPTII,S$GLB,, | Performed by: INTERNAL MEDICINE

## 2020-12-02 PROCEDURE — 3288F PR FALLS RISK ASSESSMENT DOCUMENTED: ICD-10-PCS | Mod: CPTII,S$GLB,, | Performed by: INTERNAL MEDICINE

## 2020-12-02 PROCEDURE — 86901 BLOOD TYPING SEROLOGIC RH(D): CPT

## 2020-12-02 PROCEDURE — 85027 COMPLETE CBC AUTOMATED: CPT | Mod: PN

## 2020-12-02 PROCEDURE — 1159F PR MEDICATION LIST DOCUMENTED IN MEDICAL RECORD: ICD-10-PCS | Mod: S$GLB,,, | Performed by: INTERNAL MEDICINE

## 2020-12-02 PROCEDURE — 1101F PR PT FALLS ASSESS DOC 0-1 FALLS W/OUT INJ PAST YR: ICD-10-PCS | Mod: CPTII,S$GLB,, | Performed by: INTERNAL MEDICINE

## 2020-12-02 PROCEDURE — 1126F PR PAIN SEVERITY QUANTIFIED, NO PAIN PRESENT: ICD-10-PCS | Mod: S$GLB,,, | Performed by: INTERNAL MEDICINE

## 2020-12-02 PROCEDURE — 3079F PR MOST RECENT DIASTOLIC BLOOD PRESSURE 80-89 MM HG: ICD-10-PCS | Mod: CPTII,S$GLB,, | Performed by: INTERNAL MEDICINE

## 2020-12-02 PROCEDURE — 99213 PR OFFICE/OUTPT VISIT, EST, LEVL III, 20-29 MIN: ICD-10-PCS | Mod: S$GLB,,, | Performed by: INTERNAL MEDICINE

## 2020-12-02 PROCEDURE — 86850 RBC ANTIBODY SCREEN: CPT | Mod: PN

## 2020-12-02 PROCEDURE — 1126F AMNT PAIN NOTED NONE PRSNT: CPT | Mod: S$GLB,,, | Performed by: INTERNAL MEDICINE

## 2020-12-02 PROCEDURE — 85007 BL SMEAR W/DIFF WBC COUNT: CPT | Mod: PN

## 2020-12-02 PROCEDURE — 85027 COMPLETE CBC AUTOMATED: CPT

## 2020-12-02 PROCEDURE — 36415 COLL VENOUS BLD VENIPUNCTURE: CPT | Mod: PN

## 2020-12-02 NOTE — PROGRESS NOTES
History of present illness:  The patient is a 79-year-old white gentleman well known to me for polycythemia vera which has been managed with Hydrea and intermittent phlebotomy.  Patient was recently hospitalized and found to have developed pancytopenia.  Hemoglobin was in the 5s and responded well to transfusion of 2 units of packed red blood cells.  Interval bone marrow has been obtained and reveals fibrotic/spent bone marrow.  Blast percentage was not increased.  Patient has started Jakafi 10 mg twice daily, developed thrombocytopenia, and had dose reduced to 5 mg twice daily.  Unfortunately, patient's platelet count continued to fall Jakafi was eventually held.  Patient is now on Jakafi 5 mg daily and returns to review interval lab.  Patient feels well and is not experiencing dyspnea.  No other new complaints for pertinent findings on a 14 point review systems.    Physical examination:  Well-developed, well-nourished, black male in no acute distress, who has a weight of 173 lb (increased by 1 lb).  VITAL SIGNS: Documented  and reviewed this visit.  HEENT: Normocephalic, atraumatic. Oral mucosa pink and moist. Lips without   lesions. Tongue midline. Oropharynx clear. Nonicteric sclerae.   NECK: Supple, no adenopathy. No carotid bruits, thyromegaly or thyroid nodule.   HEART: Regular rate and rhythm without murmur, gallop or rub.   LUNGS: Clear to auscultation bilaterally. Normal respiratory effort.   ABDOMEN: Soft, nontender, nondistended with positive normoactive bowel sounds,   no hepatosplenomegaly.   EXTREMITIES: No cyanosis, clubbing or edema. Distal pulses are intact.     Laboratory:  White count 4.6, hemoglobin 8.6, hematocrit 30, platelets 29.    Impression:  1.  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  2.  Pancytopenia secondary to number 1.  3.  Atrial fibrillation/currently not receiving chronic anticoagulation.    Plan:  1.  Continue Jakafi 5 mg daily.  2.  Return to clinic in 4 weeks with interval CBC CMP,  LDH, magnesium.    This note was created using voice recognition software and may contain grammatical errors.

## 2020-12-02 NOTE — TELEPHONE ENCOUNTER
Specialty Pharmacy - Clinical Reassessment    Specialty Medication Orders Linked to Encounter      Most Recent Value   Medication #1  ruxolitinib (JAKAFI) 5 mg Tab (Order#[id number], Rx#[id number])        Fran Sharma is a 79 y.o. male, who is followed by the specialty pharmacy service for management and education.    Recent Encounters     Date Type Provider Description    12/02/2020 Specialty Pharmacy Madhuri Dennis PharmD Follow-up Clinical Reassessment    11/17/2020 Specialty Pharmacy Gilbert Li Refill Coordination        Clinical call attempts since last clinical assessment   11/25/2020  6:15 PM - Specialty Pharmacy - Clinical Reassessment by Madhuri Dennis PharmD     Today he received follow up education for his specialty medication(s).    Current Outpatient Medications   Medication Sig    acetaminophen (TYLENOL) 500 MG tablet Take 1 tablet (500 mg total) by mouth 2 (two) times daily. For shoulder pain (Patient taking differently: Take 500 mg by mouth 2 (two) times daily as needed. For shoulder pain)    dexchlorphen-phenylephrine-DM (POLYTUSSIN DM) 1-5-10 mg/5 mL Syrp Take 5 mLs by mouth every 4 (four) hours as needed.    glycopyrrolate (ROBINUL) 1 mg Tab TAKE ONE TABLET BY MOUTH TWICE DAILY. MAY TAKE UP TO THREE TIMES DAILY IF needed    levothyroxine (SYNTHROID) 50 MCG tablet TAKE 1 TABLET BY MOUTH BEFORE BREAKFAST    metoprolol succinate (TOPROL-XL) 50 MG 24 hr tablet Take 1 tablet (50 mg total) by mouth once daily.    multivitamin (ONE DAILY MULTIVITAMIN) per tablet Take 1 tablet by mouth once daily.    psyllium husk (METAMUCIL ORAL) Take 1 capsule by mouth once daily.    rosuvastatin (CRESTOR) 40 MG Tab Take 1 tablet (40 mg total) by mouth every evening.    ruxolitinib (JAKAFI) 5 mg Tab Take 5 mg by mouth once daily.    tadalafiL (CIALIS) 20 MG Tab Take 1 tablet (20 mg total) by mouth as needed.    tamsulosin (FLOMAX) 0.4 mg Cap Take 1 capsule (0.4 mg total) by mouth once  "daily.    triamcinolone acetonide 0.1% (KENALOG) 0.1 % cream APPLY TO AFFECTED AREA TWICE DAILY   Last reviewed on 12/2/2020 12:36 PM by Madhuri Dennis, PharmTERE    Review of patient's allergies indicates:  No Known AllergiesLast reviewed on  12/2/2020 12:36 PM by Madhuri Dennis              Objective    He has a past medical history of Atrial fibrillation (05/07/2017), BPH (benign prostatic hypertrophy), Colon polyps, Erectile dysfunction, GERD (gastroesophageal reflux disease), Hypertension, Pneumothorax on left, Polycythemia vera, and Pulmonary nodules.    Tried/failed medications: Hydrea    BP Readings from Last 4 Encounters:   12/02/20 (!) 148/81   11/04/20 (!) 140/77   10/28/20 136/76   10/19/20 (!) 142/70     Ht Readings from Last 4 Encounters:   12/02/20 5' 7" (1.702 m)   11/04/20 5' 7" (1.702 m)   10/28/20 5' 7" (1.702 m)   10/19/20 5' 7" (1.702 m)     Wt Readings from Last 4 Encounters:   12/02/20 78.5 kg (173 lb 1 oz)   11/04/20 79 kg (174 lb 2.6 oz)   10/28/20 78.7 kg (173 lb 8 oz)   10/19/20 80.2 kg (176 lb 12.9 oz)     Recent Labs   Lab Result Units 12/02/20  0818 11/04/20  0807 10/07/20  1004 09/09/20  1003   Creatinine mg/dL  --  1.09 1.11 1.13   ALT U/L  --  20 19 15   AST U/L  --  28 30 29   Hemoglobin g/dL 8.6 L 9.7 L 7.4 L 8.5 L     The goals of prescribed drug therapy management include:  · Supporting patient to meet the prescriber's medical treatment objectives  · Improving or maintaining quality of life  · Maintaining optimal therapy adherence  · Minimizing and managing side effects           Assessment/Plan  Patient plans to continue therapy without changes      Indication, dosage, appropriateness, effectiveness, safety and convenience of his specialty medication(s) were reviewed today.     Patient reached regarding ongoing Jakafi therapy for his Polycythemia Vera. He reports feeling well and denies symptoms of PV such as headaches, abdominal distension, dizziness, weakness, SOB, etc. He feels well " "overall and was seen by his provider today. He takes Jakafi once daily due to decreased platelets and while his hgb and platelets were decreased today, he did not require a transfusion. At the time of the call, the following was reviewed in detail:      Dosing, how taking Jakafi: Taking 1 - 5mg tablet once daily at the same time each day. Denies missed doses. Avoids grapefruit juice.   Storage: Room temperature - out of reach of children/pets.  Side effects: Denies any side effects from Jakafi. Reviewed common side effects such as muscle aches, dizziness, headaches, diarrhea, bruising. Tolerates well.   Recent infections: No recent fevers, flu-like symptoms or infection. Denies any bleeding, bruising, etc. No urgent care/ER visits.   Pain: Joint pain - knees, elbows and shoulder. 4-5/10 - takes Tylenol currently and "definitely helps" - brings down to 0/10. He does have an appointment to see rheumatology.  Appetite: Eating well - eats a light breakfast and larger lunch and dinner. Drinking plenty of fluids. Weight stable.  Energy, fatigue: Energy and activity levels are good. Completes all ADLs independently. Working on his "honey do list".   Health, mood, QOL: No new health conditions. Checking blood pressure at home daily - 145/80 mmHg was most recent reading. Remains compliant with metoprolol XL 50mg daily. No changes to thyroid medications - last TSH normal.  Next clinical follow up: 90 days   Medication list reviewed. No new allergies or health conditions.     Labs reviewed from: 12/2/2020 - WBC and ANC normal. Platelets and hgb decreased at 29k and 8.6 respectively. Did not require transfusion today - MD continues to monitor. Renal function and hepatic function normal. PV symptoms well controlled. Therapy remains appropriate and effective at this time.     Tasks added this encounter   No tasks added.   Tasks due within next 3 months   11/26/2020 - Clinical - Follow Up Assesement (90 day)  12/10/2020 - Refill " Call (Auto Added)     Madhuri Dennis, PharmD  [facility]  [address] [facility] [address]  Phone: [phone]  Fax: [phone]

## 2020-12-10 DIAGNOSIS — D45 POLYCYTHEMIA VERA: ICD-10-CM

## 2020-12-10 RX ORDER — RUXOLITINIB 5 MG/1
5 TABLET ORAL DAILY
Qty: 30 TABLET | Refills: 2 | Status: CANCELLED | OUTPATIENT
Start: 2020-12-10 | End: 2021-01-07

## 2020-12-10 NOTE — TELEPHONE ENCOUNTER
----- Message from Edelmira Beasley sent at 12/10/2020  2:01 PM CST -----  Contact: spouse  Type: Needs Medical Advice    Who Called:  spouse  Best Call Back Number: [phone]    Additional Information: Requesting a call back regarding pt is feeling very sluggish and is in a lot of pain. She also stated pt has had a fever 100.0 and was given tylenol .wanted to speak with nurse   Please Advise ---Thank you

## 2020-12-11 DIAGNOSIS — D45 POLYCYTHEMIA VERA: ICD-10-CM

## 2020-12-11 NOTE — TELEPHONE ENCOUNTER
----- Message from Terri Anguiano MA sent at 12/10/2020  2:44 PM CST -----  Contact: spouse    ----- Message -----  From: Edelmira Beasley  Sent: 12/10/2020   2:01 PM CST  To: Kelsey GARCIA Staff    Type: Needs Medical Advice    Who Called:  spouse  Best Call Back Number: [phone]    Additional Information: Requesting a call back regarding pt is feeling very sluggish and is in a lot of pain. She also stated pt has had a fever 100.0 and was given tylenol .wanted to speak with nurse   Please Advise ---Thank you

## 2020-12-12 ENCOUNTER — HOSPITAL ENCOUNTER (INPATIENT)
Facility: HOSPITAL | Age: 80
LOS: 3 days | Discharge: HOME | DRG: 177 | End: 2020-12-15
Attending: EMERGENCY MEDICINE | Admitting: INTERNAL MEDICINE
Payer: MEDICARE

## 2020-12-12 DIAGNOSIS — U07.1 PNEUMONIA DUE TO COVID-19 VIRUS: Primary | ICD-10-CM

## 2020-12-12 DIAGNOSIS — J12.82 PNEUMONIA DUE TO COVID-19 VIRUS: Primary | ICD-10-CM

## 2020-12-12 DIAGNOSIS — D45 POLYCYTHEMIA VERA: ICD-10-CM

## 2020-12-12 DIAGNOSIS — D61.818 PANCYTOPENIA: ICD-10-CM

## 2020-12-12 DIAGNOSIS — G92.9 ENCEPHALOPATHY, TOXIC: ICD-10-CM

## 2020-12-12 PROBLEM — N17.9 AKI (ACUTE KIDNEY INJURY): Status: ACTIVE | Noted: 2020-12-12

## 2020-12-12 LAB
ALBUMIN SERPL BCP-MCNC: 3.9 G/DL (ref 3.5–5.2)
ALP SERPL-CCNC: 116 U/L (ref 55–135)
ALT SERPL W/O P-5'-P-CCNC: 37 U/L (ref 10–44)
ANION GAP SERPL CALC-SCNC: 13 MMOL/L (ref 8–16)
ANISOCYTOSIS BLD QL SMEAR: ABNORMAL
AST SERPL-CCNC: 59 U/L (ref 10–40)
BASOPHILS NFR BLD: 0 % (ref 0–1.9)
BILIRUB SERPL-MCNC: 1.2 MG/DL (ref 0.1–1)
BNP SERPL-MCNC: 12 PG/ML (ref 0–99)
BUN SERPL-MCNC: 20 MG/DL (ref 8–23)
CALCIUM SERPL-MCNC: 8.6 MG/DL (ref 8.7–10.5)
CHLORIDE SERPL-SCNC: 101 MMOL/L (ref 95–110)
CK SERPL-CCNC: 340 U/L (ref 20–200)
CO2 SERPL-SCNC: 21 MMOL/L (ref 23–29)
CREAT SERPL-MCNC: 1.4 MG/DL (ref 0.5–1.4)
CRP SERPL-MCNC: 69.7 MG/L (ref 0–8.2)
D DIMER PPP IA.FEU-MCNC: 0.3 MG/L FEU
DACRYOCYTES BLD QL SMEAR: ABNORMAL
DIFFERENTIAL METHOD: ABNORMAL
EOSINOPHIL NFR BLD: 0 % (ref 0–8)
ERYTHROCYTE [DISTWIDTH] IN BLOOD BY AUTOMATED COUNT: 18.2 % (ref 11.5–14.5)
ERYTHROCYTE [SEDIMENTATION RATE] IN BLOOD BY WESTERGREN METHOD: 65 MM/HR (ref 0–10)
EST. GFR  (AFRICAN AMERICAN): 55 ML/MIN/1.73 M^2
EST. GFR  (NON AFRICAN AMERICAN): 47 ML/MIN/1.73 M^2
FERRITIN SERPL-MCNC: 1123 NG/ML (ref 20–300)
GLUCOSE SERPL-MCNC: 124 MG/DL (ref 70–110)
HCT VFR BLD AUTO: 26.6 % (ref 40–54)
HGB BLD-MCNC: 8 G/DL (ref 14–18)
IMM GRANULOCYTES # BLD AUTO: ABNORMAL K/UL (ref 0–0.04)
IMM GRANULOCYTES NFR BLD AUTO: ABNORMAL % (ref 0–0.5)
INFLUENZA A, MOLECULAR: NEGATIVE
INFLUENZA B, MOLECULAR: NEGATIVE
LACTATE SERPL-SCNC: 1 MMOL/L (ref 0.5–2.2)
LDH SERPL L TO P-CCNC: 789 U/L (ref 110–260)
LYMPHOCYTES NFR BLD: 10 % (ref 18–48)
MCH RBC QN AUTO: 26.2 PG (ref 27–31)
MCHC RBC AUTO-ENTMCNC: 30.1 G/DL (ref 32–36)
MCV RBC AUTO: 87 FL (ref 82–98)
METAMYELOCYTES NFR BLD MANUAL: 4 %
MONOCYTES NFR BLD: 5 % (ref 4–15)
MYELOCYTES NFR BLD MANUAL: 2 %
NEUTROPHILS NFR BLD: 27 % (ref 38–73)
NEUTS BAND NFR BLD MANUAL: 3 %
NRBC BLD-RTO: 8 /100 WBC
OVALOCYTES BLD QL SMEAR: ABNORMAL
PLATELET # BLD AUTO: 22 K/UL (ref 150–350)
PLATELET BLD QL SMEAR: ABNORMAL
PMV BLD AUTO: ABNORMAL FL (ref 9.2–12.9)
POIKILOCYTOSIS BLD QL SMEAR: ABNORMAL
POTASSIUM SERPL-SCNC: 4.4 MMOL/L (ref 3.5–5.1)
PROCALCITONIN SERPL IA-MCNC: 0.45 NG/ML
PROT SERPL-MCNC: 7.6 G/DL (ref 6–8.4)
RBC # BLD AUTO: 3.05 M/UL (ref 4.6–6.2)
SARS-COV-2 RDRP RESP QL NAA+PROBE: POSITIVE
SODIUM SERPL-SCNC: 135 MMOL/L (ref 136–145)
SPECIMEN SOURCE: NORMAL
TROPONIN I SERPL DL<=0.01 NG/ML-MCNC: <0.006 NG/ML (ref 0–0.03)
WBC # BLD AUTO: 4.2 K/UL (ref 3.9–12.7)
WBC OTHER NFR BLD MANUAL: 49 %

## 2020-12-12 PROCEDURE — 85379 FIBRIN DEGRADATION QUANT: CPT

## 2020-12-12 PROCEDURE — 87502 INFLUENZA DNA AMP PROBE: CPT

## 2020-12-12 PROCEDURE — 36415 COLL VENOUS BLD VENIPUNCTURE: CPT

## 2020-12-12 PROCEDURE — 87040 BLOOD CULTURE FOR BACTERIA: CPT

## 2020-12-12 PROCEDURE — 83880 ASSAY OF NATRIURETIC PEPTIDE: CPT

## 2020-12-12 PROCEDURE — 84145 PROCALCITONIN (PCT): CPT

## 2020-12-12 PROCEDURE — 83615 LACTATE (LD) (LDH) ENZYME: CPT

## 2020-12-12 PROCEDURE — 85007 BL SMEAR W/DIFF WBC COUNT: CPT

## 2020-12-12 PROCEDURE — 63700000 PHARM REV CODE 250 ALT 637 W/O HCPCS: Performed by: NURSE PRACTITIONER

## 2020-12-12 PROCEDURE — 83605 ASSAY OF LACTIC ACID: CPT

## 2020-12-12 PROCEDURE — 85027 COMPLETE CBC AUTOMATED: CPT

## 2020-12-12 PROCEDURE — 63600175 PHARM REV CODE 636 W HCPCS: Performed by: NURSE PRACTITIONER

## 2020-12-12 PROCEDURE — 82550 ASSAY OF CK (CPK): CPT

## 2020-12-12 PROCEDURE — 84484 ASSAY OF TROPONIN QUANT: CPT

## 2020-12-12 PROCEDURE — 25000003 PHARM REV CODE 250: Performed by: NURSE PRACTITIONER

## 2020-12-12 PROCEDURE — 12000002 HC ACUTE/MED SURGE SEMI-PRIVATE ROOM

## 2020-12-12 PROCEDURE — 80053 COMPREHEN METABOLIC PANEL: CPT

## 2020-12-12 PROCEDURE — 99285 EMERGENCY DEPT VISIT HI MDM: CPT | Mod: 25

## 2020-12-12 PROCEDURE — 25000003 PHARM REV CODE 250: Performed by: EMERGENCY MEDICINE

## 2020-12-12 PROCEDURE — U0002 COVID-19 LAB TEST NON-CDC: HCPCS

## 2020-12-12 PROCEDURE — 94761 N-INVAS EAR/PLS OXIMETRY MLT: CPT

## 2020-12-12 PROCEDURE — 86140 C-REACTIVE PROTEIN: CPT

## 2020-12-12 PROCEDURE — 82728 ASSAY OF FERRITIN: CPT

## 2020-12-12 PROCEDURE — 85651 RBC SED RATE NONAUTOMATED: CPT

## 2020-12-12 RX ORDER — GLUCAGON 1 MG
1 KIT INJECTION
Status: DISCONTINUED | OUTPATIENT
Start: 2020-12-12 | End: 2020-12-15 | Stop reason: HOSPADM

## 2020-12-12 RX ORDER — IBUPROFEN 200 MG
24 TABLET ORAL
Status: DISCONTINUED | OUTPATIENT
Start: 2020-12-12 | End: 2020-12-15 | Stop reason: HOSPADM

## 2020-12-12 RX ORDER — METOPROLOL SUCCINATE 50 MG/1
50 TABLET, EXTENDED RELEASE ORAL DAILY
Status: DISCONTINUED | OUTPATIENT
Start: 2020-12-13 | End: 2020-12-15 | Stop reason: HOSPADM

## 2020-12-12 RX ORDER — CHOLECALCIFEROL (VITAMIN D3) 25 MCG
1000 TABLET ORAL DAILY
Status: DISCONTINUED | OUTPATIENT
Start: 2020-12-13 | End: 2020-12-15 | Stop reason: HOSPADM

## 2020-12-12 RX ORDER — BENZONATATE 100 MG/1
100 CAPSULE ORAL 3 TIMES DAILY PRN
Status: DISCONTINUED | OUTPATIENT
Start: 2020-12-12 | End: 2020-12-15 | Stop reason: HOSPADM

## 2020-12-12 RX ORDER — SODIUM,POTASSIUM PHOSPHATES 280-250MG
2 POWDER IN PACKET (EA) ORAL
Status: DISCONTINUED | OUTPATIENT
Start: 2020-12-12 | End: 2020-12-15 | Stop reason: HOSPADM

## 2020-12-12 RX ORDER — LANOLIN ALCOHOL/MO/W.PET/CERES
800 CREAM (GRAM) TOPICAL
Status: DISCONTINUED | OUTPATIENT
Start: 2020-12-12 | End: 2020-12-15 | Stop reason: HOSPADM

## 2020-12-12 RX ORDER — TAMSULOSIN HYDROCHLORIDE 0.4 MG/1
1 CAPSULE ORAL DAILY
Status: DISCONTINUED | OUTPATIENT
Start: 2020-12-13 | End: 2020-12-15 | Stop reason: HOSPADM

## 2020-12-12 RX ORDER — ACETAMINOPHEN 500 MG
1000 TABLET ORAL EVERY 6 HOURS PRN
Status: DISCONTINUED | OUTPATIENT
Start: 2020-12-12 | End: 2020-12-15 | Stop reason: HOSPADM

## 2020-12-12 RX ORDER — SODIUM CHLORIDE 9 MG/ML
INJECTION, SOLUTION INTRAVENOUS CONTINUOUS
Status: DISCONTINUED | OUTPATIENT
Start: 2020-12-12 | End: 2020-12-13

## 2020-12-12 RX ORDER — TALC
6 POWDER (GRAM) TOPICAL NIGHTLY PRN
Status: DISCONTINUED | OUTPATIENT
Start: 2020-12-12 | End: 2020-12-15 | Stop reason: HOSPADM

## 2020-12-12 RX ORDER — ONDANSETRON 2 MG/ML
4 INJECTION INTRAMUSCULAR; INTRAVENOUS EVERY 6 HOURS PRN
Status: DISCONTINUED | OUTPATIENT
Start: 2020-12-12 | End: 2020-12-15 | Stop reason: HOSPADM

## 2020-12-12 RX ORDER — GLYCOPYRROLATE 1 MG/1
1 TABLET ORAL 2 TIMES DAILY
Status: DISCONTINUED | OUTPATIENT
Start: 2020-12-12 | End: 2020-12-15 | Stop reason: HOSPADM

## 2020-12-12 RX ORDER — AZITHROMYCIN 250 MG/1
250 TABLET, FILM COATED ORAL DAILY
Status: DISCONTINUED | OUTPATIENT
Start: 2020-12-13 | End: 2020-12-15 | Stop reason: HOSPADM

## 2020-12-12 RX ORDER — SODIUM CHLORIDE 0.9 % (FLUSH) 0.9 %
10 SYRINGE (ML) INJECTION
Status: DISCONTINUED | OUTPATIENT
Start: 2020-12-12 | End: 2020-12-15 | Stop reason: HOSPADM

## 2020-12-12 RX ORDER — ROSUVASTATIN CALCIUM 20 MG/1
40 TABLET, COATED ORAL NIGHTLY
Status: DISCONTINUED | OUTPATIENT
Start: 2020-12-12 | End: 2020-12-15 | Stop reason: HOSPADM

## 2020-12-12 RX ORDER — IBUPROFEN 200 MG
16 TABLET ORAL
Status: DISCONTINUED | OUTPATIENT
Start: 2020-12-12 | End: 2020-12-15 | Stop reason: HOSPADM

## 2020-12-12 RX ORDER — ACETAMINOPHEN 500 MG
1000 TABLET ORAL
Status: COMPLETED | OUTPATIENT
Start: 2020-12-12 | End: 2020-12-12

## 2020-12-12 RX ORDER — LEVOTHYROXINE SODIUM 50 UG/1
50 TABLET ORAL
Status: DISCONTINUED | OUTPATIENT
Start: 2020-12-13 | End: 2020-12-15 | Stop reason: HOSPADM

## 2020-12-12 RX ORDER — AZITHROMYCIN 250 MG/1
500 TABLET, FILM COATED ORAL ONCE
Status: COMPLETED | OUTPATIENT
Start: 2020-12-12 | End: 2020-12-12

## 2020-12-12 RX ORDER — ASCORBIC ACID 500 MG
500 TABLET ORAL 2 TIMES DAILY
Status: DISCONTINUED | OUTPATIENT
Start: 2020-12-12 | End: 2020-12-15 | Stop reason: HOSPADM

## 2020-12-12 RX ADMIN — ROSUVASTATIN CALCIUM 40 MG: 20 TABLET, FILM COATED ORAL at 09:12

## 2020-12-12 RX ADMIN — ACETAMINOPHEN 1000 MG: 500 TABLET ORAL at 01:12

## 2020-12-12 RX ADMIN — SODIUM CHLORIDE 50 ML/HR: 0.9 INJECTION, SOLUTION INTRAVENOUS at 06:12

## 2020-12-12 RX ADMIN — CEFTRIAXONE 1 G: 1 INJECTION, SOLUTION INTRAVENOUS at 05:12

## 2020-12-12 RX ADMIN — AZITHROMYCIN 500 MG: 250 TABLET, FILM COATED ORAL at 05:12

## 2020-12-12 RX ADMIN — Medication 500 MG: at 09:12

## 2020-12-12 RX ADMIN — GLYCOPYRROLATE 1 MG: 1 TABLET ORAL at 09:12

## 2020-12-12 NOTE — ASSESSMENT & PLAN NOTE
Historical diagnosis.  Rate controlled at this time.  Continue metoprolol.  Not a candidate for anticoagulation due to severe thrombocytopenia.  Monitor on telemetry.

## 2020-12-12 NOTE — ASSESSMENT & PLAN NOTE
Suspect this is related to COVID and fever.  -CT head negative.  -neuro checks  -consult with Neurology.  -fall precautions.

## 2020-12-12 NOTE — H&P
[facility] Hospital Medicine  History & Physical    Patient Name: Morro Sharma  MRN: [id number]  Admission Date: 12/12/2020  Attending Physician: Tana Mace MD  Primary Care Provider: Yaniv Escobar MD         Patient information was obtained from patient, spouse/SO, past medical records and ER records.     Subjective:     Principal Problem:Encephalopathy, toxic    Chief Complaint:   Chief Complaint   Patient presents with    Altered Mental Status     covid positive / family states confused        HPI: Morro Sharma is a 79-year-old male with PMHx significant for HTN, paroxysmal AFib, polycythemia vera in spent phase requiring transfusions now on Jakafi and followed closely by Hematology.  Pt presented to the ED for further evaluation of altered mental status.  Pt was diagnosed with COVID yesterday.  He was seen at urgent care.  He reports 2-3 day Hx of fever and fatigue.  T-max 90° 9-100 at home per wife.  Today, Pt exhibited confusion.  His wife states he was not acting like himself and sometimes did not answer her questions.  She states he left in the car today dressed in his pajamas.  She is unsure where he went.  Pt denies any recall of events.  He is completely at his baseline now.  He reports mild cough without SOB, chest pain, nausea, vomiting, diarrhea, or abdominal pain.  He had a CT head in ED which was negative for any acute findings.  His labs continue to demonstrate pancytopenia.  He has mild evidence of VINEET and CXR confirming pneumonia likely viral.  He was admitted to the service of hospital Medicine for additional evaluation and management.  Other pertinent medical Hx as below:    Past Medical History:   Diagnosis Date    Atrial fibrillation 05/07/2017    [facility] records sent to scanning    BPH (benign prostatic hypertrophy)     Colon polyps     Erectile dysfunction     GERD (gastroesophageal reflux disease)     Hypertension     Pneumonia due to COVID-19 virus  12/12/2020    Pneumothorax on left     Polycythemia vera     Pulmonary nodules        Past Surgical History:   Procedure Laterality Date    COLONOSCOPY  9/15/14    Dr. Mcdaniels, 5 year recheck    ESOPHAGOGASTRODUODENOSCOPY      LUNG SURGERY      LEFT - CHEST TUBE       Review of patient's allergies indicates:  No Known Allergies    No current facility-administered medications on file prior to encounter.      Current Outpatient Medications on File Prior to Encounter   Medication Sig    acetaminophen (TYLENOL) 500 MG tablet Take 1 tablet (500 mg total) by mouth 2 (two) times daily. For shoulder pain (Patient taking differently: Take 500 mg by mouth 2 (two) times daily as needed. For shoulder pain)    dexchlorphen-phenylephrine-DM (POLYTUSSIN DM) 1-5-10 mg/5 mL Syrp Take 5 mLs by mouth every 4 (four) hours as needed.    glycopyrrolate (ROBINUL) 1 mg Tab TAKE ONE TABLET BY MOUTH TWICE DAILY. MAY TAKE UP TO THREE TIMES DAILY IF needed    levothyroxine (SYNTHROID) 50 MCG tablet TAKE 1 TABLET BY MOUTH BEFORE BREAKFAST    metoprolol succinate (TOPROL-XL) 50 MG 24 hr tablet Take 1 tablet (50 mg total) by mouth once daily.    multivitamin (ONE DAILY MULTIVITAMIN) per tablet Take 1 tablet by mouth once daily.    psyllium husk (METAMUCIL ORAL) Take 1 capsule by mouth once daily.    rosuvastatin (CRESTOR) 40 MG Tab Take 1 tablet (40 mg total) by mouth every evening.    ruxolitinib (JAKAFI) 5 mg Tab Take 5 mg by mouth once daily.    tadalafiL (CIALIS) 20 MG Tab Take 1 tablet (20 mg total) by mouth as needed.    tamsulosin (FLOMAX) 0.4 mg Cap Take 1 capsule (0.4 mg total) by mouth once daily.    triamcinolone acetonide 0.1% (KENALOG) 0.1 % cream APPLY TO AFFECTED AREA TWICE DAILY     Family History     Problem Relation (Age of Onset)    Early death Father    Heart disease Mother, Maternal Aunt, Maternal Uncle    Hypertension Mother, Maternal Aunt, Maternal Uncle    No Known Problems Daughter, Daughter, Daughter,  Daughter        Tobacco Use    Smoking status: Former Smoker     Packs/day: 1.50     Years: 25.00     Pack years: 37.50     Quit date: 10/2/1982     Years since quittin.2    Smokeless tobacco: Never Used   Substance and Sexual Activity    Alcohol use: No     Frequency: Monthly or less     Drinks per session: 1 or 2     Binge frequency: Never    Drug use: No    Sexual activity: Yes     Review of Systems   Constitutional: Positive for fatigue and fever. Negative for chills.   HENT: Negative for congestion, postnasal drip and trouble swallowing.    Eyes: Negative for photophobia and visual disturbance.   Respiratory: Positive for cough. Negative for shortness of breath and wheezing.    Gastrointestinal: Negative for abdominal pain, constipation, diarrhea, nausea and vomiting.   Genitourinary: Negative for difficulty urinating, dysuria and flank pain.        Urine is darker   Musculoskeletal: Negative for arthralgias and myalgias.   Skin: Negative for color change.   Neurological: Positive for speech difficulty ( wife reports he is sometimes not answering her when she talks to him.) and weakness (Generalized, no focal weakness.). Negative for dizziness, seizures, facial asymmetry, light-headedness, numbness and headaches.   Psychiatric/Behavioral: Positive for confusion. The patient is not nervous/anxious.      Objective:     Vital Signs (Most Recent):  Temp: 99.9 °F (37.7 °C) (20)  Pulse: 77 (20 165)  Resp: 18 (20)  BP: (!) 115/56 (20)  SpO2: 98 % (20) Vital Signs (24h Range):  Temp:  [99.9 °F (37.7 °C)-100.9 °F (38.3 °C)] 99.9 °F (37.7 °C)  Pulse:  [77-88] 77  Resp:  [18] 18  SpO2:  [90 %-98 %] 98 %  BP: (106-124)/(56-70) 115/56     Weight: 78.5 kg (173 lb)  Body mass index is 27.1 kg/m².    Physical Exam  Vitals signs and nursing note reviewed.   Constitutional:       Appearance: Normal appearance. He is normal weight.   HENT:      Head: Normocephalic and  atraumatic.      Mouth/Throat:      Mouth: Mucous membranes are moist.      Pharynx: Oropharynx is clear.   Eyes:      Extraocular Movements: Extraocular movements intact.      Conjunctiva/sclera: Conjunctivae normal.      Pupils: Pupils are equal, round, and reactive to light.   Neck:      Musculoskeletal: Normal range of motion and neck supple.   Cardiovascular:      Rate and Rhythm: Normal rate and regular rhythm.      Pulses: Normal pulses.      Heart sounds: Normal heart sounds.   Pulmonary:      Effort: Pulmonary effort is normal. No respiratory distress.      Breath sounds: Normal breath sounds.   Abdominal:      General: Abdomen is flat. Bowel sounds are normal.      Palpations: Abdomen is soft.   Musculoskeletal: Normal range of motion.   Skin:     General: Skin is warm and dry.      Capillary Refill: Capillary refill takes less than 2 seconds.   Neurological:      General: No focal deficit present.      Mental Status: He is alert and oriented to person, place, and time. Mental status is at baseline.      Cranial Nerves: No cranial nerve deficit.      Sensory: No sensory deficit.      Motor: No weakness.      Coordination: Coordination normal.   Psychiatric:         Mood and Affect: Mood normal.         Behavior: Behavior normal.         Thought Content: Thought content normal.         Judgment: Judgment normal.           CRANIAL NERVES     CN III, IV, VI   Pupils are equal, round, and reactive to light.       Significant Labs:   CBC:   Recent Labs   Lab 12/12/20  1355   WBC 4.20   HGB 8.0*   HCT 26.6*   PLT 22*     CMP:   Recent Labs   Lab 12/12/20  1355   *   K 4.4      CO2 21*   *   BUN 20   CREATININE 1.4   CALCIUM 8.6*   PROT 7.6   ALBUMIN 3.9   BILITOT 1.2*   ALKPHOS 116   AST 59*   ALT 37   ANIONGAP 13   EGFRNONAA 47*     Troponin: No results for input(s): TROPONINI in the last 48 hours.  TSH:   Recent Labs   Lab 07/20/20  0907   TSH 2.058       Significant Imaging: I have reviewed  all pertinent imaging results/findings within the past 24 hours.    Assessment/Plan:     * Encephalopathy, toxic  Suspect this is related to COVID and fever.  -CT head negative.  -neuro checks  -consult with Neurology.  -fall precautions.      Pneumonia due to COVID-19 virus  - COVID-19 testing Collection Date: 6/22/2020 Collection Time:  11:34 AM   - Infection Control notified     - Isolation:   - Airborne, Contact and Droplet Precautions  - Cohort patients into COVID units  - N95 mask, wear eye protection  - 20 second hand hygiene              - Limit visitors per hospital policy              - Consolidating lab draws, nursing care, provider visits, and interventions    - Diagnostics: (leukopenia, hyponatremia, hyperferritinemia, elevated troponin, elevated d-dimer, age, and comorbidities are significant predictors of poor clinical outcome)  CBC, CMP, Procalcitonin, Ferritin, CRP, LDH, Troponin and Portable CXR    - Management:  Supplemental O2 to maintain SpO2 >92%  Telemetry  Continuous/intermittent Pulse Ox  Albuterol treatment PRN    Pt with normal O2 sats at this time.  Will not start Decadron or remdesivir at this time- clinically monitor and decide as needed.  There is evidence of pneumonia on the CXR.  Will start at Rocephin/azithromycin and follow procalcitonin.               VINEET (acute kidney injury)  Pt with very mild VINEET.    Will give very judicious fluid resuscitation.    Follow-up BMP in a.m..    Check UA.    Avoid nonessential nephrotoxins.   Dose medication for Estimated Creatinine Clearance: 40 mL/min (based on SCr of 1.4 mg/dL).      Pancytopenia  Due to polycythemia vera and spent phase per Hematology notes.  We will avoid any anti thrombotic that this time.  If counts become concerningly worsened, will consult with Hematology.      Paroxysmal atrial fibrillation, onset 5/2017, CHADS-VAS score 4  Historical diagnosis.  Rate controlled at this time.  Continue metoprolol.  Not a candidate for  anticoagulation due to severe thrombocytopenia.  Monitor on telemetry.      Polycythemia vera  1.  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  2.  Pancytopenia secondary to number 1.    Hold Jakafi acutely.    Hypertension  Chronic, controlled.  Latest blood pressure and vitals reviewed-   Temp:  [100.9 °F (38.3 °C)]   Pulse:  [82-88]   Resp:  [18]   BP: (106-124)/(58-70)   SpO2:  [90 %-97 %] .   Home meds for hypertension were reviewed and noted below. Hospital anti-hypertensive changes were made as shown below.  Hypertension Medications             metoprolol succinate (TOPROL-XL) 50 MG 24 hr tablet Take 1 tablet (50 mg total) by mouth once daily.        Will utilize p.r.n. blood pressure medication only if patient's blood pressure greater than  180/110 and he develops symptoms such as worsening chest pain or shortness of breath.          VTE Risk Mitigation (From admission, onward)    None             Kristy Arango NP  Department of Hospital Medicine   [facility]

## 2020-12-12 NOTE — SUBJECTIVE & OBJECTIVE
Past Medical History:   Diagnosis Date    Atrial fibrillation 05/07/2017    [facility] records sent to scanning    BPH (benign prostatic hypertrophy)     Colon polyps     Erectile dysfunction     GERD (gastroesophageal reflux disease)     Hypertension     Pneumonia due to COVID-19 virus 12/12/2020    Pneumothorax on left     Polycythemia vera     Pulmonary nodules        Past Surgical History:   Procedure Laterality Date    COLONOSCOPY  9/15/14    Dr. Mcdaniels, 5 year recheck    ESOPHAGOGASTRODUODENOSCOPY      LUNG SURGERY      LEFT - CHEST TUBE       Review of patient's allergies indicates:  No Known Allergies    No current facility-administered medications on file prior to encounter.      Current Outpatient Medications on File Prior to Encounter   Medication Sig    acetaminophen (TYLENOL) 500 MG tablet Take 1 tablet (500 mg total) by mouth 2 (two) times daily. For shoulder pain (Patient taking differently: Take 500 mg by mouth 2 (two) times daily as needed. For shoulder pain)    dexchlorphen-phenylephrine-DM (POLYTUSSIN DM) 1-5-10 mg/5 mL Syrp Take 5 mLs by mouth every 4 (four) hours as needed.    glycopyrrolate (ROBINUL) 1 mg Tab TAKE ONE TABLET BY MOUTH TWICE DAILY. MAY TAKE UP TO THREE TIMES DAILY IF needed    levothyroxine (SYNTHROID) 50 MCG tablet TAKE 1 TABLET BY MOUTH BEFORE BREAKFAST    metoprolol succinate (TOPROL-XL) 50 MG 24 hr tablet Take 1 tablet (50 mg total) by mouth once daily.    multivitamin (ONE DAILY MULTIVITAMIN) per tablet Take 1 tablet by mouth once daily.    psyllium husk (METAMUCIL ORAL) Take 1 capsule by mouth once daily.    rosuvastatin (CRESTOR) 40 MG Tab Take 1 tablet (40 mg total) by mouth every evening.    ruxolitinib (JAKAFI) 5 mg Tab Take 5 mg by mouth once daily.    tadalafiL (CIALIS) 20 MG Tab Take 1 tablet (20 mg total) by mouth as needed.    tamsulosin (FLOMAX) 0.4 mg Cap Take 1 capsule (0.4 mg total) by mouth once daily.    triamcinolone acetonide 0.1%  (KENALOG) 0.1 % cream APPLY TO AFFECTED AREA TWICE DAILY     Family History     Problem Relation (Age of Onset)    Early death Father    Heart disease Mother, Maternal Aunt, Maternal Uncle    Hypertension Mother, Maternal Aunt, Maternal Uncle    No Known Problems Daughter, Daughter, Daughter, Daughter        Tobacco Use    Smoking status: Former Smoker     Packs/day: 1.50     Years: 25.00     Pack years: 37.50     Quit date: 10/2/1982     Years since quittin.2    Smokeless tobacco: Never Used   Substance and Sexual Activity    Alcohol use: No     Frequency: Monthly or less     Drinks per session: 1 or 2     Binge frequency: Never    Drug use: No    Sexual activity: Yes     Review of Systems   Constitutional: Positive for fatigue and fever. Negative for chills.   HENT: Negative for congestion, postnasal drip and trouble swallowing.    Eyes: Negative for photophobia and visual disturbance.   Respiratory: Positive for cough. Negative for shortness of breath and wheezing.    Gastrointestinal: Negative for abdominal pain, constipation, diarrhea, nausea and vomiting.   Genitourinary: Negative for difficulty urinating, dysuria and flank pain.        Urine is darker   Musculoskeletal: Negative for arthralgias and myalgias.   Skin: Negative for color change.   Neurological: Positive for speech difficulty ( wife reports he is sometimes not answering her when she talks to him.) and weakness (Generalized, no focal weakness.). Negative for dizziness, seizures, facial asymmetry, light-headedness, numbness and headaches.   Psychiatric/Behavioral: Positive for confusion. The patient is not nervous/anxious.      Objective:     Vital Signs (Most Recent):  Temp: 99.9 °F (37.7 °C) (20)  Pulse: 77 (20)  Resp: 18 (20)  BP: (!) 115/56 (20)  SpO2: 98 % (20) Vital Signs (24h Range):  Temp:  [99.9 °F (37.7 °C)-100.9 °F (38.3 °C)] 99.9 °F (37.7 °C)  Pulse:  [77-88] 77  Resp:   [18] 18  SpO2:  [90 %-98 %] 98 %  BP: (106-124)/(56-70) 115/56     Weight: 78.5 kg (173 lb)  Body mass index is 27.1 kg/m².    Physical Exam  Vitals signs and nursing note reviewed.   Constitutional:       Appearance: Normal appearance. He is normal weight.   HENT:      Head: Normocephalic and atraumatic.      Mouth/Throat:      Mouth: Mucous membranes are moist.      Pharynx: Oropharynx is clear.   Eyes:      Extraocular Movements: Extraocular movements intact.      Conjunctiva/sclera: Conjunctivae normal.      Pupils: Pupils are equal, round, and reactive to light.   Neck:      Musculoskeletal: Normal range of motion and neck supple.   Cardiovascular:      Rate and Rhythm: Normal rate and regular rhythm.      Pulses: Normal pulses.      Heart sounds: Normal heart sounds.   Pulmonary:      Effort: Pulmonary effort is normal. No respiratory distress.      Breath sounds: Normal breath sounds.   Abdominal:      General: Abdomen is flat. Bowel sounds are normal.      Palpations: Abdomen is soft.   Musculoskeletal: Normal range of motion.   Skin:     General: Skin is warm and dry.      Capillary Refill: Capillary refill takes less than 2 seconds.   Neurological:      General: No focal deficit present.      Mental Status: He is alert and oriented to person, place, and time. Mental status is at baseline.      Cranial Nerves: No cranial nerve deficit.      Sensory: No sensory deficit.      Motor: No weakness.      Coordination: Coordination normal.   Psychiatric:         Mood and Affect: Mood normal.         Behavior: Behavior normal.         Thought Content: Thought content normal.         Judgment: Judgment normal.           CRANIAL NERVES     CN III, IV, VI   Pupils are equal, round, and reactive to light.       Significant Labs:   CBC:   Recent Labs   Lab 12/12/20  1355   WBC 4.20   HGB 8.0*   HCT 26.6*   PLT 22*     CMP:   Recent Labs   Lab 12/12/20  1355   *   K 4.4      CO2 21*   *   BUN 20    CREATININE 1.4   CALCIUM 8.6*   PROT 7.6   ALBUMIN 3.9   BILITOT 1.2*   ALKPHOS 116   AST 59*   ALT 37   ANIONGAP 13   EGFRNONAA 47*     Troponin: No results for input(s): TROPONINI in the last 48 hours.  TSH:   Recent Labs   Lab 07/20/20  0907   TSH 2.058       Significant Imaging: I have reviewed all pertinent imaging results/findings within the past 24 hours.

## 2020-12-12 NOTE — ASSESSMENT & PLAN NOTE
1.  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  2.  Pancytopenia secondary to number 1.    Hold Jakafi acutely.

## 2020-12-12 NOTE — ASSESSMENT & PLAN NOTE
- COVID-19 testing Collection Date: 6/22/2020 Collection Time:  11:34 AM   - Infection Control notified     - Isolation:   - Airborne, Contact and Droplet Precautions  - Cohort patients into COVID units  - N95 mask, wear eye protection  - 20 second hand hygiene              - Limit visitors per hospital policy              - Consolidating lab draws, nursing care, provider visits, and interventions    - Diagnostics: (leukopenia, hyponatremia, hyperferritinemia, elevated troponin, elevated d-dimer, age, and comorbidities are significant predictors of poor clinical outcome)  CBC, CMP, Procalcitonin, Ferritin, CRP, LDH, Troponin and Portable CXR    - Management:  Supplemental O2 to maintain SpO2 >92%  Telemetry  Continuous/intermittent Pulse Ox  Albuterol treatment PRN    Pt with normal O2 sats at this time.  Will not start Decadron or remdesivir at this time- clinically monitor and decide as needed.  There is evidence of pneumonia on the CXR.  Will start at Rocephin/azithromycin and follow procalcitonin.

## 2020-12-12 NOTE — HPI
Morro Sharma is a 79-year-old male with PMHx significant for HTN, paroxysmal AFib, polycythemia vera in spent phase requiring transfusions now on Jakafi and followed closely by Hematology.  Pt presented to the ED for further evaluation of altered mental status.  Pt was diagnosed with COVID yesterday.  He was seen at urgent care.  He reports 2-3 day Hx of fever and fatigue.  T-max 90° 9-100 at home per wife.  Today, Pt exhibited confusion.  His wife states he was not acting like himself and sometimes did not answer her questions.  She states he left in the car today dressed in his pajamas.  She is unsure where he went.  Pt denies any recall of events.  He is completely at his baseline now.  He reports mild cough without SOB, chest pain, nausea, vomiting, diarrhea, or abdominal pain.  He had a CT head in ED which was negative for any acute findings.  His labs continue to demonstrate pancytopenia.  He has mild evidence of VINEET and CXR confirming pneumonia likely viral.  He was admitted to the service of hospital Medicine for additional evaluation and management.  Other pertinent medical Hx as below:

## 2020-12-12 NOTE — ED PROVIDER NOTES
Encounter Date: 12/12/2020    SCRIBE #1 NOTE: ISkinny, wilmer scribing for, and in the presence of, Romero Arzola MD.       History     Chief Complaint   Patient presents with    Altered Mental Status     covid positive / family states confused       Time seen by provider: 1:49 PM on 12/12/2020    Morro Sharma is a 79 y.o. male with PMHx of HTN, a-fib, and pulmonary nodules who presents to the ED with an onset of confusion noticed PTA. Associated symptoms include weakness. The patient's family told EMS that the patient was taking his evenings medications this morning and would like him evaluated for confusion. The patient denies syncope or any other symptoms at this time. No pertinent PSHx.  No family present on arrival.      The history is provided by the patient and a relative. The history is limited by the absence of a caregiver.     Review of patient's allergies indicates:  No Known Allergies  Past Medical History:   Diagnosis Date    Atrial fibrillation 05/07/2017    [facility] records sent to scanning    BPH (benign prostatic hypertrophy)     Colon polyps     Erectile dysfunction     GERD (gastroesophageal reflux disease)     Hypertension     Pneumonia due to COVID-19 virus 12/12/2020    Pneumothorax on left     Polycythemia vera     Pulmonary nodules      Past Surgical History:   Procedure Laterality Date    COLONOSCOPY  9/15/14    Dr. Mcdaniels, 5 year recheck    ESOPHAGOGASTRODUODENOSCOPY      LUNG SURGERY      LEFT - CHEST TUBE     Family History   Problem Relation Age of Onset    Hypertension Mother     Heart disease Mother     Hypertension Maternal Aunt     Heart disease Maternal Aunt     Hypertension Maternal Uncle     Heart disease Maternal Uncle     Early death Father     No Known Problems Daughter     No Known Problems Daughter     No Known Problems Daughter     No Known Problems Daughter     Prostate cancer Neg Hx     Kidney cancer Neg Hx     Cancer Neg Hx     Urolithiasis  Neg Hx     Melanoma Neg Hx     Psoriasis Neg Hx     Lupus Neg Hx     Eczema Neg Hx      Social History     Tobacco Use    Smoking status: Former Smoker     Packs/day: 1.50     Years: 25.00     Pack years: 37.50     Quit date: 10/2/1982     Years since quittin.2    Smokeless tobacco: Never Used   Substance Use Topics    Alcohol use: No     Frequency: Monthly or less     Drinks per session: 1 or 2     Binge frequency: Never    Drug use: No     Review of Systems   Unable to perform ROS: Other (Patient does not recall the entire event)   Neurological: Positive for weakness. Negative for syncope.   Psychiatric/Behavioral: Positive for confusion.       Physical Exam     Initial Vitals [20 1328]   BP Pulse Resp Temp SpO2   124/70 88 18 (!) 100.9 °F (38.3 °C) 96 %      MAP       --         Physical Exam    Nursing note and vitals reviewed.  Constitutional: He appears well-developed and well-nourished. He is not diaphoretic.  Non-toxic appearance. He does not have a sickly appearance. He does not appear ill. No distress.   HENT:   Head: Normocephalic and atraumatic.   Eyes: EOM are normal.   Neck: Normal range of motion. Neck supple. Normal range of motion present. No neck rigidity.   Cardiovascular: Normal rate, regular rhythm and normal heart sounds. Exam reveals no gallop and no friction rub.    No murmur heard.  Pulmonary/Chest: Breath sounds normal. No respiratory distress. He has no wheezes. He has no rhonchi. He has no rales.   Abdominal: Soft. He exhibits no distension. There is no abdominal tenderness. There is no rebound.   Musculoskeletal: Normal range of motion.   Neurological: He is alert.   No facial droop no slurred speech   Skin: Skin is warm and dry. No rash noted.   Psychiatric: He has a normal mood and affect. His behavior is normal. Judgment and thought content normal.         ED Course   Procedures  Labs Reviewed   CBC W/ AUTO DIFFERENTIAL - Abnormal; Notable for the following  components:       Result Value    RBC 3.05 (*)     Hemoglobin 8.0 (*)     Hematocrit 26.6 (*)     MCH 26.2 (*)     MCHC 30.1 (*)     RDW 18.2 (*)     Platelets 22 (*)     nRBC 8 (*)     Gran % 27.0 (*)     Lymph % 10.0 (*)     Platelet Estimate Decreased (*)     All other components within normal limits    Narrative:      plt  critical result(s) called and verbal readback obtained from   Felisa Renzo by [facility] 12/12/2020 15:28   COMPREHENSIVE METABOLIC PANEL - Abnormal; Notable for the following components:    Sodium 135 (*)     CO2 21 (*)     Glucose 124 (*)     Calcium 8.6 (*)     Total Bilirubin 1.2 (*)     AST 59 (*)     eGFR if  55 (*)     eGFR if non  47 (*)     All other components within normal limits   SARS-COV-2 RNA AMPLIFICATION, QUAL - Abnormal; Notable for the following components:    SARS-CoV-2 RNA, Amplification, Qual Positive (*)     All other components within normal limits   INFLUENZA A & B BY MOLECULAR   CULTURE, BLOOD   CULTURE, BLOOD   URINALYSIS, REFLEX TO URINE CULTURE   LACTIC ACID, PLASMA   PROCALCITONIN          Imaging Results          CT Head Without Contrast (Final result)  Result time 12/12/20 15:16:55    Final result by Mainor Schrader Jr., MD (12/12/20 15:16:55)                 Impression:      Mild brain atrophy otherwise negative head CT.      Electronically signed by: Mainor Schrader MD  Date:    12/12/2020  Time:    15:16             Narrative:    EXAMINATION:  CT HEAD WITHOUT CONTRAST    CLINICAL HISTORY:  Altered mental status;    TECHNIQUE:  Low dose axial images were obtained through the head.  Coronal and sagittal reformations were also performed. Contrast was not administered.    COMPARISON:  Head CT of May 7, 2015.    FINDINGS:  No cranial fracture is identified.  Scalp edema or hematoma is not noted.    The basal cisterns are clear and symmetric.  There is no mass effect or midline shift.  There is mild enlargement of the lateral  ventricles and cerebral sulci consistent with mild brain atrophy.  Focal areas of increased or decreased CT density consistent with tumor, edema, CVA or hemorrhage is not seen.  No intracranial hemorrhage or hematoma is noted.                               X-Ray Chest AP Portable (Final result)  Result time 12/12/20 14:24:26    Final result by Mainor Schrader Jr., MD (12/12/20 14:24:26)                 Impression:      Small peripheral infiltrates are identified on both sides suggesting viral pneumonitis.  Otherwise negative chest x-ray.      Electronically signed by: Mainor Schrader MD  Date:    12/12/2020  Time:    14:24             Narrative:    EXAMINATION:  XR CHEST AP PORTABLE    CLINICAL HISTORY:  Sepsis;    TECHNIQUE:  Single frontal view of the chest was performed.    COMPARISON:  Chest May 6, 2020.    FINDINGS:  The mediastinal cardiac size and contours are normal.  There is a very small density in the right upper lung field and very small density in the left lateral lung field with thickening of the right major fissure and possible adjacent infiltrate demonstrated.  This may represent an early viral pneumonitis.  A solid mass is not seen.  No pneumothorax or pleural effusion is noted.                                 Medical Decision Making:   History:   Old Medical Records: I decided to obtain old medical records.  Independently Interpreted Test(s):   I have ordered and independently interpreted X-rays - see prior notes.  Clinical Tests:   Lab Tests: Reviewed and Ordered  Radiological Study: Reviewed and Ordered            Scribe Attestation:   Scribe #1: I performed the above scribed service and the documentation accurately describes the services I performed. I attest to the accuracy of the note.    I, Dr. Arzola, personally performed the services described in this documentation. All medical record entries made by the scribe were at my direction and in my presence.  I have reviewed the chart and  agree that the record reflects my personal performance and is accurate and complete.4:37 PM 12/12/2020            ED Course as of Dec 12 1638   Sat Dec 12, 2020   1333 BP: 124/70 [EF]   1333 Temp(!): 100.9 °F (38.3 °C) [EF]   1333 Temp src: Oral [EF]   1333 Pulse: 88 [EF]   1333 Resp: 18 [EF]   1333 SpO2: 96 % [EF]   1404 History obtained from his wife who is now at the bedside.  Patient tested positive for the coronavirus on the 9th.  At that time he was also told he was anemic.  He has a history of polycythemia vera.  She states this morning the patient was very confused.  She states that he drove somewhere and then returned.  Grabbed his coat again to go somewhere else.  He does not have a history of confusion.    [EF]   1425 SARS-CoV-2 RNA, Amplification, Qual(!): Positive [EF]   1437 X-Ray Chest AP Portable [EF]   1457 Influenza B, Molecular: Negative [EF]   1457 Influenza A, Molecular: Negative [EF]   1520 CT Head Without Contrast [EF]   1603 Case discussed with claudia mae for admission. Spouse reports on persistent confusion in ER although he is well appearing    [EF]   1612 Hemoglobin(!): 8.0 [EF]   1612 Hgb c/w prior. Plts lower   Platelets(!!): 22 [EF]      ED Course User Index  [EF] Romero Arzola MD            Clinical Impression:     ICD-10-CM ICD-9-CM   1. Encephalopathy, toxic  G92 349.82                          ED Disposition Condition    Admit                        79-year-old male recent COVID positive test presents for confusion.  This has improved somewhat in the emergency room life states it is still present.  This is very unlike the patient to be confused.  His platelets have declined from baseline.  I think that his confusion is probably secondary to COVID and fever.  Wife reports the patient is also unsteady when he tries to walk.  Hospital Medicine consult for further care.  No distress in the ER.  I think stroke very unlikely.     Romero Arzola MD  12/12/20 [id number]

## 2020-12-12 NOTE — ASSESSMENT & PLAN NOTE
Due to polycythemia vera and spent phase per Hematology notes.  We will avoid any anti thrombotic that this time.  If counts become concerningly worsened, will consult with Hematology.

## 2020-12-12 NOTE — ASSESSMENT & PLAN NOTE
Chronic, controlled.  Latest blood pressure and vitals reviewed-   Temp:  [100.9 °F (38.3 °C)]   Pulse:  [82-88]   Resp:  [18]   BP: (106-124)/(58-70)   SpO2:  [90 %-97 %] .   Home meds for hypertension were reviewed and noted below. Hospital anti-hypertensive changes were made as shown below.  Hypertension Medications             metoprolol succinate (TOPROL-XL) 50 MG 24 hr tablet Take 1 tablet (50 mg total) by mouth once daily.        Will utilize p.r.n. blood pressure medication only if patient's blood pressure greater than  180/110 and he develops symptoms such as worsening chest pain or shortness of breath.

## 2020-12-12 NOTE — ED NOTES
Awake, alert, responsive and cooperative.  Oriented to person only.   Skin warm/dry, respirations even/unlabored.  NAD.

## 2020-12-12 NOTE — ASSESSMENT & PLAN NOTE
Pt with very mild VINEET.    Will give very judicious fluid resuscitation.    Follow-up BMP in a.m..    Check UA.    Avoid nonessential nephrotoxins.   Dose medication for Estimated Creatinine Clearance: 40 mL/min (based on SCr of 1.4 mg/dL).

## 2020-12-13 LAB
ALBUMIN SERPL BCP-MCNC: 3.6 G/DL (ref 3.5–5.2)
ALP SERPL-CCNC: 105 U/L (ref 55–135)
ALT SERPL W/O P-5'-P-CCNC: 32 U/L (ref 10–44)
ANION GAP SERPL CALC-SCNC: 8 MMOL/L (ref 8–16)
ANISOCYTOSIS BLD QL SMEAR: ABNORMAL
AST SERPL-CCNC: 51 U/L (ref 10–40)
BACTERIA #/AREA URNS HPF: NORMAL /HPF
BASOPHILS NFR BLD: 0 % (ref 0–1.9)
BILIRUB SERPL-MCNC: 1.2 MG/DL (ref 0.1–1)
BILIRUB UR QL STRIP: NEGATIVE
BUN SERPL-MCNC: 16 MG/DL (ref 8–23)
CALCIUM SERPL-MCNC: 8.5 MG/DL (ref 8.7–10.5)
CHLORIDE SERPL-SCNC: 103 MMOL/L (ref 95–110)
CLARITY UR: CLEAR
CO2 SERPL-SCNC: 23 MMOL/L (ref 23–29)
COLOR UR: YELLOW
CREAT SERPL-MCNC: 1.1 MG/DL (ref 0.5–1.4)
DACRYOCYTES BLD QL SMEAR: ABNORMAL
DIFFERENTIAL METHOD: ABNORMAL
EOSINOPHIL NFR BLD: 2 % (ref 0–8)
ERYTHROCYTE [DISTWIDTH] IN BLOOD BY AUTOMATED COUNT: 18 % (ref 11.5–14.5)
EST. GFR  (AFRICAN AMERICAN): >60 ML/MIN/1.73 M^2
EST. GFR  (NON AFRICAN AMERICAN): >60 ML/MIN/1.73 M^2
GLUCOSE SERPL-MCNC: 91 MG/DL (ref 70–110)
GLUCOSE UR QL STRIP: NEGATIVE
HCT VFR BLD AUTO: 24.8 % (ref 40–54)
HGB BLD-MCNC: 7.4 G/DL (ref 14–18)
HGB UR QL STRIP: NEGATIVE
HYALINE CASTS #/AREA URNS LPF: 0 /LPF
HYPOCHROMIA BLD QL SMEAR: ABNORMAL
IMM GRANULOCYTES # BLD AUTO: ABNORMAL K/UL (ref 0–0.04)
IMM GRANULOCYTES NFR BLD AUTO: ABNORMAL % (ref 0–0.5)
KETONES UR QL STRIP: NEGATIVE
LEUKOCYTE ESTERASE UR QL STRIP: NEGATIVE
LYMPHOCYTES NFR BLD: 15 % (ref 18–48)
MAGNESIUM SERPL-MCNC: 2.2 MG/DL (ref 1.6–2.6)
MCH RBC QN AUTO: 26 PG (ref 27–31)
MCHC RBC AUTO-ENTMCNC: 29.8 G/DL (ref 32–36)
MCV RBC AUTO: 87 FL (ref 82–98)
METAMYELOCYTES NFR BLD MANUAL: 1 %
MICROSCOPIC COMMENT: NORMAL
MONOCYTES NFR BLD: 4 % (ref 4–15)
MYELOCYTES NFR BLD MANUAL: 2 %
NEUTROPHILS NFR BLD: 25 % (ref 38–73)
NEUTS BAND NFR BLD MANUAL: 3 %
NITRITE UR QL STRIP: NEGATIVE
NRBC BLD-RTO: 8 /100 WBC
OVALOCYTES BLD QL SMEAR: ABNORMAL
PH UR STRIP: 7 [PH] (ref 5–8)
PHOSPHATE SERPL-MCNC: 3 MG/DL (ref 2.7–4.5)
PLATELET # BLD AUTO: 18 K/UL (ref 150–350)
PLATELET BLD QL SMEAR: ABNORMAL
PMV BLD AUTO: ABNORMAL FL (ref 9.2–12.9)
POIKILOCYTOSIS BLD QL SMEAR: SLIGHT
POLYCHROMASIA BLD QL SMEAR: ABNORMAL
POTASSIUM SERPL-SCNC: 4.6 MMOL/L (ref 3.5–5.1)
PROT SERPL-MCNC: 7 G/DL (ref 6–8.4)
PROT UR QL STRIP: ABNORMAL
RBC # BLD AUTO: 2.85 M/UL (ref 4.6–6.2)
RBC #/AREA URNS HPF: 0 /HPF (ref 0–4)
SODIUM SERPL-SCNC: 134 MMOL/L (ref 136–145)
SP GR UR STRIP: 1.01 (ref 1–1.03)
SQUAMOUS #/AREA URNS HPF: 1 /HPF
URN SPEC COLLECT METH UR: ABNORMAL
UROBILINOGEN UR STRIP-ACNC: ABNORMAL EU/DL
WBC # BLD AUTO: 3.61 K/UL (ref 3.9–12.7)
WBC #/AREA URNS HPF: 1 /HPF (ref 0–5)
WBC OTHER NFR BLD MANUAL: 48 %

## 2020-12-13 PROCEDURE — 81000 URINALYSIS NONAUTO W/SCOPE: CPT

## 2020-12-13 PROCEDURE — 80053 COMPREHEN METABOLIC PANEL: CPT

## 2020-12-13 PROCEDURE — 85027 COMPLETE CBC AUTOMATED: CPT

## 2020-12-13 PROCEDURE — 99222 PR INITIAL HOSPITAL CARE,LEVL II: ICD-10-PCS | Mod: GT,,, | Performed by: INTERNAL MEDICINE

## 2020-12-13 PROCEDURE — 84100 ASSAY OF PHOSPHORUS: CPT

## 2020-12-13 PROCEDURE — 12000002 HC ACUTE/MED SURGE SEMI-PRIVATE ROOM

## 2020-12-13 PROCEDURE — 85007 BL SMEAR W/DIFF WBC COUNT: CPT

## 2020-12-13 PROCEDURE — 63600175 PHARM REV CODE 636 W HCPCS: Performed by: NURSE PRACTITIONER

## 2020-12-13 PROCEDURE — 83735 ASSAY OF MAGNESIUM: CPT

## 2020-12-13 PROCEDURE — 94761 N-INVAS EAR/PLS OXIMETRY MLT: CPT

## 2020-12-13 PROCEDURE — 99222 1ST HOSP IP/OBS MODERATE 55: CPT | Mod: GT,,, | Performed by: INTERNAL MEDICINE

## 2020-12-13 PROCEDURE — 63700000 PHARM REV CODE 250 ALT 637 W/O HCPCS: Performed by: NURSE PRACTITIONER

## 2020-12-13 PROCEDURE — 36415 COLL VENOUS BLD VENIPUNCTURE: CPT

## 2020-12-13 PROCEDURE — 25000003 PHARM REV CODE 250: Performed by: NURSE PRACTITIONER

## 2020-12-13 RX ORDER — POLYETHYLENE GLYCOL 3350 17 G/17G
17 POWDER, FOR SOLUTION ORAL DAILY
Status: DISCONTINUED | OUTPATIENT
Start: 2020-12-13 | End: 2020-12-15 | Stop reason: HOSPADM

## 2020-12-13 RX ADMIN — LEVOTHYROXINE SODIUM 50 MCG: 50 TABLET ORAL at 05:12

## 2020-12-13 RX ADMIN — THERA TABS 1 TABLET: TAB at 09:12

## 2020-12-13 RX ADMIN — AZITHROMYCIN 250 MG: 250 TABLET, FILM COATED ORAL at 04:12

## 2020-12-13 RX ADMIN — POLYETHYLENE GLYCOL 3350 17 G: 17 POWDER, FOR SOLUTION ORAL at 11:12

## 2020-12-13 RX ADMIN — ROSUVASTATIN CALCIUM 40 MG: 20 TABLET, FILM COATED ORAL at 08:12

## 2020-12-13 RX ADMIN — Medication 500 MG: at 08:12

## 2020-12-13 RX ADMIN — Medication 500 MG: at 09:12

## 2020-12-13 RX ADMIN — GLYCOPYRROLATE 1 MG: 1 TABLET ORAL at 08:12

## 2020-12-13 RX ADMIN — METOPROLOL SUCCINATE 50 MG: 50 TABLET, FILM COATED, EXTENDED RELEASE ORAL at 09:12

## 2020-12-13 RX ADMIN — CEFTRIAXONE 1 G: 1 INJECTION, SOLUTION INTRAVENOUS at 04:12

## 2020-12-13 RX ADMIN — Medication 1000 UNITS: at 09:12

## 2020-12-13 RX ADMIN — GLYCOPYRROLATE 1 MG: 1 TABLET ORAL at 09:12

## 2020-12-13 RX ADMIN — TAMSULOSIN HYDROCHLORIDE 0.4 MG: 0.4 CAPSULE ORAL at 09:12

## 2020-12-13 NOTE — SUBJECTIVE & OBJECTIVE
Interval History:  Seen and examined.  Reports resting well overnight.  No chest pain, cough, or SOB.  No further confusion noted.  Evaluated by Neurology this a.m.-felt to be related to delirium.  No additional workup recommended an less additional neurologic changes occur.  His blood counts are worse today.  We will consult with Hematology.  Renal function better.  Great appetite, normal oxygen saturations on room air.    Review of Systems   Constitutional: Positive for fatigue and fever. Negative for chills.   Respiratory: Negative for cough and shortness of breath.    Gastrointestinal: Negative for abdominal pain, constipation, diarrhea, nausea and vomiting.   Genitourinary: Negative for dysuria.   Musculoskeletal: Negative for arthralgias and myalgias.   Neurological: Negative for weakness.   Psychiatric/Behavioral: Negative for confusion.     Objective:     Vital Signs (Most Recent):  Temp: 97.8 °F (36.6 °C) (12/13/20 1000)  Pulse: 78 (12/13/20 1000)  Resp: 20 (12/13/20 1000)  BP: 127/60 (12/13/20 1000)  SpO2: 96 % (12/13/20 1000) Vital Signs (24h Range):  Temp:  [97.8 °F (36.6 °C)-100.9 °F (38.3 °C)] 97.8 °F (36.6 °C)  Pulse:  [73-88] 78  Resp:  [16-20] 20  SpO2:  [90 %-99 %] 96 %  BP: (106-134)/(56-70) 127/60     Weight: 78.5 kg (173 lb)  Body mass index is 27.1 kg/m².    Intake/Output Summary (Last 24 hours) at 12/13/2020 1159  Last data filed at 12/13/2020 0800  Gross per 24 hour   Intake 837.5 ml   Output 250 ml   Net 587.5 ml      Physical Exam  Constitutional:       Appearance: Normal appearance.   HENT:      Head: Normocephalic.   Cardiovascular:      Rate and Rhythm: Normal rate.   Pulmonary:      Effort: Pulmonary effort is normal.      Comments: Appears comfortable on room air.  Musculoskeletal: Normal range of motion.   Neurological:      Mental Status: He is alert and oriented to person, place, and time. Mental status is at baseline.   Psychiatric:         Mood and Affect: Mood normal.          Behavior: Behavior normal.         Significant Labs:   CBC:   Recent Labs   Lab 12/12/20  1355 12/13/20  0637   WBC 4.20 3.61*   HGB 8.0* 7.4*   HCT 26.6* 24.8*   PLT 22* 18*     CMP:   Recent Labs   Lab 12/12/20  1355 12/13/20  0637   * 134*   K 4.4 4.6    103   CO2 21* 23   * 91   BUN 20 16   CREATININE 1.4 1.1   CALCIUM 8.6* 8.5*   PROT 7.6 7.0   ALBUMIN 3.9 3.6   BILITOT 1.2* 1.2*   ALKPHOS 116 105   AST 59* 51*   ALT 37 32   ANIONGAP 13 8   EGFRNONAA 47* >60    Ferritin:  1123  Sed rate:  65  D-dimer:  0.30  CRP:  69.7  LDH:  789  Procalcitonin:  0.45    Significant Imaging: I have reviewed all pertinent imaging results/findings within the past 24 hours.

## 2020-12-13 NOTE — ASSESSMENT & PLAN NOTE
Due to polycythemia vera and spent phase per Hematology notes.  We will avoid any anti thrombotic that this time.  His pancytopenia has worsened.  This is likely due to COVID.  We will involve Hematology.  Appreciate recommendations.

## 2020-12-13 NOTE — ASSESSMENT & PLAN NOTE
Pt with very mild VINEET which is improved.    UA benign.  DC IVFs.   Avoid nonessential nephrotoxins.   Dose medication for Estimated Creatinine Clearance: 50.9 mL/min (based on SCr of 1.1 mg/dL).

## 2020-12-13 NOTE — CONSULTS
[facility]  Department of Neurology  Neurology Consultation Note        PATIENT NAME: Morro Sharma  MRN: [id number]  CSN: [id number]      TODAY'S DATE: 12/13/2020  ADMIT DATE: 12/12/2020                            CONSULTING PROVIDER: Amauri Nation MD  CONSULT REQUESTED BY: Luci Mendoza MD       Patient Location:  323/323 A     Patient information was obtained from patient, nurse and chart.     SUBJECTIVE   DIAGNOSES:     Active Hospital Problems    Diagnosis  POA    *Encephalopathy, toxic [G92]  Yes    Pneumonia due to COVID-19 virus [U07.1, J12.89]  Yes    VINEET (acute kidney injury) [N17.9]  Yes    Pancytopenia [D61.818]  Yes    Paroxysmal atrial fibrillation, onset 5/2017, CHADS-VAS score 4 [I48.0]  Yes    Hypertension [I10]  Yes    Polycythemia vera [D45]  Yes      Resolved Hospital Problems   No resolved problems to display.       PRINCIPLE PROBLEM:  Encephalopathy, toxic    HISTORY OF PRESENT ILLNESS Per ER  Morro Sharma is a 79 y.o. male with PMHx of HTN, a-fib, and pulmonary nodules who presents to the ED with an onset of confusion noticed PTA. Associated symptoms include weakness. The patient's family told EMS that the patient was taking his evenings medications this morning and would like him evaluated for confusion. The patient denies syncope or any other symptoms at this time. No pertinent PSHx.  No family present on arrival.    Neurology consult: 80 yo with PMH of HTN , Afib and polycythemia vera who presented with acute confusional state. Hx obtained per chart and patient. Patient endorsed some confusion 3 days ago as reported by spouse but he refuses to believe it.He states he was feeling unwell last week and went to the urgent care . He was given a medication with some side effects.At baseline he denies any memory issues carries out his activity without assistance. He has never been diagnosed with dementia.    Since admission , he was found to be COVID positive and with   VINEET. Today he reports feeling well and has no complaints. CT head showed mild brain atrophy otherwise  Normal HCT.       PAST MEDICAL HISTORY   Past Medical History:   Diagnosis Date    Atrial fibrillation 2017    [facility] records sent to scanning    BPH (benign prostatic hypertrophy)     Colon polyps     Erectile dysfunction     GERD (gastroesophageal reflux disease)     Hypertension     Pneumonia due to COVID-19 virus 2020    Pneumothorax on left     Polycythemia vera     Pulmonary nodules         PAST SURGICAL HISTORY    Past Surgical History:   Procedure Laterality Date    COLONOSCOPY  9/15/14    Dr. Mcdaniels, 5 year recheck    ESOPHAGOGASTRODUODENOSCOPY      LUNG SURGERY      LEFT - CHEST TUBE        FAMILY HISTORY    Family History   Problem Relation Age of Onset    Hypertension Mother     Heart disease Mother     Hypertension Maternal Aunt     Heart disease Maternal Aunt     Hypertension Maternal Uncle     Heart disease Maternal Uncle     Early death Father     No Known Problems Daughter     No Known Problems Daughter     No Known Problems Daughter     No Known Problems Daughter     Prostate cancer Neg Hx     Kidney cancer Neg Hx     Cancer Neg Hx     Urolithiasis Neg Hx     Melanoma Neg Hx     Psoriasis Neg Hx     Lupus Neg Hx     Eczema Neg Hx         SOCIAL HISTORY    Social History     Tobacco Use    Smoking status: Former Smoker     Packs/day: 1.50     Years: 25.00     Pack years: 37.50     Quit date: 10/2/1982     Years since quittin.2    Smokeless tobacco: Never Used   Substance Use Topics    Alcohol use: No     Frequency: Monthly or less     Drinks per session: 1 or 2     Binge frequency: Never    Drug use: No               ALLERGIES  Review of patient's allergies indicates:  No Known Allergies       REVIEW OF SYSTEMS  All other systems reviewed and are negative.     OBJECTIVE     VITAL SIGNS (Most Recent)  Temp: 97.8 °F (36.6 °C) (20 1000)  Pulse:  78 (12/13/20 1000)  Resp: 20 (12/13/20 1000)  BP: 127/60 (12/13/20 1000)  SpO2: 96 % (12/13/20 1000)      INPATIENT MEDICATIONS  Scheduled Meds:   ascorbic acid (vitamin C)  500 mg Oral BID    azithromycin  250 mg Oral Daily    cefTRIAXone (ROCEPHIN) IVPB  1 g Intravenous Q24H    glycopyrrolate  1 mg Oral BID    levothyroxine  50 mcg Oral Before breakfast    metoprolol succinate  50 mg Oral Daily    multivitamin  1 tablet Oral Daily    polyethylene glycol  17 g Oral Daily    rosuvastatin  40 mg Oral QHS    tamsulosin  1 capsule Oral Daily    vitamin D  1,000 Units Oral Daily     Continuous Infusions:  PRN Meds:.acetaminophen, benzonatate, dextrose 50%, dextrose 50%, glucagon (human recombinant), glucose, glucose, magnesium oxide, magnesium oxide, melatonin, ondansetron, potassium bicarbonate, potassium bicarbonate, potassium bicarbonate, potassium, sodium phosphates, potassium, sodium phosphates, potassium, sodium phosphates, sodium chloride 0.9%         IMAGING  CT Head Without Contrast   Results for orders placed during the hospital encounter of 12/12/20   CT Head Without Contrast    Narrative EXAMINATION:  CT HEAD WITHOUT CONTRAST    CLINICAL HISTORY:  Altered mental status;    TECHNIQUE:  Low dose axial images were obtained through the head.  Coronal and sagittal reformations were also performed. Contrast was not administered.    COMPARISON:  Head CT of May 7, 2015.    FINDINGS:  No cranial fracture is identified.  Scalp edema or hematoma is not noted.    The basal cisterns are clear and symmetric.  There is no mass effect or midline shift.  There is mild enlargement of the lateral ventricles and cerebral sulci consistent with mild brain atrophy.  Focal areas of increased or decreased CT density consistent with tumor, edema, CVA or hemorrhage is not seen.  No intracranial hemorrhage or hematoma is noted.      Impression Mild brain atrophy otherwise negative head CT.      Electronically signed  by: Mainor Schrader MD  Date:    12/12/2020  Time:    15:16         CT Head With & Without Contrast   Results for orders placed in visit on 09/05/12   CT Head W Wo Contrast    Narrative Pre-and postcontrast contiguous 5-mm axial images are obtained from the skull base to the vertex.  There is mild asymmetry to the to the lateral ventricles which is likely developmental.  I do not see evidence of hydrocephalus mass or abnormal   enhancement.  No abnormal extra-axial collections are seen.  There is no evidence of abnormal enhancement to suggest intracranial metastatic disease.    Impression  Essentially negative CT of the brain with and without contrast.  ______________________________________     Electronically signed by: PARMINDER WEEKS MD  Date:     09/05/12  Time:    11:32        CARDIAC STUDIES  Results for orders placed or performed during the hospital encounter of 05/13/20   EKG 12-lead    Collection Time: 05/13/20  4:15 PM    Narrative    Test Reason : R06.02,    Vent. Rate : 090 BPM     Atrial Rate : 090 BPM     P-R Int : 174 ms          QRS Dur : 118 ms      QT Int : 392 ms       P-R-T Axes : 077 087 022 degrees     QTc Int : 479 ms    Normal sinus rhythm  Nonspecific intraventricular conduction delay  Borderline Abnormal ECG  When compared with ECG of 12-MAR-2020 12:48,  Nonspecific intraventricular conduction delay has replaced Incomplete right  bundle branch block  Confirmed by Vicki Meier MD ([id number]) on 5/15/2020 12:51:14 PM    Referred By: [email or web address]   SELF           Confirmed By:Vicki Meier MD     No results found for this or any previous visit.      PHYSICAL EXAM  General: AAOx3. NAD.  HEENT: NCAT. PERRLA. EOMI.   Neck: Supple. No JVD. No LAD.    CV: RRR. NL S1/S2. No murmurs  Chest: clear to auscultation bilaterally, normal effort  Abd: +BS x 4. Soft. ND/NT. No rebound or guarding.   Ext: peripheral pulses intact.     MENTAL STATUS EXAM:   Level of alertness: Alert   Level of attention:  Attentive w/out deficit   Orientation/Awareness: intact to person, place, time  Language: fluent with comprehension/repetition/naming intact       CRANIAL NERVE EXAM:   II: PERRL; visual fields full to confrontation; no gross deficit on visual acuity   III/IV/VI: EOMI w/out evidence of nystagmus, strabismus, or evoked diplopia   V: no deficits appreciated to light touch bilaterally   VII: no facial weakness. Eyebrow raise symmetric.   VIII: no deficits in hearing bilaterally   IX/X: palate midline, and raises symmetrically   XI: shoulder shrug 5/5 bilaterally   XII: tongue is midline w/out asymmetry. No dysarthria noted     MOTOR EXAM:   Bulk and Tone: normal throughout   Strength:   Left UE: 5/5 Deltoid, biceps, triceps, wrist extension, interossei, .   Left LE: 5/5 Hip flexion, knee flexion, knee extension, dorsiflexion, plantar flexion.   Right UE: 5/5 Deltoid, biceps, triceps, wrist extension, interossei, .   Right LE: 5/5 Hip flexion, knee flexion, knee extension, dorsiflexion, plantar flexion.   No orbiting. No Pronator drift.   No tremor either at rest or with intention.     REFLEXES:   2+ bilaterally in the Biceps, Brachioradialis, Triceps, Quadriceps and Gastrocnemius.   Merritt sign: not present   Plantar response: flexor bilaterally     SENSORY EXAM: Intact to light touch, temperature, vibration, proprioception bilateral UE, LE.     COORDINATION/CEREBELLAR EXAM:   FTN: no dysmetria.   HTS: No dysmetria. No truncal ataxia     GAIT:   Normal stance and gait       ASSESSMENT & PLAN    80 yo with PMH of HTN , Afib and polycythemia vera who presented with acute confusional state.  On admission patient found to have viral pneumonia likely from COVID infection. His neurological exam is intact with intact mental status.    AMS  Likely related to COVID infection,   - Will recommend continued monitoring  - Continue with antibiotics  - CT head with some mild atrophy otherwise negative for acute infarct  -  Keep K>4, Mg >2  - Please ensure delirium precaution in place  - Avoid deliriogenic medication  - Keep blinds up during the day, down at night  - TV off at night , minimize neurochecks at night  - If there are continued changes with mental status, please reach out to neurology    Polycythemia vera  Hematology consulted    Patient to follow up with [facility] at [phone] within 2 weeks from discharge.     Stroke education was provided including stroke risk factors modification and any acute neurological changes including weakness, confusion, visual changes to come straight to the ER.  Seizure education was provided including no driving, no swimming by self, no operation of heavy machinery or climbing on ladders.     All side effects of new medications were discussed with patient and/or next of kin and all questions were answered.                                                                                 Amauri Nation MD  [facility]

## 2020-12-13 NOTE — ASSESSMENT & PLAN NOTE
Suspect this is related to COVID and fever.  Seemingly resolved.  -CT head negative.  -neuro checks  -neurology consult reviewed-no further workup recommended, call with any acute changes  -fall precautions.

## 2020-12-13 NOTE — ASSESSMENT & PLAN NOTE
- COVID-19 testing Collection Date: 6/22/2020 Collection Time:  11:34 AM   - Infection Control notified     - Isolation:   - Airborne, Contact and Droplet Precautions  - Cohort patients into COVID units  - N95 mask, wear eye protection  - 20 second hand hygiene              - Limit visitors per hospital policy              - Consolidating lab draws, nursing care, provider visits, and interventions    - Diagnostics: (leukopenia, hyponatremia, hyperferritinemia, elevated troponin, elevated d-dimer, age, and comorbidities are significant predictors of poor clinical outcome)  CBC, CMP, Procalcitonin, Ferritin, CRP, LDH, Troponin and Portable CXR    - Management:  Supplemental O2 to maintain SpO2 >92%  Telemetry  Continuous/intermittent Pulse Ox  Albuterol treatment PRN    Pt with normal O2 sats at this time.  Will not start Decadron or remdesivir at this time- clinically monitor and decide as needed.  There is evidence of pneumonia on the CXR.  Procalcitonin is very mildly elevated.  Continue Abx.

## 2020-12-13 NOTE — PROGRESS NOTES
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  Pt presented to the ED for further evaluation of altered mental status.  Pt was diagnosed with COVID yesterday.  He was seen at urgent care.  He reports 2-3 day Hx of fever and fatigue.  T-max 90° 9-100 at home per wife.  Today, Pt exhibited confusion.  His wife states he was not acting like himself and sometimes did not answer her questions.  She states he left in the car today dressed in his pajamas.  She is unsure where he went.  Pt denies any recall of events.  He is completely at his baseline now.  He reports mild cough without SOB, chest pain, nausea, vomiting, diarrhea, or abdominal pain.  He had a CT head in ED which was negative for any acute findings.  His labs continue to demonstrate pancytopenia.  He has mild evidence of VINEET and CXR confirming pneumonia likely viral.  He was admitted to the service of hospital Medicine for additional evaluation and management.  Other pertinent medical Hx as below:    Overview/Hospital Course:  No notes on file    Interval History:  Seen and examined.  Reports resting well overnight.  No chest pain, cough, or SOB.  No further confusion noted.  Evaluated by Neurology this a.m.-felt to be related to delirium.  No additional workup recommended an less additional neurologic changes occur.  His blood counts are worse today.  We will consult with Hematology.  Renal function better.  Great appetite, normal oxygen saturations on room  air.    Review of Systems   Constitutional: Positive for fatigue and fever. Negative for chills.   Respiratory: Negative for cough and shortness of breath.    Gastrointestinal: Negative for abdominal pain, constipation, diarrhea, nausea and vomiting.   Genitourinary: Negative for dysuria.   Musculoskeletal: Negative for arthralgias and myalgias.   Neurological: Negative for weakness.   Psychiatric/Behavioral: Negative for confusion.     Objective:     Vital Signs (Most Recent):  Temp: 97.8 °F (36.6 °C) (12/13/20 1000)  Pulse: 78 (12/13/20 1000)  Resp: 20 (12/13/20 1000)  BP: 127/60 (12/13/20 1000)  SpO2: 96 % (12/13/20 1000) Vital Signs (24h Range):  Temp:  [97.8 °F (36.6 °C)-100.9 °F (38.3 °C)] 97.8 °F (36.6 °C)  Pulse:  [73-88] 78  Resp:  [16-20] 20  SpO2:  [90 %-99 %] 96 %  BP: (106-134)/(56-70) 127/60     Weight: 78.5 kg (173 lb)  Body mass index is 27.1 kg/m².    Intake/Output Summary (Last 24 hours) at 12/13/2020 1159  Last data filed at 12/13/2020 0800  Gross per 24 hour   Intake 837.5 ml   Output 250 ml   Net 587.5 ml      Physical Exam  Constitutional:       Appearance: Normal appearance.   HENT:      Head: Normocephalic.   Cardiovascular:      Rate and Rhythm: Normal rate.   Pulmonary:      Effort: Pulmonary effort is normal.      Comments: Appears comfortable on room air.  Musculoskeletal: Normal range of motion.   Neurological:      Mental Status: He is alert and oriented to person, place, and time. Mental status is at baseline.   Psychiatric:         Mood and Affect: Mood normal.         Behavior: Behavior normal.         Significant Labs:   CBC:   Recent Labs   Lab 12/12/20  1355 12/13/20  0637   WBC 4.20 3.61*   HGB 8.0* 7.4*   HCT 26.6* 24.8*   PLT 22* 18*     CMP:   Recent Labs   Lab 12/12/20  1355 12/13/20  0637   * 134*   K 4.4 4.6    103   CO2 21* 23   * 91   BUN 20 16   CREATININE 1.4 1.1   CALCIUM 8.6* 8.5*   PROT 7.6 7.0   ALBUMIN 3.9 3.6   BILITOT 1.2* 1.2*   ALKPHOS 116  105   AST 59* 51*   ALT 37 32   ANIONGAP 13 8   EGFRNONAA 47* >60    Ferritin:  1123  Sed rate:  65  D-dimer:  0.30  CRP:  69.7  LDH:  789  Procalcitonin:  0.45    Significant Imaging: I have reviewed all pertinent imaging results/findings within the past 24 hours.      Assessment/Plan:      * Encephalopathy, toxic  Suspect this is related to COVID and fever.  Seemingly resolved.  -CT head negative.  -neuro checks  -neurology consult reviewed-no further workup recommended, call with any acute changes  -fall precautions.      Pneumonia due to COVID-19 virus  - COVID-19 testing Collection Date: 6/22/2020 Collection Time:  11:34 AM   - Infection Control notified     - Isolation:   - Airborne, Contact and Droplet Precautions  - Cohort patients into COVID units  - N95 mask, wear eye protection  - 20 second hand hygiene              - Limit visitors per hospital policy              - Consolidating lab draws, nursing care, provider visits, and interventions    - Diagnostics: (leukopenia, hyponatremia, hyperferritinemia, elevated troponin, elevated d-dimer, age, and comorbidities are significant predictors of poor clinical outcome)  CBC, CMP, Procalcitonin, Ferritin, CRP, LDH, Troponin and Portable CXR    - Management:  Supplemental O2 to maintain SpO2 >92%  Telemetry  Continuous/intermittent Pulse Ox  Albuterol treatment PRN    Pt with normal O2 sats at this time.  Will not start Decadron or remdesivir at this time- clinically monitor and decide as needed.  There is evidence of pneumonia on the CXR.  Procalcitonin is very mildly elevated.  Continue Abx.               VINEET (acute kidney injury)  Pt with very mild VINEET which is improved.    UA benign.  DC IVFs.   Avoid nonessential nephrotoxins.   Dose medication for Estimated Creatinine Clearance: 50.9 mL/min (based on SCr of 1.1 mg/dL).      Pancytopenia  Due to polycythemia vera and spent phase per Hematology notes.  We will avoid any anti thrombotic that this time.  His  pancytopenia has worsened.  This is likely due to COVID.  We will involve Hematology.  Appreciate recommendations.    Paroxysmal atrial fibrillation, onset 5/2017, CHADS-VAS score 4  Historical diagnosis.  Rate controlled at this time.  Continue metoprolol.  Not a candidate for anticoagulation due to severe thrombocytopenia.  Monitor on telemetry.      Polycythemia vera  1.  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  2.  Pancytopenia secondary to number 1.    Hold Jakafi acutely.  His counts are worsened today.  This is likely from COVID.  Will consult with Hematology for additional recommendations and transfusion threshold.    Hypertension  Chronic, controlled.  Latest blood pressure and vitals reviewed-   Temp:  [100.9 °F (38.3 °C)]   Pulse:  [82-88]   Resp:  [18]   BP: (106-124)/(58-70)   SpO2:  [90 %-97 %] .   Home meds for hypertension were reviewed and noted below. Hospital anti-hypertensive changes were made as shown below.  Hypertension Medications             metoprolol succinate (TOPROL-XL) 50 MG 24 hr tablet Take 1 tablet (50 mg total) by mouth once daily.        Will utilize p.r.n. blood pressure medication only if patient's blood pressure greater than  180/110 and he develops symptoms such as worsening chest pain or shortness of breath.          VTE Risk Mitigation (From admission, onward)         Ordered     IP VTE HIGH RISK PATIENT  Once      12/12/20 1733     Place sequential compression device  Until discontinued      12/12/20 1733     Place SIMI hose  Until discontinued      12/12/20 1733                Discharge Planning   RUCHI:  12/14    Code Status: Full Code   Is the patient medically ready for discharge?:     Reason for patient still in hospital (select all that apply): Patient trending condition, Laboratory test and Consult recommendations  Discharge Plan A: Home with family          VIRTUAL TELENOTE    Patient physical exam and history were performed via - 2 way video televisit.  Physical exam  findings were other evidence primarily by visualization through 2 way video or by conversation with the patient's bedside nurse.  I was present throughout the entire tele visit.    The attending portion of this evaluation, treatment, and documentation was performed per Kristy Arango NP via audiovisual The provider was located  In the same facility as the patient. Video software was utilized to document the relevant history and physical exam. \        Kristy Arango NP  Department of Hospital Medicine   [facility]

## 2020-12-13 NOTE — ASSESSMENT & PLAN NOTE
1.  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  2.  Pancytopenia secondary to number 1.    Hold Jakafi acutely.  His counts are worsened today.  This is likely from COVID.  Will consult with Hematology for additional recommendations and transfusion threshold.

## 2020-12-13 NOTE — CONSULTS
"[facility]  Hematology/Oncology  Consult Note    Patient Name: Morro Sharma  MRN: [id number]  Admission Date: 12/12/2020  Hospital Length of Stay: 1 days  Code Status: Full Code   Attending Provider: Luci Mendoza MD   Consulting Provider: Dawna Stephenson MD  Primary Care Physician: Yaniv Escobar MD  Principal Problem:Encephalopathy, toxic    Reason for consult: low blood counts      Subjective:     Chief Complaint:  weakness     HPI:  Patient is a 79 y.o. 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 He was then started on jakafi tolerating 5mg daily. Needs occasional PRBC, none recent.     Currently admitted with covid-19 infection diagnosed 12/11/20 and had confusion on 12/12/20 which prompted ED evaluation and admission, this is now resolved, found to have VINEET and abnormal chest imaging concern for viral pneumonitis.      Patient reports currently he feels "really well" and has no complaints, no fever current, no new pain, no new weakness, no bleeding.         Past Medical History:   Diagnosis Date    Atrial fibrillation 05/07/2017    [facility] records sent to scanning    BPH (benign prostatic hypertrophy)     Colon polyps     Erectile dysfunction     GERD (gastroesophageal reflux disease)     Hypertension     Pneumonia due to COVID-19 virus 12/12/2020    Pneumothorax on left     Polycythemia vera     Pulmonary nodules        Past Surgical History:   Procedure Laterality Date    COLONOSCOPY  9/15/14    Dr. Mcdaniels, 5 year recheck    ESOPHAGOGASTRODUODENOSCOPY      LUNG SURGERY      LEFT - CHEST TUBE       Allergies reviewed.     No current facility-administered medications on file prior to encounter.      Current Outpatient Medications on File Prior to Encounter   Medication Sig    acetaminophen (TYLENOL) 500 MG tablet " Take 1 tablet (500 mg total) by mouth 2 (two) times daily. For shoulder pain (Patient taking differently: Take 500 mg by mouth 2 (two) times daily as needed. For shoulder pain)    glycopyrrolate (ROBINUL) 1 mg Tab TAKE ONE TABLET BY MOUTH TWICE DAILY. MAY TAKE UP TO THREE TIMES DAILY IF needed    levothyroxine (SYNTHROID) 50 MCG tablet TAKE 1 TABLET BY MOUTH BEFORE BREAKFAST    metoprolol succinate (TOPROL-XL) 50 MG 24 hr tablet Take 1 tablet (50 mg total) by mouth once daily.    multivitamin (ONE DAILY MULTIVITAMIN) per tablet Take 1 tablet by mouth once daily.    psyllium husk (METAMUCIL ORAL) Take 1 capsule by mouth once daily.    rosuvastatin (CRESTOR) 40 MG Tab Take 1 tablet (40 mg total) by mouth every evening.    ruxolitinib (JAKAFI) 5 mg Tab Take 5 mg by mouth once daily.    tadalafiL (CIALIS) 20 MG Tab Take 1 tablet (20 mg total) by mouth as needed.    tamsulosin (FLOMAX) 0.4 mg Cap Take 1 capsule (0.4 mg total) by mouth once daily.    triamcinolone acetonide 0.1% (KENALOG) 0.1 % cream APPLY TO AFFECTED AREA TWICE DAILY    dexchlorphen-phenylephrine-DM (POLYTUSSIN DM) 1-5-10 mg/5 mL Syrp Take 5 mLs by mouth every 4 (four) hours as needed.     Family History     Problem Relation (Age of Onset)    Early death Father    Heart disease Mother, Maternal Aunt, Maternal Uncle    Hypertension Mother, Maternal Aunt, Maternal Uncle    No Known Problems Daughter, Daughter, Daughter, Daughter        Tobacco Use    Smoking status: Former Smoker     Packs/day: 1.50     Years: 25.00     Pack years: 37.50     Quit date: 10/2/1982     Years since quittin.2    Smokeless tobacco: Never Used   Substance and Sexual Activity    Alcohol use: No     Frequency: Monthly or less     Drinks per session: 1 or 2     Binge frequency: Never    Drug use: No    Sexual activity: Yes       ROS:  Constitutional: +for activity change, appetite change, fatigue, fever and unexpected weight change.   HENT: Negative for mouth  sores and sore throat.    Respiratory: Negative for cough and shortness of breath.    Cardiovascular: Negative for chest pain, palpitations and leg swelling.   Gastrointestinal: Negative for abdominal pain, constipation and diarrhea.   Genitourinary: Negative for dysuria and frequency.   Musculoskeletal: Negative for back pain and joint swelling.   Neurological: Negative for weakness currently, light-headedness and numbness.       Objective:     Vital Signs (Most Recent):  Temp: 97.8 °F (36.6 °C) (12/13/20 1000)  Pulse: 78 (12/13/20 1000)  Resp: 20 (12/13/20 1000)  BP: 127/60 (12/13/20 1000)  SpO2: 96 % (12/13/20 1000) Vital Signs (24h Range):  Temp:  [97.8 °F (36.6 °C)-100 °F (37.8 °C)] 97.8 °F (36.6 °C)  Pulse:  [73-86] 78  Resp:  [16-20] 20  SpO2:  [90 %-99 %] 96 %  BP: (106-134)/(56-68) 127/60     Weight: 78.5 kg (173 lb)  Body mass index is 27.1 kg/m².    SpO2: 96 %  O2 Device (Oxygen Therapy): room air        PHYSICAL EXAM:deferred due to covid 19  Patient sounds quite well over the phone, speech and cognition intact, alert and oriented.     Significant Labs:   CMP   Recent Labs   Lab 12/12/20  1355 12/13/20  0637   * 134*   K 4.4 4.6    103   CO2 21* 23   * 91   BUN 20 16   CREATININE 1.4 1.1   CALCIUM 8.6* 8.5*   PROT 7.6 7.0   ALBUMIN 3.9 3.6   BILITOT 1.2* 1.2*   ALKPHOS 116 105   AST 59* 51*   ALT 37 32   ANIONGAP 13 8   ESTGFRAFRICA 55* >60   EGFRNONAA 47* >60    and CBC   Recent Labs   Lab 12/12/20  1355 12/13/20  0637   WBC 4.20 3.61*   HGB 8.0* 7.4*   HCT 26.6* 24.8*   PLT 22* 18*         Significant Imaging: cxr noted.     Assessment and Plan:     Active Diagnoses:    Diagnosis Date Noted POA    PRINCIPAL PROBLEM:  Encephalopathy, toxic [G92] 12/12/2020 Yes    Pneumonia due to COVID-19 virus [U07.1, J12.89] 12/12/2020 Yes    VINEET (acute kidney injury) [N17.9] 12/12/2020 Yes    Pancytopenia [D61.818] 05/13/2020 Yes    Paroxysmal atrial fibrillation, onset 5/2017, CHADS-VAS score  "4 [I48.0] 05/24/2018 Yes    Hypertension [I10]  Yes    Polycythemia vera [D45]  Yes      Problems Resolved During this Admission:     78yo with post-PV myelofibrosis (bone marrow failure syndrome) here with covid-19+.     His anemia appears baseline and his thrombocytopenia appears mildly worse consistent likely with viral effect. Would expect mild worsening of his chronic low blood counts due to viral effect causing bone marrow suppression. There is note of 49% "other" on his differential which is present due to  awaiting pathologist review which will happen in AM on 12/14/20 so we will follow this up, had placed order for pathology review as well.      rec hold jakafi and transfuse as indicated for Hg <7g/dL or plts <10k or if active bleeding.     Discussed diagnosis and work-up/treatment plan in detail with patient and they expressed understanding.     >60 minutes spent in reviewing the chart in detail prior to time with patient, consultation with the patient and discussing diagnosis and evaluation plan in detail with patient and family.     Thank you for your consult.   Please call with questions.           Dawna Stephenson MD  Hematology/Oncology  [facility]  12/13/2020 2:06 PM    "

## 2020-12-13 NOTE — CARE UPDATE
I attempted to call patient's wife for update on both her home number and cell number with no response.

## 2020-12-13 NOTE — HOSPITAL COURSE
Pt admitted for altered mental status and COVID-19 infection.  Patient  was monitored closely during his stay.  His oxygenation remained stable.  He did not qualify for Decadron or remdesivir with normal oxygen saturations.  He was initiated on Rocephin and azithromycin for pneumonia on CXR with mildly elevated procalcitonin.  He remained at his mental baseline.  He was evaluated by neurology who recommended altered mental status due to delirium from COVID-19 infection and fever.  Neurology recommended no further workup needed as patient return to normal mental status.  Labs were monitored and patient's chronic pancytopenia worsened and his hematology group was consulted.  Patient was evaluated by hematology who recommended PRBC and platelet transfusion prior to discharge.  Patient received 1 unit PRBC and 1 unit platelets and was monitored overnight and tolerated transfusion without any complications.  Hematologist recommended close outpatient follow-up for bone marrow biopsy once COVID infection cleared.  Patient instructed to follow-up as directed and verbalized understanding of discharge instructions.    Physical exam:  Awake alert oriented x4, pleasant, no acute distress  Heart-regular rate and rhythm, no edema  Lungs-Clear to auscultation bilaterally, respirations even unlabored  Abdomen-soft nontender normoactive bowel sounds  Neuro-no focal deficits, ambulating around room without any difficulty

## 2020-12-14 ENCOUNTER — TELEPHONE (OUTPATIENT)
Dept: HEMATOLOGY/ONCOLOGY | Facility: CLINIC | Age: 80
End: 2020-12-14

## 2020-12-14 DIAGNOSIS — D64.9 SYMPTOMATIC ANEMIA: Primary | ICD-10-CM

## 2020-12-14 LAB
ABO + RH BLD: NORMAL
ALBUMIN SERPL BCP-MCNC: 3.5 G/DL (ref 3.5–5.2)
ALP SERPL-CCNC: 113 U/L (ref 55–135)
ALT SERPL W/O P-5'-P-CCNC: 43 U/L (ref 10–44)
ANION GAP SERPL CALC-SCNC: 8 MMOL/L (ref 8–16)
ANISOCYTOSIS BLD QL SMEAR: ABNORMAL
AST SERPL-CCNC: 63 U/L (ref 10–40)
BASOPHILS NFR BLD: 0 % (ref 0–1.9)
BILIRUB SERPL-MCNC: 1.1 MG/DL (ref 0.1–1)
BLD GP AB SCN CELLS X3 SERPL QL: NORMAL
BLD PROD TYP BPU: NORMAL
BLOOD UNIT EXPIRATION DATE: NORMAL
BLOOD UNIT TYPE CODE: 5100
BLOOD UNIT TYPE: NORMAL
BUN SERPL-MCNC: 16 MG/DL (ref 8–23)
CALCIUM SERPL-MCNC: 8.7 MG/DL (ref 8.7–10.5)
CHLORIDE SERPL-SCNC: 102 MMOL/L (ref 95–110)
CO2 SERPL-SCNC: 23 MMOL/L (ref 23–29)
CODING SYSTEM: NORMAL
CREAT SERPL-MCNC: 1.2 MG/DL (ref 0.5–1.4)
D DIMER PPP IA.FEU-MCNC: 0.4 MG/L FEU
DACRYOCYTES BLD QL SMEAR: ABNORMAL
DIFFERENTIAL METHOD: ABNORMAL
DISPENSE STATUS: NORMAL
EOSINOPHIL NFR BLD: 1 % (ref 0–8)
ERYTHROCYTE [DISTWIDTH] IN BLOOD BY AUTOMATED COUNT: 18 % (ref 11.5–14.5)
EST. GFR  (AFRICAN AMERICAN): >60 ML/MIN/1.73 M^2
EST. GFR  (NON AFRICAN AMERICAN): 57 ML/MIN/1.73 M^2
GLUCOSE SERPL-MCNC: 110 MG/DL (ref 70–110)
HCT VFR BLD AUTO: 23.2 % (ref 40–54)
HGB BLD-MCNC: 7.1 G/DL (ref 14–18)
HYPOCHROMIA BLD QL SMEAR: ABNORMAL
IMM GRANULOCYTES # BLD AUTO: ABNORMAL K/UL
IMM GRANULOCYTES NFR BLD AUTO: ABNORMAL %
LYMPHOCYTES NFR BLD: 28 % (ref 18–48)
MAGNESIUM SERPL-MCNC: 2.2 MG/DL (ref 1.6–2.6)
MCH RBC QN AUTO: 26.2 PG (ref 27–31)
MCHC RBC AUTO-ENTMCNC: 30.6 G/DL (ref 32–36)
MCV RBC AUTO: 86 FL (ref 82–98)
METAMYELOCYTES NFR BLD MANUAL: 4 %
MONOCYTES NFR BLD: 4 % (ref 4–15)
MYELOCYTES NFR BLD MANUAL: 3 %
NEUTROPHILS NFR BLD: 30 % (ref 38–73)
NRBC BLD-RTO: 4 /100 WBC
PATH REV BLD -IMP: NORMAL
PATH REV BLD -IMP: NORMAL
PHOSPHATE SERPL-MCNC: 3.1 MG/DL (ref 2.7–4.5)
PLATELET # BLD AUTO: 17 K/UL (ref 150–350)
PLATELET BLD QL SMEAR: ABNORMAL
PMV BLD AUTO: ABNORMAL FL (ref 9.2–12.9)
POIKILOCYTOSIS BLD QL SMEAR: ABNORMAL
POTASSIUM SERPL-SCNC: 4.3 MMOL/L (ref 3.5–5.1)
PROT SERPL-MCNC: 6.9 G/DL (ref 6–8.4)
RBC # BLD AUTO: 2.71 M/UL (ref 4.6–6.2)
SODIUM SERPL-SCNC: 133 MMOL/L (ref 136–145)
TRANS PLATPHERESIS VOL PATIENT: NORMAL ML
WBC # BLD AUTO: 4.7 K/UL (ref 3.9–12.7)
WBC OTHER NFR BLD MANUAL: 26 %

## 2020-12-14 PROCEDURE — 80053 COMPREHEN METABOLIC PANEL: CPT

## 2020-12-14 PROCEDURE — 36415 COLL VENOUS BLD VENIPUNCTURE: CPT

## 2020-12-14 PROCEDURE — 99232 SBSQ HOSP IP/OBS MODERATE 35: CPT | Mod: ,,, | Performed by: PHYSICIAN ASSISTANT

## 2020-12-14 PROCEDURE — 85007 BL SMEAR W/DIFF WBC COUNT: CPT

## 2020-12-14 PROCEDURE — 86920 COMPATIBILITY TEST SPIN: CPT

## 2020-12-14 PROCEDURE — 85027 COMPLETE CBC AUTOMATED: CPT

## 2020-12-14 PROCEDURE — 86850 RBC ANTIBODY SCREEN: CPT

## 2020-12-14 PROCEDURE — 99232 PR SUBSEQUENT HOSPITAL CARE,LEVL II: ICD-10-PCS | Mod: ,,, | Performed by: PHYSICIAN ASSISTANT

## 2020-12-14 PROCEDURE — P9035 PLATELET PHERES LEUKOREDUCED: HCPCS

## 2020-12-14 PROCEDURE — 63700000 PHARM REV CODE 250 ALT 637 W/O HCPCS: Performed by: NURSE PRACTITIONER

## 2020-12-14 PROCEDURE — 94761 N-INVAS EAR/PLS OXIMETRY MLT: CPT

## 2020-12-14 PROCEDURE — 85060 PATHOLOGIST REVIEW: ICD-10-PCS | Mod: ,,, | Performed by: STUDENT IN AN ORGANIZED HEALTH CARE EDUCATION/TRAINING PROGRAM

## 2020-12-14 PROCEDURE — 63600175 PHARM REV CODE 636 W HCPCS: Performed by: NURSE PRACTITIONER

## 2020-12-14 PROCEDURE — 25000003 PHARM REV CODE 250: Performed by: NURSE PRACTITIONER

## 2020-12-14 PROCEDURE — 83735 ASSAY OF MAGNESIUM: CPT

## 2020-12-14 PROCEDURE — 84100 ASSAY OF PHOSPHORUS: CPT

## 2020-12-14 PROCEDURE — 85060 BLOOD SMEAR INTERPRETATION: CPT | Mod: ,,, | Performed by: STUDENT IN AN ORGANIZED HEALTH CARE EDUCATION/TRAINING PROGRAM

## 2020-12-14 PROCEDURE — 12000002 HC ACUTE/MED SURGE SEMI-PRIVATE ROOM

## 2020-12-14 PROCEDURE — 85379 FIBRIN DEGRADATION QUANT: CPT

## 2020-12-14 RX ORDER — ASCORBIC ACID 500 MG
500 TABLET ORAL 2 TIMES DAILY
COMMUNITY
Start: 2020-12-14 | End: 2021-09-27 | Stop reason: CLARIF

## 2020-12-14 RX ORDER — CHOLECALCIFEROL (VITAMIN D3) 25 MCG
1000 TABLET ORAL DAILY
Qty: 30 TABLET | Refills: 0 | Status: SHIPPED | OUTPATIENT
Start: 2020-12-15 | End: 2021-09-05

## 2020-12-14 RX ORDER — HYDROCODONE BITARTRATE AND ACETAMINOPHEN 500; 5 MG/1; MG/1
TABLET ORAL
Status: DISCONTINUED | OUTPATIENT
Start: 2020-12-14 | End: 2020-12-15 | Stop reason: HOSPADM

## 2020-12-14 RX ORDER — BENZONATATE 100 MG/1
100 CAPSULE ORAL 3 TIMES DAILY PRN
Qty: 30 CAPSULE | Refills: 0 | Status: SHIPPED | OUTPATIENT
Start: 2020-12-14 | End: 2020-12-24

## 2020-12-14 RX ORDER — AZITHROMYCIN 250 MG/1
250 TABLET, FILM COATED ORAL DAILY
Qty: 4 TABLET | Refills: 0 | Status: ON HOLD | OUTPATIENT
Start: 2020-12-15 | End: 2020-12-23 | Stop reason: HOSPADM

## 2020-12-14 RX ADMIN — THERA TABS 1 TABLET: TAB at 08:12

## 2020-12-14 RX ADMIN — AZITHROMYCIN 250 MG: 250 TABLET, FILM COATED ORAL at 08:12

## 2020-12-14 RX ADMIN — Medication 1000 UNITS: at 08:12

## 2020-12-14 RX ADMIN — LEVOTHYROXINE SODIUM 50 MCG: 50 TABLET ORAL at 05:12

## 2020-12-14 RX ADMIN — ROSUVASTATIN CALCIUM 40 MG: 20 TABLET, FILM COATED ORAL at 08:12

## 2020-12-14 RX ADMIN — METOPROLOL SUCCINATE 50 MG: 50 TABLET, FILM COATED, EXTENDED RELEASE ORAL at 08:12

## 2020-12-14 RX ADMIN — ACETAMINOPHEN 1000 MG: 500 TABLET ORAL at 08:12

## 2020-12-14 RX ADMIN — GLYCOPYRROLATE 1 MG: 1 TABLET ORAL at 08:12

## 2020-12-14 RX ADMIN — Medication 500 MG: at 08:12

## 2020-12-14 RX ADMIN — TAMSULOSIN HYDROCHLORIDE 0.4 MG: 0.4 CAPSULE ORAL at 08:12

## 2020-12-14 RX ADMIN — POLYETHYLENE GLYCOL 3350 17 G: 17 POWDER, FOR SOLUTION ORAL at 08:12

## 2020-12-14 RX ADMIN — CEFTRIAXONE 1 G: 1 INJECTION, SOLUTION INTRAVENOUS at 04:12

## 2020-12-14 NOTE — ASSESSMENT & PLAN NOTE
Pt with very mild VINEET which is resolved  UA benign.  DC IVFs.   Avoid nonessential nephrotoxins.   Dose medication for Estimated Creatinine Clearance: 46.7 mL/min (based on SCr of 1.2 mg/dL).

## 2020-12-14 NOTE — ASSESSMENT & PLAN NOTE
Secondary to COVID and fever.  Now resolved  -CT head negative.  -neuro checks  -neurology consult reviewed-no further workup recommended, call with any acute changes  -fall precautions.

## 2020-12-14 NOTE — SUBJECTIVE & OBJECTIVE
Interval History:  Notes reviewed, no acute events overnight.  Patient seen and  examined this morning and states he feels well.  He denies chest pain, shortness of breath, nausea or vomiting.  He is tolerating diet well.  Initial plan was for discharge home after PRBC transfusion.  Patient seen by Hematology and additional unit of platelets order.  Due to transfusion time patient will not be was discharged until 11:00 p.m. this evening.  Discharge will be held till tomorrow a.m..  Patient verbalized understanding.    Review of Systems   Constitutional: Negative for chills, fatigue and fever.   HENT: Negative for congestion, sore throat and trouble swallowing.    Eyes: Negative for photophobia and visual disturbance.   Respiratory: Negative for cough, chest tightness and shortness of breath.    Cardiovascular: Negative for chest pain, palpitations and leg swelling.   Gastrointestinal: Negative for abdominal pain, diarrhea, nausea and vomiting.   Endocrine: Negative for polyphagia and polyuria.   Genitourinary: Negative for difficulty urinating, dysuria, frequency and urgency.   Musculoskeletal: Negative for arthralgias, back pain and gait problem.   Skin: Negative for color change, pallor, rash and wound.   Neurological: Negative for dizziness, weakness and light-headedness.   Hematological: Negative for adenopathy.   Psychiatric/Behavioral: Negative for agitation, behavioral problems and confusion.   All other systems reviewed and are negative.    Objective:     Vital Signs (Most Recent):  Temp: 99.8 °F (37.7 °C) (12/14/20 0759)  Pulse: 84 (12/14/20 0759)  Resp: 16 (12/14/20 0759)  BP: 125/64 (12/14/20 0759)  SpO2: 99 % (12/14/20 1408) Vital Signs (24h Range):  Temp:  [97.6 °F (36.4 °C)-99.8 °F (37.7 °C)] 99.8 °F (37.7 °C)  Pulse:  [81-85] 84  Resp:  [16-20] 16  SpO2:  [95 %-99 %] 99 %  BP: (118-125)/(58-85) 125/64     Weight: 78.5 kg (173 lb)  Body mass index is 27.1 kg/m².    Intake/Output Summary (Last 24 hours)  at 12/14/2020 1619  Last data filed at 12/14/2020 0600  Gross per 24 hour   Intake 480 ml   Output --   Net 480 ml      Physical Exam  Vitals signs and nursing note reviewed.   Constitutional:       General: He is not in acute distress.     Appearance: He is well-developed. He is not ill-appearing.   HENT:      Head: Normocephalic and atraumatic.      Right Ear: External ear normal.      Left Ear: External ear normal.      Nose: Nose normal. No congestion or rhinorrhea.      Mouth/Throat:      Mouth: Mucous membranes are moist.      Pharynx: Oropharynx is clear. No oropharyngeal exudate or posterior oropharyngeal erythema.   Eyes:      General: No scleral icterus.     Conjunctiva/sclera: Conjunctivae normal.      Pupils: Pupils are equal, round, and reactive to light.   Neck:      Musculoskeletal: Normal range of motion and neck supple.      Vascular: No JVD.   Cardiovascular:      Rate and Rhythm: Normal rate and regular rhythm.      Pulses: Normal pulses.      Heart sounds: Normal heart sounds. No murmur.   Pulmonary:      Effort: Pulmonary effort is normal. No respiratory distress.      Breath sounds: Normal breath sounds. No stridor. No wheezing, rhonchi or rales.   Abdominal:      General: Bowel sounds are normal. There is no distension.      Palpations: Abdomen is soft.      Tenderness: There is no abdominal tenderness.   Musculoskeletal: Normal range of motion.         General: No swelling or tenderness.   Skin:     General: Skin is warm and dry.      Capillary Refill: Capillary refill takes 2 to 3 seconds.      Coloration: Skin is not jaundiced or pale.   Neurological:      General: No focal deficit present.      Mental Status: He is alert and oriented to person, place, and time.      Cranial Nerves: No cranial nerve deficit.      Sensory: No sensory deficit.   Psychiatric:         Mood and Affect: Mood normal.         Behavior: Behavior normal.         Thought Content: Thought content normal.          Significant Labs:   CBC:   Recent Labs   Lab 12/13/20  0637 12/14/20  0054   WBC 3.61* 4.70   HGB 7.4* 7.1*   HCT 24.8* 23.2*   PLT 18* 17*     CMP:   Recent Labs   Lab 12/13/20 0637 12/14/20  0054   * 133*   K 4.6 4.3    102   CO2 23 23   GLU 91 110   BUN 16 16   CREATININE 1.1 1.2   CALCIUM 8.5* 8.7   PROT 7.0 6.9   ALBUMIN 3.6 3.5   BILITOT 1.2* 1.1*   ALKPHOS 105 113   AST 51* 63*   ALT 32 43   ANIONGAP 8 8   EGFRNONAA >60 57*     All pertinent labs within the past 24 hours have been reviewed.    Significant Imaging: I have reviewed all pertinent imaging results/findings within the past 24 hours.

## 2020-12-14 NOTE — TELEPHONE ENCOUNTER
----- Message from Tia George sent at 12/14/2020 12:42 PM CST -----  Contact: Stephen   Stephen the  called to make a follow up appt the patient is being   D/C from the hospital today.  He will need to be seen in one to two weeks.  Call back at [phone]      

## 2020-12-14 NOTE — PROGRESS NOTES
"[facility]  Hematology/Oncology      Patient Name: Morro Sharma  MRN: [id number]  Admission Date: 12/12/2020  Hospital Length of Stay: 2 days  Code Status: Full Code   Attending Provider: Luci Mendoza MD   Consulting Provider: Alfredo Isabel PA-C  Primary Care Physician: Yaniv Escobar MD  Principal Problem:Encephalopathy, toxic    Reason for consult: low blood counts      Subjective:     Chief Complaint:  weakness     HPI:  Patient is a 79 y.o. 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 He was then started on jakafi tolerating 5mg daily. Needs occasional PRBC, none recent.     Currently admitted with covid-19 infection diagnosed 12/11/20 and had confusion on 12/12/20 which prompted ED evaluation and admission, this is now resolved, found to have VINEET and abnormal chest imaging concern for viral pneumonitis.      Patient reports currently he feels "really well" and has no complaints, no fever current, no new pain, no new weakness, no bleeding.     Interval History 12/14/2020: Pt's last peripheral smear completed on 12/02/2020 showed increased blasts with erythrocytosis and last bone marrow biopsy 05/15/2020 showed myelofibrosis. Pt remains anemic and thrombocytopenic with H/H of 7.1/23.2 and platelet count of 17 on most recent CBC. Pt was being planned for transfusion of 1 unit of PRBC and then discharge and follow up with hem/onc Dr. Chu Cole. Pt states he is fatigued. He denies any other complaints at this time.     Review of Systems   Constitutional: Positive for fatigue. Negative for chills, fever and unexpected weight change.   HENT: Negative for mouth sores and trouble swallowing.    Eyes: Negative for photophobia and visual disturbance.   Respiratory: Negative for cough, chest tightness, shortness of breath, wheezing " and stridor.    Cardiovascular: Negative for chest pain and leg swelling.   Gastrointestinal: Negative for abdominal pain, constipation, diarrhea, nausea and vomiting.   Musculoskeletal: Negative for arthralgias, back pain and myalgias.   Skin: Negative for color change, pallor, rash and wound.   Neurological: Negative for syncope, speech difficulty and weakness.   Hematological: Negative for adenopathy. Does not bruise/bleed easily.   Psychiatric/Behavioral: Negative for agitation, behavioral problems, confusion, decreased concentration and dysphoric mood.       Past Medical History:   Diagnosis Date    Atrial fibrillation 05/07/2017    [facility] records sent to scanning    BPH (benign prostatic hypertrophy)     Colon polyps     Erectile dysfunction     GERD (gastroesophageal reflux disease)     Hypertension     Pneumonia due to COVID-19 virus 12/12/2020    Pneumothorax on left     Polycythemia vera     Pulmonary nodules        Past Surgical History:   Procedure Laterality Date    COLONOSCOPY  9/15/14    Dr. Mcdaniels, 5 year recheck    ESOPHAGOGASTRODUODENOSCOPY      LUNG SURGERY      LEFT - CHEST TUBE       Allergies reviewed.     No current facility-administered medications on file prior to encounter.      Current Outpatient Medications on File Prior to Encounter   Medication Sig    acetaminophen (TYLENOL) 500 MG tablet Take 1 tablet (500 mg total) by mouth 2 (two) times daily. For shoulder pain (Patient taking differently: Take 500 mg by mouth 2 (two) times daily as needed. For shoulder pain)    glycopyrrolate (ROBINUL) 1 mg Tab TAKE ONE TABLET BY MOUTH TWICE DAILY. MAY TAKE UP TO THREE TIMES DAILY IF needed    levothyroxine (SYNTHROID) 50 MCG tablet TAKE 1 TABLET BY MOUTH BEFORE BREAKFAST    metoprolol succinate (TOPROL-XL) 50 MG 24 hr tablet Take 1 tablet (50 mg total) by mouth once daily.    multivitamin (ONE DAILY MULTIVITAMIN) per tablet Take 1 tablet by mouth once daily.    psyllium husk  (METAMUCIL ORAL) Take 1 capsule by mouth once daily.    rosuvastatin (CRESTOR) 40 MG Tab Take 1 tablet (40 mg total) by mouth every evening.    tadalafiL (CIALIS) 20 MG Tab Take 1 tablet (20 mg total) by mouth as needed.    tamsulosin (FLOMAX) 0.4 mg Cap Take 1 capsule (0.4 mg total) by mouth once daily.    triamcinolone acetonide 0.1% (KENALOG) 0.1 % cream APPLY TO AFFECTED AREA TWICE DAILY    [DISCONTINUED] ruxolitinib (JAKAFI) 5 mg Tab Take 5 mg by mouth once daily.    dexchlorphen-phenylephrine-DM (POLYTUSSIN DM) 1-5-10 mg/5 mL Syrp Take 5 mLs by mouth every 4 (four) hours as needed.     Family History     Problem Relation (Age of Onset)    Early death Father    Heart disease Mother, Maternal Aunt, Maternal Uncle    Hypertension Mother, Maternal Aunt, Maternal Uncle    No Known Problems Daughter, Daughter, Daughter, Daughter        Tobacco Use    Smoking status: Former Smoker     Packs/day: 1.50     Years: 25.00     Pack years: 37.50     Quit date: 10/2/1982     Years since quittin.2    Smokeless tobacco: Never Used   Substance and Sexual Activity    Alcohol use: No     Frequency: Monthly or less     Drinks per session: 1 or 2     Binge frequency: Never    Drug use: No    Sexual activity: Yes       ROS:  Constitutional: +for activity change, appetite change, fatigue, fever and unexpected weight change.   HENT: Negative for mouth sores and sore throat.    Respiratory: Negative for cough and shortness of breath.    Cardiovascular: Negative for chest pain, palpitations and leg swelling.   Gastrointestinal: Negative for abdominal pain, constipation and diarrhea.   Genitourinary: Negative for dysuria and frequency.   Musculoskeletal: Negative for back pain and joint swelling.   Neurological: Negative for weakness currently, light-headedness and numbness.       Objective:     Vital Signs (Most Recent):  Temp: 99.8 °F (37.7 °C) (20)  Pulse: 84 (20)  Resp: 16 (20 075)  BP:  125/64 (12/14/20 0759)  SpO2: 97 % (12/14/20 0836) Vital Signs (24h Range):  Temp:  [97 °F (36.1 °C)-99.8 °F (37.7 °C)] 99.8 °F (37.7 °C)  Pulse:  [81-93] 84  Resp:  [16-20] 16  SpO2:  [95 %-97 %] 97 %  BP: (113-125)/(58-85) 125/64     Weight: 78.5 kg (173 lb)  Body mass index is 27.1 kg/m².    SpO2: 97 %  O2 Device (Oxygen Therapy): room air    PHYSICAL EXAM: Limited due to audiovisual telemedicine visit.  Physical Exam   Constitutional: He is oriented to person, place, and time and well-developed, well-nourished, and in no distress. No distress.   HENT:   Head: Normocephalic and atraumatic.   Eyes: Conjunctivae are normal. Right eye exhibits no discharge. Left eye exhibits no discharge.   Pulmonary/Chest: Effort normal. No respiratory distress.   Neurological: He is alert and oriented to person, place, and time. No cranial nerve deficit.   Psychiatric: Mood, memory, affect and judgment normal.     Significant Labs:   CMP   Recent Labs   Lab 12/13/20  0637 12/14/20  0054   * 133*   K 4.6 4.3    102   CO2 23 23   GLU 91 110   BUN 16 16   CREATININE 1.1 1.2   CALCIUM 8.5* 8.7   PROT 7.0 6.9   ALBUMIN 3.6 3.5   BILITOT 1.2* 1.1*   ALKPHOS 105 113   AST 51* 63*   ALT 32 43   ANIONGAP 8 8   ESTGFRAFRICA >60 >60   EGFRNONAA >60 57*    and CBC   Recent Labs   Lab 12/13/20  0637 12/14/20  0054   WBC 3.61* 4.70   HGB 7.4* 7.1*   HCT 24.8* 23.2*   PLT 18* 17*     CXR 12/12/2020  FINDINGS:  The mediastinal cardiac size and contours are normal.  There is a very small density in the right upper lung field and very small density in the left lateral lung field with thickening of the right major fissure and possible adjacent infiltrate demonstrated.  This may represent an early viral pneumonitis.  A solid mass is not seen.  No pneumothorax or pleural effusion is noted.     Impression:     Small peripheral infiltrates are identified on both sides suggesting viral pneumonitis.  Otherwise negative chest  x-ray.    Assessment and Plan:     Active Diagnoses:    Diagnosis Date Noted POA    PRINCIPAL PROBLEM:  Encephalopathy, toxic [G92] 12/12/2020 Yes    Pneumonia due to COVID-19 virus [U07.1, J12.89] 12/12/2020 Yes    VINEET (acute kidney injury) [N17.9] 12/12/2020 Yes    Pancytopenia [D61.818] 05/13/2020 Yes    Paroxysmal atrial fibrillation, onset 5/2017, CHADS-VAS score 4 [I48.0] 05/24/2018 Yes    Hypertension [I10]  Yes    Polycythemia vera [D45]  Yes      Problems Resolved During this Admission:     -Pt is a 80yo male with post-PV myelofibrosis (bone marrow failure syndrome) here with covid-19+. 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 Pt diagnosed with covid19 and still under care of hospital medicine with lowered counts in part due to sequelae of viral suppression of bone marrow. We are still awaiting pathology for peripheral smear completed this morning. I discussed patient with Dr. Chu Cole (his primary hem/onc) and it was decided that if possible, we would proceed with bone marrow biopsy inpatient if hospital guidelines allowed so with pt covid19 positivity and and once pt platelet count over 20,000 due to his current disease process and covid19 infection. Hospital medicine spoke with Dr. Stephenson this morning and pt was planned to be discharged after transfusion of 1 unit PRBC and was to follow up with Dr. Cole outpatient. I have discussed with hospital medicine NP regarding bone marrow biopsy and pt would not be able to get updated bone marrow biopsy tomorrow. Pt will need updated bone marrow biopsy for assessment and is planned to follow up with his primary oncologist Dr. Chu Cole in [place] once discharged. I have discussed with hospital medicine and pt will be transfused with one unit of platelets and one unit of blood and is to follow up with Dr. Chu Cole outpatient. Pt is to continue holding  silvana.    Alfredo Isabel PA-C  Hematology/Oncology  [facility]  12/14/2020 2:06 PM

## 2020-12-14 NOTE — ASSESSMENT & PLAN NOTE
1.  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  2.  Pancytopenia secondary to number 1.    Hold Jakafi acutely.  Transfuse 1 unit PRBC and 1 unit platelets per hematology recommendations

## 2020-12-14 NOTE — TELEPHONE ENCOUNTER
Returned call to patient, no answer, left detailed message that I was rescheduling the 12/30/20 lab and follow up to 12/28/20, lab at 1:15 pm and see Dr. Cole at 2:20 pm.

## 2020-12-14 NOTE — TELEPHONE ENCOUNTER
Returned call to patient, informed of appointment rescheduled to 12/28/20 with labs. Patient will view on the patient portal

## 2020-12-14 NOTE — ASSESSMENT & PLAN NOTE
Chronic, controlled.  Latest blood pressure and vitals reviewed-   Temp:  [97.6 °F (36.4 °C)-99.8 °F (37.7 °C)]   Pulse:  [81-85]   Resp:  [16-20]   BP: (118-125)/(58-85)   SpO2:  [95 %-99 %] .   Home meds for hypertension were reviewed and noted below. Hospital anti-hypertensive changes were made as shown below.  Hypertension Medications             metoprolol succinate (TOPROL-XL) 50 MG 24 hr tablet Take 1 tablet (50 mg total) by mouth once daily.        Will utilize p.r.n. blood pressure medication only if patient's blood pressure greater than  180/110 and he develops symptoms such as worsening chest pain or shortness of breath.

## 2020-12-14 NOTE — DISCHARGE INSTRUCTIONS
Follow up with Dr. Cole next week to discuss when you can restart your Jakafi and also to schedule outpatient bone marrow biopsy. Rest and drink adequate fluids. Return to ER for any worsening symptoms or concerns.     Thank you for choosing [facility] for your medical care. The primary doctor who is taking care of you at the time of your discharge is Luci Mendoza MD.     You were admitted to the hospital with confusion secondary to fever and covid pneumonia.     Please note your discharge instructions, including diet/activity restrictions, follow-up appointments, and medication changes.  If you have any questions about your medical issues, prescriptions, or any other questions, please feel free to contact the [facility] Medicine Dept at [phone] and we will help.    If you are previously with Home health, outpatient PT/OT or under a therapy program, you are cleared to return to those programs.    Please direct all long term medication refills and follow up to your primary care provider, Yaniv Escobar MD. Thank you again for letting us take care of your health care needs.    Please note the following discharge instructions per your discharging physician-  Nadine Montoya NP    Your test was POSITIVE for COVID-19 (coronavirus).       Please isolate yourself at home.  You may leave home and/or return to work once the following conditions are met:    If you were not hospitalized and are not severely immunocompromised*:   More than 10 days since symptoms first appeared AND   More than 24 hours fever free without medications AND   Symptoms have improved     If you were hospitalized OR are severely immunocompromised*:   More than 20 days since symptoms first appeared   More than 24 hours fever free without medications   Symptoms have improved    If you had no symptoms but tested positive:   More than 10 days since the date of the first positive test (20 days if severely  immunocompromised).   If you develop symptoms, then use the guidelines above.     *Definition of severely immunocompromised:  - Current chemotherapy for cancer  - Untreated HIV with CD4 count less than 200  - Combined primary immunodeficiency disorder  - Prednisone more than 20 mg per day for more than 14 days  - Post-transplant patients    Additional instructions:   Separate yourself from other people and animals in your home.   Call ahead before visiting your doctor.   Wear a facemask when around others.   Cover your coughs and sneezes.   Wash your hands often with soap and water; hand  can be used, too.   Avoid sharing personal household items.   Wipe down surfaces used daily.   Monitor your symptoms. Seek prompt medical attention if your illness is worsening (e.g., difficulty breathing).    Before seeking care, call your healthcare provider.   If you have a medical emergency and need to call 911, notify the dispatch personnel that you have, or are being evaluated for COVID-19. If possible, put on a facemask before emergency medical services arrive.        Contact Tracing    As one of the next steps, you will receive a call or text from the [place] Department of Health ([facility]) COVID-19 Tracing Team. See the contact information below so you know not to ignore the health departments call. It is important that you contact them back immediately so they can help.      Contact Tracer Number:  [phone]  Caller ID for most carriers: [place] Dept Health     What is contact tracing?  · Contact tracing is a process that helps identify everyone who has been in close contact with an infected person. Contact tracers let those people know they may have been exposed and guide them on next steps. Confidentiality is important for everyone; no one will be told who may have exposed them to the virus.  · Your involvement is important. The more we know about where and how this virus is spreading, the better chance  we have at stopping it from spreading further.  What does exposure mean?  · Exposure means you have been within 6 feet for more than 15 minutes with a person who has or had COVID-19.  What kind of questions do the contact tracers ask?  · A contact tracer will confirm your basic contact information including name, address, phone number, and next of kin, as well as asking about any symptoms you may have had. Theyll also ask you how you think you may have gotten sick, such as places where you may have been exposed to the virus, and people you were with. Those names will never be shared with anyone outside of that call, and will only be used to help trace and stop the spread of the virus.   I have privacy concerns. How will the state use my information?  · Your privacy about your health is important. All calls are completed using call centers that use the appropriate health privacy protection measures (HIPAA compliance), meaning that your patient information is safe. No one will ever ask you any questions related to immigration status. Your health comes first.   Do I have to participate?  · You do not have to participate, but we strongly encourage you to. Contact tracing can help us catch and control new outbreaks as theyre developing to keep your friends and family safe.   What if I dont hear from anyone?  · If you dont receive a call within 24 hours, you can call the number above right away to inquire about your status. That line is open from 8:00 am - 8:00 p.m., 7 days a week.  Contact tracing saves lives! Together, we have the power to beat this virus and keep our loved ones and neighbors safe.    For more information see CDC link below.      https://www.cdc.gov/coronavirus/2019-ncov/hcp/guidance-prevent-spread.html#precautions        Sources:  [facility], [place] [facility] and [facility]           Sincerely,     Nadine Montoya NP

## 2020-12-14 NOTE — TELEPHONE ENCOUNTER
----- Message from Damion Leiva sent at 12/14/2020  1:32 PM CST -----  Contact: Patient  The pt called and believes that he missed a call from your office     Please call him back at [phone]

## 2020-12-14 NOTE — PROGRESS NOTES
[facility] Medicine  Progress Note    Patient Name: Morro Sharma  MRN: [id number]  Patient Class: IP- Inpatient   Admission Date: 12/12/2020  Length of Stay: 2 days  Attending Physician: Luci Mendoza MD  Primary Care Provider: Yaniv Escobar MD        Subjective:     Principal Problem:Encephalopathy, toxic        HPI:  Morro Sharma is a 79-year-old male with PMHx significant for HTN, paroxysmal AFib, polycythemia vera in spent phase requiring transfusions now on Jakafi and followed closely by Hematology.  Pt presented to the ED for further evaluation of altered mental status.  Pt was diagnosed with COVID yesterday.  He was seen at urgent care.  He reports 2-3 day Hx of fever and fatigue.  T-max 90° 9-100 at home per wife.  Today, Pt exhibited confusion.  His wife states he was not acting like himself and sometimes did not answer her questions.  She states he left in the car today dressed in his pajamas.  She is unsure where he went.  Pt denies any recall of events.  He is completely at his baseline now.  He reports mild cough without SOB, chest pain, nausea, vomiting, diarrhea, or abdominal pain.  He had a CT head in ED which was negative for any acute findings.  His labs continue to demonstrate pancytopenia.  He has mild evidence of VINEET and CXR confirming pneumonia likely viral.  He was admitted to the service of hospital Medicine for additional evaluation and management.  Other pertinent medical Hx as below:    Overview/Hospital Course:  Pt was monitored closely during his stay.  His oxygenation remained stable.  He did not qualify for Decadron or remdesivir with normal oxygen saturations.  He was initiated on Rocephin and azithromycin for pneumonia on CXR with mildly elevated procalcitonin.  He remained at his mental baseline.  He was evaluated by neurology who did not feel like further workup was necessary at this time.  His pancytopenia worsened and his hematology group was  consulted.    Interval History:  Notes reviewed, no acute events overnight.  Patient seen and  examined this morning and states he feels well.  He denies chest pain, shortness of breath, nausea or vomiting.  He is tolerating diet well.  Initial plan was for discharge home after PRBC transfusion.  Patient seen by Hematology and additional unit of platelets order.  Due to transfusion time patient will not be was discharged until 11:00 p.m. this evening.  Discharge will be held till tomorrow a.m..  Patient verbalized understanding.    Review of Systems   Constitutional: Negative for chills, fatigue and fever.   HENT: Negative for congestion, sore throat and trouble swallowing.    Eyes: Negative for photophobia and visual disturbance.   Respiratory: Negative for cough, chest tightness and shortness of breath.    Cardiovascular: Negative for chest pain, palpitations and leg swelling.   Gastrointestinal: Negative for abdominal pain, diarrhea, nausea and vomiting.   Endocrine: Negative for polyphagia and polyuria.   Genitourinary: Negative for difficulty urinating, dysuria, frequency and urgency.   Musculoskeletal: Negative for arthralgias, back pain and gait problem.   Skin: Negative for color change, pallor, rash and wound.   Neurological: Negative for dizziness, weakness and light-headedness.   Hematological: Negative for adenopathy.   Psychiatric/Behavioral: Negative for agitation, behavioral problems and confusion.   All other systems reviewed and are negative.    Objective:     Vital Signs (Most Recent):  Temp: 99.8 °F (37.7 °C) (12/14/20 0759)  Pulse: 84 (12/14/20 0759)  Resp: 16 (12/14/20 0759)  BP: 125/64 (12/14/20 0759)  SpO2: 99 % (12/14/20 1408) Vital Signs (24h Range):  Temp:  [97.6 °F (36.4 °C)-99.8 °F (37.7 °C)] 99.8 °F (37.7 °C)  Pulse:  [81-85] 84  Resp:  [16-20] 16  SpO2:  [95 %-99 %] 99 %  BP: (118-125)/(58-85) 125/64     Weight: 78.5 kg (173 lb)  Body mass index is 27.1 kg/m².    Intake/Output Summary  (Last 24 hours) at 12/14/2020 1619  Last data filed at 12/14/2020 0600  Gross per 24 hour   Intake 480 ml   Output --   Net 480 ml      Physical Exam  Vitals signs and nursing note reviewed.   Constitutional:       General: He is not in acute distress.     Appearance: He is well-developed. He is not ill-appearing.   HENT:      Head: Normocephalic and atraumatic.      Right Ear: External ear normal.      Left Ear: External ear normal.      Nose: Nose normal. No congestion or rhinorrhea.      Mouth/Throat:      Mouth: Mucous membranes are moist.      Pharynx: Oropharynx is clear. No oropharyngeal exudate or posterior oropharyngeal erythema.   Eyes:      General: No scleral icterus.     Conjunctiva/sclera: Conjunctivae normal.      Pupils: Pupils are equal, round, and reactive to light.   Neck:      Musculoskeletal: Normal range of motion and neck supple.      Vascular: No JVD.   Cardiovascular:      Rate and Rhythm: Normal rate and regular rhythm.      Pulses: Normal pulses.      Heart sounds: Normal heart sounds. No murmur.   Pulmonary:      Effort: Pulmonary effort is normal. No respiratory distress.      Breath sounds: Normal breath sounds. No stridor. No wheezing, rhonchi or rales.   Abdominal:      General: Bowel sounds are normal. There is no distension.      Palpations: Abdomen is soft.      Tenderness: There is no abdominal tenderness.   Musculoskeletal: Normal range of motion.         General: No swelling or tenderness.   Skin:     General: Skin is warm and dry.      Capillary Refill: Capillary refill takes 2 to 3 seconds.      Coloration: Skin is not jaundiced or pale.   Neurological:      General: No focal deficit present.      Mental Status: He is alert and oriented to person, place, and time.      Cranial Nerves: No cranial nerve deficit.      Sensory: No sensory deficit.   Psychiatric:         Mood and Affect: Mood normal.         Behavior: Behavior normal.         Thought Content: Thought content  normal.         Significant Labs:   CBC:   Recent Labs   Lab 12/13/20  0637 12/14/20  0054   WBC 3.61* 4.70   HGB 7.4* 7.1*   HCT 24.8* 23.2*   PLT 18* 17*     CMP:   Recent Labs   Lab 12/13/20  0637 12/14/20  0054   * 133*   K 4.6 4.3    102   CO2 23 23   GLU 91 110   BUN 16 16   CREATININE 1.1 1.2   CALCIUM 8.5* 8.7   PROT 7.0 6.9   ALBUMIN 3.6 3.5   BILITOT 1.2* 1.1*   ALKPHOS 105 113   AST 51* 63*   ALT 32 43   ANIONGAP 8 8   EGFRNONAA >60 57*     All pertinent labs within the past 24 hours have been reviewed.    Significant Imaging: I have reviewed all pertinent imaging results/findings within the past 24 hours.      Assessment/Plan:      * Encephalopathy, toxic  Secondary to COVID and fever.  Now resolved  -CT head negative.  -neuro checks  -neurology consult reviewed-no further workup recommended, call with any acute changes  -fall precautions.      VINEET (acute kidney injury)  Pt with very mild VINEET which is resolved  UA benign.  DC IVFs.   Avoid nonessential nephrotoxins.   Dose medication for Estimated Creatinine Clearance: 46.7 mL/min (based on SCr of 1.2 mg/dL).      Pneumonia due to COVID-19 virus  - COVID-19 testing Collection Date: 6/22/2020 Collection Time:  11:34 AM   - Infection Control notified     - Isolation:   - Airborne, Contact and Droplet Precautions  - Cohort patients into COVID units  - N95 mask, wear eye protection  - 20 second hand hygiene              - Limit visitors per hospital policy              - Consolidating lab draws, nursing care, provider visits, and interventions    - Diagnostics: (leukopenia, hyponatremia, hyperferritinemia, elevated troponin, elevated d-dimer, age, and comorbidities are significant predictors of poor clinical outcome)  CBC, CMP, Procalcitonin, Ferritin, CRP, LDH, Troponin and Portable CXR    - Management:  Supplemental O2 to maintain SpO2 >92%  Telemetry  Continuous/intermittent Pulse Ox  Albuterol treatment PRN    Pt with normal O2 sats at this  time.  Will not start Decadron or remdesivir at this time- clinically monitor and decide as needed.  There is evidence of pneumonia on the CXR.  Procalcitonin is very mildly elevated.  Continue Abx.               Pancytopenia  Due to polycythemia vera and spent phase per Hematology notes.  We will avoid any anti thrombotic that this time.  His pancytopenia has worsened.  This is likely due to COVID.  We will involve Hematology.  Appreciate recommendations.    Paroxysmal atrial fibrillation, onset 5/2017, CHADS-VAS score 4  Historical diagnosis.  Rate controlled at this time.  Continue metoprolol.  Not a candidate for anticoagulation due to severe thrombocytopenia.  Monitor on telemetry.      Polycythemia vera  1.  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  2.  Pancytopenia secondary to number 1.    Hold Jakafi acutely.  Transfuse 1 unit PRBC and 1 unit platelets per hematology recommendations    Hypertension  Chronic, controlled.  Latest blood pressure and vitals reviewed-   Temp:  [97.6 °F (36.4 °C)-99.8 °F (37.7 °C)]   Pulse:  [81-85]   Resp:  [16-20]   BP: (118-125)/(58-85)   SpO2:  [95 %-99 %] .   Home meds for hypertension were reviewed and noted below. Hospital anti-hypertensive changes were made as shown below.  Hypertension Medications             metoprolol succinate (TOPROL-XL) 50 MG 24 hr tablet Take 1 tablet (50 mg total) by mouth once daily.        Will utilize p.r.n. blood pressure medication only if patient's blood pressure greater than  180/110 and he develops symptoms such as worsening chest pain or shortness of breath.          VTE Risk Mitigation (From admission, onward)         Ordered     IP VTE HIGH RISK PATIENT  Once      12/12/20 1733     Place sequential compression device  Until discontinued      12/12/20 1733     Place SIMI hose  Until discontinued      12/12/20 1733                Discharge Planning   RUCHI: 12/14/2020     Code Status: Full Code   Is the patient medically ready for discharge?:      Reason for patient still in hospital (select all that apply): Treatment  Discharge Plan A: Home with family                  Nadine Montoya NP  Department of Hospital Medicine   [facility]

## 2020-12-14 NOTE — PROGRESS NOTES
[facility]  Department of Neurology  Neurology Consultation Note        PATIENT NAME: Morro Sharma  MRN: [id number]  CSN: [id number]      TODAY'S DATE: 12/14/2020  ADMIT DATE: 12/12/2020                            CONSULTING PROVIDER: Gisella Burton NP  CONSULT REQUESTED BY: Luci Mendoza MD       Patient Location:  323/323 A     Patient information was obtained from patient, nurse and chart.     SUBJECTIVE   DIAGNOSES:     Active Hospital Problems    Diagnosis  POA    *Encephalopathy, toxic [G92]  Yes    Pneumonia due to COVID-19 virus [U07.1, J12.89]  Yes    VINEET (acute kidney injury) [N17.9]  Yes    Pancytopenia [D61.818]  Yes    Paroxysmal atrial fibrillation, onset 5/2017, CHADS-VAS score 4 [I48.0]  Yes    Hypertension [I10]  Yes    Polycythemia vera [D45]  Yes      Resolved Hospital Problems   No resolved problems to display.       PRINCIPLE PROBLEM:  Encephalopathy, toxic    HISTORY OF PRESENT ILLNESS Per ER  Morro Sharma is a 79 y.o. male with PMHx of HTN, a-fib, and pulmonary nodules who presents to the ED with an onset of confusion noticed PTA. Associated symptoms include weakness. The patient's family told EMS that the patient was taking his evenings medications this morning and would like him evaluated for confusion. The patient denies syncope or any other symptoms at this time. No pertinent PSHx.  No family present on arrival.    Neurology consult: 80 yo with PMH of HTN , Afib and polycythemia vera who presented with acute confusional state. Hx obtained per chart and patient. Patient endorsed some confusion 3 days ago as reported by spouse but he refuses to believe it.He states he was feeling unwell last week and went to the urgent care . He was given a medication with some side effects.At baseline he denies any memory issues carries out his activity without assistance. He has never been diagnosed with dementia.    Since admission , he was found to be COVID positive and with  VINEET. Today he reports  feeling well and has no complaints. CT head showed mild brain atrophy otherwise  Normal HCT.      :  Patient seen and examined.  Plan of care discussed with Dr. Cardozo.  Patient alert and oriented.  He reports he was confused on Saturday.  He reports he is at baseline at this time.  Patient is oriented to all and follows commands.  He denies shortness of breath and chest pain.  Patient is on COVID isolation.     PAST MEDICAL HISTORY   Past Medical History:   Diagnosis Date    Atrial fibrillation 2017    [facility] records sent to scanning    BPH (benign prostatic hypertrophy)     Colon polyps     Erectile dysfunction     GERD (gastroesophageal reflux disease)     Hypertension     Pneumonia due to COVID-19 virus 2020    Pneumothorax on left     Polycythemia vera     Pulmonary nodules         PAST SURGICAL HISTORY    Past Surgical History:   Procedure Laterality Date    COLONOSCOPY  9/15/14    Dr. Mcdaniels, 5 year recheck    ESOPHAGOGASTRODUODENOSCOPY      LUNG SURGERY      LEFT - CHEST TUBE        FAMILY HISTORY    Family History   Problem Relation Age of Onset    Hypertension Mother     Heart disease Mother     Hypertension Maternal Aunt     Heart disease Maternal Aunt     Hypertension Maternal Uncle     Heart disease Maternal Uncle     Early death Father     No Known Problems Daughter     No Known Problems Daughter     No Known Problems Daughter     No Known Problems Daughter     Prostate cancer Neg Hx     Kidney cancer Neg Hx     Cancer Neg Hx     Urolithiasis Neg Hx     Melanoma Neg Hx     Psoriasis Neg Hx     Lupus Neg Hx     Eczema Neg Hx         SOCIAL HISTORY    Social History     Tobacco Use    Smoking status: Former Smoker     Packs/day: 1.50     Years: 25.00     Pack years: 37.50     Quit date: 10/2/1982     Years since quittin.2    Smokeless tobacco: Never Used   Substance Use Topics    Alcohol use: No     Frequency: Monthly or less     Drinks per session:  1 or 2     Binge frequency: Never    Drug use: No               ALLERGIES  Review of patient's allergies indicates:  No Known Allergies       REVIEW OF SYSTEMS  All other systems reviewed and are negative.     OBJECTIVE     VITAL SIGNS (Most Recent)  Temp: 99.8 °F (37.7 °C) (12/14/20 0759)  Pulse: 84 (12/14/20 0759)  Resp: 16 (12/14/20 0759)  BP: 125/64 (12/14/20 0759)  SpO2: 99 % (12/14/20 1408)      INPATIENT MEDICATIONS  Scheduled Meds:   ascorbic acid (vitamin C)  500 mg Oral BID    azithromycin  250 mg Oral Daily    cefTRIAXone (ROCEPHIN) IVPB  1 g Intravenous Q24H    glycopyrrolate  1 mg Oral BID    levothyroxine  50 mcg Oral Before breakfast    metoprolol succinate  50 mg Oral Daily    multivitamin  1 tablet Oral Daily    polyethylene glycol  17 g Oral Daily    rosuvastatin  40 mg Oral QHS    tamsulosin  1 capsule Oral Daily    vitamin D  1,000 Units Oral Daily     Continuous Infusions:  PRN Meds:.sodium chloride, sodium chloride, acetaminophen, benzonatate, dextrose 50%, dextrose 50%, glucagon (human recombinant), glucose, glucose, magnesium oxide, magnesium oxide, melatonin, ondansetron, potassium bicarbonate, potassium bicarbonate, potassium bicarbonate, potassium, sodium phosphates, potassium, sodium phosphates, potassium, sodium phosphates, sodium chloride 0.9%         IMAGING  CT Head Without Contrast   Results for orders placed during the hospital encounter of 12/12/20   CT Head Without Contrast    Narrative EXAMINATION:  CT HEAD WITHOUT CONTRAST    CLINICAL HISTORY:  Altered mental status;    TECHNIQUE:  Low dose axial images were obtained through the head.  Coronal and sagittal reformations were also performed. Contrast was not administered.    COMPARISON:  Head CT of May 7, 2015.    FINDINGS:  No cranial fracture is identified.  Scalp edema or hematoma is not noted.    The basal cisterns are clear and symmetric.  There is no mass effect or midline shift.  There is mild enlargement of the  lateral ventricles and cerebral sulci consistent with mild brain atrophy.  Focal areas of increased or decreased CT density consistent with tumor, edema, CVA or hemorrhage is not seen.  No intracranial hemorrhage or hematoma is noted.      Impression Mild brain atrophy otherwise negative head CT.      Electronically signed by: Mainor Schrader MD  Date:    12/12/2020  Time:    15:16         CT Head With & Without Contrast   Results for orders placed in visit on 09/05/12   CT Head W Wo Contrast    Narrative Pre-and postcontrast contiguous 5-mm axial images are obtained from the skull base to the vertex.  There is mild asymmetry to the to the lateral ventricles which is likely developmental.  I do not see evidence of hydrocephalus mass or abnormal   enhancement.  No abnormal extra-axial collections are seen.  There is no evidence of abnormal enhancement to suggest intracranial metastatic disease.    Impression  Essentially negative CT of the brain with and without contrast.  ______________________________________     Electronically signed by: PARMINDER WEEKS MD  Date:     09/05/12  Time:    11:32        CARDIAC STUDIES  Results for orders placed or performed during the hospital encounter of 05/13/20   EKG 12-lead    Collection Time: 05/13/20  4:15 PM    Narrative    Test Reason : R06.02,    Vent. Rate : 090 BPM     Atrial Rate : 090 BPM     P-R Int : 174 ms          QRS Dur : 118 ms      QT Int : 392 ms       P-R-T Axes : 077 087 022 degrees     QTc Int : 479 ms    Normal sinus rhythm  Nonspecific intraventricular conduction delay  Borderline Abnormal ECG  When compared with ECG of 12-MAR-2020 12:48,  Nonspecific intraventricular conduction delay has replaced Incomplete right  bundle branch block  Confirmed by Vicki Meier MD ([id number]) on 5/15/2020 12:51:14 PM    Referred By: [email or web address]   SELF           Confirmed By:Vicki Meier MD     No results found for this or any previous visit.      PHYSICAL  EXAM  General: AAOx3. NAD.  HEENT: NCAT. PERRLA. EOMI.   Neck: Supple. No JVD. No LAD.    CV: RRR. NL S1/S2. No murmurs  Chest: clear to auscultation bilaterally, normal effort  Abd: +BS x 4. Soft. ND/NT. No rebound or guarding.   Ext: peripheral pulses intact.     MENTAL STATUS EXAM:   Level of alertness: Alert   Level of attention: Attentive w/out deficit   Orientation/Awareness: intact to person, place, time  Language: fluent with comprehension/repetition/naming intact       CRANIAL NERVE EXAM:   II: PERRL; visual fields full to confrontation; no gross deficit on visual acuity   III/IV/VI: EOMI w/out evidence of nystagmus, strabismus, or evoked diplopia   V: no deficits appreciated to light touch bilaterally   VII: no facial weakness. Eyebrow raise symmetric.   VIII: no deficits in hearing bilaterally   IX/X: palate midline, and raises symmetrically   XI: shoulder shrug 5/5 bilaterally   XII: tongue is midline w/out asymmetry. No dysarthria noted     MOTOR EXAM:   Bulk and Tone: normal throughout   Strength:   Left UE: 5/5 Deltoid, biceps, triceps, wrist extension, interossei, .   Left LE: 5/5 Hip flexion, knee flexion, knee extension, dorsiflexion, plantar flexion.   Right UE: 5/5 Deltoid, biceps, triceps, wrist extension, interossei, .   Right LE: 5/5 Hip flexion, knee flexion, knee extension, dorsiflexion, plantar flexion.   No orbiting. No Pronator drift.   No tremor either at rest or with intention.     REFLEXES:   2+ bilaterally in the Biceps, Brachioradialis, Triceps, Quadriceps and Gastrocnemius.   Merritt sign: not present   Plantar response: flexor bilaterally     SENSORY EXAM: Intact to light touch, temperature, vibration, proprioception bilateral UE, LE.     COORDINATION/CEREBELLAR EXAM:   FTN: no dysmetria.   HTS: No dysmetria. No truncal ataxia     GAIT:   Normal stance and gait       ASSESSMENT & PLAN    80 yo with PMH of HTN , Afib and polycythemia vera who presented with acute confusional  state.  On admission patient found to have viral pneumonia likely from COVID infection. His neurological exam is intact with intact mental status.    AMS  Likely related to COVID infection,   - Will recommend continued monitoring  - Continue with antibiotics  - CT head with some mild atrophy otherwise negative for acute infarct  - Keep K>4, Mg >2  - Please ensure delirium precaution in place  - Avoid deliriogenic medication  - Keep blinds up during the day, down at night  - TV off at night , minimize neurochecks at night  - If there are continued changes with mental status, please reach out to neurology    Polycythemia vera  Hematology consulted      No further neurological workup required at this time.  Please call with any questions or decline in neurological status.  [phone] within 2 weeks from discharge.     Stroke education was provided including stroke risk factors modification and any acute neurological changes including weakness, confusion, visual changes to come straight to the ER.  Seizure education was provided including no driving, no swimming by self, no operation of heavy machinery or climbing on ladders.     All side effects of new medications were discussed with patient and/or next of kin and all questions were answered.                                                                                 Active Diagnoses:    Diagnosis Date Noted POA    PRINCIPAL PROBLEM:  Encephalopathy, toxic [G92] 12/12/2020 Yes    Pneumonia due to COVID-19 virus [U07.1, J12.89] 12/12/2020 Yes    VINEET (acute kidney injury) [N17.9] 12/12/2020 Yes    Pancytopenia [D61.818] 05/13/2020 Yes    Paroxysmal atrial fibrillation, onset 5/2017, CHADS-VAS score 4 [I48.0] 05/24/2018 Yes    Hypertension [I10]  Yes    Polycythemia vera [D45]  Yes      Problems Resolved During this Admission:       VTE Risk Mitigation (From admission, onward)         Ordered     IP VTE HIGH  RISK PATIENT  Once      12/12/20 1733     Place sequential compression device  Until discontinued      12/12/20 1733     Place SIMI hose  Until discontinued      12/12/20 1733                Gisella Burton NP  Neurology  [facility]

## 2020-12-15 VITALS
TEMPERATURE: 99 F | BODY MASS INDEX: 27.15 KG/M2 | DIASTOLIC BLOOD PRESSURE: 67 MMHG | OXYGEN SATURATION: 95 % | WEIGHT: 173 LBS | HEIGHT: 67 IN | SYSTOLIC BLOOD PRESSURE: 142 MMHG | RESPIRATION RATE: 18 BRPM | HEART RATE: 75 BPM

## 2020-12-15 LAB
ALBUMIN SERPL BCP-MCNC: 3.7 G/DL (ref 3.5–5.2)
ALP SERPL-CCNC: 128 U/L (ref 55–135)
ALT SERPL W/O P-5'-P-CCNC: 70 U/L (ref 10–44)
ANION GAP SERPL CALC-SCNC: 10 MMOL/L (ref 8–16)
AST SERPL-CCNC: 94 U/L (ref 10–40)
BASOPHILS NFR BLD: 0 % (ref 0–1.9)
BILIRUB SERPL-MCNC: 2.3 MG/DL (ref 0.1–1)
BLD PROD TYP BPU: NORMAL
BLOOD UNIT EXPIRATION DATE: NORMAL
BLOOD UNIT TYPE CODE: 7300
BLOOD UNIT TYPE: NORMAL
BUN SERPL-MCNC: 18 MG/DL (ref 8–23)
CALCIUM SERPL-MCNC: 9.4 MG/DL (ref 8.7–10.5)
CHLORIDE SERPL-SCNC: 102 MMOL/L (ref 95–110)
CO2 SERPL-SCNC: 22 MMOL/L (ref 23–29)
CODING SYSTEM: NORMAL
CREAT SERPL-MCNC: 1.1 MG/DL (ref 0.5–1.4)
CRP SERPL-MCNC: 186.6 MG/L (ref 0–8.2)
DIFFERENTIAL METHOD: ABNORMAL
DISPENSE STATUS: NORMAL
EOSINOPHIL NFR BLD: 0 % (ref 0–8)
ERYTHROCYTE [DISTWIDTH] IN BLOOD BY AUTOMATED COUNT: 17.1 % (ref 11.5–14.5)
EST. GFR  (AFRICAN AMERICAN): >60 ML/MIN/1.73 M^2
EST. GFR  (NON AFRICAN AMERICAN): >60 ML/MIN/1.73 M^2
FERRITIN SERPL-MCNC: 2036 NG/ML (ref 20–300)
GLUCOSE SERPL-MCNC: 97 MG/DL (ref 70–110)
HCT VFR BLD AUTO: 28.1 % (ref 40–54)
HGB BLD-MCNC: 9 G/DL (ref 14–18)
IMM GRANULOCYTES # BLD AUTO: ABNORMAL K/UL
IMM GRANULOCYTES NFR BLD AUTO: ABNORMAL %
LDH SERPL L TO P-CCNC: 1200 U/L (ref 110–260)
LYMPHOCYTES NFR BLD: 26 % (ref 18–48)
MAGNESIUM SERPL-MCNC: 2.4 MG/DL (ref 1.6–2.6)
MCH RBC QN AUTO: 27.4 PG (ref 27–31)
MCHC RBC AUTO-ENTMCNC: 32 G/DL (ref 32–36)
MCV RBC AUTO: 86 FL (ref 82–98)
METAMYELOCYTES NFR BLD MANUAL: 11 %
MONOCYTES NFR BLD: 2 % (ref 4–15)
MYELOCYTES NFR BLD MANUAL: 7 %
NEUTROPHILS NFR BLD: 52 % (ref 38–73)
NEUTS BAND NFR BLD MANUAL: 2 %
NRBC BLD-RTO: 6 /100 WBC
NUM UNITS TRANS PACKED RBC: NORMAL
PHOSPHATE SERPL-MCNC: 2.8 MG/DL (ref 2.7–4.5)
PLATELET # BLD AUTO: 54 K/UL (ref 150–350)
PLATELET BLD QL SMEAR: ABNORMAL
PMV BLD AUTO: ABNORMAL FL (ref 9.2–12.9)
POTASSIUM SERPL-SCNC: 4.6 MMOL/L (ref 3.5–5.1)
PROMYELOCYTES NFR BLD MANUAL: 1 %
PROT SERPL-MCNC: 7.8 G/DL (ref 6–8.4)
RBC # BLD AUTO: 3.28 M/UL (ref 4.6–6.2)
SODIUM SERPL-SCNC: 134 MMOL/L (ref 136–145)
WBC # BLD AUTO: 5.55 K/UL (ref 3.9–12.7)

## 2020-12-15 PROCEDURE — 86140 C-REACTIVE PROTEIN: CPT

## 2020-12-15 PROCEDURE — 25000003 PHARM REV CODE 250: Performed by: NURSE PRACTITIONER

## 2020-12-15 PROCEDURE — 85007 BL SMEAR W/DIFF WBC COUNT: CPT

## 2020-12-15 PROCEDURE — 94761 N-INVAS EAR/PLS OXIMETRY MLT: CPT

## 2020-12-15 PROCEDURE — 84100 ASSAY OF PHOSPHORUS: CPT

## 2020-12-15 PROCEDURE — 85027 COMPLETE CBC AUTOMATED: CPT

## 2020-12-15 PROCEDURE — 82728 ASSAY OF FERRITIN: CPT

## 2020-12-15 PROCEDURE — P9016 RBC LEUKOCYTES REDUCED: HCPCS

## 2020-12-15 PROCEDURE — 63700000 PHARM REV CODE 250 ALT 637 W/O HCPCS: Performed by: NURSE PRACTITIONER

## 2020-12-15 PROCEDURE — 80053 COMPREHEN METABOLIC PANEL: CPT

## 2020-12-15 PROCEDURE — 83615 LACTATE (LD) (LDH) ENZYME: CPT

## 2020-12-15 PROCEDURE — 36430 TRANSFUSION BLD/BLD COMPNT: CPT

## 2020-12-15 PROCEDURE — 36415 COLL VENOUS BLD VENIPUNCTURE: CPT

## 2020-12-15 PROCEDURE — 83735 ASSAY OF MAGNESIUM: CPT

## 2020-12-15 RX ADMIN — GLYCOPYRROLATE 1 MG: 1 TABLET ORAL at 08:12

## 2020-12-15 RX ADMIN — AZITHROMYCIN 250 MG: 250 TABLET, FILM COATED ORAL at 08:12

## 2020-12-15 RX ADMIN — THERA TABS 1 TABLET: TAB at 08:12

## 2020-12-15 RX ADMIN — POLYETHYLENE GLYCOL 3350 17 G: 17 POWDER, FOR SOLUTION ORAL at 08:12

## 2020-12-15 RX ADMIN — METOPROLOL SUCCINATE 50 MG: 50 TABLET, FILM COATED, EXTENDED RELEASE ORAL at 08:12

## 2020-12-15 RX ADMIN — TAMSULOSIN HYDROCHLORIDE 0.4 MG: 0.4 CAPSULE ORAL at 08:12

## 2020-12-15 RX ADMIN — Medication 500 MG: at 08:12

## 2020-12-15 RX ADMIN — LEVOTHYROXINE SODIUM 50 MCG: 50 TABLET ORAL at 05:12

## 2020-12-15 RX ADMIN — Medication 1000 UNITS: at 08:12

## 2020-12-15 NOTE — CARE UPDATE
12/15/20 0833   PRE-TX-O2   O2 Device (Oxygen Therapy) room air   Oxygen Concentration (%) 21   SpO2 95 %   Pulse Oximetry Type Intermittent   $ Pulse Oximetry - Multiple Charge Pulse Oximetry - Multiple   Pulse 75   Resp 18   POx

## 2020-12-16 ENCOUNTER — HOSPITAL ENCOUNTER (EMERGENCY)
Facility: HOSPITAL | Age: 80
Discharge: HOME | End: 2020-12-16
Attending: EMERGENCY MEDICINE
Payer: MEDICARE

## 2020-12-16 ENCOUNTER — TELEPHONE (OUTPATIENT)
Dept: MEDSURG UNIT | Facility: HOSPITAL | Age: 80
End: 2020-12-16

## 2020-12-16 ENCOUNTER — TELEPHONE (OUTPATIENT)
Dept: ADMINISTRATIVE | Facility: CLINIC | Age: 80
End: 2020-12-16

## 2020-12-16 ENCOUNTER — NURSE TRIAGE (OUTPATIENT)
Dept: ADMINISTRATIVE | Facility: CLINIC | Age: 80
End: 2020-12-16

## 2020-12-16 VITALS
DIASTOLIC BLOOD PRESSURE: 81 MMHG | WEIGHT: 173 LBS | RESPIRATION RATE: 20 BRPM | OXYGEN SATURATION: 97 % | HEART RATE: 86 BPM | BODY MASS INDEX: 27.1 KG/M2 | SYSTOLIC BLOOD PRESSURE: 152 MMHG | TEMPERATURE: 98 F

## 2020-12-16 DIAGNOSIS — U07.1 COVID-19: Primary | ICD-10-CM

## 2020-12-16 LAB — PATH REV BLD -IMP: NORMAL

## 2020-12-16 PROCEDURE — 99283 EMERGENCY DEPT VISIT LOW MDM: CPT | Mod: 25

## 2020-12-16 NOTE — DISCHARGE SUMMARY
[facility] Medicine  Discharge Summary      Patient Name: Morro Sharma  MRN: [id number]  Admission Date: 12/12/2020  Hospital Length of Stay: 3 days  Discharge Date and Time: 12/15/2020 10:55 AM  Attending Physician: No att. providers found   Discharging Provider: Nadine Montoya NP  Primary Care Provider: Yaniv Escobar MD      HPI:   Morro Sharma is a 79-year-old male with PMHx significant for HTN, paroxysmal AFib, polycythemia vera in spent phase requiring transfusions now on Jakafi and followed closely by Hematology.  Pt presented to the ED for further evaluation of altered mental status.  Pt was diagnosed with COVID yesterday.  He was seen at urgent care.  He reports 2-3 day Hx of fever and fatigue.  T-max 90° 9-100 at home per wife.  Today, Pt exhibited confusion.  His wife states he was not acting like himself and sometimes did not answer her questions.  She states he left in the car today dressed in his pajamas.  She is unsure where he went.  Pt denies any recall of events.  He is completely at his baseline now.  He reports mild cough without SOB, chest pain, nausea, vomiting, diarrhea, or abdominal pain.  He had a CT head in ED which was negative for any acute findings.  His labs continue to demonstrate pancytopenia.  He has mild evidence of VINEET and CXR confirming pneumonia likely viral.  He was admitted to the service of hospital Medicine for additional evaluation and management.  Other pertinent medical Hx as below:    * No surgery found *      Hospital Course:   Pt admitted for altered mental status and COVID-19 infection.  Patient  was monitored closely during his stay.  His oxygenation remained stable.  He did not qualify for Decadron or remdesivir with normal oxygen saturations.  He was initiated on Rocephin and azithromycin for pneumonia on CXR with mildly elevated procalcitonin.  He remained at his mental baseline.  He was evaluated by neurology who recommended  altered mental status due to delirium from COVID-19 infection and fever.  Neurology recommended no further workup needed as patient return to normal mental status.  Labs were monitored and patient's chronic pancytopenia worsened and his hematology group was consulted.  Patient was evaluated by hematology who recommended PRBC and platelet transfusion prior to discharge.  Patient received 1 unit PRBC and 1 unit platelets and was monitored overnight and tolerated transfusion without any complications.  Hematologist recommended close outpatient follow-up for bone marrow biopsy once COVID infection cleared.  Patient instructed to follow-up as directed and verbalized understanding of discharge instructions.    Physical exam:  Awake alert oriented x4, pleasant, no acute distress  Heart-regular rate and rhythm, no edema  Lungs-Clear to auscultation bilaterally, respirations even unlabored  Abdomen-soft nontender normoactive bowel sounds  Neuro-no focal deficits, ambulating around room without any difficulty     Consults:   Consults (From admission, onward)        Status Ordering Provider     Inpatient consult to Hematology  Once     Provider:  Dawna Stephenson MD    Completed WHIT ZAMBRANO     Inpatient consult to Neurology  Once     Provider:  Amauri Nation MD    Completed WHIT ZAMBRANO          No new Assessment & Plan notes have been filed under this hospital service since the last note was generated.  Service: Hospital Medicine    Final Active Diagnoses:    Diagnosis Date Noted POA    PRINCIPAL PROBLEM:  Encephalopathy, toxic [G92] 12/12/2020 Yes    Pneumonia due to COVID-19 virus [U07.1, J12.89] 12/12/2020 Yes    VINEET (acute kidney injury) [N17.9] 12/12/2020 Yes    Pancytopenia [D61.818] 05/13/2020 Yes    Paroxysmal atrial fibrillation, onset 5/2017, CHADS-VAS score 4 [I48.0] 05/24/2018 Yes    Hypertension [I10]  Yes    Polycythemia vera [D45]  Yes      Problems Resolved During this Admission:        Discharged Condition: good    Disposition: Home or Self Care    Follow Up:  Follow-up Information     Chu Cole MD In 1 week.    Specialty: Hematology and Oncology  Why: to recheck your symptoms.  Xiang spoke with Tia in scheduling, she sent a message to the nurse to schedule and notify pt of appointment  Contact information:  [address]  [facility] [address]  [phone]             CLIFF Cai On 12/28/2020.    Specialty: Internal Medicine  Why: to recheck your symptoms hosp f/u appt in avs  Contact information:  [address] [place] [address]  [phone]             [facility]    Specialty: DME Provider  Why: DME- pulse ox  Contact information:  [address]  [facility] [address]  [phone]                 Patient Instructions:      Diet Cardiac     Notify your health care provider if you experience any of the following:  temperature >100.4     Notify your health care provider if you experience any of the following:  persistent nausea and vomiting or diarrhea     Notify your health care provider if you experience any of the following:  severe uncontrolled pain     Notify your health care provider if you experience any of the following:  difficulty breathing or increased cough     Notify your health care provider if you experience any of the following:  persistent dizziness, light-headedness, or visual disturbances     Notify your health care provider if you experience any of the following:  increased confusion or weakness     COVID-19 Surveillance Program     Order Specific Question Answer Comments   Does patient have a smartphone? Yes    Does patient have the [facility] on their smartphone? Yes    While in surveillance program, will patient be using home oxygen? No      Activity as tolerated       Significant Diagnostic Studies: Labs:   CMP   Recent Labs   Lab 12/15/20  0717   *   K 4.6      CO2 22*   GLU 97   BUN 18   CREATININE 1.1    CALCIUM 9.4   PROT 7.8   ALBUMIN 3.7   BILITOT 2.3*   ALKPHOS 128   AST 94*   ALT 70*   ANIONGAP 10   ESTGFRAFRICA >60   EGFRNONAA >60   , CBC   Recent Labs   Lab 12/15/20  0717   WBC 5.55   HGB 9.0*   HCT 28.1*   PLT 54*    and All labs within the past 24 hours have been reviewed    Pending Diagnostic Studies:     None         Medications:  Reconciled Home Medications:      Medication List      START taking these medications    ascorbic acid (vitamin C) 500 MG tablet  Commonly known as: VITAMIN C  Take 1 tablet (500 mg total) by mouth 2 (two) times daily.     azithromycin 250 MG tablet  Commonly known as: Z-ROSE  Take 1 tablet (250 mg total) by mouth once daily. for 4 days     benzonatate 100 MG capsule  Commonly known as: TESSALON  Take 1 capsule (100 mg total) by mouth 3 (three) times daily as needed for Cough.     pulse oximeter device  Commonly known as: pulse oximeter  by Apply Externally route 2 (two) times a day. Use twice daily at 8 AM and 3 PM and [facility] as [facility].     vitamin D 1000 units Tab  Commonly known as: VITAMIN D3  Take 1 tablet (1,000 Units total) by mouth once daily.        CHANGE how you take these medications    acetaminophen 500 MG tablet  Commonly known as: TYLENOL  Take 1 tablet (500 mg total) by mouth 2 (two) times daily. For shoulder pain  What changed:   · when to take this  · reasons to take this        CONTINUE taking these medications    glycopyrrolate 1 mg Tab  Commonly known as: ROBINUL  TAKE ONE TABLET BY MOUTH TWICE DAILY. MAY TAKE UP TO THREE TIMES DAILY IF needed     levothyroxine 50 MCG tablet  Commonly known as: SYNTHROID  TAKE 1 TABLET BY MOUTH BEFORE BREAKFAST     METAMUCIL ORAL  Take 1 capsule by mouth once daily.     metoprolol succinate 50 MG 24 hr tablet  Commonly known as: TOPROL-XL  Take 1 tablet (50 mg total) by mouth once daily.     ONE DAILY MULTIVITAMIN per tablet  Generic drug: multivitamin  Take 1 tablet by mouth once daily.      POLYTUSSIN DM 1-5-10 mg/5 mL Syrp  Generic drug: dexchlorphen-phenylephrine-DM  Take 5 mLs by mouth every 4 (four) hours as needed.     rosuvastatin 40 MG Tab  Commonly known as: CRESTOR  Take 1 tablet (40 mg total) by mouth every evening.     tadalafiL 20 MG Tab  Commonly known as: CIALIS  Take 1 tablet (20 mg total) by mouth as needed.     tamsulosin 0.4 mg Cap  Commonly known as: FLOMAX  Take 1 capsule (0.4 mg total) by mouth once daily.     triamcinolone acetonide 0.1% 0.1 % cream  Commonly known as: KENALOG  APPLY TO AFFECTED AREA TWICE DAILY        STOP taking these medications    ruxolitinib 5 mg Tab  Commonly known as: JAKAFI            Indwelling Lines/Drains at time of discharge:   Lines/Drains/Airways     None                 Time spent on the discharge of patient: 38 minutes  Patient was seen and examined on the date of discharge and determined to be suitable for discharge.         Nadine Montoya NP  Department of Hospital Medicine  [facility]

## 2020-12-16 NOTE — TELEPHONE ENCOUNTER
Called patient due to RN escalation in COVID Surveillance program. Pt escalated due to hypoxia.    Patient location: [place]    Vitals: Sp02: 88-93%. P: 83-88. Temp: 98.0 F  Ambulatory Sp02 on the phone (if applicable): 88-89%    79 y.o. male with pertinent PMHx of lung mass, HTN, pAF, dyslipidemia, polycythemia vera, obesity, GERD, hx of smoking, and PNA due to covid-19 on day 15 of Covid symptoms. Positive Covid screen 12/12/20. CXR on 12/12 during hospitalization. Home oxygen: no. COVID-19 Hospitalization History: admitted 12/12-12/15.    HPI: Patient with above PMH and recent hospital discharge yesterday escalated for reported SpO2 of 88-93% today. Patient states that he did not require any supplemental O2 in the hospital. Repeat SpO2 now initially started at 96-97% and quickly decreased to 89-91%. Patient is shallow breathing due to cough and pain illicited by deep breathing. States he feels like mucous is stuck in his throat today and he is unable to cough it up like he was previously. He denies fever, chills, or chest pain. He states that he feels out of breath when conversing. Otherwise, no acute complaints noted.    ROS: As above. Denies worsening cough, light headedness, fever, chills, diaphoresis, chest pain, abdominal pain, emesis, diarrhea or further symptoms.     Assessment: Vitals appear abnormal as above. During phone call, patient appears alert and oriented. Unable to speak in full sentences without difficulty. No audible wheezing heard during call.    Plan:    Advised patient to present to the ED now for evaluation of worsening hypoxia and dyspnea. Patient and wife in agreement with plan and state that they will get ready to go in. All questions answered. Reviewed importance of wearing mask if self or family members leave the house.     Advised next steps: ED referral     Will copy PCP on this encounter.

## 2020-12-16 NOTE — ED PROVIDER NOTES
Encounter Date: 12/16/2020       History     Chief Complaint   Patient presents with    COVID-19 Concerns     Pt on COVID monitoring program; told to come to ED for sats of 89%. Pt states he was discharged from hospital yesterday and has no significant increase in prior sx.      HPI   Patient is a 79-year-old man who is sent over from COVID surveillance program for O2 sats of 89% at home.  Patient was discharged from the hospital yesterday for toxic encephalopathy.  He was treated for COVID pneumonia with Rocephin and azithromycin and did not require Remdesivir or Decadron treatment.  Patient states he feels significantly better today and feels that he is improving.  His main complaint is having difficulty expectorating his chest congestion.  Review of patient's allergies indicates:  No Known Allergies  Past Medical History:   Diagnosis Date    Atrial fibrillation 05/07/2017    [facility] records sent to scanning    BPH (benign prostatic hypertrophy)     Colon polyps     Erectile dysfunction     GERD (gastroesophageal reflux disease)     Hypertension     Pneumonia due to COVID-19 virus 12/12/2020    Pneumothorax on left     Polycythemia vera     Pulmonary nodules      Past Surgical History:   Procedure Laterality Date    COLONOSCOPY  9/15/14    Dr. Mcdaniels, 5 year recheck    ESOPHAGOGASTRODUODENOSCOPY      LUNG SURGERY      LEFT - CHEST TUBE     Family History   Problem Relation Age of Onset    Hypertension Mother     Heart disease Mother     Hypertension Maternal Aunt     Heart disease Maternal Aunt     Hypertension Maternal Uncle     Heart disease Maternal Uncle     Early death Father     No Known Problems Daughter     No Known Problems Daughter     No Known Problems Daughter     No Known Problems Daughter     Prostate cancer Neg Hx     Kidney cancer Neg Hx     Cancer Neg Hx     Urolithiasis Neg Hx     Melanoma Neg Hx     Psoriasis Neg Hx     Lupus Neg Hx     Eczema Neg Hx      Social  History     Tobacco Use    Smoking status: Former Smoker     Packs/day: 1.50     Years: 25.00     Pack years: 37.50     Quit date: 10/2/1982     Years since quittin.2    Smokeless tobacco: Never Used   Substance Use Topics    Alcohol use: No     Frequency: Monthly or less     Drinks per session: 1 or 2     Binge frequency: Never    Drug use: No     Review of Systems   Constitutional: Positive for fatigue. Negative for fever.   HENT: Negative for sore throat.    Respiratory: Positive for cough and shortness of breath (improving).    Cardiovascular: Negative for chest pain.   Gastrointestinal: Negative for nausea.   Genitourinary: Negative for dysuria.   Musculoskeletal: Negative for back pain.   Skin: Negative for rash.   Neurological: Negative for weakness.   Hematological: Does not bruise/bleed easily.       Physical Exam     Initial Vitals [20 1219]   BP Pulse Resp Temp SpO2   (!) 152/81 88 20 97.7 °F (36.5 °C) 95 %      MAP       --         Physical Exam    Constitutional: He appears well-developed and well-nourished.  Non-toxic appearance. No distress.   HENT:   Head: Normocephalic and atraumatic.   Eyes: EOM are normal. Pupils are equal, round, and reactive to light.   Neck: Normal range of motion. Neck supple. No neck rigidity. No JVD present.   Cardiovascular: Normal rate, regular rhythm, normal heart sounds and intact distal pulses. Exam reveals no gallop and no friction rub.    No murmur heard.  Pulmonary/Chest: He has no wheezes. He has no rhonchi. He has rales.   Abdominal: Soft. Bowel sounds are normal. He exhibits no distension. There is no abdominal tenderness. There is no rebound and no guarding.   Musculoskeletal: Normal range of motion.   Neurological: He is alert and oriented to person, place, and time. He has normal strength and normal reflexes. No cranial nerve deficit or sensory deficit. He exhibits normal muscle tone. Coordination normal. GCS eye subscore is 4. GCS verbal  subscore is 5. GCS motor subscore is 6.   Skin: Skin is warm and dry.   Psychiatric: He has a normal mood and affect. His speech is normal and behavior is normal. He is not actively hallucinating.         ED Course   Procedures  Labs Reviewed - No data to display       Imaging Results          X-Ray Chest AP Portable (Final result)  Result time 12/16/20 12:58:25    Final result by Mainor Schrader Jr., MD (12/16/20 12:58:25)                 Impression:      Near complete resolution of intrapulmonary infiltrates noted      Electronically signed by: Mainor Schrader MD  Date:    12/16/2020  Time:    12:58             Narrative:    EXAMINATION:  XR CHEST AP PORTABLE    CLINICAL HISTORY:  COVID-19    TECHNIQUE:  Single frontal view of the chest was performed.    COMPARISON:  Chest of December 12, 2020 and May 6, 2020    FINDINGS:  the mediastinal and cardiac size and contour are within normal limits. There is near complete resolution of indistinct intrapulmonary infiltrates with only mild interstitial prominence remaining. Consolidation or solid mass is not seen.  No pneumothorax or pleural effusion is noted.                                 Medical Decision Making:   History:   Old Medical Records: I decided to obtain old medical records.  Initial Assessment:   Mr. Sharma is a very pleasant 79-year-old man sent over because of low O2 sats of 89% at home during COVID-19 home monitoring program.  He was recently discharged from the hospital but is not requiring home oxygen.  He was treated for COVID pneumonia with Rocephin and azithromycin and states he feels as though he is improving.  He has had some thick phlegm he has had difficulty coughing up but states he is taking Mucinex.  O2 sats in the ED or above 95% on room air.  He has ambulated without decrease in his O2 sats and is in no significant respiratory distress.  Chest x-rays performed shows near complete resolution of the intrapulmonary infiltrates that were  present on the previous chest x-ray.  Patient does not wish to come into the hospital as he does not feel that his symptoms warrant that and I am in agreement.  He is to continue COVID-19 home monitoring.  Return precautions discussed for worsening symptoms.  He is discharged in no acute distress.                             Clinical Impression:       ICD-10-CM ICD-9-CM   1. COVID-19  U07.1 079.89                          ED Disposition Condition    Discharge Stable        ED Prescriptions     None        Follow-up Information     Follow up With Specialties Details Why Contact Info    Yaniv Escobar MD Family Medicine   [address] [place] [id number]  [phone]      [facility] Emergency Medicine  As needed, If symptoms worsen [address] [place] [id number] [phone]                                       Mayo Webster MD  12/16/20 6071

## 2020-12-16 NOTE — TELEPHONE ENCOUNTER
Called pt for enrollment in Covid Surveillance program. Pt verified name and . Pt was discharged from hospital on yesterday. Pt has pulse ox. Explained how to use pulse ox and advised if SpO2 ever falls below 94 and does not increase after a few deep breaths to return to ED for evaluation. Got VS and symptoms over the phone and all WNL except for SpO2 was 92-93. Pt was initially laying in a recliner so had pt sit up and take deep breaths and he went down to 91. Had pt get up and walk around to see if he would breathe deeper and pt went down to 88. Pt is not on home O2. Had pt sit back down and take some more deep breaths and only went back up to 91. Pt denies any SOB but states that it hurts to take a deep breath. Escalated to NABILA, Yovana Quinn PA-C, for furhter evaluation. Will have PES agent, AS, call pt back for completion of enrollment process.Gave number to OOC for worsening symptoms or any further concerns.    Sp02: 88-93    Pulse: 83-88    Temperature: 98.0    SOB at rest (0-5): 0    SOB with activity (0-5): 0    Worsening symptoms: no. Says he feels better today than yesterday    Fever symptoms: no    Increased home oxygen: n/a pt not on home O2    Called patient to review COVID-19 Surveillance Program enrollment process.    Smartphone: yes     [facility]: yes    Program consent: pending    Pulse oximeter status: yes    Verified emergency contacts: pending    Program Overview: Reviewed , no response process, and importance of correct emergency contacts in event that well-being check is warranted. Patient will call [phone]  to speak with an OnCall nurse, if needed. Pt aware that if Sp02 <94% or if they have any worsening symptoms, they need to go to the emergency department. If they are having a medical emergency, they will call 911. Otherwise, patient will continue to submit data as scheduled. Reviewed importance of wearing mask if self or family members leave the house.      Patient had no further questions.          Reason for Disposition   Information only question and nurse able to answer    Protocols used: INFORMATION ONLY CALL - NO TRIAGE-A-OH

## 2020-12-17 ENCOUNTER — NURSE TRIAGE (OUTPATIENT)
Dept: ADMINISTRATIVE | Facility: CLINIC | Age: 80
End: 2020-12-17

## 2020-12-17 ENCOUNTER — TELEPHONE (OUTPATIENT)
Dept: ADMINISTRATIVE | Facility: OTHER | Age: 80
End: 2020-12-17

## 2020-12-17 ENCOUNTER — HOSPITAL ENCOUNTER (EMERGENCY)
Facility: HOSPITAL | Age: 80
Discharge: HOME | End: 2020-12-17
Attending: EMERGENCY MEDICINE
Payer: MEDICARE

## 2020-12-17 ENCOUNTER — PATIENT OUTREACH (OUTPATIENT)
Dept: ADMINISTRATIVE | Facility: CLINIC | Age: 80
End: 2020-12-17

## 2020-12-17 VITALS
WEIGHT: 173 LBS | HEIGHT: 67 IN | RESPIRATION RATE: 22 BRPM | SYSTOLIC BLOOD PRESSURE: 138 MMHG | BODY MASS INDEX: 27.15 KG/M2 | DIASTOLIC BLOOD PRESSURE: 75 MMHG | OXYGEN SATURATION: 95 % | TEMPERATURE: 98 F | HEART RATE: 66 BPM

## 2020-12-17 DIAGNOSIS — U07.1 COVID-19: Primary | ICD-10-CM

## 2020-12-17 PROCEDURE — 99282 EMERGENCY DEPT VISIT SF MDM: CPT

## 2020-12-17 NOTE — PROGRESS NOTES
C3 nurse attempted to contact patient. The following occurred:   C3 nurse attempted to contact Morro Sharma for a TCC post hospital discharge follow up call. The patient is unable to conduct the call @ this time. The patient requested a callback.    The patient has a scheduled HOSFU appointment with JOSELUIS Hunter on 12/28 @ 1120. Message sent to Physician staff.

## 2020-12-17 NOTE — DISCHARGE INSTRUCTIONS
Your test was POSITIVE for COVID-19 (coronavirus).       Please isolate yourself at home.  You may leave home and/or return to work once the following conditions are met:    If you were not hospitalized and are not severely immunocompromised*:  More than 10 days since symptoms first appeared AND  More than 24 hours fever free without medications AND  Symptoms have improved     If you were hospitalized OR are severely immunocompromised*:  More than 20 days since symptoms first appeared  More than 24 hours fever free without medications  Symptoms have improved    If you had no symptoms but tested positive:  More than 10 days since the date of the first positive test (20 days if severely immunocompromised).   If you develop symptoms, then use the guidelines above.     *Definition of severely immunocompromised:  Current chemotherapy for cancer  Untreated HIV with CD4 count less than 200  Combined primary immunodeficiency disorder  Prednisone more than 20 mg per day for more than 14 days  Post-transplant patients    Additional instructions:  Separate yourself from other people and animals in your home.  Call ahead before visiting your doctor.  Wear a facemask when around others.  Cover your coughs and sneezes.  Wash your hands often with soap and water; hand  can be used, too.  Avoid sharing personal household items.  Wipe down surfaces used daily.  Monitor your symptoms. Seek prompt medical attention if your illness is worsening (e.g., difficulty breathing).   Before seeking care, call your healthcare provider.  If you have a medical emergency and need to call 911, notify the dispatch personnel that you have, or are being evaluated for COVID-19. If possible, put on a facemask before emergency medical services arrive.        Contact Tracing    As one of the next steps, you will receive a call or text from the [place] Department of Health ([facility]) COVID-19 Tracing Team. See the contact information below so you  know not to ignore the health departments call. It is important that you contact them back immediately so they can help.      Contact Tracer Number:  [phone]  Caller ID for most carriers: [facility]     What is contact tracing?  Contact tracing is a process that helps identify everyone who has been in close contact with an infected person. Contact tracers let those people know they may have been exposed and guide them on next steps. Confidentiality is important for everyone; no one will be told who may have exposed them to the virus.  Your involvement is important. The more we know about where and how this virus is spreading, the better chance we have at stopping it from spreading further.  What does exposure mean?  Exposure means you have been within 6 feet for more than 15 minutes with a person who has or had COVID-19.  What kind of questions do the contact tracers ask?  A contact tracer will confirm your basic contact information including name, address, phone number, and next of kin, as well as asking about any symptoms you may have had. Theyll also ask you how you think you may have gotten sick, such as places where you may have been exposed to the virus, and people you were with. Those names will never be shared with anyone outside of that call, and will only be used to help trace and stop the spread of the virus.   I have privacy concerns. How will the state use my information?  Your privacy about your health is important. All calls are completed using call centers that use the appropriate health privacy protection measures (HIPAA compliance), meaning that your patient information is safe. No one will ever ask you any questions related to immigration status. Your health comes first.   Do I have to participate?  You do not have to participate, but we strongly encourage you to. Contact tracing can help us catch and control new outbreaks as theyre developing to keep your friends and family safe.    What if I dont hear from anyone?  If you dont receive a call within 24 hours, you can call the number above right away to inquire about your status. That line is open from 8:00 am - 8:00 p.m., 7 days a week.  Contact tracing saves lives! Together, we have the power to beat this virus and keep our loved ones and neighbors safe.    For more information see CDC link below.      https://www.cdc.gov/coronavirus/2019-ncov/hcp/guidance-prevent-spread.html#precautions        Sources:  [facility], [place] [facility] and [facility]           Sincerely,     Kristy Macedo NP

## 2020-12-17 NOTE — TELEPHONE ENCOUNTER
Received call from on call triager. Wife was calling in because pt's O2 sat was 81. I spoke with this pt for low O2 sat yesterday and pt was send to ED by DORIAN. Pt was discharged from ED and sent home without home O2. Had wife warm up pt's hands to see if that was causing the pulse ox to read so low but even after warming could only get pt up to 87. Pt states he is breathing better today and doesn't hurt when he inhales anymore. Spoke with DORIAN, Yovana Quinn PA-C, who recommended pt return to ED. Pt will go back to [facility]. DORIAN called ED to advise pt is returning. Pt has [facility] number to call with any further concerns.    Reason for Disposition   Nursing judgment or information in reference    Protocols used: NO GUIDELINE [id number]

## 2020-12-17 NOTE — TELEPHONE ENCOUNTER
Pt CV +, wife on the phone. Pt is on surveillance program. Sent over to Surveillance RN Nadine Moran.

## 2020-12-17 NOTE — ED PROVIDER NOTES
Encounter Date: 12/17/2020    SCRIBE #1 NOTE: Gilma CARRANZA, wilmer scribing for, and in the presence of, JULIO Bone.       History     Chief Complaint   Patient presents with    COVID-19 Concerns     sent by covid surveillence for low O2 sat concerns.        Time seen by provider: 10:29 AM on 12/17/2020    Morro Sharma is a 80 y.o. male with PMHx of HTN and Afib who presents to the ED for low O2 saturation concerns. Patient was recently diagnosed with COVID-19. He was recently hospitalized for toxic encephalopathy at which time he was treated for COVID pneumonia with Rocephin and azithromycin and did not require Remdesivir or Decadron treatment. He was additionally seen in the ED yesterday after being sent by [employer] home monitoring program for O2 sats of 89% at home. Spouse states O2 sat reading was again in the 80's this morning, and patient was contacted by [employer] home monitoring program to go to hospital for further evaluation. Patient states he is coughing up mucous. Overall, he is feeling good. He is eating and drinking well. He denies fever, SOB, or any other symptoms at this time. PSHx of lung surgery.    The history is provided by the patient and the spouse.     Review of patient's allergies indicates:  No Known Allergies  Past Medical History:   Diagnosis Date    Atrial fibrillation 05/07/2017    [facility] records sent to scanning    BPH (benign prostatic hypertrophy)     Colon polyps     Erectile dysfunction     GERD (gastroesophageal reflux disease)     Hypertension     Pneumonia due to COVID-19 virus 12/12/2020    Pneumothorax on left     Polycythemia vera     Pulmonary nodules      Past Surgical History:   Procedure Laterality Date    COLONOSCOPY  9/15/14    Dr. Mcdaniels, 5 year recheck    ESOPHAGOGASTRODUODENOSCOPY      LUNG SURGERY      LEFT - CHEST TUBE     Family History   Problem Relation Age of Onset    Hypertension Mother     Heart disease Mother     Hypertension Maternal  Aunt     Heart disease Maternal Aunt     Hypertension Maternal Uncle     Heart disease Maternal Uncle     Early death Father     No Known Problems Daughter     No Known Problems Daughter     No Known Problems Daughter     No Known Problems Daughter     Prostate cancer Neg Hx     Kidney cancer Neg Hx     Cancer Neg Hx     Urolithiasis Neg Hx     Melanoma Neg Hx     Psoriasis Neg Hx     Lupus Neg Hx     Eczema Neg Hx      Social History     Tobacco Use    Smoking status: Former Smoker     Packs/day: 1.50     Years: 25.00     Pack years: 37.50     Quit date: 10/2/1982     Years since quittin.2    Smokeless tobacco: Never Used   Substance Use Topics    Alcohol use: No     Frequency: Monthly or less     Drinks per session: 1 or 2     Binge frequency: Never    Drug use: No     Review of Systems   Constitutional: Negative for activity change, appetite change, fatigue and fever.   HENT: Negative for congestion, rhinorrhea and sore throat.    Respiratory: Positive for cough (productive). Negative for chest tightness, shortness of breath and wheezing.    Cardiovascular: Negative for chest pain and palpitations.   Gastrointestinal: Negative for diarrhea, nausea and vomiting.   Musculoskeletal: Negative for arthralgias and myalgias.   Skin: Negative for rash.   Neurological: Negative for weakness, light-headedness and headaches.       Physical Exam     Initial Vitals [20 1003]   BP Pulse Resp Temp SpO2   138/75 66 (!) 22 98.4 °F (36.9 °C) 95 %      MAP       --         Physical Exam    Nursing note and vitals reviewed.  Constitutional: Vital signs are normal. He appears well-developed and well-nourished.   HENT:   Head: Normocephalic and atraumatic.   Mouth/Throat: Uvula is midline, oropharynx is clear and moist and mucous membranes are normal. No oropharyngeal exudate, posterior oropharyngeal edema or posterior oropharyngeal erythema.   Eyes: Pupils are equal, round, and reactive to light.    Neck: Neck supple.   Cardiovascular: Normal rate, regular rhythm, normal heart sounds and intact distal pulses. Exam reveals no gallop and no friction rub.    No murmur heard.  Pulmonary/Chest: No respiratory distress. He has no wheezes. He has rhonchi. He has no rales.   Scattered rhonchi. No acute respiratory distress.   Abdominal: Soft. Normal appearance and bowel sounds are normal. There is no abdominal tenderness.   Neurological: He is alert and oriented to person, place, and time. He has normal strength.   Skin: Skin is warm, dry and intact.   Psychiatric: He has a normal mood and affect. His speech is normal and behavior is normal.         ED Course   Procedures  Labs Reviewed - No data to display       Imaging Results    None          Medical Decision Making:   History:   Old Medical Records: I decided to obtain old medical records.  Differential Diagnosis:   Pneumonia  COVID-19  PE       APC / Resident Notes:   Patient is a 80 y.o. male who presents to the ED 12/17/2020 who underwent emergent evaluation for low oxygen saturation reading on home pulse ox and was recommended to come for evaluation to the emergency department by the COVID monitoring system.  Patient clinically states he feels better.  States he does still have a productive cough but otherwise feels good.  He denies any shortness of breath.  His oxygen saturation in the emergency department is 95% on room air.  With exercise in the room patient's oxygen saturation is 92-94%.  Compared with patient's home pulse oximeter which also reads 94 and 97% on room air.  Chest x-ray yesterday noted resolving infiltrates.  Discussed appropriate use of pulse oximeter with patient and family member and compared to hospital pulse oximeter; encouraged family to come in for evaluation if they do get continuous readings less than 90% despite simple interventions discussed.  Patient and family member verbalized understanding.  I do not think other emergent  process such as PE, CHF, or worsening of his anemia.  I do not think further diagnostic studies or workup are indicated at this time.  Case discussed with Dr. Jorgensen who is agreeable plan of care and patient discharged home in stable condition and will continue the COVID home monitoring program as discussed.           Scribe Attestation:   Scribe #1: I performed the above scribed service and the documentation accurately describes the services I performed. I attest to the accuracy of the note.    Attending Attestation:     Physician Attestation Statement for NP/PA:   I discussed this assessment and plan of this patient with the NP/PA, but I did not personally examine the patient. The face to face encounter was performed by the NP/PA.    Other NP/PA Attestation Additions:      Medical Decision Making: Morro Sharma is a 80 y.o. male presenting with concern for hypoxia on home pulse ox.  Patient symptom profile is improving and he denies dyspnea or other concerning symptoms such as chest pain.  Patient has no hypoxia on check here and is clinically improving.  I do not think further ED diagnostic tests are indicated.  He seems to be doing well in his convalescence and will continue in the outpatient monitoring program with planned follow-up.  I have low suspicion for other alternative life-threatening process such as worsening anemia, CHF, myocarditis, PE.  Return precautions were reviewed.       Physician Attestation for Scribe:  Physician Attestation Statement for Scribe #1: I, Kristy Macedo, reviewed documentation, as scribed by in my presence, and it is both accurate and complete.     Comments: I, JULIO Bone, personally performed the services described in this documentation. All medical record entries made by the scribe were at my direction and in my presence.  I have reviewed the chart and agree that the record reflects my personal performance and is accurate and complete. JULIO Boen.  10:57  AM 12/17/2020                        Clinical Impression:       ICD-10-CM ICD-9-CM   1. COVID-19  U07.1 079.89                      Disposition:   Disposition: Discharged  Condition: Stable     ED Disposition Condition    Discharge Stable        ED Prescriptions     None        Follow-up Information     Follow up With Specialties Details Why Contact Info    Yaniv Escobar MD Family Medicine In 3 days  [address] [facility] [address]  [phone]      [facility] Emergency Medicine  As needed, If symptoms worsen [address] [place] [id number]  [phone]                                       Kristy Macedo NP  12/17/20 1100       Kristy Macedo NP  12/17/20 1248

## 2020-12-18 ENCOUNTER — TELEPHONE (OUTPATIENT)
Dept: ADMINISTRATIVE | Facility: OTHER | Age: 80
End: 2020-12-18

## 2020-12-18 ENCOUNTER — NURSE TRIAGE (OUTPATIENT)
Dept: ADMINISTRATIVE | Facility: CLINIC | Age: 80
End: 2020-12-18

## 2020-12-18 ENCOUNTER — SPECIALTY PHARMACY (OUTPATIENT)
Dept: PHARMACY | Facility: CLINIC | Age: 80
End: 2020-12-18

## 2020-12-18 ENCOUNTER — TELEPHONE (OUTPATIENT)
Dept: ADMINISTRATIVE | Facility: CLINIC | Age: 80
End: 2020-12-18

## 2020-12-18 ENCOUNTER — PATIENT MESSAGE (OUTPATIENT)
Dept: ADMINISTRATIVE | Facility: OTHER | Age: 80
End: 2020-12-18

## 2020-12-18 LAB
BACTERIA BLD CULT: NORMAL
BACTERIA BLD CULT: NORMAL

## 2020-12-18 NOTE — TELEPHONE ENCOUNTER
PC to patient =- get VM immediately x2  Tried home home with no response- VM not set up.   Reviewed Patient chart.

## 2020-12-18 NOTE — TELEPHONE ENCOUNTER
Patient escalated for O2 94% and SOB 3/5 at rest.   RN escalated call to ROSE for further assessment and directives. Secure chat sent to ROSE, ROSE acknowledged chat. Patient advised ROSE would call him. Rose to call pt.    Reason for Disposition   Nursing judgment    Protocols used: INFORMATION ONLY CALL - NO TRIAGE-A-OH

## 2020-12-18 NOTE — TELEPHONE ENCOUNTER
Attempted to reach patient regarding status of Jakafi therapy after recent discharge from the hospital. VM full, mychart message sent. Will continue to follow-up.

## 2020-12-18 NOTE — TELEPHONE ENCOUNTER
Patient is actively enrolled in covid surveillance program. No contact needed at this time. Routed message to PCP.     Patient was not callee to review COVID-19 Surveillance Program enrollment process.    Smartphone: Yes    [facility]: Yes     Program consent: Yes     Pulse oximeter status: ?    Verified emergency contacts: ?    Program Overview: Reviewed , no response process, and importance of correct emergency contacts in event that well-being check is warranted. Patient will call [phone] 24/7 to speak with an OnCall nurse, if needed. Pt aware that if Sp02 <94% or if they have any worsening symptoms, they need to go to the emergency department. If they are having a medical emergency, they will call 911. Otherwise, patient will continue to submit data as scheduled. Reviewed importance of wearing mask if self or family members leave the house.     Patient had no further questions.

## 2020-12-19 ENCOUNTER — NURSE TRIAGE (OUTPATIENT)
Dept: ADMINISTRATIVE | Facility: CLINIC | Age: 80
End: 2020-12-19

## 2020-12-19 ENCOUNTER — HOSPITAL ENCOUNTER (INPATIENT)
Facility: HOSPITAL | Age: 80
LOS: 5 days | Discharge: HOME | DRG: 177 | End: 2020-12-24
Attending: EMERGENCY MEDICINE | Admitting: INTERNAL MEDICINE
Payer: MEDICARE

## 2020-12-19 DIAGNOSIS — R09.02 HYPOXEMIA: Primary | ICD-10-CM

## 2020-12-19 DIAGNOSIS — Z20.822 SUSPECTED COVID-19 VIRUS INFECTION: ICD-10-CM

## 2020-12-19 DIAGNOSIS — U07.1 PNEUMONIA DUE TO COVID-19 VIRUS: ICD-10-CM

## 2020-12-19 DIAGNOSIS — D61.818 PANCYTOPENIA: ICD-10-CM

## 2020-12-19 DIAGNOSIS — D45 POLYCYTHEMIA VERA: ICD-10-CM

## 2020-12-19 DIAGNOSIS — R06.00 DYSPNEA: ICD-10-CM

## 2020-12-19 DIAGNOSIS — U07.1 COVID-19: ICD-10-CM

## 2020-12-19 DIAGNOSIS — J12.82 PNEUMONIA DUE TO COVID-19 VIRUS: ICD-10-CM

## 2020-12-19 DIAGNOSIS — J96.01 ACUTE HYPOXEMIC RESPIRATORY FAILURE: ICD-10-CM

## 2020-12-19 LAB
ABO GROUP BLD: NORMAL
ALBUMIN SERPL BCP-MCNC: 3.9 G/DL (ref 3.5–5.2)
ALP SERPL-CCNC: 155 U/L (ref 55–135)
ALT SERPL W/O P-5'-P-CCNC: 113 U/L (ref 10–44)
ANION GAP SERPL CALC-SCNC: 11 MMOL/L (ref 8–16)
ANISOCYTOSIS BLD QL SMEAR: SLIGHT
AST SERPL-CCNC: 94 U/L (ref 10–40)
BASOPHILS NFR BLD: 0 % (ref 0–1.9)
BILIRUB SERPL-MCNC: 3 MG/DL (ref 0.1–1)
BLD PROD TYP BPU: NORMAL
BLD PROD TYP BPU: NORMAL
BLOOD UNIT EXPIRATION DATE: NORMAL
BLOOD UNIT EXPIRATION DATE: NORMAL
BLOOD UNIT TYPE CODE: 7300
BLOOD UNIT TYPE CODE: 7300
BLOOD UNIT TYPE: NORMAL
BLOOD UNIT TYPE: NORMAL
BNP SERPL-MCNC: 18 PG/ML (ref 0–99)
BUN SERPL-MCNC: 35 MG/DL (ref 8–23)
CALCIUM SERPL-MCNC: 9 MG/DL (ref 8.7–10.5)
CHLORIDE SERPL-SCNC: 103 MMOL/L (ref 95–110)
CK SERPL-CCNC: 103 U/L (ref 20–200)
CO2 SERPL-SCNC: 20 MMOL/L (ref 23–29)
CODING SYSTEM: NORMAL
CODING SYSTEM: NORMAL
CREAT SERPL-MCNC: 1.3 MG/DL (ref 0.5–1.4)
CRP SERPL-MCNC: 21.6 MG/DL
D DIMER PPP IA.FEU-MCNC: 1.74 UG/ML FEU
DIFFERENTIAL METHOD: ABNORMAL
DISPENSE STATUS: NORMAL
DISPENSE STATUS: NORMAL
EOSINOPHIL NFR BLD: 1 % (ref 0–8)
ERYTHROCYTE [DISTWIDTH] IN BLOOD BY AUTOMATED COUNT: 17.8 % (ref 11.5–14.5)
EST. GFR  (AFRICAN AMERICAN): 59.6 ML/MIN/1.73 M^2
EST. GFR  (NON AFRICAN AMERICAN): 51.5 ML/MIN/1.73 M^2
FERRITIN SERPL-MCNC: 1369 NG/ML (ref 20–300)
GLUCOSE SERPL-MCNC: 145 MG/DL (ref 70–110)
HCT VFR BLD AUTO: 25.9 % (ref 40–54)
HGB BLD-MCNC: 8 G/DL (ref 14–18)
IMM GRANULOCYTES # BLD AUTO: ABNORMAL K/UL (ref 0–0.04)
IMM GRANULOCYTES NFR BLD AUTO: ABNORMAL % (ref 0–0.5)
LACTATE SERPL-SCNC: 1.2 MMOL/L (ref 0.5–1.9)
LDH SERPL L TO P-CCNC: 1089 U/L (ref 110–260)
LYMPHOCYTES NFR BLD: 27 % (ref 18–48)
MCH RBC QN AUTO: 26.5 PG (ref 27–31)
MCHC RBC AUTO-ENTMCNC: 30.9 G/DL (ref 32–36)
MCV RBC AUTO: 86 FL (ref 82–98)
METAMYELOCYTES NFR BLD MANUAL: 3 %
MONOCYTES NFR BLD: 7 % (ref 4–15)
MYELOCYTES NFR BLD MANUAL: 4 %
NEUTROPHILS NFR BLD: 38 % (ref 38–73)
NEUTS BAND NFR BLD MANUAL: 20 %
NRBC BLD-RTO: 10 /100 WBC
PLATELET # BLD AUTO: 28 K/UL (ref 150–350)
PLATELET BLD QL SMEAR: ABNORMAL
PMV BLD AUTO: ABNORMAL FL (ref 9.2–12.9)
POIKILOCYTOSIS BLD QL SMEAR: SLIGHT
POLYCHROMASIA BLD QL SMEAR: ABNORMAL
POTASSIUM SERPL-SCNC: 4.8 MMOL/L (ref 3.5–5.1)
PROCALCITONIN SERPL IA-MCNC: 1.3 NG/ML (ref 0–0.5)
PROT SERPL-MCNC: 8.1 G/DL (ref 6–8.4)
RBC # BLD AUTO: 3.02 M/UL (ref 4.6–6.2)
RH BLD: NORMAL
SODIUM SERPL-SCNC: 134 MMOL/L (ref 136–145)
TROPONIN I SERPL DL<=0.01 NG/ML-MCNC: <0.03 NG/ML
UNIT NUMBER: NORMAL
UNIT NUMBER: NORMAL
WBC # BLD AUTO: 4.11 K/UL (ref 3.9–12.7)

## 2020-12-19 PROCEDURE — 83880 ASSAY OF NATRIURETIC PEPTIDE: CPT

## 2020-12-19 PROCEDURE — 82550 ASSAY OF CK (CPK): CPT

## 2020-12-19 PROCEDURE — 36415 COLL VENOUS BLD VENIPUNCTURE: CPT

## 2020-12-19 PROCEDURE — 85007 BL SMEAR W/DIFF WBC COUNT: CPT

## 2020-12-19 PROCEDURE — 99285 EMERGENCY DEPT VISIT HI MDM: CPT | Mod: 25

## 2020-12-19 PROCEDURE — 83605 ASSAY OF LACTIC ACID: CPT

## 2020-12-19 PROCEDURE — 80053 COMPREHEN METABOLIC PANEL: CPT

## 2020-12-19 PROCEDURE — 84145 PROCALCITONIN (PCT): CPT

## 2020-12-19 PROCEDURE — 21400001 HC TELEMETRY ROOM

## 2020-12-19 PROCEDURE — 25000003 PHARM REV CODE 250: Performed by: INTERNAL MEDICINE

## 2020-12-19 PROCEDURE — 93010 EKG 12-LEAD: ICD-10-PCS | Mod: ,,, | Performed by: INTERNAL MEDICINE

## 2020-12-19 PROCEDURE — 63600175 PHARM REV CODE 636 W HCPCS: Performed by: INTERNAL MEDICINE

## 2020-12-19 PROCEDURE — 85379 FIBRIN DEGRADATION QUANT: CPT

## 2020-12-19 PROCEDURE — 93005 ELECTROCARDIOGRAM TRACING: CPT | Performed by: INTERNAL MEDICINE

## 2020-12-19 PROCEDURE — 85027 COMPLETE CBC AUTOMATED: CPT

## 2020-12-19 PROCEDURE — 82728 ASSAY OF FERRITIN: CPT

## 2020-12-19 PROCEDURE — 84484 ASSAY OF TROPONIN QUANT: CPT

## 2020-12-19 PROCEDURE — 86901 BLOOD TYPING SEROLOGIC RH(D): CPT

## 2020-12-19 PROCEDURE — 12000002 HC ACUTE/MED SURGE SEMI-PRIVATE ROOM

## 2020-12-19 PROCEDURE — 86140 C-REACTIVE PROTEIN: CPT

## 2020-12-19 PROCEDURE — 63600175 PHARM REV CODE 636 W HCPCS: Performed by: EMERGENCY MEDICINE

## 2020-12-19 PROCEDURE — 87040 BLOOD CULTURE FOR BACTERIA: CPT

## 2020-12-19 PROCEDURE — 96374 THER/PROPH/DIAG INJ IV PUSH: CPT

## 2020-12-19 PROCEDURE — 93010 ELECTROCARDIOGRAM REPORT: CPT | Mod: ,,, | Performed by: INTERNAL MEDICINE

## 2020-12-19 PROCEDURE — 25000003 PHARM REV CODE 250: Performed by: EMERGENCY MEDICINE

## 2020-12-19 PROCEDURE — 83615 LACTATE (LD) (LDH) ENZYME: CPT

## 2020-12-19 RX ORDER — CEFTRIAXONE 1 G/50ML
1 INJECTION, SOLUTION INTRAVENOUS
Status: DISCONTINUED | OUTPATIENT
Start: 2020-12-19 | End: 2020-12-19

## 2020-12-19 RX ORDER — ACETAMINOPHEN 500 MG
500 TABLET ORAL 2 TIMES DAILY PRN
Status: DISCONTINUED | OUTPATIENT
Start: 2020-12-19 | End: 2020-12-24 | Stop reason: HOSPADM

## 2020-12-19 RX ORDER — LEVOFLOXACIN 5 MG/ML
250 INJECTION, SOLUTION INTRAVENOUS
Status: DISCONTINUED | OUTPATIENT
Start: 2020-12-20 | End: 2020-12-24

## 2020-12-19 RX ORDER — METOPROLOL SUCCINATE 25 MG/1
50 TABLET, EXTENDED RELEASE ORAL DAILY
Status: DISCONTINUED | OUTPATIENT
Start: 2020-12-20 | End: 2020-12-24 | Stop reason: HOSPADM

## 2020-12-19 RX ORDER — DEXAMETHASONE SODIUM PHOSPHATE 4 MG/ML
6 INJECTION, SOLUTION INTRA-ARTICULAR; INTRALESIONAL; INTRAMUSCULAR; INTRAVENOUS; SOFT TISSUE
Status: COMPLETED | OUTPATIENT
Start: 2020-12-19 | End: 2020-12-19

## 2020-12-19 RX ORDER — LEVOFLOXACIN 5 MG/ML
500 INJECTION, SOLUTION INTRAVENOUS ONCE
Status: COMPLETED | OUTPATIENT
Start: 2020-12-19 | End: 2020-12-19

## 2020-12-19 RX ORDER — ENOXAPARIN SODIUM 100 MG/ML
40 INJECTION SUBCUTANEOUS
Status: DISCONTINUED | OUTPATIENT
Start: 2020-12-19 | End: 2020-12-24 | Stop reason: HOSPADM

## 2020-12-19 RX ORDER — TAMSULOSIN HYDROCHLORIDE 0.4 MG/1
1 CAPSULE ORAL DAILY
Status: DISCONTINUED | OUTPATIENT
Start: 2020-12-20 | End: 2020-12-24 | Stop reason: HOSPADM

## 2020-12-19 RX ORDER — CHOLECALCIFEROL (VITAMIN D3) 25 MCG
1000 TABLET ORAL DAILY
Status: DISCONTINUED | OUTPATIENT
Start: 2020-12-20 | End: 2020-12-20

## 2020-12-19 RX ORDER — HYDROCODONE BITARTRATE AND ACETAMINOPHEN 500; 5 MG/1; MG/1
TABLET ORAL
Status: DISCONTINUED | OUTPATIENT
Start: 2020-12-19 | End: 2020-12-24 | Stop reason: HOSPADM

## 2020-12-19 RX ORDER — IBUPROFEN 200 MG
16 TABLET ORAL
Status: DISCONTINUED | OUTPATIENT
Start: 2020-12-19 | End: 2020-12-24 | Stop reason: HOSPADM

## 2020-12-19 RX ORDER — IBUPROFEN 200 MG
24 TABLET ORAL
Status: DISCONTINUED | OUTPATIENT
Start: 2020-12-19 | End: 2020-12-24 | Stop reason: HOSPADM

## 2020-12-19 RX ORDER — LEVOTHYROXINE SODIUM 25 UG/1
50 TABLET ORAL
Status: DISCONTINUED | OUTPATIENT
Start: 2020-12-20 | End: 2020-12-24 | Stop reason: HOSPADM

## 2020-12-19 RX ORDER — GLUCAGON 1 MG
1 KIT INJECTION
Status: DISCONTINUED | OUTPATIENT
Start: 2020-12-19 | End: 2020-12-24 | Stop reason: HOSPADM

## 2020-12-19 RX ORDER — ASCORBIC ACID 500 MG
500 TABLET ORAL 2 TIMES DAILY
Status: DISCONTINUED | OUTPATIENT
Start: 2020-12-19 | End: 2020-12-24 | Stop reason: HOSPADM

## 2020-12-19 RX ORDER — BENZONATATE 100 MG/1
100 CAPSULE ORAL 3 TIMES DAILY PRN
Status: DISCONTINUED | OUTPATIENT
Start: 2020-12-19 | End: 2020-12-24 | Stop reason: HOSPADM

## 2020-12-19 RX ORDER — SODIUM CHLORIDE 0.9 % (FLUSH) 0.9 %
10 SYRINGE (ML) INJECTION
Status: DISCONTINUED | OUTPATIENT
Start: 2020-12-19 | End: 2020-12-24 | Stop reason: HOSPADM

## 2020-12-19 RX ORDER — DEXAMETHASONE SODIUM PHOSPHATE 4 MG/ML
6 INJECTION, SOLUTION INTRA-ARTICULAR; INTRALESIONAL; INTRAMUSCULAR; INTRAVENOUS; SOFT TISSUE EVERY 24 HOURS
Status: DISCONTINUED | OUTPATIENT
Start: 2020-12-20 | End: 2020-12-22

## 2020-12-19 RX ADMIN — LEVOFLOXACIN 500 MG: 5 INJECTION, SOLUTION INTRAVENOUS at 09:12

## 2020-12-19 RX ADMIN — ENOXAPARIN SODIUM 40 MG: 40 INJECTION SUBCUTANEOUS at 09:12

## 2020-12-19 RX ADMIN — DEXAMETHASONE SODIUM PHOSPHATE 6 MG: 4 INJECTION, SOLUTION INTRAMUSCULAR; INTRAVENOUS at 06:12

## 2020-12-19 RX ADMIN — REMDESIVIR 200 MG: 100 INJECTION, POWDER, LYOPHILIZED, FOR SOLUTION INTRAVENOUS at 10:12

## 2020-12-19 RX ADMIN — OXYCODONE HYDROCHLORIDE AND ACETAMINOPHEN 500 MG: 500 TABLET ORAL at 09:12

## 2020-12-19 NOTE — TELEPHONE ENCOUNTER
Pt escalated for no response to 3pm push. Spoke to wife. They had recently spoken to another triager with [facility] and was advised to call EMS for low SpO2. Wife states it was 88 when EMS arrived so they took him to [facility]. Will continue to monitor.    Reason for Disposition   Patient already left for the hospital/clinic.    Protocols used: NO CONTACT OR DUPLICATE CONTACT CALL-A-AH

## 2020-12-19 NOTE — TELEPHONE ENCOUNTER
Reason for Disposition   Caller has cancelled the call before the first contact    Protocols used: NO CONTACT OR DUPLICATE CONTACT CALL-A-    Patient auto populated in queue as a no response. At 1130, no entry made, Nt unable to make phone contact with pt. EC, Gabi ruiz, contacted but reports she is not with pt but will be at pt home in 45min. My chart message also sent and encounter closed as no contact.

## 2020-12-19 NOTE — TELEPHONE ENCOUNTER
Reason for Disposition   Sounds like a life-threatening emergency to the triager    Additional Information   Negative: Shock suspected (e.g., cold/pale/clammy skin, too weak to stand, low BP, rapid pulse)   Negative: Bluish (or gray) lips or face now   Negative: Difficult to awaken or acting confused (e.g., disoriented, slurred speech)   Negative: Severe difficulty breathing (e.g., struggling for each breath, speaks in single words)    Protocols used: CORONAVIRUS (COVID-19) - DIAGNOSED OR [id number]-S-TX  daughter called re pt with covid 19 virus. Pt is not with caller. Daughter admits pt is weak, febrile. SOB, HA. wife on the line via conference call with daughter. Pt wobbly. BP not checked. tried to check after speaking with nurse yest. told to take deep breaths and cough. o2 sat 68%. rec EMS. caller agrees. call back with questions.

## 2020-12-20 LAB
ANISOCYTOSIS BLD QL SMEAR: SLIGHT
BASOPHILS NFR BLD: 1 % (ref 0–1.9)
DACRYOCYTES BLD QL SMEAR: ABNORMAL
DIFFERENTIAL METHOD: ABNORMAL
EOSINOPHIL NFR BLD: 1 % (ref 0–8)
ERYTHROCYTE [DISTWIDTH] IN BLOOD BY AUTOMATED COUNT: 17.7 % (ref 11.5–14.5)
FERRITIN SERPL-MCNC: 1316 NG/ML (ref 20–300)
HCT VFR BLD AUTO: 23.1 % (ref 40–54)
HGB BLD-MCNC: 7.1 G/DL (ref 14–18)
IMM GRANULOCYTES # BLD AUTO: ABNORMAL K/UL (ref 0–0.04)
IMM GRANULOCYTES NFR BLD AUTO: ABNORMAL % (ref 0–0.5)
LYMPHOCYTES NFR BLD: 29 % (ref 18–48)
MCH RBC QN AUTO: 26.8 PG (ref 27–31)
MCHC RBC AUTO-ENTMCNC: 30.7 G/DL (ref 32–36)
MCV RBC AUTO: 87 FL (ref 82–98)
MONOCYTES NFR BLD: 15 % (ref 4–15)
NEUTROPHILS NFR BLD: 44 % (ref 38–73)
NEUTS BAND NFR BLD MANUAL: 10 %
NRBC BLD-RTO: 10 /100 WBC
OVALOCYTES BLD QL SMEAR: ABNORMAL
PLATELET # BLD AUTO: 24 K/UL (ref 150–350)
PLATELET BLD QL SMEAR: ABNORMAL
PMV BLD AUTO: ABNORMAL FL (ref 9.2–12.9)
POIKILOCYTOSIS BLD QL SMEAR: SLIGHT
POLYCHROMASIA BLD QL SMEAR: ABNORMAL
RBC # BLD AUTO: 2.65 M/UL (ref 4.6–6.2)
WBC # BLD AUTO: 3.12 K/UL (ref 3.9–12.7)

## 2020-12-20 PROCEDURE — 25000003 PHARM REV CODE 250: Performed by: EMERGENCY MEDICINE

## 2020-12-20 PROCEDURE — 94761 N-INVAS EAR/PLS OXIMETRY MLT: CPT

## 2020-12-20 PROCEDURE — 63600175 PHARM REV CODE 636 W HCPCS: Performed by: INTERNAL MEDICINE

## 2020-12-20 PROCEDURE — 85027 COMPLETE CBC AUTOMATED: CPT

## 2020-12-20 PROCEDURE — 27100171 HC OXYGEN HIGH FLOW UP TO 24 HOURS

## 2020-12-20 PROCEDURE — 99900035 HC TECH TIME PER 15 MIN (STAT)

## 2020-12-20 PROCEDURE — 25000003 PHARM REV CODE 250: Performed by: INTERNAL MEDICINE

## 2020-12-20 PROCEDURE — 36415 COLL VENOUS BLD VENIPUNCTURE: CPT

## 2020-12-20 PROCEDURE — 82728 ASSAY OF FERRITIN: CPT

## 2020-12-20 PROCEDURE — 99223 PR INITIAL HOSPITAL CARE,LEVL III: ICD-10-PCS | Mod: ,,, | Performed by: INTERNAL MEDICINE

## 2020-12-20 PROCEDURE — 12000002 HC ACUTE/MED SURGE SEMI-PRIVATE ROOM

## 2020-12-20 PROCEDURE — 99223 1ST HOSP IP/OBS HIGH 75: CPT | Mod: ,,, | Performed by: INTERNAL MEDICINE

## 2020-12-20 PROCEDURE — 99900031 HC PATIENT EDUCATION (STAT)

## 2020-12-20 PROCEDURE — 85007 BL SMEAR W/DIFF WBC COUNT: CPT

## 2020-12-20 PROCEDURE — 27000221 HC OXYGEN, UP TO 24 HOURS

## 2020-12-20 PROCEDURE — 21400001 HC TELEMETRY ROOM

## 2020-12-20 RX ORDER — TALC
6 POWDER (GRAM) TOPICAL NIGHTLY
Status: DISCONTINUED | OUTPATIENT
Start: 2020-12-20 | End: 2020-12-24 | Stop reason: HOSPADM

## 2020-12-20 RX ORDER — ZINC SULFATE 50(220)MG
220 CAPSULE ORAL DAILY
Status: DISCONTINUED | OUTPATIENT
Start: 2020-12-20 | End: 2020-12-24 | Stop reason: HOSPADM

## 2020-12-20 RX ORDER — CHOLECALCIFEROL (VITAMIN D3) 25 MCG
4000 TABLET ORAL DAILY
Status: DISCONTINUED | OUTPATIENT
Start: 2020-12-21 | End: 2020-12-24 | Stop reason: HOSPADM

## 2020-12-20 RX ADMIN — OXYCODONE HYDROCHLORIDE AND ACETAMINOPHEN 500 MG: 500 TABLET ORAL at 08:12

## 2020-12-20 RX ADMIN — ZINC SULFATE 220 MG (50 MG) CAPSULE 220 MG: CAPSULE at 03:12

## 2020-12-20 RX ADMIN — DEXAMETHASONE SODIUM PHOSPHATE 6 MG: 4 INJECTION, SOLUTION INTRA-ARTICULAR; INTRALESIONAL; INTRAMUSCULAR; INTRAVENOUS; SOFT TISSUE at 08:12

## 2020-12-20 RX ADMIN — REMDESIVIR 100 MG: 100 INJECTION, POWDER, LYOPHILIZED, FOR SOLUTION INTRAVENOUS at 08:12

## 2020-12-20 RX ADMIN — MELATONIN 6 MG: at 08:12

## 2020-12-20 RX ADMIN — ENOXAPARIN SODIUM 40 MG: 40 INJECTION SUBCUTANEOUS at 10:12

## 2020-12-20 RX ADMIN — TAMSULOSIN HYDROCHLORIDE 0.4 MG: 0.4 CAPSULE ORAL at 08:12

## 2020-12-20 RX ADMIN — METOPROLOL SUCCINATE 50 MG: 25 TABLET, FILM COATED, EXTENDED RELEASE ORAL at 08:12

## 2020-12-20 RX ADMIN — LEVOTHYROXINE SODIUM 50 MCG: 25 TABLET ORAL at 05:12

## 2020-12-20 RX ADMIN — GUAIFENESIN AND DEXTROMETHORPHAN HYDROBROMIDE 1 TABLET: 600; 30 TABLET, EXTENDED RELEASE ORAL at 08:12

## 2020-12-20 RX ADMIN — Medication 1000 UNITS: at 08:12

## 2020-12-20 RX ADMIN — LEVOFLOXACIN 250 MG: 5 INJECTION, SOLUTION INTRAVENOUS at 10:12

## 2020-12-20 NOTE — PLAN OF CARE
12/20/20 0832   Patient Assessment/Suction   Level of Consciousness (AVPU) alert   Respiratory Effort Unlabored   PRE-TX-O2   $ Is the patient on Low Flow Oxygen? Yes   Flow (L/min) 3   SpO2 (!) 92 %   $ Pulse Oximetry - Multiple Charge Pulse Oximetry - Multiple   Pulse 79   Resp 18   Respiratory Evaluation   $ Care Plan Tech Time 15 min

## 2020-12-20 NOTE — ED PROVIDER NOTES
Encounter Date: 12/19/2020       History     Chief Complaint   Patient presents with    Shortness of Breath     This is a 80-year-old male who presents emergency department with shortness of breath.  He is COVID positive on December 12th.  Says he was checking his pulse ox at home and it was 79%.  He says he has been having some shortness of breath with exertion.  He has a dry nonproductive cough.  He does not have any chest pain.  No lack of taste her lack of smell.  His appetite has been less than usual.  When EMS arose he was 88% on room air.  They put him on 6 L of oxygen and is feeling much better.        Review of patient's allergies indicates:  No Known Allergies  Past Medical History:   Diagnosis Date    Atrial fibrillation 05/07/2017    [facility] records sent to scanning    BPH (benign prostatic hypertrophy)     Colon polyps     Erectile dysfunction     GERD (gastroesophageal reflux disease)     Hypertension     Pneumonia due to COVID-19 virus 12/12/2020    Pneumothorax on left     Polycythemia vera     Pulmonary nodules      Past Surgical History:   Procedure Laterality Date    COLONOSCOPY  9/15/14    Dr. Mcdaniels, 5 year recheck    ESOPHAGOGASTRODUODENOSCOPY      LUNG SURGERY      LEFT - CHEST TUBE     Family History   Problem Relation Age of Onset    Hypertension Mother     Heart disease Mother     Hypertension Maternal Aunt     Heart disease Maternal Aunt     Hypertension Maternal Uncle     Heart disease Maternal Uncle     Early death Father     No Known Problems Daughter     No Known Problems Daughter     No Known Problems Daughter     No Known Problems Daughter     Prostate cancer Neg Hx     Kidney cancer Neg Hx     Cancer Neg Hx     Urolithiasis Neg Hx     Melanoma Neg Hx     Psoriasis Neg Hx     Lupus Neg Hx     Eczema Neg Hx      Social History     Tobacco Use    Smoking status: Former Smoker     Packs/day: 1.50     Years: 25.00     Pack years: 37.50     Quit date:  10/2/1982     Years since quittin.2    Smokeless tobacco: Never Used   Substance Use Topics    Alcohol use: No     Frequency: Monthly or less     Drinks per session: 1 or 2     Binge frequency: Never    Drug use: No     Review of Systems   Constitutional: Negative for chills and fever.   HENT: Negative for sore throat.    Respiratory: Positive for cough and shortness of breath.    Cardiovascular: Negative for chest pain.   Gastrointestinal: Negative for nausea and vomiting.   Genitourinary: Negative for dysuria.   Musculoskeletal: Negative for back pain.   Skin: Negative for rash.   Neurological: Negative for weakness and headaches.   Hematological: Does not bruise/bleed easily.   All other systems reviewed and are negative.      Physical Exam     Initial Vitals   BP Pulse Resp Temp SpO2   20 2009 20 1802 20 1802 20 18020 180   138/77 88 (!) 25 97.5 °F (36.4 °C) (!) 86 %      MAP       --                Physical Exam    Nursing note and vitals reviewed.  Constitutional: He appears well-developed and well-nourished. He is not diaphoretic. No distress.   HENT:   Head: Normocephalic and atraumatic.   Mouth/Throat: Oropharynx is clear and moist. No oropharyngeal exudate.   Eyes: Conjunctivae and EOM are normal. Pupils are equal, round, and reactive to light.   Neck: No tracheal deviation present.   Cardiovascular: Normal rate, regular rhythm, normal heart sounds and intact distal pulses.   No murmur heard.  Pulmonary/Chest: Breath sounds normal. No stridor. No respiratory distress. He has no wheezes. He has no rhonchi. He has no rales.   Abdominal: Soft. Bowel sounds are normal. He exhibits no distension. There is no abdominal tenderness. There is no rebound and no guarding.   Musculoskeletal: Normal range of motion. No tenderness or edema.   Lymphadenopathy:     He has no cervical adenopathy.   Neurological: He is alert and oriented to person, place, and time. He has normal  strength and normal reflexes. No cranial nerve deficit or sensory deficit.   Skin: Skin is warm and dry. Capillary refill takes less than 2 seconds. No rash noted. No erythema. No pallor.   Psychiatric: He has a normal mood and affect. His behavior is normal. Thought content normal.         ED Course   Procedures  Labs Reviewed   CBC W/ AUTO DIFFERENTIAL - Abnormal; Notable for the following components:       Result Value    RBC 3.02 (*)     Hemoglobin 8.0 (*)     Hematocrit 25.9 (*)     MCH 26.5 (*)     MCHC 30.9 (*)     RDW 17.8 (*)     Platelets 28 (*)     nRBC 10 (*)     Platelet Estimate Decreased (*)     All other components within normal limits   COMPREHENSIVE METABOLIC PANEL - Abnormal; Notable for the following components:    Sodium 134 (*)     CO2 20 (*)     Glucose 145 (*)     BUN 35 (*)     Total Bilirubin 3.0 (*)     Alkaline Phosphatase 155 (*)     AST 94 (*)      (*)     eGFR if  59.6 (*)     eGFR if non  51.5 (*)     All other components within normal limits   C-REACTIVE PROTEIN - Abnormal; Notable for the following components:    CRP 21.60 (*)     All other components within normal limits   FERRITIN - Abnormal; Notable for the following components:    Ferritin 1,369 (*)     All other components within normal limits   LACTATE DEHYDROGENASE - Abnormal; Notable for the following components:    LD 1,089 (*)     All other components within normal limits   PROCALCITONIN - Abnormal; Notable for the following components:    Procalcitonin 1.30 (*)     All other components within normal limits   D DIMER, QUANTITATIVE - Abnormal; Notable for the following components:    D-Dimer 1.74 (*)     All other components within normal limits    Narrative:     DDimer & Platelet count critical result(s) called and verbal readback   obtained from Destiny Mcmanus RN (ER) by SWJan 12/19/2020 19:09   CULTURE, BLOOD   CK   LACTIC ACID, PLASMA   TROPONIN I   B-TYPE NATRIURETIC PEPTIDE            Results for orders placed or performed during the hospital encounter of 12/19/20   Blood culture x two cultures. Draw prior to antibiotics.    Specimen: Peripheral, Lower Arm, Left; Blood   Result Value Ref Range    Blood Culture, Routine No Growth to date    CBC auto differential   Result Value Ref Range    WBC 4.11 3.90 - 12.70 K/uL    RBC 3.02 (L) 4.60 - 6.20 M/uL    Hemoglobin 8.0 (L) 14.0 - 18.0 g/dL    Hematocrit 25.9 (L) 40.0 - 54.0 %    MCV 86 82 - 98 fL    MCH 26.5 (L) 27.0 - 31.0 pg    MCHC 30.9 (L) 32.0 - 36.0 g/dL    RDW 17.8 (H) 11.5 - 14.5 %    Platelets 28 (LL) 150 - 350 K/uL    MPV SEE COMMENT 9.2 - 12.9 fL    Immature Granulocytes CANCELED 0.0 - 0.5 %    Immature Grans (Abs) CANCELED 0.00 - 0.04 K/uL    nRBC 10 (A) 0 /100 WBC    Gran % 38.0 38.0 - 73.0 %    Lymph % 27.0 18.0 - 48.0 %    Mono % 7.0 4.0 - 15.0 %    Eosinophil % 1.0 0.0 - 8.0 %    Basophil % 0.0 0.0 - 1.9 %    Bands 20.0 %    Metamyelocytes 3.0 %    Myelocytes 4.0 %    Platelet Estimate Decreased (A)     Aniso Slight     Poik Slight     Poly Moderate     Differential Method Manual    Comprehensive metabolic panel   Result Value Ref Range    Sodium 134 (L) 136 - 145 mmol/L    Potassium 4.8 3.5 - 5.1 mmol/L    Chloride 103 95 - 110 mmol/L    CO2 20 (L) 23 - 29 mmol/L    Glucose 145 (H) 70 - 110 mg/dL    BUN 35 (H) 8 - 23 mg/dL    Creatinine 1.3 0.5 - 1.4 mg/dL    Calcium 9.0 8.7 - 10.5 mg/dL    Total Protein 8.1 6.0 - 8.4 g/dL    Albumin 3.9 3.5 - 5.2 g/dL    Total Bilirubin 3.0 (H) 0.1 - 1.0 mg/dL    Alkaline Phosphatase 155 (H) 55 - 135 U/L    AST 94 (H) 10 - 40 U/L     (H) 10 - 44 U/L    Anion Gap 11 8 - 16 mmol/L    eGFR if African American 59.6 (A) >60 mL/min/1.73 m^2    eGFR if non  51.5 (A) >60 mL/min/1.73 m^2   C-Reactive Protein   Result Value Ref Range    CRP 21.60 (H) <0.76 mg/dL   Ferritin   Result Value Ref Range    Ferritin 1,369 (H) 20.0 - 300.0 ng/mL   Lactate Dehydrogenase   Result Value Ref  Range    LD 1,089 (H) 110 - 260 U/L   CK   Result Value Ref Range     20 - 200 U/L   Lactic Acid, Plasma   Result Value Ref Range    Lactate (Lactic Acid) 1.2 0.5 - 1.9 mmol/L   Troponin I   Result Value Ref Range    Troponin I <0.030 <=0.040 ng/mL   Procalcitonin   Result Value Ref Range    Procalcitonin 1.30 (H) 0.00 - 0.50 ng/mL   Brain Natriuretic Peptide   Result Value Ref Range    BNP 18 0 - 99 pg/mL   D dimer, quantitative   Result Value Ref Range    D-Dimer 1.74 (HH) <0.50 ug/mL FEU   Group & Rh   Result Value Ref Range    ABO B     Rh Type POS    Prepare Fresh Frozen Plasma   Result Value Ref Range    UNIT NUMBER [id number]     Product Code [id number]     DISPENSE STATUS TRANSFUSED     CODING SYSTEM ZQBM364     Unit Blood Type Code 7300     Unit Blood Type B POS     Unit Expiration [id number]     UNIT NUMBER [id number]     Product Code [id number]     DISPENSE STATUS TRANSFUSED     CODING SYSTEM SOZU654     Unit Blood Type Code 7300     Unit Blood Type B POS     Unit Expiration [id number]      X-Ray Chest AP Portable   Final Result          Imaging Results          X-Ray Chest AP Portable (Final result)  Result time 12/19/20 18:39:01    Final result by Nell Arnold MD (12/19/20 18:39:01)                 Narrative:    PROCEDURE:   XR CHEST AP PORTABLE  dated  12/19/2020 6:16 PM    CLINICAL HISTORY:   Male 80 years of age.   Suspected Covid-19 Virus  Infection    TECHNIQUE: AP view of the chest obtained portably at 6:16 PM.    PREVIOUS STUDIES:  December 16, 2020; December 12, 2020; May 6, 2020    FINDINGS:    Cardiac and mediastinal contours are normal. There are subtle  reticular and groundglass opacities of the mid to lower lungs that  were not present in May 2020. There is no pleural effusion or  pneumothorax. There is bilateral glenohumeral osteoarthritis.    IMPRESSION:    Subtle bilateral opacities in the mid to lower lungs, compatible with  but not specific for Covid 19  pneumonia.    Electronically Signed by Nell Arnold on 12/19/2020 7:13 PM                               Medical Decision Making:   ED Management:  Patient presents emergency department concern for low oxygen saturation tested positive for COVID December 12 he has already completed therapy with azithromycin and ceftriaxone.  He has been given Decadron emergency department as he is found to be hypoxic.  Has responded oxygen therapy in the ER.  Be admitted for further treatment and care of his COVID pneumonia.                   ED Course as of Dec 20 0309   Sat Dec 19, 2020   1956 Dr. Hein will admit the patient.    [JR]      ED Course User Index  [JR] Fasuto Juares DO            Clinical Impression:       ICD-10-CM ICD-9-CM   1. Hypoxemia  R09.02 799.02   2. Suspected COVID-19 virus infection  Z20.828 V01.79   3. Dyspnea  R06.00 786.09   4. Pneumonia due to COVID-19 virus  U07.1 480.8    J12.89                           ED Disposition Condition    Admit                             Fausto Juares DO  12/20/20 0311

## 2020-12-20 NOTE — HPI
Morro Sharma is a 79-year-old male with PMHx significant for HTN, paroxysmal AFib, polycythemia vera in spent phase requiring transfusions now on Jakafi    Came to emergency room with shortness of breath.  He was COVID positive on December 12th.  He was admitted at [facility] for that but he was closely monitored there  and there was no De oxygenation and antiviral or steroid was not given because he was not qualified.  At that time he had worsening anemia and thrombocytopenia and got 1 unit of blood and platelet.  He was discharged but he had 2 visits back to emergency room of [facility] with shortness of breath and was discharged.  he was checking his pulse ox at home and it was 79% today and called EMS.  At arrival to emergency room 86% and given 6 L and currently oxygenation is 96%.   Inflammatory markers were severely elevated and chest x-ray with bilateral diffuse ground glass appearance.  He has a dry nonproductive cough but deny any chest pain.

## 2020-12-20 NOTE — PLAN OF CARE
12/20/20 1541   Discharge Assessment   Assessment Type Discharge Planning Assessment   Confirmed/corrected address and phone number on facesheet? Yes   Assessment information obtained from? Patient;Medical Record   Prior to hospitilization cognitive status: Alert/Oriented   Prior to hospitalization functional status: Independent   Current cognitive status: Alert/Oriented   Current Functional Status: Independent   Facility Arrived From: Home   Lives With spouse   Able to Return to Prior Arrangements yes   Is patient able to care for self after discharge? Yes   Who are your caregiver(s) and their phone number(s)? Spouse King Sharma [phone]   Readmission Within the Last 30 Days previous discharge plan unsuccessful   If yes, most recent facility name: [facility]   Patient currently being followed by outpatient case management? No   Patient currently receives any other outside agency services? No   Equipment Currently Used at Home none   Part D Coverage PHN   Do you have any problems affording any of your prescribed medications? No   Is the patient taking medications as prescribed? yes   Does the patient have transportation home? Yes   Transportation Anticipated family or friend will provide   Discharge Plan A Home with family   Discharge Plan B Home with family   Patient/Family in Agreement with Plan yes   Readmission Questionnaire   At the time of your discharge, did someone talk to you about what your health problems were? Yes   At the time of discharge, did someone talk to you about what to watch out for regarding worsening of your health problem? Yes   At the time of discharge, did someone talk to you about what to do if you experienced worsening of your health problem? Yes   At the time of discharge, did someone talk to you about which medication to take when you left the hospital and which ones to stop taking? Yes   At the time of discharge, did someone talk to you about when and where to follow  up with a doctor after you left the hospital? Yes   Living Arrangements house   Are you currently feeling confused? No   Are you currently having problems thinking? No   Are you currently having memory problems? No

## 2020-12-20 NOTE — SUBJECTIVE & OBJECTIVE
Interval History:  Patient states he feels improved to was shortness of breath.  Nonproductive cough.  No chest pain no abdominal pain no diarrhea.      Objective:     Vital Signs (Most Recent):  Temp: 97.9 °F (36.6 °C) (12/20/20 1500)  Pulse: 72 (12/20/20 1500)  Resp: 17 (12/20/20 1500)  BP: 137/81 (12/20/20 1500)  SpO2: 95 % (12/20/20 1100) Vital Signs (24h Range):  Temp:  [97.5 °F (36.4 °C)-98.4 °F (36.9 °C)] 97.9 °F (36.6 °C)  Pulse:  [66-88] 72  Resp:  [17-25] 17  SpO2:  [86 %-100 %] 95 %  BP: (117-145)/(70-81) 137/81     Weight: 70 kg (154 lb 5.2 oz)  Body mass index is 24.17 kg/m².    Intake/Output Summary (Last 24 hours) at 12/20/2020 1737  Last data filed at 12/20/2020 0600  Gross per 24 hour   Intake 581.25 ml   Output 750 ml   Net -168.75 ml      Physical Exam patient appears no acute distress.  Speaking full sentences  Interacting appropriately  HEENT sclerae nonicteric  Neck is supple nontender  Lungs symmetrical expansion.  Occasional rhonchi, coarse breath sounds  Heart regular rate rhythm  Abdomen is soft nontender  Extremities no edema   exam no [place]  Neuro patient is alert oriented x3 motor exam is nonfocal  Skin no rash  Patient is pleasant calm cooperative    Significant Labs:   BMP:   Recent Labs   Lab 12/19/20 1821   *   *   K 4.8      CO2 20*   BUN 35*   CREATININE 1.3   CALCIUM 9.0     CBC:   Recent Labs   Lab 12/19/20 1821 12/20/20  0406   WBC 4.11 3.12*   HGB 8.0* 7.1*   HCT 25.9* 23.1*   PLT 28* 24*     CMP:   Recent Labs   Lab 12/19/20 1821   *   K 4.8      CO2 20*   *   BUN 35*   CREATININE 1.3   CALCIUM 9.0   PROT 8.1   ALBUMIN 3.9   BILITOT 3.0*   ALKPHOS 155*   AST 94*   *   ANIONGAP 11   EGFRNONAA 51.5*       Significant Imaging:

## 2020-12-20 NOTE — SUBJECTIVE & OBJECTIVE
Past Medical History:   Diagnosis Date    Atrial fibrillation 05/07/2017    [facility] records sent to scanning    BPH (benign prostatic hypertrophy)     Colon polyps     Erectile dysfunction     GERD (gastroesophageal reflux disease)     Hypertension     Pneumonia due to COVID-19 virus 12/12/2020    Pneumothorax on left     Polycythemia vera     Pulmonary nodules        Past Surgical History:   Procedure Laterality Date    COLONOSCOPY  9/15/14    Dr. Mcdaniels, 5 year recheck    ESOPHAGOGASTRODUODENOSCOPY      LUNG SURGERY      LEFT - CHEST TUBE       Review of patient's allergies indicates:  No Known Allergies    No current facility-administered medications on file prior to encounter.      Current Outpatient Medications on File Prior to Encounter   Medication Sig    acetaminophen (TYLENOL) 500 MG tablet Take 1 tablet (500 mg total) by mouth 2 (two) times daily. For shoulder pain (Patient taking differently: Take 500 mg by mouth 2 (two) times daily as needed. For shoulder pain)    ascorbic acid, vitamin C, (VITAMIN C) 500 MG tablet Take 1 tablet (500 mg total) by mouth 2 (two) times daily.    azithromycin (Z-ROSE) 250 MG tablet Take 1 tablet (250 mg total) by mouth once daily. for 4 days    benzonatate (TESSALON) 100 MG capsule Take 1 capsule (100 mg total) by mouth 3 (three) times daily as needed for Cough.    dexchlorphen-phenylephrine-DM (POLYTUSSIN DM) 1-5-10 mg/5 mL Syrp Take 5 mLs by mouth every 4 (four) hours as needed.    glycopyrrolate (ROBINUL) 1 mg Tab TAKE ONE TABLET BY MOUTH TWICE DAILY. MAY TAKE UP TO THREE TIMES DAILY IF needed    levothyroxine (SYNTHROID) 50 MCG tablet TAKE 1 TABLET BY MOUTH BEFORE BREAKFAST    metoprolol succinate (TOPROL-XL) 50 MG 24 hr tablet Take 1 tablet (50 mg total) by mouth once daily.    multivitamin (ONE DAILY MULTIVITAMIN) per tablet Take 1 tablet by mouth once daily.    psyllium husk (METAMUCIL ORAL) Take 1 capsule by mouth once daily.    pulse oximeter  (PULSE OXIMETER) device by Apply Externally route 2 (two) times a day. Use twice daily at 8 AM and 3 PM and record the value in MyChart as directed.    rosuvastatin (CRESTOR) 40 MG Tab Take 1 tablet (40 mg total) by mouth every evening.    tadalafiL (CIALIS) 20 MG Tab Take 1 tablet (20 mg total) by mouth as needed.    tamsulosin (FLOMAX) 0.4 mg Cap Take 1 capsule (0.4 mg total) by mouth once daily.    triamcinolone acetonide 0.1% (KENALOG) 0.1 % cream APPLY TO AFFECTED AREA TWICE DAILY    vitamin D (VITAMIN D3) 1000 units Tab Take 1 tablet (1,000 Units total) by mouth once daily.     Family History     Problem Relation (Age of Onset)    Early death Father    Heart disease Mother, Maternal Aunt, Maternal Uncle    Hypertension Mother, Maternal Aunt, Maternal Uncle    No Known Problems Daughter, Daughter, Daughter, Daughter        Tobacco Use    Smoking status: Former Smoker     Packs/day: 1.50     Years: 25.00     Pack years: 37.50     Quit date: 10/2/1982     Years since quittin.2    Smokeless tobacco: Never Used   Substance and Sexual Activity    Alcohol use: No     Frequency: Monthly or less     Drinks per session: 1 or 2     Binge frequency: Never    Drug use: No    Sexual activity: Yes     Review of Systems   Constitutional: Positive for fatigue. Negative for fever.   Respiratory: Negative for apnea.    Cardiovascular: Negative for chest pain.   Genitourinary: Negative for difficulty urinating.   Musculoskeletal: Positive for arthralgias.   Neurological: Negative for dizziness.   Psychiatric/Behavioral: Negative for agitation.     Objective:     Vital Signs (Most Recent):  Temp: 97.5 °F (36.4 °C) (20)  Pulse: 76 (20)  Resp: (!) 25 (20)  BP: 138/77 (20)  SpO2: 100 % (20) Vital Signs (24h Range):  Temp:  [97.5 °F (36.4 °C)] 97.5 °F (36.4 °C)  Pulse:  [76-88] 76  Resp:  [25] 25  SpO2:  [86 %-100 %] 100 %  BP: (138)/(77) 138/77     Weight: 79.4 kg  (175 lb)  Body mass index is 27.41 kg/m².    Physical Exam  Constitutional:       General: He is not in acute distress.     Appearance: He is well-developed.   HENT:      Head: Normocephalic.   Neck:      Musculoskeletal: Neck supple.   Cardiovascular:      Rate and Rhythm: Normal rate and regular rhythm.   Pulmonary:      Effort: No respiratory distress.      Breath sounds: Rales present. No wheezing.   Abdominal:      General: Abdomen is flat. There is no distension.      Tenderness: There is no abdominal tenderness.   Skin:     Findings: No rash.   Neurological:      Mental Status: He is alert and oriented to person, place, and time.             Significant Labs:   CBC:   Recent Labs   Lab 12/19/20 1821   WBC 4.11   HGB 8.0*   HCT 25.9*   PLT 28*     CMP:   Recent Labs   Lab 12/19/20 1821   *   K 4.8      CO2 20*   *   BUN 35*   CREATININE 1.3   CALCIUM 9.0   PROT 8.1   ALBUMIN 3.9   BILITOT 3.0*   ALKPHOS 155*   AST 94*   *   ANIONGAP 11   EGFRNONAA 51.5*     Cardiac Markers:   Recent Labs   Lab 12/19/20 1821   BNP 18     Lactic Acid:   Recent Labs   Lab 12/19/20 2003   LACTATE 1.2       Significant Imaging: I have reviewed and interpreted all pertinent imaging results/findings within the past 24 hours.

## 2020-12-20 NOTE — PROGRESS NOTES
[facility] Medicine  Progress Note    Patient Name: Morro Sharma  MRN: [id number]  Patient Class: IP- Inpatient   Admission Date: 12/19/2020  Length of Stay: 1 days  Attending Physician: Arnaud Desai DO  Primary Care Provider: Yaniv Escobar MD        Subjective:     Principal Problem:<principal problem not specified>        HPI:  Morro Sharma is a 79-year-old male with PMHx significant for HTN, paroxysmal AFib, polycythemia vera in spent phase requiring transfusions now on Jakafi    Came to emergency room with shortness of breath.  He was COVID positive on December 12th.  He was admitted at [facility] for that but he was closely monitored there  and there was no De oxygenation and antiviral or steroid was not given because he was not qualified.  At that time he had worsening anemia and thrombocytopenia and got 1 unit of blood and platelet.  He was discharged but he had 2 visits back to emergency room of [facility] with shortness of breath and was discharged.  he was checking his pulse ox at home and it was 79% today and called EMS.  At arrival to emergency room 86% and given 6 L and currently oxygenation is 96%.   Inflammatory markers were severely elevated and chest x-ray with bilateral diffuse ground glass appearance.  He has a dry nonproductive cough but deny any chest pain.      Overview/Hospital Course:  No notes on file    Interval History:  Patient states he feels improved to was shortness of breath.  Nonproductive cough.  No chest pain no abdominal pain no diarrhea.      Objective:     Vital Signs (Most Recent):  Temp: 97.9 °F (36.6 °C) (12/20/20 1500)  Pulse: 72 (12/20/20 1500)  Resp: 17 (12/20/20 1500)  BP: 137/81 (12/20/20 1500)  SpO2: 95 % (12/20/20 1100) Vital Signs (24h Range):  Temp:  [97.5 °F (36.4 °C)-98.4 °F (36.9 °C)] 97.9 °F (36.6 °C)  Pulse:  [66-88] 72  Resp:  [17-25] 17  SpO2:  [86 %-100 %] 95 %  BP: (117-145)/(70-81) 137/81     Weight: 70 kg  (154 lb 5.2 oz)  Body mass index is 24.17 kg/m².    Intake/Output Summary (Last 24 hours) at 12/20/2020 1737  Last data filed at 12/20/2020 0600  Gross per 24 hour   Intake 581.25 ml   Output 750 ml   Net -168.75 ml      Physical Exam patient appears no acute distress.  Speaking full sentences  Interacting appropriately  HEENT sclerae nonicteric  Neck is supple nontender  Lungs symmetrical expansion.  Occasional rhonchi, coarse breath sounds  Heart regular rate rhythm  Abdomen is soft nontender  Extremities no edema   exam no [place]  Neuro patient is alert oriented x3 motor exam is nonfocal  Skin no rash  Patient is pleasant calm cooperative    Significant Labs:   BMP:   Recent Labs   Lab 12/19/20 1821   *   *   K 4.8      CO2 20*   BUN 35*   CREATININE 1.3   CALCIUM 9.0     CBC:   Recent Labs   Lab 12/19/20  1821 12/20/20  0406   WBC 4.11 3.12*   HGB 8.0* 7.1*   HCT 25.9* 23.1*   PLT 28* 24*     CMP:   Recent Labs   Lab 12/19/20 1821   *   K 4.8      CO2 20*   *   BUN 35*   CREATININE 1.3   CALCIUM 9.0   PROT 8.1   ALBUMIN 3.9   BILITOT 3.0*   ALKPHOS 155*   AST 94*   *   ANIONGAP 11   EGFRNONAA 51.5*       Significant Imaging:       Assessment/Plan:    #1 COVID-19 pneumonia  #2 Pancytopenia  #3 Atrial fibrillation chronic  #4 Essential hypertension    Will continue plan as outlined  Appreciate help from ID    VTE Risk Mitigation (From admission, onward)         Ordered     enoxaparin injection 40 mg  Every 12 hours (non-standard times)      12/19/20 2111     IP VTE HIGH RISK PATIENT  Once      12/19/20 2004     Place sequential compression device  Until discontinued      12/19/20 2004                      Arnaud Desai DO  Department of Hospital Medicine   [facility]

## 2020-12-20 NOTE — CONSULTS
Consult Note  Infectious Disease    Reason for Consult:      HPI: Morro Sharma is a  80 y.o. male past medical history of AFib, HTN, D LP, GERD, ED, BPH, polycythemia vera came to emergency room complaining of shortness of breath.  Patient was diagnosed with COVID on 12/12/2020, 8 days ago.  He was monitored at home   His pulse oximeter became 79, he was having progressive shortness of breath, worse with exertion, associated by nonproductive cough, lack of taste and lack of smell.  He called EMS.  When EMS checked pulse oximeter was 88%.  He was given 6 L O2 and was brought to the hospital.  In ER he was breathing at 25 times a minute, he was afebrile, pulse oximeter was 86%  He was diagnosed with viral pneumonia by chest x-ray, probably COVID given the recent positive test.  He was given fresh frozen plasma, RDV and Decadron  He was found to have transaminitis, pancytopenia,  Procalcitonin was elevated at 1.3 and he was given levofloxacin.  I came and saw patient.  He is on 3 L O2, pulse ox is 95%.  He is feeling much better.  He is emotional and had a few tears thinking of how his youngest, adapted daughter age 13 insisted that he goes in the hospital.    Ferritin 1369--1316  D-dimer 1.74  LDH 1089  Procalcitonin 1.3      Antibiotics (From admission, onward)    Start     Stop Route Frequency Ordered    12/20/20 2300  levoFLOXacin 250 mg/50 mL IVPB 250 mg      -- IV Every 24 hours (non-standard times) 12/19/20 2130        Antifungals (From admission, onward)    None        Antivirals (From admission, onward)        Stop Route Frequency     remdesivir (EUA) 100 mg      12/24 2059 IV Every 24 hours (non-standard times)            Review of patient's allergies indicates:  No Known Allergies  Past Medical History:   Diagnosis Date    Atrial fibrillation 05/07/2017    [facility] sent to scanning    BPH (benign prostatic hypertrophy)     Colon polyps     Erectile dysfunction     GERD (gastroesophageal reflux  disease)     Hypertension     Pneumonia due to COVID-19 virus 2020    Pneumothorax on left     Polycythemia vera     Pulmonary nodules      Past Surgical History:   Procedure Laterality Date    COLONOSCOPY  9/15/14    Dr. Mcdaniels, 5 year recheck    ESOPHAGOGASTRODUODENOSCOPY      LUNG SURGERY      LEFT - CHEST TUBE     Social History     Socioeconomic History    Marital status:      Spouse name: Not on file    Number of children: Not on file    Years of education: Not on file    Highest education level: Not on file   Occupational History    Not on file   Social Needs    Financial resource strain: Not very hard    Food insecurity     Worry: Never true     Inability: Never true    Transportation needs     Medical: No     Non-medical: No   Tobacco Use    Smoking status: Former Smoker     Packs/day: 1.50     Years: 25.00     Pack years: 37.50     Quit date: 10/2/1982     Years since quittin.2    Smokeless tobacco: Never Used   Substance and Sexual Activity    Alcohol use: No     Frequency: Monthly or less     Drinks per session: 1 or 2     Binge frequency: Never    Drug use: No    Sexual activity: Yes   Lifestyle    Physical activity     Days per week: 0 days     Minutes per session: 20 min    Stress: Not at all   Relationships    Social connections     Talks on phone: Once a week     Gets together: Once a week     Attends Taoist service: Not on file     Active member of club or organization: Yes     Attends meetings of clubs or organizations: More than 4 times per year     Relationship status:    Other Topics Concern    Not on file   Social History Narrative    Not on file     Family History   Problem Relation Age of Onset    Hypertension Mother     Heart disease Mother     Hypertension Maternal Aunt     Heart disease Maternal Aunt     Hypertension Maternal Uncle     Heart disease Maternal Uncle     Early death Father     No Known Problems Daughter     No  Known Problems Daughter     No Known Problems Daughter     No Known Problems Daughter     Prostate cancer Neg Hx     Kidney cancer Neg Hx     Cancer Neg Hx     Urolithiasis Neg Hx     Melanoma Neg Hx     Psoriasis Neg Hx     Lupus Neg Hx     Eczema Neg Hx        Pertinent medications noted:     Review of Systems:   Positive chills, fever, sweats,  No weight loss  No change in vision, loss of vision or diplopia  No sinus congestion, purulent nasal discharge, post nasal drip or facial pain  No pain in mouth or throat. No problems with teeth, gums.  No chest pain, palpitations, syncope  No cough, sputum production, shortness of breath, dyspnea on exertion, pleurisy, hemoptysis  No nausea, vomiting, diarrhea, constipation, blood in stool, or focal abd pain,  No dysphagia, odynophagia  No dysuria, hematuria, strangury, retention, incontinence,  Positive nocturia due to history of BPH  No v enile discharge, genital ulcers, risk for STD  No swelling of joints, redness of joints, injuries, or new focal pain  No unusual headaches, dizziness, vertigo, numbness, paresthesias, neuropathy, falls  No anxiety, depression, substance abuse, sleep disturbance  No diabetes, thyroid, hypogonadal conditions  No bleeding, lymphadenopathy, anemia, malignancy, unusual bruising  No new rashes, lesions, or wounds  No TB exposure  No recent/prior steroids  Outdoor activities:  No  Travel:  No  Implants:  No  Antibiotic History: no    EXAM & DIAGNOSTICS REVIEWED:   Vitals:     Temp:  [97.5 °F (36.4 °C)-98.4 °F (36.9 °C)]   Temp: 98 °F (36.7 °C) (12/20/20 1100)  Pulse: 75 (12/20/20 1100)  Resp: 17 (12/20/20 1100)  BP: 117/75 (12/20/20 1100)  SpO2: 95 % (12/20/20 1100)    Intake/Output Summary (Last 24 hours) at 12/20/2020 1424  Last data filed at 12/20/2020 0600  Gross per 24 hour   Intake 581.25 ml   Output 750 ml   Net -168.75 ml       General:  In NAD. Alert and attentive, cooperative, comfortable  Eyes:  Anicteric, PERRL,  EOMI  ENT:  No ulcers, exudates, thrush, nares patent, dentition is fair, some broken teeth.  Neck:  supple, no masses or adenopathy appreciated  Lungs: Fine crackles bilaterally, no consolidation, rales, wheezes, rub  Heart:  RRR, no gallop/murmur/rub noted  Abd:  Soft, NT, ND, normal BS, no masses or organomegaly appreciated.  :  Voids urine clear, no flank tenderness  Musc:  Joints without effusion, swelling, erythema, synovitis, muscle wasting.   Skin:  No rashes. No palmar or plantar lesions. No subungual petechiae  Wound:   Neuro: Alert, attentive, speech fluent, face symmetric, moves all extremities, no focal weakness. Ambulatory  Psych:  Calm, cooperative.  Emotional.  Lymphatic:     No cervical, supraclavicular, axillary, or inguinal nodes  Extrem: No edema, erythema, phlebitis, cellulitis, warm and well perfused  VAD:       Isolation:      Lines/Tubes/Drains:    General Labs reviewed:  Recent Labs   Lab 12/15/20  0717 12/19/20  1821 12/20/20  0406   WBC 5.55 4.11 3.12*   HGB 9.0* 8.0* 7.1*   HCT 28.1* 25.9* 23.1*   PLT 54* 28* 24*       Recent Labs   Lab 12/14/20  0054 12/15/20  0717 12/19/20  1821   * 134* 134*   K 4.3 4.6 4.8    102 103   CO2 23 22* 20*   BUN 16 18 35*   CREATININE 1.2 1.1 1.3   CALCIUM 8.7 9.4 9.0   PROT 6.9 7.8 8.1   BILITOT 1.1* 2.3* 3.0*   ALKPHOS 113 128 155*   ALT 43 70* 113*   AST 63* 94* 94*           Micro:  Microbiology Results (last 7 days)     Procedure Component Value Units Date/Time    Blood culture x two cultures. Draw prior to antibiotics. [[id number]] Collected: 12/19/20 2003    Order Status: Completed Specimen: Blood from Peripheral, Hand, Right Updated: 12/20/20 0317     Blood Culture, Routine No Growth to date    Narrative:      Aerobic and anaerobic    Blood culture x two cultures. Draw prior to antibiotics. [[id number]] Collected: 12/19/20 1832    Order Status: Completed Specimen: Blood from Peripheral, Lower Arm, Left Updated: 12/20/20 0158     Blood  Culture, Routine No Growth to date    Narrative:      Aerobic and anaerobic        Imaging Reviewed:   CXR  Subtle bilateral opacities in the mid to lower lungs, compatible with   but not specific for Covid 19 pneumonia.   Cardiology:  Stress test on 04/05/2019  · Normal left ventricular systolic function. The estimated ejection fraction is 65%  · Normal LV diastolic function.  · No wall motion abnormalities.  · Normal right ventricular systolic function.  · Mild mitral sclerosis.  · There is mild leaflet calcification of the Mitral Valve.  · Mild mitral regurgitation.  · Mild tricuspid regurgitation.  · Normal central venous pressure (3 mm Hg).  · The estimated PA systolic pressure is 28 mm Hg  · The stress echo portion of this study is negative for myocardial ischemia.  · The patient reported no symptoms during the stress test.  · There were no arrhythmias during stress.  · Overall, the patient's exercise capacity was excellent tolerance.  · The test was stopped secondary to fatigue.  · The EKG portion of this study is negative for myocardial ischemia.         IMPRESSION & PLAN   COVID illness, hypoxemia.  Probably immunocompromised due to blood disorder.    Pancytopenia  Transaminitis  PMHx AFib, HTN, D LP, GERD, ED, BPH, polycythemia vera used to be on hydroxyurea but not anymore, follows with Dr. Morris , hypothyroidism.    Recommendations:  Received COVID convalescent plasma  Continue RDV d2/5  Decadron 6 mg q.24  Continue Lovenox 40 q.12.  D-dimer is 1.7 but I am will not increase dose of Lovenox because platelets are only 24.  Continue vitamin-D, vitamin-C, melatonin, zinc,  Continue albuterol, antitussives, prone positioning.  Monitor markers of inflammation( he has an impressive LDH)    Medical Decision Making during this encounter was  [_] Low Complexity  [_] Moderate Complexity  [x  ] High Complexity

## 2020-12-20 NOTE — H&P
[facility] Medicine  History & Physical    Patient Name: Morro Sharma  MRN: [id number]  Patient Class: IP- Inpatient  Admission Date: 12/19/2020  Attending Physician: Fausto Juares DO   Primary Care Provider: Yaniv Escobar MD         Patient information was obtained from patient and ER records.     Subjective:     Principal Problem:<principal problem not specified>    Chief Complaint:   Chief Complaint   Patient presents with    Shortness of Breath        HPI: Morro Sharma is a 79-year-old male with PMHx significant for HTN, paroxysmal AFib, polycythemia vera in spent phase requiring transfusions now on Jakafi    Came to emergency room with shortness of breath.  He was COVID positive on December 12th.  He was admitted at [facility] for that but he was closely monitored there  and there was no De oxygenation and antiviral or steroid was not given because he was not qualified.  At that time he had worsening anemia and thrombocytopenia and got 1 unit of blood and platelet.  He was discharged but he had 2 visits back to emergency room of [facility] with shortness of breath and was discharged.  he was checking his pulse ox at home and it was 79% today and called EMS.  At arrival to emergency room 86% and given 6 L and currently oxygenation is 96%.   Inflammatory markers were severely elevated and chest x-ray with bilateral diffuse ground glass appearance.  He has a dry nonproductive cough but deny any chest pain.      Past Medical History:   Diagnosis Date    Atrial fibrillation 05/07/2017    [facility] records sent to scanning    BPH (benign prostatic hypertrophy)     Colon polyps     Erectile dysfunction     GERD (gastroesophageal reflux disease)     Hypertension     Pneumonia due to COVID-19 virus 12/12/2020    Pneumothorax on left     Polycythemia vera     Pulmonary nodules        Past Surgical History:   Procedure Laterality Date    COLONOSCOPY  9/15/14      [place], 5 year recheck    ESOPHAGOGASTRODUODENOSCOPY      LUNG SURGERY      LEFT - CHEST TUBE       Review of patient's allergies indicates:  No Known Allergies    No current facility-administered medications on file prior to encounter.      Current Outpatient Medications on File Prior to Encounter   Medication Sig    acetaminophen (TYLENOL) 500 MG tablet Take 1 tablet (500 mg total) by mouth 2 (two) times daily. For shoulder pain (Patient taking differently: Take 500 mg by mouth 2 (two) times daily as needed. For shoulder pain)    ascorbic acid, vitamin C, (VITAMIN C) 500 MG tablet Take 1 tablet (500 mg total) by mouth 2 (two) times daily.    azithromycin (Z-ROSE) 250 MG tablet Take 1 tablet (250 mg total) by mouth once daily. for 4 days    benzonatate (TESSALON) 100 MG capsule Take 1 capsule (100 mg total) by mouth 3 (three) times daily as needed for Cough.    dexchlorphen-phenylephrine-DM (POLYTUSSIN DM) 1-5-10 mg/5 mL Syrp Take 5 mLs by mouth every 4 (four) hours as needed.    glycopyrrolate (ROBINUL) 1 mg Tab TAKE ONE TABLET BY MOUTH TWICE DAILY. MAY TAKE UP TO THREE TIMES DAILY IF needed    levothyroxine (SYNTHROID) 50 MCG tablet TAKE 1 TABLET BY MOUTH BEFORE BREAKFAST    metoprolol succinate (TOPROL-XL) 50 MG 24 hr tablet Take 1 tablet (50 mg total) by mouth once daily.    multivitamin (ONE DAILY MULTIVITAMIN) per tablet Take 1 tablet by mouth once daily.    psyllium husk (METAMUCIL ORAL) Take 1 capsule by mouth once daily.    pulse oximeter (PULSE OXIMETER) device by Apply Externally route 2 (two) times a day. Use twice daily at 8 AM and 3 PM and [facility].    rosuvastatin (CRESTOR) 40 MG Tab Take 1 tablet (40 mg total) by mouth every evening.    tadalafiL (CIALIS) 20 MG Tab Take 1 tablet (20 mg total) by mouth as needed.    tamsulosin (FLOMAX) 0.4 mg Cap Take 1 capsule (0.4 mg total) by mouth once daily.    triamcinolone acetonide 0.1% (KENALOG) 0.1 % cream  APPLY TO AFFECTED AREA TWICE DAILY    vitamin D (VITAMIN D3) 1000 units Tab Take 1 tablet (1,000 Units total) by mouth once daily.     Family History     Problem Relation (Age of Onset)    Early death Father    Heart disease Mother, Maternal Aunt, Maternal Uncle    Hypertension Mother, Maternal Aunt, Maternal Uncle    No Known Problems Daughter, Daughter, Daughter, Daughter        Tobacco Use    Smoking status: Former Smoker     Packs/day: 1.50     Years: 25.00     Pack years: 37.50     Quit date: 10/2/1982     Years since quittin.2    Smokeless tobacco: Never Used   Substance and Sexual Activity    Alcohol use: No     Frequency: Monthly or less     Drinks per session: 1 or 2     Binge frequency: Never    Drug use: No    Sexual activity: Yes     Review of Systems   Constitutional: Positive for fatigue. Negative for fever.   Respiratory: Negative for apnea.    Cardiovascular: Negative for chest pain.   Genitourinary: Negative for difficulty urinating.   Musculoskeletal: Positive for arthralgias.   Neurological: Negative for dizziness.   Psychiatric/Behavioral: Negative for agitation.     Objective:     Vital Signs (Most Recent):  Temp: 97.5 °F (36.4 °C) (20)  Pulse: 76 (20)  Resp: (!) 25 (20)  BP: 138/77 (20)  SpO2: 100 % (20) Vital Signs (24h Range):  Temp:  [97.5 °F (36.4 °C)] 97.5 °F (36.4 °C)  Pulse:  [76-88] 76  Resp:  [25] 25  SpO2:  [86 %-100 %] 100 %  BP: (138)/(77) 138/77     Weight: 79.4 kg (175 lb)  Body mass index is 27.41 kg/m².    Physical Exam  Constitutional:       General: He is not in acute distress.     Appearance: He is well-developed.   HENT:      Head: Normocephalic.   Neck:      Musculoskeletal: Neck supple.   Cardiovascular:      Rate and Rhythm: Normal rate and regular rhythm.   Pulmonary:      Effort: No respiratory distress.      Breath sounds: Rales present. No wheezing.   Abdominal:      General: Abdomen is flat. There is  no distension.      Tenderness: There is no abdominal tenderness.   Skin:     Findings: No rash.   Neurological:      Mental Status: He is alert and oriented to person, place, and time.             Significant Labs:   CBC:   Recent Labs   Lab 12/19/20 1821   WBC 4.11   HGB 8.0*   HCT 25.9*   PLT 28*     CMP:   Recent Labs   Lab 12/19/20 1821   *   K 4.8      CO2 20*   *   BUN 35*   CREATININE 1.3   CALCIUM 9.0   PROT 8.1   ALBUMIN 3.9   BILITOT 3.0*   ALKPHOS 155*   AST 94*   *   ANIONGAP 11   EGFRNONAA 51.5*     Cardiac Markers:   Recent Labs   Lab 12/19/20 1821   BNP 18     Lactic Acid:   Recent Labs   Lab 12/19/20 2003   LACTATE 1.2       Significant Imaging: I have reviewed and interpreted all pertinent imaging results/findings within the past 24 hours.    Assessment/Plan:     Pneumonia due to COVID-19 virus     Plan   IV dexamethasone, convalescent plasma and RDV patient agreed to take all of them  Explain to the side effect is and consent form  signed and registered nurse in emergency room witnessed.  Oxygen support   lateral/prone position  Monitor inflammatory markers  Respiratory treatments p.r.n.  Wean oxygen as able  Symptomatic treatments for cough and congestion  Start levaquin   vitamins   Lovenox 40 mgbid   ID consult      The FDAs Convalescent Plasma Emergency Use Authorization Fact Sheet was provided to the patient and/or caregiver.  The patient and/or caregiver has been counseled that the FDA has authorized the emergency use of convalescent plasma which is not FDA approved. The significant known and potential risks and benefits of COVID-19 convalescent plasma and the extent to which such risks and benefits are unknown have been discussed with the patient and/or caregiver. The patient and/or caregiver has been given the option to accept or refuse and has verbally agreed to receive convalescent plasma. All questions and concerns were addressed.         Hypoxemia  From  covid 19 infection   6L and wean       Pancytopenia  Close monitor       Dyslipidemia (high LDL; low HDL), baseline   Home meds      Paroxysmal atrial fibrillation, onset 5/2017, CHADS-VAS score 4  Stable HR . Home meds      Calcified granuloma of lung  Old finding    Polycythemia vera  Stable . Thrombocytopenia noted , close monitor       Hypertension  Stable , home meds      VTE Risk Mitigation (From admission, onward)         Ordered     IP VTE HIGH RISK PATIENT  Once      12/19/20 2004     Place sequential compression device  Until discontinued      12/19/20 2004                   Oleg Isaac MD  Department of Hospital Medicine   [facility]

## 2020-12-20 NOTE — PROGRESS NOTES
Pharmacist Renal Dose Adjustment Note    Morro Sharma is a 80 y.o. male being treated with the medication levofloxacin    Patient Data:    Vital Signs (Most Recent):  Temp: 98.4 °F (36.9 °C) (12/19/20 2111)  Pulse: 78 (12/19/20 2111)  Resp: (!) 25 (12/19/20 2111)  BP: 133/76 (12/19/20 2111)  SpO2: (!) 88 % (12/19/20 2111)   Vital Signs (72h Range):  Temp:  [97.5 °F (36.4 °C)-98.4 °F (36.9 °C)]   Pulse:  [76-88]   Resp:  [25]   BP: (133-138)/(76-77)   SpO2:  [86 %-100 %]      Recent Labs   Lab 12/14/20  0054 12/15/20  0717 12/19/20  1821   CREATININE 1.2 1.1 1.3     Serum creatinine: 1.3 mg/dL 12/19/20 1821  Estimated creatinine clearance: 42.4 mL/min    Medication:levofloxacin 500 mg IV q 24 hours will be changed to 500 mg x 1 dose, then 250 mg q 24 hours per crcl 20-49 ml/min as per hospital protocol    Pharmacist's Name: Terrance Ladd  Pharmacist's Extension: [phone]

## 2020-12-20 NOTE — ASSESSMENT & PLAN NOTE
Plan   IV dexamethasone, convalescent plasma and RDV patient agreed to take all of them  Explain to the side effect is and consent form  signed and registered nurse in emergency room witnessed.  Oxygen support   lateral/prone position  Monitor inflammatory markers  Respiratory treatments p.r.n.  Wean oxygen as able  Symptomatic treatments for cough and congestion  Start levaquin   vitamins      The FDAs Convalescent Plasma Emergency Use Authorization Fact Sheet was provided to the patient and/or caregiver.  The patient and/or caregiver has been counseled that the FDA has authorized the emergency use of convalescent plasma which is not FDA approved. The significant known and potential risks and benefits of COVID-19 convalescent plasma and the extent to which such risks and benefits are unknown have been discussed with the patient and/or caregiver. The patient and/or caregiver has been given the option to accept or refuse and has verbally agreed to receive convalescent plasma. All questions and concerns were addressed.

## 2020-12-21 ENCOUNTER — TELEPHONE (OUTPATIENT)
Dept: FAMILY MEDICINE | Facility: CLINIC | Age: 80
End: 2020-12-21

## 2020-12-21 LAB
ANISOCYTOSIS BLD QL SMEAR: SLIGHT
BASOPHILS NFR BLD: 0 % (ref 0–1.9)
BLASTS NFR BLD MANUAL: 4 %
BNP SERPL-MCNC: 60 PG/ML (ref 0–99)
CRP SERPL-MCNC: 9.04 MG/DL
D DIMER PPP IA.FEU-MCNC: 0.88 UG/ML FEU
DIFFERENTIAL METHOD: ABNORMAL
EOSINOPHIL NFR BLD: 2 % (ref 0–8)
ERYTHROCYTE [DISTWIDTH] IN BLOOD BY AUTOMATED COUNT: 17.6 % (ref 11.5–14.5)
FERRITIN SERPL-MCNC: 1077 NG/ML (ref 20–300)
HCT VFR BLD AUTO: 23.1 % (ref 40–54)
HGB BLD-MCNC: 7 G/DL (ref 14–18)
IMM GRANULOCYTES # BLD AUTO: ABNORMAL K/UL (ref 0–0.04)
IMM GRANULOCYTES NFR BLD AUTO: ABNORMAL % (ref 0–0.5)
LDH SERPL L TO P-CCNC: 692 U/L (ref 110–260)
LYMPHOCYTES NFR BLD: 25 % (ref 18–48)
MCH RBC QN AUTO: 26.1 PG (ref 27–31)
MCHC RBC AUTO-ENTMCNC: 30.3 G/DL (ref 32–36)
MCV RBC AUTO: 86 FL (ref 82–98)
MONOCYTES NFR BLD: 11 % (ref 4–15)
MYELOCYTES NFR BLD MANUAL: 1 %
NEUTROPHILS NFR BLD: 53 % (ref 38–73)
NEUTS BAND NFR BLD MANUAL: 4 %
NRBC BLD-RTO: 12 /100 WBC
PLATELET # BLD AUTO: 23 K/UL (ref 150–350)
PLATELET BLD QL SMEAR: ABNORMAL
PMV BLD AUTO: ABNORMAL FL (ref 9.2–12.9)
POIKILOCYTOSIS BLD QL SMEAR: SLIGHT
POLYCHROMASIA BLD QL SMEAR: ABNORMAL
RBC # BLD AUTO: 2.68 M/UL (ref 4.6–6.2)
WBC # BLD AUTO: 3.2 K/UL (ref 3.9–12.7)

## 2020-12-21 PROCEDURE — 99231 PR SUBSEQUENT HOSPITAL CARE,LEVL I: ICD-10-PCS | Mod: ,,, | Performed by: INTERNAL MEDICINE

## 2020-12-21 PROCEDURE — 82784 ASSAY IGA/IGD/IGG/IGM EACH: CPT | Mod: 91

## 2020-12-21 PROCEDURE — 36415 COLL VENOUS BLD VENIPUNCTURE: CPT

## 2020-12-21 PROCEDURE — 83880 ASSAY OF NATRIURETIC PEPTIDE: CPT

## 2020-12-21 PROCEDURE — 27000221 HC OXYGEN, UP TO 24 HOURS

## 2020-12-21 PROCEDURE — 99900035 HC TECH TIME PER 15 MIN (STAT)

## 2020-12-21 PROCEDURE — 25000003 PHARM REV CODE 250: Performed by: INTERNAL MEDICINE

## 2020-12-21 PROCEDURE — 82728 ASSAY OF FERRITIN: CPT

## 2020-12-21 PROCEDURE — 94761 N-INVAS EAR/PLS OXIMETRY MLT: CPT

## 2020-12-21 PROCEDURE — 85007 BL SMEAR W/DIFF WBC COUNT: CPT

## 2020-12-21 PROCEDURE — 85379 FIBRIN DEGRADATION QUANT: CPT

## 2020-12-21 PROCEDURE — 83615 LACTATE (LD) (LDH) ENZYME: CPT

## 2020-12-21 PROCEDURE — 85027 COMPLETE CBC AUTOMATED: CPT

## 2020-12-21 PROCEDURE — 86140 C-REACTIVE PROTEIN: CPT

## 2020-12-21 PROCEDURE — 63600175 PHARM REV CODE 636 W HCPCS: Performed by: INTERNAL MEDICINE

## 2020-12-21 PROCEDURE — 25000003 PHARM REV CODE 250: Performed by: EMERGENCY MEDICINE

## 2020-12-21 PROCEDURE — 12000002 HC ACUTE/MED SURGE SEMI-PRIVATE ROOM

## 2020-12-21 PROCEDURE — 99231 SBSQ HOSP IP/OBS SF/LOW 25: CPT | Mod: ,,, | Performed by: INTERNAL MEDICINE

## 2020-12-21 RX ADMIN — MELATONIN 6 MG: at 09:12

## 2020-12-21 RX ADMIN — TAMSULOSIN HYDROCHLORIDE 0.4 MG: 0.4 CAPSULE ORAL at 09:12

## 2020-12-21 RX ADMIN — LEVOTHYROXINE SODIUM 50 MCG: 25 TABLET ORAL at 05:12

## 2020-12-21 RX ADMIN — BENZONATATE 100 MG: 100 CAPSULE ORAL at 09:12

## 2020-12-21 RX ADMIN — Medication 4000 UNITS: at 09:12

## 2020-12-21 RX ADMIN — DEXAMETHASONE SODIUM PHOSPHATE 6 MG: 4 INJECTION, SOLUTION INTRA-ARTICULAR; INTRALESIONAL; INTRAMUSCULAR; INTRAVENOUS; SOFT TISSUE at 09:12

## 2020-12-21 RX ADMIN — ENOXAPARIN SODIUM 40 MG: 40 INJECTION SUBCUTANEOUS at 09:12

## 2020-12-21 RX ADMIN — GUAIFENESIN AND DEXTROMETHORPHAN HYDROBROMIDE 1 TABLET: 600; 30 TABLET, EXTENDED RELEASE ORAL at 09:12

## 2020-12-21 RX ADMIN — REMDESIVIR 100 MG: 100 INJECTION, POWDER, LYOPHILIZED, FOR SOLUTION INTRAVENOUS at 09:12

## 2020-12-21 RX ADMIN — LEVOFLOXACIN 250 MG: 5 INJECTION, SOLUTION INTRAVENOUS at 09:12

## 2020-12-21 RX ADMIN — ENOXAPARIN SODIUM 40 MG: 40 INJECTION SUBCUTANEOUS at 12:12

## 2020-12-21 RX ADMIN — OXYCODONE HYDROCHLORIDE AND ACETAMINOPHEN 500 MG: 500 TABLET ORAL at 09:12

## 2020-12-21 RX ADMIN — ZINC SULFATE 220 MG (50 MG) CAPSULE 220 MG: CAPSULE at 09:12

## 2020-12-21 RX ADMIN — METOPROLOL SUCCINATE 50 MG: 25 TABLET, FILM COATED, EXTENDED RELEASE ORAL at 09:12

## 2020-12-21 NOTE — PROGRESS NOTES
[facility] Medicine  Progress Note    Patient Name: Morro Sharma  MRN: [id number]  Patient Class: IP- Inpatient   Admission Date: 12/19/2020  Length of Stay: 2 days  Attending Physician: Arnaud Desai DO  Primary Care Provider: Yaniv Escobar MD        Subjective:     Principal Problem:<principal problem not specified>        HPI:  Morro Sharma is a 79-year-old male with PMHx significant for HTN, paroxysmal AFib, polycythemia vera in spent phase requiring transfusions now on Jakafi    Came to emergency room with shortness of breath.  He was COVID positive on December 12th.  He was admitted at [facility] for that but he was closely monitored there  and there was no De oxygenation and antiviral or steroid was not given because he was not qualified.  At that time he had worsening anemia and thrombocytopenia and got 1 unit of blood and platelet.  He was discharged but he had 2 visits back to emergency room of [facility] with shortness of breath and was discharged.  he was checking his pulse ox at home and it was 79% today and called EMS.  At arrival to emergency room 86% and given 6 L and currently oxygenation is 96%.   Inflammatory markers were severely elevated and chest x-ray with bilateral diffuse ground glass appearance.  He has a dry nonproductive cough but deny any chest pain.      Overview/Hospital Course:  No notes on file    Interval History:  Feels better, less shortness of breath.  No chest pain.  Patient denies diarrhea nausea vomiting.  Objective:     Vital Signs (Most Recent):  Temp: 97.6 °F (36.4 °C) (12/21/20 1500)  Pulse: 75 (12/21/20 1500)  Resp: 18 (12/21/20 1500)  BP: 132/76 (12/21/20 1500)  SpO2: 95 % (12/21/20 1057) Vital Signs (24h Range):  Temp:  [97.5 °F (36.4 °C)-98.6 °F (37 °C)] 97.6 °F (36.4 °C)  Pulse:  [66-92] 75  Resp:  [16-19] 18  SpO2:  [95 %-100 %] 95 %  BP: (115-132)/(70-79) 132/76     Weight: 70 kg (154 lb 5.2 oz)  Body mass index is  24.17 kg/m².  No intake or output data in the 24 hours ending 12/21/20 1603   Physical Exam patient appears in no acute distress.  Lying in bed  HEENT sclerae nonicteric  Neck is supple nontender  Lungs good air movement symmetrical expansion no wheeze, scattered rhonchi  Heart regular rate and rhythm  Abdomen is soft nontender, +BS  Extremities no edema,, no deformity  Neuro patient is alert oriented x3 motor exam is nonfocal   no Chicas  Skin no rash  Psych patient is pleasant cooperative  Significant Labs:   BMP:   Recent Labs   Lab 12/19/20  1821   *   *   K 4.8      CO2 20*   BUN 35*   CREATININE 1.3   CALCIUM 9.0     CBC:   Recent Labs   Lab 12/19/20  1821 12/20/20  0406 12/21/20  0429   WBC 4.11 3.12* 3.20*   HGB 8.0* 7.1* 7.0*   HCT 25.9* 23.1* 23.1*   PLT 28* 24* 23*     CMP:   Recent Labs   Lab 12/19/20 1821   *   K 4.8      CO2 20*   *   BUN 35*   CREATININE 1.3   CALCIUM 9.0   PROT 8.1   ALBUMIN 3.9   BILITOT 3.0*   ALKPHOS 155*   AST 94*   *   ANIONGAP 11   EGFRNONAA 51.5*       Significant Imaging:       Assessment/Plan:    #1 COVID-19 pneumonia  Received convalesce of plasma  Day3/5 remdesivir  Continue Decadron 6 mg IV daily  Continue Lovenox 40 mg subcu q.12(patient with elevated D-dimer)  (monitoring carefully due to thrombocytopenia  #2 Pancytopenia  #3 Atrial fibrillation chronic  #4 Essential hypertension  #5 Hx polycythemia vera-currently not on hydroxyurea  Will consult Heme-Onc      Patient high risk for decline and life-threatening deterioration.  Appreciate help from Infectious Disease.  Not certain if patient needs Levaquin.  Will defer to ID    VTE Risk Mitigation (From admission, onward)         Ordered     enoxaparin injection 40 mg  Every 12 hours (non-standard times)      12/19/20 2111     IP VTE HIGH RISK PATIENT  Once      12/19/20 2004     Place sequential compression device  Until discontinued      12/19/20 2004                     Arnaud Desai DO  Department of Hospital Medicine   [facility]

## 2020-12-21 NOTE — PROGRESS NOTES
Progress Note  Infectious Disease    Reason for Consult:      HPI: Morro Sharma is a  80 y.o. male past medical history of AFib, HTN, D LP, GERD, ED, BPH, polycythemia vera came to emergency room complaining of shortness of breath.  Patient was diagnosed with COVID on 12/12/2020, 8 days ago.  He was monitored at home   His pulse oximeter became 79, he was having progressive shortness of breath, worse with exertion, associated by nonproductive cough, lack of taste and lack of smell.  He called EMS.  When EMS checked pulse oximeter was 88%.  He was given 6 L O2 and was brought to the hospital.  In ER he was breathing at 25 times a minute, he was afebrile, pulse oximeter was 86%  He was diagnosed with viral pneumonia by chest x-ray, probably COVID given the recent positive test.  He was given fresh frozen plasma, RDV and Decadron  He was found to have transaminitis, pancytopenia,  Procalcitonin was elevated at 1.3 and he was given levofloxacin.  I came and saw patient.  He is on 3 L O2, pulse ox is 95%.  He is feeling much better.  He is emotional and had a few tears thinking of how his youngest, adapted daughter age 13 insisted that he goes in the hospital.    12/21/2020  needing 3 L o2.  In good spirits.  Sitting up and having dinner.  He os how many days will he be in the hospital.  I explained that tonight he will receive the 2nd dose of RDV.  Duration of treatment is 5 days.      CRP9   Ferritin 1369--1316--1007  D-dimer 1.74--0.88  LDH 1089--692  Procalcitonin 1.3      Antibiotics (From admission, onward)    Start     Stop Route Frequency Ordered    12/20/20 2300  levoFLOXacin 250 mg/50 mL IVPB 250 mg      -- IV Every 24 hours (non-standard times) 12/19/20 2130        Antifungals (From admission, onward)    None        Antivirals (From admission, onward)        Stop Route Frequency     remdesivir (EUA) 100 mg      12/24 2059 IV Every 24 hours (non-standard times)            Review of patient's allergies  indicates:  No Known Allergies  Past Medical History:   Diagnosis Date    Acute hypoxemic respiratory failure     Atrial fibrillation 05/07/2017    [facility] records sent to scanning    BPH (benign prostatic hypertrophy)     Colon polyps     Erectile dysfunction     GERD (gastroesophageal reflux disease)     Hypertension     Pneumonia due to COVID-19 virus 12/12/2020    Pneumothorax on left     Polycythemia vera     Pulmonary nodules      Past Surgical History:   Procedure Laterality Date    COLONOSCOPY  9/15/14    Dr. Mcdaniels, 5 year recheck    ESOPHAGOGASTRODUODENOSCOPY      LUNG SURGERY      LEFT - CHEST TUBE         EXAM & DIAGNOSTICS REVIEWED:   Vitals:     Temp:  [97.5 °F (36.4 °C)-98.6 °F (37 °C)]   Temp: 97.5 °F (36.4 °C) (12/21/20 0701)  Pulse: 92 (12/21/20 0701)  Resp: 16 (12/21/20 0701)  BP: 115/70 (12/21/20 0701)  SpO2: 95 % (12/21/20 0701)  No intake or output data in the 24 hours ending 12/21/20 1010         General:  In NAD. Alert and attentive, cooperative, comfortable wearing 3 L O2.  Eyes:  Anicteric, PERRL, EOMI  ENT:  No ulcers, exudates, thrush, nares patent, dentition is fair, some broken teeth.  Neck:  supple, no masses or adenopathy appreciated  Lungs: Fine crackles bilaterally, no consolidation, rales, wheezes, rub  Heart:  RRR, no gallop/murmur/rub noted  Abd:  Soft, NT, ND, normal BS, no masses or organomegaly appreciated.  :  Voids urine clear, no flank tenderness  Musc:  Joints without effusion, swelling, erythema, synovitis, muscle wasting.   Skin:  No rashes. No palmar or plantar lesions. No subungual petechiae  Wound:   Neuro: Alert, attentive, speech fluent, face symmetric, moves all extremities, no focal weakness. Ambulatory  Psych:  Calm, cooperative.  In good spirits today  Lymphatic:     No cervical, supraclavicular, axillary, or inguinal nodes  Extrem: No edema, erythema, phlebitis, cellulitis, warm and well perfused  VAD:       Isolation:       Lines/Tubes/Drains:    General Labs reviewed:  Recent Labs   Lab 12/19/20  1821 12/20/20  0406 12/21/20  0429   WBC 4.11 3.12* 3.20*   HGB 8.0* 7.1* 7.0*   HCT 25.9* 23.1* 23.1*   PLT 28* 24* 23*       Recent Labs   Lab 12/15/20  0717 12/19/20  1821   * 134*   K 4.6 4.8    103   CO2 22* 20*   BUN 18 35*   CREATININE 1.1 1.3   CALCIUM 9.4 9.0   PROT 7.8 8.1   BILITOT 2.3* 3.0*   ALKPHOS 128 155*   ALT 70* 113*   AST 94* 94*       Micro:  Microbiology Results (last 7 days)     Procedure Component Value Units Date/Time    Blood culture x two cultures. Draw prior to antibiotics. [[id number]] Collected: 12/19/20 2003    Order Status: Completed Specimen: Blood from Peripheral, Hand, Right Updated: 12/20/20 2232     Blood Culture, Routine No Growth to date      No Growth to date    Narrative:      Aerobic and anaerobic    Blood culture x two cultures. Draw prior to antibiotics. [[id number]] Collected: 12/19/20 1832    Order Status: Completed Specimen: Blood from Peripheral, Lower Arm, Left Updated: 12/20/20 2032     Blood Culture, Routine No Growth to date      No Growth to date    Narrative:      Aerobic and anaerobic        Imaging Reviewed:   CXR  Subtle bilateral opacities in the mid to lower lungs, compatible with   but not specific for Covid 19 pneumonia.   Cardiology:  Stress test on 04/05/2019  · Normal left ventricular systolic function. The estimated ejection fraction is 65%  · Normal LV diastolic function.  · No wall motion abnormalities.  · Normal right ventricular systolic function.  · Mild mitral sclerosis.  · There is mild leaflet calcification of the Mitral Valve.  · Mild mitral regurgitation.  · Mild tricuspid regurgitation.  · Normal central venous pressure (3 mm Hg).  · The estimated PA systolic pressure is 28 mm Hg  · The stress echo portion of this study is negative for myocardial ischemia.  · The patient reported no symptoms during the stress test.  · There were no arrhythmias during  stress.  · Overall, the patient's exercise capacity was excellent tolerance.  · The test was stopped secondary to fatigue.  · The EKG portion of this study is negative for myocardial ischemia.         IMPRESSION & PLAN   COVID illness, hypoxemia.  On 3 L oxygen  Probably immunocompromised due to blood disorder.    Pancytopenia, as per hospitalist  Transaminitis  PMHx AFib, HTN, D LP, GERD, ED, BPH, polycythemia vera used to be on hydroxyurea but not anymore, follows with Dr. Morris , now with pancytopenia hypothyroidism.    Recommendations:  Received COVID convalescent plasma  Continue RDV 3/5---mild transaminitis.  I can still continue RDV  Decadron 6 mg q.24  Continue Lovenox 40 q.12.  D-dimer is 1.7 ---0.8  Continue vitamin-D, vitamin-C, melatonin, zinc,  Continue albuterol, antitussives, prone positioning.  Monitor markers of inflammation( he has an impressive LDH, improving)    I feel he is improving and he will do great.    Medical Decision Making during this encounter was  [x_] Low Complexity  [_] Moderate Complexity  [  ] High Complexity

## 2020-12-21 NOTE — PLAN OF CARE
Problem: Fall Injury Risk  Goal: Absence of Fall and Fall-Related Injury  Outcome: Ongoing, Progressing     Problem: Adult Inpatient Plan of Care  Goal: Plan of Care Review  Outcome: Ongoing, Progressing     Problem: Adult Inpatient Plan of Care  Goal: Absence of Hospital-Acquired Illness or Injury  Outcome: Ongoing, Progressing     Problem: Adult Inpatient Plan of Care  Goal: Optimal Comfort and Wellbeing  Outcome: Ongoing, Progressing     Problem: Adult Inpatient Plan of Care  Goal: Readiness for Transition of Care  Outcome: Ongoing, Progressing

## 2020-12-21 NOTE — RESPIRATORY THERAPY
12/20/20 2045   Patient Assessment/Suction   Level of Consciousness (AVPU) alert   Respiratory Effort Normal;Unlabored   Expansion/Accessory Muscles/Retractions no use of accessory muscles;no retractions;expansion symmetric   All Lung Fields Breath Sounds equal bilaterally;diminished   Rhythm/Pattern, Respiratory unlabored;pattern regular;depth regular   Cough Frequency no cough   PRE-TX-O2   $ Is the patient on Low Flow Oxygen? Yes   Flow (L/min) 3   SpO2 95 %   $ Pulse Oximetry - Multiple Charge Pulse Oximetry - Multiple   Pulse 86   Resp 19   Wound Care   $ Wound Care Tech Time 15 min   Area of Concern Left;Right;Behind ear;Cheek   Skin Color/Characteristics without discoloration   Skin Temperature warm   Education   $ Education DME Oxygen;15 min   Respiratory Evaluation   $ Care Plan Tech Time 15 min

## 2020-12-21 NOTE — TELEPHONE ENCOUNTER
Patient re-admitted. Jakafi script discontinued during last hospitalization, but patient blood counts currently low so hold is appropriate. Refill not needed at this time. Will continue to follow.

## 2020-12-21 NOTE — TELEPHONE ENCOUNTER
----- Message from Malou Barbour sent at 12/21/2020  8:33 AM CST -----  Type: Needs Medical Advice    Who Called:  Wife (Xi)  Best Call Back Number: [phone]  Additional Information:  Patient and herself has Covid/patient was put in hospital due to oxygen levels/would like to speak with nurse or doctor concerning acquiring home care services/please call back to advise.

## 2020-12-21 NOTE — SUBJECTIVE & OBJECTIVE
Interval History:  Feels better, less shortness of breath.  No chest pain.  Patient denies diarrhea nausea vomiting.  Objective:     Vital Signs (Most Recent):  Temp: 97.6 °F (36.4 °C) (12/21/20 1500)  Pulse: 75 (12/21/20 1500)  Resp: 18 (12/21/20 1500)  BP: 132/76 (12/21/20 1500)  SpO2: 95 % (12/21/20 1057) Vital Signs (24h Range):  Temp:  [97.5 °F (36.4 °C)-98.6 °F (37 °C)] 97.6 °F (36.4 °C)  Pulse:  [66-92] 75  Resp:  [16-19] 18  SpO2:  [95 %-100 %] 95 %  BP: (115-132)/(70-79) 132/76     Weight: 70 kg (154 lb 5.2 oz)  Body mass index is 24.17 kg/m².  No intake or output data in the 24 hours ending 12/21/20 1603   Physical Exam patient appears in no acute distress.  Lying in bed  HEENT sclerae nonicteric  Neck is supple nontender  Lungs good air movement symmetrical expansion no wheeze, scattered rhonchi  Heart regular rate and rhythm  Abdomen is soft nontender, +BS  Extremities no edema,, no deformity  Neuro patient is alert oriented x3 motor exam is nonfocal   no Chicas  Skin no rash  Psych patient is pleasant cooperative  Significant Labs:   BMP:   Recent Labs   Lab 12/19/20 1821   *   *   K 4.8      CO2 20*   BUN 35*   CREATININE 1.3   CALCIUM 9.0     CBC:   Recent Labs   Lab 12/19/20  1821 12/20/20  0406 12/21/20  0429   WBC 4.11 3.12* 3.20*   HGB 8.0* 7.1* 7.0*   HCT 25.9* 23.1* 23.1*   PLT 28* 24* 23*     CMP:   Recent Labs   Lab 12/19/20 1821   *   K 4.8      CO2 20*   *   BUN 35*   CREATININE 1.3   CALCIUM 9.0   PROT 8.1   ALBUMIN 3.9   BILITOT 3.0*   ALKPHOS 155*   AST 94*   *   ANIONGAP 11   EGFRNONAA 51.5*       Significant Imaging:

## 2020-12-22 LAB
ANISOCYTOSIS BLD QL SMEAR: SLIGHT
BASOPHILS NFR BLD: 0 % (ref 0–1.9)
BNP SERPL-MCNC: 41 PG/ML (ref 0–99)
CRP SERPL-MCNC: 5.61 MG/DL
D DIMER PPP IA.FEU-MCNC: 0.89 UG/ML FEU
DACRYOCYTES BLD QL SMEAR: ABNORMAL
DIFFERENTIAL METHOD: ABNORMAL
EOSINOPHIL NFR BLD: 2 % (ref 0–8)
ERYTHROCYTE [DISTWIDTH] IN BLOOD BY AUTOMATED COUNT: 17.8 % (ref 11.5–14.5)
FERRITIN SERPL-MCNC: 1263 NG/ML (ref 20–300)
HCT VFR BLD AUTO: 26.7 % (ref 40–54)
HGB BLD-MCNC: 8 G/DL (ref 14–18)
IGA SERPL-MCNC: 175 MG/DL (ref 61–437)
IGG SERPL-MCNC: 1059 MG/DL (ref 603–1613)
IGM SERPL-MCNC: 131 MG/DL (ref 15–143)
IMM GRANULOCYTES # BLD AUTO: ABNORMAL K/UL (ref 0–0.04)
IMM GRANULOCYTES NFR BLD AUTO: ABNORMAL % (ref 0–0.5)
LDH SERPL L TO P-CCNC: 721 U/L (ref 110–260)
LYMPHOCYTES NFR BLD: 34 % (ref 18–48)
MCH RBC QN AUTO: 26.1 PG (ref 27–31)
MCHC RBC AUTO-ENTMCNC: 30 G/DL (ref 32–36)
MCV RBC AUTO: 87 FL (ref 82–98)
METAMYELOCYTES NFR BLD MANUAL: 2 %
MONOCYTES NFR BLD: 12 % (ref 4–15)
MYELOCYTES NFR BLD MANUAL: 2 %
NEUTROPHILS NFR BLD: 47 % (ref 38–73)
NEUTS BAND NFR BLD MANUAL: 1 %
NRBC BLD-RTO: 12 /100 WBC
PLATELET # BLD AUTO: 30 K/UL (ref 150–350)
PMV BLD AUTO: ABNORMAL FL (ref 9.2–12.9)
POIKILOCYTOSIS BLD QL SMEAR: SLIGHT
POLYCHROMASIA BLD QL SMEAR: ABNORMAL
RBC # BLD AUTO: 3.06 M/UL (ref 4.6–6.2)
WBC # BLD AUTO: 5.1 K/UL (ref 3.9–12.7)

## 2020-12-22 PROCEDURE — 99900035 HC TECH TIME PER 15 MIN (STAT)

## 2020-12-22 PROCEDURE — 85379 FIBRIN DEGRADATION QUANT: CPT

## 2020-12-22 PROCEDURE — 63600175 PHARM REV CODE 636 W HCPCS: Performed by: INTERNAL MEDICINE

## 2020-12-22 PROCEDURE — 82728 ASSAY OF FERRITIN: CPT

## 2020-12-22 PROCEDURE — 25000003 PHARM REV CODE 250: Performed by: INTERNAL MEDICINE

## 2020-12-22 PROCEDURE — 99232 PR SUBSEQUENT HOSPITAL CARE,LEVL II: ICD-10-PCS | Mod: ,,, | Performed by: INTERNAL MEDICINE

## 2020-12-22 PROCEDURE — 83880 ASSAY OF NATRIURETIC PEPTIDE: CPT

## 2020-12-22 PROCEDURE — 86140 C-REACTIVE PROTEIN: CPT

## 2020-12-22 PROCEDURE — 85007 BL SMEAR W/DIFF WBC COUNT: CPT

## 2020-12-22 PROCEDURE — 94761 N-INVAS EAR/PLS OXIMETRY MLT: CPT

## 2020-12-22 PROCEDURE — 83615 LACTATE (LD) (LDH) ENZYME: CPT

## 2020-12-22 PROCEDURE — 36415 COLL VENOUS BLD VENIPUNCTURE: CPT

## 2020-12-22 PROCEDURE — 27000221 HC OXYGEN, UP TO 24 HOURS

## 2020-12-22 PROCEDURE — 12000002 HC ACUTE/MED SURGE SEMI-PRIVATE ROOM

## 2020-12-22 PROCEDURE — 85027 COMPLETE CBC AUTOMATED: CPT

## 2020-12-22 PROCEDURE — 99232 SBSQ HOSP IP/OBS MODERATE 35: CPT | Mod: ,,, | Performed by: INTERNAL MEDICINE

## 2020-12-22 PROCEDURE — 94760 N-INVAS EAR/PLS OXIMETRY 1: CPT

## 2020-12-22 RX ADMIN — ENOXAPARIN SODIUM 40 MG: 40 INJECTION SUBCUTANEOUS at 09:12

## 2020-12-22 RX ADMIN — ZINC SULFATE 220 MG (50 MG) CAPSULE 220 MG: CAPSULE at 09:12

## 2020-12-22 RX ADMIN — OXYCODONE HYDROCHLORIDE AND ACETAMINOPHEN 500 MG: 500 TABLET ORAL at 09:12

## 2020-12-22 RX ADMIN — GUAIFENESIN AND DEXTROMETHORPHAN HYDROBROMIDE 1 TABLET: 600; 30 TABLET, EXTENDED RELEASE ORAL at 09:12

## 2020-12-22 RX ADMIN — BENZONATATE 100 MG: 100 CAPSULE ORAL at 09:12

## 2020-12-22 RX ADMIN — LEVOTHYROXINE SODIUM 50 MCG: 25 TABLET ORAL at 05:12

## 2020-12-22 RX ADMIN — REMDESIVIR 100 MG: 100 INJECTION, POWDER, LYOPHILIZED, FOR SOLUTION INTRAVENOUS at 03:12

## 2020-12-22 RX ADMIN — LEVOFLOXACIN 250 MG: 5 INJECTION, SOLUTION INTRAVENOUS at 10:12

## 2020-12-22 RX ADMIN — METOPROLOL SUCCINATE 50 MG: 25 TABLET, FILM COATED, EXTENDED RELEASE ORAL at 09:12

## 2020-12-22 RX ADMIN — TAMSULOSIN HYDROCHLORIDE 0.4 MG: 0.4 CAPSULE ORAL at 09:12

## 2020-12-22 RX ADMIN — Medication 4000 UNITS: at 09:12

## 2020-12-22 RX ADMIN — DEXAMETHASONE SODIUM PHOSPHATE 6 MG: 4 INJECTION, SOLUTION INTRA-ARTICULAR; INTRALESIONAL; INTRAMUSCULAR; INTRAVENOUS; SOFT TISSUE at 09:12

## 2020-12-22 RX ADMIN — MELATONIN 6 MG: at 09:12

## 2020-12-22 NOTE — PROGRESS NOTES
[facility] Medicine    Progress Note    Patient Name: Morro Sharma  MRN: [id number]  Patient Class: IP- Inpatient   Admission Date: 12/19/2020  5:55 PM  Length of Stay: 3  Attending Physician: Laquita Clemens MD  Primary Care Provider: Yaniv Escobar MD  Face-to-Face encounter date: 12/22/2020  Code status:  Chief Complaint: Shortness of Breath        Subjective:    HPI:Morro Sharma is a 79-year-old male with PMHx significant for HTN, paroxysmal AFib, polycythemia vera. Came to emergency room with shortness of breath.  He was COVID positive on December 12th.  He was admitted at [facility] for that but he was closely monitored there  and there was no De oxygenation and antiviral or steroid was not given because he was not qualified.  At that time he had worsening anemia and thrombocytopenia and got 1 unit of blood and platelet.  He was discharged but he had 2 visits back to emergency room of [facility] with shortness of breath and was discharged.  he was checking his pulse ox at home and it was 79% today and called EMS.  At arrival to emergency room 86% and given 6 L and currently oxygenation is 96%.   Inflammatory markers were severely elevated and chest x-ray with bilateral diffuse ground glass appearance.  He has a dry nonproductive cough but deny any chest pain.      Interval History:  Patient is doing well.   No concerns/issues overnight reported by the patient or the nursing staff.  I spoke to the radiologist, concerning patient having bone marrow diet biopsy done as recommended by heme/Onc, he stated that as patient is COVID positive and it is not an emergency biopsy, he would recommend it will be done outpatient once patient has been discharge.    Review of Systems All other Review of Systems were found to be negative expect for that mentioned already in HPI.     Objective:     Vitals:    12/21/20 2310 12/22/20 0258 12/22/20 0722 12/22/20 1110   BP: 129/77 125/74  122/74 102/66   BP Location: Right arm Right arm  Right arm   Patient Position: Lying Lying  Sitting   Pulse: 73 72 80 79   Resp: 17 18 19 16   Temp: 98 °F (36.7 °C) 97.5 °F (36.4 °C) 98.6 °F (37 °C) 98.5 °F (36.9 °C)   TempSrc: Oral Oral Oral Oral   SpO2: 96% 99% 98% 97%   Weight:       Height:            Vitals reviewed.  Constitutional: No distress.   HENT: NC  Head: Atraumatic.   Cardiovascular: Normal rate, regular rhythm and normal heart sounds.   Pulmonary/Chest: Effort normal. No wheezes.   Abdominal: Soft. Bowel sounds are normal. No distension and no mass. No tenderness  Neurological: Alert.   Skin: Skin is warm and dry.   Psych: Appropriate mood and affect    Following labs were Reviewed   CBC:  Recent Labs   Lab 12/22/20  0806   WBC 5.10   HGB 8.0*   HCT 26.7*   PLT 30*     CMP:  No results for input(s): GLUCOSE, CALCIUM, ALBUMIN, PROT, NA, K, CO2, CL, BUN, CREATININE, ALKPHOS, ALT, AST, BILITOT in the last 24 hours.    Micro Results  Microbiology Results (last 7 days)     Procedure Component Value Units Date/Time    Blood culture x two cultures. Draw prior to antibiotics. [[id number]] Collected: 12/19/20 2003    Order Status: Completed Specimen: Blood from Peripheral, Hand, Right Updated: 12/21/20 2232     Blood Culture, Routine No Growth to date      No Growth to date      No Growth to date    Narrative:      Aerobic and anaerobic    Blood culture x two cultures. Draw prior to antibiotics. [[id number]] Collected: 12/19/20 1832    Order Status: Completed Specimen: Blood from Peripheral, Lower Arm, Left Updated: 12/21/20 2032     Blood Culture, Routine No Growth to date      No Growth to date      No Growth to date    Narrative:      Aerobic and anaerobic           Radiology Reports  X-ray Chest Ap Portable  Result Date: 12/19/2020  IMPRESSION: Subtle bilateral opacities in the mid to lower lungs, compatible with but not specific for Covid 19 pneumonia. Electronically Signed by Nell Arnold on 12/19/2020  7:13 PM      Meds  Scheduled Meds:   ascorbic acid (vitamin C)  500 mg Oral BID    dexamethasone  6 mg Intravenous Q24H    dextromethorphan-guaifenesin  mg  1 tablet Oral BID    enoxparin  40 mg Subcutaneous Q12H    levoFLOXacin  250 mg Intravenous Q24H    levothyroxine  50 mcg Oral Before breakfast    melatonin  6 mg Oral Nightly    metoprolol succinate  50 mg Oral Daily    EUA remdesivir infusion  100 mg Intravenous Q24H    tamsulosin  1 capsule Oral Daily    vitamin D  4,000 Units Oral Daily    zinc sulfate  220 mg Oral Daily     Continuous Infusions:  PRN Meds:.sodium chloride, acetaminophen, benzonatate, dextrose 50%, dextrose 50%, glucagon (human recombinant), glucose, glucose, sodium chloride 0.9%.    Assessment & Plan:     Active Hospital Problems    Diagnosis    *Pneumonia due to COVID-19 virus  Continue with prophylactic dose Lovenox  Continue isolation precautions  Continue dexamethasone 6 mg 4/10 days  Continue remdesivir 4/5  Received convalescent plasma 12/19  Albuterol scheduled with antitussives  Trending inflammatory markers  Counseled on sitting up in chair, incentive spirometer and sleeping prone  Infectious disease on board       Pancytopenia  - the heme/Onc on board  - continue to monitor      Paroxysmal atrial fibrillation, onset 5/2017, CHADS-VAS score 4  - continue Toprol-XL      Hypertension  - controlled    Hypothyroidism  - continue home medication of levothyroxine 50 mcg daily      Polycythemia vera  - heme/Onc on board           Discharge Planning:   Is the patient medically ready for discharge?: no    Reason for patient still in hospital (select all that apply): Patient trending condition and Treatment    Above encounter included review of the medical records, interviewing and examining the patient face-to-face, discussion with family and other health care providers, ordering and interpreting lab/test results and formulating a plan of care.     Medical Decision  Making:      [_] Low Complexity  [_] Moderate Complexity  [x] High Complexity      Laquita Clemens MD  Department of Hospital Medicine   [facility]

## 2020-12-22 NOTE — PHYSICIAN QUERY
PT Name: Morro Sharma  MR #: [id number]     RESPIRATORY CONDITION CLARIFICATION     CDS/: Suzanne Barajas Pe               Contact information: [phone]   This form is a permanent document in the medical record.     Query Date: December 22, 2020    By submitting this query, we are merely seeking further clarification of documentation.  Please utilize your independent clinical judgment when addressing the question(s) below.  The Medical Record contains the following   Indicators   Supporting Clinical Findings Location in Medical Record    SOB, [place], Wheezing, Productive Cough, Use of Accessory Muscles, etc.  Presents with c/o SOB/NY and dry non-prod cough  Per ER    RR         ABGs         O2 sat  Per Patient - spO2 noted 79% at home > per EMS spO2 88% on RA placed on 6L O2 -  Upon arrival to ER   RR 25, spO2 86% /RA > % on 4L NC     Per ER documentation    Hypoxia/Hypercapnia Diagnosis of Hypoxemia from COVID Infection  Per H&P    Supplemental O2 Patient maintained on 3-4L via NC this admit  Per Vital Signs / Flowsheets    Home O2, Oxygen Dependence No documentation of Home O2      Acute/Chronic Illness Dx of COVID PNA  Per progress notes      Provider, please further clarify the diagnosis of hypoxia, if applicable - associated with above clinical findings.     [    ] Acute Respiratory Distress -    [    x] Acute Respiratory Failure with Hypoxia    [    ] Acute Respiratory Failure with Hypercapnia    [    ] Acute Respiratory Insufficiency    [    ] ARDS (Acute Respiratory Distress Syndrome)   [    ] Hypoxia Only   [    ] Other Respiratory Diagnosis (please specify): _________________   [   ] Clinically Undetermined     Present on admission (POA) status:   [   ] Yes (Y)                          [ ] Clinically Undetermined (W)  [   ] No (N)                            [   ] Documentation insufficient to determine if condition is POA (U)          Please document in your progress notes daily for the duration of  treatment until resolved and include in your discharge summary.

## 2020-12-22 NOTE — PLAN OF CARE
12/21/20 2000   Patient Assessment/Suction   Level of Consciousness (AVPU) alert   Respiratory Effort Unlabored;Normal   Expansion/Accessory Muscles/Retractions no use of accessory muscles   PRE-TX-O2   Flow (L/min) 3   SpO2 96 %   $ Pulse Oximetry - Multiple Charge Pulse Oximetry - Multiple   Pulse 78   Resp 18   Respiratory Evaluation   $ Care Plan Tech Time 15 min   Evaluation For Re-Eval 3 day   Admitting Diagnosis Covid 19

## 2020-12-22 NOTE — PROGRESS NOTES
Progress Note  Infectious Disease    Reason for Consult:      HPI: Morro Sharma is a  80 y.o. male past medical history of AFib, HTN, D LP, GERD, ED, BPH, polycythemia vera came to emergency room complaining of shortness of breath.  Patient was diagnosed with COVID on 12/12/2020, 8 days ago.  He was monitored at home   His pulse oximeter became 79, he was having progressive shortness of breath, worse with exertion, associated by nonproductive cough, lack of taste and lack of smell.  He called EMS.  When EMS checked pulse oximeter was 88%.  He was given 6 L O2 and was brought to the hospital.  In ER he was breathing at 25 times a minute, he was afebrile, pulse oximeter was 86%  He was diagnosed with viral pneumonia by chest x-ray, probably COVID given the recent positive test.  He was given fresh frozen plasma, RDV and Decadron  He was found to have transaminitis, pancytopenia,  Procalcitonin was elevated at 1.3 and he was given levofloxacin.  I came and saw patient.  He is on 3 L O2, pulse ox is 95%.  He is feeling much better.  He is emotional and had a few tears thinking of how his youngest, adapted daughter age 13 insisted that he goes in the hospital.    12/21/2020  needing 3 L o2.  In good spirits.  Sitting up and having dinner.  He os how many days will he be in the hospital.  I explained that tonight he will receive the 2nd dose of RDV.  Duration of treatment is 5 days.  12/22: consult/notes reviewed. No fever, on decadron day 4, levaquin day 3, RDV day 4. Requiring only 3 liters NC. Platelets low but stable. Off hydrea. He is ambulatory around the room on oxygen. Lungs clear. Eating. No diarrhea. No pleurisy, or chest pain. No bleeding.       Ferritin 1369--1316--1077, 1263  D-dimer 1.74--0.88, 0.89  LDH 1089--692, 721  Procalcitonin 1.3  Recent Labs   Lab 12/19/20  1821 12/21/20  0429 12/22/20  0806   CRP 21.60* 9.04* 5.61*         Antibiotics (From admission, onward)    Start     Stop Route Frequency  Ordered    12/20/20 2300  levoFLOXacin 250 mg/50 mL IVPB 250 mg      -- IV Every 24 hours (non-standard times) 12/19/20 2130         Antivirals (From admission, onward)        Stop Route Frequency     remdesivir (EUA) 100 mg      12/24 2059 IV Every 24 hours (non-standard times)             EXAM & DIAGNOSTICS REVIEWED:   Vitals:     Temp:  [97.5 °F (36.4 °C)-98.6 °F (37 °C)]   Temp: 98.6 °F (37 °C) (12/22/20 0722)  Pulse: 80 (12/22/20 0722)  Resp: 19 (12/22/20 0722)  BP: 122/74 (12/22/20 0722)  SpO2: 98 % (12/22/20 0722)  No intake or output data in the 24 hours ending 12/22/20 0955         General:  In NAD. Alert and attentive, cooperative, comfortable wearing 3 L O2.  Eyes:  Anicteric,  EOMI  ENT:  No ulcers, exudates, thrush, nares patent, dentition is fair, some broken teeth.  Neck:  Supple,   Lungs: clear  Heart:  RRR, no gallop/murmur/rub noted  Abd:  Soft, NT, ND, normal BS, no masses or organomegaly appreciated.  :  Voids  no flank tenderness  Musc:  Joints without effusion, swelling, erythema, synovitis, muscle wasting.   Skin:  No rashes.   Wound:   Neuro:  Alert, attentive, speech fluent, face symmetric, moves all extremities, no focal weakness. Ambulatory  Psych:  Calm, cooperative.  In good spirits   Lymphatic:       Extrem: No edema, erythema, phlebitis, cellulitis, warm and well perfused  VAD:  peripheral     Isolation:  COVID    Lines/Tubes/Drains:    General Labs reviewed:  Recent Labs   Lab 12/20/20  0406 12/21/20  0429 12/22/20  0806   WBC 3.12* 3.20* 5.10   HGB 7.1* 7.0* 8.0*   HCT 23.1* 23.1* 26.7*   PLT 24* 23* 30*       Recent Labs   Lab 12/19/20  1821   *   K 4.8      CO2 20*   BUN 35*   CREATININE 1.3   CALCIUM 9.0   PROT 8.1   BILITOT 3.0*   ALKPHOS 155*   *   AST 94*       Micro:  Microbiology Results (last 7 days)     Procedure Component Value Units Date/Time    Blood culture x two cultures. Draw prior to antibiotics. [[id number]] Collected: 12/19/20 2003    Order  Status: Completed Specimen: Blood from Peripheral, Hand, Right Updated: 12/21/20 2232     Blood Culture, Routine No Growth to date      No Growth to date      No Growth to date    Narrative:      Aerobic and anaerobic    Blood culture x two cultures. Draw prior to antibiotics. [[id number]] Collected: 12/19/20 1832    Order Status: Completed Specimen: Blood from Peripheral, Lower Arm, Left Updated: 12/21/20 2032     Blood Culture, Routine No Growth to date      No Growth to date      No Growth to date    Narrative:      Aerobic and anaerobic        Imaging Reviewed:   CXR  Subtle bilateral opacities in the mid to lower lungs, compatible with   but not specific for Covid 19 pneumonia.   Cardiology:  Stress test on 04/05/2019  · Normal left ventricular systolic function. The estimated ejection fraction is 65%  · Normal LV diastolic function.  · No wall motion abnormalities.  · Normal right ventricular systolic function.  · Mild mitral sclerosis.  · There is mild leaflet calcification of the Mitral Valve.  · Mild mitral regurgitation.  · Mild tricuspid regurgitation.  · Normal central venous pressure (3 mm Hg).  · The estimated PA systolic pressure is 28 mm Hg  · The stress echo portion of this study is negative for myocardial ischemia.  · The patient reported no symptoms during the stress test.  · There were no arrhythmias during stress.  · Overall, the patient's exercise capacity was excellent tolerance.  · The test was stopped secondary to fatigue.  · The EKG portion of this study is negative for myocardial ischemia.         IMPRESSION & PLAN   COVID illness, hypoxemia.  On 3 L oxygen  immunocompromised due to blood disorder.   Received COVID convalescent plasma  Pancytopenia, as per hospitalist   Very elevated ferritin compared to baseline (437 in June)  Transaminitis  PMHx AFib, HTN, D LP, GERD, ED, BPH, polycythemia vera used to be on hydroxyurea but not anymore, follows with Dr. Morris , now with pancytopenia  hypothyroidism.    Recommendations:    Continue RDV 4/5-  Will move to earlier in the day  Potential discharge tomorrow on oxygen after 5th dose of RDV  Decadron 6 mg q.24 to complete 10 days  Continue Lovenox 40 q.12.  D-dimer is 1.7 ---0.8  Continue vitamin-D, vitamin-C, melatonin, zinc,  Continue albuterol, antitussives, prone positioning.  Monitor markers of inflammation( he has an impressive LDH, improving)         Medical Decision Making during this encounter was  [_] Low Complexity  [_] Moderate Complexity  [ x ] High Complexity

## 2020-12-22 NOTE — CONSULTS
GASTROENTEROLOGY INPATIENT CONSULT NOTE  Patient Name: Morro Sharma  Patient MRN: [id number]  Patient : 1940    Admit Date: 2020  Service date: 2020    Reason for Consult: anemia    PCP: Yaniv Escobar MD    Chief Complaint   Patient presents with    Shortness of Breath       HPI: Patient is a 80 y.o. male with PMHx HTN, paroxysmal Afib, polycythemia vera (phlebotomy in past; now myelofibrosis w/ intermittent transfusions), pancytopenia, HTN, hypothyroid, diverticulosis presents for evaluation of shortness of breath. Acute onset, intermittent, progressive. Dx'd with Covid early last week, discharged but came back w/ progressive SOB and low O2 sats. No signs of GIB, abd pain or any concerns. Eating lunch w/o issues.     CHART REVIEW;   CT Ch/Abd  - emphysema and stable lung lesions; Nml bowels; vascular disease; Nml GB, panc.   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; tics  '03 Transanal resection benign rectal mass    Past Medical History:  Past Medical History:   Diagnosis Date    Acute hypoxemic respiratory failure     Atrial fibrillation 2017    [facility] records sent to scanning    BPH (benign prostatic hypertrophy)     Colon polyps     Erectile dysfunction     GERD (gastroesophageal reflux disease)     Hypertension     Pneumonia due to COVID-19 virus 2020    Pneumothorax on left     Polycythemia vera     Pulmonary nodules         Past Surgical History:  Past Surgical History:   Procedure Laterality Date    COLONOSCOPY  9/15/14    Dr. Mcdaniels, 5 year recheck    ESOPHAGOGASTRODUODENOSCOPY      LUNG SURGERY      LEFT - CHEST TUBE        Home Medications:  Medications Prior to Admission   Medication Sig Dispense Refill Last Dose    acetaminophen (TYLENOL) 500 MG tablet Take 1 tablet (500 mg total) by mouth 2 (two) times daily. For shoulder pain (Patient taking differently: Take 500 mg by mouth 2 (two) times daily as needed. For shoulder pain) 60  tablet 11     ascorbic acid, vitamin C, (VITAMIN C) 500 MG tablet Take 1 tablet (500 mg total) by mouth 2 (two) times daily.       [] azithromycin (Z-ROSE) 250 MG tablet Take 1 tablet (250 mg total) by mouth once daily. for 4 days 4 tablet 0     benzonatate (TESSALON) 100 MG capsule Take 1 capsule (100 mg total) by mouth 3 (three) times daily as needed for Cough. 30 capsule 0     dexchlorphen-phenylephrine-DM (POLYTUSSIN DM) 1-5-10 mg/5 mL Syrp Take 5 mLs by mouth every 4 (four) hours as needed. 120 mL 0     glycopyrrolate (ROBINUL) 1 mg Tab TAKE ONE TABLET BY MOUTH TWICE DAILY. MAY TAKE UP TO THREE TIMES DAILY IF needed 90 tablet 5     levothyroxine (SYNTHROID) 50 MCG tablet TAKE 1 TABLET BY MOUTH BEFORE BREAKFAST 90 tablet 1     metoprolol succinate (TOPROL-XL) 50 MG 24 hr tablet Take 1 tablet (50 mg total) by mouth once daily. 90 tablet 3     multivitamin (ONE DAILY MULTIVITAMIN) per tablet Take 1 tablet by mouth once daily.       psyllium husk (METAMUCIL ORAL) Take 1 capsule by mouth once daily.       pulse oximeter (PULSE OXIMETER) device by Apply Externally route 2 (two) times a day. Use twice daily at 8 AM and 3 PM and record the value in [facility] as directed. 1 each 0     rosuvastatin (CRESTOR) 40 MG Tab Take 1 tablet (40 mg total) by mouth every evening. 90 tablet 3     tadalafiL (CIALIS) 20 MG Tab Take 1 tablet (20 mg total) by mouth as needed. 10 tablet 4     tamsulosin (FLOMAX) 0.4 mg Cap Take 1 capsule (0.4 mg total) by mouth once daily. 90 capsule 3     triamcinolone acetonide 0.1% (KENALOG) 0.1 % cream APPLY TO AFFECTED AREA TWICE DAILY 80 g 0     vitamin D (VITAMIN D3) 1000 units Tab Take 1 tablet (1,000 Units total) by mouth once daily. 30 tablet 0        Inpatient Medications:   ascorbic acid (vitamin C)  500 mg Oral BID    dexamethasone  6 mg Intravenous Q24H    dextromethorphan-guaifenesin  mg  1 tablet Oral BID    enoxparin  40 mg Subcutaneous Q12H    levoFLOXacin   250 mg Intravenous Q24H    levothyroxine  50 mcg Oral Before breakfast    melatonin  6 mg Oral Nightly    metoprolol succinate  50 mg Oral Daily    EUA remdesivir infusion  100 mg Intravenous Q24H    tamsulosin  1 capsule Oral Daily    vitamin D  4,000 Units Oral Daily    zinc sulfate  220 mg Oral Daily     sodium chloride, acetaminophen, benzonatate, dextrose 50%, dextrose 50%, glucagon (human recombinant), glucose, glucose, sodium chloride 0.9%    Review of patient's allergies indicates:  No Known Allergies    Social History:   Social History     Occupational History    Not on file   Tobacco Use    Smoking status: Former Smoker     Packs/day: 1.50     Years: 25.00     Pack years: 37.50     Quit date: 10/2/1982     Years since quittin.2    Smokeless tobacco: Never Used   Substance and Sexual Activity    Alcohol use: No     Frequency: Monthly or less     Drinks per session: 1 or 2     Binge frequency: Never    Drug use: No    Sexual activity: Yes       Family History:   Family History   Problem Relation Age of Onset    Hypertension Mother     Heart disease Mother     Hypertension Maternal Aunt     Heart disease Maternal Aunt     Hypertension Maternal Uncle     Heart disease Maternal Uncle     Early death Father     No Known Problems Daughter     No Known Problems Daughter     No Known Problems Daughter     No Known Problems Daughter     Prostate cancer Neg Hx     Kidney cancer Neg Hx     Cancer Neg Hx     Urolithiasis Neg Hx     Melanoma Neg Hx     Psoriasis Neg Hx     Lupus Neg Hx     Eczema Neg Hx        Review of Systems:  A 10 point review of systems was performed and was normal, except as mentioned in the HPI, including constitutional, HEENT, heme, lymph, cardiovascular, respiratory, gastrointestinal, genitourinary, neurologic, endocrine, psychiatric and musculoskeletal.      OBJECTIVE:    Physical Exam:  24 Hour Vital Sign Ranges: Temp:  [97.5 °F (36.4 °C)-98.6 °F (37 °C)]  "98.6 °F (37 °C)  Pulse:  [72-80] 80  Resp:  [16-19] 19  SpO2:  [95 %-99 %] 98 %  BP: (119-132)/(72-77) 122/74  Most recent vitals: /74   Pulse 80   Temp 98.6 °F (37 °C) (Oral)   Resp 19   Ht 5' 7" (1.702 m)   Wt 70 kg (154 lb 5.2 oz)   SpO2 98%   BMI 24.17 kg/m²    GEN: well-developed, well-nourished, awake and alert, non-toxic appearing adult  HEENT: PERRL, sclera anicteric, oral mucosa pink and moist without lesion  NECK: trachea midline; Good ROM  CV: regular rate and rhythm, no murmurs or gallops  RESP: min coarse; moving air  ABD: soft, non-tender, non-distended, normal bowel sounds  EXT: no swelling or edema, 2+ pulses distally  SKIN: no rashes or jaundice  PSYCH: normal affect    Labs:   Recent Labs     12/19/20  1821 12/20/20  0406 12/21/20  0429   WBC 4.11 3.12* 3.20*   MCV 86 87 86   PLT 28* 24* 23*     Recent Labs     12/19/20  1821   *   K 4.8      CO2 20*   BUN 35*   *     No results for input(s): ALB in the last 72 hours.    Invalid input(s): ALKP, SGOT, SGPT, TBIL, DBIL, TPRO  No results for input(s): PT, INR, PTT in the last 72 hours.      Radiology Review:  X-Ray Chest AP Portable   Final Result            IMPRESSION / RECOMMENDATIONS:  80 y.o. male with PMHx HTN, paroxysmal Afib, polycythemia vera (phlebotomy in past; now myelofibrosis w/ intermittent transfusions), pancytopenia, HTN, hypothyroid, diverticulosis presents for evaluation of shortness of breath in setting of acute COVID. Patient w/ chronic anemia / pancytopenia w/o any signs of GIB and suspect anemia is chronic disease / myelofibrosis. Risks, benefits, alternative discussed in detail with patient / family regarding anticipated procedure and possible complications.     -Conservative for now as discussed and agreed upon by patient  -If signs of active bleeding, inpatient endoscopy could be revisited  -May consider outpatient endoscopy in next couple weeks pending COVID course / recovery    Thank you for " this consult.    Rod PAUL Dauterive III  12/22/2020  7:55 AM

## 2020-12-22 NOTE — CONSULTS
[facility]  Patient Name: Morro Sharma  : 1940  Age: 80 y.o.  Sex: male  MRN: [id number]  Admission Date: 2020  Hospital Length of Stay: 3 days  Code Status: Full Code   Admitting Provider: Oleg Isaac MD  Attending Provider: Arnaud Desai DO  Primary Care Physician: Yaniv Escobar MD  Full Code  Subjective:     Date of Visit: 2020             Principal Problem: COVID-19    Reason for Consult: Pancytopenia    Unable to do telemedicine visit at the hospital with the patient. Note prepared using nursing staff, EHR record and call to patient room and discussion with patient.  Patient ID: Morro Sharma is a 80 y.o. male with PMHx significant for HTN, paroxysmal AFib, and post-polycythemia vera myelofibrosis who presented to [facility] emergency room 2020 with complaints of shortness of breath. Pt originally diagnosed with covid19 20 and had confusion on 20 which prompted ED evaluation and pt was admitted at [facility] and he was closely monitored and was discharged. Pt had 2 subsequent visits back to emergency room at [facility] with shortness of breath and was discharged. Pt was checking his pulse ox at home prior to arrival at [facility] and it was 79% and he called EMS. At arrival to emergency room pt pulse ox was noted to be 86% and he was started on 6L O2. Pt is seen by Dr. Chu Cole as primary oncologist and last bone marrow biopsy 05/15/2020 showed myelofibrosis. Blast percentage was not increased. Patient has previously on Jakafi 10 mg twice daily for polycythemia vera and developed thrombocytopenia, and had dose reduced to 5 mg twice daily. Due to patient's ongoing lowered platelet counts, Jakafi was eventually held. Pt endorses SOB. Pt denies cough, weakness, fever/chills, easy bruising/bledding, active bleeding, chest pain/tightness.     Review of Systems   Constitutional: Positive for appetite change (decreased)  and fatigue. Negative for activity change, chills, fever and unexpected weight change.   HENT: Negative for mouth sores and trouble swallowing.    Eyes: Negative for photophobia and visual disturbance.   Respiratory: Negative for cough, chest tightness, shortness of breath, wheezing and stridor.    Cardiovascular: Negative for chest pain and leg swelling.   Gastrointestinal: Negative for abdominal pain, constipation, diarrhea, nausea and vomiting.   Musculoskeletal: Negative for arthralgias, back pain and myalgias.   Skin: Negative for color change, pallor, rash and wound.   Neurological: Negative for syncope, speech difficulty and weakness.   Hematological: Negative for adenopathy. Does not bruise/bleed easily.   Psychiatric/Behavioral: Negative for agitation, behavioral problems, confusion, decreased concentration and dysphoric mood.      Past Medical History:   Diagnosis Date    Acute hypoxemic respiratory failure     Atrial fibrillation 05/07/2017    [facility] records sent to scanning    BPH (benign prostatic hypertrophy)     Colon polyps     Erectile dysfunction     GERD (gastroesophageal reflux disease)     Hypertension     Pneumonia due to COVID-19 virus 12/12/2020    Pneumothorax on left     Polycythemia vera     Pulmonary nodules        Family History   Problem Relation Age of Onset    Hypertension Mother     Heart disease Mother     Hypertension Maternal Aunt     Heart disease Maternal Aunt     Hypertension Maternal Uncle     Heart disease Maternal Uncle     Early death Father     No Known Problems Daughter     No Known Problems Daughter     No Known Problems Daughter     No Known Problems Daughter     Prostate cancer Neg Hx     Kidney cancer Neg Hx     Cancer Neg Hx     Urolithiasis Neg Hx     Melanoma Neg Hx     Psoriasis Neg Hx     Lupus Neg Hx     Eczema Neg Hx        Past Surgical History:   Procedure Laterality Date    COLONOSCOPY  9/15/14    Dr. Mcdaniels, 5 year recheck     ESOPHAGOGASTRODUODENOSCOPY      LUNG SURGERY      LEFT - CHEST TUBE       Social History     Socioeconomic History    Marital status:      Spouse name: Not on file    Number of children: Not on file    Years of education: Not on file    Highest education level: Not on file   Occupational History    Not on file   Social Needs    Financial resource strain: Not very hard    Food insecurity     Worry: Never true     Inability: Never true    Transportation needs     Medical: No     Non-medical: No   Tobacco Use    Smoking status: Former Smoker     Packs/day: 1.50     Years: 25.00     Pack years: 37.50     Quit date: 10/2/1982     Years since quittin.2    Smokeless tobacco: Never Used   Substance and Sexual Activity    Alcohol use: No     Frequency: Monthly or less     Drinks per session: 1 or 2     Binge frequency: Never    Drug use: No    Sexual activity: Yes   Lifestyle    Physical activity     Days per week: 0 days     Minutes per session: 20 min    Stress: Not at all   Relationships    Social connections     Talks on phone: Once a week     Gets together: Once a week     Attends Mu-ism service: Not on file     Active member of club or organization: Yes     Attends meetings of clubs or organizations: More than 4 times per year     Relationship status:    Other Topics Concern    Not on file   Social History Narrative    Not on file       Current Facility-Administered Medications   Medication Dose Route Frequency Provider Last Rate Last Dose    0.9%  NaCl infusion (for blood administration)   Intravenous Q24H PRN Oleg Isaac MD        acetaminophen tablet 500 mg  500 mg Oral BID PRN Oleg Isaac MD        ascorbic acid (vitamin C) tablet 500 mg  500 mg Oral BID Oleg Isaac MD   500 mg at 20    benzonatate capsule 100 mg  100 mg Oral TID PRN Oleg Isaac MD   100 mg at 20    dexamethasone injection 6 mg  6 mg Intravenous Q24H Oleg Iasac MD   6 mg  at 12/21/20 0938    dextromethorphan-guaifenesin  mg per 12 hr tablet 1 tablet  1 tablet Oral BID Oleg Isaac MD   1 tablet at 12/21/20 2103    dextrose 50% injection 12.5 g  12.5 g Intravenous PRN Oleg Isaac MD        dextrose 50% injection 25 g  25 g Intravenous PRN Oleg Isaac MD        enoxaparin injection 40 mg  40 mg Subcutaneous Q12H Oleg Isaac MD   40 mg at 12/21/20 2140    glucagon (human recombinant) injection 1 mg  1 mg Intramuscular PRN Oleg Isaac MD        glucose chewable tablet 16 g  16 g Oral PRN Oleg Isaac MD        glucose chewable tablet 24 g  24 g Oral PRN Oleg Isaac MD        levoFLOXacin 250 mg/50 mL IVPB 250 mg  250 mg Intravenous Q24H Oleg Isaac MD 50 mL/hr at 12/21/20 2141 250 mg at 12/21/20 2141    levothyroxine tablet 50 mcg  50 mcg Oral Before breakfast Oleg Isaac MD   50 mcg at 12/22/20 0552    melatonin tablet 6 mg  6 mg Oral Nightly Janel Patel MD   6 mg at 12/21/20 2103    metoprolol succinate (TOPROL-XL) 24 hr tablet 50 mg  50 mg Oral Daily Oleg Isaac MD   50 mg at 12/21/20 0938    remdesivir (EUA) 100 mg in sodium chloride 0.9% 250 mL infusion  100 mg Intravenous Q24H Fausto Juares  mL/hr at 12/21/20 2103 100 mg at 12/21/20 2103    sodium chloride 0.9% flush 10 mL  10 mL Intravenous PRN Oleg Isaac MD        tamsulosin 24 hr capsule 0.4 mg  1 capsule Oral Daily Oleg Isaac MD   0.4 mg at 12/21/20 0937    vitamin D 1000 units tablet 4,000 Units  4,000 Units Oral Daily Janel Patel MD   4,000 Units at 12/21/20 0900    zinc sulfate capsule 220 mg  220 mg Oral Daily Janel Patel MD   220 mg at 12/21/20 0938        ascorbic acid (vitamin C)  500 mg Oral BID    dexamethasone  6 mg Intravenous Q24H    dextromethorphan-guaifenesin  mg  1 tablet Oral BID    enoxparin  40 mg Subcutaneous Q12H    levoFLOXacin  250 mg Intravenous Q24H    levothyroxine  50 mcg Oral Before breakfast    melatonin  6 mg Oral Nightly     metoprolol succinate  50 mg Oral Daily    EUA remdesivir infusion  100 mg Intravenous Q24H    tamsulosin  1 capsule Oral Daily    vitamin D  4,000 Units Oral Daily    zinc sulfate  220 mg Oral Daily       sodium chloride, acetaminophen, benzonatate, dextrose 50%, dextrose 50%, glucagon (human recombinant), glucose, glucose, sodium chloride 0.9%    Antibiotics (From admission, onward)    Start     Stop Route Frequency Ordered    12/20/20 2300  levoFLOXacin 250 mg/50 mL IVPB 250 mg      -- IV Every 24 hours (non-standard times) 12/19/20 2130        Review of patient's allergies indicates:  No Known Allergies  All medications and past history have been reviewed.    Objective:      Vitals:  Patient Vitals for the past 24 hrs:   BP Temp Temp src Pulse Resp SpO2   12/22/20 0722 122/74 98.6 °F (37 °C) Oral 80 19 98 %   12/22/20 0258 125/74 97.5 °F (36.4 °C) Oral 72 18 99 %   12/21/20 2310 129/77 98 °F (36.7 °C) Oral 73 17 96 %   12/21/20 2000 -- -- -- 78 18 96 %   12/21/20 1905 127/75 97.8 °F (36.6 °C) Oral 77 16 95 %   12/21/20 1500 132/76 97.6 °F (36.4 °C) Oral 75 18 --   12/21/20 1057 119/72 97.8 °F (36.6 °C) Oral 73 16 95 %      Body mass index is 24.17 kg/m².  Body surface area is 1.82 meters squared.    Last 24 Hours:  No intake or output data in the 24 hours ending 12/22/20 0914  Weight Readings:  Wt Readings from Last 5 Encounters:   12/19/20 70 kg (154 lb 5.2 oz)   12/17/20 78.5 kg (173 lb)   12/16/20 78.5 kg (173 lb)   12/12/20 78.5 kg (173 lb)   12/02/20 78.5 kg (173 lb 1 oz)      Blood Type:  B     Physical Exam  Deferred due to covid 19 and unable to do audiovisual telemedicine visit at the hospital.  Patient sounds quite well over the phone, speech and cognition intact, alert and oriented.     Labs:  Recent Labs   Lab 12/19/20  1821 12/20/20  0406 12/21/20  0429   WBC 4.11 3.12* 3.20*   RBC 3.02* 2.65* 2.68*   HGB 8.0* 7.1* 7.0*   HCT 25.9* 23.1* 23.1*   PLT 28* 24* 23*   MCV 86 87 86     Recent Labs    Lab 12/19/20  1821   *   K 4.8      CO2 20*   BUN 35*   CREATININE 1.3   *   CALCIUM 9.0   ALKPHOS 155*   PROT 8.1   ALBUMIN 3.9   BILITOT 3.0*   AST 94*   *     No results for input(s): TSH in the last 168 hours.  Recent Labs   Lab 12/19/20  1821 12/21/20  0429   LDH 1,089* 692*      12/21/2020  PERIPHERAL SMEAR (COLLECTED 12/21/2020 AT 0429 HOURS):   -  LEUKOPENIA (3.2 K/UL).     - THERE IMMATURE CIRCULATING WHITE BLOOD CELLS WITH PRESENCE OF 11%    BLASTOID      APPEARING WHITE BLOOD CELLS.   -  OCCASIONAL ATYPICAL PLASMACYTOID LYMPHOCYTES ARE PRESENT.   - MODERATE TO SEVERE NORMOCYTIC, HYPORCHROMIC ANEMIA WITH    ANISOPOIKILOCYTOSIS.   - MANY TEARDROP CELLS, SPHEROCYTES AND OCCASIONAL RED CELL FRAGMENTS    ARE PRESENT.   -  MANY NUCLEATED RED BLOOD CELLS  ARE PRESENT.     -  SEVERE THROMBOCYTOPENIA WITH OCCASIONAL LARGE PLATELETS PRESENT.       Note: Peripheral blood smear morphology is consistent with patient's    history of polycythemia vera. Circulating blasts are present    comprising less than 20% of white blood cells.     BMBX 05/15/2020  Final Pathologic Diagnosis BONE MARROW, LEFT ILIAC CREST, ASPIRATE, CLOT, AND CORE BIOPSY:   --Fibrotic marrow, diffuse moderate fibrosis, showing background decreased   trilineage hematopoiesis, see comment   --CD34 positive blasts estimated at 2-3%, see comment   --No increase in stainable iron   COMMENT:  Concomitantly submitted flow cytometric analysis is a limited study   in that the specimen received clotted.  There is a cluster of myeloid blasts   present representing approximately 5% of total events which shows expression   of CD13 and CD34 with partial expression of CD64 and CD33 (42%).  Background   B-cells are polytypic and T-cells are immunophenotypically unremarkable.   This marrow core biopsy is adequate for review and shows a fibrotic marrow   background with scattered background trilineage hematopoiesis which appears   decreased  overall.  Overall cellularity is difficult to estimate given the   background, but appears overall decreased.  There are trilineage elements   present with developing myeloid and erythroid cells present as well as a few   megakaryocytes with some in clusters.   Clinical correlation and correlation with any available cytogenetic and   molecular studies is required in this patient with a reported known history   of treated polycythemia vera.      Imaging:  Results for orders placed or performed during the hospital encounter of 12/12/20 (from the past 2160 hour(s))   CT Head Without Contrast    Impression    Mild brain atrophy otherwise negative head CT.      Electronically signed by: Mainor Schrader MD  Date:    12/12/2020  Time:    15:16     CXR 12/19/2020  FINDINGS:     Cardiac and mediastinal contours are normal. There are subtle  reticular and groundglass opacities of the mid to lower lungs that  were not present in May 2020. There is no pleural effusion or  pneumothorax. There is bilateral glenohumeral osteoarthritis.     IMPRESSION:     Subtle bilateral opacities in the mid to lower lungs, compatible with  but not specific for Covid 19 pneumonia.  No results found for this or any previous visit (from the past 2160 hour(s)).  No results found for this or any previous visit (from the past 2160 hour(s)).  All lab results and imaging results have been reviewed.    Assessment and Plan:      Present on Admission:   Hypertension   Polycythemia vera   Calcified granuloma of lung   Paroxysmal atrial fibrillation, onset 5/2017, CHADS-VAS score 4   Dyslipidemia (high LDL; low HDL), baseline    Pneumonia due to COVID-19 virus   Pancytopenia   Hypoxemia   COVID-19    -Post-polcythemia vera myelofibrosis pt who presented to [facility] covid-19+ with SOB. Pt's anemia is improving with newest CBC showing H/H of 8.0/26.7. Pt last blood transfusion 12/14/2020. Pt last received platelets 12/14/2020 and received fresh  frozen plasma 12/19/2020. Updated peripheral smear shows increase of blasts to 11% concerning for possible transformation into AML. Pt's lowered count can be attributed in part sequelae of viral suppression of bone marrow. Further workup with bone marrow biopsy is needed for definitive diagnosis. Proceed with bone marrow biopsy lymphoma/leukemia screen inpatient if hospital guideline allows with pt covid19 positivity as long as pt platelet count over 20,000 due to his current disease process with covid19 infection.     -Continue to hold jakafi. Transfuse PRBC PRN for H/H <7/21 or if patient hemodynamically unstable/symptomatically anemic  -Transfuse 1 unit of platelets PRN if platelets <20,000 or if active bleeding.     Sincerely,  Alfredo Isabel PA-C    Note is available for collaborating MD; Dr. Brisa Meyer for review.    Electronically signed by: Alfredo Isabel PA-C    I have reviewed the notes, assessments, and/or procedures performed by CLIFF Mcgarry. I concur with his documentation of this patient's findings and plan.     Brisa Coffey M.D.

## 2020-12-23 DIAGNOSIS — D45 POLYCYTHEMIA VERA: Primary | ICD-10-CM

## 2020-12-23 DIAGNOSIS — D61.818 PANCYTOPENIA: ICD-10-CM

## 2020-12-23 PROBLEM — R09.02 HYPOXEMIA: Status: RESOLVED | Noted: 2020-12-19 | Resolved: 2020-12-23

## 2020-12-23 LAB
ANION GAP SERPL CALC-SCNC: 10 MMOL/L (ref 8–16)
ANISOCYTOSIS BLD QL SMEAR: SLIGHT
BASOPHILS NFR BLD: 0 % (ref 0–1.9)
BLASTS NFR BLD MANUAL: 7 %
BUN SERPL-MCNC: 33 MG/DL (ref 8–23)
CALCIUM SERPL-MCNC: 9.2 MG/DL (ref 8.7–10.5)
CHLORIDE SERPL-SCNC: 104 MMOL/L (ref 95–110)
CO2 SERPL-SCNC: 20 MMOL/L (ref 23–29)
CREAT SERPL-MCNC: 1 MG/DL (ref 0.5–1.4)
CRP SERPL-MCNC: 7.09 MG/DL
D DIMER PPP IA.FEU-MCNC: 0.99 UG/ML FEU
DACRYOCYTES BLD QL SMEAR: ABNORMAL
DIFFERENTIAL METHOD: ABNORMAL
EOSINOPHIL NFR BLD: 1 % (ref 0–8)
ERYTHROCYTE [DISTWIDTH] IN BLOOD BY AUTOMATED COUNT: 17.8 % (ref 11.5–14.5)
EST. GFR  (AFRICAN AMERICAN): >60 ML/MIN/1.73 M^2
EST. GFR  (NON AFRICAN AMERICAN): >60 ML/MIN/1.73 M^2
FERRITIN SERPL-MCNC: 1190 NG/ML (ref 20–300)
GLUCOSE SERPL-MCNC: 156 MG/DL (ref 70–110)
HCT VFR BLD AUTO: 25.6 % (ref 40–54)
HGB BLD-MCNC: 7.7 G/DL (ref 14–18)
IMM GRANULOCYTES # BLD AUTO: ABNORMAL K/UL (ref 0–0.04)
IMM GRANULOCYTES NFR BLD AUTO: ABNORMAL % (ref 0–0.5)
LDH SERPL L TO P-CCNC: 608 U/L (ref 110–260)
LYMPHOCYTES NFR BLD: 26 % (ref 18–48)
MCH RBC QN AUTO: 26 PG (ref 27–31)
MCHC RBC AUTO-ENTMCNC: 30.1 G/DL (ref 32–36)
MCV RBC AUTO: 87 FL (ref 82–98)
METAMYELOCYTES NFR BLD MANUAL: 4 %
MONOCYTES NFR BLD: 6 % (ref 4–15)
MYELOCYTES NFR BLD MANUAL: 3 %
NEUTROPHILS NFR BLD: 41 % (ref 38–73)
NEUTS BAND NFR BLD MANUAL: 12 %
NRBC BLD-RTO: 14 /100 WBC
OVALOCYTES BLD QL SMEAR: ABNORMAL
PLATELET # BLD AUTO: 30 K/UL (ref 150–350)
PLATELET BLD QL SMEAR: ABNORMAL
PMV BLD AUTO: ABNORMAL FL (ref 9.2–12.9)
POIKILOCYTOSIS BLD QL SMEAR: ABNORMAL
POLYCHROMASIA BLD QL SMEAR: ABNORMAL
POTASSIUM SERPL-SCNC: 4.5 MMOL/L (ref 3.5–5.1)
PROCALCITONIN SERPL IA-MCNC: 0.25 NG/ML (ref 0–0.5)
RBC # BLD AUTO: 2.96 M/UL (ref 4.6–6.2)
SODIUM SERPL-SCNC: 134 MMOL/L (ref 136–145)
SPHEROCYTES BLD QL SMEAR: ABNORMAL
WBC # BLD AUTO: 4.39 K/UL (ref 3.9–12.7)

## 2020-12-23 PROCEDURE — 25000003 PHARM REV CODE 250: Performed by: INTERNAL MEDICINE

## 2020-12-23 PROCEDURE — 25000242 PHARM REV CODE 250 ALT 637 W/ HCPCS: Performed by: STUDENT IN AN ORGANIZED HEALTH CARE EDUCATION/TRAINING PROGRAM

## 2020-12-23 PROCEDURE — 63600175 PHARM REV CODE 636 W HCPCS: Performed by: INTERNAL MEDICINE

## 2020-12-23 PROCEDURE — 97110 THERAPEUTIC EXERCISES: CPT

## 2020-12-23 PROCEDURE — 36415 COLL VENOUS BLD VENIPUNCTURE: CPT

## 2020-12-23 PROCEDURE — 82728 ASSAY OF FERRITIN: CPT

## 2020-12-23 PROCEDURE — 97165 OT EVAL LOW COMPLEX 30 MIN: CPT

## 2020-12-23 PROCEDURE — 99232 PR SUBSEQUENT HOSPITAL CARE,LEVL II: ICD-10-PCS | Mod: ,,, | Performed by: INTERNAL MEDICINE

## 2020-12-23 PROCEDURE — 97161 PT EVAL LOW COMPLEX 20 MIN: CPT

## 2020-12-23 PROCEDURE — 12000002 HC ACUTE/MED SURGE SEMI-PRIVATE ROOM

## 2020-12-23 PROCEDURE — 83615 LACTATE (LD) (LDH) ENZYME: CPT

## 2020-12-23 PROCEDURE — 99900035 HC TECH TIME PER 15 MIN (STAT)

## 2020-12-23 PROCEDURE — 94799 UNLISTED PULMONARY SVC/PX: CPT

## 2020-12-23 PROCEDURE — 94761 N-INVAS EAR/PLS OXIMETRY MLT: CPT

## 2020-12-23 PROCEDURE — 99232 SBSQ HOSP IP/OBS MODERATE 35: CPT | Mod: ,,, | Performed by: INTERNAL MEDICINE

## 2020-12-23 PROCEDURE — 80048 BASIC METABOLIC PNL TOTAL CA: CPT

## 2020-12-23 PROCEDURE — 85379 FIBRIN DEGRADATION QUANT: CPT

## 2020-12-23 PROCEDURE — 84145 PROCALCITONIN (PCT): CPT

## 2020-12-23 PROCEDURE — 27000221 HC OXYGEN, UP TO 24 HOURS

## 2020-12-23 PROCEDURE — 85027 COMPLETE CBC AUTOMATED: CPT

## 2020-12-23 PROCEDURE — 63600175 PHARM REV CODE 636 W HCPCS: Performed by: STUDENT IN AN ORGANIZED HEALTH CARE EDUCATION/TRAINING PROGRAM

## 2020-12-23 PROCEDURE — 85007 BL SMEAR W/DIFF WBC COUNT: CPT

## 2020-12-23 PROCEDURE — 86140 C-REACTIVE PROTEIN: CPT

## 2020-12-23 RX ORDER — ALBUTEROL SULFATE 90 UG/1
2 AEROSOL, METERED RESPIRATORY (INHALATION) 2 TIMES DAILY
Qty: 18 G | Refills: 0 | Status: SHIPPED | OUTPATIENT
Start: 2020-12-23

## 2020-12-23 RX ORDER — DEXAMETHASONE 6 MG/1
6 TABLET ORAL DAILY
Qty: 10 TABLET | Refills: 0 | Status: SHIPPED | OUTPATIENT
Start: 2020-12-24 | End: 2021-01-04

## 2020-12-23 RX ADMIN — METOPROLOL SUCCINATE 50 MG: 25 TABLET, FILM COATED, EXTENDED RELEASE ORAL at 09:12

## 2020-12-23 RX ADMIN — OXYCODONE HYDROCHLORIDE AND ACETAMINOPHEN 500 MG: 500 TABLET ORAL at 09:12

## 2020-12-23 RX ADMIN — BENZONATATE 100 MG: 100 CAPSULE ORAL at 09:12

## 2020-12-23 RX ADMIN — LEVOTHYROXINE SODIUM 50 MCG: 25 TABLET ORAL at 06:12

## 2020-12-23 RX ADMIN — ZINC SULFATE 220 MG (50 MG) CAPSULE 220 MG: CAPSULE at 09:12

## 2020-12-23 RX ADMIN — GUAIFENESIN AND DEXTROMETHORPHAN HYDROBROMIDE 1 TABLET: 600; 30 TABLET, EXTENDED RELEASE ORAL at 09:12

## 2020-12-23 RX ADMIN — DEXAMETHASONE 6 MG: 2 TABLET ORAL at 09:12

## 2020-12-23 RX ADMIN — Medication 4000 UNITS: at 09:12

## 2020-12-23 RX ADMIN — ENOXAPARIN SODIUM 40 MG: 40 INJECTION SUBCUTANEOUS at 09:12

## 2020-12-23 RX ADMIN — TAMSULOSIN HYDROCHLORIDE 0.4 MG: 0.4 CAPSULE ORAL at 09:12

## 2020-12-23 RX ADMIN — ENOXAPARIN SODIUM 40 MG: 40 INJECTION SUBCUTANEOUS at 11:12

## 2020-12-23 RX ADMIN — MELATONIN 6 MG: at 09:12

## 2020-12-23 RX ADMIN — REMDESIVIR 100 MG: 100 INJECTION, POWDER, LYOPHILIZED, FOR SOLUTION INTRAVENOUS at 11:12

## 2020-12-23 NOTE — HOSPITAL COURSE
Morro Sharma is a 79-year-old male with PMHx significant for HTN, paroxysmal AFib, polycythemia vera. Came to emergency room with shortness of breath.  He was COVID positive on December 12th.  He was admitted at [facility] for that but he was closely monitored there  and there was no De oxygenation and antiviral or steroid was not given because he was not qualified.  At that time he had worsening anemia and thrombocytopenia and got 1 unit of blood and platelet.  He was discharged but he had 2 visits back to emergency room of [facility] with shortness of breath and was discharged.  he was checking his pulse ox at home and it was 79% today and called EMS.  At arrival to emergency room 86% and given 6 L and currently oxygenation is 96%.   Inflammatory markers were severely elevated and chest x-ray with bilateral diffuse ground glass appearance.  He has a dry nonproductive cough but deny any chest pain.       Interval History:  Patient is doing well.   No concerns/issues overnight reported by the patient or the nursing staff.  I spoke to the radiologist, concerning patient having bone marrow diet biopsy done as recommended by heme/Onc, he stated that as patient is COVID positive and it is not an emergency biopsy, he would recommend it will be done outpatient once patient has been discharge.

## 2020-12-23 NOTE — PT/OT/SLP EVAL
Physical Therapy Evaluation and Discharge Note    Patient Name:  Morro Sharma   MRN:  [id number]    Recommendations:     Discharge Recommendations:  home   Discharge Equipment Recommendations: none   Barriers to discharge: None    Assessment:     Morro Sharma is a 80 y.o. male admitted with a medical diagnosis of Pneumonia due to COVID-19 virus. .  At this time, patient is functioning at their prior level of function and does not require further acute PT services.     Recent Surgery: * No surgery found *      Plan:     During this hospitalization, patient does not require further acute PT services.  Please re-consult if situation changes.      Subjective     Chief Complaint: none given  Patient/Family Comments/goals: go home  Pain/Comfort:  ·      Patients cultural, spiritual, Restoration conflicts given the current situation:      Living Environment:  Currently lives with family in [address].  Prior to admission, patients level of function was independence.  Equipment used at home: none.  DME owned (not currently used): none.  Upon discharge, patient will have assistance from family.    Objective:     Communicated with nurse prior to session.  Patient found supine with bed alarm, oxygen, telemetry upon PT entry to room.    General Precautions: Standard, fall   Orthopedic Precautions:N/A   Braces:       Exams:  · RLE ROM: WFL  · RLE Strength: WFL  · LLE ROM: WFL  · LLE Strength: WFL    Functional Mobility:  · Bed Mobility:     · Supine to Sit: independence  · Sit to Supine: independence  · Transfers:     · Sit to Stand:  independence with no AD  · Gait: x 50 feet with no AD with independence    AM-PAC 6 CLICK MOBILITY  Total Score:24       Therapeutic Activities and Exercises:   none given    AM-PAC 6 CLICK MOBILITY  Total Score:24     Patient left sitting EOB with call button in reach and nurse notified.    GOALS:   Multidisciplinary Problems     Physical Therapy Goals     Not on file           Multidisciplinary Problems (Resolved)        Problem: Physical Therapy Goal    Goal Priority Disciplines Outcome Goal Variances Interventions   Physical Therapy Goal   (Resolved)     PT, PT/OT Met     Description: Goals to be met by: 2020    Patient will increase functional independence with mobility by performin. Gait  x 50 feet wit [place] using no AD.                      History:     Past Medical History:   Diagnosis Date    Acute hypoxemic respiratory failure     Atrial fibrillation 2017    [facility] records sent to scanning    BPH (benign prostatic hypertrophy)     Colon polyps     Erectile dysfunction     GERD (gastroesophageal reflux disease)     Hypertension     Pneumonia due to COVID-19 virus 2020    Pneumothorax on left     Polycythemia vera     Pulmonary nodules        Past Surgical History:   Procedure Laterality Date    COLONOSCOPY  9/15/14    Dr. Mcdaniels, 5 year recheck    ESOPHAGOGASTRODUODENOSCOPY      LUNG SURGERY      LEFT - CHEST TUBE       Time Tracking:     PT Received On: 20  PT Start Time: 1305     PT Stop Time: 1331  PT Total Time (min): 26 min     Billable Minutes: Evaluation 26      Chris MeGilligan, PT  2020

## 2020-12-23 NOTE — PLAN OF CARE
Problem: Physical Therapy Goal  Goal: Physical Therapy Goal  Description: Goals to be met by: 2020    Patient will increase functional independence with mobility by performin. Gait  x 50 feet wit [place] using no AD.     Outcome: Met

## 2020-12-23 NOTE — DISCHARGE SUMMARY
Critical access hospital Medicine  Discharge Summary      Patient Name: Morro Sharma  MRN: [id number]  Patient Class: IP- Inpatient  Admission Date: 12/19/2020  Hospital Length of Stay: 4 days  Discharge Date and Time:  12/23/2020 12:27 PM  Attending Physician: Laquita Clemens MD   Discharging Provider: Laquita Clemens MD  Primary Care Provider: Yaniv Escobar MD      HPI:   Morro Sharma is a 79-year-old male with PMHx significant for HTN, paroxysmal AFib, polycythemia vera in spent phase requiring transfusions now on Jakafi    Came to emergency room with shortness of breath.  He was COVID positive on December 12th.  He was admitted at [facility] for that but he was closely monitored there  and there was no De oxygenation and antiviral or steroid was not given because he was not qualified.  At that time he had worsening anemia and thrombocytopenia and got 1 unit of blood and platelet.  He was discharged but he had 2 visits back to emergency room of [facility] with shortness of breath and was discharged.  he was checking his pulse ox at home and it was 79% today and called EMS.  At arrival to emergency room 86% and given 6 L and currently oxygenation is 96%.   Inflammatory markers were severely elevated and chest x-ray with bilateral diffuse ground glass appearance.  He has a dry nonproductive cough but deny any chest pain.      * No surgery found *      Hospital Course:   Patient received remdesivir for 5 days and once stable was able to ambulate without oxygen was discharged home.  His platelets were stable during admission and was 30 on discharge.  It was recommended by hematologist/oncologist for patient to have bone marrow biopsy, however because patient was COVID positive and was not an emergency, radiologist recommended it be done outpatient.  Patient will be scheduled outpatient for bone marrow biopsy.         Consults:   Consults (From admission, onward)        Status  Ordering Provider     Inpatient consult to Gastroenterology  Once     Provider:  Rod Sky III, MD    Completed SERVANDO PERSAUD     Inpatient consult to Hematology Oncology  Once     Provider:  Freddy Page MD    Completed SERAVNDO PERSAUD     Inpatient consult to Infectious Diseases  Once     Provider:  Rosalinda Hernandez MD    Completed JOSY ALAMO     Pharmacy Remdesivir Consult  Once     Provider:  (Not yet assigned)    Acknowledged JOSY ALAMO          No new Assessment & Plan notes have been filed under this hospital service since the last note was generated.  Service: Hospital Medicine    Final Active Diagnoses:    Diagnosis Date Noted POA    PRINCIPAL PROBLEM:  Pneumonia due to COVID-19 virus [U07.1, J12.89] 12/12/2020 Yes    Pancytopenia [D61.818] 05/13/2020 Yes    Paroxysmal atrial fibrillation, onset 5/2017, CHADS-VAS score 4 [I48.0] 05/24/2018 Yes    Hypertension [I10]  Yes    Polycythemia vera [D45]  Yes      Problems Resolved During this Admission:    Diagnosis Date Noted Date Resolved POA    Hypoxemia [R09.02] 12/19/2020 12/23/2020 Yes       Discharged Condition: stable    Disposition: Home or Self Care    Follow Up:  Follow-up Information     Yaniv Escobar MD In 2 weeks.    Specialty: Family Medicine  Contact information:  [address]  [place] [address]  [phone]             Brisa Meyer MD In 2 weeks.    Specialty: Hematology and Oncology  Contact information:  [address]  [place] [address]  [phone]             Schedule an appointment as soon as possible for a visit with Radiology [facility].    Why: Bone marrow biopsy               Patient Instructions:      Ambulatory referral/consult to Home Health   Standing Status: Future   Referral Priority: Routine Referral Type: Home Health   Referral Reason: Specialty Services Required   Requested Specialty: Home Health Services   Number of Visits Requested: 1     Diet diabetic     Activity as tolerated        Significant Diagnostic Studies: Labs:   BMP:   Recent Labs   Lab 12/23/20  0909   *   *   K 4.5      CO2 20*   BUN 33*   CREATININE 1.0   CALCIUM 9.2   , CMP   Recent Labs   Lab 12/23/20  0909   *   K 4.5      CO2 20*   *   BUN 33*   CREATININE 1.0   CALCIUM 9.2   ANIONGAP 10   ESTGFRAFRICA >60.0   EGFRNONAA >60.0    and CBC   Recent Labs   Lab 12/22/20  0806 12/23/20  0909   WBC 5.10 4.39   HGB 8.0* 7.7*   HCT 26.7* 25.6*   PLT 30* 30*     Microbiology:   Blood Culture   Lab Results   Component Value Date    LABBLOO No Growth to date 12/19/2020    LABBLOO No Growth to date 12/19/2020    LABBLOO No Growth to date 12/19/2020    LABBLOO No Growth to date 12/19/2020       Pending Diagnostic Studies:     None         Medications:  Reconciled Home Medications:      Medication List      START taking these medications    albuterol 90 mcg/actuation inhaler  Commonly known as: PROVENTIL HFA  Inhale 2 puffs into the lungs 2 (two) times daily. Rescue     dexAMETHasone 6 MG tablet  Commonly known as: DECADRON  Take 1 tablet (6 mg total) by mouth once daily. for 10 days  Start taking on: December 24, 2020        CHANGE how you take these medications    acetaminophen 500 MG tablet  Commonly known as: TYLENOL  Take 1 tablet (500 mg total) by mouth 2 (two) times daily. For shoulder pain  What changed:   · when to take this  · reasons to take this        CONTINUE taking these medications    ascorbic acid (vitamin C) 500 MG tablet  Commonly known as: VITAMIN C  Take 1 tablet (500 mg total) by mouth 2 (two) times daily.     benzonatate 100 MG capsule  Commonly known as: TESSALON  Take 1 capsule (100 mg total) by mouth 3 (three) times daily as needed for Cough.     levothyroxine 50 MCG tablet  Commonly known as: SYNTHROID  TAKE 1 TABLET BY MOUTH BEFORE BREAKFAST     METAMUCIL ORAL  Take 1 capsule by mouth once daily.     metoprolol succinate 50 MG 24 hr tablet  Commonly known as:  TOPROL-XL  Take 1 tablet (50 mg total) by mouth once daily.     ONE DAILY MULTIVITAMIN per tablet  Generic drug: multivitamin  Take 1 tablet by mouth once daily.     pulse oximeter device  Commonly known as: pulse oximeter  by Apply Externally route 2 (two) times a day. Use twice daily at 8 AM and 3 PM and record the value in Veritracthart as directed.     rosuvastatin 40 MG Tab  Commonly known as: CRESTOR  Take 1 tablet (40 mg total) by mouth every evening.     tamsulosin 0.4 mg Cap  Commonly known as: FLOMAX  Take 1 capsule (0.4 mg total) by mouth once daily.        STOP taking these medications    azithromycin 250 MG tablet  Commonly known as: Z-ROSE     glycopyrrolate 1 mg Tab  Commonly known as: SHAKIRA     POLYTUSSIN DM 1-5-10 mg/5 mL Syrp  Generic drug: dexchlorphen-phenylephrine-DM     tadalafiL 20 MG Tab  Commonly known as: CIALIS     triamcinolone acetonide 0.1% 0.1 % cream  Commonly known as: KENALOG        ASK your doctor about these medications    vitamin D 1000 units Tab  Commonly known as: VITAMIN D3  Take 1 tablet (1,000 Units total) by mouth once daily.            Indwelling Lines/Drains at time of discharge:   Lines/Drains/Airways     None                 Time spent on the discharge of patient: 40 minutes  Patient was seen and examined on the date of discharge and determined to be suitable for discharge.         Laquita Clemens MD  Department of Hospital Medicine  [facility]

## 2020-12-23 NOTE — CARE UPDATE
12/22/20 6444   Patient Assessment/Suction   Level of Consciousness (AVPU) alert   Respiratory Effort Unlabored   Expansion/Accessory Muscles/Retractions no use of accessory muscles   All Lung Fields Breath Sounds clear;diminished   Rhythm/Pattern, Respiratory no shortness of breath reported   Cough Frequency no cough   PRE-TX-O2   $ Is the patient on Low Flow Oxygen? Yes   Flow (L/min) 3   SpO2 96 %   $ Pulse Oximetry - Single Charge Pulse Oximetry - Single   $ Pulse Oximetry - Multiple Charge Pulse Oximetry - Multiple   Pulse 77   Resp 20   Positioning   Head of Bed (HOB) HOB elevated;HOB at 30 degrees   Respiratory Evaluation   $ Care Plan Tech Time 15 min

## 2020-12-23 NOTE — PROGRESS NOTES
Progress Note  Infectious Disease    Reason for Consult:      HPI: Morro Sharma is a  80 y.o. male past medical history of AFib, HTN, D LP, GERD, ED, BPH, polycythemia vera came to emergency room complaining of shortness of breath.  Patient was diagnosed with COVID on 12/12/2020, 8 days ago.  He was monitored at home   His pulse oximeter became 79, he was having progressive shortness of breath, worse with exertion, associated by nonproductive cough, lack of taste and lack of smell.  He called EMS.  When EMS checked pulse oximeter was 88%.  He was given 6 L O2 and was brought to the hospital.  In ER he was breathing at 25 times a minute, he was afebrile, pulse oximeter was 86%  He was diagnosed with viral pneumonia by chest x-ray, probably COVID given the recent positive test.  He was given fresh frozen plasma, RDV and Decadron  He was found to have transaminitis, pancytopenia,  Procalcitonin was elevated at 1.3 and he was given levofloxacin.  I came and saw patient.  He is on 3 L O2, pulse ox is 95%.  He is feeling much better.  He is emotional and had a few tears thinking of how his youngest, adapted daughter age 13 insisted that he goes in the hospital.    12/21/2020  needing 3 L o2.  In good spirits.  Sitting up and having dinner.  He os how many days will he be in the hospital.  I explained that tonight he will receive the 2nd dose of RDV.  Duration of treatment is 5 days.  12/22: consult/notes reviewed. No fever, on decadron day 4, levaquin day 3, RDV day 4. Requiring only 3 liters NC. Platelets low but stable. Off hydrea. He is ambulatory around the room on oxygen. Lungs clear. Eating. No diarrhea. No pleurisy, or chest pain. No bleeding.  12/23: remains on 3 liters. Labs for today have not been drawn yet. He is mobile around the room. He is capable of taking a deep breath, but has improper timing with IS. He is eating, voiding. No pleurisy or chest pain. No diarrhea.      Ferritin 1369--1316--1077,  1263  D-dimer 1.74--0.88, 0.89  LDH 1089--692, 721  Procalcitonin 1.3  Recent Labs   Lab 12/19/20  1821 12/21/20  0429 12/22/20  0806   CRP 21.60* 9.04* 5.61*         Antibiotics (From admission, onward)    Start     Stop Route Frequency Ordered    12/20/20 2300  levoFLOXacin 250 mg/50 mL IVPB 250 mg      -- IV Every 24 hours (non-standard times) 12/19/20 2130         Antivirals (From admission, onward)        Stop Route Frequency     remdesivir (EUA) 100 mg      12/24 1159 IV Every 24 hours (non-standard times)             EXAM & DIAGNOSTICS REVIEWED:   Vitals:     Temp:  [98.1 °F (36.7 °C)-98.5 °F (36.9 °C)]   Temp: 98.2 °F (36.8 °C) (12/23/20 0719)  Pulse: 84 (12/23/20 0719)  Resp: 18 (12/23/20 0719)  BP: 123/73 (12/23/20 0719)  SpO2: 96 % (12/23/20 0719)    Intake/Output Summary (Last 24 hours) at 12/23/2020 0759  Last data filed at 12/23/2020 0431  Gross per 24 hour   Intake 360 ml   Output 300 ml   Net 60 ml            General:  In NAD. Alert and attentive, cooperative, comfortable wearing 3 L O2.  Eyes:  Anicteric,  EOMI  ENT:  No ulcers, exudates, thrush, nares patent, dentition is fair, some broken teeth.  Neck:  Supple,   Lungs: Faint RLL crackles  Heart:  RRR, no gallop/murmur/rub noted  Abd:  Soft, NT, ND, normal BS, no masses or organomegaly appreciated.  :  Voids  no flank tenderness  Musc:  Joints without effusion, swelling, erythema, synovitis, muscle wasting.   Skin:  No rashes.   Wound:   Neuro:  Alert, attentive, speech fluent, face symmetric, moves all extremities, no focal weakness. Ambulatory  Psych:  Calm, cooperative.    Lymphatic:       Extrem: No edema, erythema, phlebitis, cellulitis, warm and well perfused  VAD:  peripheral     Isolation:  COVID    Lines/Tubes/Drains:    General Labs reviewed:  Recent Labs   Lab 12/20/20  0406 12/21/20  0429 12/22/20  0806   WBC 3.12* 3.20* 5.10   HGB 7.1* 7.0* 8.0*   HCT 23.1* 23.1* 26.7*   PLT 24* 23* 30*       Recent Labs   Lab 12/19/20  1821   NA  134*   K 4.8      CO2 20*   BUN 35*   CREATININE 1.3   CALCIUM 9.0   PROT 8.1   BILITOT 3.0*   ALKPHOS 155*   *   AST 94*       Micro:  Microbiology Results (last 7 days)     Procedure Component Value Units Date/Time    Blood culture x two cultures. Draw prior to antibiotics. [[id number]] Collected: 12/19/20 2003    Order Status: Completed Specimen: Blood from Peripheral, Hand, Right Updated: 12/22/20 2232     Blood Culture, Routine No Growth to date      No Growth to date      No Growth to date      No Growth to date    Narrative:      Aerobic and anaerobic    Blood culture x two cultures. Draw prior to antibiotics. [[id number]] Collected: 12/19/20 1832    Order Status: Completed Specimen: Blood from Peripheral, Lower Arm, Left Updated: 12/22/20 2032     Blood Culture, Routine No Growth to date      No Growth to date      No Growth to date      No Growth to date    Narrative:      Aerobic and anaerobic        Imaging Reviewed:   CXR  Subtle bilateral opacities in the mid to lower lungs, compatible with   but not specific for Covid 19 pneumonia.   Cardiology:  Stress test on 04/05/2019  · Normal left ventricular systolic function. The estimated ejection fraction is 65%  · Normal LV diastolic function.  · No wall motion abnormalities.  · Normal right ventricular systolic function.  · Mild mitral sclerosis.  · There is mild leaflet calcification of the Mitral Valve.  · Mild mitral regurgitation.  · Mild tricuspid regurgitation.  · Normal central venous pressure (3 mm Hg).  · The estimated PA systolic pressure is 28 mm Hg  · The stress echo portion of this study is negative for myocardial ischemia.  · The patient reported no symptoms during the stress test.  · There were no arrhythmias during stress.  · Overall, the patient's exercise capacity was excellent tolerance.  · The test was stopped secondary to fatigue.  · The EKG portion of this study is negative for myocardial ischemia.         IMPRESSION & PLAN    COVID illness, hypoxemia.  On 3 L oxygen  immunocompromised due to blood disorder.   Received COVID convalescent plasma  Pancytopenia, as per hospitalist   Very elevated ferritin compared to baseline (437 in June)  Transaminitis  PMHx AFib, HTN, D LP, GERD, ED, BPH, polycythemia vera used to be on hydroxyurea but not anymore, follows with Dr. Cole , now with pancytopenia hypothyroidism.    Recommendations:    Complete RDV 5/5-    Potential discharge today or tomorrow on oxygen after 5th dose of RDV today. 6 minute walk ordered for home oxygen  Decadron 6 mg q.24 to complete 10 days    because of his risk of bleeding with low platelet count, I would not anticoagulate after discharge.(rec d/w hematology also)  Continue vitamin-D, vitamin-C, melatonin, zinc, while here  Continue albuterol, antitussives, prone positioning. At home  I think it would be best for him to have home health to monitor his oxygen sat. Unsure if there is someone in his household that could manage this.             Medical Decision Making during this encounter was  [_] Low Complexity  [_] Moderate Complexity  [ x ] High Complexity

## 2020-12-23 NOTE — PLAN OF CARE
12/23/20 1534   Final Note   Assessment Type Final Discharge Note   Anticipated Discharge Disposition Home-Health  (and Home O2 set up to N)   Post-Acute Status   Post-Acute Authorization HME   HME Status Referrals Sent  (For Home O2 to N)   Part D Coverage PHN   Discharge Delays None known at this time     Built and sent referral to N earlier for the HH and now resp did Home o2 Assess and pt needs Home O2 Set Up, sent to N fax at [phone] and called the office at [phone] with Option 3 and spoke with Mrs Winnie Zhou and she saw the HH not assigned yet and she noted Home O2 sent, callback number [phone] given and on referral.

## 2020-12-23 NOTE — PLAN OF CARE
12/23/20 1355   Discharge Assessment   Assessment Type Discharge Planning Reassessment     Spoke with patient on his room phone due to Covid Isolation Precautions, about his Preference for Patient Choice Form for HH with [facility], explained to patient that he has the right to choose any agency, and a list of agencies were provided to patient to review over the phone, but explained that [facility] will assign his HH for him, they verbalized an understanding, and pt ok with [facility] assigning his HH and to this Phone consent as well, and form scanned into CM notes.

## 2020-12-23 NOTE — PLAN OF CARE
12/23/20 1430   Home Oxygen Qualification   Room Air SpO2 At Rest 90 %   Room Air SpO2 on Exertion (!) 87 %   SpO2 During Exertion on O2 (!) 89 %   Heart Rate on O2 80 bpm   Exertion O2 LPM 3 LPM   SpO2 on Recovery 95 %   Recovery Heart Rate 82 bpm   Recovery O2 LPM 3 LPM

## 2020-12-23 NOTE — PLAN OF CARE
Important Message from Medicare was verbally explained and given to patient/caregiver on 12/23/2020 at 9:27pm     answered all questions.    Spoke with patient's spouse King Sharma at [phone] and reviewed the IMM

## 2020-12-23 NOTE — PT/OT/SLP EVAL
Occupational Therapy   Evaluation and Discharge Note    Name: Morro Sharma  MRN: [id number]  Admitting Diagnosis:  Pneumonia due to COVID-19 virus      Recommendations:     Discharge Recommendations: home with home health, home health OT, home health PT  Discharge Equipment Recommendations:  shower chair  Barriers to discharge:  None    Assessment:     Morro Sharma is a 80 y.o. male with a medical diagnosis of Pneumonia due to COVID-19 virus. At this time, patient is functioning at their prior level of function and does not require further acute OT services.     Plan:     During this hospitalization, patient does not require further acute OT services.  Please re-consult if situation changes.    · Plan of Care Reviewed with: patient    Subjective     Chief Complaint: pt is without c/o  Patient/Family Comments/goals: pt did not state    Occupational Profile:  Previous level of function: independent   Equipment Used at home:  none    Pain/Comfort:  · Pain Rating 1: 0/10  · Pain Rating Post-Intervention 1: 0/10    Patients cultural, spiritual, Mosque conflicts given the current situation: no    Objective:     Communicated with: Nursing,PT and CM prior to session.  Patient found lying in supine  with telemetry, peripheral IV, pulse ox (continuous), oxygen upon OT entry to room.    General Precautions: Standard, fall, airborne, droplet, contact   Orthopedic Precautions:N/A   Braces: N/A     Occupational Performance:    Bed Mobility:    · Patient completed Rolling/Turning to Right with independence  · Patient completed Scooting/Bridging with independence  · Patient completed Supine to Sit with independence  · Patient completed Sit to Supine with independence   · Standing into prone independently     Functional Mobility/Transfers:  · Patient completed Sit <> Stand Transfer with independence  with  no assistive device   · Patient completed Bed <> Chair Transfer using Step Transfer technique with independence with no  assistive device  · Functional Mobility: pt is ambulating in the room alone     Activities of Daily Living:  · Feeding:  independence    · Toileting: modified independence      Cognitive/Visual Perceptual:  Cognitive/Psychosocial Skills:     -       Oriented to: Person, Place, Time and Situation   -       Follows Commands/attention:Follows two-step commands  -       Communication: clear/fluent  -       Memory: No Deficits noted  -       Safety awareness/insight to disability: impaired; questionable compliance with treatment plan as this is the patients 4th admission/visit since his COVID 19 and pt reports that he has been lying in bed   -       Mood/Affect/Coping skills/emotional control: Appropriate to situation, Cooperative and Pleasant    Physical Exam:  Balance: -       good static standing     AMPAC 6 Click ADL:  AMPAC Total Score: 24    Treatment & Education: role of OT, call don't fall; Therapeutic activity with education and assessment of his tolerance in prone x 10 min with O2 sats at 98% on wall O2; use of pillows under his abdomin and his shins for back safety and comfort to increase compliance with proning;   HEP education w/ handouts for yellow theraband to be completed 1-2 x daily; 10 reps x 2 sets, pt states he has a pulse oximeter to check his O2 sats; ed the pt on the importance of being out the bed, sitting in the chair, lying in prone 6 hours during the day and 6 hours at night; pt reports being surprised by this information but states he will cooperate with this treatment plan at home  Education:    Patient left prone with pillow under his abdomin for back comfort  with all lines intact, call button in reach and nurse and CM  notified    GOALS:   Multidisciplinary Problems     Occupational Therapy Goals     Not on file                History:     Past Medical History:   Diagnosis Date    Acute hypoxemic respiratory failure     Atrial fibrillation 05/07/2017    [facility] sent to scanning     BPH (benign prostatic hypertrophy)     Colon polyps     Erectile dysfunction     GERD (gastroesophageal reflux disease)     Hypertension     Pneumonia due to COVID-19 virus 12/12/2020    Pneumothorax on left     Polycythemia vera     Pulmonary nodules        Past Surgical History:   Procedure Laterality Date    COLONOSCOPY  9/15/14    Dr. Mcdaniels, 5 year recheck    ESOPHAGOGASTRODUODENOSCOPY      LUNG SURGERY      LEFT - CHEST TUBE       Time Tracking:     OT Date of Treatment: 12/23/20  OT Start Time: 1455  OT Stop Time: 1530  OT Total Time (min): 35 min    Billable Minutes:Evaluation 10  Therapeutic Exercise 25    Brisa Rainey, OT  12/23/2020

## 2020-12-23 NOTE — PLAN OF CARE
12/23/20 1408   Final Note   Assessment Type Final Discharge Note   Anticipated Discharge Disposition Home-Health   Post-Acute Status   Post-Acute Authorization Home Health   Home Health Status Set-up Complete  (Sent to [facility] for assigned agency)   Part D Coverage PHN   Discharge Delays None known at this time     Built and sent HH orders from Dr Clemens for SN with PT/OT Eval and Treat, faxed to [facility] to assign HH ageny at [phone] and called the office at [phone] opt 3 and spoke with Mrs Rangel and explained that since tomorrow [facility] wanted to get the HH order to them and the PT/OT notes to be in a little later this afternoon, callback number given [phone] and number on referral.

## 2020-12-23 NOTE — PLAN OF CARE
12/23/20 0822   PRE-TX-O2   $ Is the patient on Low Flow Oxygen? Yes   Flow (L/min) 3  (nasal cannula)   SpO2 96 %   $ Pulse Oximetry - Multiple Charge Pulse Oximetry - Multiple   Pulse 86   Incentive Spirometer   $ Incentive Spirometer Charges done with encouragement   Incentive Spirometer Predicted Level (mL) 2000   Administration (IS) instruction provided, initial   Number of Repetitions (IS) 10   Level Incentive Spirometer (mL) 1000   Patient Tolerance (IS) fair   Respiratory Evaluation   $ Care Plan Tech Time 15 min

## 2020-12-23 NOTE — PROGRESS NOTES
"[facility]  Adult Nutrition   Progress Note (Initial Assessment)     SUMMARY     Recommendations  Recommendation/Intervention: 1. Continue current diet and encourage continued adequate PO intake.  Goals: 1. Patient to meet at least 75% of estimated energy and protein needs.  Nutrition Goal Status: new    Dietitian Rounds Brief  Patient assessed 2' LOS. Patient in isolation with COVID-19 precautions. RD unable to interview, RD obtained PO intake via flowsheet and review of progress notes. Patient is eating well.     Reason for Assessment  Reason For Assessment: length of stay  Relevant Medical History: HTN; Polycythemia vera; Pneumonia due to COVID-19 virus    Nutrition Risk Screen  Nutrition Risk Screen: no indicators present     MST Score: 0  Have you recently lost weight without trying?: No  Weight loss score: 0  Have you been eating poorly because of a decreased appetite?: No  Appetite score: 0       Nutrition/Diet History  Food Allergies: NKFA  Factors Affecting Nutritional Intake: None identified at this time    Anthropometrics  Temp: 98.2 °F (36.8 °C)  Height Method: Stated  Height: 5' 7" (170.2 cm)  Height (inches): 67 in  Weight Method: Standard Scale  Weight: 70 kg (154 lb 5.2 oz)  Weight (lb): 154.32 lb  Ideal Body Weight (IBW), Male: 148 lb  % Ideal Body Weight, Male (lb): 104.27 %  BMI (Calculated): 24.2  BMI Grade: 18.5-24.9 - normal       Weight History:  Wt Readings from Last 10 Encounters:   12/19/20 70 kg (154 lb 5.2 oz)   12/17/20 78.5 kg (173 lb)   12/16/20 78.5 kg (173 lb)   12/12/20 78.5 kg (173 lb)   12/02/20 78.5 kg (173 lb 1 oz)   11/04/20 79 kg (174 lb 2.6 oz)   10/28/20 78.7 kg (173 lb 8 oz)   10/19/20 80.2 kg (176 lb 12.9 oz)   10/07/20 77.9 kg (171 lb 11.8 oz)   09/09/20 77.9 kg (171 lb 11.8 oz)       Lab/Procedures/Meds: Pertinent Labs Reviewed    Clinical Chemistry:  Recent Labs   Lab 12/19/20  1821   *   K 4.8      CO2 20*   *   BUN 35*   CREATININE " 1.3   CALCIUM 9.0   PROT 8.1   ALBUMIN 3.9   BILITOT 3.0*   ALKPHOS 155*   AST 94*   *   ANIONGAP 11   ESTGFRAFRICA 59.6*   EGFRNONAA 51.5*       CBC:   Recent Labs   Lab 12/22/20  0806   WBC 5.10   RBC 3.06*   HGB 8.0*   HCT 26.7*   PLT 30*   MCV 87   MCH 26.1*   MCHC 30.0*       Cardiac Profile:  Recent Labs   Lab 12/19/20  1821 12/21/20  0429 12/22/20  0806   BNP 18 60 41     --   --    TROPONINI <0.030  --   --        Inflammatory Labs:  Recent Labs   Lab 12/19/20  1821 12/21/20  0429 12/22/20  0806   CRP 21.60* 9.04* 5.61*       Diabetes:  Lab Results   Component Value Date    HGBA1C 5.7 08/14/2007       Medications: Pertinent Medications reviewed    Scheduled Meds:   ascorbic acid (vitamin C)  500 mg Oral BID    dexAMETHasone  6 mg Oral Daily    dextromethorphan-guaifenesin  mg  1 tablet Oral BID    enoxparin  40 mg Subcutaneous Q12H    levoFLOXacin  250 mg Intravenous Q24H    levothyroxine  50 mcg Oral Before breakfast    melatonin  6 mg Oral Nightly    metoprolol succinate  50 mg Oral Daily    EUA remdesivir infusion  100 mg Intravenous Q24H    tamsulosin  1 capsule Oral Daily    vitamin D  4,000 Units Oral Daily    zinc sulfate  220 mg Oral Daily       PRN Meds:.sodium chloride, acetaminophen, benzonatate, dextrose 50%, dextrose 50%, glucagon (human recombinant), glucose, glucose, sodium chloride 0.9%    Estimated/Assessed Needs  Weight Used For Calorie Calculations: 70 kg (154 lb 5.2 oz)  Energy Calorie Requirements (kcal): 1750 - 2100 (25 - 30)  Energy Need Method: Kcal/kg  Protein Requirements: 84 (1.2 g/kg)  Weight Used For Protein Calculations: 70 kg (154 lb 5.2 oz)     Estimated Fluid Requirement Method: RDA Method  RDA Method (mL): 1750       Nutrition Prescription Ordered  Current Diet Order: 1800 calorie diabetic    Evaluation of Received Nutrient/Fluid Intake  Energy Calories Required: meeting needs  Protein Required: meeting needs  Fluid Required: meeting  needs  Tolerance: tolerating     Intake/Output Summary (Last 24 hours) at 12/23/2020 0953  Last data filed at 12/23/2020 0431  Gross per 24 hour   Intake 360 ml   Output 300 ml   Net 60 ml      % Intake of Estimated Energy Needs: 75 - 100 %  % Meal Intake: 75 - 100 %    Nutrition Risk  Level of Risk/Frequency of Follow-up: moderate     Monitor and Evaluation  Food and Nutrient Intake: energy intake, food and beverage intake  Food and Nutrient Adminstration: diet order  Physical Activity and Function: nutrition-related ADLs and IADLs, factors affecting access to physical activity  Anthropometric Measurements: weight, weight change, body mass index  Biochemical Data, Medical Tests and Procedures: gastrointestinal profile, lipid profile, electrolyte and renal panel, inflammatory profile, glucose/endocrine profile  Nutrition-Focused Physical Findings: overall appearance     Nutrition Follow-Up  RD Follow-up?: Yes    Eva Payne RD 12/23/2020 9:53 AM

## 2020-12-23 NOTE — PLAN OF CARE
12/23/20 1700   Discharge Assessment   Assessment Type Discharge Planning Reassessment     Pt's Nurse mrs Ronquillo asked if heard anything about the pt's Home O2, so called [facility] at [phone] opt 3 and they said that they could not guarantee that it would be today otr tomorrow, updated her that pt been Dc'd and this has been sent in, and she said that she working on lots of them, but when done with one she on, then she will work on Mr Zachary's. Gave her Unit number [phone] and nurse is mrs Ronquillo, and she will call her when gets set up Home O2.

## 2020-12-24 ENCOUNTER — TELEPHONE (OUTPATIENT)
Dept: RADIOLOGY | Facility: HOSPITAL | Age: 80
End: 2020-12-24

## 2020-12-24 ENCOUNTER — PATIENT OUTREACH (OUTPATIENT)
Dept: ADMINISTRATIVE | Facility: CLINIC | Age: 80
End: 2020-12-24

## 2020-12-24 ENCOUNTER — NURSE TRIAGE (OUTPATIENT)
Dept: ADMINISTRATIVE | Facility: CLINIC | Age: 80
End: 2020-12-24

## 2020-12-24 VITALS
RESPIRATION RATE: 18 BRPM | SYSTOLIC BLOOD PRESSURE: 130 MMHG | WEIGHT: 154.31 LBS | HEIGHT: 67 IN | BODY MASS INDEX: 24.22 KG/M2 | HEART RATE: 79 BPM | DIASTOLIC BLOOD PRESSURE: 81 MMHG | TEMPERATURE: 98 F | OXYGEN SATURATION: 96 %

## 2020-12-24 LAB
ANION GAP SERPL CALC-SCNC: 8 MMOL/L (ref 8–16)
ANISOCYTOSIS BLD QL SMEAR: SLIGHT
BACTERIA BLD CULT: NORMAL
BACTERIA BLD CULT: NORMAL
BASOPHILS NFR BLD: 0 % (ref 0–1.9)
BUN SERPL-MCNC: 29 MG/DL (ref 8–23)
CALCIUM SERPL-MCNC: 9.3 MG/DL (ref 8.7–10.5)
CHLORIDE SERPL-SCNC: 106 MMOL/L (ref 95–110)
CO2 SERPL-SCNC: 21 MMOL/L (ref 23–29)
CREAT SERPL-MCNC: 0.9 MG/DL (ref 0.5–1.4)
CRP SERPL-MCNC: 7.48 MG/DL
D DIMER PPP IA.FEU-MCNC: 0.89 UG/ML FEU
DIFFERENTIAL METHOD: ABNORMAL
EOSINOPHIL NFR BLD: 0 % (ref 0–8)
ERYTHROCYTE [DISTWIDTH] IN BLOOD BY AUTOMATED COUNT: 17.8 % (ref 11.5–14.5)
EST. GFR  (AFRICAN AMERICAN): >60 ML/MIN/1.73 M^2
EST. GFR  (NON AFRICAN AMERICAN): >60 ML/MIN/1.73 M^2
FERRITIN SERPL-MCNC: 1004 NG/ML (ref 20–300)
GLUCOSE SERPL-MCNC: 98 MG/DL (ref 70–110)
HCT VFR BLD AUTO: 24.5 % (ref 40–54)
HGB BLD-MCNC: 7.5 G/DL (ref 14–18)
IMM GRANULOCYTES # BLD AUTO: ABNORMAL K/UL (ref 0–0.04)
IMM GRANULOCYTES NFR BLD AUTO: ABNORMAL % (ref 0–0.5)
LYMPHOCYTES NFR BLD: 29 % (ref 18–48)
MCH RBC QN AUTO: 26.4 PG (ref 27–31)
MCHC RBC AUTO-ENTMCNC: 30.6 G/DL (ref 32–36)
MCV RBC AUTO: 86 FL (ref 82–98)
METAMYELOCYTES NFR BLD MANUAL: 5 %
MONOCYTES NFR BLD: 7 % (ref 4–15)
MYELOCYTES NFR BLD MANUAL: 3 %
NEUTROPHILS NFR BLD: 47 % (ref 38–73)
NEUTS BAND NFR BLD MANUAL: 9 %
NRBC BLD-RTO: 11 /100 WBC
PLATELET # BLD AUTO: 28 K/UL (ref 150–350)
PLATELET BLD QL SMEAR: ABNORMAL
PMV BLD AUTO: ABNORMAL FL (ref 9.2–12.9)
POIKILOCYTOSIS BLD QL SMEAR: SLIGHT
POLYCHROMASIA BLD QL SMEAR: ABNORMAL
POTASSIUM SERPL-SCNC: 4.9 MMOL/L (ref 3.5–5.1)
RBC # BLD AUTO: 2.84 M/UL (ref 4.6–6.2)
SODIUM SERPL-SCNC: 135 MMOL/L (ref 136–145)
WBC # BLD AUTO: 3.82 K/UL (ref 3.9–12.7)

## 2020-12-24 PROCEDURE — 85379 FIBRIN DEGRADATION QUANT: CPT

## 2020-12-24 PROCEDURE — 99231 SBSQ HOSP IP/OBS SF/LOW 25: CPT | Mod: ,,, | Performed by: INTERNAL MEDICINE

## 2020-12-24 PROCEDURE — 94761 N-INVAS EAR/PLS OXIMETRY MLT: CPT

## 2020-12-24 PROCEDURE — 25000003 PHARM REV CODE 250: Performed by: INTERNAL MEDICINE

## 2020-12-24 PROCEDURE — 63600175 PHARM REV CODE 636 W HCPCS: Performed by: STUDENT IN AN ORGANIZED HEALTH CARE EDUCATION/TRAINING PROGRAM

## 2020-12-24 PROCEDURE — 82728 ASSAY OF FERRITIN: CPT

## 2020-12-24 PROCEDURE — 25000242 PHARM REV CODE 250 ALT 637 W/ HCPCS: Performed by: STUDENT IN AN ORGANIZED HEALTH CARE EDUCATION/TRAINING PROGRAM

## 2020-12-24 PROCEDURE — 80048 BASIC METABOLIC PNL TOTAL CA: CPT

## 2020-12-24 PROCEDURE — 99900035 HC TECH TIME PER 15 MIN (STAT)

## 2020-12-24 PROCEDURE — 27000221 HC OXYGEN, UP TO 24 HOURS

## 2020-12-24 PROCEDURE — 85027 COMPLETE CBC AUTOMATED: CPT

## 2020-12-24 PROCEDURE — 36415 COLL VENOUS BLD VENIPUNCTURE: CPT

## 2020-12-24 PROCEDURE — 85007 BL SMEAR W/DIFF WBC COUNT: CPT

## 2020-12-24 PROCEDURE — 86140 C-REACTIVE PROTEIN: CPT

## 2020-12-24 PROCEDURE — 99231 PR SUBSEQUENT HOSPITAL CARE,LEVL I: ICD-10-PCS | Mod: ,,, | Performed by: INTERNAL MEDICINE

## 2020-12-24 RX ORDER — LEVOFLOXACIN 5 MG/ML
500 INJECTION, SOLUTION INTRAVENOUS
Status: DISCONTINUED | OUTPATIENT
Start: 2020-12-24 | End: 2020-12-24 | Stop reason: HOSPADM

## 2020-12-24 RX ADMIN — GUAIFENESIN AND DEXTROMETHORPHAN HYDROBROMIDE 1 TABLET: 600; 30 TABLET, EXTENDED RELEASE ORAL at 08:12

## 2020-12-24 RX ADMIN — OXYCODONE HYDROCHLORIDE AND ACETAMINOPHEN 500 MG: 500 TABLET ORAL at 08:12

## 2020-12-24 RX ADMIN — LEVOTHYROXINE SODIUM 50 MCG: 25 TABLET ORAL at 05:12

## 2020-12-24 RX ADMIN — TAMSULOSIN HYDROCHLORIDE 0.4 MG: 0.4 CAPSULE ORAL at 08:12

## 2020-12-24 RX ADMIN — ZINC SULFATE 220 MG (50 MG) CAPSULE 220 MG: CAPSULE at 08:12

## 2020-12-24 RX ADMIN — METOPROLOL SUCCINATE 50 MG: 25 TABLET, FILM COATED, EXTENDED RELEASE ORAL at 08:12

## 2020-12-24 RX ADMIN — DEXAMETHASONE 6 MG: 2 TABLET ORAL at 08:12

## 2020-12-24 RX ADMIN — Medication 4000 UNITS: at 08:12

## 2020-12-24 NOTE — PLAN OF CARE
"   12/24/20 5876   Discharge Assessment   Assessment Type Discharge Planning Reassessment     Nurse updated this CM that the Home O2 was not delivered till 1100 pm last night per [facility] setting up Home O2 that was sent to them around 1100-12 noon yesterday, but Spoke with Dr Hernandez and she was "Glad that he stayed another night anyway," and DC orders still in and wife will  pt this am per nurse.     Called [facility] about HH for pt and Mrs Damon has not set up yet,and she has this CM callback [phone] once set up. CM will follow for DC Planning Needs.  "

## 2020-12-24 NOTE — PLAN OF CARE
12/24/20 0835   PRE-TX-O2   $ Is the patient on Low Flow Oxygen? Yes  (nasal cannula)   Flow (L/min) 3   SpO2 96 %   $ Pulse Oximetry - Multiple Charge Pulse Oximetry - Multiple   Pulse 79   Resp 18   Respiratory Evaluation   $ Care Plan Tech Time 15 min

## 2020-12-24 NOTE — PROGRESS NOTES
Progress Note  Infectious Disease    Reason for Consult:      HPI: Morro Sharma is a  80 y.o. male past medical history of AFib, HTN, D LP, GERD, ED, BPH, polycythemia vera came to emergency room complaining of shortness of breath.  Patient was diagnosed with COVID on 12/12/2020, 8 days ago.  He was monitored at home   His pulse oximeter became 79, he was having progressive shortness of breath, worse with exertion, associated by nonproductive cough, lack of taste and lack of smell.  He called EMS.  When EMS checked pulse oximeter was 88%.  He was given 6 L O2 and was brought to the hospital.  In ER he was breathing at 25 times a minute, he was afebrile, pulse oximeter was 86%  He was diagnosed with viral pneumonia by chest x-ray, probably COVID given the recent positive test.  He was given fresh frozen plasma, RDV and Decadron  He was found to have transaminitis, pancytopenia,  Procalcitonin was elevated at 1.3 and he was given levofloxacin.  I came and saw patient.  He is on 3 L O2, pulse ox is 95%.  He is feeling much better.  He is emotional and had a few tears thinking of how his youngest, adapted daughter age 13 insisted that he goes in the hospital.    12/21/2020  needing 3 L o2.  In good spirits.  Sitting up and having dinner.  He os how many days will he be in the hospital.  I explained that tonight he will receive the 2nd dose of RDV.  Duration of treatment is 5 days.  12/22: consult/notes reviewed. No fever, on decadron day 4, levaquin day 3, RDV day 4. Requiring only 3 liters NC. Platelets low but stable. Off hydrea. He is ambulatory around the room on oxygen. Lungs clear. Eating. No diarrhea. No pleurisy, or chest pain. No bleeding.  12/23: remains on 3 liters. Labs for today have not been drawn yet. He is mobile around the room. He is capable of taking a deep breath, but has improper timing with IS. He is eating, voiding. No pleurisy or chest pain. No diarrhea.   12/24:  Afebrile, remains on 3 L.  Procalcitonin is now normal 0.26.  Ferritin is down, D-dimer is down but CRP is actually higher.  Oxygen has been delivered and he is ready to go.  He feels improved, is not short of breath when he walks in the room, is able to take a deep breath, appetite is fair.     Ferritin 1369--1316--1077, 1263, 1190, 1004  D-dimer 1.74--0.88, 0.89, 0.99, 0.89  LDH 1089--692, 721  Procalcitonin 1.3  Recent Labs   Lab 12/22/20  0806 12/23/20  0909 12/24/20  0234   CRP 5.61* 7.09* 7.48*         Antibiotics (From admission, onward)    Start     Stop Route Frequency Ordered    12/24/20 2300  levoFLOXacin 500 mg/100 mL IVPB 500 mg      -- IV Every 24 hours (non-standard times) 12/24/20 0653         Antivirals (From admission, onward)    None             EXAM & DIAGNOSTICS REVIEWED:   Vitals:     Temp:  [97.5 °F (36.4 °C)-98.6 °F (37 °C)]   Temp: 97.7 °F (36.5 °C) (12/24/20 0707)  Pulse: 79 (12/24/20 0835)  Resp: 18 (12/24/20 0835)  BP: 130/81 (12/24/20 0707)  SpO2: 96 % (12/24/20 0835)    Intake/Output Summary (Last 24 hours) at 12/24/2020 0854  Last data filed at 12/23/2020 1911  Gross per 24 hour   Intake 120 ml   Output 175 ml   Net -55 ml            General:  In NAD. Alert and attentive, cooperative, comfortable wearing 3 L O2.  Eyes:  Anicteric,  EOMI  ENT:  No ulcers, exudates, thrush, nares patent, dentition is fair, some broken teeth.  Neck:  Supple,   Lungs: Clear  Heart:  RRR, no gallop/murmur/rub noted  Abd:  Soft, NT, ND, normal BS, no masses or organomegaly appreciated.  :  Voids  no flank tenderness  Musc:  Joints without effusion, swelling, erythema, synovitis, muscle wasting.   Skin:  No rashes.   Wound:   Neuro:  Alert, attentive, speech fluent, face symmetric, moves all extremities, no focal weakness. Ambulatory  Psych:  Calm, cooperative.    Lymphatic:       Extrem: No edema, erythema, phlebitis, cellulitis, warm and well perfused, calves are soft and nontender VAD:  peripheral     Isolation:   COVID    Lines/Tubes/Drains:    General Labs reviewed:  Recent Labs   Lab 12/22/20  0806 12/23/20  0909 12/24/20  0234   WBC 5.10 4.39 3.82*   HGB 8.0* 7.7* 7.5*   HCT 26.7* 25.6* 24.5*   PLT 30* 30* 28*       Recent Labs   Lab 12/19/20  1821 12/23/20  0909 12/24/20  0234   * 134* 135*   K 4.8 4.5 4.9    104 106   CO2 20* 20* 21*   BUN 35* 33* 29*   CREATININE 1.3 1.0 0.9   CALCIUM 9.0 9.2 9.3   PROT 8.1  --   --    BILITOT 3.0*  --   --    ALKPHOS 155*  --   --    *  --   --    AST 94*  --   --        Micro:  Microbiology Results (last 7 days)     Procedure Component Value Units Date/Time    Blood culture x two cultures. Draw prior to antibiotics. [[id number]] Collected: 12/19/20 2003    Order Status: Completed Specimen: Blood from Peripheral, Hand, Right Updated: 12/23/20 2232     Blood Culture, Routine No Growth to date      No Growth to date      No Growth to date      No Growth to date      No Growth to date    Narrative:      Aerobic and anaerobic    Blood culture x two cultures. Draw prior to antibiotics. [[id number]] Collected: 12/19/20 1832    Order Status: Completed Specimen: Blood from Peripheral, Lower Arm, Left Updated: 12/23/20 2032     Blood Culture, Routine No Growth to date      No Growth to date      No Growth to date      No Growth to date      No Growth to date    Narrative:      Aerobic and anaerobic        Imaging Reviewed:   CXR  Subtle bilateral opacities in the mid to lower lungs, compatible with   but not specific for Covid 19 pneumonia.   Cardiology:  Stress test on 04/05/2019  · Normal left ventricular systolic function. The estimated ejection fraction is 65%  · Normal LV diastolic function.  · No wall motion abnormalities.  · Normal right ventricular systolic function.  · Mild mitral sclerosis.  · There is mild leaflet calcification of the Mitral Valve.  · Mild mitral regurgitation.  · Mild tricuspid regurgitation.  · Normal central venous pressure (3 mm Hg).  · The  estimated PA systolic pressure is 28 mm Hg  · The stress echo portion of this study is negative for myocardial ischemia.  · The patient reported no symptoms during the stress test.  · There were no arrhythmias during stress.  · Overall, the patient's exercise capacity was excellent tolerance.  · The test was stopped secondary to fatigue.  · The EKG portion of this study is negative for myocardial ischemia.         IMPRESSION & PLAN   COVID illness, hypoxemia.  On 3 L oxygen  immunocompromised due to blood disorder.   Received COVID convalescent plasma  Pancytopenia, as per hospitalist   Very elevated ferritin compared to baseline (437 in June)  Transaminitis  PMHx AFib, HTN, D LP, GERD, ED, BPH, polycythemia vera used to be on hydroxyurea but not anymore, follows with Dr. Cole , now with pancytopenia hypothyroidism.    Recommendations:      discharge today     Decadron 6 mg q.24 to complete 10-14 days    because of his risk of bleeding with low platelet count, I would not anticoagulate after discharge.(rec d/w hematology als o  Continue vitamin-D, vitamin-C, melatonin, zinc, while here  Continue albuterol, antitussives, prone positioning. At home  I think it would be best for him to have home health to monitor his oxygen sat. Unsure if there is someone in his household that could manage this.    May need blood transfusion soon  Counseled to return to the hospital if his oxygen requirement goes up       Medical Decision Making during this encounter was  [_] Low Complexity  [_] Moderate Complexity  [ x ] High Complexity

## 2020-12-24 NOTE — PROGRESS NOTES
Pharmacist Renal Dose Adjustment Note    Morro Sharma is a 80 y.o. male being treated with Levofloxacin    Patient Data:    Vital Signs (Most Recent):  Temp: 97.9 °F (36.6 °C) (12/24/20 0354)  Pulse: 79 (12/24/20 0354)  Resp: 18 (12/24/20 0354)  BP: (!) 125/90 (12/24/20 0354)  SpO2: 98 % (12/23/20 2205)   Vital Signs (72h Range):  Temp:  [97.5 °F (36.4 °C)-98.6 °F (37 °C)]   Pulse:  [71-92]   Resp:  [15-20]   BP: (102-132)/(66-90)   SpO2:  [95 %-99 %]      Recent Labs   Lab 12/19/20  1821 12/23/20  0909 12/24/20  0234   CREATININE 1.3 1.0 0.9     Serum creatinine: 0.9 mg/dL 12/24/20 0234  Estimated creatinine clearance: 61.2 mL/min    Levofloxacin 250 mg IV Q24H will be changed to Levofloxacin 500 mg IV Q24H.    Pharmacist's Name: Joseline Chavez  Pharmacist's Extension: [phone]

## 2020-12-24 NOTE — NURSING
Spoke with pt does not wish to schedule his bone marrow biopsy at this time will call when he is ready to schedule

## 2020-12-24 NOTE — CARE UPDATE
12/23/20 7755   Patient Assessment/Suction   Level of Consciousness (AVPU) alert   Respiratory Effort Unlabored   Expansion/Accessory Muscles/Retractions no use of accessory muscles   All Lung Fields Breath Sounds diminished   Rhythm/Pattern, Respiratory no shortness of breath reported   Cough Frequency no cough   PRE-TX-O2   $ Is the patient on Low Flow Oxygen? Yes   Flow (L/min) 3   SpO2 98 %   $ Pulse Oximetry - Multiple Charge Pulse Oximetry - Multiple   Pulse 84   Resp 15   Positioning   Head of Bed (HOB) HOB elevated;HOB at 30-45 degrees   Respiratory Evaluation   $ Care Plan Tech Time 15 min   Evaluation For   (0)

## 2020-12-25 ENCOUNTER — NURSE TRIAGE (OUTPATIENT)
Dept: ADMINISTRATIVE | Facility: CLINIC | Age: 80
End: 2020-12-25

## 2020-12-25 ENCOUNTER — TELEPHONE (OUTPATIENT)
Dept: ADMINISTRATIVE | Facility: CLINIC | Age: 80
End: 2020-12-25

## 2020-12-25 ENCOUNTER — PATIENT MESSAGE (OUTPATIENT)
Dept: ADMINISTRATIVE | Facility: OTHER | Age: 80
End: 2020-12-25

## 2020-12-25 NOTE — TELEPHONE ENCOUNTER
Covid surveillance program escalation due to no response after 3 pm push. Follow up call, patient's wife is going to check his vital signs and submit them now. No further action at this time. If VS trigger an escalation, will follow up.

## 2020-12-25 NOTE — TELEPHONE ENCOUNTER
NT received message pt wife requesting call back regarding pt my chart nabila and entering surveillance questions in my chart- on call wife reports she was able to get the nabila and questions to work no longer has any questions or needs.     Reason for Disposition   Information only question and nurse able to answer    Protocols used: INFORMATION ONLY CALL - NO TRIAGE-A-[place]

## 2020-12-25 NOTE — TELEPHONE ENCOUNTER
Patient escalated for no response. Unable to reach pt or ECs listed. Wireless[employer] message sent.    Reason for Disposition   Caller has cancelled the call before the first contact    Additional Information   Negative: Caller is angry or rude (e.g., hangs up, verbally abusive, yelling)   Negative: Caller hangs up   Negative: Caller has already spoken with the PCP and has no further questions.   Negative: Caller has already spoken with another triager and has no further questions.   Negative: Caller has already spoken with another triager or PCP AND has further questions AND triager able to answer questions.   Negative: Busy signal.  First attempt to contact caller.  Follow-up call scheduled within 15 minutes.   Negative: No answer.  First attempt to contact caller.  Follow-up call scheduled within 15 minutes.   Negative: Message left on identified voice mail   Negative: Message left on unidentified voice mail.  Phone number verified.   Negative: Message left with person in household.   Negative: Wrong number reached.  Phone number verified.   Negative: Second attempt to contact family AND no contact made.  Phone number verified.   Negative: Cell phone out of range.  Phone number verified.   Negative: Pager number given.  Answering service notified.   Negative: Patient already left for the hospital/clinic.   Negative: Unable to complete triage due to phone connection issues   Negative: NON-URGENT call redirected to PCP's office because it is open    Protocols used: NO CONTACT OR DUPLICATE CONTACT CALL-A-

## 2020-12-26 ENCOUNTER — PATIENT MESSAGE (OUTPATIENT)
Dept: ADMINISTRATIVE | Facility: OTHER | Age: 80
End: 2020-12-26

## 2020-12-27 ENCOUNTER — NURSE TRIAGE (OUTPATIENT)
Dept: ADMINISTRATIVE | Facility: CLINIC | Age: 80
End: 2020-12-27

## 2020-12-27 ENCOUNTER — PATIENT MESSAGE (OUTPATIENT)
Dept: ADMINISTRATIVE | Facility: OTHER | Age: 80
End: 2020-12-27

## 2020-12-27 NOTE — TELEPHONE ENCOUNTER
Patient escalated for no response. Patient auto populated in queue as a no response. At 1130, no entry made, so patient contacted. Patient reports vitals and symptoms (above) which are within normal limits. No triage needed at this time.    O2 98% HR 77  Temp 96.9 SOB 1/5 SOB 1/5    Reason for Disposition   Information only question and nurse able to answer    Additional Information   Negative: Nursing judgment   Negative: Nursing judgment   Negative: Nursing judgment   Negative: Nursing judgment    Protocols used: INFORMATION ONLY CALL - NO TRIAGE-A-[place]

## 2020-12-28 ENCOUNTER — PATIENT MESSAGE (OUTPATIENT)
Dept: ADMINISTRATIVE | Facility: OTHER | Age: 80
End: 2020-12-28

## 2020-12-28 ENCOUNTER — OFFICE VISIT (OUTPATIENT)
Dept: HEMATOLOGY/ONCOLOGY | Facility: CLINIC | Age: 80
End: 2020-12-28
Payer: MEDICARE

## 2020-12-28 ENCOUNTER — LAB VISIT (OUTPATIENT)
Dept: LAB | Facility: HOSPITAL | Age: 80
End: 2020-12-28
Attending: INTERNAL MEDICINE
Payer: MEDICARE

## 2020-12-28 ENCOUNTER — PATIENT OUTREACH (OUTPATIENT)
Dept: ADMINISTRATIVE | Facility: CLINIC | Age: 80
End: 2020-12-28

## 2020-12-28 ENCOUNTER — OFFICE VISIT (OUTPATIENT)
Dept: FAMILY MEDICINE | Facility: CLINIC | Age: 80
End: 2020-12-28
Payer: MEDICARE

## 2020-12-28 VITALS
TEMPERATURE: 97 F | BODY MASS INDEX: 24.4 KG/M2 | HEART RATE: 82 BPM | SYSTOLIC BLOOD PRESSURE: 130 MMHG | HEIGHT: 67 IN | OXYGEN SATURATION: 99 % | DIASTOLIC BLOOD PRESSURE: 74 MMHG | WEIGHT: 155.44 LBS

## 2020-12-28 VITALS
HEART RATE: 83 BPM | BODY MASS INDEX: 24.36 KG/M2 | DIASTOLIC BLOOD PRESSURE: 60 MMHG | WEIGHT: 155.19 LBS | SYSTOLIC BLOOD PRESSURE: 103 MMHG | OXYGEN SATURATION: 94 % | TEMPERATURE: 98 F | RESPIRATION RATE: 18 BRPM | HEIGHT: 67 IN

## 2020-12-28 DIAGNOSIS — J12.82 PNEUMONIA DUE TO COVID-19 VIRUS: ICD-10-CM

## 2020-12-28 DIAGNOSIS — D45 POLYCYTHEMIA VERA: ICD-10-CM

## 2020-12-28 DIAGNOSIS — D61.818 PANCYTOPENIA: ICD-10-CM

## 2020-12-28 DIAGNOSIS — I48.91 ATRIAL FIBRILLATION, UNSPECIFIED TYPE: ICD-10-CM

## 2020-12-28 DIAGNOSIS — J96.01 ACUTE HYPOXEMIC RESPIRATORY FAILURE: ICD-10-CM

## 2020-12-28 DIAGNOSIS — U07.1 COVID-19: ICD-10-CM

## 2020-12-28 DIAGNOSIS — U07.1 PNEUMONIA DUE TO COVID-19 VIRUS: ICD-10-CM

## 2020-12-28 DIAGNOSIS — K21.9 GASTROESOPHAGEAL REFLUX DISEASE, UNSPECIFIED WHETHER ESOPHAGITIS PRESENT: ICD-10-CM

## 2020-12-28 DIAGNOSIS — Z79.01 ANTICOAGULANT LONG-TERM USE: ICD-10-CM

## 2020-12-28 DIAGNOSIS — D75.838 SECONDARY THROMBOCYTOSIS: ICD-10-CM

## 2020-12-28 DIAGNOSIS — R53.1 GENERAL WEAKNESS: ICD-10-CM

## 2020-12-28 DIAGNOSIS — I48.0 PAROXYSMAL ATRIAL FIBRILLATION: ICD-10-CM

## 2020-12-28 DIAGNOSIS — D45 POLYCYTHEMIA VERA: Primary | ICD-10-CM

## 2020-12-28 DIAGNOSIS — Z09 HOSPITAL DISCHARGE FOLLOW-UP: Primary | ICD-10-CM

## 2020-12-28 LAB
ABO + RH BLD: NORMAL
ALBUMIN SERPL BCP-MCNC: 3.8 G/DL (ref 3.5–5.2)
ALP SERPL-CCNC: 124 U/L (ref 38–145)
ALT SERPL W/O P-5'-P-CCNC: 31 U/L (ref 0–50)
ANION GAP SERPL CALC-SCNC: 9 MMOL/L (ref 8–16)
ANISOCYTOSIS BLD QL SMEAR: SLIGHT
AST SERPL-CCNC: 30 U/L (ref 17–59)
BASOPHILS NFR BLD: 1 % (ref 0–1.9)
BILIRUB SERPL-MCNC: 1.6 MG/DL (ref 0.2–1.3)
BLD GP AB SCN CELLS X3 SERPL QL: NORMAL
CALCIUM SERPL-MCNC: 9.2 MG/DL (ref 8.4–10.2)
CHLORIDE SERPL-SCNC: 101 MMOL/L (ref 95–110)
CO2 SERPL-SCNC: 23 MMOL/L (ref 22–31)
CREAT SERPL-MCNC: 0.94 MG/DL (ref 0.5–1.4)
DACRYOCYTES BLD QL SMEAR: ABNORMAL
DIFFERENTIAL METHOD: ABNORMAL
EOSINOPHIL NFR BLD: 0 % (ref 0–8)
ERYTHROCYTE [DISTWIDTH] IN BLOOD BY AUTOMATED COUNT: 18.3 % (ref 11.5–14.5)
EST. GFR  (AFRICAN AMERICAN): >60 ML/MIN/1.73 M^2
EST. GFR  (NON AFRICAN AMERICAN): >60 ML/MIN/1.73 M^2
GLUCOSE SERPL-MCNC: 111 MG/DL (ref 70–110)
HCT VFR BLD AUTO: 25.9 % (ref 40–54)
HGB BLD-MCNC: 7.8 G/DL (ref 14–18)
IMM GRANULOCYTES # BLD AUTO: ABNORMAL K/UL (ref 0–0.04)
IMM GRANULOCYTES NFR BLD AUTO: ABNORMAL % (ref 0–0.5)
LDH SERPL L TO P-CCNC: 1350 U/L (ref 313–618)
LYMPHOCYTES NFR BLD: 28 % (ref 18–48)
MAGNESIUM SERPL-MCNC: 2.3 MG/DL (ref 1.6–2.6)
MCH RBC QN AUTO: 26.6 PG (ref 27–31)
MCHC RBC AUTO-ENTMCNC: 30.1 G/DL (ref 32–36)
MCV RBC AUTO: 88 FL (ref 82–98)
METAMYELOCYTES NFR BLD MANUAL: 2 %
MONOCYTES NFR BLD: 18 % (ref 4–15)
MYELOCYTES NFR BLD MANUAL: 2 %
NEUTROPHILS # BLD AUTO: 2.1 K/UL (ref 1.8–7.7)
NEUTROPHILS NFR BLD: 38 % (ref 38–73)
NEUTS BAND NFR BLD MANUAL: 2 %
NRBC BLD-RTO: 12 /100 WBC
OVALOCYTES BLD QL SMEAR: ABNORMAL
PLATELET # BLD AUTO: 32 K/UL (ref 150–350)
PMV BLD AUTO: ABNORMAL FL (ref 9.2–12.9)
POIKILOCYTOSIS BLD QL SMEAR: SLIGHT
POLYCHROMASIA BLD QL SMEAR: ABNORMAL
POTASSIUM SERPL-SCNC: 4.3 MMOL/L (ref 3.5–5.1)
PROT SERPL-MCNC: 6.8 G/DL (ref 6–8.4)
RBC # BLD AUTO: 2.93 M/UL (ref 4.6–6.2)
SODIUM SERPL-SCNC: 133 MMOL/L (ref 136–145)
SPHEROCYTES BLD QL SMEAR: ABNORMAL
UUN UR-MCNC: 20 MG/DL (ref 9–21)
WBC # BLD AUTO: 5.15 K/UL (ref 3.9–12.7)
WBC OTHER NFR BLD MANUAL: 9 %

## 2020-12-28 PROCEDURE — 1126F AMNT PAIN NOTED NONE PRSNT: CPT | Mod: S$GLB,,, | Performed by: INTERNAL MEDICINE

## 2020-12-28 PROCEDURE — 80053 COMPREHEN METABOLIC PANEL: CPT

## 2020-12-28 PROCEDURE — 99999 PR PBB SHADOW E&M-EST. PATIENT-LVL IV: ICD-10-PCS | Mod: PBBFAC,,, | Performed by: PHYSICIAN ASSISTANT

## 2020-12-28 PROCEDURE — 99499 UNLISTED E&M SERVICE: CPT | Mod: S$GLB,,, | Performed by: PHYSICIAN ASSISTANT

## 2020-12-28 PROCEDURE — 99999 PR PBB SHADOW E&M-EST. PATIENT-LVL V: CPT | Mod: PBBFAC,,, | Performed by: INTERNAL MEDICINE

## 2020-12-28 PROCEDURE — 1101F PR PT FALLS ASSESS DOC 0-1 FALLS W/OUT INJ PAST YR: ICD-10-PCS | Mod: CPTII,S$GLB,, | Performed by: PHYSICIAN ASSISTANT

## 2020-12-28 PROCEDURE — 3078F PR MOST RECENT DIASTOLIC BLOOD PRESSURE < 80 MM HG: ICD-10-PCS | Mod: CPTII,S$GLB,, | Performed by: INTERNAL MEDICINE

## 2020-12-28 PROCEDURE — 85027 COMPLETE CBC AUTOMATED: CPT | Mod: PN

## 2020-12-28 PROCEDURE — 3074F PR MOST RECENT SYSTOLIC BLOOD PRESSURE < 130 MM HG: ICD-10-PCS | Mod: CPTII,S$GLB,, | Performed by: INTERNAL MEDICINE

## 2020-12-28 PROCEDURE — 1159F MED LIST DOCD IN RCRD: CPT | Mod: S$GLB,,, | Performed by: INTERNAL MEDICINE

## 2020-12-28 PROCEDURE — 93005 EKG 12-LEAD: ICD-10-PCS | Mod: S$GLB,,, | Performed by: PHYSICIAN ASSISTANT

## 2020-12-28 PROCEDURE — 99999 PR PBB SHADOW E&M-EST. PATIENT-LVL V: ICD-10-PCS | Mod: PBBFAC,,, | Performed by: INTERNAL MEDICINE

## 2020-12-28 PROCEDURE — 99499 RISK ADDL DX/OHS AUDIT: ICD-10-PCS | Mod: S$GLB,,, | Performed by: INTERNAL MEDICINE

## 2020-12-28 PROCEDURE — 85007 BL SMEAR W/DIFF WBC COUNT: CPT | Mod: PN

## 2020-12-28 PROCEDURE — 3288F FALL RISK ASSESSMENT DOCD: CPT | Mod: CPTII,S$GLB,, | Performed by: PHYSICIAN ASSISTANT

## 2020-12-28 PROCEDURE — 99499 UNLISTED E&M SERVICE: CPT | Mod: S$GLB,,, | Performed by: INTERNAL MEDICINE

## 2020-12-28 PROCEDURE — 3074F SYST BP LT 130 MM HG: CPT | Mod: CPTII,S$GLB,, | Performed by: INTERNAL MEDICINE

## 2020-12-28 PROCEDURE — 83735 ASSAY OF MAGNESIUM: CPT | Mod: PN

## 2020-12-28 PROCEDURE — 99214 OFFICE O/P EST MOD 30 MIN: CPT | Mod: S$GLB,,, | Performed by: INTERNAL MEDICINE

## 2020-12-28 PROCEDURE — 1101F PT FALLS ASSESS-DOCD LE1/YR: CPT | Mod: CPTII,S$GLB,, | Performed by: PHYSICIAN ASSISTANT

## 2020-12-28 PROCEDURE — 86900 BLOOD TYPING SEROLOGIC ABO: CPT

## 2020-12-28 PROCEDURE — 99499 RISK ADDL DX/OHS AUDIT: ICD-10-PCS | Mod: S$GLB,,, | Performed by: PHYSICIAN ASSISTANT

## 2020-12-28 PROCEDURE — 83735 ASSAY OF MAGNESIUM: CPT

## 2020-12-28 PROCEDURE — 3288F PR FALLS RISK ASSESSMENT DOCUMENTED: ICD-10-PCS | Mod: CPTII,S$GLB,, | Performed by: PHYSICIAN ASSISTANT

## 2020-12-28 PROCEDURE — 93005 ELECTROCARDIOGRAM TRACING: CPT | Mod: S$GLB,,, | Performed by: PHYSICIAN ASSISTANT

## 2020-12-28 PROCEDURE — 93010 EKG 12-LEAD: ICD-10-PCS | Mod: S$GLB,ICN,, | Performed by: INTERNAL MEDICINE

## 2020-12-28 PROCEDURE — 85027 COMPLETE CBC AUTOMATED: CPT

## 2020-12-28 PROCEDURE — 1159F PR MEDICATION LIST DOCUMENTED IN MEDICAL RECORD: ICD-10-PCS | Mod: S$GLB,,, | Performed by: INTERNAL MEDICINE

## 2020-12-28 PROCEDURE — 99214 OFFICE O/P EST MOD 30 MIN: CPT | Mod: S$GLB,,, | Performed by: PHYSICIAN ASSISTANT

## 2020-12-28 PROCEDURE — 1126F AMNT PAIN NOTED NONE PRSNT: CPT | Mod: S$GLB,,, | Performed by: PHYSICIAN ASSISTANT

## 2020-12-28 PROCEDURE — 83615 LACTATE (LD) (LDH) ENZYME: CPT | Mod: PN

## 2020-12-28 PROCEDURE — 3078F DIAST BP <80 MM HG: CPT | Mod: CPTII,S$GLB,, | Performed by: INTERNAL MEDICINE

## 2020-12-28 PROCEDURE — 80053 COMPREHEN METABOLIC PANEL: CPT | Mod: PN

## 2020-12-28 PROCEDURE — 85007 BL SMEAR W/DIFF WBC COUNT: CPT

## 2020-12-28 PROCEDURE — 83615 LACTATE (LD) (LDH) ENZYME: CPT

## 2020-12-28 PROCEDURE — 99214 PR OFFICE/OUTPT VISIT, EST, LEVL IV, 30-39 MIN: ICD-10-PCS | Mod: S$GLB,,, | Performed by: PHYSICIAN ASSISTANT

## 2020-12-28 PROCEDURE — 93010 ELECTROCARDIOGRAM REPORT: CPT | Mod: S$GLB,ICN,, | Performed by: INTERNAL MEDICINE

## 2020-12-28 PROCEDURE — 86900 BLOOD TYPING SEROLOGIC ABO: CPT | Mod: PN

## 2020-12-28 PROCEDURE — 1126F PR PAIN SEVERITY QUANTIFIED, NO PAIN PRESENT: ICD-10-PCS | Mod: S$GLB,,, | Performed by: PHYSICIAN ASSISTANT

## 2020-12-28 PROCEDURE — 1126F PR PAIN SEVERITY QUANTIFIED, NO PAIN PRESENT: ICD-10-PCS | Mod: S$GLB,,, | Performed by: INTERNAL MEDICINE

## 2020-12-28 PROCEDURE — 99999 PR PBB SHADOW E&M-EST. PATIENT-LVL IV: CPT | Mod: PBBFAC,,, | Performed by: PHYSICIAN ASSISTANT

## 2020-12-28 PROCEDURE — 36415 COLL VENOUS BLD VENIPUNCTURE: CPT | Mod: PN

## 2020-12-28 PROCEDURE — 99214 PR OFFICE/OUTPT VISIT, EST, LEVL IV, 30-39 MIN: ICD-10-PCS | Mod: S$GLB,,, | Performed by: INTERNAL MEDICINE

## 2020-12-28 RX ORDER — HYDROCODONE BITARTRATE AND ACETAMINOPHEN 500; 5 MG/1; MG/1
TABLET ORAL ONCE
Status: CANCELLED | OUTPATIENT
Start: 2020-12-28 | End: 2020-12-28

## 2020-12-28 RX ORDER — ACETAMINOPHEN 325 MG/1
650 TABLET ORAL ONCE
Status: CANCELLED | OUTPATIENT
Start: 2020-12-28

## 2020-12-28 RX ORDER — FAMOTIDINE 40 MG/1
40 TABLET, FILM COATED ORAL DAILY
Qty: 30 TABLET | Refills: 11 | Status: SHIPPED | OUTPATIENT
Start: 2020-12-28 | End: 2021-09-05

## 2020-12-28 RX ORDER — DIPHENHYDRAMINE HCL 25 MG
25 CAPSULE ORAL ONCE
Status: CANCELLED | OUTPATIENT
Start: 2020-12-28

## 2020-12-28 RX ORDER — FUROSEMIDE 10 MG/ML
20 INJECTION INTRAMUSCULAR; INTRAVENOUS ONCE
Status: CANCELLED | OUTPATIENT
Start: 2020-12-28

## 2020-12-28 NOTE — Clinical Note
Transfuse 2 units packed cells.  Arrange for unilateral bone marrow aspiration and biopsy.  Return to clinic to review results.

## 2020-12-28 NOTE — PROGRESS NOTES
History of present illness:  The patient is a 79-year-old white gentleman well known to me for polycythemia vera which has been managed with Hydrea and intermittent phlebotomy.  Patient was recently hospitalized and found to have developed pancytopenia.  Hemoglobin was in the 5s and responded well to transfusion of 2 units of packed red blood cells.  Interval bone marrow has been obtained and reveals fibrotic/spent bone marrow.  Blast percentage was not increased.  Patient has started Jakafi 10 mg twice daily, developed thrombocytopenia, and had dose reduced to 5 mg twice daily.  Unfortunately, patient's platelet count continued to fall and Jakafi was eventually held.  Patient is now on Jakafi 5 mg daily and returns to review interval lab.  Prior to [place], patient was hospitalized in [place] with COVID-19 infection, worsening cytopenias, and increase in circulating blasts.  It was recommended that additional bone marrow aspiration and biopsy be pursued.  However, this could not be accomplished during the inpatient stay in light of his active infection.  He returns to clinic for hospital follow-up.  Patient is profoundly weak and trying to transfer his car to ambulate into clinic.  No other new complaints for pertinent findings on a 14 point review systems.    Physical examination:  Well-developed, well-nourished, black male in no acute distress, who has a weight of 154 lb (decreased by 19 lb).  VITAL SIGNS: Documented  and reviewed this visit.  HEENT: Normocephalic, atraumatic. Oral mucosa pink and moist. Lips without   lesions. Tongue midline. Oropharynx clear. Nonicteric sclerae.   NECK: Supple, no adenopathy. No carotid bruits, thyromegaly or thyroid nodule.   HEART: Regular rate and rhythm without murmur, gallop or rub.   LUNGS: Clear to auscultation bilaterally. Normal respiratory effort.   ABDOMEN: Soft, nontender, nondistended with positive normoactive bowel sounds,   no hepatosplenomegaly.   EXTREMITIES:  No cyanosis, clubbing or edema. Distal pulses are intact.     Laboratory:  White count 3.8, hemoglobin 7.5, hematocrit 24.5, platelets 28.  Differential is remarkable 47% granulocytes, 29% lymphocytes, 7% monocytes, 9% bands, 5% metamyelocytes, 3% myelocytes, no blasts, and 11 nucleated red blood cells.  Ferritin is 1004.  D-dimer 0.89.  Sodium 135, potassium 4.9, chloride 106, CO2 21, BUN 29, creatinine 0.9, glucose 98, calcium 9.3, GFR is greater than 60.  CRP 7.48.    Impression:  1.  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  2.  Pancytopenia secondary to number 1.  3.  Atrial fibrillation/currently not receiving chronic anticoagulation.  4.  Fall without injury.    Plan:  1.  Transfuse 2 units of packed cells with typical pre meds and Lasix between units.  2.  Arrange for outpatient, unilateral, bone marrow aspiration and biopsy.  3.  Return to clinic to review results at earliest convenience.    This note was created using voice recognition software and may contain grammatical errors.

## 2020-12-28 NOTE — PROGRESS NOTES
Subjective:       Patient ID: Morro Sharma is a 80 y.o. male.    Chief Complaint: Hospital Follow Up    Patient presents today for hospital follow-up.  He tested positive for COVID-19 on 12/12/2020.  Patient was eventually admitted to the hospital on 12/19/2020.  Patient has controlled hypertension, paroxysmal atrial fibrillation, and polycythemia vera.  Upon emergency room evaluation patient's pulse ox was 86%.  He was started on 6 L of nasal cannula oxygenation.  Patient has had severely elevated inflammatory markers and chest x-ray with bilateral diffuse ground-glass appearance.  Patient was admitted in received 5 days of room the severe.  Labs were monitored over the course of the hospitalization.  Platelet count was stable during admission and was 30 on discharge.  It was recommended by hematology oncology to have bone marrow biopsy after COVID resolved.  Since discharge patient has been convalescing at home.  He states that he has been in bed for the most part.  He states that he still feels quite lethargic and weak.  He does become short of breath at times with exertion.  He still has slight dry cough.  He has used albuterol only 1 time.  Patient states that his appetite is improving.  He has been afebrile.  He has had no nausea vomiting or diarrhea.  Patient has lost approximately 18-20 lb since his COVID diagnosis.  He is scheduled to see is hematologist today.   Patient states that he developed symptoms of heartburn.  He states that the symptoms began this morning while on the way to the clinic.  He is complaining of a burning sensation over the left side of the chest.  He is complaining of increased belching.  He states that he typically takes Rolaids or Tums for the symptoms.  He denies feeling short of breath at the present time.  He denies nausea vomiting or diaphoresis.  Pain is located in the left side of the chest.  Pain does not radiate.  He describes the pain as burning. Patients patient  medical/surgical, social and family histories have been reviewed       Review of Systems   Constitutional: Positive for unexpected weight change. Negative for activity change, appetite change and fatigue.   Respiratory: Positive for cough and shortness of breath. Negative for chest tightness and wheezing.    Cardiovascular: Positive for chest pain. Negative for palpitations and leg swelling.   Gastrointestinal: Negative for abdominal pain, anal bleeding, blood in stool, constipation, diarrhea and nausea.        Positive for heartburn      Endocrine: Negative for polydipsia and polyuria.   Genitourinary: Negative for difficulty urinating, frequency, hematuria and urgency.   Musculoskeletal: Negative for arthralgias.   Skin: Negative for rash.   Neurological: Negative for dizziness and headaches.   Psychiatric/Behavioral: Negative for dysphoric mood. The patient is not nervous/anxious.        Objective:      Physical Exam  Vitals signs reviewed.   Constitutional:       General: He is not in acute distress.     Appearance: He is well-developed.   HENT:      Head: Normocephalic and atraumatic.   Eyes:      General: No scleral icterus.     Conjunctiva/sclera: Conjunctivae normal.   Neck:      Vascular: No carotid bruit.   Cardiovascular:      Rate and Rhythm: Normal rate and regular rhythm.      Heart sounds: Normal heart sounds.   Pulmonary:      Effort: Pulmonary effort is normal.      Breath sounds: Normal breath sounds.   Abdominal:      General: Bowel sounds are normal.      Palpations: Abdomen is soft.      Tenderness: There is no abdominal tenderness.   Musculoskeletal:      Right lower leg: No edema.      Left lower leg: No edema.   Skin:     General: Skin is warm and dry.      Capillary Refill: Capillary refill takes less than 2 seconds.   Neurological:      Mental Status: He is alert.   Psychiatric:         Behavior: Behavior is cooperative.         Assessment:       1. Hospital discharge follow-up    2. Atrial  fibrillation, unspecified type    3. COVID-19    4. Pneumonia due to COVID-19 virus    5. Acute hypoxemic respiratory failure    6. General weakness    7. Gastroesophageal reflux disease, unspecified whether esophagitis present        Plan:       Morro was seen today for hospital follow up.    Diagnoses and all orders for this visit:    Hospital discharge follow-up    Atrial fibrillation, unspecified type  -     IN OFFICE EKG 12-LEAD (to Muse)  Continue current medications and follow-up with cardiology  Patient does not have any anti coagulants listed on chart.  We will communicate with home health for updated med list  COVID-19    Pneumonia due to COVID-19 virus  -     Ambulatory referral/consult to Home Health; Future    Acute hypoxemic respiratory failure  -     Ambulatory referral/consult to Home Health; Future  Continue oxygen.  Would appreciate respiratory therapy via home health recommendations  Follow-up in approximately 4 weeks to determine if he is ready for 6 min walk test.  General weakness, needs physical and occupational therapy  -     Ambulatory referral/consult to Home Health; Future    Gastroesophageal reflux disease, unspecified whether esophagitis present  -     famotidine (PEPCID) 40 MG tablet; Take 1 tablet (40 mg total) by mouth once daily.    Other orders  -     (pyxis) gi cocktail (mylanta 30 mL, lidocaine 2 % viscous 10 mL, dicyclomine 10 mL) 50 mL     approximately 20 min after GI cocktail was given patient reports resolution of his symptoms.

## 2020-12-29 ENCOUNTER — PATIENT MESSAGE (OUTPATIENT)
Dept: ADMINISTRATIVE | Facility: OTHER | Age: 80
End: 2020-12-29

## 2020-12-29 PROCEDURE — G0180 PR HOME HEALTH MD CERTIFICATION: ICD-10-PCS | Mod: ,,, | Performed by: FAMILY MEDICINE

## 2020-12-29 PROCEDURE — G0180 MD CERTIFICATION HHA PATIENT: HCPCS | Mod: ,,, | Performed by: FAMILY MEDICINE

## 2020-12-30 ENCOUNTER — PATIENT MESSAGE (OUTPATIENT)
Dept: ADMINISTRATIVE | Facility: OTHER | Age: 80
End: 2020-12-30

## 2020-12-30 DIAGNOSIS — D61.818 PANCYTOPENIA: Primary | ICD-10-CM

## 2020-12-30 DIAGNOSIS — D45 POLYCYTHEMIA VERA: ICD-10-CM

## 2020-12-30 LAB — PATH REV BLD -IMP: NORMAL

## 2020-12-31 ENCOUNTER — PATIENT MESSAGE (OUTPATIENT)
Dept: ADMINISTRATIVE | Facility: OTHER | Age: 80
End: 2020-12-31

## 2020-12-31 ENCOUNTER — NURSE TRIAGE (OUTPATIENT)
Dept: ADMINISTRATIVE | Facility: CLINIC | Age: 80
End: 2020-12-31

## 2021-01-01 ENCOUNTER — PATIENT MESSAGE (OUTPATIENT)
Dept: ADMINISTRATIVE | Facility: OTHER | Age: 81
End: 2021-01-01

## 2021-01-01 ENCOUNTER — TELEPHONE (OUTPATIENT)
Dept: ADMINISTRATIVE | Facility: CLINIC | Age: 81
End: 2021-01-01

## 2021-01-02 ENCOUNTER — PATIENT MESSAGE (OUTPATIENT)
Dept: ADMINISTRATIVE | Facility: OTHER | Age: 81
End: 2021-01-02

## 2021-01-03 ENCOUNTER — PATIENT MESSAGE (OUTPATIENT)
Dept: ADMINISTRATIVE | Facility: OTHER | Age: 81
End: 2021-01-03

## 2021-01-03 ENCOUNTER — ANESTHESIA EVENT (OUTPATIENT)
Dept: SURGERY | Facility: HOSPITAL | Age: 81
End: 2021-01-03
Payer: MEDICARE

## 2021-01-03 ENCOUNTER — TELEPHONE (OUTPATIENT)
Dept: ADMINISTRATIVE | Facility: CLINIC | Age: 81
End: 2021-01-03

## 2021-01-04 ENCOUNTER — HOSPITAL ENCOUNTER (OUTPATIENT)
Facility: HOSPITAL | Age: 81
Discharge: HOME | End: 2021-01-04
Attending: INTERNAL MEDICINE | Admitting: INTERNAL MEDICINE
Payer: MEDICARE

## 2021-01-04 ENCOUNTER — ANESTHESIA (OUTPATIENT)
Dept: SURGERY | Facility: HOSPITAL | Age: 81
End: 2021-01-04
Payer: MEDICARE

## 2021-01-04 ENCOUNTER — PATIENT MESSAGE (OUTPATIENT)
Dept: ADMINISTRATIVE | Facility: OTHER | Age: 81
End: 2021-01-04

## 2021-01-04 ENCOUNTER — PATIENT MESSAGE (OUTPATIENT)
Dept: ADMINISTRATIVE | Facility: CLINIC | Age: 81
End: 2021-01-04

## 2021-01-04 VITALS
HEIGHT: 67 IN | HEART RATE: 81 BPM | RESPIRATION RATE: 13 BRPM | BODY MASS INDEX: 24.33 KG/M2 | SYSTOLIC BLOOD PRESSURE: 124 MMHG | OXYGEN SATURATION: 99 % | TEMPERATURE: 98 F | DIASTOLIC BLOOD PRESSURE: 63 MMHG | WEIGHT: 155 LBS

## 2021-01-04 DIAGNOSIS — D45 POLYCYTHEMIA VERA: Primary | ICD-10-CM

## 2021-01-04 DIAGNOSIS — D45: ICD-10-CM

## 2021-01-04 PROCEDURE — 88313 SPECIAL STAINS GROUP 2: CPT | Mod: 26,,, | Performed by: PATHOLOGY

## 2021-01-04 PROCEDURE — 88313 PR  SPECIAL STAINS,GROUP II: ICD-10-PCS | Mod: 26,,, | Performed by: PATHOLOGY

## 2021-01-04 PROCEDURE — 37000008 HC ANESTHESIA 1ST 15 MINUTES: Mod: PO | Performed by: INTERNAL MEDICINE

## 2021-01-04 PROCEDURE — 88342 IMHCHEM/IMCYTCHM 1ST ANTB: CPT | Mod: 26,59,, | Performed by: PATHOLOGY

## 2021-01-04 PROCEDURE — 25000003 PHARM REV CODE 250: Mod: PO | Performed by: NURSE ANESTHETIST, CERTIFIED REGISTERED

## 2021-01-04 PROCEDURE — 85097 BONE MARROW INTERPRETATION: CPT | Mod: ,,, | Performed by: PATHOLOGY

## 2021-01-04 PROCEDURE — D9220A PRA ANESTHESIA: ICD-10-PCS | Mod: CRNA,,, | Performed by: NURSE ANESTHETIST, CERTIFIED REGISTERED

## 2021-01-04 PROCEDURE — 63600175 PHARM REV CODE 636 W HCPCS: Mod: PO | Performed by: NURSE ANESTHETIST, CERTIFIED REGISTERED

## 2021-01-04 PROCEDURE — 88311 DECALCIFY TISSUE: CPT | Performed by: PATHOLOGY

## 2021-01-04 PROCEDURE — 71000033 HC RECOVERY, INTIAL HOUR: Mod: PO | Performed by: INTERNAL MEDICINE

## 2021-01-04 PROCEDURE — 88341 IMHCHEM/IMCYTCHM EA ADD ANTB: CPT | Mod: 26,,, | Performed by: PATHOLOGY

## 2021-01-04 PROCEDURE — 37000009 HC ANESTHESIA EA ADD 15 MINS: Mod: PO | Performed by: INTERNAL MEDICINE

## 2021-01-04 PROCEDURE — 38221 PR BONE MARROW BIOPSY(IES); DIAGNOSTIC: ICD-10-PCS | Mod: LT,,, | Performed by: INTERNAL MEDICINE

## 2021-01-04 PROCEDURE — 38221 DX BONE MARROW BIOPSIES: CPT | Mod: LT,,, | Performed by: INTERNAL MEDICINE

## 2021-01-04 PROCEDURE — 88313 SPECIAL STAINS GROUP 2: CPT | Mod: 59 | Performed by: PATHOLOGY

## 2021-01-04 PROCEDURE — 85097 PR  BONE MARROW,SMEAR INTERPRETATION: ICD-10-PCS | Mod: ,,, | Performed by: PATHOLOGY

## 2021-01-04 PROCEDURE — 88342 CHG IMMUNOCYTOCHEMISTRY: ICD-10-PCS | Mod: 26,59,, | Performed by: PATHOLOGY

## 2021-01-04 PROCEDURE — 88305 TISSUE EXAM BY PATHOLOGIST: ICD-10-PCS | Mod: 26,,, | Performed by: PATHOLOGY

## 2021-01-04 PROCEDURE — D9220A PRA ANESTHESIA: Mod: ANES,,, | Performed by: ANESTHESIOLOGY

## 2021-01-04 PROCEDURE — 88305 TISSUE EXAM BY PATHOLOGIST: CPT | Performed by: PATHOLOGY

## 2021-01-04 PROCEDURE — 88311 DECALCIFY TISSUE: CPT | Mod: 26,,, | Performed by: PATHOLOGY

## 2021-01-04 PROCEDURE — 88305 TISSUE EXAM BY PATHOLOGIST: CPT | Mod: 26,,, | Performed by: PATHOLOGY

## 2021-01-04 PROCEDURE — 88341 PR IHC OR ICC EACH ADD'L SINGLE ANTIBODY  STAINPR: ICD-10-PCS | Mod: 26,,, | Performed by: PATHOLOGY

## 2021-01-04 PROCEDURE — 36000705 HC OR TIME LEV I EA ADD 15 MIN: Mod: PO | Performed by: INTERNAL MEDICINE

## 2021-01-04 PROCEDURE — 71000015 HC POSTOP RECOV 1ST HR: Mod: PO | Performed by: INTERNAL MEDICINE

## 2021-01-04 PROCEDURE — 88342 IMHCHEM/IMCYTCHM 1ST ANTB: CPT | Performed by: PATHOLOGY

## 2021-01-04 PROCEDURE — D9220A PRA ANESTHESIA: Mod: CRNA,,, | Performed by: NURSE ANESTHETIST, CERTIFIED REGISTERED

## 2021-01-04 PROCEDURE — 88341 IMHCHEM/IMCYTCHM EA ADD ANTB: CPT | Mod: 59 | Performed by: PATHOLOGY

## 2021-01-04 PROCEDURE — 36000704 HC OR TIME LEV I 1ST 15 MIN: Mod: PO | Performed by: INTERNAL MEDICINE

## 2021-01-04 PROCEDURE — D9220A PRA ANESTHESIA: ICD-10-PCS | Mod: ANES,,, | Performed by: ANESTHESIOLOGY

## 2021-01-04 PROCEDURE — 88311 PR  DECALCIFY TISSUE: ICD-10-PCS | Mod: 26,,, | Performed by: PATHOLOGY

## 2021-01-04 RX ORDER — LIDOCAINE HCL/PF 100 MG/5ML
SYRINGE (ML) INTRAVENOUS
Status: DISCONTINUED | OUTPATIENT
Start: 2021-01-04 | End: 2021-01-04

## 2021-01-04 RX ORDER — MIDAZOLAM HYDROCHLORIDE 1 MG/ML
INJECTION INTRAMUSCULAR; INTRAVENOUS
Status: DISCONTINUED | OUTPATIENT
Start: 2021-01-04 | End: 2021-01-04

## 2021-01-04 RX ORDER — FENTANYL CITRATE 50 UG/ML
INJECTION, SOLUTION INTRAMUSCULAR; INTRAVENOUS
Status: DISCONTINUED | OUTPATIENT
Start: 2021-01-04 | End: 2021-01-04

## 2021-01-04 RX ORDER — PROPOFOL 10 MG/ML
INJECTION, EMULSION INTRAVENOUS
Status: DISCONTINUED | OUTPATIENT
Start: 2021-01-04 | End: 2021-01-04

## 2021-01-04 RX ORDER — ONDANSETRON 2 MG/ML
INJECTION INTRAMUSCULAR; INTRAVENOUS
Status: DISCONTINUED | OUTPATIENT
Start: 2021-01-04 | End: 2021-01-04

## 2021-01-04 RX ADMIN — MIDAZOLAM HYDROCHLORIDE 0.5 MG: 1 INJECTION, SOLUTION INTRAMUSCULAR; INTRAVENOUS at 06:01

## 2021-01-04 RX ADMIN — FENTANYL CITRATE 50 MCG: 50 INJECTION, SOLUTION INTRAMUSCULAR; INTRAVENOUS at 07:01

## 2021-01-04 RX ADMIN — ONDANSETRON 4 MG: 2 INJECTION, SOLUTION INTRAMUSCULAR; INTRAVENOUS at 07:01

## 2021-01-04 RX ADMIN — PROPOFOL 20 MG: 10 INJECTION, EMULSION INTRAVENOUS at 07:01

## 2021-01-04 RX ADMIN — PROPOFOL 40 MG: 10 INJECTION, EMULSION INTRAVENOUS at 07:01

## 2021-01-04 RX ADMIN — SODIUM CHLORIDE, SODIUM LACTATE, POTASSIUM CHLORIDE, AND CALCIUM CHLORIDE: .6; .31; .03; .02 INJECTION, SOLUTION INTRAVENOUS at 06:01

## 2021-01-04 RX ADMIN — LIDOCAINE HYDROCHLORIDE 100 MG: 20 INJECTION PARENTERAL at 07:01

## 2021-01-05 ENCOUNTER — PATIENT MESSAGE (OUTPATIENT)
Dept: ADMINISTRATIVE | Facility: OTHER | Age: 81
End: 2021-01-05

## 2021-01-05 ENCOUNTER — NURSE TRIAGE (OUTPATIENT)
Dept: ADMINISTRATIVE | Facility: CLINIC | Age: 81
End: 2021-01-05

## 2021-01-06 ENCOUNTER — PATIENT MESSAGE (OUTPATIENT)
Dept: ADMINISTRATIVE | Facility: OTHER | Age: 81
End: 2021-01-06

## 2021-01-06 ENCOUNTER — TELEPHONE (OUTPATIENT)
Dept: HEMATOLOGY/ONCOLOGY | Facility: CLINIC | Age: 81
End: 2021-01-06

## 2021-01-07 ENCOUNTER — PATIENT MESSAGE (OUTPATIENT)
Dept: ADMINISTRATIVE | Facility: OTHER | Age: 81
End: 2021-01-07

## 2021-01-08 ENCOUNTER — TELEPHONE (OUTPATIENT)
Dept: HEMATOLOGY/ONCOLOGY | Facility: CLINIC | Age: 81
End: 2021-01-08

## 2021-01-08 ENCOUNTER — PATIENT MESSAGE (OUTPATIENT)
Dept: ADMINISTRATIVE | Facility: OTHER | Age: 81
End: 2021-01-08

## 2021-01-08 ENCOUNTER — PATIENT MESSAGE (OUTPATIENT)
Dept: HEMATOLOGY/ONCOLOGY | Facility: CLINIC | Age: 81
End: 2021-01-08

## 2021-01-08 ENCOUNTER — NURSE TRIAGE (OUTPATIENT)
Dept: ADMINISTRATIVE | Facility: CLINIC | Age: 81
End: 2021-01-08

## 2021-01-08 DIAGNOSIS — D64.9 SYMPTOMATIC ANEMIA: ICD-10-CM

## 2021-01-08 DIAGNOSIS — D45 POLYCYTHEMIA VERA: Primary | ICD-10-CM

## 2021-01-11 ENCOUNTER — OFFICE VISIT (OUTPATIENT)
Dept: HEMATOLOGY/ONCOLOGY | Facility: CLINIC | Age: 81
End: 2021-01-11
Payer: MEDICARE

## 2021-01-11 ENCOUNTER — LAB VISIT (OUTPATIENT)
Dept: LAB | Facility: HOSPITAL | Age: 81
End: 2021-01-11
Attending: INTERNAL MEDICINE
Payer: MEDICARE

## 2021-01-11 ENCOUNTER — DOCUMENTATION ONLY (OUTPATIENT)
Dept: HEMATOLOGY/ONCOLOGY | Facility: CLINIC | Age: 81
End: 2021-01-11

## 2021-01-11 VITALS
OXYGEN SATURATION: 97 % | BODY MASS INDEX: 24.92 KG/M2 | WEIGHT: 158.75 LBS | DIASTOLIC BLOOD PRESSURE: 51 MMHG | RESPIRATION RATE: 18 BRPM | HEART RATE: 107 BPM | TEMPERATURE: 99 F | SYSTOLIC BLOOD PRESSURE: 111 MMHG | HEIGHT: 67 IN

## 2021-01-11 DIAGNOSIS — D45 POLYCYTHEMIA VERA: ICD-10-CM

## 2021-01-11 DIAGNOSIS — D46.9 MDS (MYELODYSPLASTIC SYNDROME): Primary | ICD-10-CM

## 2021-01-11 DIAGNOSIS — D61.818 ACQUIRED PANCYTOPENIA: ICD-10-CM

## 2021-01-11 DIAGNOSIS — D64.9 SYMPTOMATIC ANEMIA: ICD-10-CM

## 2021-01-11 DIAGNOSIS — D46.9 MDS (MYELODYSPLASTIC SYNDROME): ICD-10-CM

## 2021-01-11 DIAGNOSIS — D64.9 SYMPTOMATIC ANEMIA: Primary | ICD-10-CM

## 2021-01-11 DIAGNOSIS — D75.0: ICD-10-CM

## 2021-01-11 LAB
ABO + RH BLD: NORMAL
ALBUMIN SERPL BCP-MCNC: 3.5 G/DL (ref 3.5–5.2)
ALP SERPL-CCNC: 141 U/L (ref 38–145)
ALT SERPL W/O P-5'-P-CCNC: 29 U/L (ref 0–50)
ANION GAP SERPL CALC-SCNC: 7 MMOL/L (ref 8–16)
ANISOCYTOSIS BLD QL SMEAR: SLIGHT
AST SERPL-CCNC: 35 U/L (ref 17–59)
BASOPHILS NFR BLD: 0 % (ref 0–1.9)
BILIRUB SERPL-MCNC: 1.8 MG/DL (ref 0.2–1.3)
BLD GP AB SCN CELLS X3 SERPL QL: NORMAL
CALCIUM SERPL-MCNC: 9.3 MG/DL (ref 8.4–10.2)
CHLORIDE SERPL-SCNC: 101 MMOL/L (ref 95–110)
CO2 SERPL-SCNC: 24 MMOL/L (ref 22–31)
CREAT SERPL-MCNC: 0.96 MG/DL (ref 0.5–1.4)
DACRYOCYTES BLD QL SMEAR: ABNORMAL
DIFFERENTIAL METHOD: ABNORMAL
EOSINOPHIL NFR BLD: 0 % (ref 0–8)
ERYTHROCYTE [DISTWIDTH] IN BLOOD BY AUTOMATED COUNT: 20.9 % (ref 11.5–14.5)
EST. GFR  (AFRICAN AMERICAN): >60 ML/MIN/1.73 M^2
EST. GFR  (NON AFRICAN AMERICAN): >60 ML/MIN/1.73 M^2
GLUCOSE SERPL-MCNC: 230 MG/DL (ref 70–110)
HCT VFR BLD AUTO: 18.1 % (ref 40–54)
HGB BLD-MCNC: 5.3 G/DL (ref 14–18)
IMM GRANULOCYTES # BLD AUTO: ABNORMAL K/UL (ref 0–0.04)
IMM GRANULOCYTES NFR BLD AUTO: ABNORMAL % (ref 0–0.5)
LDH SERPL L TO P-CCNC: 1735 U/L (ref 313–618)
LYMPHOCYTES NFR BLD: 15 % (ref 18–48)
MAGNESIUM SERPL-MCNC: 2.1 MG/DL (ref 1.6–2.6)
MCH RBC QN AUTO: 26.5 PG (ref 27–31)
MCHC RBC AUTO-ENTMCNC: 29.3 G/DL (ref 32–36)
MCV RBC AUTO: 91 FL (ref 82–98)
MONOCYTES NFR BLD: 17 % (ref 4–15)
NEUTROPHILS # BLD AUTO: 3.1 K/UL (ref 1.8–7.7)
NEUTROPHILS NFR BLD: 68 % (ref 38–73)
NRBC BLD-RTO: 11 /100 WBC
OVALOCYTES BLD QL SMEAR: ABNORMAL
PLATELET # BLD AUTO: 21 K/UL (ref 150–350)
PLATELET BLD QL SMEAR: ABNORMAL
PMV BLD AUTO: ABNORMAL FL (ref 9.2–12.9)
POIKILOCYTOSIS BLD QL SMEAR: SLIGHT
POLYCHROMASIA BLD QL SMEAR: ABNORMAL
POTASSIUM SERPL-SCNC: 4.3 MMOL/L (ref 3.5–5.1)
PROT SERPL-MCNC: 6.5 G/DL (ref 6–8.4)
RBC # BLD AUTO: 2 M/UL (ref 4.6–6.2)
SODIUM SERPL-SCNC: 132 MMOL/L (ref 136–145)
UUN UR-MCNC: 14 MG/DL (ref 9–21)
WBC # BLD AUTO: 4.6 K/UL (ref 3.9–12.7)

## 2021-01-11 PROCEDURE — 85007 BL SMEAR W/DIFF WBC COUNT: CPT

## 2021-01-11 PROCEDURE — 85027 COMPLETE CBC AUTOMATED: CPT | Mod: PN

## 2021-01-11 PROCEDURE — 1159F MED LIST DOCD IN RCRD: CPT | Mod: S$GLB,,, | Performed by: INTERNAL MEDICINE

## 2021-01-11 PROCEDURE — 83615 LACTATE (LD) (LDH) ENZYME: CPT

## 2021-01-11 PROCEDURE — 30000890 MAYO MISCELLANEOUS TEST (REFLEX): Mod: 59

## 2021-01-11 PROCEDURE — 3288F FALL RISK ASSESSMENT DOCD: CPT | Mod: CPTII,S$GLB,, | Performed by: INTERNAL MEDICINE

## 2021-01-11 PROCEDURE — 86900 BLOOD TYPING SEROLOGIC ABO: CPT

## 2021-01-11 PROCEDURE — 1125F AMNT PAIN NOTED PAIN PRSNT: CPT | Mod: S$GLB,,, | Performed by: INTERNAL MEDICINE

## 2021-01-11 PROCEDURE — 3288F PR FALLS RISK ASSESSMENT DOCUMENTED: ICD-10-PCS | Mod: CPTII,S$GLB,, | Performed by: INTERNAL MEDICINE

## 2021-01-11 PROCEDURE — 83735 ASSAY OF MAGNESIUM: CPT

## 2021-01-11 PROCEDURE — 85007 BL SMEAR W/DIFF WBC COUNT: CPT | Mod: PN

## 2021-01-11 PROCEDURE — 83735 ASSAY OF MAGNESIUM: CPT | Mod: PN

## 2021-01-11 PROCEDURE — 1159F PR MEDICATION LIST DOCUMENTED IN MEDICAL RECORD: ICD-10-PCS | Mod: S$GLB,,, | Performed by: INTERNAL MEDICINE

## 2021-01-11 PROCEDURE — 86900 BLOOD TYPING SEROLOGIC ABO: CPT | Mod: PN

## 2021-01-11 PROCEDURE — 99999 PR PBB SHADOW E&M-EST. PATIENT-LVL V: CPT | Mod: PBBFAC,,, | Performed by: INTERNAL MEDICINE

## 2021-01-11 PROCEDURE — 80053 COMPREHEN METABOLIC PANEL: CPT | Mod: PN

## 2021-01-11 PROCEDURE — 3078F DIAST BP <80 MM HG: CPT | Mod: CPTII,S$GLB,, | Performed by: INTERNAL MEDICINE

## 2021-01-11 PROCEDURE — 1125F PR PAIN SEVERITY QUANTIFIED, PAIN PRESENT: ICD-10-PCS | Mod: S$GLB,,, | Performed by: INTERNAL MEDICINE

## 2021-01-11 PROCEDURE — 99999 PR PBB SHADOW E&M-EST. PATIENT-LVL V: ICD-10-PCS | Mod: PBBFAC,,, | Performed by: INTERNAL MEDICINE

## 2021-01-11 PROCEDURE — 99215 PR OFFICE/OUTPT VISIT, EST, LEVL V, 40-54 MIN: ICD-10-PCS | Mod: S$GLB,,, | Performed by: INTERNAL MEDICINE

## 2021-01-11 PROCEDURE — 30000890 HC MISC. SEND OUT TEST: Mod: 59

## 2021-01-11 PROCEDURE — 88189 FLOWCYTOMETRY/READ 16 & >: CPT | Mod: ,,, | Performed by: PATHOLOGY

## 2021-01-11 PROCEDURE — 1101F PT FALLS ASSESS-DOCD LE1/YR: CPT | Mod: CPTII,S$GLB,, | Performed by: INTERNAL MEDICINE

## 2021-01-11 PROCEDURE — 88185 FLOWCYTOMETRY/TC ADD-ON: CPT | Performed by: PATHOLOGY

## 2021-01-11 PROCEDURE — 3074F SYST BP LT 130 MM HG: CPT | Mod: CPTII,S$GLB,, | Performed by: INTERNAL MEDICINE

## 2021-01-11 PROCEDURE — 3074F PR MOST RECENT SYSTOLIC BLOOD PRESSURE < 130 MM HG: ICD-10-PCS | Mod: CPTII,S$GLB,, | Performed by: INTERNAL MEDICINE

## 2021-01-11 PROCEDURE — 3078F PR MOST RECENT DIASTOLIC BLOOD PRESSURE < 80 MM HG: ICD-10-PCS | Mod: CPTII,S$GLB,, | Performed by: INTERNAL MEDICINE

## 2021-01-11 PROCEDURE — 88184 FLOWCYTOMETRY/ TC 1 MARKER: CPT | Performed by: PATHOLOGY

## 2021-01-11 PROCEDURE — 99215 OFFICE O/P EST HI 40 MIN: CPT | Mod: S$GLB,,, | Performed by: INTERNAL MEDICINE

## 2021-01-11 PROCEDURE — 80053 COMPREHEN METABOLIC PANEL: CPT

## 2021-01-11 PROCEDURE — 85027 COMPLETE CBC AUTOMATED: CPT

## 2021-01-11 PROCEDURE — 1101F PR PT FALLS ASSESS DOC 0-1 FALLS W/OUT INJ PAST YR: ICD-10-PCS | Mod: CPTII,S$GLB,, | Performed by: INTERNAL MEDICINE

## 2021-01-11 PROCEDURE — 88275 CYTOGENETICS 100-300: CPT | Mod: 59

## 2021-01-11 PROCEDURE — 88271 CYTOGENETICS DNA PROBE: CPT | Mod: 59

## 2021-01-11 PROCEDURE — 83615 LACTATE (LD) (LDH) ENZYME: CPT | Mod: PN

## 2021-01-11 PROCEDURE — 88189 PR  FLOWCYTOMETRY/READ, 16 & > MARKERS: ICD-10-PCS | Mod: ,,, | Performed by: PATHOLOGY

## 2021-01-11 RX ORDER — HYDROCODONE BITARTRATE AND ACETAMINOPHEN 500; 5 MG/1; MG/1
TABLET ORAL ONCE
Status: CANCELLED | OUTPATIENT
Start: 2021-01-11 | End: 2021-01-11

## 2021-01-11 RX ORDER — ACETAMINOPHEN 325 MG/1
650 TABLET ORAL ONCE
Status: CANCELLED | OUTPATIENT
Start: 2021-01-11

## 2021-01-11 RX ORDER — FUROSEMIDE 10 MG/ML
20 INJECTION INTRAMUSCULAR; INTRAVENOUS ONCE
Status: CANCELLED | OUTPATIENT
Start: 2021-01-11

## 2021-01-11 RX ORDER — DIPHENHYDRAMINE HCL 25 MG
25 CAPSULE ORAL ONCE
Status: CANCELLED | OUTPATIENT
Start: 2021-01-11

## 2021-01-12 ENCOUNTER — SPECIALTY PHARMACY (OUTPATIENT)
Dept: PHARMACY | Facility: CLINIC | Age: 81
End: 2021-01-12

## 2021-01-12 ENCOUNTER — TELEPHONE (OUTPATIENT)
Dept: ADMINISTRATIVE | Facility: CLINIC | Age: 81
End: 2021-01-12

## 2021-01-12 DIAGNOSIS — D45 POLYCYTHEMIA VERA: Primary | ICD-10-CM

## 2021-01-12 LAB
FLOW CYTOMETRY ANTIBODIES ANALYZED - BLOOD: NORMAL
FLOW CYTOMETRY COMMENT - BLOOD: NORMAL
FLOW CYTOMETRY INTERPRETATION - BLOOD: NORMAL

## 2021-01-13 ENCOUNTER — PATIENT MESSAGE (OUTPATIENT)
Dept: ADMINISTRATIVE | Facility: OTHER | Age: 81
End: 2021-01-13

## 2021-01-13 ENCOUNTER — NURSE TRIAGE (OUTPATIENT)
Dept: ADMINISTRATIVE | Facility: CLINIC | Age: 81
End: 2021-01-13

## 2021-01-14 ENCOUNTER — PATIENT MESSAGE (OUTPATIENT)
Dept: ADMINISTRATIVE | Facility: OTHER | Age: 81
End: 2021-01-14

## 2021-01-14 ENCOUNTER — NURSE TRIAGE (OUTPATIENT)
Dept: ADMINISTRATIVE | Facility: CLINIC | Age: 81
End: 2021-01-14

## 2021-01-14 ENCOUNTER — HOSPITAL ENCOUNTER (INPATIENT)
Facility: HOSPITAL | Age: 81
LOS: 3 days | Discharge: HOME HEALTH | DRG: 864 | End: 2021-01-18
Attending: EMERGENCY MEDICINE | Admitting: INTERNAL MEDICINE
Payer: MEDICARE

## 2021-01-14 DIAGNOSIS — Z91.89 AT HIGH RISK FOR BLEEDING: ICD-10-CM

## 2021-01-14 DIAGNOSIS — R50.9 FEVER: ICD-10-CM

## 2021-01-14 DIAGNOSIS — R50.9 FEVER, UNSPECIFIED FEVER CAUSE: ICD-10-CM

## 2021-01-14 DIAGNOSIS — Z86.16 HISTORY OF COVID-19: ICD-10-CM

## 2021-01-14 DIAGNOSIS — D45 POLYCYTHEMIA VERA: Primary | ICD-10-CM

## 2021-01-14 DIAGNOSIS — D61.818 PANCYTOPENIA: ICD-10-CM

## 2021-01-14 DIAGNOSIS — D64.9 SYMPTOMATIC ANEMIA: ICD-10-CM

## 2021-01-14 DIAGNOSIS — D64.9 TRANSFUSION-DEPENDENT ANEMIA: ICD-10-CM

## 2021-01-14 PROCEDURE — 99285 EMERGENCY DEPT VISIT HI MDM: CPT | Mod: 25

## 2021-01-15 PROBLEM — R50.9 FEVER: Status: ACTIVE | Noted: 2021-01-15

## 2021-01-15 LAB
ALBUMIN SERPL BCP-MCNC: 2.7 G/DL (ref 3.5–5.2)
ALP SERPL-CCNC: 137 U/L (ref 55–135)
ALT SERPL W/O P-5'-P-CCNC: 28 U/L (ref 10–44)
ANION GAP SERPL CALC-SCNC: 9 MMOL/L (ref 8–16)
ANION GAP SERPL CALC-SCNC: 9 MMOL/L (ref 8–16)
ANISOCYTOSIS BLD QL SMEAR: SLIGHT
ANISOCYTOSIS BLD QL SMEAR: SLIGHT
APTT PPP: 47.7 SEC (ref 23.6–33.3)
AST SERPL-CCNC: 31 U/L (ref 10–40)
BASOPHILS NFR BLD: 0 % (ref 0–1.9)
BASOPHILS NFR BLD: 0 % (ref 0–1.9)
BILIRUB SERPL-MCNC: 1.6 MG/DL (ref 0.1–1)
BILIRUB UR QL STRIP: NEGATIVE
BLASTS NFR BLD MANUAL: 3 %
BLD PROD TYP BPU: NORMAL
BLOOD UNIT EXPIRATION DATE: NORMAL
BLOOD UNIT TYPE CODE: 7300
BLOOD UNIT TYPE: NORMAL
BNP SERPL-MCNC: 22 PG/ML (ref 0–99)
BUN SERPL-MCNC: 10 MG/DL (ref 8–23)
BUN SERPL-MCNC: 11 MG/DL (ref 8–23)
CALCIUM SERPL-MCNC: 8.4 MG/DL (ref 8.7–10.5)
CALCIUM SERPL-MCNC: 8.5 MG/DL (ref 8.7–10.5)
CHLORIDE SERPL-SCNC: 103 MMOL/L (ref 95–110)
CHLORIDE SERPL-SCNC: 106 MMOL/L (ref 95–110)
CLARITY UR: CLEAR
CO2 SERPL-SCNC: 20 MMOL/L (ref 23–29)
CO2 SERPL-SCNC: 22 MMOL/L (ref 23–29)
CODING SYSTEM: NORMAL
COLOR UR: YELLOW
CREAT SERPL-MCNC: 0.9 MG/DL (ref 0.5–1.4)
CREAT SERPL-MCNC: 0.9 MG/DL (ref 0.5–1.4)
CRP SERPL-MCNC: 10.3 MG/DL
DACRYOCYTES BLD QL SMEAR: ABNORMAL
DIFFERENTIAL METHOD: ABNORMAL
DIFFERENTIAL METHOD: ABNORMAL
DISPENSE STATUS: NORMAL
EOSINOPHIL NFR BLD: 0 % (ref 0–8)
EOSINOPHIL NFR BLD: 1 % (ref 0–8)
ERYTHROCYTE [DISTWIDTH] IN BLOOD BY AUTOMATED COUNT: 17.4 % (ref 11.5–14.5)
ERYTHROCYTE [DISTWIDTH] IN BLOOD BY AUTOMATED COUNT: 17.4 % (ref 11.5–14.5)
EST. GFR  (AFRICAN AMERICAN): >60 ML/MIN/1.73 M^2
EST. GFR  (AFRICAN AMERICAN): >60 ML/MIN/1.73 M^2
EST. GFR  (NON AFRICAN AMERICAN): >60 ML/MIN/1.73 M^2
EST. GFR  (NON AFRICAN AMERICAN): >60 ML/MIN/1.73 M^2
GLUCOSE SERPL-MCNC: 112 MG/DL (ref 70–110)
GLUCOSE SERPL-MCNC: 170 MG/DL (ref 70–110)
GLUCOSE UR QL STRIP: NEGATIVE
HCT VFR BLD AUTO: 25.2 % (ref 40–54)
HCT VFR BLD AUTO: 25.5 % (ref 40–54)
HGB BLD-MCNC: 7.9 G/DL (ref 14–18)
HGB BLD-MCNC: 8 G/DL (ref 14–18)
HGB UR QL STRIP: NEGATIVE
IMM GRANULOCYTES # BLD AUTO: ABNORMAL K/UL (ref 0–0.04)
IMM GRANULOCYTES # BLD AUTO: ABNORMAL K/UL (ref 0–0.04)
IMM GRANULOCYTES NFR BLD AUTO: ABNORMAL % (ref 0–0.5)
IMM GRANULOCYTES NFR BLD AUTO: ABNORMAL % (ref 0–0.5)
INFLUENZA A, MOLECULAR: NEGATIVE
INFLUENZA B, MOLECULAR: NEGATIVE
INR PPP: 1.5
KETONES UR QL STRIP: NEGATIVE
LACTATE SERPL-SCNC: 1.2 MMOL/L (ref 0.5–1.9)
LACTATE SERPL-SCNC: 1.8 MMOL/L (ref 0.5–1.9)
LEUKOCYTE ESTERASE UR QL STRIP: NEGATIVE
LIPASE SERPL-CCNC: 39 U/L (ref 4–60)
LYMPHOCYTES NFR BLD: 30 % (ref 18–48)
LYMPHOCYTES NFR BLD: 30 % (ref 18–48)
MAGNESIUM SERPL-MCNC: 1.8 MG/DL (ref 1.6–2.6)
MAGNESIUM SERPL-MCNC: 2 MG/DL (ref 1.6–2.6)
MCH RBC QN AUTO: 27.8 PG (ref 27–31)
MCH RBC QN AUTO: 27.9 PG (ref 27–31)
MCHC RBC AUTO-ENTMCNC: 31 G/DL (ref 32–36)
MCHC RBC AUTO-ENTMCNC: 31.7 G/DL (ref 32–36)
MCV RBC AUTO: 88 FL (ref 82–98)
MCV RBC AUTO: 90 FL (ref 82–98)
METAMYELOCYTES NFR BLD MANUAL: 2 %
METAMYELOCYTES NFR BLD MANUAL: 3 %
MONOCYTES NFR BLD: 5 % (ref 4–15)
MONOCYTES NFR BLD: 9 % (ref 4–15)
MYELOCYTES NFR BLD MANUAL: 1 %
MYELOCYTES NFR BLD MANUAL: 9 %
NEUTROPHILS NFR BLD: 32 % (ref 38–73)
NEUTROPHILS NFR BLD: 50 % (ref 38–73)
NEUTS BAND NFR BLD MANUAL: 10 %
NEUTS BAND NFR BLD MANUAL: 11 %
NITRITE UR QL STRIP: NEGATIVE
NRBC BLD-RTO: 8 /100 WBC
NRBC BLD-RTO: 9 /100 WBC
OVALOCYTES BLD QL SMEAR: ABNORMAL
PH UR STRIP: 7 [PH] (ref 5–8)
PLATELET # BLD AUTO: 11 K/UL (ref 150–350)
PLATELET # BLD AUTO: 23 K/UL (ref 150–350)
PLATELET BLD QL SMEAR: ABNORMAL
PLATELET BLD QL SMEAR: ABNORMAL
PMV BLD AUTO: ABNORMAL FL (ref 9.2–12.9)
PMV BLD AUTO: ABNORMAL FL (ref 9.2–12.9)
POIKILOCYTOSIS BLD QL SMEAR: SLIGHT
POLYCHROMASIA BLD QL SMEAR: ABNORMAL
POLYCHROMASIA BLD QL SMEAR: ABNORMAL
POTASSIUM SERPL-SCNC: 3.4 MMOL/L (ref 3.5–5.1)
POTASSIUM SERPL-SCNC: 3.5 MMOL/L (ref 3.5–5.1)
PROCALCITONIN SERPL IA-MCNC: 0.42 NG/ML (ref 0–0.5)
PROMYELOCYTES NFR BLD MANUAL: 4 %
PROT SERPL-MCNC: 6.3 G/DL (ref 6–8.4)
PROT UR QL STRIP: ABNORMAL
PROTHROMBIN TIME: 17.3 SEC (ref 10.6–14.8)
RBC # BLD AUTO: 2.84 M/UL (ref 4.6–6.2)
RBC # BLD AUTO: 2.87 M/UL (ref 4.6–6.2)
SODIUM SERPL-SCNC: 132 MMOL/L (ref 136–145)
SODIUM SERPL-SCNC: 137 MMOL/L (ref 136–145)
SP GR UR STRIP: 1.01 (ref 1–1.03)
SPECIMEN SOURCE: NORMAL
TROPONIN I SERPL DL<=0.01 NG/ML-MCNC: 0.03 NG/ML
UNIT NUMBER: NORMAL
URN SPEC COLLECT METH UR: ABNORMAL
UROBILINOGEN UR STRIP-ACNC: ABNORMAL EU/DL
WBC # BLD AUTO: 2.61 K/UL (ref 3.9–12.7)
WBC # BLD AUTO: 2.73 K/UL (ref 3.9–12.7)

## 2021-01-15 PROCEDURE — 25000003 PHARM REV CODE 250: Performed by: NURSE PRACTITIONER

## 2021-01-15 PROCEDURE — 99223 PR INITIAL HOSPITAL CARE,LEVL III: ICD-10-PCS | Mod: ,,, | Performed by: INTERNAL MEDICINE

## 2021-01-15 PROCEDURE — 85007 BL SMEAR W/DIFF WBC COUNT: CPT

## 2021-01-15 PROCEDURE — 83735 ASSAY OF MAGNESIUM: CPT

## 2021-01-15 PROCEDURE — 25000003 PHARM REV CODE 250: Performed by: INTERNAL MEDICINE

## 2021-01-15 PROCEDURE — 86900 BLOOD TYPING SEROLOGIC ABO: CPT

## 2021-01-15 PROCEDURE — 83690 ASSAY OF LIPASE: CPT

## 2021-01-15 PROCEDURE — 87040 BLOOD CULTURE FOR BACTERIA: CPT

## 2021-01-15 PROCEDURE — 63600175 PHARM REV CODE 636 W HCPCS: Performed by: EMERGENCY MEDICINE

## 2021-01-15 PROCEDURE — 63600175 PHARM REV CODE 636 W HCPCS: Performed by: NURSE PRACTITIONER

## 2021-01-15 PROCEDURE — 81003 URINALYSIS AUTO W/O SCOPE: CPT

## 2021-01-15 PROCEDURE — 84145 PROCALCITONIN (PCT): CPT

## 2021-01-15 PROCEDURE — P9073 PLATELETS PHERESIS PATH REDU: HCPCS

## 2021-01-15 PROCEDURE — 80048 BASIC METABOLIC PNL TOTAL CA: CPT

## 2021-01-15 PROCEDURE — 85027 COMPLETE CBC AUTOMATED: CPT | Mod: 91

## 2021-01-15 PROCEDURE — 93010 ELECTROCARDIOGRAM REPORT: CPT | Mod: ,,, | Performed by: INTERNAL MEDICINE

## 2021-01-15 PROCEDURE — 83605 ASSAY OF LACTIC ACID: CPT | Mod: 91

## 2021-01-15 PROCEDURE — 36430 TRANSFUSION BLD/BLD COMPNT: CPT

## 2021-01-15 PROCEDURE — 84484 ASSAY OF TROPONIN QUANT: CPT

## 2021-01-15 PROCEDURE — 83605 ASSAY OF LACTIC ACID: CPT

## 2021-01-15 PROCEDURE — 25000003 PHARM REV CODE 250: Performed by: EMERGENCY MEDICINE

## 2021-01-15 PROCEDURE — 85730 THROMBOPLASTIN TIME PARTIAL: CPT

## 2021-01-15 PROCEDURE — 99223 1ST HOSP IP/OBS HIGH 75: CPT | Mod: ,,, | Performed by: INTERNAL MEDICINE

## 2021-01-15 PROCEDURE — 83880 ASSAY OF NATRIURETIC PEPTIDE: CPT

## 2021-01-15 PROCEDURE — 27000221 HC OXYGEN, UP TO 24 HOURS

## 2021-01-15 PROCEDURE — 87502 INFLUENZA DNA AMP PROBE: CPT

## 2021-01-15 PROCEDURE — 93005 ELECTROCARDIOGRAM TRACING: CPT | Performed by: INTERNAL MEDICINE

## 2021-01-15 PROCEDURE — 86140 C-REACTIVE PROTEIN: CPT

## 2021-01-15 PROCEDURE — 12000002 HC ACUTE/MED SURGE SEMI-PRIVATE ROOM

## 2021-01-15 PROCEDURE — 36415 COLL VENOUS BLD VENIPUNCTURE: CPT

## 2021-01-15 PROCEDURE — 93010 EKG 12-LEAD: ICD-10-PCS | Mod: ,,, | Performed by: INTERNAL MEDICINE

## 2021-01-15 PROCEDURE — 80053 COMPREHEN METABOLIC PANEL: CPT

## 2021-01-15 PROCEDURE — 25500020 PHARM REV CODE 255: Performed by: STUDENT IN AN ORGANIZED HEALTH CARE EDUCATION/TRAINING PROGRAM

## 2021-01-15 PROCEDURE — 99900035 HC TECH TIME PER 15 MIN (STAT)

## 2021-01-15 PROCEDURE — 63600175 PHARM REV CODE 636 W HCPCS: Performed by: INTERNAL MEDICINE

## 2021-01-15 PROCEDURE — 83735 ASSAY OF MAGNESIUM: CPT | Mod: 91

## 2021-01-15 PROCEDURE — 85610 PROTHROMBIN TIME: CPT

## 2021-01-15 RX ORDER — ALBUTEROL SULFATE 90 UG/1
2 AEROSOL, METERED RESPIRATORY (INHALATION) 2 TIMES DAILY
Status: CANCELLED | OUTPATIENT
Start: 2021-01-15

## 2021-01-15 RX ORDER — SODIUM,POTASSIUM PHOSPHATES 280-250MG
2 POWDER IN PACKET (EA) ORAL
Status: DISCONTINUED | OUTPATIENT
Start: 2021-01-15 | End: 2021-01-18 | Stop reason: HOSPADM

## 2021-01-15 RX ORDER — LANOLIN ALCOHOL/MO/W.PET/CERES
800 CREAM (GRAM) TOPICAL
Status: DISCONTINUED | OUTPATIENT
Start: 2021-01-15 | End: 2021-01-18 | Stop reason: HOSPADM

## 2021-01-15 RX ORDER — HYDROCODONE BITARTRATE AND ACETAMINOPHEN 500; 5 MG/1; MG/1
TABLET ORAL
Status: DISCONTINUED | OUTPATIENT
Start: 2021-01-15 | End: 2021-01-18 | Stop reason: HOSPADM

## 2021-01-15 RX ORDER — ACETAMINOPHEN 500 MG
1000 TABLET ORAL
Status: COMPLETED | OUTPATIENT
Start: 2021-01-15 | End: 2021-01-15

## 2021-01-15 RX ORDER — CEFEPIME HYDROCHLORIDE 1 G/50ML
2 INJECTION, SOLUTION INTRAVENOUS
Status: DISCONTINUED | OUTPATIENT
Start: 2021-01-15 | End: 2021-01-15

## 2021-01-15 RX ORDER — TAMSULOSIN HYDROCHLORIDE 0.4 MG/1
1 CAPSULE ORAL DAILY
Status: CANCELLED | OUTPATIENT
Start: 2021-01-15

## 2021-01-15 RX ORDER — HYDROCODONE BITARTRATE AND ACETAMINOPHEN 5; 325 MG/1; MG/1
1 TABLET ORAL EVERY 6 HOURS PRN
Status: DISCONTINUED | OUTPATIENT
Start: 2021-01-15 | End: 2021-01-18 | Stop reason: HOSPADM

## 2021-01-15 RX ORDER — SODIUM CHLORIDE 0.9 % (FLUSH) 0.9 %
10 SYRINGE (ML) INJECTION
Status: DISCONTINUED | OUTPATIENT
Start: 2021-01-15 | End: 2021-01-18 | Stop reason: HOSPADM

## 2021-01-15 RX ORDER — TALC
6 POWDER (GRAM) TOPICAL NIGHTLY PRN
Status: DISCONTINUED | OUTPATIENT
Start: 2021-01-15 | End: 2021-01-18 | Stop reason: HOSPADM

## 2021-01-15 RX ORDER — ASCORBIC ACID 500 MG
500 TABLET ORAL 2 TIMES DAILY
Status: CANCELLED | OUTPATIENT
Start: 2021-01-15

## 2021-01-15 RX ORDER — VANCOMYCIN HCL IN 5 % DEXTROSE 1G/250ML
1000 PLASTIC BAG, INJECTION (ML) INTRAVENOUS ONCE
Status: COMPLETED | OUTPATIENT
Start: 2021-01-15 | End: 2021-01-15

## 2021-01-15 RX ORDER — CEFEPIME HYDROCHLORIDE 1 G/50ML
2 INJECTION, SOLUTION INTRAVENOUS ONCE
Status: COMPLETED | OUTPATIENT
Start: 2021-01-15 | End: 2021-01-15

## 2021-01-15 RX ORDER — ACETAMINOPHEN 325 MG/1
650 TABLET ORAL EVERY 8 HOURS PRN
Status: DISCONTINUED | OUTPATIENT
Start: 2021-01-15 | End: 2021-01-18 | Stop reason: HOSPADM

## 2021-01-15 RX ORDER — ONDANSETRON 2 MG/ML
4 INJECTION INTRAMUSCULAR; INTRAVENOUS EVERY 8 HOURS PRN
Status: DISCONTINUED | OUTPATIENT
Start: 2021-01-15 | End: 2021-01-18 | Stop reason: HOSPADM

## 2021-01-15 RX ORDER — ACETAMINOPHEN 325 MG/1
650 TABLET ORAL EVERY 4 HOURS PRN
Status: DISCONTINUED | OUTPATIENT
Start: 2021-01-15 | End: 2021-01-18 | Stop reason: HOSPADM

## 2021-01-15 RX ORDER — LEVOTHYROXINE SODIUM 25 UG/1
50 TABLET ORAL
Status: CANCELLED | OUTPATIENT
Start: 2021-01-15

## 2021-01-15 RX ORDER — FAMOTIDINE 20 MG/1
40 TABLET, FILM COATED ORAL DAILY
Status: CANCELLED | OUTPATIENT
Start: 2021-01-15

## 2021-01-15 RX ORDER — ROSUVASTATIN CALCIUM 20 MG/1
40 TABLET, COATED ORAL NIGHTLY
Status: CANCELLED | OUTPATIENT
Start: 2021-01-15

## 2021-01-15 RX ORDER — METOPROLOL SUCCINATE 25 MG/1
50 TABLET, EXTENDED RELEASE ORAL DAILY
Status: CANCELLED | OUTPATIENT
Start: 2021-01-15

## 2021-01-15 RX ORDER — CHOLECALCIFEROL (VITAMIN D3) 25 MCG
1000 TABLET ORAL DAILY
Status: CANCELLED | OUTPATIENT
Start: 2021-01-15

## 2021-01-15 RX ADMIN — VANCOMYCIN HYDROCHLORIDE 1250 MG: 1.25 INJECTION, POWDER, LYOPHILIZED, FOR SOLUTION INTRAVENOUS at 11:01

## 2021-01-15 RX ADMIN — SODIUM CHLORIDE, SODIUM LACTATE, POTASSIUM CHLORIDE, AND CALCIUM CHLORIDE 2151 ML: .6; .31; .03; .02 INJECTION, SOLUTION INTRAVENOUS at 12:01

## 2021-01-15 RX ADMIN — IOHEXOL 100 ML: 350 INJECTION, SOLUTION INTRAVENOUS at 06:01

## 2021-01-15 RX ADMIN — PIPERACILLIN SODIUM AND TAZOBACTAM SODIUM 3.38 G: 3; .375 INJECTION, POWDER, LYOPHILIZED, FOR SOLUTION INTRAVENOUS at 12:01

## 2021-01-15 RX ADMIN — PIPERACILLIN SODIUM AND TAZOBACTAM SODIUM 3.38 G: 3; .375 INJECTION, POWDER, LYOPHILIZED, FOR SOLUTION INTRAVENOUS at 08:01

## 2021-01-15 RX ADMIN — CEFEPIME HYDROCHLORIDE 2 G: 2 INJECTION, SOLUTION INTRAVENOUS at 01:01

## 2021-01-15 RX ADMIN — ACETAMINOPHEN 1000 MG: 500 TABLET, FILM COATED ORAL at 01:01

## 2021-01-15 RX ADMIN — VANCOMYCIN HYDROCHLORIDE 500 MG: 500 INJECTION, POWDER, LYOPHILIZED, FOR SOLUTION INTRAVENOUS at 07:01

## 2021-01-15 RX ADMIN — ACETAMINOPHEN 650 MG: 325 TABLET, FILM COATED ORAL at 04:01

## 2021-01-15 RX ADMIN — VANCOMYCIN HYDROCHLORIDE 1000 MG: 1 INJECTION, POWDER, LYOPHILIZED, FOR SOLUTION INTRAVENOUS at 05:01

## 2021-01-16 PROBLEM — R91.8 LUNG NODULES: Status: ACTIVE | Noted: 2021-01-16

## 2021-01-16 PROBLEM — R16.1 SPLENOMEGALY: Status: ACTIVE | Noted: 2021-01-16

## 2021-01-16 PROBLEM — D75.838 SECONDARY THROMBOCYTOSIS: Status: RESOLVED | Noted: 2020-12-28 | Resolved: 2021-01-16

## 2021-01-16 PROBLEM — D73.5 SPLENIC INFARCT: Status: ACTIVE | Noted: 2021-01-16

## 2021-01-16 PROBLEM — R59.0 HILAR ADENOPATHY: Status: ACTIVE | Noted: 2021-01-16

## 2021-01-16 PROBLEM — D64.9 TRANSFUSION-DEPENDENT ANEMIA: Status: ACTIVE | Noted: 2021-01-16

## 2021-01-16 PROBLEM — D45: Status: RESOLVED | Noted: 2021-01-04 | Resolved: 2021-01-16

## 2021-01-16 LAB
ABO + RH BLD: NORMAL
ANION GAP SERPL CALC-SCNC: 10 MMOL/L (ref 8–16)
ANISOCYTOSIS BLD QL SMEAR: SLIGHT
BACTERIA #/AREA URNS HPF: NEGATIVE /HPF
BASOPHILS NFR BLD: 0 % (ref 0–1.9)
BASOPHILS NFR BLD: 1 % (ref 0–1.9)
BILIRUB DIRECT SERPL-MCNC: 0.3 MG/DL (ref 0.1–0.3)
BLASTS NFR BLD MANUAL: 2 %
BLASTS NFR BLD MANUAL: 2 %
BLD GP AB SCN CELLS X3 SERPL QL: NORMAL
BLD PROD TYP BPU: NORMAL
BLOOD UNIT EXPIRATION DATE: NORMAL
BLOOD UNIT TYPE CODE: 5100
BLOOD UNIT TYPE: NORMAL
BUN SERPL-MCNC: 9 MG/DL (ref 8–23)
CALCIUM SERPL-MCNC: 8.6 MG/DL (ref 8.7–10.5)
CHLORIDE SERPL-SCNC: 101 MMOL/L (ref 95–110)
CO2 SERPL-SCNC: 24 MMOL/L (ref 23–29)
CODING SYSTEM: NORMAL
CREAT SERPL-MCNC: 1 MG/DL (ref 0.5–1.4)
CRP SERPL-MCNC: 12.64 MG/DL
DACRYOCYTES BLD QL SMEAR: ABNORMAL
DACRYOCYTES BLD QL SMEAR: ABNORMAL
DAT IGG-SP REAG RBC-IMP: NORMAL
DIFFERENTIAL METHOD: ABNORMAL
DIFFERENTIAL METHOD: ABNORMAL
DISPENSE STATUS: NORMAL
EOSINOPHIL NFR BLD: 0 % (ref 0–8)
EOSINOPHIL NFR BLD: 1 % (ref 0–8)
ERYTHROCYTE [DISTWIDTH] IN BLOOD BY AUTOMATED COUNT: 17.4 % (ref 11.5–14.5)
ERYTHROCYTE [DISTWIDTH] IN BLOOD BY AUTOMATED COUNT: 17.6 % (ref 11.5–14.5)
EST. GFR  (AFRICAN AMERICAN): >60 ML/MIN/1.73 M^2
EST. GFR  (NON AFRICAN AMERICAN): >60 ML/MIN/1.73 M^2
GLUCOSE SERPL-MCNC: 106 MG/DL (ref 70–110)
HCT VFR BLD AUTO: 25.1 % (ref 40–54)
HCT VFR BLD AUTO: 29 % (ref 40–54)
HGB BLD-MCNC: 7.8 G/DL (ref 14–18)
HGB BLD-MCNC: 8.8 G/DL (ref 14–18)
HYALINE CASTS #/AREA URNS LPF: 1 /LPF
IMM GRANULOCYTES # BLD AUTO: ABNORMAL K/UL (ref 0–0.04)
IMM GRANULOCYTES # BLD AUTO: ABNORMAL K/UL (ref 0–0.04)
IMM GRANULOCYTES NFR BLD AUTO: ABNORMAL % (ref 0–0.5)
IMM GRANULOCYTES NFR BLD AUTO: ABNORMAL % (ref 0–0.5)
LDH SERPL L TO P-CCNC: 496 U/L (ref 110–260)
LYMPHOCYTES NFR BLD: 24 % (ref 18–48)
LYMPHOCYTES NFR BLD: 35 % (ref 18–48)
MAGNESIUM SERPL-MCNC: 2 MG/DL (ref 1.6–2.6)
MCH RBC QN AUTO: 27.5 PG (ref 27–31)
MCH RBC QN AUTO: 27.8 PG (ref 27–31)
MCHC RBC AUTO-ENTMCNC: 30.3 G/DL (ref 32–36)
MCHC RBC AUTO-ENTMCNC: 31.1 G/DL (ref 32–36)
MCV RBC AUTO: 88 FL (ref 82–98)
MCV RBC AUTO: 92 FL (ref 82–98)
METAMYELOCYTES NFR BLD MANUAL: 4 %
METAMYELOCYTES NFR BLD MANUAL: 5 %
MICROSCOPIC COMMENT: NORMAL
MONOCYTES NFR BLD: 2 % (ref 4–15)
MONOCYTES NFR BLD: 3 % (ref 4–15)
MYELOCYTES NFR BLD MANUAL: 2 %
MYELOCYTES NFR BLD MANUAL: 6 %
NEUTROPHILS NFR BLD: 39 % (ref 38–73)
NEUTROPHILS NFR BLD: 41 % (ref 38–73)
NEUTS BAND NFR BLD MANUAL: 18 %
NEUTS BAND NFR BLD MANUAL: 7 %
NRBC BLD-RTO: 12 /100 WBC
NRBC BLD-RTO: 14 /100 WBC
OVALOCYTES BLD QL SMEAR: ABNORMAL
OVALOCYTES BLD QL SMEAR: ABNORMAL
PATH REV BLD -IMP: NORMAL
PLATELET # BLD AUTO: 30 K/UL (ref 150–350)
PLATELET # BLD AUTO: 49 K/UL (ref 150–350)
PLATELET BLD QL SMEAR: ABNORMAL
PLATELET BLD QL SMEAR: ABNORMAL
PMV BLD AUTO: ABNORMAL FL (ref 9.2–12.9)
PMV BLD AUTO: ABNORMAL FL (ref 9.2–12.9)
POLYCHROMASIA BLD QL SMEAR: ABNORMAL
POTASSIUM SERPL-SCNC: 3.8 MMOL/L (ref 3.5–5.1)
RBC # BLD AUTO: 2.84 M/UL (ref 4.6–6.2)
RBC # BLD AUTO: 3.17 M/UL (ref 4.6–6.2)
RBC #/AREA URNS HPF: 0 /HPF (ref 0–4)
RETICS/RBC NFR AUTO: 1.7 % (ref 0.4–2)
SODIUM SERPL-SCNC: 135 MMOL/L (ref 136–145)
SQUAMOUS #/AREA URNS HPF: 0 /HPF
UNIT NUMBER: NORMAL
WBC # BLD AUTO: 2.91 K/UL (ref 3.9–12.7)
WBC # BLD AUTO: 3.58 K/UL (ref 3.9–12.7)
WBC #/AREA URNS HPF: 0 /HPF (ref 0–5)
WBC OTHER NFR BLD MANUAL: 8 %

## 2021-01-16 PROCEDURE — 85045 AUTOMATED RETICULOCYTE COUNT: CPT

## 2021-01-16 PROCEDURE — 25000003 PHARM REV CODE 250: Performed by: NURSE PRACTITIONER

## 2021-01-16 PROCEDURE — 25000003 PHARM REV CODE 250: Performed by: INTERNAL MEDICINE

## 2021-01-16 PROCEDURE — 81001 URINALYSIS AUTO W/SCOPE: CPT

## 2021-01-16 PROCEDURE — 63600175 PHARM REV CODE 636 W HCPCS: Performed by: INTERNAL MEDICINE

## 2021-01-16 PROCEDURE — 87385 HISTOPLASMA CAPSUL AG IA: CPT

## 2021-01-16 PROCEDURE — 83010 ASSAY OF HAPTOGLOBIN QUANT: CPT

## 2021-01-16 PROCEDURE — 85007 BL SMEAR W/DIFF WBC COUNT: CPT

## 2021-01-16 PROCEDURE — 80048 BASIC METABOLIC PNL TOTAL CA: CPT

## 2021-01-16 PROCEDURE — 83735 ASSAY OF MAGNESIUM: CPT

## 2021-01-16 PROCEDURE — 63600175 PHARM REV CODE 636 W HCPCS: Performed by: NURSE PRACTITIONER

## 2021-01-16 PROCEDURE — 83615 LACTATE (LD) (LDH) ENZYME: CPT

## 2021-01-16 PROCEDURE — 82248 BILIRUBIN DIRECT: CPT

## 2021-01-16 PROCEDURE — 36430 TRANSFUSION BLD/BLD COMPNT: CPT

## 2021-01-16 PROCEDURE — 86140 C-REACTIVE PROTEIN: CPT

## 2021-01-16 PROCEDURE — 99223 PR INITIAL HOSPITAL CARE,LEVL III: ICD-10-PCS | Mod: ,,, | Performed by: INTERNAL MEDICINE

## 2021-01-16 PROCEDURE — 36415 COLL VENOUS BLD VENIPUNCTURE: CPT

## 2021-01-16 PROCEDURE — P9073 PLATELETS PHERESIS PATH REDU: HCPCS

## 2021-01-16 PROCEDURE — 86880 COOMBS TEST DIRECT: CPT

## 2021-01-16 PROCEDURE — 99223 1ST HOSP IP/OBS HIGH 75: CPT | Mod: ,,, | Performed by: INTERNAL MEDICINE

## 2021-01-16 PROCEDURE — 12000002 HC ACUTE/MED SURGE SEMI-PRIVATE ROOM

## 2021-01-16 PROCEDURE — 82232 ASSAY OF BETA-2 PROTEIN: CPT

## 2021-01-16 PROCEDURE — 99233 SBSQ HOSP IP/OBS HIGH 50: CPT | Mod: ,,, | Performed by: INTERNAL MEDICINE

## 2021-01-16 PROCEDURE — 99233 PR SUBSEQUENT HOSPITAL CARE,LEVL III: ICD-10-PCS | Mod: ,,, | Performed by: INTERNAL MEDICINE

## 2021-01-16 PROCEDURE — 87449 NOS EACH ORGANISM AG IA: CPT

## 2021-01-16 PROCEDURE — 85027 COMPLETE CBC AUTOMATED: CPT

## 2021-01-16 PROCEDURE — 85041 AUTOMATED RBC COUNT: CPT

## 2021-01-16 RX ORDER — SODIUM CHLORIDE 9 MG/ML
INJECTION, SOLUTION INTRAVENOUS CONTINUOUS
Status: DISCONTINUED | OUTPATIENT
Start: 2021-01-16 | End: 2021-01-18 | Stop reason: HOSPADM

## 2021-01-16 RX ORDER — HYDROCODONE BITARTRATE AND ACETAMINOPHEN 500; 5 MG/1; MG/1
TABLET ORAL
Status: DISCONTINUED | OUTPATIENT
Start: 2021-01-16 | End: 2021-01-18 | Stop reason: HOSPADM

## 2021-01-16 RX ADMIN — PIPERACILLIN SODIUM AND TAZOBACTAM SODIUM 3.38 G: 3; .375 INJECTION, POWDER, LYOPHILIZED, FOR SOLUTION INTRAVENOUS at 04:01

## 2021-01-16 RX ADMIN — HYDROCODONE BITARTRATE AND ACETAMINOPHEN 1 TABLET: 5; 325 TABLET ORAL at 09:01

## 2021-01-16 RX ADMIN — METHYLPREDNISOLONE SODIUM SUCCINATE 40 MG: 40 INJECTION, POWDER, FOR SOLUTION INTRAMUSCULAR; INTRAVENOUS at 08:01

## 2021-01-16 RX ADMIN — PIPERACILLIN SODIUM AND TAZOBACTAM SODIUM 3.38 G: 3; .375 INJECTION, POWDER, LYOPHILIZED, FOR SOLUTION INTRAVENOUS at 03:01

## 2021-01-16 RX ADMIN — SODIUM CHLORIDE 100 ML/HR: 0.9 INJECTION, SOLUTION INTRAVENOUS at 08:01

## 2021-01-16 RX ADMIN — ACETAMINOPHEN 650 MG: 325 TABLET, FILM COATED ORAL at 02:01

## 2021-01-16 RX ADMIN — METHYLPREDNISOLONE SODIUM SUCCINATE 40 MG: 40 INJECTION, POWDER, FOR SOLUTION INTRAMUSCULAR; INTRAVENOUS at 12:01

## 2021-01-16 RX ADMIN — EPOETIN ALFA-EPBX 40000 UNITS: 40000 INJECTION, SOLUTION INTRAVENOUS; SUBCUTANEOUS at 12:01

## 2021-01-16 RX ADMIN — TBO-FILGRASTIM 300 MCG: 300 INJECTION, SOLUTION SUBCUTANEOUS at 12:01

## 2021-01-17 LAB
ADENOVIRUS: NOT DETECTED
ANION GAP SERPL CALC-SCNC: 9 MMOL/L (ref 8–16)
BASOPHILS NFR BLD: 0 % (ref 0–1.9)
BLASTS NFR BLD MANUAL: 1 %
BORDETELLA PARAPERTUSSIS (IS1001): NOT DETECTED
BORDETELLA PERTUSSIS (PTXP): NOT DETECTED
BUN SERPL-MCNC: 12 MG/DL (ref 8–23)
CALCIUM SERPL-MCNC: 8.8 MG/DL (ref 8.7–10.5)
CHLAMYDIA PNEUMONIAE: NOT DETECTED
CHLORIDE SERPL-SCNC: 106 MMOL/L (ref 95–110)
CO2 SERPL-SCNC: 23 MMOL/L (ref 23–29)
CORONAVIRUS 229E, COMMON COLD VIRUS: NOT DETECTED
CORONAVIRUS HKU1, COMMON COLD VIRUS: NOT DETECTED
CORONAVIRUS NL63, COMMON COLD VIRUS: NOT DETECTED
CORONAVIRUS OC43, COMMON COLD VIRUS: NOT DETECTED
CREAT SERPL-MCNC: 0.8 MG/DL (ref 0.5–1.4)
CRP SERPL-MCNC: 8.97 MG/DL
D DIMER PPP IA.FEU-MCNC: 1.33 UG/ML FEU
DACRYOCYTES BLD QL SMEAR: ABNORMAL
DIFFERENTIAL METHOD: ABNORMAL
EOSINOPHIL NFR BLD: 1 % (ref 0–8)
ERYTHROCYTE [DISTWIDTH] IN BLOOD BY AUTOMATED COUNT: 17.4 % (ref 11.5–14.5)
EST. GFR  (AFRICAN AMERICAN): >60 ML/MIN/1.73 M^2
EST. GFR  (NON AFRICAN AMERICAN): >60 ML/MIN/1.73 M^2
FLUBV RNA NPH QL NAA+NON-PROBE: NOT DETECTED
GLUCOSE SERPL-MCNC: 130 MG/DL (ref 70–110)
HCT VFR BLD AUTO: 24.2 % (ref 40–54)
HGB BLD-MCNC: 7.6 G/DL (ref 14–18)
HPIV1 RNA NPH QL NAA+NON-PROBE: NOT DETECTED
HPIV2 RNA NPH QL NAA+NON-PROBE: NOT DETECTED
HPIV3 RNA NPH QL NAA+NON-PROBE: NOT DETECTED
HPIV4 RNA NPH QL NAA+NON-PROBE: NOT DETECTED
HUMAN METAPNEUMOVIRUS: NOT DETECTED
IMM GRANULOCYTES # BLD AUTO: ABNORMAL K/UL (ref 0–0.04)
IMM GRANULOCYTES NFR BLD AUTO: ABNORMAL % (ref 0–0.5)
INFLUENZA A (SUBTYPES H1,H1-2009,H3): NOT DETECTED
LYMPHOCYTES NFR BLD: 16 % (ref 18–48)
MAGNESIUM SERPL-MCNC: 2.1 MG/DL (ref 1.6–2.6)
MCH RBC QN AUTO: 27.8 PG (ref 27–31)
MCHC RBC AUTO-ENTMCNC: 31.4 G/DL (ref 32–36)
MCV RBC AUTO: 89 FL (ref 82–98)
METAMYELOCYTES NFR BLD MANUAL: 4 %
MONOCYTES NFR BLD: 0 % (ref 4–15)
MYCOPLASMA PNEUMONIAE: NOT DETECTED
MYELOCYTES NFR BLD MANUAL: 5 %
NEUTROPHILS NFR BLD: 44 % (ref 38–73)
NEUTS BAND NFR BLD MANUAL: 29 %
NRBC BLD-RTO: 13 /100 WBC
OVALOCYTES BLD QL SMEAR: ABNORMAL
PLATELET # BLD AUTO: 53 K/UL (ref 150–350)
PLATELET BLD QL SMEAR: ABNORMAL
PMV BLD AUTO: 10.2 FL (ref 9.2–12.9)
POLYCHROMASIA BLD QL SMEAR: ABNORMAL
POTASSIUM SERPL-SCNC: 3.8 MMOL/L (ref 3.5–5.1)
PROCALCITONIN SERPL IA-MCNC: 0.39 NG/ML (ref 0–0.5)
RBC # BLD AUTO: 2.73 M/UL (ref 4.6–6.2)
RESPIRATORY INFECTION PANEL SOURCE: NORMAL
RSV RNA NPH QL NAA+NON-PROBE: NOT DETECTED
RV+EV RNA NPH QL NAA+NON-PROBE: NOT DETECTED
SODIUM SERPL-SCNC: 138 MMOL/L (ref 136–145)
WBC # BLD AUTO: 5 K/UL (ref 3.9–12.7)

## 2021-01-17 PROCEDURE — 85379 FIBRIN DEGRADATION QUANT: CPT

## 2021-01-17 PROCEDURE — 25000003 PHARM REV CODE 250: Performed by: NURSE PRACTITIONER

## 2021-01-17 PROCEDURE — 63600175 PHARM REV CODE 636 W HCPCS: Performed by: NURSE PRACTITIONER

## 2021-01-17 PROCEDURE — 82784 ASSAY IGA/IGD/IGG/IGM EACH: CPT | Mod: 91

## 2021-01-17 PROCEDURE — 86769 SARS-COV-2 COVID-19 ANTIBODY: CPT

## 2021-01-17 PROCEDURE — 85007 BL SMEAR W/DIFF WBC COUNT: CPT

## 2021-01-17 PROCEDURE — 87798 DETECT AGENT NOS DNA AMP: CPT

## 2021-01-17 PROCEDURE — 84145 PROCALCITONIN (PCT): CPT

## 2021-01-17 PROCEDURE — 12000002 HC ACUTE/MED SURGE SEMI-PRIVATE ROOM

## 2021-01-17 PROCEDURE — 99233 PR SUBSEQUENT HOSPITAL CARE,LEVL III: ICD-10-PCS | Mod: ,,, | Performed by: INTERNAL MEDICINE

## 2021-01-17 PROCEDURE — 63600175 PHARM REV CODE 636 W HCPCS: Mod: JG | Performed by: INTERNAL MEDICINE

## 2021-01-17 PROCEDURE — 85027 COMPLETE CBC AUTOMATED: CPT

## 2021-01-17 PROCEDURE — 80048 BASIC METABOLIC PNL TOTAL CA: CPT

## 2021-01-17 PROCEDURE — 86140 C-REACTIVE PROTEIN: CPT

## 2021-01-17 PROCEDURE — 83735 ASSAY OF MAGNESIUM: CPT

## 2021-01-17 PROCEDURE — 99233 SBSQ HOSP IP/OBS HIGH 50: CPT | Mod: ,,, | Performed by: INTERNAL MEDICINE

## 2021-01-17 RX ADMIN — PIPERACILLIN SODIUM AND TAZOBACTAM SODIUM 3.38 G: 3; .375 INJECTION, POWDER, LYOPHILIZED, FOR SOLUTION INTRAVENOUS at 12:01

## 2021-01-17 RX ADMIN — TBO-FILGRASTIM 300 MCG: 300 INJECTION, SOLUTION SUBCUTANEOUS at 09:01

## 2021-01-17 RX ADMIN — PIPERACILLIN SODIUM AND TAZOBACTAM SODIUM 3.38 G: 3; .375 INJECTION, POWDER, LYOPHILIZED, FOR SOLUTION INTRAVENOUS at 04:01

## 2021-01-17 RX ADMIN — PIPERACILLIN SODIUM AND TAZOBACTAM SODIUM 3.38 G: 3; .375 INJECTION, POWDER, LYOPHILIZED, FOR SOLUTION INTRAVENOUS at 09:01

## 2021-01-18 VITALS
OXYGEN SATURATION: 97 % | RESPIRATION RATE: 19 BRPM | WEIGHT: 158 LBS | HEART RATE: 96 BPM | BODY MASS INDEX: 24.8 KG/M2 | DIASTOLIC BLOOD PRESSURE: 67 MMHG | HEIGHT: 67 IN | TEMPERATURE: 99 F | SYSTOLIC BLOOD PRESSURE: 127 MMHG

## 2021-01-18 LAB
ANION GAP SERPL CALC-SCNC: 10 MMOL/L (ref 8–16)
ANISOCYTOSIS BLD QL SMEAR: SLIGHT
BASOPHILS NFR BLD: 0 % (ref 0–1.9)
BLASTS NFR BLD MANUAL: 3 %
BUN SERPL-MCNC: 11 MG/DL (ref 8–23)
CALCIUM SERPL-MCNC: 8.7 MG/DL (ref 8.7–10.5)
CHLORIDE SERPL-SCNC: 106 MMOL/L (ref 95–110)
CO2 SERPL-SCNC: 22 MMOL/L (ref 23–29)
CREAT SERPL-MCNC: 0.8 MG/DL (ref 0.5–1.4)
CRP SERPL-MCNC: 5.37 MG/DL
DIFFERENTIAL METHOD: ABNORMAL
EOSINOPHIL NFR BLD: 0 % (ref 0–8)
ERYTHROCYTE [DISTWIDTH] IN BLOOD BY AUTOMATED COUNT: 17.5 % (ref 11.5–14.5)
EST. GFR  (AFRICAN AMERICAN): >60 ML/MIN/1.73 M^2
EST. GFR  (NON AFRICAN AMERICAN): >60 ML/MIN/1.73 M^2
GLUCOSE SERPL-MCNC: 89 MG/DL (ref 70–110)
HAPTOGLOB SERPL-MCNC: 173 MG/DL (ref 34–355)
HCT VFR BLD AUTO: 23.7 % (ref 40–54)
HGB BLD-MCNC: 7.4 G/DL (ref 14–18)
IMM GRANULOCYTES # BLD AUTO: ABNORMAL K/UL (ref 0–0.04)
IMM GRANULOCYTES NFR BLD AUTO: ABNORMAL % (ref 0–0.5)
LYMPHOCYTES NFR BLD: 16 % (ref 18–48)
MAGNESIUM SERPL-MCNC: 1.9 MG/DL (ref 1.6–2.6)
MCH RBC QN AUTO: 27.7 PG (ref 27–31)
MCHC RBC AUTO-ENTMCNC: 31.2 G/DL (ref 32–36)
MCV RBC AUTO: 89 FL (ref 82–98)
METAMYELOCYTES NFR BLD MANUAL: 3 %
MONOCYTES NFR BLD: 3 % (ref 4–15)
MYELOCYTES NFR BLD MANUAL: 3 %
NEUTROPHILS NFR BLD: 49 % (ref 38–73)
NEUTS BAND NFR BLD MANUAL: 15 %
NRBC BLD-RTO: 8 /100 WBC
PLATELET # BLD AUTO: 40 K/UL (ref 150–350)
PMV BLD AUTO: ABNORMAL FL (ref 9.2–12.9)
POIKILOCYTOSIS BLD QL SMEAR: SLIGHT
POLYCHROMASIA BLD QL SMEAR: ABNORMAL
POTASSIUM SERPL-SCNC: 3.5 MMOL/L (ref 3.5–5.1)
PROMYELOCYTES NFR BLD MANUAL: 2 %
RBC # BLD AUTO: 2.67 M/UL (ref 4.6–6.2)
SARS-COV-2 IGG SERPLBLD QL IA.RAPID: POSITIVE
SODIUM SERPL-SCNC: 138 MMOL/L (ref 136–145)
WBC # BLD AUTO: 7.32 K/UL (ref 3.9–12.7)
WBC OTHER NFR BLD MANUAL: 6 %

## 2021-01-18 PROCEDURE — 99233 PR SUBSEQUENT HOSPITAL CARE,LEVL III: ICD-10-PCS | Mod: ,,, | Performed by: INTERNAL MEDICINE

## 2021-01-18 PROCEDURE — 86140 C-REACTIVE PROTEIN: CPT

## 2021-01-18 PROCEDURE — 63600175 PHARM REV CODE 636 W HCPCS: Performed by: NURSE PRACTITIONER

## 2021-01-18 PROCEDURE — 83735 ASSAY OF MAGNESIUM: CPT

## 2021-01-18 PROCEDURE — 85007 BL SMEAR W/DIFF WBC COUNT: CPT

## 2021-01-18 PROCEDURE — 99233 SBSQ HOSP IP/OBS HIGH 50: CPT | Mod: ,,, | Performed by: INTERNAL MEDICINE

## 2021-01-18 PROCEDURE — 80048 BASIC METABOLIC PNL TOTAL CA: CPT

## 2021-01-18 PROCEDURE — 36415 COLL VENOUS BLD VENIPUNCTURE: CPT

## 2021-01-18 PROCEDURE — 25000003 PHARM REV CODE 250: Performed by: INTERNAL MEDICINE

## 2021-01-18 PROCEDURE — 63600175 PHARM REV CODE 636 W HCPCS: Mod: JG | Performed by: INTERNAL MEDICINE

## 2021-01-18 PROCEDURE — 85027 COMPLETE CBC AUTOMATED: CPT

## 2021-01-18 RX ORDER — LEVOFLOXACIN 750 MG/1
750 TABLET ORAL DAILY
Qty: 5 TABLET | Refills: 0 | Status: SHIPPED | OUTPATIENT
Start: 2021-01-19 | End: 2021-01-24

## 2021-01-18 RX ADMIN — PIPERACILLIN SODIUM AND TAZOBACTAM SODIUM 3.38 G: 3; .375 INJECTION, POWDER, LYOPHILIZED, FOR SOLUTION INTRAVENOUS at 08:01

## 2021-01-18 RX ADMIN — TBO-FILGRASTIM 300 MCG: 300 INJECTION, SOLUTION SUBCUTANEOUS at 08:01

## 2021-01-18 RX ADMIN — SODIUM CHLORIDE 100 ML/HR: 0.9 INJECTION, SOLUTION INTRAVENOUS at 12:01

## 2021-01-18 RX ADMIN — PIPERACILLIN SODIUM AND TAZOBACTAM SODIUM 3.38 G: 3; .375 INJECTION, POWDER, LYOPHILIZED, FOR SOLUTION INTRAVENOUS at 12:01

## 2021-01-19 LAB
B2 MICROGLOB SERPL-MCNC: 2.3 MG/L (ref 0.6–2.4)
COMMENT: NORMAL
FINAL PATHOLOGIC DIAGNOSIS: NORMAL
G6PD BLD QN: 293 U/10E12 RBC (ref 127–427)
GROSS: NORMAL
IGA SERPL-MCNC: 192 MG/DL (ref 61–437)
IGG SERPL-MCNC: 941 MG/DL (ref 603–1613)
IGM SERPL-MCNC: 151 MG/DL (ref 15–143)
L PNEUMO1 AG UR QL IA: NEGATIVE
L PNEUMO1 AG UR QL IA: NEGATIVE
LEGIONELLA SPECIMEN SOURCE: NORMAL
LEGIONELLA SPECIMEN SOURCE: NORMAL
Lab: NORMAL
MICROSCOPIC EXAM: NORMAL
RBC # BLD AUTO: 2.7 X10E6/UL (ref 4.14–5.8)
SUPPLEMENTAL DIAGNOSIS: NORMAL

## 2021-01-20 ENCOUNTER — TELEPHONE (OUTPATIENT)
Dept: RHEUMATOLOGY | Facility: CLINIC | Age: 81
End: 2021-01-20

## 2021-01-20 ENCOUNTER — OFFICE VISIT (OUTPATIENT)
Dept: RHEUMATOLOGY | Facility: CLINIC | Age: 81
End: 2021-01-20
Payer: MEDICARE

## 2021-01-20 ENCOUNTER — HOSPITAL ENCOUNTER (EMERGENCY)
Facility: HOSPITAL | Age: 81
Discharge: HOME | End: 2021-01-20
Attending: EMERGENCY MEDICINE
Payer: MEDICARE

## 2021-01-20 VITALS
DIASTOLIC BLOOD PRESSURE: 76 MMHG | SYSTOLIC BLOOD PRESSURE: 128 MMHG | BODY MASS INDEX: 24.53 KG/M2 | TEMPERATURE: 98 F | WEIGHT: 156.63 LBS

## 2021-01-20 VITALS
OXYGEN SATURATION: 95 % | HEART RATE: 90 BPM | TEMPERATURE: 100 F | WEIGHT: 156 LBS | DIASTOLIC BLOOD PRESSURE: 63 MMHG | BODY MASS INDEX: 24.48 KG/M2 | SYSTOLIC BLOOD PRESSURE: 125 MMHG | HEIGHT: 67 IN | RESPIRATION RATE: 18 BRPM

## 2021-01-20 DIAGNOSIS — M25.50 ARTHRALGIA, UNSPECIFIED JOINT: Primary | ICD-10-CM

## 2021-01-20 DIAGNOSIS — M25.50 ARTHRALGIA, UNSPECIFIED JOINT: ICD-10-CM

## 2021-01-20 DIAGNOSIS — R76.8 POSITIVE ANA (ANTINUCLEAR ANTIBODY): ICD-10-CM

## 2021-01-20 DIAGNOSIS — R50.9 FEVER, UNSPECIFIED FEVER CAUSE: Primary | ICD-10-CM

## 2021-01-20 DIAGNOSIS — R76.8 POSITIVE ANA (ANTINUCLEAR ANTIBODY): Primary | ICD-10-CM

## 2021-01-20 DIAGNOSIS — D69.6 THROMBOCYTOPENIA: ICD-10-CM

## 2021-01-20 DIAGNOSIS — F32.1 CURRENT MODERATE EPISODE OF MAJOR DEPRESSIVE DISORDER, UNSPECIFIED WHETHER RECURRENT: ICD-10-CM

## 2021-01-20 LAB
BACTERIA #/AREA URNS HPF: NORMAL /HPF
BACTERIA BLD CULT: NORMAL
BACTERIA BLD CULT: NORMAL
BILIRUB UR QL STRIP: NEGATIVE
CLARITY UR: CLEAR
COLOR UR: YELLOW
GLUCOSE UR QL STRIP: NEGATIVE
H CAPSUL AG UR QL IA: <0.5
HGB UR QL STRIP: NEGATIVE
HYALINE CASTS #/AREA URNS LPF: 0 /LPF
KETONES UR QL STRIP: NEGATIVE
LEUKOCYTE ESTERASE UR QL STRIP: NEGATIVE
Lab: NORMAL
MICROSCOPIC COMMENT: NORMAL
NITRITE UR QL STRIP: NEGATIVE
PH UR STRIP: 6 [PH] (ref 5–8)
PROT UR QL STRIP: ABNORMAL
RBC #/AREA URNS HPF: 0 /HPF (ref 0–4)
SP GR UR STRIP: 1.02 (ref 1–1.03)
URN SPEC COLLECT METH UR: ABNORMAL
UROBILINOGEN UR STRIP-ACNC: >=8 EU/DL
WBC #/AREA URNS HPF: 1 /HPF (ref 0–5)

## 2021-01-20 PROCEDURE — 1125F PR PAIN SEVERITY QUANTIFIED, PAIN PRESENT: ICD-10-PCS | Mod: S$GLB,,, | Performed by: INTERNAL MEDICINE

## 2021-01-20 PROCEDURE — 3074F SYST BP LT 130 MM HG: CPT | Mod: S$GLB,,, | Performed by: INTERNAL MEDICINE

## 2021-01-20 PROCEDURE — 3288F PR FALLS RISK ASSESSMENT DOCUMENTED: ICD-10-PCS | Mod: S$GLB,,, | Performed by: INTERNAL MEDICINE

## 2021-01-20 PROCEDURE — 99284 EMERGENCY DEPT VISIT MOD MDM: CPT | Mod: 25

## 2021-01-20 PROCEDURE — 3078F PR MOST RECENT DIASTOLIC BLOOD PRESSURE < 80 MM HG: ICD-10-PCS | Mod: S$GLB,,, | Performed by: INTERNAL MEDICINE

## 2021-01-20 PROCEDURE — 3288F FALL RISK ASSESSMENT DOCD: CPT | Mod: S$GLB,,, | Performed by: INTERNAL MEDICINE

## 2021-01-20 PROCEDURE — 1159F MED LIST DOCD IN RCRD: CPT | Mod: S$GLB,,, | Performed by: INTERNAL MEDICINE

## 2021-01-20 PROCEDURE — 1101F PT FALLS ASSESS-DOCD LE1/YR: CPT | Mod: S$GLB,,, | Performed by: INTERNAL MEDICINE

## 2021-01-20 PROCEDURE — 3078F DIAST BP <80 MM HG: CPT | Mod: S$GLB,,, | Performed by: INTERNAL MEDICINE

## 2021-01-20 PROCEDURE — 1125F AMNT PAIN NOTED PAIN PRSNT: CPT | Mod: S$GLB,,, | Performed by: INTERNAL MEDICINE

## 2021-01-20 PROCEDURE — 1101F PR PT FALLS ASSESS DOC 0-1 FALLS W/OUT INJ PAST YR: ICD-10-PCS | Mod: S$GLB,,, | Performed by: INTERNAL MEDICINE

## 2021-01-20 PROCEDURE — 99205 PR OFFICE/OUTPT VISIT, NEW, LEVL V, 60-74 MIN: ICD-10-PCS | Mod: S$GLB,,, | Performed by: INTERNAL MEDICINE

## 2021-01-20 PROCEDURE — 99205 OFFICE O/P NEW HI 60 MIN: CPT | Mod: S$GLB,,, | Performed by: INTERNAL MEDICINE

## 2021-01-20 PROCEDURE — 1111F DSCHRG MED/CURRENT MED MERGE: CPT | Mod: S$GLB,,, | Performed by: INTERNAL MEDICINE

## 2021-01-20 PROCEDURE — 81000 URINALYSIS NONAUTO W/SCOPE: CPT

## 2021-01-20 PROCEDURE — 1111F PR DISCHARGE MEDS RECONCILED W/ CURRENT OUTPATIENT MED LIST: ICD-10-PCS | Mod: S$GLB,,, | Performed by: INTERNAL MEDICINE

## 2021-01-20 PROCEDURE — 1159F PR MEDICATION LIST DOCUMENTED IN MEDICAL RECORD: ICD-10-PCS | Mod: S$GLB,,, | Performed by: INTERNAL MEDICINE

## 2021-01-20 PROCEDURE — 3074F PR MOST RECENT SYSTOLIC BLOOD PRESSURE < 130 MM HG: ICD-10-PCS | Mod: S$GLB,,, | Performed by: INTERNAL MEDICINE

## 2021-01-20 RX ORDER — ESCITALOPRAM OXALATE 10 MG/1
10 TABLET ORAL DAILY
Qty: 30 TABLET | Refills: 11 | Status: SHIPPED | OUTPATIENT
Start: 2021-01-20 | End: 2021-08-28 | Stop reason: ALTCHOICE

## 2021-01-20 RX ORDER — METHYLPREDNISOLONE 4 MG/1
TABLET ORAL
Qty: 1 PACKAGE | Refills: 0 | Status: SHIPPED | OUTPATIENT
Start: 2021-01-20 | End: 2021-01-26 | Stop reason: CLARIF

## 2021-01-21 ENCOUNTER — PES CALL (OUTPATIENT)
Dept: ADMINISTRATIVE | Facility: CLINIC | Age: 81
End: 2021-01-21

## 2021-01-21 ENCOUNTER — LAB VISIT (OUTPATIENT)
Dept: LAB | Facility: HOSPITAL | Age: 81
End: 2021-01-21
Attending: INTERNAL MEDICINE
Payer: MEDICARE

## 2021-01-21 ENCOUNTER — OFFICE VISIT (OUTPATIENT)
Dept: HEMATOLOGY/ONCOLOGY | Facility: CLINIC | Age: 81
End: 2021-01-21
Payer: MEDICARE

## 2021-01-21 VITALS
HEART RATE: 95 BPM | RESPIRATION RATE: 18 BRPM | WEIGHT: 157.19 LBS | OXYGEN SATURATION: 98 % | SYSTOLIC BLOOD PRESSURE: 115 MMHG | TEMPERATURE: 98 F | DIASTOLIC BLOOD PRESSURE: 72 MMHG | BODY MASS INDEX: 24.67 KG/M2 | HEIGHT: 67 IN

## 2021-01-21 DIAGNOSIS — D75.0: ICD-10-CM

## 2021-01-21 DIAGNOSIS — D46.Z MDS (MYELODYSPLASTIC SYNDROME), HIGH GRADE: ICD-10-CM

## 2021-01-21 DIAGNOSIS — D64.9 SYMPTOMATIC ANEMIA: ICD-10-CM

## 2021-01-21 DIAGNOSIS — D61.818 PANCYTOPENIA: ICD-10-CM

## 2021-01-21 DIAGNOSIS — D45 POLYCYTHEMIA VERA: ICD-10-CM

## 2021-01-21 DIAGNOSIS — D46.9 MDS (MYELODYSPLASTIC SYNDROME): Primary | ICD-10-CM

## 2021-01-21 DIAGNOSIS — D46.9 MDS (MYELODYSPLASTIC SYNDROME): ICD-10-CM

## 2021-01-21 LAB
ABO + RH BLD: NORMAL
ALBUMIN SERPL BCP-MCNC: 3.6 G/DL (ref 3.5–5.2)
ALP SERPL-CCNC: 194 U/L (ref 38–145)
ALT SERPL W/O P-5'-P-CCNC: 42 U/L (ref 0–50)
ANION GAP SERPL CALC-SCNC: 8 MMOL/L (ref 8–16)
ANISOCYTOSIS BLD QL SMEAR: SLIGHT
AST SERPL-CCNC: 35 U/L (ref 17–59)
BASOPHILS NFR BLD: 1 % (ref 0–1.9)
BILIRUB SERPL-MCNC: 1.4 MG/DL (ref 0.2–1.3)
BLD GP AB SCN CELLS X3 SERPL QL: NORMAL
CALCIUM SERPL-MCNC: 9.3 MG/DL (ref 8.4–10.2)
CHLORIDE SERPL-SCNC: 102 MMOL/L (ref 95–110)
CO2 SERPL-SCNC: 23 MMOL/L (ref 22–31)
CREAT SERPL-MCNC: 0.89 MG/DL (ref 0.5–1.4)
DIFFERENTIAL METHOD: ABNORMAL
EOSINOPHIL NFR BLD: 1 % (ref 0–8)
ERYTHROCYTE [DISTWIDTH] IN BLOOD BY AUTOMATED COUNT: 17.5 % (ref 11.5–14.5)
EST. GFR  (AFRICAN AMERICAN): >60 ML/MIN/1.73 M^2
EST. GFR  (NON AFRICAN AMERICAN): >60 ML/MIN/1.73 M^2
GLUCOSE SERPL-MCNC: 204 MG/DL (ref 70–110)
HCT VFR BLD AUTO: 23.3 % (ref 40–54)
HGB BLD-MCNC: 7.3 G/DL (ref 14–18)
IMM GRANULOCYTES # BLD AUTO: ABNORMAL K/UL (ref 0–0.04)
IMM GRANULOCYTES NFR BLD AUTO: ABNORMAL % (ref 0–0.5)
LDH SERPL L TO P-CCNC: 1718 U/L (ref 313–618)
LYMPHOCYTES NFR BLD: 7 % (ref 18–48)
MAGNESIUM SERPL-MCNC: 2.1 MG/DL (ref 1.6–2.6)
MAYO MISCELLANEOUS RESULT (REF): NORMAL
MCH RBC QN AUTO: 28.1 PG (ref 27–31)
MCHC RBC AUTO-ENTMCNC: 31.3 G/DL (ref 32–36)
MCV RBC AUTO: 90 FL (ref 82–98)
METAMYELOCYTES NFR BLD MANUAL: 4 %
MONOCYTES NFR BLD: 15 % (ref 4–15)
MYELOCYTES NFR BLD MANUAL: 7 %
NEUTROPHILS # BLD AUTO: 6.5 K/UL (ref 1.8–7.7)
NEUTROPHILS NFR BLD: 56 % (ref 38–73)
NEUTS BAND NFR BLD MANUAL: 2 %
NRBC BLD-RTO: 5 /100 WBC
OVALOCYTES BLD QL SMEAR: ABNORMAL
PLATELET # BLD AUTO: 12 K/UL (ref 150–350)
PMV BLD AUTO: ABNORMAL FL (ref 9.2–12.9)
POIKILOCYTOSIS BLD QL SMEAR: SLIGHT
POLYCHROMASIA BLD QL SMEAR: ABNORMAL
POTASSIUM SERPL-SCNC: 4.2 MMOL/L (ref 3.5–5.1)
PROMYELOCYTES NFR BLD MANUAL: 2 %
PROT SERPL-MCNC: 6.9 G/DL (ref 6–8.4)
RBC # BLD AUTO: 2.6 M/UL (ref 4.6–6.2)
SODIUM SERPL-SCNC: 133 MMOL/L (ref 136–145)
UUN UR-MCNC: 13 MG/DL (ref 9–21)
WBC # BLD AUTO: 11.18 K/UL (ref 3.9–12.7)
WBC OTHER NFR BLD MANUAL: 5 %

## 2021-01-21 PROCEDURE — 86900 BLOOD TYPING SEROLOGIC ABO: CPT | Mod: PN

## 2021-01-21 PROCEDURE — 85007 BL SMEAR W/DIFF WBC COUNT: CPT | Mod: PN

## 2021-01-21 PROCEDURE — 99497 ADVNCD CARE PLAN 30 MIN: CPT | Mod: S$GLB,,, | Performed by: INTERNAL MEDICINE

## 2021-01-21 PROCEDURE — 1101F PT FALLS ASSESS-DOCD LE1/YR: CPT | Mod: CPTII,S$GLB,, | Performed by: INTERNAL MEDICINE

## 2021-01-21 PROCEDURE — 3078F PR MOST RECENT DIASTOLIC BLOOD PRESSURE < 80 MM HG: ICD-10-PCS | Mod: CPTII,S$GLB,, | Performed by: INTERNAL MEDICINE

## 2021-01-21 PROCEDURE — 85007 BL SMEAR W/DIFF WBC COUNT: CPT

## 2021-01-21 PROCEDURE — 99215 OFFICE O/P EST HI 40 MIN: CPT | Mod: S$GLB,,, | Performed by: INTERNAL MEDICINE

## 2021-01-21 PROCEDURE — 1159F MED LIST DOCD IN RCRD: CPT | Mod: S$GLB,,, | Performed by: INTERNAL MEDICINE

## 2021-01-21 PROCEDURE — 83735 ASSAY OF MAGNESIUM: CPT | Mod: PN

## 2021-01-21 PROCEDURE — 99215 PR OFFICE/OUTPT VISIT, EST, LEVL V, 40-54 MIN: ICD-10-PCS | Mod: S$GLB,,, | Performed by: INTERNAL MEDICINE

## 2021-01-21 PROCEDURE — 85027 COMPLETE CBC AUTOMATED: CPT | Mod: PN

## 2021-01-21 PROCEDURE — 80053 COMPREHEN METABOLIC PANEL: CPT

## 2021-01-21 PROCEDURE — 99999 PR PBB SHADOW E&M-EST. PATIENT-LVL V: ICD-10-PCS | Mod: PBBFAC,,, | Performed by: INTERNAL MEDICINE

## 2021-01-21 PROCEDURE — 3074F SYST BP LT 130 MM HG: CPT | Mod: CPTII,S$GLB,, | Performed by: INTERNAL MEDICINE

## 2021-01-21 PROCEDURE — 3288F PR FALLS RISK ASSESSMENT DOCUMENTED: ICD-10-PCS | Mod: CPTII,S$GLB,, | Performed by: INTERNAL MEDICINE

## 2021-01-21 PROCEDURE — 83615 LACTATE (LD) (LDH) ENZYME: CPT | Mod: PN

## 2021-01-21 PROCEDURE — 99497 PR ADVNCD CARE PLAN 30 MIN: ICD-10-PCS | Mod: S$GLB,,, | Performed by: INTERNAL MEDICINE

## 2021-01-21 PROCEDURE — 1159F PR MEDICATION LIST DOCUMENTED IN MEDICAL RECORD: ICD-10-PCS | Mod: S$GLB,,, | Performed by: INTERNAL MEDICINE

## 2021-01-21 PROCEDURE — 83615 LACTATE (LD) (LDH) ENZYME: CPT

## 2021-01-21 PROCEDURE — 1126F PR PAIN SEVERITY QUANTIFIED, NO PAIN PRESENT: ICD-10-PCS | Mod: S$GLB,,, | Performed by: INTERNAL MEDICINE

## 2021-01-21 PROCEDURE — 3074F PR MOST RECENT SYSTOLIC BLOOD PRESSURE < 130 MM HG: ICD-10-PCS | Mod: CPTII,S$GLB,, | Performed by: INTERNAL MEDICINE

## 2021-01-21 PROCEDURE — 99999 PR PBB SHADOW E&M-EST. PATIENT-LVL V: CPT | Mod: PBBFAC,,, | Performed by: INTERNAL MEDICINE

## 2021-01-21 PROCEDURE — 85027 COMPLETE CBC AUTOMATED: CPT

## 2021-01-21 PROCEDURE — 83735 ASSAY OF MAGNESIUM: CPT

## 2021-01-21 PROCEDURE — 3078F DIAST BP <80 MM HG: CPT | Mod: CPTII,S$GLB,, | Performed by: INTERNAL MEDICINE

## 2021-01-21 PROCEDURE — 36415 COLL VENOUS BLD VENIPUNCTURE: CPT | Mod: PN

## 2021-01-21 PROCEDURE — 1101F PR PT FALLS ASSESS DOC 0-1 FALLS W/OUT INJ PAST YR: ICD-10-PCS | Mod: CPTII,S$GLB,, | Performed by: INTERNAL MEDICINE

## 2021-01-21 PROCEDURE — 80053 COMPREHEN METABOLIC PANEL: CPT | Mod: PN

## 2021-01-21 PROCEDURE — 3288F FALL RISK ASSESSMENT DOCD: CPT | Mod: CPTII,S$GLB,, | Performed by: INTERNAL MEDICINE

## 2021-01-21 PROCEDURE — 86900 BLOOD TYPING SEROLOGIC ABO: CPT

## 2021-01-21 PROCEDURE — 1126F AMNT PAIN NOTED NONE PRSNT: CPT | Mod: S$GLB,,, | Performed by: INTERNAL MEDICINE

## 2021-01-21 RX ORDER — HYDROCODONE BITARTRATE AND ACETAMINOPHEN 500; 5 MG/1; MG/1
TABLET ORAL ONCE
Status: CANCELLED | OUTPATIENT
Start: 2021-01-21 | End: 2021-01-21

## 2021-01-21 RX ORDER — FUROSEMIDE 10 MG/ML
20 INJECTION INTRAMUSCULAR; INTRAVENOUS ONCE
Status: CANCELLED | OUTPATIENT
Start: 2021-01-21

## 2021-01-21 RX ORDER — ACETAMINOPHEN 325 MG/1
650 TABLET ORAL ONCE
Status: CANCELLED | OUTPATIENT
Start: 2021-01-21

## 2021-01-21 RX ORDER — DIPHENHYDRAMINE HCL 25 MG
25 CAPSULE ORAL ONCE
Status: CANCELLED | OUTPATIENT
Start: 2021-01-21

## 2021-01-22 ENCOUNTER — SPECIALTY PHARMACY (OUTPATIENT)
Dept: PHARMACY | Facility: CLINIC | Age: 81
End: 2021-01-22

## 2021-01-24 DIAGNOSIS — D45: Primary | ICD-10-CM

## 2021-01-25 ENCOUNTER — DOCUMENT SCAN (OUTPATIENT)
Dept: HOME HEALTH SERVICES | Facility: HOSPITAL | Age: 81
End: 2021-01-25
Payer: MEDICARE

## 2021-01-25 ENCOUNTER — LAB VISIT (OUTPATIENT)
Dept: LAB | Facility: HOSPITAL | Age: 81
End: 2021-01-25
Attending: INTERNAL MEDICINE
Payer: MEDICARE

## 2021-01-25 ENCOUNTER — SPECIALTY PHARMACY (OUTPATIENT)
Dept: PHARMACY | Facility: CLINIC | Age: 81
End: 2021-01-25

## 2021-01-25 DIAGNOSIS — D45: ICD-10-CM

## 2021-01-25 DIAGNOSIS — D46.9 MDS (MYELODYSPLASTIC SYNDROME): ICD-10-CM

## 2021-01-25 LAB
ANISOCYTOSIS BLD QL SMEAR: SLIGHT
BASOPHILS NFR BLD: 0 % (ref 0–1.9)
DACRYOCYTES BLD QL SMEAR: ABNORMAL
DIFFERENTIAL METHOD: ABNORMAL
EOSINOPHIL NFR BLD: 2 % (ref 0–8)
ERYTHROCYTE [DISTWIDTH] IN BLOOD BY AUTOMATED COUNT: 17.2 % (ref 11.5–14.5)
HCT VFR BLD AUTO: 34.4 % (ref 40–54)
HGB BLD-MCNC: 10.5 G/DL (ref 14–18)
IMM GRANULOCYTES # BLD AUTO: ABNORMAL K/UL (ref 0–0.04)
IMM GRANULOCYTES NFR BLD AUTO: ABNORMAL % (ref 0–0.5)
LYMPHOCYTES NFR BLD: 17 % (ref 18–48)
MCH RBC QN AUTO: 27.5 PG (ref 27–31)
MCHC RBC AUTO-ENTMCNC: 30.5 G/DL (ref 32–36)
MCV RBC AUTO: 90 FL (ref 82–98)
METAMYELOCYTES NFR BLD MANUAL: 1 %
MONOCYTES NFR BLD: 11 % (ref 4–15)
MYELOCYTES NFR BLD MANUAL: 1 %
NEUTROPHILS # BLD AUTO: 10.6 K/UL (ref 1.8–7.7)
NEUTROPHILS NFR BLD: 65 % (ref 38–73)
NEUTS BAND NFR BLD MANUAL: 1 %
NRBC BLD-RTO: 19 /100 WBC
OVALOCYTES BLD QL SMEAR: ABNORMAL
PLATELET # BLD AUTO: 13 K/UL (ref 150–350)
PLATELET BLD QL SMEAR: ABNORMAL
PMV BLD AUTO: ABNORMAL FL (ref 9.2–12.9)
POIKILOCYTOSIS BLD QL SMEAR: SLIGHT
POLYCHROMASIA BLD QL SMEAR: ABNORMAL
RBC # BLD AUTO: 3.82 M/UL (ref 4.6–6.2)
WBC # BLD AUTO: 16.06 K/UL (ref 3.9–12.7)
WBC OTHER NFR BLD MANUAL: 2 %

## 2021-01-25 PROCEDURE — 85027 COMPLETE CBC AUTOMATED: CPT | Mod: PN

## 2021-01-25 PROCEDURE — 85027 COMPLETE CBC AUTOMATED: CPT

## 2021-01-25 PROCEDURE — 85007 BL SMEAR W/DIFF WBC COUNT: CPT | Mod: PN

## 2021-01-25 PROCEDURE — 85007 BL SMEAR W/DIFF WBC COUNT: CPT

## 2021-01-25 PROCEDURE — 36415 COLL VENOUS BLD VENIPUNCTURE: CPT | Mod: PN

## 2021-01-26 ENCOUNTER — DOCUMENT SCAN (OUTPATIENT)
Dept: HOME HEALTH SERVICES | Facility: HOSPITAL | Age: 81
End: 2021-01-26
Payer: MEDICARE

## 2021-01-26 ENCOUNTER — TELEPHONE (OUTPATIENT)
Dept: HEMATOLOGY/ONCOLOGY | Facility: CLINIC | Age: 81
End: 2021-01-26

## 2021-01-26 DIAGNOSIS — D69.6 THROMBOCYTOPENIA: Primary | ICD-10-CM

## 2021-01-26 RX ORDER — DEXAMETHASONE SODIUM PHOSPHATE 4 MG/ML
4 INJECTION, SOLUTION INTRA-ARTICULAR; INTRALESIONAL; INTRAMUSCULAR; INTRAVENOUS; SOFT TISSUE
Status: CANCELLED | OUTPATIENT
Start: 2021-01-26

## 2021-01-26 RX ORDER — HYDROCODONE BITARTRATE AND ACETAMINOPHEN 500; 5 MG/1; MG/1
TABLET ORAL ONCE
Status: CANCELLED | OUTPATIENT
Start: 2021-01-26 | End: 2021-01-26

## 2021-01-26 RX ORDER — DIPHENHYDRAMINE HYDROCHLORIDE 50 MG/ML
25 INJECTION INTRAMUSCULAR; INTRAVENOUS
Status: CANCELLED | OUTPATIENT
Start: 2021-01-26

## 2021-01-27 ENCOUNTER — CLINICAL SUPPORT (OUTPATIENT)
Dept: HEMATOLOGY/ONCOLOGY | Facility: CLINIC | Age: 81
End: 2021-01-27
Payer: MEDICARE

## 2021-01-27 ENCOUNTER — DOCUMENTATION ONLY (OUTPATIENT)
Dept: INFUSION THERAPY | Facility: HOSPITAL | Age: 81
End: 2021-01-27

## 2021-01-27 VITALS
HEIGHT: 67 IN | SYSTOLIC BLOOD PRESSURE: 141 MMHG | WEIGHT: 154.56 LBS | RESPIRATION RATE: 18 BRPM | TEMPERATURE: 98 F | OXYGEN SATURATION: 98 % | HEART RATE: 93 BPM | DIASTOLIC BLOOD PRESSURE: 72 MMHG | BODY MASS INDEX: 24.26 KG/M2

## 2021-01-27 DIAGNOSIS — D46.Z MDS (MYELODYSPLASTIC SYNDROME), HIGH GRADE: Primary | ICD-10-CM

## 2021-01-27 DIAGNOSIS — I10 ESSENTIAL HYPERTENSION: ICD-10-CM

## 2021-01-27 PROCEDURE — 99999 PR PBB SHADOW E&M-EST. PATIENT-LVL V: CPT | Mod: PBBFAC,,, | Performed by: NURSE PRACTITIONER

## 2021-01-27 PROCEDURE — 99214 PR OFFICE/OUTPT VISIT, EST, LEVL IV, 30-39 MIN: ICD-10-PCS | Mod: S$GLB,,, | Performed by: NURSE PRACTITIONER

## 2021-01-27 PROCEDURE — 99214 OFFICE O/P EST MOD 30 MIN: CPT | Mod: S$GLB,,, | Performed by: NURSE PRACTITIONER

## 2021-01-27 PROCEDURE — 99999 PR PBB SHADOW E&M-EST. PATIENT-LVL V: ICD-10-PCS | Mod: PBBFAC,,, | Performed by: NURSE PRACTITIONER

## 2021-01-27 RX ORDER — PROCHLORPERAZINE MALEATE 10 MG
10 TABLET ORAL EVERY 6 HOURS PRN
Qty: 30 TABLET | Refills: 6 | Status: SHIPPED | OUTPATIENT
Start: 2021-01-27 | End: 2021-02-04 | Stop reason: SDUPTHER

## 2021-01-27 RX ORDER — LIDOCAINE AND PRILOCAINE 25; 25 MG/G; MG/G
CREAM TOPICAL
Qty: 30 G | Refills: 2 | Status: SHIPPED | OUTPATIENT
Start: 2021-01-27 | End: 2021-01-28

## 2021-01-28 ENCOUNTER — OFFICE VISIT (OUTPATIENT)
Dept: CARDIOLOGY | Facility: CLINIC | Age: 81
End: 2021-01-28
Payer: MEDICARE

## 2021-01-28 ENCOUNTER — OFFICE VISIT (OUTPATIENT)
Dept: FAMILY MEDICINE | Facility: CLINIC | Age: 81
End: 2021-01-28
Attending: FAMILY MEDICINE
Payer: MEDICARE

## 2021-01-28 VITALS
BODY MASS INDEX: 24.48 KG/M2 | HEIGHT: 67 IN | SYSTOLIC BLOOD PRESSURE: 118 MMHG | DIASTOLIC BLOOD PRESSURE: 72 MMHG | WEIGHT: 156 LBS | OXYGEN SATURATION: 99 % | HEART RATE: 90 BPM

## 2021-01-28 VITALS
BODY MASS INDEX: 24.49 KG/M2 | DIASTOLIC BLOOD PRESSURE: 54 MMHG | WEIGHT: 156.06 LBS | HEART RATE: 86 BPM | TEMPERATURE: 97 F | HEIGHT: 67 IN | SYSTOLIC BLOOD PRESSURE: 100 MMHG | OXYGEN SATURATION: 98 %

## 2021-01-28 DIAGNOSIS — D61.818 PANCYTOPENIA: ICD-10-CM

## 2021-01-28 DIAGNOSIS — E78.00 HYPERCHOLESTEREMIA: ICD-10-CM

## 2021-01-28 DIAGNOSIS — D46.Z MDS (MYELODYSPLASTIC SYNDROME), HIGH GRADE: ICD-10-CM

## 2021-01-28 DIAGNOSIS — D69.6 THROMBOCYTOPENIA: ICD-10-CM

## 2021-01-28 DIAGNOSIS — J12.82 PNEUMONIA DUE TO COVID-19 VIRUS: ICD-10-CM

## 2021-01-28 DIAGNOSIS — E03.9 ACQUIRED HYPOTHYROIDISM: ICD-10-CM

## 2021-01-28 DIAGNOSIS — I70.209 ATHEROSCLEROSIS OF NATIVE ARTERY OF EXTREMITY, UNSPECIFIED EXTREMITY, WITH UNSPECIFIED PRESENCE OF CLINICAL MANIFESTATION: Primary | ICD-10-CM

## 2021-01-28 DIAGNOSIS — U07.1 PNEUMONIA DUE TO COVID-19 VIRUS: ICD-10-CM

## 2021-01-28 DIAGNOSIS — I48.0 PAROXYSMAL ATRIAL FIBRILLATION: ICD-10-CM

## 2021-01-28 DIAGNOSIS — E78.5 DYSLIPIDEMIA (HIGH LDL; LOW HDL): ICD-10-CM

## 2021-01-28 DIAGNOSIS — R79.89 ELEVATED LFTS: ICD-10-CM

## 2021-01-28 DIAGNOSIS — G92.9 ENCEPHALOPATHY, TOXIC: ICD-10-CM

## 2021-01-28 DIAGNOSIS — M79.605 BILATERAL LEG PAIN: ICD-10-CM

## 2021-01-28 DIAGNOSIS — D64.9 TRANSFUSION-DEPENDENT ANEMIA: ICD-10-CM

## 2021-01-28 DIAGNOSIS — R73.9 HYPERGLYCEMIA: Primary | ICD-10-CM

## 2021-01-28 DIAGNOSIS — R16.1 SPLENOMEGALY: ICD-10-CM

## 2021-01-28 DIAGNOSIS — I10 ESSENTIAL HYPERTENSION: ICD-10-CM

## 2021-01-28 DIAGNOSIS — M79.604 BILATERAL LEG PAIN: ICD-10-CM

## 2021-01-28 DIAGNOSIS — E11.69 TYPE 2 DIABETES MELLITUS WITH OTHER SPECIFIED COMPLICATION, WITHOUT LONG-TERM CURRENT USE OF INSULIN: ICD-10-CM

## 2021-01-28 DIAGNOSIS — U07.1 COVID-19: ICD-10-CM

## 2021-01-28 DIAGNOSIS — N17.9 AKI (ACUTE KIDNEY INJURY): ICD-10-CM

## 2021-01-28 DIAGNOSIS — T50.905A MEDICATION SIDE EFFECT, INITIAL ENCOUNTER: ICD-10-CM

## 2021-01-28 PROCEDURE — 1101F PT FALLS ASSESS-DOCD LE1/YR: CPT | Mod: CPTII,S$GLB,, | Performed by: SPECIALIST

## 2021-01-28 PROCEDURE — 99999 PR PBB SHADOW E&M-EST. PATIENT-LVL IV: CPT | Mod: PBBFAC,,, | Performed by: FAMILY MEDICINE

## 2021-01-28 PROCEDURE — 99214 PR OFFICE/OUTPT VISIT, EST, LEVL IV, 30-39 MIN: ICD-10-PCS | Mod: S$GLB,,, | Performed by: FAMILY MEDICINE

## 2021-01-28 PROCEDURE — 3288F FALL RISK ASSESSMENT DOCD: CPT | Mod: CPTII,S$GLB,, | Performed by: SPECIALIST

## 2021-01-28 PROCEDURE — 3074F SYST BP LT 130 MM HG: CPT | Mod: CPTII,S$GLB,, | Performed by: FAMILY MEDICINE

## 2021-01-28 PROCEDURE — 99999 PR PBB SHADOW E&M-EST. PATIENT-LVL IV: ICD-10-PCS | Mod: PBBFAC,,, | Performed by: FAMILY MEDICINE

## 2021-01-28 PROCEDURE — 3074F PR MOST RECENT SYSTOLIC BLOOD PRESSURE < 130 MM HG: ICD-10-PCS | Mod: CPTII,S$GLB,, | Performed by: SPECIALIST

## 2021-01-28 PROCEDURE — 99214 PR OFFICE/OUTPT VISIT, EST, LEVL IV, 30-39 MIN: ICD-10-PCS | Mod: S$GLB,,, | Performed by: SPECIALIST

## 2021-01-28 PROCEDURE — 3288F FALL RISK ASSESSMENT DOCD: CPT | Mod: CPTII,S$GLB,, | Performed by: FAMILY MEDICINE

## 2021-01-28 PROCEDURE — 1101F PR PT FALLS ASSESS DOC 0-1 FALLS W/OUT INJ PAST YR: ICD-10-PCS | Mod: CPTII,S$GLB,, | Performed by: SPECIALIST

## 2021-01-28 PROCEDURE — 3288F PR FALLS RISK ASSESSMENT DOCUMENTED: ICD-10-PCS | Mod: CPTII,S$GLB,, | Performed by: FAMILY MEDICINE

## 2021-01-28 PROCEDURE — 1159F MED LIST DOCD IN RCRD: CPT | Mod: S$GLB,,, | Performed by: SPECIALIST

## 2021-01-28 PROCEDURE — 1125F AMNT PAIN NOTED PAIN PRSNT: CPT | Mod: S$GLB,,, | Performed by: FAMILY MEDICINE

## 2021-01-28 PROCEDURE — 3078F PR MOST RECENT DIASTOLIC BLOOD PRESSURE < 80 MM HG: ICD-10-PCS | Mod: CPTII,S$GLB,, | Performed by: SPECIALIST

## 2021-01-28 PROCEDURE — 1159F PR MEDICATION LIST DOCUMENTED IN MEDICAL RECORD: ICD-10-PCS | Mod: S$GLB,,, | Performed by: SPECIALIST

## 2021-01-28 PROCEDURE — 3078F DIAST BP <80 MM HG: CPT | Mod: CPTII,S$GLB,, | Performed by: SPECIALIST

## 2021-01-28 PROCEDURE — 99214 OFFICE O/P EST MOD 30 MIN: CPT | Mod: S$GLB,,, | Performed by: SPECIALIST

## 2021-01-28 PROCEDURE — 99214 OFFICE O/P EST MOD 30 MIN: CPT | Mod: S$GLB,,, | Performed by: FAMILY MEDICINE

## 2021-01-28 PROCEDURE — 1101F PR PT FALLS ASSESS DOC 0-1 FALLS W/OUT INJ PAST YR: ICD-10-PCS | Mod: CPTII,S$GLB,, | Performed by: FAMILY MEDICINE

## 2021-01-28 PROCEDURE — 1125F PR PAIN SEVERITY QUANTIFIED, PAIN PRESENT: ICD-10-PCS | Mod: S$GLB,,, | Performed by: FAMILY MEDICINE

## 2021-01-28 PROCEDURE — 3078F DIAST BP <80 MM HG: CPT | Mod: CPTII,S$GLB,, | Performed by: FAMILY MEDICINE

## 2021-01-28 PROCEDURE — 3078F PR MOST RECENT DIASTOLIC BLOOD PRESSURE < 80 MM HG: ICD-10-PCS | Mod: CPTII,S$GLB,, | Performed by: FAMILY MEDICINE

## 2021-01-28 PROCEDURE — 3074F SYST BP LT 130 MM HG: CPT | Mod: CPTII,S$GLB,, | Performed by: SPECIALIST

## 2021-01-28 PROCEDURE — 1101F PT FALLS ASSESS-DOCD LE1/YR: CPT | Mod: CPTII,S$GLB,, | Performed by: FAMILY MEDICINE

## 2021-01-28 PROCEDURE — 1159F PR MEDICATION LIST DOCUMENTED IN MEDICAL RECORD: ICD-10-PCS | Mod: S$GLB,,, | Performed by: FAMILY MEDICINE

## 2021-01-28 PROCEDURE — 3288F PR FALLS RISK ASSESSMENT DOCUMENTED: ICD-10-PCS | Mod: CPTII,S$GLB,, | Performed by: SPECIALIST

## 2021-01-28 PROCEDURE — 1159F MED LIST DOCD IN RCRD: CPT | Mod: S$GLB,,, | Performed by: FAMILY MEDICINE

## 2021-01-28 PROCEDURE — 3074F PR MOST RECENT SYSTOLIC BLOOD PRESSURE < 130 MM HG: ICD-10-PCS | Mod: CPTII,S$GLB,, | Performed by: FAMILY MEDICINE

## 2021-01-29 ENCOUNTER — PATIENT MESSAGE (OUTPATIENT)
Dept: HEMATOLOGY/ONCOLOGY | Facility: CLINIC | Age: 81
End: 2021-01-29

## 2021-01-29 ENCOUNTER — DOCUMENTATION ONLY (OUTPATIENT)
Dept: INFUSION THERAPY | Facility: HOSPITAL | Age: 81
End: 2021-01-29

## 2021-01-29 DIAGNOSIS — D64.9 SYMPTOMATIC ANEMIA: Primary | ICD-10-CM

## 2021-01-29 PROBLEM — D46.9 MDS (MYELODYSPLASTIC SYNDROME): Status: ACTIVE | Noted: 2021-01-29

## 2021-02-01 ENCOUNTER — INFUSION (OUTPATIENT)
Dept: INFUSION THERAPY | Facility: HOSPITAL | Age: 81
End: 2021-02-01
Attending: INTERNAL MEDICINE
Payer: MEDICARE

## 2021-02-01 ENCOUNTER — OFFICE VISIT (OUTPATIENT)
Dept: HEMATOLOGY/ONCOLOGY | Facility: CLINIC | Age: 81
End: 2021-02-01
Payer: MEDICARE

## 2021-02-01 VITALS
BODY MASS INDEX: 24.41 KG/M2 | HEART RATE: 82 BPM | OXYGEN SATURATION: 98 % | WEIGHT: 155.88 LBS | TEMPERATURE: 97 F | DIASTOLIC BLOOD PRESSURE: 64 MMHG | SYSTOLIC BLOOD PRESSURE: 97 MMHG

## 2021-02-01 VITALS
HEART RATE: 78 BPM | SYSTOLIC BLOOD PRESSURE: 102 MMHG | BODY MASS INDEX: 24.47 KG/M2 | HEIGHT: 67 IN | TEMPERATURE: 97 F | WEIGHT: 155.88 LBS | DIASTOLIC BLOOD PRESSURE: 70 MMHG | RESPIRATION RATE: 18 BRPM

## 2021-02-01 DIAGNOSIS — D45 POLYCYTHEMIA VERA: ICD-10-CM

## 2021-02-01 DIAGNOSIS — D46.Z MDS (MYELODYSPLASTIC SYNDROME), HIGH GRADE: Primary | ICD-10-CM

## 2021-02-01 DIAGNOSIS — D64.9 TRANSFUSION-DEPENDENT ANEMIA: ICD-10-CM

## 2021-02-01 PROCEDURE — 1126F AMNT PAIN NOTED NONE PRSNT: CPT | Mod: S$GLB,,, | Performed by: INTERNAL MEDICINE

## 2021-02-01 PROCEDURE — 99215 OFFICE O/P EST HI 40 MIN: CPT | Mod: S$GLB,,, | Performed by: INTERNAL MEDICINE

## 2021-02-01 PROCEDURE — 3288F PR FALLS RISK ASSESSMENT DOCUMENTED: ICD-10-PCS | Mod: CPTII,S$GLB,, | Performed by: INTERNAL MEDICINE

## 2021-02-01 PROCEDURE — 3074F PR MOST RECENT SYSTOLIC BLOOD PRESSURE < 130 MM HG: ICD-10-PCS | Mod: CPTII,S$GLB,, | Performed by: INTERNAL MEDICINE

## 2021-02-01 PROCEDURE — 1159F MED LIST DOCD IN RCRD: CPT | Mod: S$GLB,,, | Performed by: INTERNAL MEDICINE

## 2021-02-01 PROCEDURE — 99999 PR PBB SHADOW E&M-EST. PATIENT-LVL IV: ICD-10-PCS | Mod: PBBFAC,,, | Performed by: INTERNAL MEDICINE

## 2021-02-01 PROCEDURE — 1126F PR PAIN SEVERITY QUANTIFIED, NO PAIN PRESENT: ICD-10-PCS | Mod: S$GLB,,, | Performed by: INTERNAL MEDICINE

## 2021-02-01 PROCEDURE — 96375 TX/PRO/DX INJ NEW DRUG ADDON: CPT | Mod: PN

## 2021-02-01 PROCEDURE — 99215 PR OFFICE/OUTPT VISIT, EST, LEVL V, 40-54 MIN: ICD-10-PCS | Mod: S$GLB,,, | Performed by: INTERNAL MEDICINE

## 2021-02-01 PROCEDURE — 3074F SYST BP LT 130 MM HG: CPT | Mod: CPTII,S$GLB,, | Performed by: INTERNAL MEDICINE

## 2021-02-01 PROCEDURE — 3078F DIAST BP <80 MM HG: CPT | Mod: CPTII,S$GLB,, | Performed by: INTERNAL MEDICINE

## 2021-02-01 PROCEDURE — 1159F PR MEDICATION LIST DOCUMENTED IN MEDICAL RECORD: ICD-10-PCS | Mod: S$GLB,,, | Performed by: INTERNAL MEDICINE

## 2021-02-01 PROCEDURE — 3078F PR MOST RECENT DIASTOLIC BLOOD PRESSURE < 80 MM HG: ICD-10-PCS | Mod: CPTII,S$GLB,, | Performed by: INTERNAL MEDICINE

## 2021-02-01 PROCEDURE — 1101F PT FALLS ASSESS-DOCD LE1/YR: CPT | Mod: CPTII,S$GLB,, | Performed by: INTERNAL MEDICINE

## 2021-02-01 PROCEDURE — 1101F PR PT FALLS ASSESS DOC 0-1 FALLS W/OUT INJ PAST YR: ICD-10-PCS | Mod: CPTII,S$GLB,, | Performed by: INTERNAL MEDICINE

## 2021-02-01 PROCEDURE — 96409 CHEMO IV PUSH SNGL DRUG: CPT | Mod: PN

## 2021-02-01 PROCEDURE — 3288F FALL RISK ASSESSMENT DOCD: CPT | Mod: CPTII,S$GLB,, | Performed by: INTERNAL MEDICINE

## 2021-02-01 PROCEDURE — 99999 PR PBB SHADOW E&M-EST. PATIENT-LVL IV: CPT | Mod: PBBFAC,,, | Performed by: INTERNAL MEDICINE

## 2021-02-01 PROCEDURE — 63600175 PHARM REV CODE 636 W HCPCS: Mod: JW,JG,PN | Performed by: INTERNAL MEDICINE

## 2021-02-01 PROCEDURE — 25000003 PHARM REV CODE 250: Mod: PN | Performed by: INTERNAL MEDICINE

## 2021-02-01 RX ORDER — SODIUM CHLORIDE 0.9 % (FLUSH) 0.9 %
10 SYRINGE (ML) INJECTION
Status: DISCONTINUED | OUTPATIENT
Start: 2021-02-01 | End: 2021-02-01 | Stop reason: HOSPADM

## 2021-02-01 RX ORDER — SODIUM CHLORIDE 0.9 % (FLUSH) 0.9 %
10 SYRINGE (ML) INJECTION
Status: CANCELLED | OUTPATIENT
Start: 2021-02-04

## 2021-02-01 RX ORDER — ONDANSETRON 2 MG/ML
8 INJECTION INTRAMUSCULAR; INTRAVENOUS ONCE
Status: COMPLETED | OUTPATIENT
Start: 2021-02-01 | End: 2021-02-01

## 2021-02-01 RX ORDER — HEPARIN 100 UNIT/ML
500 SYRINGE INTRAVENOUS
Status: CANCELLED | OUTPATIENT
Start: 2021-02-03

## 2021-02-01 RX ORDER — ONDANSETRON 2 MG/ML
8 INJECTION INTRAMUSCULAR; INTRAVENOUS ONCE
Status: CANCELLED
Start: 2021-02-02 | End: 2021-02-02

## 2021-02-01 RX ORDER — HEPARIN 100 UNIT/ML
500 SYRINGE INTRAVENOUS
Status: CANCELLED | OUTPATIENT
Start: 2021-02-04

## 2021-02-01 RX ORDER — SODIUM CHLORIDE 0.9 % (FLUSH) 0.9 %
10 SYRINGE (ML) INJECTION
Status: CANCELLED | OUTPATIENT
Start: 2021-02-01

## 2021-02-01 RX ORDER — ONDANSETRON 2 MG/ML
8 INJECTION INTRAMUSCULAR; INTRAVENOUS ONCE
Status: CANCELLED
Start: 2021-02-05 | End: 2021-02-05

## 2021-02-01 RX ORDER — ONDANSETRON 2 MG/ML
8 INJECTION INTRAMUSCULAR; INTRAVENOUS ONCE
Status: CANCELLED
Start: 2021-02-04 | End: 2021-02-04

## 2021-02-01 RX ORDER — SODIUM CHLORIDE 0.9 % (FLUSH) 0.9 %
10 SYRINGE (ML) INJECTION
Status: CANCELLED | OUTPATIENT
Start: 2021-02-05

## 2021-02-01 RX ORDER — HEPARIN 100 UNIT/ML
500 SYRINGE INTRAVENOUS
Status: CANCELLED | OUTPATIENT
Start: 2021-02-02

## 2021-02-01 RX ORDER — SODIUM CHLORIDE 0.9 % (FLUSH) 0.9 %
10 SYRINGE (ML) INJECTION
Status: CANCELLED | OUTPATIENT
Start: 2021-02-03

## 2021-02-01 RX ORDER — HEPARIN 100 UNIT/ML
500 SYRINGE INTRAVENOUS
Status: CANCELLED | OUTPATIENT
Start: 2021-02-01

## 2021-02-01 RX ORDER — ONDANSETRON 2 MG/ML
8 INJECTION INTRAMUSCULAR; INTRAVENOUS ONCE
Status: CANCELLED
Start: 2021-02-03 | End: 2021-02-03

## 2021-02-01 RX ORDER — ONDANSETRON 2 MG/ML
8 INJECTION INTRAMUSCULAR; INTRAVENOUS ONCE
Status: CANCELLED
Start: 2021-02-01 | End: 2021-02-01

## 2021-02-01 RX ORDER — DIPHENHYDRAMINE HYDROCHLORIDE 12.5 MG/5ML
LIQUID ORAL
COMMUNITY
Start: 2021-01-28 | End: 2021-03-19

## 2021-02-01 RX ORDER — SODIUM CHLORIDE 0.9 % (FLUSH) 0.9 %
10 SYRINGE (ML) INJECTION
Status: CANCELLED | OUTPATIENT
Start: 2021-02-02

## 2021-02-01 RX ORDER — HEPARIN 100 UNIT/ML
500 SYRINGE INTRAVENOUS
Status: DISCONTINUED | OUTPATIENT
Start: 2021-02-01 | End: 2021-02-01 | Stop reason: HOSPADM

## 2021-02-01 RX ORDER — HEPARIN 100 UNIT/ML
500 SYRINGE INTRAVENOUS
Status: CANCELLED | OUTPATIENT
Start: 2021-02-05

## 2021-02-01 RX ADMIN — AZACITIDINE 45 MG: 100 INJECTION, POWDER, LYOPHILIZED, FOR SOLUTION INTRAVENOUS; SUBCUTANEOUS at 04:02

## 2021-02-01 RX ADMIN — ONDANSETRON 8 MG: 2 INJECTION INTRAMUSCULAR; INTRAVENOUS at 03:02

## 2021-02-01 RX ADMIN — SODIUM CHLORIDE: 900 INJECTION, SOLUTION INTRAVENOUS at 03:02

## 2021-02-02 ENCOUNTER — INFUSION (OUTPATIENT)
Dept: INFUSION THERAPY | Facility: HOSPITAL | Age: 81
End: 2021-02-02
Attending: INTERNAL MEDICINE
Payer: MEDICARE

## 2021-02-02 VITALS
SYSTOLIC BLOOD PRESSURE: 98 MMHG | HEART RATE: 74 BPM | RESPIRATION RATE: 18 BRPM | DIASTOLIC BLOOD PRESSURE: 69 MMHG | TEMPERATURE: 98 F

## 2021-02-02 DIAGNOSIS — D46.Z MDS (MYELODYSPLASTIC SYNDROME), HIGH GRADE: Primary | ICD-10-CM

## 2021-02-02 PROCEDURE — 25000003 PHARM REV CODE 250: Mod: PN | Performed by: INTERNAL MEDICINE

## 2021-02-02 PROCEDURE — 63600175 PHARM REV CODE 636 W HCPCS: Mod: PN | Performed by: INTERNAL MEDICINE

## 2021-02-02 PROCEDURE — A4216 STERILE WATER/SALINE, 10 ML: HCPCS | Mod: PN | Performed by: INTERNAL MEDICINE

## 2021-02-02 PROCEDURE — 96409 CHEMO IV PUSH SNGL DRUG: CPT | Mod: PN

## 2021-02-02 PROCEDURE — 96375 TX/PRO/DX INJ NEW DRUG ADDON: CPT | Mod: PN

## 2021-02-02 RX ORDER — ONDANSETRON 2 MG/ML
8 INJECTION INTRAMUSCULAR; INTRAVENOUS ONCE
Status: COMPLETED | OUTPATIENT
Start: 2021-02-02 | End: 2021-02-02

## 2021-02-02 RX ORDER — SODIUM CHLORIDE 0.9 % (FLUSH) 0.9 %
10 SYRINGE (ML) INJECTION
Status: DISCONTINUED | OUTPATIENT
Start: 2021-02-02 | End: 2021-02-02 | Stop reason: HOSPADM

## 2021-02-02 RX ADMIN — Medication 10 ML: at 10:02

## 2021-02-02 RX ADMIN — SODIUM CHLORIDE: 900 INJECTION, SOLUTION INTRAVENOUS at 10:02

## 2021-02-02 RX ADMIN — ONDANSETRON 8 MG: 2 INJECTION INTRAMUSCULAR; INTRAVENOUS at 10:02

## 2021-02-02 RX ADMIN — AZACITIDINE 45 MG: 100 INJECTION, POWDER, LYOPHILIZED, FOR SOLUTION INTRAVENOUS; SUBCUTANEOUS at 10:02

## 2021-02-03 ENCOUNTER — INFUSION (OUTPATIENT)
Dept: INFUSION THERAPY | Facility: HOSPITAL | Age: 81
End: 2021-02-03
Attending: INTERNAL MEDICINE
Payer: MEDICARE

## 2021-02-03 VITALS
SYSTOLIC BLOOD PRESSURE: 111 MMHG | TEMPERATURE: 99 F | OXYGEN SATURATION: 98 % | HEART RATE: 73 BPM | HEIGHT: 67 IN | DIASTOLIC BLOOD PRESSURE: 58 MMHG | RESPIRATION RATE: 18 BRPM | WEIGHT: 155.88 LBS | BODY MASS INDEX: 24.47 KG/M2

## 2021-02-03 DIAGNOSIS — D46.Z MDS (MYELODYSPLASTIC SYNDROME), HIGH GRADE: Primary | ICD-10-CM

## 2021-02-03 PROCEDURE — 96409 CHEMO IV PUSH SNGL DRUG: CPT | Mod: PN

## 2021-02-03 PROCEDURE — 96375 TX/PRO/DX INJ NEW DRUG ADDON: CPT | Mod: PN

## 2021-02-03 PROCEDURE — 63600175 PHARM REV CODE 636 W HCPCS: Mod: PN | Performed by: INTERNAL MEDICINE

## 2021-02-03 PROCEDURE — 25000003 PHARM REV CODE 250: Mod: PN | Performed by: INTERNAL MEDICINE

## 2021-02-03 RX ORDER — HEPARIN 100 UNIT/ML
500 SYRINGE INTRAVENOUS
Status: DISCONTINUED | OUTPATIENT
Start: 2021-02-03 | End: 2021-02-03 | Stop reason: HOSPADM

## 2021-02-03 RX ORDER — ONDANSETRON 2 MG/ML
8 INJECTION INTRAMUSCULAR; INTRAVENOUS ONCE
Status: COMPLETED | OUTPATIENT
Start: 2021-02-03 | End: 2021-02-03

## 2021-02-03 RX ORDER — SODIUM CHLORIDE 0.9 % (FLUSH) 0.9 %
10 SYRINGE (ML) INJECTION
Status: DISCONTINUED | OUTPATIENT
Start: 2021-02-03 | End: 2021-02-03 | Stop reason: HOSPADM

## 2021-02-03 RX ADMIN — SODIUM CHLORIDE: 9 INJECTION, SOLUTION INTRAVENOUS at 02:02

## 2021-02-03 RX ADMIN — AZACITIDINE 45 MG: 100 INJECTION, POWDER, LYOPHILIZED, FOR SOLUTION INTRAVENOUS; SUBCUTANEOUS at 03:02

## 2021-02-03 RX ADMIN — ONDANSETRON 8 MG: 2 INJECTION INTRAMUSCULAR; INTRAVENOUS at 02:02

## 2021-02-04 ENCOUNTER — INFUSION (OUTPATIENT)
Dept: INFUSION THERAPY | Facility: HOSPITAL | Age: 81
End: 2021-02-04
Attending: INTERNAL MEDICINE
Payer: MEDICARE

## 2021-02-04 ENCOUNTER — OFFICE VISIT (OUTPATIENT)
Dept: HEMATOLOGY/ONCOLOGY | Facility: CLINIC | Age: 81
End: 2021-02-04
Payer: MEDICARE

## 2021-02-04 VITALS
SYSTOLIC BLOOD PRESSURE: 110 MMHG | BODY MASS INDEX: 24.63 KG/M2 | OXYGEN SATURATION: 97 % | HEART RATE: 81 BPM | TEMPERATURE: 99 F | DIASTOLIC BLOOD PRESSURE: 60 MMHG | HEIGHT: 67 IN | WEIGHT: 156.94 LBS | RESPIRATION RATE: 18 BRPM

## 2021-02-04 VITALS
SYSTOLIC BLOOD PRESSURE: 111 MMHG | HEART RATE: 79 BPM | DIASTOLIC BLOOD PRESSURE: 63 MMHG | WEIGHT: 158.5 LBS | OXYGEN SATURATION: 95 % | RESPIRATION RATE: 18 BRPM | BODY MASS INDEX: 24.88 KG/M2 | HEIGHT: 67 IN | TEMPERATURE: 99 F

## 2021-02-04 DIAGNOSIS — D46.Z MDS (MYELODYSPLASTIC SYNDROME), HIGH GRADE: Primary | ICD-10-CM

## 2021-02-04 DIAGNOSIS — D64.9 SYMPTOMATIC ANEMIA: Primary | ICD-10-CM

## 2021-02-04 DIAGNOSIS — D46.Z MDS (MYELODYSPLASTIC SYNDROME), HIGH GRADE: ICD-10-CM

## 2021-02-04 DIAGNOSIS — D45 POLYCYTHEMIA VERA: ICD-10-CM

## 2021-02-04 PROCEDURE — 99214 PR OFFICE/OUTPT VISIT, EST, LEVL IV, 30-39 MIN: ICD-10-PCS | Mod: S$GLB,,, | Performed by: INTERNAL MEDICINE

## 2021-02-04 PROCEDURE — 1101F PT FALLS ASSESS-DOCD LE1/YR: CPT | Mod: CPTII,S$GLB,, | Performed by: INTERNAL MEDICINE

## 2021-02-04 PROCEDURE — 96375 TX/PRO/DX INJ NEW DRUG ADDON: CPT | Mod: PN

## 2021-02-04 PROCEDURE — 3078F PR MOST RECENT DIASTOLIC BLOOD PRESSURE < 80 MM HG: ICD-10-PCS | Mod: CPTII,S$GLB,, | Performed by: INTERNAL MEDICINE

## 2021-02-04 PROCEDURE — 3074F PR MOST RECENT SYSTOLIC BLOOD PRESSURE < 130 MM HG: ICD-10-PCS | Mod: CPTII,S$GLB,, | Performed by: INTERNAL MEDICINE

## 2021-02-04 PROCEDURE — 25000003 PHARM REV CODE 250: Mod: PN | Performed by: INTERNAL MEDICINE

## 2021-02-04 PROCEDURE — 3074F SYST BP LT 130 MM HG: CPT | Mod: CPTII,S$GLB,, | Performed by: INTERNAL MEDICINE

## 2021-02-04 PROCEDURE — 99214 OFFICE O/P EST MOD 30 MIN: CPT | Mod: S$GLB,,, | Performed by: INTERNAL MEDICINE

## 2021-02-04 PROCEDURE — 3078F DIAST BP <80 MM HG: CPT | Mod: CPTII,S$GLB,, | Performed by: INTERNAL MEDICINE

## 2021-02-04 PROCEDURE — 99999 PR PBB SHADOW E&M-EST. PATIENT-LVL III: ICD-10-PCS | Mod: PBBFAC,,, | Performed by: INTERNAL MEDICINE

## 2021-02-04 PROCEDURE — 1159F PR MEDICATION LIST DOCUMENTED IN MEDICAL RECORD: ICD-10-PCS | Mod: S$GLB,,, | Performed by: INTERNAL MEDICINE

## 2021-02-04 PROCEDURE — A4216 STERILE WATER/SALINE, 10 ML: HCPCS | Mod: PN | Performed by: INTERNAL MEDICINE

## 2021-02-04 PROCEDURE — 3288F PR FALLS RISK ASSESSMENT DOCUMENTED: ICD-10-PCS | Mod: CPTII,S$GLB,, | Performed by: INTERNAL MEDICINE

## 2021-02-04 PROCEDURE — 1126F AMNT PAIN NOTED NONE PRSNT: CPT | Mod: S$GLB,,, | Performed by: INTERNAL MEDICINE

## 2021-02-04 PROCEDURE — 1101F PR PT FALLS ASSESS DOC 0-1 FALLS W/OUT INJ PAST YR: ICD-10-PCS | Mod: CPTII,S$GLB,, | Performed by: INTERNAL MEDICINE

## 2021-02-04 PROCEDURE — 96409 CHEMO IV PUSH SNGL DRUG: CPT | Mod: PN

## 2021-02-04 PROCEDURE — 3288F FALL RISK ASSESSMENT DOCD: CPT | Mod: CPTII,S$GLB,, | Performed by: INTERNAL MEDICINE

## 2021-02-04 PROCEDURE — 99999 PR PBB SHADOW E&M-EST. PATIENT-LVL III: CPT | Mod: PBBFAC,,, | Performed by: INTERNAL MEDICINE

## 2021-02-04 PROCEDURE — 1126F PR PAIN SEVERITY QUANTIFIED, NO PAIN PRESENT: ICD-10-PCS | Mod: S$GLB,,, | Performed by: INTERNAL MEDICINE

## 2021-02-04 PROCEDURE — 1159F MED LIST DOCD IN RCRD: CPT | Mod: S$GLB,,, | Performed by: INTERNAL MEDICINE

## 2021-02-04 PROCEDURE — 63600175 PHARM REV CODE 636 W HCPCS: Mod: PN | Performed by: INTERNAL MEDICINE

## 2021-02-04 RX ORDER — HYDROCODONE BITARTRATE AND ACETAMINOPHEN 500; 5 MG/1; MG/1
TABLET ORAL ONCE
Status: CANCELLED | OUTPATIENT
Start: 2021-02-04 | End: 2021-02-04

## 2021-02-04 RX ORDER — ONDANSETRON 2 MG/ML
8 INJECTION INTRAMUSCULAR; INTRAVENOUS ONCE
Status: COMPLETED | OUTPATIENT
Start: 2021-02-04 | End: 2021-02-04

## 2021-02-04 RX ORDER — DEXAMETHASONE SODIUM PHOSPHATE 4 MG/ML
4 INJECTION, SOLUTION INTRA-ARTICULAR; INTRALESIONAL; INTRAMUSCULAR; INTRAVENOUS; SOFT TISSUE ONCE
Status: CANCELLED | OUTPATIENT
Start: 2021-02-04

## 2021-02-04 RX ORDER — FUROSEMIDE 10 MG/ML
20 INJECTION INTRAMUSCULAR; INTRAVENOUS ONCE
Status: CANCELLED | OUTPATIENT
Start: 2021-02-04

## 2021-02-04 RX ORDER — DIPHENHYDRAMINE HYDROCHLORIDE 50 MG/ML
25 INJECTION INTRAMUSCULAR; INTRAVENOUS ONCE
Status: CANCELLED | OUTPATIENT
Start: 2021-02-04

## 2021-02-04 RX ORDER — SODIUM CHLORIDE 0.9 % (FLUSH) 0.9 %
10 SYRINGE (ML) INJECTION
Status: DISCONTINUED | OUTPATIENT
Start: 2021-02-04 | End: 2021-02-04 | Stop reason: HOSPADM

## 2021-02-04 RX ORDER — PROCHLORPERAZINE MALEATE 10 MG
10 TABLET ORAL EVERY 6 HOURS PRN
Qty: 30 TABLET | Refills: 6 | Status: SHIPPED | OUTPATIENT
Start: 2021-02-04 | End: 2021-03-19

## 2021-02-04 RX ORDER — ACETAMINOPHEN 325 MG/1
650 TABLET ORAL ONCE
Status: CANCELLED | OUTPATIENT
Start: 2021-02-04

## 2021-02-04 RX ADMIN — SODIUM CHLORIDE: 9 INJECTION, SOLUTION INTRAVENOUS at 08:02

## 2021-02-04 RX ADMIN — AZACITIDINE 45 MG: 100 INJECTION, POWDER, LYOPHILIZED, FOR SOLUTION INTRAVENOUS; SUBCUTANEOUS at 09:02

## 2021-02-04 RX ADMIN — Medication 10 ML: at 09:02

## 2021-02-04 RX ADMIN — ONDANSETRON 8 MG: 2 INJECTION INTRAMUSCULAR; INTRAVENOUS at 08:02

## 2021-02-05 ENCOUNTER — INFUSION (OUTPATIENT)
Dept: INFUSION THERAPY | Facility: HOSPITAL | Age: 81
End: 2021-02-05
Attending: INTERNAL MEDICINE
Payer: MEDICARE

## 2021-02-05 VITALS
DIASTOLIC BLOOD PRESSURE: 72 MMHG | HEIGHT: 67 IN | RESPIRATION RATE: 16 BRPM | SYSTOLIC BLOOD PRESSURE: 118 MMHG | HEART RATE: 82 BPM | BODY MASS INDEX: 24.88 KG/M2 | WEIGHT: 158.5 LBS | TEMPERATURE: 98 F

## 2021-02-05 DIAGNOSIS — D46.Z MDS (MYELODYSPLASTIC SYNDROME), HIGH GRADE: Primary | ICD-10-CM

## 2021-02-05 PROCEDURE — 63600175 PHARM REV CODE 636 W HCPCS: Mod: JW,JG,PN | Performed by: INTERNAL MEDICINE

## 2021-02-05 PROCEDURE — 96375 TX/PRO/DX INJ NEW DRUG ADDON: CPT | Mod: PN

## 2021-02-05 PROCEDURE — A4216 STERILE WATER/SALINE, 10 ML: HCPCS | Mod: PN | Performed by: INTERNAL MEDICINE

## 2021-02-05 PROCEDURE — 25000003 PHARM REV CODE 250: Mod: PN | Performed by: INTERNAL MEDICINE

## 2021-02-05 PROCEDURE — 96409 CHEMO IV PUSH SNGL DRUG: CPT | Mod: PN

## 2021-02-05 RX ORDER — ONDANSETRON 2 MG/ML
8 INJECTION INTRAMUSCULAR; INTRAVENOUS ONCE
Status: COMPLETED | OUTPATIENT
Start: 2021-02-05 | End: 2021-02-05

## 2021-02-05 RX ORDER — HEPARIN 100 UNIT/ML
500 SYRINGE INTRAVENOUS
Status: DISCONTINUED | OUTPATIENT
Start: 2021-02-05 | End: 2021-02-05 | Stop reason: HOSPADM

## 2021-02-05 RX ORDER — SODIUM CHLORIDE 0.9 % (FLUSH) 0.9 %
10 SYRINGE (ML) INJECTION
Status: DISCONTINUED | OUTPATIENT
Start: 2021-02-05 | End: 2021-02-05 | Stop reason: HOSPADM

## 2021-02-05 RX ADMIN — ONDANSETRON 8 MG: 2 INJECTION INTRAMUSCULAR; INTRAVENOUS at 02:02

## 2021-02-05 RX ADMIN — Medication 10 ML: at 02:02

## 2021-02-05 RX ADMIN — SODIUM CHLORIDE: 9 INJECTION, SOLUTION INTRAVENOUS at 02:02

## 2021-02-05 RX ADMIN — AZACITIDINE 45 MG: 100 INJECTION, POWDER, LYOPHILIZED, FOR SOLUTION INTRAVENOUS; SUBCUTANEOUS at 02:02

## 2021-02-09 ENCOUNTER — DOCUMENTATION ONLY (OUTPATIENT)
Dept: HEMATOLOGY/ONCOLOGY | Facility: CLINIC | Age: 81
End: 2021-02-09

## 2021-02-09 ENCOUNTER — TELEPHONE (OUTPATIENT)
Dept: HEMATOLOGY/ONCOLOGY | Facility: CLINIC | Age: 81
End: 2021-02-09

## 2021-02-09 ENCOUNTER — LAB VISIT (OUTPATIENT)
Dept: LAB | Facility: HOSPITAL | Age: 81
End: 2021-02-09
Attending: INTERNAL MEDICINE
Payer: MEDICARE

## 2021-02-09 DIAGNOSIS — D64.9 SYMPTOMATIC ANEMIA: ICD-10-CM

## 2021-02-09 LAB
ABO + RH BLD: NORMAL
ANISOCYTOSIS BLD QL SMEAR: SLIGHT
BASOPHILS # BLD AUTO: ABNORMAL K/UL (ref 0–0.2)
BASOPHILS NFR BLD: 1 % (ref 0–1.9)
BLASTS NFR BLD MANUAL: 6 %
BLD GP AB SCN CELLS X3 SERPL QL: NORMAL
DACRYOCYTES BLD QL SMEAR: ABNORMAL
DIFFERENTIAL METHOD: ABNORMAL
EOSINOPHIL # BLD AUTO: ABNORMAL K/UL (ref 0–0.5)
EOSINOPHIL NFR BLD: 4 % (ref 0–8)
ERYTHROCYTE [DISTWIDTH] IN BLOOD BY AUTOMATED COUNT: 17.6 % (ref 11.5–14.5)
HCT VFR BLD AUTO: 32.4 % (ref 40–54)
HGB BLD-MCNC: 9.8 G/DL (ref 14–18)
HYPOCHROMIA BLD QL SMEAR: ABNORMAL
IMM GRANULOCYTES # BLD AUTO: ABNORMAL K/UL (ref 0–0.04)
IMM GRANULOCYTES NFR BLD AUTO: ABNORMAL % (ref 0–0.5)
LYMPHOCYTES # BLD AUTO: ABNORMAL K/UL (ref 1–4.8)
LYMPHOCYTES NFR BLD: 29 % (ref 18–48)
MCH RBC QN AUTO: 27.7 PG (ref 27–31)
MCHC RBC AUTO-ENTMCNC: 30.2 G/DL (ref 32–36)
MCV RBC AUTO: 92 FL (ref 82–98)
METAMYELOCYTES NFR BLD MANUAL: 2 %
MONOCYTES # BLD AUTO: ABNORMAL K/UL (ref 0.3–1)
MONOCYTES NFR BLD: 9 % (ref 4–15)
NEUTROPHILS # BLD AUTO: 2.7 K/UL (ref 1.8–7.7)
NEUTROPHILS NFR BLD: 43 % (ref 38–73)
NEUTS BAND NFR BLD MANUAL: 6 %
NRBC BLD-RTO: 14 /100 WBC
OVALOCYTES BLD QL SMEAR: ABNORMAL
PLATELET # BLD AUTO: 16 K/UL (ref 150–350)
PLATELET BLD QL SMEAR: ABNORMAL
PMV BLD AUTO: ABNORMAL FL (ref 9.2–12.9)
POIKILOCYTOSIS BLD QL SMEAR: SLIGHT
POLYCHROMASIA BLD QL SMEAR: ABNORMAL
RBC # BLD AUTO: 3.54 M/UL (ref 4.6–6.2)
WBC # BLD AUTO: 5.5 K/UL (ref 3.9–12.7)

## 2021-02-09 PROCEDURE — 86900 BLOOD TYPING SEROLOGIC ABO: CPT

## 2021-02-09 PROCEDURE — 85027 COMPLETE CBC AUTOMATED: CPT

## 2021-02-09 PROCEDURE — 85027 COMPLETE CBC AUTOMATED: CPT | Mod: PN

## 2021-02-09 PROCEDURE — 85007 BL SMEAR W/DIFF WBC COUNT: CPT | Mod: PN

## 2021-02-09 PROCEDURE — 86900 BLOOD TYPING SEROLOGIC ABO: CPT | Mod: PN

## 2021-02-09 PROCEDURE — 85007 BL SMEAR W/DIFF WBC COUNT: CPT

## 2021-02-11 ENCOUNTER — LAB VISIT (OUTPATIENT)
Dept: LAB | Facility: HOSPITAL | Age: 81
End: 2021-02-11
Attending: INTERNAL MEDICINE
Payer: MEDICARE

## 2021-02-11 ENCOUNTER — EXTERNAL HOME HEALTH (OUTPATIENT)
Dept: HOME HEALTH SERVICES | Facility: HOSPITAL | Age: 81
End: 2021-02-11
Payer: MEDICARE

## 2021-02-11 DIAGNOSIS — D64.9 SYMPTOMATIC ANEMIA: ICD-10-CM

## 2021-02-11 LAB
ANISOCYTOSIS BLD QL SMEAR: ABNORMAL
BASOPHILS # BLD AUTO: ABNORMAL K/UL (ref 0–0.2)
BASOPHILS NFR BLD: 1 % (ref 0–1.9)
BLASTS NFR BLD MANUAL: 10 %
DACRYOCYTES BLD QL SMEAR: ABNORMAL
DIFFERENTIAL METHOD: ABNORMAL
DOHLE BOD BLD QL SMEAR: PRESENT
EOSINOPHIL # BLD AUTO: ABNORMAL K/UL (ref 0–0.5)
EOSINOPHIL NFR BLD: 6 % (ref 0–8)
ERYTHROCYTE [DISTWIDTH] IN BLOOD BY AUTOMATED COUNT: 17.8 % (ref 11.5–14.5)
HCT VFR BLD AUTO: 32.2 % (ref 40–54)
HGB BLD-MCNC: 9.7 G/DL (ref 14–18)
HYPOCHROMIA BLD QL SMEAR: ABNORMAL
IMM GRANULOCYTES # BLD AUTO: ABNORMAL K/UL (ref 0–0.04)
IMM GRANULOCYTES NFR BLD AUTO: ABNORMAL % (ref 0–0.5)
LYMPHOCYTES # BLD AUTO: ABNORMAL K/UL (ref 1–4.8)
LYMPHOCYTES NFR BLD: 29 % (ref 18–48)
MCH RBC QN AUTO: 27.6 PG (ref 27–31)
MCHC RBC AUTO-ENTMCNC: 30.1 G/DL (ref 32–36)
MCV RBC AUTO: 92 FL (ref 82–98)
METAMYELOCYTES NFR BLD MANUAL: 1 %
MONOCYTES # BLD AUTO: ABNORMAL K/UL (ref 0.3–1)
MONOCYTES NFR BLD: 9 % (ref 4–15)
NEUTROPHILS # BLD AUTO: 2.5 K/UL (ref 1.8–7.7)
NEUTROPHILS NFR BLD: 41 % (ref 38–73)
NEUTS BAND NFR BLD MANUAL: 3 %
NRBC BLD-RTO: 16 /100 WBC
OVALOCYTES BLD QL SMEAR: ABNORMAL
PLATELET # BLD AUTO: 16 K/UL (ref 150–350)
PLATELET BLD QL SMEAR: ABNORMAL
PMV BLD AUTO: ABNORMAL FL (ref 9.2–12.9)
POIKILOCYTOSIS BLD QL SMEAR: SLIGHT
POLYCHROMASIA BLD QL SMEAR: ABNORMAL
RBC # BLD AUTO: 3.52 M/UL (ref 4.6–6.2)
WBC # BLD AUTO: 5.7 K/UL (ref 3.9–12.7)
WBC TOXIC VACUOLES BLD QL SMEAR: PRESENT

## 2021-02-11 PROCEDURE — 85007 BL SMEAR W/DIFF WBC COUNT: CPT

## 2021-02-11 PROCEDURE — 85027 COMPLETE CBC AUTOMATED: CPT

## 2021-02-11 PROCEDURE — 85007 BL SMEAR W/DIFF WBC COUNT: CPT | Mod: PN

## 2021-02-11 PROCEDURE — 85027 COMPLETE CBC AUTOMATED: CPT | Mod: PN

## 2021-02-11 PROCEDURE — 36415 COLL VENOUS BLD VENIPUNCTURE: CPT | Mod: PN

## 2021-02-17 ENCOUNTER — DOCUMENT SCAN (OUTPATIENT)
Dept: HOME HEALTH SERVICES | Facility: HOSPITAL | Age: 81
End: 2021-02-17
Payer: MEDICARE

## 2021-02-17 ENCOUNTER — LAB VISIT (OUTPATIENT)
Dept: LAB | Facility: HOSPITAL | Age: 81
End: 2021-02-17
Attending: INTERNAL MEDICINE
Payer: MEDICARE

## 2021-02-17 ENCOUNTER — OFFICE VISIT (OUTPATIENT)
Dept: HEMATOLOGY/ONCOLOGY | Facility: CLINIC | Age: 81
End: 2021-02-17
Payer: MEDICARE

## 2021-02-17 VITALS
HEART RATE: 85 BPM | RESPIRATION RATE: 18 BRPM | OXYGEN SATURATION: 97 % | WEIGHT: 161.19 LBS | TEMPERATURE: 97 F | DIASTOLIC BLOOD PRESSURE: 67 MMHG | SYSTOLIC BLOOD PRESSURE: 124 MMHG | BODY MASS INDEX: 25.3 KG/M2 | HEIGHT: 67 IN

## 2021-02-17 DIAGNOSIS — D46.Z MDS (MYELODYSPLASTIC SYNDROME), HIGH GRADE: Primary | ICD-10-CM

## 2021-02-17 DIAGNOSIS — D45 POLYCYTHEMIA VERA: ICD-10-CM

## 2021-02-17 DIAGNOSIS — D61.818 PANCYTOPENIA: ICD-10-CM

## 2021-02-17 DIAGNOSIS — D64.9 SYMPTOMATIC ANEMIA: ICD-10-CM

## 2021-02-17 LAB
ABO + RH BLD: NORMAL
ANISOCYTOSIS BLD QL SMEAR: ABNORMAL
BASO STIPL BLD QL SMEAR: ABNORMAL
BASOPHILS NFR BLD: 0 % (ref 0–1.9)
BLASTS NFR BLD MANUAL: 8 %
BLD GP AB SCN CELLS X3 SERPL QL: NORMAL
DACRYOCYTES BLD QL SMEAR: ABNORMAL
DIFFERENTIAL METHOD: ABNORMAL
DOHLE BOD BLD QL SMEAR: PRESENT
EOSINOPHIL NFR BLD: 4 % (ref 0–8)
ERYTHROCYTE [DISTWIDTH] IN BLOOD BY AUTOMATED COUNT: 18.7 % (ref 11.5–14.5)
HCT VFR BLD AUTO: 30.6 % (ref 40–54)
HGB BLD-MCNC: 9.1 G/DL (ref 14–18)
HYPOCHROMIA BLD QL SMEAR: ABNORMAL
IMM GRANULOCYTES # BLD AUTO: ABNORMAL K/UL (ref 0–0.04)
IMM GRANULOCYTES NFR BLD AUTO: ABNORMAL % (ref 0–0.5)
LYMPHOCYTES NFR BLD: 29 % (ref 18–48)
MCH RBC QN AUTO: 27.6 PG (ref 27–31)
MCHC RBC AUTO-ENTMCNC: 29.7 G/DL (ref 32–36)
MCV RBC AUTO: 93 FL (ref 82–98)
METAMYELOCYTES NFR BLD MANUAL: 5 %
MONOCYTES NFR BLD: 5 % (ref 4–15)
MYELOCYTES NFR BLD MANUAL: 4 %
NEUTROPHILS # BLD AUTO: 2.2 K/UL (ref 1.8–7.7)
NEUTROPHILS NFR BLD: 37 % (ref 38–73)
NEUTS BAND NFR BLD MANUAL: 8 %
NRBC BLD-RTO: 16 /100 WBC
OVALOCYTES BLD QL SMEAR: ABNORMAL
PLATELET # BLD AUTO: 22 K/UL (ref 150–350)
PLATELET BLD QL SMEAR: ABNORMAL
PMV BLD AUTO: ABNORMAL FL (ref 9.2–12.9)
POIKILOCYTOSIS BLD QL SMEAR: SLIGHT
POLYCHROMASIA BLD QL SMEAR: ABNORMAL
RBC # BLD AUTO: 3.3 M/UL (ref 4.6–6.2)
WBC # BLD AUTO: 4.78 K/UL (ref 3.9–12.7)

## 2021-02-17 PROCEDURE — 1159F PR MEDICATION LIST DOCUMENTED IN MEDICAL RECORD: ICD-10-PCS | Mod: S$GLB,,, | Performed by: INTERNAL MEDICINE

## 2021-02-17 PROCEDURE — 99213 PR OFFICE/OUTPT VISIT, EST, LEVL III, 20-29 MIN: ICD-10-PCS | Mod: S$GLB,,, | Performed by: INTERNAL MEDICINE

## 2021-02-17 PROCEDURE — 3078F DIAST BP <80 MM HG: CPT | Mod: CPTII,S$GLB,, | Performed by: INTERNAL MEDICINE

## 2021-02-17 PROCEDURE — 99999 PR PBB SHADOW E&M-EST. PATIENT-LVL IV: ICD-10-PCS | Mod: PBBFAC,,, | Performed by: INTERNAL MEDICINE

## 2021-02-17 PROCEDURE — 1126F AMNT PAIN NOTED NONE PRSNT: CPT | Mod: S$GLB,,, | Performed by: INTERNAL MEDICINE

## 2021-02-17 PROCEDURE — 3074F SYST BP LT 130 MM HG: CPT | Mod: CPTII,S$GLB,, | Performed by: INTERNAL MEDICINE

## 2021-02-17 PROCEDURE — 85027 COMPLETE CBC AUTOMATED: CPT | Mod: PN

## 2021-02-17 PROCEDURE — 99999 PR PBB SHADOW E&M-EST. PATIENT-LVL IV: CPT | Mod: PBBFAC,,, | Performed by: INTERNAL MEDICINE

## 2021-02-17 PROCEDURE — 86900 BLOOD TYPING SEROLOGIC ABO: CPT | Mod: PN

## 2021-02-17 PROCEDURE — 3288F PR FALLS RISK ASSESSMENT DOCUMENTED: ICD-10-PCS | Mod: CPTII,S$GLB,, | Performed by: INTERNAL MEDICINE

## 2021-02-17 PROCEDURE — 3078F PR MOST RECENT DIASTOLIC BLOOD PRESSURE < 80 MM HG: ICD-10-PCS | Mod: CPTII,S$GLB,, | Performed by: INTERNAL MEDICINE

## 2021-02-17 PROCEDURE — 85027 COMPLETE CBC AUTOMATED: CPT

## 2021-02-17 PROCEDURE — 85007 BL SMEAR W/DIFF WBC COUNT: CPT

## 2021-02-17 PROCEDURE — 1101F PT FALLS ASSESS-DOCD LE1/YR: CPT | Mod: CPTII,S$GLB,, | Performed by: INTERNAL MEDICINE

## 2021-02-17 PROCEDURE — 1101F PR PT FALLS ASSESS DOC 0-1 FALLS W/OUT INJ PAST YR: ICD-10-PCS | Mod: CPTII,S$GLB,, | Performed by: INTERNAL MEDICINE

## 2021-02-17 PROCEDURE — 86900 BLOOD TYPING SEROLOGIC ABO: CPT

## 2021-02-17 PROCEDURE — 36415 COLL VENOUS BLD VENIPUNCTURE: CPT | Mod: PN

## 2021-02-17 PROCEDURE — 85007 BL SMEAR W/DIFF WBC COUNT: CPT | Mod: PN

## 2021-02-17 PROCEDURE — 3074F PR MOST RECENT SYSTOLIC BLOOD PRESSURE < 130 MM HG: ICD-10-PCS | Mod: CPTII,S$GLB,, | Performed by: INTERNAL MEDICINE

## 2021-02-17 PROCEDURE — 1159F MED LIST DOCD IN RCRD: CPT | Mod: S$GLB,,, | Performed by: INTERNAL MEDICINE

## 2021-02-17 PROCEDURE — 99213 OFFICE O/P EST LOW 20 MIN: CPT | Mod: S$GLB,,, | Performed by: INTERNAL MEDICINE

## 2021-02-17 PROCEDURE — 1126F PR PAIN SEVERITY QUANTIFIED, NO PAIN PRESENT: ICD-10-PCS | Mod: S$GLB,,, | Performed by: INTERNAL MEDICINE

## 2021-02-17 PROCEDURE — 3288F FALL RISK ASSESSMENT DOCD: CPT | Mod: CPTII,S$GLB,, | Performed by: INTERNAL MEDICINE

## 2021-02-19 ENCOUNTER — TELEPHONE (OUTPATIENT)
Dept: HEMATOLOGY/ONCOLOGY | Facility: CLINIC | Age: 81
End: 2021-02-19

## 2021-02-19 ENCOUNTER — PATIENT MESSAGE (OUTPATIENT)
Dept: HEMATOLOGY/ONCOLOGY | Facility: CLINIC | Age: 81
End: 2021-02-19

## 2021-02-22 ENCOUNTER — OFFICE VISIT (OUTPATIENT)
Dept: RHEUMATOLOGY | Facility: CLINIC | Age: 81
End: 2021-02-22
Payer: MEDICARE

## 2021-02-22 ENCOUNTER — LAB VISIT (OUTPATIENT)
Dept: LAB | Facility: HOSPITAL | Age: 81
End: 2021-02-22
Attending: INTERNAL MEDICINE
Payer: MEDICARE

## 2021-02-22 ENCOUNTER — DOCUMENT SCAN (OUTPATIENT)
Dept: HOME HEALTH SERVICES | Facility: HOSPITAL | Age: 81
End: 2021-02-22
Payer: MEDICARE

## 2021-02-22 ENCOUNTER — DOCUMENTATION ONLY (OUTPATIENT)
Dept: HEMATOLOGY/ONCOLOGY | Facility: CLINIC | Age: 81
End: 2021-02-22

## 2021-02-22 VITALS
WEIGHT: 160.63 LBS | SYSTOLIC BLOOD PRESSURE: 124 MMHG | BODY MASS INDEX: 25.15 KG/M2 | TEMPERATURE: 98 F | DIASTOLIC BLOOD PRESSURE: 69 MMHG

## 2021-02-22 DIAGNOSIS — F32.1 CURRENT MODERATE EPISODE OF MAJOR DEPRESSIVE DISORDER, UNSPECIFIED WHETHER RECURRENT: ICD-10-CM

## 2021-02-22 DIAGNOSIS — R76.8 POSITIVE ANA (ANTINUCLEAR ANTIBODY): Primary | ICD-10-CM

## 2021-02-22 DIAGNOSIS — D46.Z MDS (MYELODYSPLASTIC SYNDROME), HIGH GRADE: ICD-10-CM

## 2021-02-22 DIAGNOSIS — D64.9 SYMPTOMATIC ANEMIA: ICD-10-CM

## 2021-02-22 LAB
ABO + RH BLD: NORMAL
ANISOCYTOSIS BLD QL SMEAR: ABNORMAL
BASOPHILS # BLD AUTO: ABNORMAL K/UL (ref 0–0.2)
BASOPHILS NFR BLD: 3 % (ref 0–1.9)
BLASTS NFR BLD MANUAL: 5 %
BLD GP AB SCN CELLS X3 SERPL QL: NORMAL
DACRYOCYTES BLD QL SMEAR: ABNORMAL
DIFFERENTIAL METHOD: ABNORMAL
EOSINOPHIL # BLD AUTO: ABNORMAL K/UL (ref 0–0.5)
EOSINOPHIL NFR BLD: 6 % (ref 0–8)
ERYTHROCYTE [DISTWIDTH] IN BLOOD BY AUTOMATED COUNT: 18.8 % (ref 11.5–14.5)
HCT VFR BLD AUTO: 29 % (ref 40–54)
HGB BLD-MCNC: 8.4 G/DL (ref 14–18)
HYPOCHROMIA BLD QL SMEAR: ABNORMAL
IMM GRANULOCYTES # BLD AUTO: ABNORMAL K/UL (ref 0–0.04)
IMM GRANULOCYTES NFR BLD AUTO: ABNORMAL % (ref 0–0.5)
LYMPHOCYTES # BLD AUTO: ABNORMAL K/UL (ref 1–4.8)
LYMPHOCYTES NFR BLD: 28 % (ref 18–48)
MCH RBC QN AUTO: 27 PG (ref 27–31)
MCHC RBC AUTO-ENTMCNC: 29 G/DL (ref 32–36)
MCV RBC AUTO: 93 FL (ref 82–98)
MONOCYTES # BLD AUTO: ABNORMAL K/UL (ref 0.3–1)
MONOCYTES NFR BLD: 10 % (ref 4–15)
NEUTROPHILS # BLD AUTO: 1.7 K/UL (ref 1.8–7.7)
NEUTROPHILS NFR BLD: 37 % (ref 38–73)
NEUTS BAND NFR BLD MANUAL: 11 %
NRBC BLD-RTO: 14 /100 WBC
OVALOCYTES BLD QL SMEAR: ABNORMAL
PLATELET # BLD AUTO: 29 K/UL (ref 150–350)
PLATELET BLD QL SMEAR: ABNORMAL
PMV BLD AUTO: ABNORMAL FL (ref 9.2–12.9)
POIKILOCYTOSIS BLD QL SMEAR: SLIGHT
POLYCHROMASIA BLD QL SMEAR: ABNORMAL
RBC # BLD AUTO: 3.11 M/UL (ref 4.6–6.2)
WBC # BLD AUTO: 3.48 K/UL (ref 3.9–12.7)

## 2021-02-22 PROCEDURE — 99213 PR OFFICE/OUTPT VISIT, EST, LEVL III, 20-29 MIN: ICD-10-PCS | Mod: S$GLB,,, | Performed by: INTERNAL MEDICINE

## 2021-02-22 PROCEDURE — 3288F FALL RISK ASSESSMENT DOCD: CPT | Mod: S$GLB,,, | Performed by: INTERNAL MEDICINE

## 2021-02-22 PROCEDURE — 3288F PR FALLS RISK ASSESSMENT DOCUMENTED: ICD-10-PCS | Mod: S$GLB,,, | Performed by: INTERNAL MEDICINE

## 2021-02-22 PROCEDURE — 86900 BLOOD TYPING SEROLOGIC ABO: CPT | Mod: PN

## 2021-02-22 PROCEDURE — 3074F PR MOST RECENT SYSTOLIC BLOOD PRESSURE < 130 MM HG: ICD-10-PCS | Mod: S$GLB,,, | Performed by: INTERNAL MEDICINE

## 2021-02-22 PROCEDURE — 1159F PR MEDICATION LIST DOCUMENTED IN MEDICAL RECORD: ICD-10-PCS | Mod: S$GLB,,, | Performed by: INTERNAL MEDICINE

## 2021-02-22 PROCEDURE — 3078F PR MOST RECENT DIASTOLIC BLOOD PRESSURE < 80 MM HG: ICD-10-PCS | Mod: S$GLB,,, | Performed by: INTERNAL MEDICINE

## 2021-02-22 PROCEDURE — 85007 BL SMEAR W/DIFF WBC COUNT: CPT

## 2021-02-22 PROCEDURE — 1159F MED LIST DOCD IN RCRD: CPT | Mod: S$GLB,,, | Performed by: INTERNAL MEDICINE

## 2021-02-22 PROCEDURE — 1101F PT FALLS ASSESS-DOCD LE1/YR: CPT | Mod: S$GLB,,, | Performed by: INTERNAL MEDICINE

## 2021-02-22 PROCEDURE — 1125F PR PAIN SEVERITY QUANTIFIED, PAIN PRESENT: ICD-10-PCS | Mod: S$GLB,,, | Performed by: INTERNAL MEDICINE

## 2021-02-22 PROCEDURE — 1101F PR PT FALLS ASSESS DOC 0-1 FALLS W/OUT INJ PAST YR: ICD-10-PCS | Mod: S$GLB,,, | Performed by: INTERNAL MEDICINE

## 2021-02-22 PROCEDURE — 3078F DIAST BP <80 MM HG: CPT | Mod: S$GLB,,, | Performed by: INTERNAL MEDICINE

## 2021-02-22 PROCEDURE — 1125F AMNT PAIN NOTED PAIN PRSNT: CPT | Mod: S$GLB,,, | Performed by: INTERNAL MEDICINE

## 2021-02-22 PROCEDURE — 85007 BL SMEAR W/DIFF WBC COUNT: CPT | Mod: PN

## 2021-02-22 PROCEDURE — 85027 COMPLETE CBC AUTOMATED: CPT | Mod: PN

## 2021-02-22 PROCEDURE — 99213 OFFICE O/P EST LOW 20 MIN: CPT | Mod: S$GLB,,, | Performed by: INTERNAL MEDICINE

## 2021-02-22 PROCEDURE — 3074F SYST BP LT 130 MM HG: CPT | Mod: S$GLB,,, | Performed by: INTERNAL MEDICINE

## 2021-02-22 PROCEDURE — 86900 BLOOD TYPING SEROLOGIC ABO: CPT

## 2021-02-22 PROCEDURE — 85027 COMPLETE CBC AUTOMATED: CPT

## 2021-02-23 ENCOUNTER — SPECIALTY PHARMACY (OUTPATIENT)
Dept: PHARMACY | Facility: CLINIC | Age: 81
End: 2021-02-23

## 2021-02-23 DIAGNOSIS — D46.9 MDS (MYELODYSPLASTIC SYNDROME): Primary | ICD-10-CM

## 2021-02-25 ENCOUNTER — LAB VISIT (OUTPATIENT)
Dept: LAB | Facility: HOSPITAL | Age: 81
End: 2021-02-25
Attending: INTERNAL MEDICINE
Payer: MEDICARE

## 2021-02-25 ENCOUNTER — TELEPHONE (OUTPATIENT)
Dept: HEMATOLOGY/ONCOLOGY | Facility: CLINIC | Age: 81
End: 2021-02-25

## 2021-02-25 ENCOUNTER — DOCUMENTATION ONLY (OUTPATIENT)
Dept: HEMATOLOGY/ONCOLOGY | Facility: CLINIC | Age: 81
End: 2021-02-25

## 2021-02-25 DIAGNOSIS — D64.9 SYMPTOMATIC ANEMIA: ICD-10-CM

## 2021-02-25 LAB
ABO + RH BLD: NORMAL
ANISOCYTOSIS BLD QL SMEAR: SLIGHT
BASO STIPL BLD QL SMEAR: ABNORMAL
BASOPHILS NFR BLD: 4 % (ref 0–1.9)
BLD GP AB SCN CELLS X3 SERPL QL: NORMAL
DACRYOCYTES BLD QL SMEAR: ABNORMAL
DIFFERENTIAL METHOD: ABNORMAL
EOSINOPHIL NFR BLD: 4 % (ref 0–8)
ERYTHROCYTE [DISTWIDTH] IN BLOOD BY AUTOMATED COUNT: 19 % (ref 11.5–14.5)
HCT VFR BLD AUTO: 27.9 % (ref 40–54)
HGB BLD-MCNC: 8.2 G/DL (ref 14–18)
HYPOCHROMIA BLD QL SMEAR: ABNORMAL
IMM GRANULOCYTES # BLD AUTO: ABNORMAL K/UL (ref 0–0.04)
IMM GRANULOCYTES NFR BLD AUTO: ABNORMAL % (ref 0–0.5)
LYMPHOCYTES NFR BLD: 39 % (ref 18–48)
MCH RBC QN AUTO: 27.4 PG (ref 27–31)
MCHC RBC AUTO-ENTMCNC: 29.4 G/DL (ref 32–36)
MCV RBC AUTO: 93 FL (ref 82–98)
MONOCYTES NFR BLD: 5 % (ref 4–15)
NEUTROPHILS # BLD AUTO: 1.7 K/UL (ref 1.8–7.7)
NEUTROPHILS NFR BLD: 48 % (ref 38–73)
NRBC BLD-RTO: 17 /100 WBC
OVALOCYTES BLD QL SMEAR: ABNORMAL
PLATELET # BLD AUTO: 31 K/UL (ref 150–350)
PLATELET BLD QL SMEAR: ABNORMAL
PMV BLD AUTO: ABNORMAL FL (ref 9.2–12.9)
POIKILOCYTOSIS BLD QL SMEAR: SLIGHT
POLYCHROMASIA BLD QL SMEAR: ABNORMAL
RBC # BLD AUTO: 2.99 M/UL (ref 4.6–6.2)
WBC # BLD AUTO: 3.53 K/UL (ref 3.9–12.7)

## 2021-02-25 PROCEDURE — 85027 COMPLETE CBC AUTOMATED: CPT | Mod: PN

## 2021-02-25 PROCEDURE — 36415 COLL VENOUS BLD VENIPUNCTURE: CPT | Mod: PN

## 2021-02-25 PROCEDURE — 85007 BL SMEAR W/DIFF WBC COUNT: CPT | Mod: PN

## 2021-02-25 PROCEDURE — 85007 BL SMEAR W/DIFF WBC COUNT: CPT

## 2021-02-25 PROCEDURE — 85027 COMPLETE CBC AUTOMATED: CPT

## 2021-02-25 PROCEDURE — 86900 BLOOD TYPING SEROLOGIC ABO: CPT | Mod: PN

## 2021-02-25 PROCEDURE — 86900 BLOOD TYPING SEROLOGIC ABO: CPT

## 2021-02-26 ENCOUNTER — TELEPHONE (OUTPATIENT)
Dept: INFUSION THERAPY | Facility: HOSPITAL | Age: 81
End: 2021-02-26

## 2021-02-27 DIAGNOSIS — I10 ESSENTIAL HYPERTENSION: ICD-10-CM

## 2021-02-27 DIAGNOSIS — I48.20 CHRONIC ATRIAL FIBRILLATION: ICD-10-CM

## 2021-02-27 PROCEDURE — G0179 PR HOME HEALTH MD RECERTIFICATION: ICD-10-PCS | Mod: ,,, | Performed by: FAMILY MEDICINE

## 2021-02-27 PROCEDURE — G0179 MD RECERTIFICATION HHA PT: HCPCS | Mod: ,,, | Performed by: FAMILY MEDICINE

## 2021-03-01 ENCOUNTER — OFFICE VISIT (OUTPATIENT)
Dept: HEMATOLOGY/ONCOLOGY | Facility: CLINIC | Age: 81
End: 2021-03-01
Payer: MEDICARE

## 2021-03-01 ENCOUNTER — INFUSION (OUTPATIENT)
Dept: INFUSION THERAPY | Facility: HOSPITAL | Age: 81
End: 2021-03-01
Attending: INTERNAL MEDICINE
Payer: MEDICARE

## 2021-03-01 VITALS
TEMPERATURE: 98 F | HEIGHT: 67 IN | WEIGHT: 163.13 LBS | HEART RATE: 88 BPM | SYSTOLIC BLOOD PRESSURE: 112 MMHG | OXYGEN SATURATION: 98 % | RESPIRATION RATE: 18 BRPM | DIASTOLIC BLOOD PRESSURE: 65 MMHG | BODY MASS INDEX: 25.6 KG/M2

## 2021-03-01 VITALS
HEART RATE: 72 BPM | SYSTOLIC BLOOD PRESSURE: 118 MMHG | BODY MASS INDEX: 25.55 KG/M2 | RESPIRATION RATE: 18 BRPM | TEMPERATURE: 98 F | WEIGHT: 163.13 LBS | DIASTOLIC BLOOD PRESSURE: 57 MMHG

## 2021-03-01 DIAGNOSIS — D45 POLYCYTHEMIA VERA: ICD-10-CM

## 2021-03-01 DIAGNOSIS — D46.Z MDS (MYELODYSPLASTIC SYNDROME), HIGH GRADE: Primary | ICD-10-CM

## 2021-03-01 DIAGNOSIS — D61.818 PANCYTOPENIA: ICD-10-CM

## 2021-03-01 DIAGNOSIS — I48.0 PAROXYSMAL ATRIAL FIBRILLATION: ICD-10-CM

## 2021-03-01 PROCEDURE — 99999 PR PBB SHADOW E&M-EST. PATIENT-LVL IV: ICD-10-PCS | Mod: PBBFAC,,, | Performed by: INTERNAL MEDICINE

## 2021-03-01 PROCEDURE — 1159F PR MEDICATION LIST DOCUMENTED IN MEDICAL RECORD: ICD-10-PCS | Mod: S$GLB,,, | Performed by: INTERNAL MEDICINE

## 2021-03-01 PROCEDURE — 96409 CHEMO IV PUSH SNGL DRUG: CPT | Mod: PN

## 2021-03-01 PROCEDURE — 3288F FALL RISK ASSESSMENT DOCD: CPT | Mod: CPTII,S$GLB,, | Performed by: INTERNAL MEDICINE

## 2021-03-01 PROCEDURE — 99999 PR PBB SHADOW E&M-EST. PATIENT-LVL IV: CPT | Mod: PBBFAC,,, | Performed by: INTERNAL MEDICINE

## 2021-03-01 PROCEDURE — 3288F PR FALLS RISK ASSESSMENT DOCUMENTED: ICD-10-PCS | Mod: CPTII,S$GLB,, | Performed by: INTERNAL MEDICINE

## 2021-03-01 PROCEDURE — 1125F AMNT PAIN NOTED PAIN PRSNT: CPT | Mod: S$GLB,,, | Performed by: INTERNAL MEDICINE

## 2021-03-01 PROCEDURE — 99214 OFFICE O/P EST MOD 30 MIN: CPT | Mod: S$GLB,,, | Performed by: INTERNAL MEDICINE

## 2021-03-01 PROCEDURE — 3078F DIAST BP <80 MM HG: CPT | Mod: CPTII,S$GLB,, | Performed by: INTERNAL MEDICINE

## 2021-03-01 PROCEDURE — 1159F MED LIST DOCD IN RCRD: CPT | Mod: S$GLB,,, | Performed by: INTERNAL MEDICINE

## 2021-03-01 PROCEDURE — 3074F SYST BP LT 130 MM HG: CPT | Mod: CPTII,S$GLB,, | Performed by: INTERNAL MEDICINE

## 2021-03-01 PROCEDURE — 25000003 PHARM REV CODE 250: Mod: PN | Performed by: INTERNAL MEDICINE

## 2021-03-01 PROCEDURE — 99214 PR OFFICE/OUTPT VISIT, EST, LEVL IV, 30-39 MIN: ICD-10-PCS | Mod: S$GLB,,, | Performed by: INTERNAL MEDICINE

## 2021-03-01 PROCEDURE — 3074F PR MOST RECENT SYSTOLIC BLOOD PRESSURE < 130 MM HG: ICD-10-PCS | Mod: CPTII,S$GLB,, | Performed by: INTERNAL MEDICINE

## 2021-03-01 PROCEDURE — 96375 TX/PRO/DX INJ NEW DRUG ADDON: CPT | Mod: PN

## 2021-03-01 PROCEDURE — 1125F PR PAIN SEVERITY QUANTIFIED, PAIN PRESENT: ICD-10-PCS | Mod: S$GLB,,, | Performed by: INTERNAL MEDICINE

## 2021-03-01 PROCEDURE — 3078F PR MOST RECENT DIASTOLIC BLOOD PRESSURE < 80 MM HG: ICD-10-PCS | Mod: CPTII,S$GLB,, | Performed by: INTERNAL MEDICINE

## 2021-03-01 PROCEDURE — 1101F PR PT FALLS ASSESS DOC 0-1 FALLS W/OUT INJ PAST YR: ICD-10-PCS | Mod: CPTII,S$GLB,, | Performed by: INTERNAL MEDICINE

## 2021-03-01 PROCEDURE — 1101F PT FALLS ASSESS-DOCD LE1/YR: CPT | Mod: CPTII,S$GLB,, | Performed by: INTERNAL MEDICINE

## 2021-03-01 PROCEDURE — 63600175 PHARM REV CODE 636 W HCPCS: Mod: JG,PN | Performed by: INTERNAL MEDICINE

## 2021-03-01 RX ORDER — SODIUM CHLORIDE 0.9 % (FLUSH) 0.9 %
10 SYRINGE (ML) INJECTION
Status: CANCELLED | OUTPATIENT
Start: 2021-03-04

## 2021-03-01 RX ORDER — ONDANSETRON 2 MG/ML
8 INJECTION INTRAMUSCULAR; INTRAVENOUS ONCE
Status: CANCELLED
Start: 2021-03-05 | End: 2021-03-05

## 2021-03-01 RX ORDER — ONDANSETRON 2 MG/ML
8 INJECTION INTRAMUSCULAR; INTRAVENOUS ONCE
Status: CANCELLED
Start: 2021-03-02 | End: 2021-03-02

## 2021-03-01 RX ORDER — HEPARIN 100 UNIT/ML
500 SYRINGE INTRAVENOUS
Status: CANCELLED | OUTPATIENT
Start: 2021-03-04

## 2021-03-01 RX ORDER — ONDANSETRON 2 MG/ML
8 INJECTION INTRAMUSCULAR; INTRAVENOUS ONCE
Status: CANCELLED
Start: 2021-03-04 | End: 2021-03-04

## 2021-03-01 RX ORDER — ONDANSETRON 2 MG/ML
8 INJECTION INTRAMUSCULAR; INTRAVENOUS ONCE
Status: CANCELLED
Start: 2021-03-03 | End: 2021-03-03

## 2021-03-01 RX ORDER — HEPARIN 100 UNIT/ML
500 SYRINGE INTRAVENOUS
Status: CANCELLED | OUTPATIENT
Start: 2021-03-02

## 2021-03-01 RX ORDER — SODIUM CHLORIDE 0.9 % (FLUSH) 0.9 %
10 SYRINGE (ML) INJECTION
Status: CANCELLED | OUTPATIENT
Start: 2021-03-02

## 2021-03-01 RX ORDER — SODIUM CHLORIDE 0.9 % (FLUSH) 0.9 %
10 SYRINGE (ML) INJECTION
Status: CANCELLED | OUTPATIENT
Start: 2021-03-05

## 2021-03-01 RX ORDER — SODIUM CHLORIDE 0.9 % (FLUSH) 0.9 %
10 SYRINGE (ML) INJECTION
Status: DISCONTINUED | OUTPATIENT
Start: 2021-03-01 | End: 2021-03-01 | Stop reason: HOSPADM

## 2021-03-01 RX ORDER — HEPARIN 100 UNIT/ML
500 SYRINGE INTRAVENOUS
Status: CANCELLED | OUTPATIENT
Start: 2021-03-03

## 2021-03-01 RX ORDER — HEPARIN 100 UNIT/ML
500 SYRINGE INTRAVENOUS
Status: CANCELLED | OUTPATIENT
Start: 2021-03-01

## 2021-03-01 RX ORDER — SODIUM CHLORIDE 0.9 % (FLUSH) 0.9 %
10 SYRINGE (ML) INJECTION
Status: CANCELLED | OUTPATIENT
Start: 2021-03-03

## 2021-03-01 RX ORDER — HEPARIN 100 UNIT/ML
500 SYRINGE INTRAVENOUS
Status: CANCELLED | OUTPATIENT
Start: 2021-03-05

## 2021-03-01 RX ORDER — ONDANSETRON 2 MG/ML
8 INJECTION INTRAMUSCULAR; INTRAVENOUS ONCE
Status: COMPLETED | OUTPATIENT
Start: 2021-03-01 | End: 2021-03-01

## 2021-03-01 RX ADMIN — AZACITIDINE 45 MG: 100 INJECTION, POWDER, LYOPHILIZED, FOR SOLUTION INTRAVENOUS; SUBCUTANEOUS at 03:03

## 2021-03-01 RX ADMIN — ONDANSETRON 8 MG: 2 INJECTION INTRAMUSCULAR; INTRAVENOUS at 03:03

## 2021-03-01 RX ADMIN — SODIUM CHLORIDE: 900 INJECTION, SOLUTION INTRAVENOUS at 03:03

## 2021-03-02 ENCOUNTER — INFUSION (OUTPATIENT)
Dept: INFUSION THERAPY | Facility: HOSPITAL | Age: 81
End: 2021-03-02
Attending: INTERNAL MEDICINE
Payer: MEDICARE

## 2021-03-02 VITALS
HEIGHT: 67 IN | TEMPERATURE: 98 F | HEART RATE: 72 BPM | DIASTOLIC BLOOD PRESSURE: 64 MMHG | RESPIRATION RATE: 18 BRPM | BODY MASS INDEX: 25.6 KG/M2 | WEIGHT: 163.13 LBS | OXYGEN SATURATION: 98 % | SYSTOLIC BLOOD PRESSURE: 127 MMHG

## 2021-03-02 DIAGNOSIS — D46.Z MDS (MYELODYSPLASTIC SYNDROME), HIGH GRADE: Primary | ICD-10-CM

## 2021-03-02 DIAGNOSIS — E03.9 ACQUIRED HYPOTHYROIDISM: ICD-10-CM

## 2021-03-02 PROCEDURE — 63600175 PHARM REV CODE 636 W HCPCS: Mod: PN | Performed by: INTERNAL MEDICINE

## 2021-03-02 PROCEDURE — 96375 TX/PRO/DX INJ NEW DRUG ADDON: CPT | Mod: PN

## 2021-03-02 PROCEDURE — 96409 CHEMO IV PUSH SNGL DRUG: CPT | Mod: PN

## 2021-03-02 PROCEDURE — A4216 STERILE WATER/SALINE, 10 ML: HCPCS | Mod: PN | Performed by: INTERNAL MEDICINE

## 2021-03-02 PROCEDURE — 25000003 PHARM REV CODE 250: Mod: PN | Performed by: INTERNAL MEDICINE

## 2021-03-02 RX ORDER — ONDANSETRON 2 MG/ML
8 INJECTION INTRAMUSCULAR; INTRAVENOUS ONCE
Status: COMPLETED | OUTPATIENT
Start: 2021-03-02 | End: 2021-03-02

## 2021-03-02 RX ORDER — METOPROLOL SUCCINATE 50 MG/1
TABLET, EXTENDED RELEASE ORAL
Qty: 90 TABLET | Refills: 3 | Status: SHIPPED | OUTPATIENT
Start: 2021-03-02 | End: 2021-09-14

## 2021-03-02 RX ORDER — SODIUM CHLORIDE 0.9 % (FLUSH) 0.9 %
10 SYRINGE (ML) INJECTION
Status: DISCONTINUED | OUTPATIENT
Start: 2021-03-02 | End: 2021-03-02 | Stop reason: HOSPADM

## 2021-03-02 RX ADMIN — SODIUM CHLORIDE: 9 INJECTION, SOLUTION INTRAVENOUS at 02:03

## 2021-03-02 RX ADMIN — ONDANSETRON 8 MG: 2 INJECTION INTRAMUSCULAR; INTRAVENOUS at 02:03

## 2021-03-02 RX ADMIN — AZACITIDINE 45 MG: 100 INJECTION, POWDER, LYOPHILIZED, FOR SOLUTION INTRAVENOUS; SUBCUTANEOUS at 03:03

## 2021-03-02 RX ADMIN — Medication 10 ML: at 03:03

## 2021-03-03 ENCOUNTER — INFUSION (OUTPATIENT)
Dept: INFUSION THERAPY | Facility: HOSPITAL | Age: 81
End: 2021-03-03
Attending: INTERNAL MEDICINE
Payer: MEDICARE

## 2021-03-03 VITALS
WEIGHT: 164.25 LBS | HEIGHT: 67 IN | BODY MASS INDEX: 25.78 KG/M2 | HEART RATE: 75 BPM | TEMPERATURE: 98 F | SYSTOLIC BLOOD PRESSURE: 125 MMHG | RESPIRATION RATE: 16 BRPM | DIASTOLIC BLOOD PRESSURE: 62 MMHG

## 2021-03-03 DIAGNOSIS — D46.Z MDS (MYELODYSPLASTIC SYNDROME), HIGH GRADE: Primary | ICD-10-CM

## 2021-03-03 PROCEDURE — 96409 CHEMO IV PUSH SNGL DRUG: CPT | Mod: PN

## 2021-03-03 PROCEDURE — 96375 TX/PRO/DX INJ NEW DRUG ADDON: CPT | Mod: PN

## 2021-03-03 PROCEDURE — 25000003 PHARM REV CODE 250: Mod: PN | Performed by: INTERNAL MEDICINE

## 2021-03-03 PROCEDURE — 63600175 PHARM REV CODE 636 W HCPCS: Mod: JW,JG,PN | Performed by: INTERNAL MEDICINE

## 2021-03-03 PROCEDURE — A4216 STERILE WATER/SALINE, 10 ML: HCPCS | Mod: PN | Performed by: INTERNAL MEDICINE

## 2021-03-03 RX ORDER — SODIUM CHLORIDE 0.9 % (FLUSH) 0.9 %
10 SYRINGE (ML) INJECTION
Status: DISCONTINUED | OUTPATIENT
Start: 2021-03-03 | End: 2021-03-03 | Stop reason: HOSPADM

## 2021-03-03 RX ORDER — ONDANSETRON 2 MG/ML
8 INJECTION INTRAMUSCULAR; INTRAVENOUS ONCE
Status: COMPLETED | OUTPATIENT
Start: 2021-03-03 | End: 2021-03-03

## 2021-03-03 RX ADMIN — AZACITIDINE 45 MG: 100 INJECTION, POWDER, LYOPHILIZED, FOR SOLUTION INTRAVENOUS; SUBCUTANEOUS at 02:03

## 2021-03-03 RX ADMIN — SODIUM CHLORIDE: 900 INJECTION, SOLUTION INTRAVENOUS at 02:03

## 2021-03-03 RX ADMIN — ONDANSETRON 8 MG: 2 INJECTION INTRAMUSCULAR; INTRAVENOUS at 02:03

## 2021-03-03 RX ADMIN — Medication 10 ML: at 02:03

## 2021-03-04 ENCOUNTER — INFUSION (OUTPATIENT)
Dept: INFUSION THERAPY | Facility: HOSPITAL | Age: 81
End: 2021-03-04
Attending: INTERNAL MEDICINE
Payer: MEDICARE

## 2021-03-04 ENCOUNTER — SPECIALTY PHARMACY (OUTPATIENT)
Dept: PHARMACY | Facility: CLINIC | Age: 81
End: 2021-03-04

## 2021-03-04 VITALS
TEMPERATURE: 99 F | HEIGHT: 67 IN | BODY MASS INDEX: 25.78 KG/M2 | DIASTOLIC BLOOD PRESSURE: 63 MMHG | SYSTOLIC BLOOD PRESSURE: 121 MMHG | HEART RATE: 78 BPM | RESPIRATION RATE: 16 BRPM | WEIGHT: 164.25 LBS

## 2021-03-04 DIAGNOSIS — D46.Z MDS (MYELODYSPLASTIC SYNDROME), HIGH GRADE: Primary | ICD-10-CM

## 2021-03-04 DIAGNOSIS — D64.9 SYMPTOMATIC ANEMIA: Primary | ICD-10-CM

## 2021-03-04 PROCEDURE — 25000003 PHARM REV CODE 250: Mod: PN | Performed by: INTERNAL MEDICINE

## 2021-03-04 PROCEDURE — 96375 TX/PRO/DX INJ NEW DRUG ADDON: CPT | Mod: PN

## 2021-03-04 PROCEDURE — 63600175 PHARM REV CODE 636 W HCPCS: Mod: PN | Performed by: INTERNAL MEDICINE

## 2021-03-04 PROCEDURE — 96409 CHEMO IV PUSH SNGL DRUG: CPT | Mod: PN

## 2021-03-04 PROCEDURE — A4216 STERILE WATER/SALINE, 10 ML: HCPCS | Mod: PN | Performed by: INTERNAL MEDICINE

## 2021-03-04 RX ORDER — DIPHENHYDRAMINE HCL 25 MG
25 CAPSULE ORAL ONCE
Status: CANCELLED | OUTPATIENT
Start: 2021-03-04

## 2021-03-04 RX ORDER — SODIUM CHLORIDE 0.9 % (FLUSH) 0.9 %
10 SYRINGE (ML) INJECTION
Status: DISCONTINUED | OUTPATIENT
Start: 2021-03-04 | End: 2021-03-04 | Stop reason: HOSPADM

## 2021-03-04 RX ORDER — LEVOTHYROXINE SODIUM 50 UG/1
TABLET ORAL
Qty: 90 TABLET | Refills: 0 | Status: SHIPPED | OUTPATIENT
Start: 2021-03-04 | End: 2021-06-03

## 2021-03-04 RX ORDER — FUROSEMIDE 10 MG/ML
20 INJECTION INTRAMUSCULAR; INTRAVENOUS ONCE
Status: CANCELLED | OUTPATIENT
Start: 2021-03-04

## 2021-03-04 RX ORDER — ONDANSETRON 2 MG/ML
8 INJECTION INTRAMUSCULAR; INTRAVENOUS ONCE
Status: COMPLETED | OUTPATIENT
Start: 2021-03-04 | End: 2021-03-04

## 2021-03-04 RX ORDER — ACETAMINOPHEN 500 MG
1000 TABLET ORAL ONCE
Status: CANCELLED | OUTPATIENT
Start: 2021-03-04

## 2021-03-04 RX ORDER — HYDROCODONE BITARTRATE AND ACETAMINOPHEN 500; 5 MG/1; MG/1
TABLET ORAL ONCE
Status: CANCELLED | OUTPATIENT
Start: 2021-03-04 | End: 2021-03-04

## 2021-03-04 RX ADMIN — Medication 10 ML: at 03:03

## 2021-03-04 RX ADMIN — AZACITIDINE 45 MG: 100 INJECTION, POWDER, LYOPHILIZED, FOR SOLUTION INTRAVENOUS; SUBCUTANEOUS at 02:03

## 2021-03-04 RX ADMIN — SODIUM CHLORIDE: 9 INJECTION, SOLUTION INTRAVENOUS at 02:03

## 2021-03-04 RX ADMIN — ONDANSETRON 8 MG: 2 INJECTION INTRAMUSCULAR; INTRAVENOUS at 02:03

## 2021-03-05 ENCOUNTER — INFUSION (OUTPATIENT)
Dept: INFUSION THERAPY | Facility: HOSPITAL | Age: 81
End: 2021-03-05
Attending: INTERNAL MEDICINE
Payer: MEDICARE

## 2021-03-05 VITALS
OXYGEN SATURATION: 98 % | HEART RATE: 71 BPM | TEMPERATURE: 98 F | DIASTOLIC BLOOD PRESSURE: 78 MMHG | RESPIRATION RATE: 16 BRPM | SYSTOLIC BLOOD PRESSURE: 123 MMHG

## 2021-03-05 DIAGNOSIS — D46.Z MDS (MYELODYSPLASTIC SYNDROME), HIGH GRADE: Primary | ICD-10-CM

## 2021-03-05 PROCEDURE — 96375 TX/PRO/DX INJ NEW DRUG ADDON: CPT | Mod: PN

## 2021-03-05 PROCEDURE — A4216 STERILE WATER/SALINE, 10 ML: HCPCS | Mod: PN | Performed by: INTERNAL MEDICINE

## 2021-03-05 PROCEDURE — 96374 THER/PROPH/DIAG INJ IV PUSH: CPT | Mod: PN

## 2021-03-05 PROCEDURE — 63600175 PHARM REV CODE 636 W HCPCS: Mod: JG,PN | Performed by: INTERNAL MEDICINE

## 2021-03-05 PROCEDURE — 96409 CHEMO IV PUSH SNGL DRUG: CPT | Mod: PN

## 2021-03-05 PROCEDURE — 96411 CHEMO IV PUSH ADDL DRUG: CPT | Mod: PN

## 2021-03-05 PROCEDURE — 25000003 PHARM REV CODE 250: Mod: PN | Performed by: INTERNAL MEDICINE

## 2021-03-05 RX ORDER — ONDANSETRON 2 MG/ML
INJECTION INTRAMUSCULAR; INTRAVENOUS
Status: DISPENSED
Start: 2021-03-05 | End: 2021-03-06

## 2021-03-05 RX ORDER — ONDANSETRON 2 MG/ML
8 INJECTION INTRAMUSCULAR; INTRAVENOUS ONCE
Status: COMPLETED | OUTPATIENT
Start: 2021-03-05 | End: 2021-03-05

## 2021-03-05 RX ORDER — HEPARIN 100 UNIT/ML
500 SYRINGE INTRAVENOUS
Status: DISCONTINUED | OUTPATIENT
Start: 2021-03-05 | End: 2021-03-05 | Stop reason: HOSPADM

## 2021-03-05 RX ORDER — SODIUM CHLORIDE 0.9 % (FLUSH) 0.9 %
10 SYRINGE (ML) INJECTION
Status: DISCONTINUED | OUTPATIENT
Start: 2021-03-05 | End: 2021-03-05 | Stop reason: HOSPADM

## 2021-03-05 RX ADMIN — SODIUM CHLORIDE: 0.9 INJECTION, SOLUTION INTRAVENOUS at 12:03

## 2021-03-05 RX ADMIN — AZACITIDINE 45 MG: 100 INJECTION, POWDER, LYOPHILIZED, FOR SOLUTION INTRAVENOUS; SUBCUTANEOUS at 12:03

## 2021-03-05 RX ADMIN — ONDANSETRON 8 MG: 2 INJECTION INTRAMUSCULAR; INTRAVENOUS at 12:03

## 2021-03-05 RX ADMIN — Medication 10 ML: at 12:03

## 2021-03-09 ENCOUNTER — IMMUNIZATION (OUTPATIENT)
Dept: PRIMARY CARE CLINIC | Facility: CLINIC | Age: 81
End: 2021-03-09
Payer: MEDICARE

## 2021-03-09 DIAGNOSIS — Z23 NEED FOR VACCINATION: Primary | ICD-10-CM

## 2021-03-09 PROCEDURE — 0001A COVID-19, MRNA, LNP-S, PF, 30 MCG/0.3 ML DOSE VACCINE: CPT | Mod: CV19,S$GLB,, | Performed by: FAMILY MEDICINE

## 2021-03-09 PROCEDURE — 91300 COVID-19, MRNA, LNP-S, PF, 30 MCG/0.3 ML DOSE VACCINE: CPT | Mod: S$GLB,,, | Performed by: FAMILY MEDICINE

## 2021-03-09 PROCEDURE — 0001A COVID-19, MRNA, LNP-S, PF, 30 MCG/0.3 ML DOSE VACCINE: ICD-10-PCS | Mod: CV19,S$GLB,, | Performed by: FAMILY MEDICINE

## 2021-03-09 PROCEDURE — 91300 COVID-19, MRNA, LNP-S, PF, 30 MCG/0.3 ML DOSE VACCINE: ICD-10-PCS | Mod: S$GLB,,, | Performed by: FAMILY MEDICINE

## 2021-03-11 ENCOUNTER — LAB VISIT (OUTPATIENT)
Dept: LAB | Facility: HOSPITAL | Age: 81
End: 2021-03-11
Attending: NURSE PRACTITIONER
Payer: MEDICARE

## 2021-03-11 DIAGNOSIS — D64.9 SYMPTOMATIC ANEMIA: ICD-10-CM

## 2021-03-11 LAB
ABO + RH BLD: NORMAL
ANISOCYTOSIS BLD QL SMEAR: SLIGHT
BASOPHILS NFR BLD: 3 % (ref 0–1.9)
BLASTS NFR BLD MANUAL: 19 %
BLD GP AB SCN CELLS X3 SERPL QL: NORMAL
DACRYOCYTES BLD QL SMEAR: ABNORMAL
DIFFERENTIAL METHOD: ABNORMAL
EOSINOPHIL NFR BLD: 2 % (ref 0–8)
ERYTHROCYTE [DISTWIDTH] IN BLOOD BY AUTOMATED COUNT: 18.9 % (ref 11.5–14.5)
HCT VFR BLD AUTO: 32.1 % (ref 40–54)
HGB BLD-MCNC: 9.3 G/DL (ref 14–18)
HYPOCHROMIA BLD QL SMEAR: ABNORMAL
IMM GRANULOCYTES # BLD AUTO: ABNORMAL 10*3/UL
IMM GRANULOCYTES NFR BLD AUTO: ABNORMAL %
LYMPHOCYTES NFR BLD: 36 % (ref 18–48)
MCH RBC QN AUTO: 27.3 PG (ref 27–31)
MCHC RBC AUTO-ENTMCNC: 29 G/DL (ref 32–36)
MCV RBC AUTO: 94 FL (ref 82–98)
METAMYELOCYTES NFR BLD MANUAL: 2 %
MONOCYTES NFR BLD: 4 % (ref 4–15)
MYELOCYTES NFR BLD MANUAL: 7 %
NEUTROPHILS # BLD AUTO: 0.8 K/UL (ref 1.8–7.7)
NEUTROPHILS NFR BLD: 27 % (ref 38–73)
NRBC BLD-RTO: 12 /100 WBC
OVALOCYTES BLD QL SMEAR: ABNORMAL
PLATELET # BLD AUTO: 20 K/UL (ref 150–350)
PLATELET BLD QL SMEAR: ABNORMAL
PMV BLD AUTO: ABNORMAL FL (ref 9.2–12.9)
POIKILOCYTOSIS BLD QL SMEAR: SLIGHT
POLYCHROMASIA BLD QL SMEAR: ABNORMAL
PROMYELOCYTES NFR BLD MANUAL: 1 %
RBC # BLD AUTO: 3.41 M/UL (ref 4.6–6.2)
SCHISTOCYTES BLD QL SMEAR: ABNORMAL
WBC # BLD AUTO: 2.87 K/UL (ref 3.9–12.7)
WBC OTHER NFR BLD MANUAL: ABNORMAL %

## 2021-03-11 PROCEDURE — 86900 BLOOD TYPING SEROLOGIC ABO: CPT | Performed by: INTERNAL MEDICINE

## 2021-03-11 PROCEDURE — 85027 COMPLETE CBC AUTOMATED: CPT | Mod: PN | Performed by: INTERNAL MEDICINE

## 2021-03-11 PROCEDURE — 85007 BL SMEAR W/DIFF WBC COUNT: CPT | Mod: PN | Performed by: INTERNAL MEDICINE

## 2021-03-11 PROCEDURE — 36415 COLL VENOUS BLD VENIPUNCTURE: CPT | Mod: PN | Performed by: INTERNAL MEDICINE

## 2021-03-11 PROCEDURE — 85007 BL SMEAR W/DIFF WBC COUNT: CPT | Performed by: INTERNAL MEDICINE

## 2021-03-11 PROCEDURE — 85027 COMPLETE CBC AUTOMATED: CPT | Performed by: INTERNAL MEDICINE

## 2021-03-11 PROCEDURE — 86900 BLOOD TYPING SEROLOGIC ABO: CPT | Mod: PN | Performed by: INTERNAL MEDICINE

## 2021-03-15 ENCOUNTER — PATIENT MESSAGE (OUTPATIENT)
Dept: HEMATOLOGY/ONCOLOGY | Facility: CLINIC | Age: 81
End: 2021-03-15

## 2021-03-15 ENCOUNTER — TELEPHONE (OUTPATIENT)
Dept: HEMATOLOGY/ONCOLOGY | Facility: CLINIC | Age: 81
End: 2021-03-15

## 2021-03-16 ENCOUNTER — TELEPHONE (OUTPATIENT)
Dept: HEMATOLOGY/ONCOLOGY | Facility: CLINIC | Age: 81
End: 2021-03-16

## 2021-03-16 ENCOUNTER — LAB VISIT (OUTPATIENT)
Dept: LAB | Facility: HOSPITAL | Age: 81
End: 2021-03-16
Attending: INTERNAL MEDICINE
Payer: MEDICARE

## 2021-03-16 ENCOUNTER — PATIENT MESSAGE (OUTPATIENT)
Dept: HEMATOLOGY/ONCOLOGY | Facility: CLINIC | Age: 81
End: 2021-03-16

## 2021-03-16 DIAGNOSIS — D64.9 SYMPTOMATIC ANEMIA: ICD-10-CM

## 2021-03-16 LAB
ABO + RH BLD: NORMAL
ANISOCYTOSIS BLD QL SMEAR: SLIGHT
BASO STIPL BLD QL SMEAR: ABNORMAL
BASOPHILS NFR BLD: 2 % (ref 0–1.9)
BLASTS NFR BLD MANUAL: 19 %
BLD GP AB SCN CELLS X3 SERPL QL: NORMAL
DACRYOCYTES BLD QL SMEAR: ABNORMAL
DIFFERENTIAL METHOD: ABNORMAL
EOSINOPHIL NFR BLD: 2 % (ref 0–8)
ERYTHROCYTE [DISTWIDTH] IN BLOOD BY AUTOMATED COUNT: 18.7 % (ref 11.5–14.5)
GIANT PLATELETS BLD QL SMEAR: PRESENT
HCT VFR BLD AUTO: 31.3 % (ref 40–54)
HGB BLD-MCNC: 8.9 G/DL (ref 14–18)
HYPOCHROMIA BLD QL SMEAR: ABNORMAL
IMM GRANULOCYTES # BLD AUTO: ABNORMAL K/UL (ref 0–0.04)
IMM GRANULOCYTES NFR BLD AUTO: ABNORMAL % (ref 0–0.5)
LYMPHOCYTES NFR BLD: 34 % (ref 18–48)
MCH RBC QN AUTO: 26.6 PG (ref 27–31)
MCHC RBC AUTO-ENTMCNC: 28.4 G/DL (ref 32–36)
MCV RBC AUTO: 94 FL (ref 82–98)
METAMYELOCYTES NFR BLD MANUAL: 2 %
MONOCYTES NFR BLD: 7 % (ref 4–15)
MYELOCYTES NFR BLD MANUAL: 5 %
NEUTROPHILS # BLD AUTO: 1.1 K/UL (ref 1.8–7.7)
NEUTROPHILS NFR BLD: 26 % (ref 38–73)
NEUTS BAND NFR BLD MANUAL: 3 %
NRBC BLD-RTO: 12 /100 WBC
OVALOCYTES BLD QL SMEAR: ABNORMAL
PLATELET # BLD AUTO: 28 K/UL (ref 150–350)
PMV BLD AUTO: ABNORMAL FL (ref 9.2–12.9)
POIKILOCYTOSIS BLD QL SMEAR: SLIGHT
POLYCHROMASIA BLD QL SMEAR: ABNORMAL
RBC # BLD AUTO: 3.34 M/UL (ref 4.6–6.2)
SCHISTOCYTES BLD QL SMEAR: PRESENT
WBC # BLD AUTO: 3.76 K/UL (ref 3.9–12.7)

## 2021-03-16 PROCEDURE — 85027 COMPLETE CBC AUTOMATED: CPT | Performed by: INTERNAL MEDICINE

## 2021-03-16 PROCEDURE — 85027 COMPLETE CBC AUTOMATED: CPT | Mod: PN | Performed by: INTERNAL MEDICINE

## 2021-03-16 PROCEDURE — 85007 BL SMEAR W/DIFF WBC COUNT: CPT | Mod: PN | Performed by: INTERNAL MEDICINE

## 2021-03-16 PROCEDURE — 86900 BLOOD TYPING SEROLOGIC ABO: CPT | Mod: PN | Performed by: INTERNAL MEDICINE

## 2021-03-16 PROCEDURE — 85007 BL SMEAR W/DIFF WBC COUNT: CPT | Performed by: INTERNAL MEDICINE

## 2021-03-16 PROCEDURE — 36415 COLL VENOUS BLD VENIPUNCTURE: CPT | Mod: PN | Performed by: INTERNAL MEDICINE

## 2021-03-16 PROCEDURE — 86900 BLOOD TYPING SEROLOGIC ABO: CPT | Performed by: INTERNAL MEDICINE

## 2021-03-18 ENCOUNTER — LAB VISIT (OUTPATIENT)
Dept: LAB | Facility: HOSPITAL | Age: 81
End: 2021-03-18
Attending: INTERNAL MEDICINE
Payer: MEDICARE

## 2021-03-18 DIAGNOSIS — D64.9 SYMPTOMATIC ANEMIA: ICD-10-CM

## 2021-03-18 LAB
ABO + RH BLD: NORMAL
ANISOCYTOSIS BLD QL SMEAR: SLIGHT
BASOPHILS NFR BLD: 0 % (ref 0–1.9)
BLD GP AB SCN CELLS X3 SERPL QL: NORMAL
DACRYOCYTES BLD QL SMEAR: ABNORMAL
DIFFERENTIAL METHOD: ABNORMAL
EOSINOPHIL NFR BLD: 5 % (ref 0–8)
ERYTHROCYTE [DISTWIDTH] IN BLOOD BY AUTOMATED COUNT: 19.2 % (ref 11.5–14.5)
HCT VFR BLD AUTO: 31.7 % (ref 40–54)
HGB BLD-MCNC: 9.2 G/DL (ref 14–18)
HYPOCHROMIA BLD QL SMEAR: ABNORMAL
IMM GRANULOCYTES # BLD AUTO: ABNORMAL K/UL (ref 0–0.04)
IMM GRANULOCYTES NFR BLD AUTO: ABNORMAL % (ref 0–0.5)
LYMPHOCYTES NFR BLD: 57 % (ref 18–48)
MCH RBC QN AUTO: 27.1 PG (ref 27–31)
MCHC RBC AUTO-ENTMCNC: 29 G/DL (ref 32–36)
MCV RBC AUTO: 94 FL (ref 82–98)
MONOCYTES NFR BLD: 10 % (ref 4–15)
NEUTROPHILS # BLD AUTO: 1.2 K/UL (ref 1.8–7.7)
NEUTROPHILS NFR BLD: 27 % (ref 38–73)
NRBC BLD-RTO: 12 /100 WBC
OVALOCYTES BLD QL SMEAR: ABNORMAL
PLATELET # BLD AUTO: 36 K/UL (ref 150–350)
PLATELET BLD QL SMEAR: ABNORMAL
PMV BLD AUTO: ABNORMAL FL (ref 9.2–12.9)
POIKILOCYTOSIS BLD QL SMEAR: SLIGHT
POLYCHROMASIA BLD QL SMEAR: ABNORMAL
RBC # BLD AUTO: 3.39 M/UL (ref 4.6–6.2)
WBC # BLD AUTO: 4.57 K/UL (ref 3.9–12.7)
WBC OTHER NFR BLD MANUAL: 1 %

## 2021-03-18 PROCEDURE — 85027 COMPLETE CBC AUTOMATED: CPT | Mod: PN | Performed by: INTERNAL MEDICINE

## 2021-03-18 PROCEDURE — 86900 BLOOD TYPING SEROLOGIC ABO: CPT | Performed by: INTERNAL MEDICINE

## 2021-03-18 PROCEDURE — 85027 COMPLETE CBC AUTOMATED: CPT | Performed by: INTERNAL MEDICINE

## 2021-03-18 PROCEDURE — 85007 BL SMEAR W/DIFF WBC COUNT: CPT | Performed by: INTERNAL MEDICINE

## 2021-03-18 PROCEDURE — 86900 BLOOD TYPING SEROLOGIC ABO: CPT | Mod: PN | Performed by: INTERNAL MEDICINE

## 2021-03-18 PROCEDURE — 85007 BL SMEAR W/DIFF WBC COUNT: CPT | Mod: PN | Performed by: INTERNAL MEDICINE

## 2021-03-19 ENCOUNTER — OFFICE VISIT (OUTPATIENT)
Dept: FAMILY MEDICINE | Facility: CLINIC | Age: 81
End: 2021-03-19
Attending: FAMILY MEDICINE
Payer: MEDICARE

## 2021-03-19 ENCOUNTER — TELEPHONE (OUTPATIENT)
Dept: FAMILY MEDICINE | Facility: CLINIC | Age: 81
End: 2021-03-19

## 2021-03-19 VITALS
OXYGEN SATURATION: 98 % | SYSTOLIC BLOOD PRESSURE: 122 MMHG | TEMPERATURE: 97 F | WEIGHT: 167.13 LBS | HEIGHT: 67 IN | HEART RATE: 77 BPM | DIASTOLIC BLOOD PRESSURE: 66 MMHG | BODY MASS INDEX: 26.23 KG/M2

## 2021-03-19 DIAGNOSIS — E78.5 DYSLIPIDEMIA (HIGH LDL; LOW HDL): ICD-10-CM

## 2021-03-19 DIAGNOSIS — D61.818 PANCYTOPENIA: ICD-10-CM

## 2021-03-19 DIAGNOSIS — J30.1 SEASONAL ALLERGIC RHINITIS DUE TO POLLEN: ICD-10-CM

## 2021-03-19 DIAGNOSIS — E03.9 ACQUIRED HYPOTHYROIDISM: ICD-10-CM

## 2021-03-19 DIAGNOSIS — K21.9 GASTROESOPHAGEAL REFLUX DISEASE, UNSPECIFIED WHETHER ESOPHAGITIS PRESENT: ICD-10-CM

## 2021-03-19 DIAGNOSIS — J84.10 CALCIFIED GRANULOMA OF LUNG: ICD-10-CM

## 2021-03-19 DIAGNOSIS — I10 ESSENTIAL HYPERTENSION: ICD-10-CM

## 2021-03-19 DIAGNOSIS — I48.0 PAROXYSMAL ATRIAL FIBRILLATION: ICD-10-CM

## 2021-03-19 DIAGNOSIS — R35.1 BENIGN PROSTATIC HYPERPLASIA WITH NOCTURIA: ICD-10-CM

## 2021-03-19 DIAGNOSIS — D46.Z MDS (MYELODYSPLASTIC SYNDROME), HIGH GRADE: ICD-10-CM

## 2021-03-19 DIAGNOSIS — Z86.16 HISTORY OF COVID-19: ICD-10-CM

## 2021-03-19 DIAGNOSIS — N40.1 BENIGN PROSTATIC HYPERPLASIA WITH NOCTURIA: ICD-10-CM

## 2021-03-19 DIAGNOSIS — Z00.00 ENCOUNTER FOR PREVENTIVE HEALTH EXAMINATION: Primary | ICD-10-CM

## 2021-03-19 PROCEDURE — 1101F PR PT FALLS ASSESS DOC 0-1 FALLS W/OUT INJ PAST YR: ICD-10-PCS | Mod: CPTII,S$GLB,, | Performed by: FAMILY MEDICINE

## 2021-03-19 PROCEDURE — 99214 PR OFFICE/OUTPT VISIT, EST, LEVL IV, 30-39 MIN: ICD-10-PCS | Mod: S$GLB,,, | Performed by: FAMILY MEDICINE

## 2021-03-19 PROCEDURE — 99214 OFFICE O/P EST MOD 30 MIN: CPT | Mod: S$GLB,,, | Performed by: FAMILY MEDICINE

## 2021-03-19 PROCEDURE — 3288F FALL RISK ASSESSMENT DOCD: CPT | Mod: CPTII,S$GLB,, | Performed by: FAMILY MEDICINE

## 2021-03-19 PROCEDURE — 99999 PR PBB SHADOW E&M-EST. PATIENT-LVL IV: CPT | Mod: PBBFAC,,, | Performed by: FAMILY MEDICINE

## 2021-03-19 PROCEDURE — 1159F PR MEDICATION LIST DOCUMENTED IN MEDICAL RECORD: ICD-10-PCS | Mod: S$GLB,,, | Performed by: FAMILY MEDICINE

## 2021-03-19 PROCEDURE — 3074F SYST BP LT 130 MM HG: CPT | Mod: CPTII,S$GLB,, | Performed by: FAMILY MEDICINE

## 2021-03-19 PROCEDURE — 1159F MED LIST DOCD IN RCRD: CPT | Mod: S$GLB,,, | Performed by: FAMILY MEDICINE

## 2021-03-19 PROCEDURE — 99999 PR PBB SHADOW E&M-EST. PATIENT-LVL IV: ICD-10-PCS | Mod: PBBFAC,,, | Performed by: FAMILY MEDICINE

## 2021-03-19 PROCEDURE — 3078F DIAST BP <80 MM HG: CPT | Mod: CPTII,S$GLB,, | Performed by: FAMILY MEDICINE

## 2021-03-19 PROCEDURE — 1126F AMNT PAIN NOTED NONE PRSNT: CPT | Mod: S$GLB,,, | Performed by: FAMILY MEDICINE

## 2021-03-19 PROCEDURE — 3074F PR MOST RECENT SYSTOLIC BLOOD PRESSURE < 130 MM HG: ICD-10-PCS | Mod: CPTII,S$GLB,, | Performed by: FAMILY MEDICINE

## 2021-03-19 PROCEDURE — 1101F PT FALLS ASSESS-DOCD LE1/YR: CPT | Mod: CPTII,S$GLB,, | Performed by: FAMILY MEDICINE

## 2021-03-19 PROCEDURE — 1126F PR PAIN SEVERITY QUANTIFIED, NO PAIN PRESENT: ICD-10-PCS | Mod: S$GLB,,, | Performed by: FAMILY MEDICINE

## 2021-03-19 PROCEDURE — 3288F PR FALLS RISK ASSESSMENT DOCUMENTED: ICD-10-PCS | Mod: CPTII,S$GLB,, | Performed by: FAMILY MEDICINE

## 2021-03-19 PROCEDURE — 3078F PR MOST RECENT DIASTOLIC BLOOD PRESSURE < 80 MM HG: ICD-10-PCS | Mod: CPTII,S$GLB,, | Performed by: FAMILY MEDICINE

## 2021-03-19 RX ORDER — TADALAFIL 5 MG/1
5 TABLET ORAL DAILY PRN
Qty: 90 TABLET | Refills: 1 | Status: SHIPPED | OUTPATIENT
Start: 2021-03-19 | End: 2021-09-27 | Stop reason: CLARIF

## 2021-03-20 LAB — PATH REV BLD -IMP: NORMAL

## 2021-03-22 ENCOUNTER — LAB VISIT (OUTPATIENT)
Dept: LAB | Facility: HOSPITAL | Age: 81
End: 2021-03-22
Attending: INTERNAL MEDICINE
Payer: MEDICARE

## 2021-03-22 ENCOUNTER — TELEPHONE (OUTPATIENT)
Dept: HEMATOLOGY/ONCOLOGY | Facility: CLINIC | Age: 81
End: 2021-03-22

## 2021-03-22 DIAGNOSIS — E03.9 ACQUIRED HYPOTHYROIDISM: ICD-10-CM

## 2021-03-22 DIAGNOSIS — D64.9 SYMPTOMATIC ANEMIA: ICD-10-CM

## 2021-03-22 LAB
ABO + RH BLD: NORMAL
ANISOCYTOSIS BLD QL SMEAR: SLIGHT
BASO STIPL BLD QL SMEAR: ABNORMAL
BASOPHILS NFR BLD: 0 % (ref 0–1.9)
BLASTS NFR BLD MANUAL: 19 %
BLD GP AB SCN CELLS X3 SERPL QL: NORMAL
DACRYOCYTES BLD QL SMEAR: ABNORMAL
DIFFERENTIAL METHOD: ABNORMAL
EOSINOPHIL NFR BLD: 2 % (ref 0–8)
ERYTHROCYTE [DISTWIDTH] IN BLOOD BY AUTOMATED COUNT: 19.6 % (ref 11.5–14.5)
HCT VFR BLD AUTO: 32.2 % (ref 40–54)
HGB BLD-MCNC: 9.1 G/DL (ref 14–18)
HYPOCHROMIA BLD QL SMEAR: ABNORMAL
IMM GRANULOCYTES # BLD AUTO: ABNORMAL K/UL (ref 0–0.04)
IMM GRANULOCYTES NFR BLD AUTO: ABNORMAL % (ref 0–0.5)
LYMPHOCYTES NFR BLD: 33 % (ref 18–48)
MCH RBC QN AUTO: 26.4 PG (ref 27–31)
MCHC RBC AUTO-ENTMCNC: 28.3 G/DL (ref 32–36)
MCV RBC AUTO: 93 FL (ref 82–98)
MONOCYTES NFR BLD: 4 % (ref 4–15)
MYELOCYTES NFR BLD MANUAL: 1 %
NEUTROPHILS # BLD AUTO: 1.5 K/UL (ref 1.8–7.7)
NEUTROPHILS NFR BLD: 40 % (ref 38–73)
NEUTS BAND NFR BLD MANUAL: 1 %
NRBC BLD-RTO: 14 /100 WBC
OVALOCYTES BLD QL SMEAR: ABNORMAL
PLATELET # BLD AUTO: 41 K/UL (ref 150–350)
PLATELET BLD QL SMEAR: ABNORMAL
PMV BLD AUTO: ABNORMAL FL (ref 9.2–12.9)
POLYCHROMASIA BLD QL SMEAR: ABNORMAL
RBC # BLD AUTO: 3.45 M/UL (ref 4.6–6.2)
SCHISTOCYTES BLD QL SMEAR: PRESENT
SPHEROCYTES BLD QL SMEAR: ABNORMAL
TSH SERPL DL<=0.005 MIU/L-ACNC: 2.31 UIU/ML (ref 0.4–4)
WBC # BLD AUTO: 3.66 K/UL (ref 3.9–12.7)

## 2021-03-22 PROCEDURE — 85007 BL SMEAR W/DIFF WBC COUNT: CPT | Mod: PN | Performed by: INTERNAL MEDICINE

## 2021-03-22 PROCEDURE — 86900 BLOOD TYPING SEROLOGIC ABO: CPT | Performed by: INTERNAL MEDICINE

## 2021-03-22 PROCEDURE — 85007 BL SMEAR W/DIFF WBC COUNT: CPT | Performed by: INTERNAL MEDICINE

## 2021-03-22 PROCEDURE — 85027 COMPLETE CBC AUTOMATED: CPT | Performed by: INTERNAL MEDICINE

## 2021-03-22 PROCEDURE — 84443 ASSAY THYROID STIM HORMONE: CPT | Performed by: FAMILY MEDICINE

## 2021-03-22 PROCEDURE — 84443 ASSAY THYROID STIM HORMONE: CPT | Mod: PN | Performed by: FAMILY MEDICINE

## 2021-03-22 PROCEDURE — 86900 BLOOD TYPING SEROLOGIC ABO: CPT | Mod: PN | Performed by: INTERNAL MEDICINE

## 2021-03-22 PROCEDURE — 85027 COMPLETE CBC AUTOMATED: CPT | Mod: PN | Performed by: INTERNAL MEDICINE

## 2021-03-23 ENCOUNTER — TELEPHONE (OUTPATIENT)
Dept: FAMILY MEDICINE | Facility: CLINIC | Age: 81
End: 2021-03-23

## 2021-03-25 ENCOUNTER — PATIENT MESSAGE (OUTPATIENT)
Dept: HEMATOLOGY/ONCOLOGY | Facility: CLINIC | Age: 81
End: 2021-03-25

## 2021-03-29 ENCOUNTER — OFFICE VISIT (OUTPATIENT)
Dept: HEMATOLOGY/ONCOLOGY | Facility: CLINIC | Age: 81
End: 2021-03-29
Payer: MEDICARE

## 2021-03-29 ENCOUNTER — SPECIALTY PHARMACY (OUTPATIENT)
Dept: PHARMACY | Facility: CLINIC | Age: 81
End: 2021-03-29

## 2021-03-29 ENCOUNTER — INFUSION (OUTPATIENT)
Dept: INFUSION THERAPY | Facility: HOSPITAL | Age: 81
End: 2021-03-29
Attending: INTERNAL MEDICINE
Payer: MEDICARE

## 2021-03-29 VITALS
RESPIRATION RATE: 18 BRPM | TEMPERATURE: 99 F | DIASTOLIC BLOOD PRESSURE: 61 MMHG | BODY MASS INDEX: 26.61 KG/M2 | HEIGHT: 67 IN | SYSTOLIC BLOOD PRESSURE: 121 MMHG | WEIGHT: 169.56 LBS | HEART RATE: 72 BPM

## 2021-03-29 VITALS
HEART RATE: 77 BPM | SYSTOLIC BLOOD PRESSURE: 117 MMHG | HEIGHT: 67 IN | DIASTOLIC BLOOD PRESSURE: 63 MMHG | BODY MASS INDEX: 26.61 KG/M2 | TEMPERATURE: 99 F | RESPIRATION RATE: 18 BRPM | OXYGEN SATURATION: 98 % | WEIGHT: 169.56 LBS

## 2021-03-29 DIAGNOSIS — D46.Z MDS (MYELODYSPLASTIC SYNDROME), HIGH GRADE: Primary | ICD-10-CM

## 2021-03-29 PROCEDURE — 1159F MED LIST DOCD IN RCRD: CPT | Mod: S$GLB,,, | Performed by: NURSE PRACTITIONER

## 2021-03-29 PROCEDURE — 1126F PR PAIN SEVERITY QUANTIFIED, NO PAIN PRESENT: ICD-10-PCS | Mod: S$GLB,,, | Performed by: NURSE PRACTITIONER

## 2021-03-29 PROCEDURE — 3074F SYST BP LT 130 MM HG: CPT | Mod: CPTII,S$GLB,, | Performed by: NURSE PRACTITIONER

## 2021-03-29 PROCEDURE — 1126F AMNT PAIN NOTED NONE PRSNT: CPT | Mod: S$GLB,,, | Performed by: NURSE PRACTITIONER

## 2021-03-29 PROCEDURE — 3078F DIAST BP <80 MM HG: CPT | Mod: CPTII,S$GLB,, | Performed by: NURSE PRACTITIONER

## 2021-03-29 PROCEDURE — 25000003 PHARM REV CODE 250: Mod: PN | Performed by: NURSE PRACTITIONER

## 2021-03-29 PROCEDURE — 1159F PR MEDICATION LIST DOCUMENTED IN MEDICAL RECORD: ICD-10-PCS | Mod: S$GLB,,, | Performed by: NURSE PRACTITIONER

## 2021-03-29 PROCEDURE — 1101F PT FALLS ASSESS-DOCD LE1/YR: CPT | Mod: CPTII,S$GLB,, | Performed by: NURSE PRACTITIONER

## 2021-03-29 PROCEDURE — 99999 PR PBB SHADOW E&M-EST. PATIENT-LVL IV: CPT | Mod: PBBFAC,,, | Performed by: NURSE PRACTITIONER

## 2021-03-29 PROCEDURE — 96409 CHEMO IV PUSH SNGL DRUG: CPT | Mod: PN

## 2021-03-29 PROCEDURE — 99214 OFFICE O/P EST MOD 30 MIN: CPT | Mod: S$GLB,,, | Performed by: NURSE PRACTITIONER

## 2021-03-29 PROCEDURE — 96375 TX/PRO/DX INJ NEW DRUG ADDON: CPT | Mod: PN

## 2021-03-29 PROCEDURE — 1101F PR PT FALLS ASSESS DOC 0-1 FALLS W/OUT INJ PAST YR: ICD-10-PCS | Mod: CPTII,S$GLB,, | Performed by: NURSE PRACTITIONER

## 2021-03-29 PROCEDURE — 63600175 PHARM REV CODE 636 W HCPCS: Mod: JG,PN | Performed by: NURSE PRACTITIONER

## 2021-03-29 PROCEDURE — 99214 PR OFFICE/OUTPT VISIT, EST, LEVL IV, 30-39 MIN: ICD-10-PCS | Mod: S$GLB,,, | Performed by: NURSE PRACTITIONER

## 2021-03-29 PROCEDURE — 3288F PR FALLS RISK ASSESSMENT DOCUMENTED: ICD-10-PCS | Mod: CPTII,S$GLB,, | Performed by: NURSE PRACTITIONER

## 2021-03-29 PROCEDURE — 3288F FALL RISK ASSESSMENT DOCD: CPT | Mod: CPTII,S$GLB,, | Performed by: NURSE PRACTITIONER

## 2021-03-29 PROCEDURE — 99999 PR PBB SHADOW E&M-EST. PATIENT-LVL IV: ICD-10-PCS | Mod: PBBFAC,,, | Performed by: NURSE PRACTITIONER

## 2021-03-29 PROCEDURE — 3074F PR MOST RECENT SYSTOLIC BLOOD PRESSURE < 130 MM HG: ICD-10-PCS | Mod: CPTII,S$GLB,, | Performed by: NURSE PRACTITIONER

## 2021-03-29 PROCEDURE — 3078F PR MOST RECENT DIASTOLIC BLOOD PRESSURE < 80 MM HG: ICD-10-PCS | Mod: CPTII,S$GLB,, | Performed by: NURSE PRACTITIONER

## 2021-03-29 PROCEDURE — A4216 STERILE WATER/SALINE, 10 ML: HCPCS | Mod: PN | Performed by: NURSE PRACTITIONER

## 2021-03-29 RX ORDER — ONDANSETRON 2 MG/ML
8 INJECTION INTRAMUSCULAR; INTRAVENOUS ONCE
Status: CANCELLED
Start: 2021-04-01 | End: 2021-04-01

## 2021-03-29 RX ORDER — SODIUM CHLORIDE 0.9 % (FLUSH) 0.9 %
10 SYRINGE (ML) INJECTION
Status: CANCELLED | OUTPATIENT
Start: 2021-04-02

## 2021-03-29 RX ORDER — HEPARIN 100 UNIT/ML
500 SYRINGE INTRAVENOUS
Status: CANCELLED | OUTPATIENT
Start: 2021-04-02

## 2021-03-29 RX ORDER — SODIUM CHLORIDE 0.9 % (FLUSH) 0.9 %
10 SYRINGE (ML) INJECTION
Status: CANCELLED | OUTPATIENT
Start: 2021-03-30

## 2021-03-29 RX ORDER — ONDANSETRON 2 MG/ML
8 INJECTION INTRAMUSCULAR; INTRAVENOUS ONCE
Status: CANCELLED
Start: 2021-04-02 | End: 2021-04-02

## 2021-03-29 RX ORDER — HEPARIN 100 UNIT/ML
500 SYRINGE INTRAVENOUS
Status: CANCELLED | OUTPATIENT
Start: 2021-04-01

## 2021-03-29 RX ORDER — HEPARIN 100 UNIT/ML
500 SYRINGE INTRAVENOUS
Status: CANCELLED | OUTPATIENT
Start: 2021-03-30

## 2021-03-29 RX ORDER — SODIUM CHLORIDE 0.9 % (FLUSH) 0.9 %
10 SYRINGE (ML) INJECTION
Status: DISCONTINUED | OUTPATIENT
Start: 2021-03-29 | End: 2021-03-29 | Stop reason: HOSPADM

## 2021-03-29 RX ORDER — ONDANSETRON 2 MG/ML
8 INJECTION INTRAMUSCULAR; INTRAVENOUS ONCE
Status: CANCELLED
Start: 2021-03-31 | End: 2021-03-31

## 2021-03-29 RX ORDER — SODIUM CHLORIDE 0.9 % (FLUSH) 0.9 %
10 SYRINGE (ML) INJECTION
Status: CANCELLED | OUTPATIENT
Start: 2021-03-29

## 2021-03-29 RX ORDER — ONDANSETRON 2 MG/ML
8 INJECTION INTRAMUSCULAR; INTRAVENOUS ONCE
Status: CANCELLED
Start: 2021-03-30 | End: 2021-03-30

## 2021-03-29 RX ORDER — SODIUM CHLORIDE 0.9 % (FLUSH) 0.9 %
10 SYRINGE (ML) INJECTION
Status: CANCELLED | OUTPATIENT
Start: 2021-03-31

## 2021-03-29 RX ORDER — ONDANSETRON 2 MG/ML
8 INJECTION INTRAMUSCULAR; INTRAVENOUS ONCE
Status: CANCELLED
Start: 2021-03-29 | End: 2021-03-29

## 2021-03-29 RX ORDER — SODIUM CHLORIDE 0.9 % (FLUSH) 0.9 %
10 SYRINGE (ML) INJECTION
Status: CANCELLED | OUTPATIENT
Start: 2021-04-01

## 2021-03-29 RX ORDER — HEPARIN 100 UNIT/ML
500 SYRINGE INTRAVENOUS
Status: CANCELLED | OUTPATIENT
Start: 2021-03-29

## 2021-03-29 RX ORDER — ONDANSETRON 2 MG/ML
8 INJECTION INTRAMUSCULAR; INTRAVENOUS ONCE
Status: COMPLETED | OUTPATIENT
Start: 2021-03-29 | End: 2021-03-29

## 2021-03-29 RX ORDER — HEPARIN 100 UNIT/ML
500 SYRINGE INTRAVENOUS
Status: CANCELLED | OUTPATIENT
Start: 2021-03-31

## 2021-03-29 RX ADMIN — ONDANSETRON 8 MG: 2 INJECTION INTRAMUSCULAR; INTRAVENOUS at 03:03

## 2021-03-29 RX ADMIN — AZACITIDINE 45 MG: 100 INJECTION, POWDER, LYOPHILIZED, FOR SOLUTION INTRAVENOUS; SUBCUTANEOUS at 04:03

## 2021-03-29 RX ADMIN — SODIUM CHLORIDE: 900 INJECTION, SOLUTION INTRAVENOUS at 03:03

## 2021-03-29 RX ADMIN — Medication 10 ML: at 04:03

## 2021-03-30 ENCOUNTER — INFUSION (OUTPATIENT)
Dept: INFUSION THERAPY | Facility: HOSPITAL | Age: 81
End: 2021-03-30
Attending: INTERNAL MEDICINE
Payer: MEDICARE

## 2021-03-30 VITALS
HEIGHT: 67 IN | HEART RATE: 75 BPM | SYSTOLIC BLOOD PRESSURE: 108 MMHG | TEMPERATURE: 97 F | WEIGHT: 169.56 LBS | DIASTOLIC BLOOD PRESSURE: 59 MMHG | RESPIRATION RATE: 18 BRPM | BODY MASS INDEX: 26.61 KG/M2

## 2021-03-30 DIAGNOSIS — D46.Z MDS (MYELODYSPLASTIC SYNDROME), HIGH GRADE: Primary | ICD-10-CM

## 2021-03-30 PROCEDURE — 25000003 PHARM REV CODE 250: Mod: PN | Performed by: NURSE PRACTITIONER

## 2021-03-30 PROCEDURE — 63600175 PHARM REV CODE 636 W HCPCS: Mod: PN | Performed by: NURSE PRACTITIONER

## 2021-03-30 PROCEDURE — 96375 TX/PRO/DX INJ NEW DRUG ADDON: CPT | Mod: PN

## 2021-03-30 PROCEDURE — 96409 CHEMO IV PUSH SNGL DRUG: CPT | Mod: PN

## 2021-03-30 RX ORDER — ONDANSETRON 2 MG/ML
8 INJECTION INTRAMUSCULAR; INTRAVENOUS ONCE
Status: COMPLETED | OUTPATIENT
Start: 2021-03-30 | End: 2021-03-30

## 2021-03-30 RX ORDER — HEPARIN 100 UNIT/ML
500 SYRINGE INTRAVENOUS
Status: DISCONTINUED | OUTPATIENT
Start: 2021-03-30 | End: 2021-03-30 | Stop reason: HOSPADM

## 2021-03-30 RX ORDER — SODIUM CHLORIDE 0.9 % (FLUSH) 0.9 %
10 SYRINGE (ML) INJECTION
Status: DISCONTINUED | OUTPATIENT
Start: 2021-03-30 | End: 2021-03-30 | Stop reason: HOSPADM

## 2021-03-30 RX ADMIN — ONDANSETRON 8 MG: 2 INJECTION INTRAMUSCULAR; INTRAVENOUS at 02:03

## 2021-03-30 RX ADMIN — SODIUM CHLORIDE: 9 INJECTION, SOLUTION INTRAVENOUS at 02:03

## 2021-03-30 RX ADMIN — AZACITIDINE 45 MG: 100 INJECTION, POWDER, LYOPHILIZED, FOR SOLUTION INTRAVENOUS; SUBCUTANEOUS at 02:03

## 2021-03-31 ENCOUNTER — INFUSION (OUTPATIENT)
Dept: INFUSION THERAPY | Facility: HOSPITAL | Age: 81
End: 2021-03-31
Attending: INTERNAL MEDICINE
Payer: MEDICARE

## 2021-03-31 ENCOUNTER — PATIENT MESSAGE (OUTPATIENT)
Dept: HEMATOLOGY/ONCOLOGY | Facility: CLINIC | Age: 81
End: 2021-03-31

## 2021-03-31 VITALS
DIASTOLIC BLOOD PRESSURE: 74 MMHG | HEART RATE: 82 BPM | BODY MASS INDEX: 26.55 KG/M2 | TEMPERATURE: 98 F | WEIGHT: 169.56 LBS | RESPIRATION RATE: 18 BRPM | SYSTOLIC BLOOD PRESSURE: 121 MMHG

## 2021-03-31 DIAGNOSIS — D46.Z MDS (MYELODYSPLASTIC SYNDROME), HIGH GRADE: Primary | ICD-10-CM

## 2021-03-31 PROCEDURE — 96375 TX/PRO/DX INJ NEW DRUG ADDON: CPT | Mod: PN

## 2021-03-31 PROCEDURE — 25000003 PHARM REV CODE 250: Mod: PN | Performed by: NURSE PRACTITIONER

## 2021-03-31 PROCEDURE — 63600175 PHARM REV CODE 636 W HCPCS: Mod: PN | Performed by: NURSE PRACTITIONER

## 2021-03-31 PROCEDURE — 96409 CHEMO IV PUSH SNGL DRUG: CPT | Mod: PN

## 2021-03-31 RX ORDER — HEPARIN 100 UNIT/ML
500 SYRINGE INTRAVENOUS
Status: DISCONTINUED | OUTPATIENT
Start: 2021-03-31 | End: 2021-03-31 | Stop reason: HOSPADM

## 2021-03-31 RX ORDER — ONDANSETRON 2 MG/ML
8 INJECTION INTRAMUSCULAR; INTRAVENOUS ONCE
Status: COMPLETED | OUTPATIENT
Start: 2021-03-31 | End: 2021-03-31

## 2021-03-31 RX ORDER — SODIUM CHLORIDE 0.9 % (FLUSH) 0.9 %
10 SYRINGE (ML) INJECTION
Status: DISCONTINUED | OUTPATIENT
Start: 2021-03-31 | End: 2021-03-31 | Stop reason: HOSPADM

## 2021-03-31 RX ADMIN — SODIUM CHLORIDE: 900 INJECTION, SOLUTION INTRAVENOUS at 03:03

## 2021-03-31 RX ADMIN — ONDANSETRON 8 MG: 2 INJECTION INTRAMUSCULAR; INTRAVENOUS at 03:03

## 2021-03-31 RX ADMIN — AZACITIDINE 45 MG: 100 INJECTION, POWDER, LYOPHILIZED, FOR SOLUTION INTRAVENOUS; SUBCUTANEOUS at 03:03

## 2021-04-01 ENCOUNTER — INFUSION (OUTPATIENT)
Dept: INFUSION THERAPY | Facility: HOSPITAL | Age: 81
End: 2021-04-01
Attending: INTERNAL MEDICINE
Payer: MEDICARE

## 2021-04-01 VITALS
WEIGHT: 169.56 LBS | RESPIRATION RATE: 18 BRPM | TEMPERATURE: 98 F | BODY MASS INDEX: 26.55 KG/M2 | HEART RATE: 71 BPM | DIASTOLIC BLOOD PRESSURE: 61 MMHG | SYSTOLIC BLOOD PRESSURE: 106 MMHG

## 2021-04-01 DIAGNOSIS — D46.Z MDS (MYELODYSPLASTIC SYNDROME), HIGH GRADE: Primary | ICD-10-CM

## 2021-04-01 PROCEDURE — 63600175 PHARM REV CODE 636 W HCPCS: Mod: JG,PN | Performed by: NURSE PRACTITIONER

## 2021-04-01 PROCEDURE — 96375 TX/PRO/DX INJ NEW DRUG ADDON: CPT | Mod: PN

## 2021-04-01 PROCEDURE — 96409 CHEMO IV PUSH SNGL DRUG: CPT | Mod: PN

## 2021-04-01 PROCEDURE — 25000003 PHARM REV CODE 250: Mod: PN | Performed by: NURSE PRACTITIONER

## 2021-04-01 RX ORDER — ONDANSETRON 2 MG/ML
8 INJECTION INTRAMUSCULAR; INTRAVENOUS ONCE
Status: COMPLETED | OUTPATIENT
Start: 2021-04-01 | End: 2021-04-01

## 2021-04-01 RX ORDER — SODIUM CHLORIDE 0.9 % (FLUSH) 0.9 %
10 SYRINGE (ML) INJECTION
Status: DISCONTINUED | OUTPATIENT
Start: 2021-04-01 | End: 2021-04-01 | Stop reason: HOSPADM

## 2021-04-01 RX ORDER — HEPARIN 100 UNIT/ML
500 SYRINGE INTRAVENOUS
Status: DISCONTINUED | OUTPATIENT
Start: 2021-04-01 | End: 2021-04-01 | Stop reason: HOSPADM

## 2021-04-01 RX ADMIN — AZACITIDINE 45 MG: 100 INJECTION, POWDER, LYOPHILIZED, FOR SOLUTION INTRAVENOUS; SUBCUTANEOUS at 02:04

## 2021-04-01 RX ADMIN — ONDANSETRON 8 MG: 2 INJECTION INTRAMUSCULAR; INTRAVENOUS at 02:04

## 2021-04-01 RX ADMIN — SODIUM CHLORIDE: 900 INJECTION, SOLUTION INTRAVENOUS at 02:04

## 2021-04-05 ENCOUNTER — LAB VISIT (OUTPATIENT)
Dept: LAB | Facility: HOSPITAL | Age: 81
End: 2021-04-05
Attending: INTERNAL MEDICINE
Payer: MEDICARE

## 2021-04-05 DIAGNOSIS — D46.Z MDS (MYELODYSPLASTIC SYNDROME), HIGH GRADE: ICD-10-CM

## 2021-04-05 LAB
ANISOCYTOSIS BLD QL SMEAR: SLIGHT
BASOPHILS NFR BLD: 0 % (ref 0–1.9)
BLASTS NFR BLD MANUAL: 24 %
DACRYOCYTES BLD QL SMEAR: ABNORMAL
DIFFERENTIAL METHOD: ABNORMAL
EOSINOPHIL NFR BLD: 1 % (ref 0–8)
ERYTHROCYTE [DISTWIDTH] IN BLOOD BY AUTOMATED COUNT: 19.8 % (ref 11.5–14.5)
HCT VFR BLD AUTO: 28.4 % (ref 40–54)
HGB BLD-MCNC: 8.1 G/DL (ref 14–18)
IMM GRANULOCYTES # BLD AUTO: ABNORMAL K/UL (ref 0–0.04)
IMM GRANULOCYTES NFR BLD AUTO: ABNORMAL % (ref 0–0.5)
LYMPHOCYTES NFR BLD: 20 % (ref 18–48)
MCH RBC QN AUTO: 26 PG (ref 27–31)
MCHC RBC AUTO-ENTMCNC: 28.5 G/DL (ref 32–36)
MCV RBC AUTO: 91 FL (ref 82–98)
METAMYELOCYTES NFR BLD MANUAL: 18 %
MONOCYTES NFR BLD: 3 % (ref 4–15)
NEUTROPHILS NFR BLD: 26 % (ref 38–73)
NEUTS BAND NFR BLD MANUAL: 7 %
NRBC BLD-RTO: 19 /100 WBC
PLATELET # BLD AUTO: 52 K/UL (ref 150–450)
PMV BLD AUTO: ABNORMAL FL (ref 9.2–12.9)
POLYCHROMASIA BLD QL SMEAR: ABNORMAL
PROMYELOCYTES NFR BLD MANUAL: 1 %
RBC # BLD AUTO: 3.11 M/UL (ref 4.6–6.2)
WBC # BLD AUTO: 4.03 K/UL (ref 3.9–12.7)

## 2021-04-05 PROCEDURE — 36415 COLL VENOUS BLD VENIPUNCTURE: CPT | Performed by: NURSE PRACTITIONER

## 2021-04-05 PROCEDURE — 85007 BL SMEAR W/DIFF WBC COUNT: CPT | Performed by: NURSE PRACTITIONER

## 2021-04-05 PROCEDURE — 85027 COMPLETE CBC AUTOMATED: CPT | Performed by: NURSE PRACTITIONER

## 2021-04-08 ENCOUNTER — TELEPHONE (OUTPATIENT)
Dept: HEMATOLOGY/ONCOLOGY | Facility: CLINIC | Age: 81
End: 2021-04-08

## 2021-04-08 ENCOUNTER — LAB VISIT (OUTPATIENT)
Dept: LAB | Facility: HOSPITAL | Age: 81
End: 2021-04-08
Attending: INTERNAL MEDICINE
Payer: MEDICARE

## 2021-04-08 DIAGNOSIS — D64.9 SYMPTOMATIC ANEMIA: Primary | ICD-10-CM

## 2021-04-08 DIAGNOSIS — D64.9 SYMPTOMATIC ANEMIA: ICD-10-CM

## 2021-04-08 LAB
ABO + RH BLD: NORMAL
ANISOCYTOSIS BLD QL SMEAR: SLIGHT
BASO STIPL BLD QL SMEAR: ABNORMAL
BASOPHILS NFR BLD: 1 % (ref 0–1.9)
BLASTS NFR BLD MANUAL: 22 %
BLD GP AB SCN CELLS X3 SERPL QL: NORMAL
DACRYOCYTES BLD QL SMEAR: ABNORMAL
DIFFERENTIAL METHOD: ABNORMAL
EOSINOPHIL NFR BLD: 5 % (ref 0–8)
ERYTHROCYTE [DISTWIDTH] IN BLOOD BY AUTOMATED COUNT: 20 % (ref 11.5–14.5)
HCT VFR BLD AUTO: 26.8 % (ref 40–54)
HGB BLD-MCNC: 7.6 G/DL (ref 14–18)
HYPOCHROMIA BLD QL SMEAR: ABNORMAL
IMM GRANULOCYTES # BLD AUTO: ABNORMAL K/UL (ref 0–0.04)
IMM GRANULOCYTES NFR BLD AUTO: ABNORMAL % (ref 0–0.5)
LYMPHOCYTES NFR BLD: 37 % (ref 18–48)
MCH RBC QN AUTO: 25.9 PG (ref 27–31)
MCHC RBC AUTO-ENTMCNC: 28.4 G/DL (ref 32–36)
MCV RBC AUTO: 91 FL (ref 82–98)
METAMYELOCYTES NFR BLD MANUAL: 2 %
MONOCYTES NFR BLD: 9 % (ref 4–15)
MYELOCYTES NFR BLD MANUAL: 1 %
NEUTROPHILS # BLD AUTO: 0.8 K/UL (ref 1.8–7.7)
NEUTROPHILS NFR BLD: 19 % (ref 38–73)
NEUTS BAND NFR BLD MANUAL: 4 %
NRBC BLD-RTO: 19 /100 WBC
OVALOCYTES BLD QL SMEAR: ABNORMAL
PLATELET # BLD AUTO: 25 K/UL (ref 150–450)
PLATELET BLD QL SMEAR: ABNORMAL
PMV BLD AUTO: ABNORMAL FL (ref 9.2–12.9)
POIKILOCYTOSIS BLD QL SMEAR: SLIGHT
POLYCHROMASIA BLD QL SMEAR: ABNORMAL
RBC # BLD AUTO: 2.94 M/UL (ref 4.6–6.2)
SCHISTOCYTES BLD QL SMEAR: PRESENT
WBC # BLD AUTO: 3.66 K/UL (ref 3.9–12.7)

## 2021-04-08 PROCEDURE — 36415 COLL VENOUS BLD VENIPUNCTURE: CPT | Mod: PN | Performed by: INTERNAL MEDICINE

## 2021-04-08 PROCEDURE — 86900 BLOOD TYPING SEROLOGIC ABO: CPT | Performed by: INTERNAL MEDICINE

## 2021-04-08 PROCEDURE — 85027 COMPLETE CBC AUTOMATED: CPT | Performed by: INTERNAL MEDICINE

## 2021-04-08 PROCEDURE — 85007 BL SMEAR W/DIFF WBC COUNT: CPT | Mod: PN | Performed by: INTERNAL MEDICINE

## 2021-04-08 PROCEDURE — 86900 BLOOD TYPING SEROLOGIC ABO: CPT | Mod: PN | Performed by: INTERNAL MEDICINE

## 2021-04-08 PROCEDURE — 85027 COMPLETE CBC AUTOMATED: CPT | Mod: PN | Performed by: INTERNAL MEDICINE

## 2021-04-08 PROCEDURE — 85007 BL SMEAR W/DIFF WBC COUNT: CPT | Performed by: INTERNAL MEDICINE

## 2021-04-08 RX ORDER — ACETAMINOPHEN 325 MG/1
650 TABLET ORAL
Status: CANCELLED | OUTPATIENT
Start: 2021-04-08

## 2021-04-08 RX ORDER — HYDROCODONE BITARTRATE AND ACETAMINOPHEN 500; 5 MG/1; MG/1
TABLET ORAL ONCE
Status: CANCELLED | OUTPATIENT
Start: 2021-04-08 | End: 2021-04-08

## 2021-04-08 RX ORDER — DIPHENHYDRAMINE HCL 25 MG
25 CAPSULE ORAL
Status: CANCELLED | OUTPATIENT
Start: 2021-04-08

## 2021-04-10 ENCOUNTER — IMMUNIZATION (OUTPATIENT)
Dept: PRIMARY CARE CLINIC | Facility: CLINIC | Age: 81
End: 2021-04-10
Payer: MEDICARE

## 2021-04-10 DIAGNOSIS — Z23 NEED FOR VACCINATION: Primary | ICD-10-CM

## 2021-04-10 PROCEDURE — 91300 COVID-19, MRNA, LNP-S, PF, 30 MCG/0.3 ML DOSE VACCINE: ICD-10-PCS | Mod: S$GLB,,, | Performed by: FAMILY MEDICINE

## 2021-04-10 PROCEDURE — 91300 COVID-19, MRNA, LNP-S, PF, 30 MCG/0.3 ML DOSE VACCINE: CPT | Mod: S$GLB,,, | Performed by: FAMILY MEDICINE

## 2021-04-10 PROCEDURE — 0002A COVID-19, MRNA, LNP-S, PF, 30 MCG/0.3 ML DOSE VACCINE: ICD-10-PCS | Mod: CV19,S$GLB,, | Performed by: FAMILY MEDICINE

## 2021-04-10 PROCEDURE — 0002A COVID-19, MRNA, LNP-S, PF, 30 MCG/0.3 ML DOSE VACCINE: CPT | Mod: CV19,S$GLB,, | Performed by: FAMILY MEDICINE

## 2021-04-12 DIAGNOSIS — D64.9 SYMPTOMATIC ANEMIA: Primary | ICD-10-CM

## 2021-04-15 ENCOUNTER — LAB VISIT (OUTPATIENT)
Dept: LAB | Facility: HOSPITAL | Age: 81
End: 2021-04-15
Attending: INTERNAL MEDICINE
Payer: MEDICARE

## 2021-04-15 DIAGNOSIS — D64.9 SYMPTOMATIC ANEMIA: ICD-10-CM

## 2021-04-15 LAB
ABO + RH BLD: NORMAL
ANISOCYTOSIS BLD QL SMEAR: ABNORMAL
BASOPHILS NFR BLD: 1 % (ref 0–1.9)
BLASTS NFR BLD MANUAL: 20 %
BLD GP AB SCN CELLS X3 SERPL QL: NORMAL
DACRYOCYTES BLD QL SMEAR: ABNORMAL
DIFFERENTIAL METHOD: ABNORMAL
EOSINOPHIL NFR BLD: 3 % (ref 0–8)
ERYTHROCYTE [DISTWIDTH] IN BLOOD BY AUTOMATED COUNT: 18.7 % (ref 11.5–14.5)
HCT VFR BLD AUTO: 34.5 % (ref 40–54)
HGB BLD-MCNC: 9.7 G/DL (ref 14–18)
HYPOCHROMIA BLD QL SMEAR: ABNORMAL
IMM GRANULOCYTES # BLD AUTO: ABNORMAL K/UL (ref 0–0.04)
IMM GRANULOCYTES NFR BLD AUTO: ABNORMAL % (ref 0–0.5)
LYMPHOCYTES NFR BLD: 41 % (ref 18–48)
MCH RBC QN AUTO: 25.8 PG (ref 27–31)
MCHC RBC AUTO-ENTMCNC: 28.1 G/DL (ref 32–36)
MCV RBC AUTO: 92 FL (ref 82–98)
METAMYELOCYTES NFR BLD MANUAL: 1 %
MONOCYTES NFR BLD: 12 % (ref 4–15)
MYELOCYTES NFR BLD MANUAL: 1 %
NEUTROPHILS # BLD AUTO: 0.9 K/UL (ref 1.8–7.7)
NEUTROPHILS NFR BLD: 18 % (ref 38–73)
NEUTS BAND NFR BLD MANUAL: 2 %
NRBC BLD-RTO: 11 /100 WBC
OVALOCYTES BLD QL SMEAR: ABNORMAL
PLATELET # BLD AUTO: 28 K/UL (ref 150–450)
PLATELET BLD QL SMEAR: ABNORMAL
PMV BLD AUTO: ABNORMAL FL (ref 9.2–12.9)
POIKILOCYTOSIS BLD QL SMEAR: SLIGHT
POLYCHROMASIA BLD QL SMEAR: ABNORMAL
PROMYELOCYTES NFR BLD MANUAL: 1 %
RBC # BLD AUTO: 3.76 M/UL (ref 4.6–6.2)
WBC # BLD AUTO: 4.38 K/UL (ref 3.9–12.7)

## 2021-04-15 PROCEDURE — 36415 COLL VENOUS BLD VENIPUNCTURE: CPT | Mod: PN | Performed by: INTERNAL MEDICINE

## 2021-04-15 PROCEDURE — 85027 COMPLETE CBC AUTOMATED: CPT | Mod: PN | Performed by: INTERNAL MEDICINE

## 2021-04-15 PROCEDURE — 86900 BLOOD TYPING SEROLOGIC ABO: CPT | Mod: PN | Performed by: INTERNAL MEDICINE

## 2021-04-15 PROCEDURE — 85007 BL SMEAR W/DIFF WBC COUNT: CPT | Performed by: INTERNAL MEDICINE

## 2021-04-15 PROCEDURE — 85027 COMPLETE CBC AUTOMATED: CPT | Performed by: INTERNAL MEDICINE

## 2021-04-15 PROCEDURE — 85007 BL SMEAR W/DIFF WBC COUNT: CPT | Mod: PN | Performed by: INTERNAL MEDICINE

## 2021-04-15 PROCEDURE — 86900 BLOOD TYPING SEROLOGIC ABO: CPT | Performed by: INTERNAL MEDICINE

## 2021-04-19 ENCOUNTER — LAB VISIT (OUTPATIENT)
Dept: LAB | Facility: HOSPITAL | Age: 81
End: 2021-04-19
Attending: INTERNAL MEDICINE
Payer: MEDICARE

## 2021-04-19 ENCOUNTER — PATIENT MESSAGE (OUTPATIENT)
Dept: HEMATOLOGY/ONCOLOGY | Facility: CLINIC | Age: 81
End: 2021-04-19

## 2021-04-19 ENCOUNTER — TELEPHONE (OUTPATIENT)
Dept: HEMATOLOGY/ONCOLOGY | Facility: CLINIC | Age: 81
End: 2021-04-19

## 2021-04-19 DIAGNOSIS — D64.9 SYMPTOMATIC ANEMIA: ICD-10-CM

## 2021-04-19 LAB
ABO + RH BLD: NORMAL
ANISOCYTOSIS BLD QL SMEAR: ABNORMAL
BASOPHILS NFR BLD: 0 % (ref 0–1.9)
BLASTS NFR BLD MANUAL: 10 %
BLD GP AB SCN CELLS X3 SERPL QL: NORMAL
DACRYOCYTES BLD QL SMEAR: ABNORMAL
DIFFERENTIAL METHOD: ABNORMAL
EOSINOPHIL NFR BLD: 2 % (ref 0–8)
ERYTHROCYTE [DISTWIDTH] IN BLOOD BY AUTOMATED COUNT: 19.2 % (ref 11.5–14.5)
HCT VFR BLD AUTO: 34.1 % (ref 40–54)
HGB BLD-MCNC: 9.7 G/DL (ref 14–18)
HYPOCHROMIA BLD QL SMEAR: ABNORMAL
IMM GRANULOCYTES # BLD AUTO: ABNORMAL K/UL (ref 0–0.04)
IMM GRANULOCYTES NFR BLD AUTO: ABNORMAL % (ref 0–0.5)
LYMPHOCYTES NFR BLD: 43 % (ref 18–48)
MCH RBC QN AUTO: 26 PG (ref 27–31)
MCHC RBC AUTO-ENTMCNC: 28.4 G/DL (ref 32–36)
MCV RBC AUTO: 91 FL (ref 82–98)
METAMYELOCYTES NFR BLD MANUAL: 2 %
MONOCYTES NFR BLD: 4 % (ref 4–15)
MYELOCYTES NFR BLD MANUAL: 1 %
NEUTROPHILS # BLD AUTO: 1.5 K/UL (ref 1.8–7.7)
NEUTROPHILS NFR BLD: 36 % (ref 38–73)
NEUTS BAND NFR BLD MANUAL: 1 %
NRBC BLD-RTO: 16 /100 WBC
PLATELET # BLD AUTO: 35 K/UL (ref 150–450)
PLATELET BLD QL SMEAR: ABNORMAL
PMV BLD AUTO: ABNORMAL FL (ref 9.2–12.9)
POIKILOCYTOSIS BLD QL SMEAR: SLIGHT
POLYCHROMASIA BLD QL SMEAR: ABNORMAL
PROMYELOCYTES NFR BLD MANUAL: 1 %
RBC # BLD AUTO: 3.73 M/UL (ref 4.6–6.2)
WBC # BLD AUTO: 3.98 K/UL (ref 3.9–12.7)

## 2021-04-19 PROCEDURE — 85027 COMPLETE CBC AUTOMATED: CPT | Performed by: INTERNAL MEDICINE

## 2021-04-19 PROCEDURE — 85007 BL SMEAR W/DIFF WBC COUNT: CPT | Performed by: INTERNAL MEDICINE

## 2021-04-19 PROCEDURE — 86900 BLOOD TYPING SEROLOGIC ABO: CPT | Mod: PN | Performed by: INTERNAL MEDICINE

## 2021-04-19 PROCEDURE — 85007 BL SMEAR W/DIFF WBC COUNT: CPT | Mod: PN | Performed by: INTERNAL MEDICINE

## 2021-04-19 PROCEDURE — 85027 COMPLETE CBC AUTOMATED: CPT | Mod: PN | Performed by: INTERNAL MEDICINE

## 2021-04-19 PROCEDURE — 86900 BLOOD TYPING SEROLOGIC ABO: CPT | Performed by: INTERNAL MEDICINE

## 2021-04-19 PROCEDURE — 36415 COLL VENOUS BLD VENIPUNCTURE: CPT | Mod: PN | Performed by: INTERNAL MEDICINE

## 2021-04-26 ENCOUNTER — OFFICE VISIT (OUTPATIENT)
Dept: HEMATOLOGY/ONCOLOGY | Facility: CLINIC | Age: 81
End: 2021-04-26
Payer: MEDICARE

## 2021-04-26 ENCOUNTER — INFUSION (OUTPATIENT)
Dept: INFUSION THERAPY | Facility: HOSPITAL | Age: 81
End: 2021-04-26
Attending: INTERNAL MEDICINE
Payer: MEDICARE

## 2021-04-26 ENCOUNTER — PATIENT MESSAGE (OUTPATIENT)
Dept: HEMATOLOGY/ONCOLOGY | Facility: CLINIC | Age: 81
End: 2021-04-26

## 2021-04-26 VITALS
HEART RATE: 74 BPM | RESPIRATION RATE: 16 BRPM | TEMPERATURE: 98 F | SYSTOLIC BLOOD PRESSURE: 136 MMHG | BODY MASS INDEX: 26.85 KG/M2 | WEIGHT: 171.06 LBS | HEIGHT: 67 IN | DIASTOLIC BLOOD PRESSURE: 68 MMHG

## 2021-04-26 VITALS
RESPIRATION RATE: 18 BRPM | HEART RATE: 70 BPM | BODY MASS INDEX: 26.85 KG/M2 | HEIGHT: 67 IN | OXYGEN SATURATION: 99 % | DIASTOLIC BLOOD PRESSURE: 76 MMHG | WEIGHT: 171.06 LBS | SYSTOLIC BLOOD PRESSURE: 126 MMHG

## 2021-04-26 DIAGNOSIS — D46.Z MDS (MYELODYSPLASTIC SYNDROME), HIGH GRADE: Primary | ICD-10-CM

## 2021-04-26 DIAGNOSIS — D45 POLYCYTHEMIA VERA: ICD-10-CM

## 2021-04-26 DIAGNOSIS — D61.818 PANCYTOPENIA: ICD-10-CM

## 2021-04-26 PROCEDURE — 96411 CHEMO IV PUSH ADDL DRUG: CPT | Mod: PN

## 2021-04-26 PROCEDURE — 99214 PR OFFICE/OUTPT VISIT, EST, LEVL IV, 30-39 MIN: ICD-10-PCS | Mod: S$GLB,,, | Performed by: INTERNAL MEDICINE

## 2021-04-26 PROCEDURE — 96375 TX/PRO/DX INJ NEW DRUG ADDON: CPT | Mod: PN

## 2021-04-26 PROCEDURE — 63600175 PHARM REV CODE 636 W HCPCS: Mod: PN | Performed by: INTERNAL MEDICINE

## 2021-04-26 PROCEDURE — 1101F PR PT FALLS ASSESS DOC 0-1 FALLS W/OUT INJ PAST YR: ICD-10-PCS | Mod: CPTII,S$GLB,, | Performed by: INTERNAL MEDICINE

## 2021-04-26 PROCEDURE — 1101F PT FALLS ASSESS-DOCD LE1/YR: CPT | Mod: CPTII,S$GLB,, | Performed by: INTERNAL MEDICINE

## 2021-04-26 PROCEDURE — 25000003 PHARM REV CODE 250: Mod: PN | Performed by: INTERNAL MEDICINE

## 2021-04-26 PROCEDURE — 3288F PR FALLS RISK ASSESSMENT DOCUMENTED: ICD-10-PCS | Mod: CPTII,S$GLB,, | Performed by: INTERNAL MEDICINE

## 2021-04-26 PROCEDURE — 99999 PR PBB SHADOW E&M-EST. PATIENT-LVL IV: CPT | Mod: PBBFAC,,, | Performed by: INTERNAL MEDICINE

## 2021-04-26 PROCEDURE — 99499 RISK ADDL DX/OHS AUDIT: ICD-10-PCS | Mod: S$GLB,,, | Performed by: INTERNAL MEDICINE

## 2021-04-26 PROCEDURE — 99214 OFFICE O/P EST MOD 30 MIN: CPT | Mod: S$GLB,,, | Performed by: INTERNAL MEDICINE

## 2021-04-26 PROCEDURE — 3288F FALL RISK ASSESSMENT DOCD: CPT | Mod: CPTII,S$GLB,, | Performed by: INTERNAL MEDICINE

## 2021-04-26 PROCEDURE — 1126F PR PAIN SEVERITY QUANTIFIED, NO PAIN PRESENT: ICD-10-PCS | Mod: S$GLB,,, | Performed by: INTERNAL MEDICINE

## 2021-04-26 PROCEDURE — 96409 CHEMO IV PUSH SNGL DRUG: CPT

## 2021-04-26 PROCEDURE — 1126F AMNT PAIN NOTED NONE PRSNT: CPT | Mod: S$GLB,,, | Performed by: INTERNAL MEDICINE

## 2021-04-26 PROCEDURE — A4216 STERILE WATER/SALINE, 10 ML: HCPCS | Mod: PN | Performed by: INTERNAL MEDICINE

## 2021-04-26 PROCEDURE — 1159F MED LIST DOCD IN RCRD: CPT | Mod: S$GLB,,, | Performed by: INTERNAL MEDICINE

## 2021-04-26 PROCEDURE — 1159F PR MEDICATION LIST DOCUMENTED IN MEDICAL RECORD: ICD-10-PCS | Mod: S$GLB,,, | Performed by: INTERNAL MEDICINE

## 2021-04-26 PROCEDURE — 99999 PR PBB SHADOW E&M-EST. PATIENT-LVL IV: ICD-10-PCS | Mod: PBBFAC,,, | Performed by: INTERNAL MEDICINE

## 2021-04-26 PROCEDURE — 99499 UNLISTED E&M SERVICE: CPT | Mod: S$GLB,,, | Performed by: INTERNAL MEDICINE

## 2021-04-26 RX ORDER — ONDANSETRON 2 MG/ML
8 INJECTION INTRAMUSCULAR; INTRAVENOUS ONCE
Status: CANCELLED
Start: 2021-04-26 | End: 2021-04-26

## 2021-04-26 RX ORDER — HEPARIN 100 UNIT/ML
500 SYRINGE INTRAVENOUS
Status: CANCELLED | OUTPATIENT
Start: 2021-04-26

## 2021-04-26 RX ORDER — HEPARIN 100 UNIT/ML
500 SYRINGE INTRAVENOUS
Status: CANCELLED | OUTPATIENT
Start: 2021-04-30

## 2021-04-26 RX ORDER — SODIUM CHLORIDE 0.9 % (FLUSH) 0.9 %
10 SYRINGE (ML) INJECTION
Status: CANCELLED | OUTPATIENT
Start: 2021-04-29

## 2021-04-26 RX ORDER — ONDANSETRON 2 MG/ML
8 INJECTION INTRAMUSCULAR; INTRAVENOUS ONCE
Status: CANCELLED
Start: 2021-04-29 | End: 2021-04-29

## 2021-04-26 RX ORDER — ONDANSETRON 2 MG/ML
8 INJECTION INTRAMUSCULAR; INTRAVENOUS ONCE
Status: COMPLETED | OUTPATIENT
Start: 2021-04-26 | End: 2021-04-26

## 2021-04-26 RX ORDER — SODIUM CHLORIDE 0.9 % (FLUSH) 0.9 %
10 SYRINGE (ML) INJECTION
Status: DISCONTINUED | OUTPATIENT
Start: 2021-04-26 | End: 2021-04-26 | Stop reason: HOSPADM

## 2021-04-26 RX ORDER — ROSUVASTATIN CALCIUM 40 MG/1
40 TABLET, COATED ORAL EVERY MORNING
Status: ON HOLD | COMMUNITY
Start: 2021-04-18 | End: 2021-10-14 | Stop reason: HOSPADM

## 2021-04-26 RX ORDER — HEPARIN 100 UNIT/ML
500 SYRINGE INTRAVENOUS
Status: CANCELLED | OUTPATIENT
Start: 2021-04-29

## 2021-04-26 RX ORDER — SODIUM CHLORIDE 0.9 % (FLUSH) 0.9 %
10 SYRINGE (ML) INJECTION
Status: CANCELLED | OUTPATIENT
Start: 2021-04-30

## 2021-04-26 RX ORDER — ONDANSETRON 2 MG/ML
8 INJECTION INTRAMUSCULAR; INTRAVENOUS ONCE
Status: CANCELLED
Start: 2021-04-28 | End: 2021-04-28

## 2021-04-26 RX ORDER — SODIUM CHLORIDE 0.9 % (FLUSH) 0.9 %
10 SYRINGE (ML) INJECTION
Status: CANCELLED | OUTPATIENT
Start: 2021-04-26

## 2021-04-26 RX ORDER — HEPARIN 100 UNIT/ML
500 SYRINGE INTRAVENOUS
Status: CANCELLED | OUTPATIENT
Start: 2021-04-27

## 2021-04-26 RX ORDER — ONDANSETRON 2 MG/ML
8 INJECTION INTRAMUSCULAR; INTRAVENOUS ONCE
Status: CANCELLED
Start: 2021-04-27 | End: 2021-04-27

## 2021-04-26 RX ORDER — SODIUM CHLORIDE 0.9 % (FLUSH) 0.9 %
10 SYRINGE (ML) INJECTION
Status: CANCELLED | OUTPATIENT
Start: 2021-04-27

## 2021-04-26 RX ORDER — SODIUM CHLORIDE 0.9 % (FLUSH) 0.9 %
10 SYRINGE (ML) INJECTION
Status: CANCELLED | OUTPATIENT
Start: 2021-04-28

## 2021-04-26 RX ORDER — HEPARIN 100 UNIT/ML
500 SYRINGE INTRAVENOUS
Status: DISCONTINUED | OUTPATIENT
Start: 2021-04-26 | End: 2021-04-26 | Stop reason: HOSPADM

## 2021-04-26 RX ORDER — ONDANSETRON 2 MG/ML
8 INJECTION INTRAMUSCULAR; INTRAVENOUS ONCE
Status: CANCELLED
Start: 2021-04-30 | End: 2021-04-30

## 2021-04-26 RX ORDER — HEPARIN 100 UNIT/ML
500 SYRINGE INTRAVENOUS
Status: CANCELLED | OUTPATIENT
Start: 2021-04-28

## 2021-04-26 RX ADMIN — Medication 10 ML: at 05:04

## 2021-04-26 RX ADMIN — AZACITIDINE 45 MG: 100 INJECTION, POWDER, LYOPHILIZED, FOR SOLUTION INTRAVENOUS; SUBCUTANEOUS at 04:04

## 2021-04-26 RX ADMIN — ONDANSETRON 8 MG: 2 INJECTION INTRAMUSCULAR; INTRAVENOUS at 03:04

## 2021-04-26 RX ADMIN — SODIUM CHLORIDE: 900 INJECTION, SOLUTION INTRAVENOUS at 03:04

## 2021-04-26 RX ADMIN — Medication 10 ML: at 03:04

## 2021-04-27 ENCOUNTER — SPECIALTY PHARMACY (OUTPATIENT)
Dept: PHARMACY | Facility: CLINIC | Age: 81
End: 2021-04-27

## 2021-04-27 ENCOUNTER — INFUSION (OUTPATIENT)
Dept: INFUSION THERAPY | Facility: HOSPITAL | Age: 81
End: 2021-04-27
Attending: INTERNAL MEDICINE
Payer: MEDICARE

## 2021-04-27 VITALS
WEIGHT: 171.06 LBS | BODY MASS INDEX: 26.79 KG/M2 | SYSTOLIC BLOOD PRESSURE: 118 MMHG | DIASTOLIC BLOOD PRESSURE: 78 MMHG | HEART RATE: 72 BPM | RESPIRATION RATE: 18 BRPM | TEMPERATURE: 98 F

## 2021-04-27 DIAGNOSIS — D46.Z MDS (MYELODYSPLASTIC SYNDROME), HIGH GRADE: Primary | ICD-10-CM

## 2021-04-27 PROCEDURE — 96376 TX/PRO/DX INJ SAME DRUG ADON: CPT | Mod: PN

## 2021-04-27 PROCEDURE — 96409 CHEMO IV PUSH SNGL DRUG: CPT | Mod: PN

## 2021-04-27 PROCEDURE — 63600175 PHARM REV CODE 636 W HCPCS: Mod: PN | Performed by: INTERNAL MEDICINE

## 2021-04-27 PROCEDURE — 25000003 PHARM REV CODE 250: Mod: PN | Performed by: INTERNAL MEDICINE

## 2021-04-27 RX ORDER — SODIUM CHLORIDE 0.9 % (FLUSH) 0.9 %
10 SYRINGE (ML) INJECTION
Status: DISCONTINUED | OUTPATIENT
Start: 2021-04-27 | End: 2021-04-27 | Stop reason: HOSPADM

## 2021-04-27 RX ORDER — ONDANSETRON 2 MG/ML
8 INJECTION INTRAMUSCULAR; INTRAVENOUS ONCE
Status: COMPLETED | OUTPATIENT
Start: 2021-04-27 | End: 2021-04-27

## 2021-04-27 RX ORDER — HEPARIN 100 UNIT/ML
500 SYRINGE INTRAVENOUS
Status: DISCONTINUED | OUTPATIENT
Start: 2021-04-27 | End: 2021-04-27 | Stop reason: HOSPADM

## 2021-04-27 RX ADMIN — AZACITIDINE 90 MG: 100 INJECTION, POWDER, LYOPHILIZED, FOR SOLUTION INTRAVENOUS; SUBCUTANEOUS at 03:04

## 2021-04-27 RX ADMIN — ONDANSETRON 8 MG: 2 INJECTION INTRAMUSCULAR; INTRAVENOUS at 03:04

## 2021-04-27 RX ADMIN — SODIUM CHLORIDE: 900 INJECTION, SOLUTION INTRAVENOUS at 03:04

## 2021-04-28 ENCOUNTER — INFUSION (OUTPATIENT)
Dept: INFUSION THERAPY | Facility: HOSPITAL | Age: 81
End: 2021-04-28
Attending: INTERNAL MEDICINE
Payer: MEDICARE

## 2021-04-28 VITALS
WEIGHT: 171.06 LBS | SYSTOLIC BLOOD PRESSURE: 122 MMHG | TEMPERATURE: 98 F | HEART RATE: 68 BPM | BODY MASS INDEX: 26.79 KG/M2 | RESPIRATION RATE: 16 BRPM | DIASTOLIC BLOOD PRESSURE: 66 MMHG

## 2021-04-28 DIAGNOSIS — D46.Z MDS (MYELODYSPLASTIC SYNDROME), HIGH GRADE: Primary | ICD-10-CM

## 2021-04-28 PROCEDURE — 96375 TX/PRO/DX INJ NEW DRUG ADDON: CPT | Mod: PN

## 2021-04-28 PROCEDURE — 25000003 PHARM REV CODE 250: Mod: PN | Performed by: INTERNAL MEDICINE

## 2021-04-28 PROCEDURE — 96409 CHEMO IV PUSH SNGL DRUG: CPT | Mod: PN

## 2021-04-28 PROCEDURE — 63600175 PHARM REV CODE 636 W HCPCS: Mod: PN | Performed by: INTERNAL MEDICINE

## 2021-04-28 RX ORDER — HEPARIN 100 UNIT/ML
500 SYRINGE INTRAVENOUS
Status: DISCONTINUED | OUTPATIENT
Start: 2021-04-28 | End: 2021-04-28 | Stop reason: HOSPADM

## 2021-04-28 RX ORDER — SODIUM CHLORIDE 0.9 % (FLUSH) 0.9 %
10 SYRINGE (ML) INJECTION
Status: DISCONTINUED | OUTPATIENT
Start: 2021-04-28 | End: 2021-04-28 | Stop reason: HOSPADM

## 2021-04-28 RX ORDER — ONDANSETRON 2 MG/ML
8 INJECTION INTRAMUSCULAR; INTRAVENOUS ONCE
Status: COMPLETED | OUTPATIENT
Start: 2021-04-28 | End: 2021-04-28

## 2021-04-28 RX ADMIN — SODIUM CHLORIDE: 900 INJECTION, SOLUTION INTRAVENOUS at 02:04

## 2021-04-28 RX ADMIN — ONDANSETRON 8 MG: 2 INJECTION INTRAMUSCULAR; INTRAVENOUS at 02:04

## 2021-04-28 RX ADMIN — AZACITIDINE 90 MG: 100 INJECTION, POWDER, LYOPHILIZED, FOR SOLUTION INTRAVENOUS; SUBCUTANEOUS at 03:04

## 2021-04-29 ENCOUNTER — INFUSION (OUTPATIENT)
Dept: INFUSION THERAPY | Facility: HOSPITAL | Age: 81
End: 2021-04-29
Attending: INTERNAL MEDICINE
Payer: MEDICARE

## 2021-04-29 VITALS
HEART RATE: 62 BPM | HEIGHT: 67 IN | TEMPERATURE: 98 F | DIASTOLIC BLOOD PRESSURE: 65 MMHG | WEIGHT: 171.06 LBS | BODY MASS INDEX: 26.85 KG/M2 | RESPIRATION RATE: 16 BRPM | SYSTOLIC BLOOD PRESSURE: 112 MMHG

## 2021-04-29 DIAGNOSIS — D46.Z MDS (MYELODYSPLASTIC SYNDROME), HIGH GRADE: Primary | ICD-10-CM

## 2021-04-29 PROCEDURE — 96367 TX/PROPH/DG ADDL SEQ IV INF: CPT | Mod: PN

## 2021-04-29 PROCEDURE — 63600175 PHARM REV CODE 636 W HCPCS: Mod: JG,PN | Performed by: INTERNAL MEDICINE

## 2021-04-29 PROCEDURE — 25000003 PHARM REV CODE 250: Mod: PN | Performed by: INTERNAL MEDICINE

## 2021-04-29 PROCEDURE — 96409 CHEMO IV PUSH SNGL DRUG: CPT | Mod: PN

## 2021-04-29 RX ORDER — ONDANSETRON 2 MG/ML
8 INJECTION INTRAMUSCULAR; INTRAVENOUS ONCE
Status: COMPLETED | OUTPATIENT
Start: 2021-04-29 | End: 2021-04-29

## 2021-04-29 RX ORDER — SODIUM CHLORIDE 0.9 % (FLUSH) 0.9 %
10 SYRINGE (ML) INJECTION
Status: DISCONTINUED | OUTPATIENT
Start: 2021-04-29 | End: 2021-04-29 | Stop reason: HOSPADM

## 2021-04-29 RX ADMIN — SODIUM CHLORIDE: 9 INJECTION, SOLUTION INTRAVENOUS at 02:04

## 2021-04-29 RX ADMIN — ONDANSETRON 8 MG: 2 INJECTION INTRAMUSCULAR; INTRAVENOUS at 02:04

## 2021-04-29 RX ADMIN — AZACITIDINE 90 MG: 100 INJECTION, POWDER, LYOPHILIZED, FOR SOLUTION INTRAVENOUS; SUBCUTANEOUS at 02:04

## 2021-04-30 ENCOUNTER — INFUSION (OUTPATIENT)
Dept: INFUSION THERAPY | Facility: HOSPITAL | Age: 81
End: 2021-04-30
Attending: INTERNAL MEDICINE
Payer: MEDICARE

## 2021-04-30 VITALS
DIASTOLIC BLOOD PRESSURE: 64 MMHG | TEMPERATURE: 98 F | RESPIRATION RATE: 16 BRPM | SYSTOLIC BLOOD PRESSURE: 122 MMHG | WEIGHT: 171.94 LBS | HEART RATE: 82 BPM | BODY MASS INDEX: 26.93 KG/M2

## 2021-04-30 DIAGNOSIS — D46.Z MDS (MYELODYSPLASTIC SYNDROME), HIGH GRADE: Primary | ICD-10-CM

## 2021-04-30 PROCEDURE — 96409 CHEMO IV PUSH SNGL DRUG: CPT | Mod: PN

## 2021-04-30 PROCEDURE — 96375 TX/PRO/DX INJ NEW DRUG ADDON: CPT | Mod: PN

## 2021-04-30 PROCEDURE — 25000003 PHARM REV CODE 250: Mod: PN | Performed by: INTERNAL MEDICINE

## 2021-04-30 PROCEDURE — 63600175 PHARM REV CODE 636 W HCPCS: Mod: JG,PN | Performed by: INTERNAL MEDICINE

## 2021-04-30 RX ORDER — SODIUM CHLORIDE 0.9 % (FLUSH) 0.9 %
10 SYRINGE (ML) INJECTION
Status: DISCONTINUED | OUTPATIENT
Start: 2021-04-30 | End: 2021-04-30 | Stop reason: HOSPADM

## 2021-04-30 RX ORDER — ONDANSETRON 2 MG/ML
8 INJECTION INTRAMUSCULAR; INTRAVENOUS ONCE
Status: COMPLETED | OUTPATIENT
Start: 2021-04-30 | End: 2021-04-30

## 2021-04-30 RX ADMIN — SODIUM CHLORIDE: 900 INJECTION, SOLUTION INTRAVENOUS at 02:04

## 2021-04-30 RX ADMIN — ONDANSETRON 8 MG: 2 INJECTION INTRAMUSCULAR; INTRAVENOUS at 02:04

## 2021-04-30 RX ADMIN — AZACITIDINE 90 MG: 100 INJECTION, POWDER, LYOPHILIZED, FOR SOLUTION INTRAVENOUS; SUBCUTANEOUS at 02:04

## 2021-05-04 ENCOUNTER — EXTERNAL HOME HEALTH (OUTPATIENT)
Dept: HOME HEALTH SERVICES | Facility: HOSPITAL | Age: 81
End: 2021-05-04
Payer: MEDICARE

## 2021-05-06 ENCOUNTER — TELEPHONE (OUTPATIENT)
Dept: HEMATOLOGY/ONCOLOGY | Facility: CLINIC | Age: 81
End: 2021-05-06

## 2021-05-07 ENCOUNTER — LAB VISIT (OUTPATIENT)
Dept: LAB | Facility: HOSPITAL | Age: 81
End: 2021-05-07
Attending: INTERNAL MEDICINE
Payer: MEDICARE

## 2021-05-07 DIAGNOSIS — D64.9 SYMPTOMATIC ANEMIA: ICD-10-CM

## 2021-05-07 LAB
ABO + RH BLD: NORMAL
ANISOCYTOSIS BLD QL SMEAR: SLIGHT
BASO STIPL BLD QL SMEAR: ABNORMAL
BASOPHILS NFR BLD: 0 % (ref 0–1.9)
BLASTS NFR BLD MANUAL: 18 %
BLD GP AB SCN CELLS X3 SERPL QL: NORMAL
DACRYOCYTES BLD QL SMEAR: ABNORMAL
DIFFERENTIAL METHOD: ABNORMAL
EOSINOPHIL NFR BLD: 1 % (ref 0–8)
ERYTHROCYTE [DISTWIDTH] IN BLOOD BY AUTOMATED COUNT: 20 % (ref 11.5–14.5)
HCT VFR BLD AUTO: 28.1 % (ref 40–54)
HGB BLD-MCNC: 8 G/DL (ref 14–18)
HYPOCHROMIA BLD QL SMEAR: ABNORMAL
IMM GRANULOCYTES # BLD AUTO: ABNORMAL K/UL (ref 0–0.04)
IMM GRANULOCYTES NFR BLD AUTO: ABNORMAL % (ref 0–0.5)
LYMPHOCYTES NFR BLD: 49 % (ref 18–48)
MCH RBC QN AUTO: 26 PG (ref 27–31)
MCHC RBC AUTO-ENTMCNC: 28.5 G/DL (ref 32–36)
MCV RBC AUTO: 91 FL (ref 82–98)
MONOCYTES NFR BLD: 16 % (ref 4–15)
NEUTROPHILS # BLD AUTO: 0.8 K/UL (ref 1.8–7.7)
NEUTROPHILS NFR BLD: 15 % (ref 38–73)
NRBC BLD-RTO: 16 /100 WBC
OVALOCYTES BLD QL SMEAR: ABNORMAL
PLATELET # BLD AUTO: 26 K/UL (ref 150–450)
PLATELET BLD QL SMEAR: ABNORMAL
PMV BLD AUTO: ABNORMAL FL (ref 9.2–12.9)
POIKILOCYTOSIS BLD QL SMEAR: SLIGHT
POLYCHROMASIA BLD QL SMEAR: ABNORMAL
RBC # BLD AUTO: 3.08 M/UL (ref 4.6–6.2)
SCHISTOCYTES BLD QL SMEAR: PRESENT
WBC # BLD AUTO: 5 K/UL (ref 3.9–12.7)
WBC OTHER NFR BLD MANUAL: 1 %

## 2021-05-07 PROCEDURE — 86900 BLOOD TYPING SEROLOGIC ABO: CPT | Mod: PN | Performed by: INTERNAL MEDICINE

## 2021-05-07 PROCEDURE — 36415 COLL VENOUS BLD VENIPUNCTURE: CPT | Mod: PN | Performed by: INTERNAL MEDICINE

## 2021-05-07 PROCEDURE — 85027 COMPLETE CBC AUTOMATED: CPT | Mod: PN | Performed by: INTERNAL MEDICINE

## 2021-05-07 PROCEDURE — 86900 BLOOD TYPING SEROLOGIC ABO: CPT | Performed by: INTERNAL MEDICINE

## 2021-05-07 PROCEDURE — 85007 BL SMEAR W/DIFF WBC COUNT: CPT | Mod: PN | Performed by: INTERNAL MEDICINE

## 2021-05-07 PROCEDURE — 85007 BL SMEAR W/DIFF WBC COUNT: CPT | Performed by: INTERNAL MEDICINE

## 2021-05-07 PROCEDURE — 85027 COMPLETE CBC AUTOMATED: CPT | Performed by: INTERNAL MEDICINE

## 2021-05-10 ENCOUNTER — OFFICE VISIT (OUTPATIENT)
Dept: HEMATOLOGY/ONCOLOGY | Facility: CLINIC | Age: 81
End: 2021-05-10
Payer: MEDICARE

## 2021-05-10 ENCOUNTER — LAB VISIT (OUTPATIENT)
Dept: LAB | Facility: HOSPITAL | Age: 81
End: 2021-05-10
Attending: INTERNAL MEDICINE
Payer: MEDICARE

## 2021-05-10 VITALS
WEIGHT: 171.5 LBS | TEMPERATURE: 98 F | BODY MASS INDEX: 26.92 KG/M2 | DIASTOLIC BLOOD PRESSURE: 73 MMHG | SYSTOLIC BLOOD PRESSURE: 132 MMHG | HEART RATE: 79 BPM | HEIGHT: 67 IN | OXYGEN SATURATION: 98 %

## 2021-05-10 DIAGNOSIS — D64.9 SYMPTOMATIC ANEMIA: ICD-10-CM

## 2021-05-10 DIAGNOSIS — D46.Z MDS (MYELODYSPLASTIC SYNDROME), HIGH GRADE: Primary | ICD-10-CM

## 2021-05-10 DIAGNOSIS — D46.Z MDS (MYELODYSPLASTIC SYNDROME), HIGH GRADE: ICD-10-CM

## 2021-05-10 LAB
ABO + RH BLD: NORMAL
ANISOCYTOSIS BLD QL SMEAR: ABNORMAL
BASOPHILS NFR BLD: 0 % (ref 0–1.9)
BLD GP AB SCN CELLS X3 SERPL QL: NORMAL
DACRYOCYTES BLD QL SMEAR: ABNORMAL
DIFFERENTIAL METHOD: ABNORMAL
EOSINOPHIL NFR BLD: 2 % (ref 0–8)
ERYTHROCYTE [DISTWIDTH] IN BLOOD BY AUTOMATED COUNT: 20.8 % (ref 11.5–14.5)
HCT VFR BLD AUTO: 28.1 % (ref 40–54)
HGB BLD-MCNC: 7.9 G/DL (ref 14–18)
HYPOCHROMIA BLD QL SMEAR: ABNORMAL
IMM GRANULOCYTES # BLD AUTO: ABNORMAL K/UL (ref 0–0.04)
IMM GRANULOCYTES NFR BLD AUTO: ABNORMAL % (ref 0–0.5)
LYMPHOCYTES NFR BLD: 29 % (ref 18–48)
MCH RBC QN AUTO: 25.6 PG (ref 27–31)
MCHC RBC AUTO-ENTMCNC: 28.1 G/DL (ref 32–36)
MCV RBC AUTO: 91 FL (ref 82–98)
METAMYELOCYTES NFR BLD MANUAL: 1 %
MONOCYTES NFR BLD: 6 % (ref 4–15)
MYELOCYTES NFR BLD MANUAL: 1 %
NEUTROPHILS # BLD AUTO: 0.8 K/UL (ref 1.8–7.7)
NEUTROPHILS NFR BLD: 15 % (ref 38–73)
NEUTS BAND NFR BLD MANUAL: 1 %
NRBC BLD-RTO: 15 /100 WBC
OVALOCYTES BLD QL SMEAR: ABNORMAL
PLATELET # BLD AUTO: 31 K/UL (ref 150–450)
PLATELET BLD QL SMEAR: ABNORMAL
PMV BLD AUTO: ABNORMAL FL (ref 9.2–12.9)
POIKILOCYTOSIS BLD QL SMEAR: SLIGHT
POLYCHROMASIA BLD QL SMEAR: ABNORMAL
RBC # BLD AUTO: 3.09 M/UL (ref 4.6–6.2)
SCHISTOCYTES BLD QL SMEAR: ABNORMAL
WBC # BLD AUTO: 4.75 K/UL (ref 3.9–12.7)
WBC OTHER NFR BLD MANUAL: 45 %

## 2021-05-10 PROCEDURE — 3288F FALL RISK ASSESSMENT DOCD: CPT | Mod: CPTII,S$GLB,, | Performed by: NURSE PRACTITIONER

## 2021-05-10 PROCEDURE — 86900 BLOOD TYPING SEROLOGIC ABO: CPT | Performed by: INTERNAL MEDICINE

## 2021-05-10 PROCEDURE — 85007 BL SMEAR W/DIFF WBC COUNT: CPT | Performed by: NURSE PRACTITIONER

## 2021-05-10 PROCEDURE — 85007 BL SMEAR W/DIFF WBC COUNT: CPT | Mod: PN | Performed by: NURSE PRACTITIONER

## 2021-05-10 PROCEDURE — 86900 BLOOD TYPING SEROLOGIC ABO: CPT | Mod: PN | Performed by: INTERNAL MEDICINE

## 2021-05-10 PROCEDURE — 1126F PR PAIN SEVERITY QUANTIFIED, NO PAIN PRESENT: ICD-10-PCS | Mod: S$GLB,,, | Performed by: NURSE PRACTITIONER

## 2021-05-10 PROCEDURE — 99214 PR OFFICE/OUTPT VISIT, EST, LEVL IV, 30-39 MIN: ICD-10-PCS | Mod: S$GLB,,, | Performed by: NURSE PRACTITIONER

## 2021-05-10 PROCEDURE — 1101F PR PT FALLS ASSESS DOC 0-1 FALLS W/OUT INJ PAST YR: ICD-10-PCS | Mod: CPTII,S$GLB,, | Performed by: NURSE PRACTITIONER

## 2021-05-10 PROCEDURE — 1159F MED LIST DOCD IN RCRD: CPT | Mod: S$GLB,,, | Performed by: NURSE PRACTITIONER

## 2021-05-10 PROCEDURE — 99999 PR PBB SHADOW E&M-EST. PATIENT-LVL V: CPT | Mod: PBBFAC,,, | Performed by: NURSE PRACTITIONER

## 2021-05-10 PROCEDURE — 99214 OFFICE O/P EST MOD 30 MIN: CPT | Mod: S$GLB,,, | Performed by: NURSE PRACTITIONER

## 2021-05-10 PROCEDURE — 3288F PR FALLS RISK ASSESSMENT DOCUMENTED: ICD-10-PCS | Mod: CPTII,S$GLB,, | Performed by: NURSE PRACTITIONER

## 2021-05-10 PROCEDURE — 99999 PR PBB SHADOW E&M-EST. PATIENT-LVL V: ICD-10-PCS | Mod: PBBFAC,,, | Performed by: NURSE PRACTITIONER

## 2021-05-10 PROCEDURE — 1101F PT FALLS ASSESS-DOCD LE1/YR: CPT | Mod: CPTII,S$GLB,, | Performed by: NURSE PRACTITIONER

## 2021-05-10 PROCEDURE — 36415 COLL VENOUS BLD VENIPUNCTURE: CPT | Mod: PN | Performed by: INTERNAL MEDICINE

## 2021-05-10 PROCEDURE — 1126F AMNT PAIN NOTED NONE PRSNT: CPT | Mod: S$GLB,,, | Performed by: NURSE PRACTITIONER

## 2021-05-10 PROCEDURE — 1159F PR MEDICATION LIST DOCUMENTED IN MEDICAL RECORD: ICD-10-PCS | Mod: S$GLB,,, | Performed by: NURSE PRACTITIONER

## 2021-05-10 PROCEDURE — 85027 COMPLETE CBC AUTOMATED: CPT | Performed by: NURSE PRACTITIONER

## 2021-05-10 PROCEDURE — 85027 COMPLETE CBC AUTOMATED: CPT | Mod: PN | Performed by: NURSE PRACTITIONER

## 2021-05-10 RX ORDER — DIPHENHYDRAMINE HCL 25 MG
25 CAPSULE ORAL ONCE
Status: CANCELLED | OUTPATIENT
Start: 2021-05-10

## 2021-05-10 RX ORDER — FUROSEMIDE 10 MG/ML
20 INJECTION INTRAMUSCULAR; INTRAVENOUS ONCE
Status: CANCELLED | OUTPATIENT
Start: 2021-05-10

## 2021-05-10 RX ORDER — ACETAMINOPHEN 500 MG
1000 TABLET ORAL ONCE
Status: CANCELLED | OUTPATIENT
Start: 2021-05-10

## 2021-05-10 RX ORDER — HYDROCODONE BITARTRATE AND ACETAMINOPHEN 500; 5 MG/1; MG/1
TABLET ORAL ONCE
Status: CANCELLED | OUTPATIENT
Start: 2021-05-10 | End: 2021-05-10

## 2021-05-17 ENCOUNTER — LAB VISIT (OUTPATIENT)
Dept: LAB | Facility: HOSPITAL | Age: 81
End: 2021-05-17
Attending: INTERNAL MEDICINE
Payer: MEDICARE

## 2021-05-17 ENCOUNTER — TELEPHONE (OUTPATIENT)
Dept: HEMATOLOGY/ONCOLOGY | Facility: CLINIC | Age: 81
End: 2021-05-17

## 2021-05-17 DIAGNOSIS — D64.9 SYMPTOMATIC ANEMIA: ICD-10-CM

## 2021-05-17 LAB
ABO + RH BLD: NORMAL
ANISOCYTOSIS BLD QL SMEAR: ABNORMAL
BASOPHILS NFR BLD: 0 % (ref 0–1.9)
BLD GP AB SCN CELLS X3 SERPL QL: NORMAL
DACRYOCYTES BLD QL SMEAR: ABNORMAL
DIFFERENTIAL METHOD: ABNORMAL
EOSINOPHIL NFR BLD: 1 % (ref 0–8)
ERYTHROCYTE [DISTWIDTH] IN BLOOD BY AUTOMATED COUNT: 19.9 % (ref 11.5–14.5)
GIANT PLATELETS BLD QL SMEAR: PRESENT
HCT VFR BLD AUTO: 35.5 % (ref 40–54)
HGB BLD-MCNC: 10 G/DL (ref 14–18)
HYPOCHROMIA BLD QL SMEAR: ABNORMAL
IMM GRANULOCYTES # BLD AUTO: ABNORMAL K/UL (ref 0–0.04)
IMM GRANULOCYTES NFR BLD AUTO: ABNORMAL % (ref 0–0.5)
LYMPHOCYTES NFR BLD: 36 % (ref 18–48)
MCH RBC QN AUTO: 25.8 PG (ref 27–31)
MCHC RBC AUTO-ENTMCNC: 28.2 G/DL (ref 32–36)
MCV RBC AUTO: 92 FL (ref 82–98)
METAMYELOCYTES NFR BLD MANUAL: 1 %
MONOCYTES NFR BLD: 14 % (ref 4–15)
NEUTROPHILS # BLD AUTO: 0.6 K/UL (ref 1.8–7.7)
NEUTROPHILS NFR BLD: 17 % (ref 38–73)
NRBC BLD-RTO: 11 /100 WBC
OVALOCYTES BLD QL SMEAR: ABNORMAL
PLATELET # BLD AUTO: 38 K/UL (ref 150–450)
PMV BLD AUTO: ABNORMAL FL (ref 9.2–12.9)
POIKILOCYTOSIS BLD QL SMEAR: ABNORMAL
POLYCHROMASIA BLD QL SMEAR: ABNORMAL
RBC # BLD AUTO: 3.88 M/UL (ref 4.6–6.2)
SCHISTOCYTES BLD QL SMEAR: PRESENT
WBC # BLD AUTO: 3.81 K/UL (ref 3.9–12.7)
WBC OTHER NFR BLD MANUAL: 31 %

## 2021-05-17 PROCEDURE — 85027 COMPLETE CBC AUTOMATED: CPT | Performed by: INTERNAL MEDICINE

## 2021-05-17 PROCEDURE — 85027 COMPLETE CBC AUTOMATED: CPT | Mod: PN | Performed by: INTERNAL MEDICINE

## 2021-05-17 PROCEDURE — 85007 BL SMEAR W/DIFF WBC COUNT: CPT | Performed by: INTERNAL MEDICINE

## 2021-05-17 PROCEDURE — 86900 BLOOD TYPING SEROLOGIC ABO: CPT | Mod: PN | Performed by: INTERNAL MEDICINE

## 2021-05-17 PROCEDURE — 85007 BL SMEAR W/DIFF WBC COUNT: CPT | Mod: PN | Performed by: INTERNAL MEDICINE

## 2021-05-17 PROCEDURE — 86900 BLOOD TYPING SEROLOGIC ABO: CPT | Performed by: INTERNAL MEDICINE

## 2021-05-17 PROCEDURE — 36415 COLL VENOUS BLD VENIPUNCTURE: CPT | Mod: PN | Performed by: INTERNAL MEDICINE

## 2021-05-20 ENCOUNTER — SPECIALTY PHARMACY (OUTPATIENT)
Dept: PHARMACY | Facility: CLINIC | Age: 81
End: 2021-05-20

## 2021-05-24 ENCOUNTER — SPECIALTY PHARMACY (OUTPATIENT)
Dept: PHARMACY | Facility: CLINIC | Age: 81
End: 2021-05-24

## 2021-05-24 ENCOUNTER — OFFICE VISIT (OUTPATIENT)
Dept: HEMATOLOGY/ONCOLOGY | Facility: CLINIC | Age: 81
End: 2021-05-24
Payer: MEDICARE

## 2021-05-24 ENCOUNTER — INFUSION (OUTPATIENT)
Dept: INFUSION THERAPY | Facility: HOSPITAL | Age: 81
End: 2021-05-24
Attending: INTERNAL MEDICINE
Payer: MEDICARE

## 2021-05-24 ENCOUNTER — PATIENT MESSAGE (OUTPATIENT)
Dept: HEMATOLOGY/ONCOLOGY | Facility: CLINIC | Age: 81
End: 2021-05-24

## 2021-05-24 VITALS
WEIGHT: 171.5 LBS | HEART RATE: 82 BPM | SYSTOLIC BLOOD PRESSURE: 112 MMHG | DIASTOLIC BLOOD PRESSURE: 73 MMHG | TEMPERATURE: 98 F | RESPIRATION RATE: 18 BRPM | BODY MASS INDEX: 26.92 KG/M2 | HEIGHT: 67 IN

## 2021-05-24 VITALS
HEIGHT: 67 IN | RESPIRATION RATE: 18 BRPM | BODY MASS INDEX: 26.92 KG/M2 | OXYGEN SATURATION: 96 % | DIASTOLIC BLOOD PRESSURE: 70 MMHG | WEIGHT: 171.5 LBS | HEART RATE: 79 BPM | SYSTOLIC BLOOD PRESSURE: 118 MMHG

## 2021-05-24 DIAGNOSIS — D45 POLYCYTHEMIA VERA: ICD-10-CM

## 2021-05-24 DIAGNOSIS — D46.Z MDS (MYELODYSPLASTIC SYNDROME), HIGH GRADE: Primary | ICD-10-CM

## 2021-05-24 DIAGNOSIS — D61.818 PANCYTOPENIA: ICD-10-CM

## 2021-05-24 DIAGNOSIS — I48.0 PAROXYSMAL ATRIAL FIBRILLATION: ICD-10-CM

## 2021-05-24 PROCEDURE — 96375 TX/PRO/DX INJ NEW DRUG ADDON: CPT | Mod: PN

## 2021-05-24 PROCEDURE — 99215 PR OFFICE/OUTPT VISIT, EST, LEVL V, 40-54 MIN: ICD-10-PCS | Mod: S$GLB,,, | Performed by: INTERNAL MEDICINE

## 2021-05-24 PROCEDURE — 99499 UNLISTED E&M SERVICE: CPT | Mod: S$GLB,,, | Performed by: INTERNAL MEDICINE

## 2021-05-24 PROCEDURE — 99499 RISK ADDL DX/OHS AUDIT: ICD-10-PCS | Mod: S$GLB,,, | Performed by: INTERNAL MEDICINE

## 2021-05-24 PROCEDURE — 63600175 PHARM REV CODE 636 W HCPCS: Mod: PN | Performed by: INTERNAL MEDICINE

## 2021-05-24 PROCEDURE — 1126F AMNT PAIN NOTED NONE PRSNT: CPT | Mod: S$GLB,,, | Performed by: INTERNAL MEDICINE

## 2021-05-24 PROCEDURE — 3288F FALL RISK ASSESSMENT DOCD: CPT | Mod: CPTII,S$GLB,, | Performed by: INTERNAL MEDICINE

## 2021-05-24 PROCEDURE — 99999 PR PBB SHADOW E&M-EST. PATIENT-LVL IV: ICD-10-PCS | Mod: PBBFAC,,, | Performed by: INTERNAL MEDICINE

## 2021-05-24 PROCEDURE — 3288F PR FALLS RISK ASSESSMENT DOCUMENTED: ICD-10-PCS | Mod: CPTII,S$GLB,, | Performed by: INTERNAL MEDICINE

## 2021-05-24 PROCEDURE — 99215 OFFICE O/P EST HI 40 MIN: CPT | Mod: S$GLB,,, | Performed by: INTERNAL MEDICINE

## 2021-05-24 PROCEDURE — 1159F PR MEDICATION LIST DOCUMENTED IN MEDICAL RECORD: ICD-10-PCS | Mod: S$GLB,,, | Performed by: INTERNAL MEDICINE

## 2021-05-24 PROCEDURE — 1126F PR PAIN SEVERITY QUANTIFIED, NO PAIN PRESENT: ICD-10-PCS | Mod: S$GLB,,, | Performed by: INTERNAL MEDICINE

## 2021-05-24 PROCEDURE — 1101F PR PT FALLS ASSESS DOC 0-1 FALLS W/OUT INJ PAST YR: ICD-10-PCS | Mod: CPTII,S$GLB,, | Performed by: INTERNAL MEDICINE

## 2021-05-24 PROCEDURE — 25000003 PHARM REV CODE 250: Mod: PN | Performed by: INTERNAL MEDICINE

## 2021-05-24 PROCEDURE — 1159F MED LIST DOCD IN RCRD: CPT | Mod: S$GLB,,, | Performed by: INTERNAL MEDICINE

## 2021-05-24 PROCEDURE — A4216 STERILE WATER/SALINE, 10 ML: HCPCS | Mod: PN | Performed by: INTERNAL MEDICINE

## 2021-05-24 PROCEDURE — 1101F PT FALLS ASSESS-DOCD LE1/YR: CPT | Mod: CPTII,S$GLB,, | Performed by: INTERNAL MEDICINE

## 2021-05-24 PROCEDURE — 96409 CHEMO IV PUSH SNGL DRUG: CPT | Mod: PN

## 2021-05-24 PROCEDURE — 99999 PR PBB SHADOW E&M-EST. PATIENT-LVL IV: CPT | Mod: PBBFAC,,, | Performed by: INTERNAL MEDICINE

## 2021-05-24 RX ORDER — SODIUM CHLORIDE 0.9 % (FLUSH) 0.9 %
10 SYRINGE (ML) INJECTION
Status: CANCELLED | OUTPATIENT
Start: 2021-05-24

## 2021-05-24 RX ORDER — ONDANSETRON 2 MG/ML
8 INJECTION INTRAMUSCULAR; INTRAVENOUS ONCE
Status: CANCELLED
Start: 2021-05-26 | End: 2021-05-26

## 2021-05-24 RX ORDER — SODIUM CHLORIDE 0.9 % (FLUSH) 0.9 %
10 SYRINGE (ML) INJECTION
Status: DISCONTINUED | OUTPATIENT
Start: 2021-05-24 | End: 2021-05-24 | Stop reason: HOSPADM

## 2021-05-24 RX ORDER — ONDANSETRON 2 MG/ML
8 INJECTION INTRAMUSCULAR; INTRAVENOUS ONCE
Status: CANCELLED
Start: 2021-05-25 | End: 2021-05-25

## 2021-05-24 RX ORDER — ONDANSETRON 2 MG/ML
8 INJECTION INTRAMUSCULAR; INTRAVENOUS ONCE
Status: COMPLETED | OUTPATIENT
Start: 2021-05-24 | End: 2021-05-24

## 2021-05-24 RX ORDER — SODIUM CHLORIDE 0.9 % (FLUSH) 0.9 %
10 SYRINGE (ML) INJECTION
Status: CANCELLED | OUTPATIENT
Start: 2021-05-28

## 2021-05-24 RX ORDER — HEPARIN 100 UNIT/ML
500 SYRINGE INTRAVENOUS
Status: CANCELLED | OUTPATIENT
Start: 2021-05-28

## 2021-05-24 RX ORDER — HEPARIN 100 UNIT/ML
500 SYRINGE INTRAVENOUS
Status: CANCELLED | OUTPATIENT
Start: 2021-05-26

## 2021-05-24 RX ORDER — ONDANSETRON 2 MG/ML
8 INJECTION INTRAMUSCULAR; INTRAVENOUS ONCE
Status: CANCELLED
Start: 2021-05-27 | End: 2021-05-27

## 2021-05-24 RX ORDER — SODIUM CHLORIDE 0.9 % (FLUSH) 0.9 %
10 SYRINGE (ML) INJECTION
Status: CANCELLED | OUTPATIENT
Start: 2021-05-26

## 2021-05-24 RX ORDER — MONTELUKAST SODIUM 10 MG/1
10 TABLET ORAL NIGHTLY
COMMUNITY
Start: 2020-12-11 | End: 2021-09-05

## 2021-05-24 RX ORDER — ONDANSETRON 2 MG/ML
8 INJECTION INTRAMUSCULAR; INTRAVENOUS ONCE
Status: CANCELLED
Start: 2021-05-28 | End: 2021-05-28

## 2021-05-24 RX ORDER — HEPARIN 100 UNIT/ML
500 SYRINGE INTRAVENOUS
Status: CANCELLED | OUTPATIENT
Start: 2021-05-27

## 2021-05-24 RX ORDER — HEPARIN 100 UNIT/ML
500 SYRINGE INTRAVENOUS
Status: CANCELLED | OUTPATIENT
Start: 2021-05-25

## 2021-05-24 RX ORDER — SODIUM CHLORIDE 0.9 % (FLUSH) 0.9 %
10 SYRINGE (ML) INJECTION
Status: CANCELLED | OUTPATIENT
Start: 2021-05-25

## 2021-05-24 RX ORDER — HEPARIN 100 UNIT/ML
500 SYRINGE INTRAVENOUS
Status: CANCELLED | OUTPATIENT
Start: 2021-05-24

## 2021-05-24 RX ORDER — ONDANSETRON 2 MG/ML
8 INJECTION INTRAMUSCULAR; INTRAVENOUS ONCE
Status: CANCELLED
Start: 2021-05-24 | End: 2021-05-24

## 2021-05-24 RX ORDER — SODIUM CHLORIDE 0.9 % (FLUSH) 0.9 %
10 SYRINGE (ML) INJECTION
Status: CANCELLED | OUTPATIENT
Start: 2021-05-27

## 2021-05-24 RX ADMIN — ONDANSETRON 8 MG: 2 INJECTION INTRAMUSCULAR; INTRAVENOUS at 04:05

## 2021-05-24 RX ADMIN — Medication 10 ML: at 05:05

## 2021-05-24 RX ADMIN — SODIUM CHLORIDE: 9 INJECTION, SOLUTION INTRAVENOUS at 04:05

## 2021-05-24 RX ADMIN — AZACITIDINE 140 MG: 100 INJECTION, POWDER, LYOPHILIZED, FOR SOLUTION INTRAVENOUS; SUBCUTANEOUS at 05:05

## 2021-05-25 ENCOUNTER — INFUSION (OUTPATIENT)
Dept: INFUSION THERAPY | Facility: HOSPITAL | Age: 81
End: 2021-05-25
Attending: INTERNAL MEDICINE
Payer: MEDICARE

## 2021-05-25 VITALS
TEMPERATURE: 98 F | HEART RATE: 68 BPM | BODY MASS INDEX: 26.83 KG/M2 | DIASTOLIC BLOOD PRESSURE: 64 MMHG | WEIGHT: 171.31 LBS | RESPIRATION RATE: 18 BRPM | SYSTOLIC BLOOD PRESSURE: 114 MMHG

## 2021-05-25 DIAGNOSIS — D46.Z MDS (MYELODYSPLASTIC SYNDROME), HIGH GRADE: Primary | ICD-10-CM

## 2021-05-25 PROCEDURE — 96375 TX/PRO/DX INJ NEW DRUG ADDON: CPT | Mod: PN

## 2021-05-25 PROCEDURE — 25000003 PHARM REV CODE 250: Mod: PN | Performed by: INTERNAL MEDICINE

## 2021-05-25 PROCEDURE — 96409 CHEMO IV PUSH SNGL DRUG: CPT | Mod: PN

## 2021-05-25 PROCEDURE — 63600175 PHARM REV CODE 636 W HCPCS: Mod: JW,JG,PN | Performed by: INTERNAL MEDICINE

## 2021-05-25 RX ORDER — ONDANSETRON 2 MG/ML
8 INJECTION INTRAMUSCULAR; INTRAVENOUS ONCE
Status: COMPLETED | OUTPATIENT
Start: 2021-05-25 | End: 2021-05-25

## 2021-05-25 RX ORDER — HEPARIN 100 UNIT/ML
500 SYRINGE INTRAVENOUS
Status: DISCONTINUED | OUTPATIENT
Start: 2021-05-25 | End: 2021-05-25 | Stop reason: HOSPADM

## 2021-05-25 RX ORDER — SODIUM CHLORIDE 0.9 % (FLUSH) 0.9 %
10 SYRINGE (ML) INJECTION
Status: DISCONTINUED | OUTPATIENT
Start: 2021-05-25 | End: 2021-05-25 | Stop reason: HOSPADM

## 2021-05-25 RX ADMIN — SODIUM CHLORIDE: 900 INJECTION, SOLUTION INTRAVENOUS at 03:05

## 2021-05-25 RX ADMIN — AZACITIDINE 140 MG: 100 INJECTION, POWDER, LYOPHILIZED, FOR SOLUTION INTRAVENOUS; SUBCUTANEOUS at 03:05

## 2021-05-25 RX ADMIN — ONDANSETRON 8 MG: 2 INJECTION INTRAMUSCULAR; INTRAVENOUS at 03:05

## 2021-05-26 ENCOUNTER — INFUSION (OUTPATIENT)
Dept: INFUSION THERAPY | Facility: HOSPITAL | Age: 81
End: 2021-05-26
Attending: INTERNAL MEDICINE
Payer: MEDICARE

## 2021-05-26 VITALS
RESPIRATION RATE: 18 BRPM | TEMPERATURE: 98 F | WEIGHT: 169.75 LBS | HEART RATE: 71 BPM | SYSTOLIC BLOOD PRESSURE: 106 MMHG | DIASTOLIC BLOOD PRESSURE: 54 MMHG | BODY MASS INDEX: 26.59 KG/M2

## 2021-05-26 DIAGNOSIS — D45: ICD-10-CM

## 2021-05-26 DIAGNOSIS — D46.Z MDS (MYELODYSPLASTIC SYNDROME), HIGH GRADE: Primary | ICD-10-CM

## 2021-05-26 PROCEDURE — 25000003 PHARM REV CODE 250: Mod: PN | Performed by: INTERNAL MEDICINE

## 2021-05-26 PROCEDURE — 96375 TX/PRO/DX INJ NEW DRUG ADDON: CPT | Mod: PN

## 2021-05-26 PROCEDURE — 63600175 PHARM REV CODE 636 W HCPCS: Mod: JW,JG,PN | Performed by: INTERNAL MEDICINE

## 2021-05-26 PROCEDURE — 96409 CHEMO IV PUSH SNGL DRUG: CPT | Mod: PN

## 2021-05-26 RX ORDER — HEPARIN 100 UNIT/ML
500 SYRINGE INTRAVENOUS
Status: DISCONTINUED | OUTPATIENT
Start: 2021-05-26 | End: 2021-05-26 | Stop reason: HOSPADM

## 2021-05-26 RX ORDER — RUXOLITINIB 5 MG/1
5 TABLET ORAL DAILY
Qty: 30 TABLET | Refills: 5 | Status: CANCELLED | OUTPATIENT
Start: 2021-05-26

## 2021-05-26 RX ORDER — SODIUM CHLORIDE 0.9 % (FLUSH) 0.9 %
10 SYRINGE (ML) INJECTION
Status: DISCONTINUED | OUTPATIENT
Start: 2021-05-26 | End: 2021-05-26 | Stop reason: HOSPADM

## 2021-05-26 RX ORDER — ONDANSETRON 2 MG/ML
8 INJECTION INTRAMUSCULAR; INTRAVENOUS ONCE
Status: COMPLETED | OUTPATIENT
Start: 2021-05-26 | End: 2021-05-26

## 2021-05-26 RX ADMIN — SODIUM CHLORIDE: 900 INJECTION, SOLUTION INTRAVENOUS at 02:05

## 2021-05-26 RX ADMIN — ONDANSETRON 8 MG: 2 INJECTION INTRAMUSCULAR; INTRAVENOUS at 02:05

## 2021-05-26 RX ADMIN — AZACITIDINE 140 MG: 100 INJECTION, POWDER, LYOPHILIZED, FOR SOLUTION INTRAVENOUS; SUBCUTANEOUS at 02:05

## 2021-05-27 ENCOUNTER — INFUSION (OUTPATIENT)
Dept: INFUSION THERAPY | Facility: HOSPITAL | Age: 81
End: 2021-05-27
Attending: INTERNAL MEDICINE
Payer: MEDICARE

## 2021-05-27 VITALS
WEIGHT: 169.75 LBS | DIASTOLIC BLOOD PRESSURE: 63 MMHG | RESPIRATION RATE: 18 BRPM | HEIGHT: 67 IN | TEMPERATURE: 98 F | HEART RATE: 72 BPM | SYSTOLIC BLOOD PRESSURE: 110 MMHG | OXYGEN SATURATION: 98 % | BODY MASS INDEX: 26.64 KG/M2

## 2021-05-27 DIAGNOSIS — D46.Z MDS (MYELODYSPLASTIC SYNDROME), HIGH GRADE: Primary | ICD-10-CM

## 2021-05-27 PROCEDURE — 96409 CHEMO IV PUSH SNGL DRUG: CPT | Mod: PN

## 2021-05-27 PROCEDURE — A4216 STERILE WATER/SALINE, 10 ML: HCPCS | Mod: PN | Performed by: INTERNAL MEDICINE

## 2021-05-27 PROCEDURE — 25000003 PHARM REV CODE 250: Mod: PN | Performed by: INTERNAL MEDICINE

## 2021-05-27 PROCEDURE — 96375 TX/PRO/DX INJ NEW DRUG ADDON: CPT | Mod: PN

## 2021-05-27 PROCEDURE — 63600175 PHARM REV CODE 636 W HCPCS: Mod: PN | Performed by: INTERNAL MEDICINE

## 2021-05-27 RX ORDER — SODIUM CHLORIDE 0.9 % (FLUSH) 0.9 %
10 SYRINGE (ML) INJECTION
Status: DISCONTINUED | OUTPATIENT
Start: 2021-05-27 | End: 2021-05-27 | Stop reason: HOSPADM

## 2021-05-27 RX ORDER — ONDANSETRON 2 MG/ML
8 INJECTION INTRAMUSCULAR; INTRAVENOUS ONCE
Status: COMPLETED | OUTPATIENT
Start: 2021-05-27 | End: 2021-05-27

## 2021-05-27 RX ADMIN — ONDANSETRON 8 MG: 2 INJECTION INTRAMUSCULAR; INTRAVENOUS at 02:05

## 2021-05-27 RX ADMIN — Medication 10 ML: at 03:05

## 2021-05-27 RX ADMIN — AZACITIDINE 140 MG: 100 INJECTION, POWDER, LYOPHILIZED, FOR SOLUTION INTRAVENOUS; SUBCUTANEOUS at 03:05

## 2021-05-27 RX ADMIN — SODIUM CHLORIDE: 9 INJECTION, SOLUTION INTRAVENOUS at 02:05

## 2021-05-28 ENCOUNTER — INFUSION (OUTPATIENT)
Dept: INFUSION THERAPY | Facility: HOSPITAL | Age: 81
End: 2021-05-28
Attending: INTERNAL MEDICINE
Payer: MEDICARE

## 2021-05-28 VITALS
BODY MASS INDEX: 26.64 KG/M2 | TEMPERATURE: 98 F | OXYGEN SATURATION: 99 % | HEIGHT: 67 IN | WEIGHT: 169.75 LBS | RESPIRATION RATE: 18 BRPM | SYSTOLIC BLOOD PRESSURE: 113 MMHG | HEART RATE: 71 BPM | DIASTOLIC BLOOD PRESSURE: 68 MMHG

## 2021-05-28 DIAGNOSIS — D46.Z MDS (MYELODYSPLASTIC SYNDROME), HIGH GRADE: Primary | ICD-10-CM

## 2021-05-28 PROCEDURE — 63600175 PHARM REV CODE 636 W HCPCS: Mod: JG,PN | Performed by: INTERNAL MEDICINE

## 2021-05-28 PROCEDURE — 25000003 PHARM REV CODE 250: Mod: PN | Performed by: INTERNAL MEDICINE

## 2021-05-28 PROCEDURE — A4216 STERILE WATER/SALINE, 10 ML: HCPCS | Mod: PN | Performed by: INTERNAL MEDICINE

## 2021-05-28 PROCEDURE — 96409 CHEMO IV PUSH SNGL DRUG: CPT | Mod: PN

## 2021-05-28 PROCEDURE — 96375 TX/PRO/DX INJ NEW DRUG ADDON: CPT | Mod: PN

## 2021-05-28 RX ORDER — ONDANSETRON 2 MG/ML
8 INJECTION INTRAMUSCULAR; INTRAVENOUS ONCE
Status: COMPLETED | OUTPATIENT
Start: 2021-05-28 | End: 2021-05-28

## 2021-05-28 RX ORDER — SODIUM CHLORIDE 0.9 % (FLUSH) 0.9 %
10 SYRINGE (ML) INJECTION
Status: DISCONTINUED | OUTPATIENT
Start: 2021-05-28 | End: 2021-05-28 | Stop reason: HOSPADM

## 2021-05-28 RX ADMIN — SODIUM CHLORIDE: 9 INJECTION, SOLUTION INTRAVENOUS at 02:05

## 2021-05-28 RX ADMIN — Medication 10 ML: at 02:05

## 2021-05-28 RX ADMIN — AZACITIDINE 140 MG: 100 INJECTION, POWDER, LYOPHILIZED, FOR SOLUTION INTRAVENOUS; SUBCUTANEOUS at 02:05

## 2021-05-28 RX ADMIN — ONDANSETRON 8 MG: 2 INJECTION INTRAMUSCULAR; INTRAVENOUS at 02:05

## 2021-05-31 ENCOUNTER — OFFICE VISIT (OUTPATIENT)
Dept: HEMATOLOGY/ONCOLOGY | Facility: CLINIC | Age: 81
End: 2021-05-31
Payer: MEDICARE

## 2021-05-31 ENCOUNTER — LAB VISIT (OUTPATIENT)
Dept: LAB | Facility: HOSPITAL | Age: 81
End: 2021-05-31
Attending: NURSE PRACTITIONER
Payer: MEDICARE

## 2021-05-31 ENCOUNTER — TELEPHONE (OUTPATIENT)
Dept: HEMATOLOGY/ONCOLOGY | Facility: CLINIC | Age: 81
End: 2021-05-31

## 2021-05-31 VITALS
BODY MASS INDEX: 26.51 KG/M2 | OXYGEN SATURATION: 100 % | HEART RATE: 72 BPM | HEIGHT: 67 IN | TEMPERATURE: 98 F | WEIGHT: 168.88 LBS | DIASTOLIC BLOOD PRESSURE: 76 MMHG | SYSTOLIC BLOOD PRESSURE: 133 MMHG | RESPIRATION RATE: 18 BRPM

## 2021-05-31 DIAGNOSIS — E03.9 ACQUIRED HYPOTHYROIDISM: ICD-10-CM

## 2021-05-31 DIAGNOSIS — D64.9 SYMPTOMATIC ANEMIA: ICD-10-CM

## 2021-05-31 DIAGNOSIS — D46.Z MDS (MYELODYSPLASTIC SYNDROME), HIGH GRADE: ICD-10-CM

## 2021-05-31 DIAGNOSIS — D46.Z MDS (MYELODYSPLASTIC SYNDROME), HIGH GRADE: Primary | ICD-10-CM

## 2021-05-31 LAB
ABO + RH BLD: NORMAL
ANISOCYTOSIS BLD QL SMEAR: SLIGHT
BASOPHILS NFR BLD: 1 % (ref 0–1.9)
BLASTS NFR BLD MANUAL: 21 %
BLD GP AB SCN CELLS X3 SERPL QL: NORMAL
DACRYOCYTES BLD QL SMEAR: ABNORMAL
DIFFERENTIAL METHOD: ABNORMAL
EOSINOPHIL NFR BLD: 2 % (ref 0–8)
ERYTHROCYTE [DISTWIDTH] IN BLOOD BY AUTOMATED COUNT: 18.8 % (ref 11.5–14.5)
HCT VFR BLD AUTO: 28.9 % (ref 40–54)
HGB BLD-MCNC: 8.6 G/DL (ref 14–18)
HYPOCHROMIA BLD QL SMEAR: ABNORMAL
IMM GRANULOCYTES # BLD AUTO: ABNORMAL K/UL (ref 0–0.04)
IMM GRANULOCYTES NFR BLD AUTO: ABNORMAL % (ref 0–0.5)
LYMPHOCYTES NFR BLD: 55 % (ref 18–48)
MCH RBC QN AUTO: 26 PG (ref 27–31)
MCHC RBC AUTO-ENTMCNC: 29.8 G/DL (ref 32–36)
MCV RBC AUTO: 87 FL (ref 82–98)
MONOCYTES NFR BLD: 7 % (ref 4–15)
NEUTROPHILS # BLD AUTO: 0.4 K/UL (ref 1.8–7.7)
NEUTROPHILS NFR BLD: 13 % (ref 38–73)
NEUTS BAND NFR BLD MANUAL: 1 %
NRBC BLD-RTO: 9 /100 WBC
OVALOCYTES BLD QL SMEAR: ABNORMAL
PLATELET # BLD AUTO: 26 K/UL (ref 150–450)
PLATELET BLD QL SMEAR: ABNORMAL
PMV BLD AUTO: ABNORMAL FL (ref 9.2–12.9)
POIKILOCYTOSIS BLD QL SMEAR: SLIGHT
POLYCHROMASIA BLD QL SMEAR: ABNORMAL
RBC # BLD AUTO: 3.31 M/UL (ref 4.6–6.2)
WBC # BLD AUTO: 3.01 K/UL (ref 3.9–12.7)

## 2021-05-31 PROCEDURE — 99999 PR PBB SHADOW E&M-EST. PATIENT-LVL IV: CPT | Mod: PBBFAC,,, | Performed by: NURSE PRACTITIONER

## 2021-05-31 PROCEDURE — 1101F PT FALLS ASSESS-DOCD LE1/YR: CPT | Mod: CPTII,S$GLB,, | Performed by: NURSE PRACTITIONER

## 2021-05-31 PROCEDURE — 1126F AMNT PAIN NOTED NONE PRSNT: CPT | Mod: S$GLB,,, | Performed by: NURSE PRACTITIONER

## 2021-05-31 PROCEDURE — 86900 BLOOD TYPING SEROLOGIC ABO: CPT | Mod: PN | Performed by: INTERNAL MEDICINE

## 2021-05-31 PROCEDURE — 1159F MED LIST DOCD IN RCRD: CPT | Mod: S$GLB,,, | Performed by: NURSE PRACTITIONER

## 2021-05-31 PROCEDURE — 99999 PR PBB SHADOW E&M-EST. PATIENT-LVL IV: ICD-10-PCS | Mod: PBBFAC,,, | Performed by: NURSE PRACTITIONER

## 2021-05-31 PROCEDURE — 85007 BL SMEAR W/DIFF WBC COUNT: CPT | Mod: PN | Performed by: NURSE PRACTITIONER

## 2021-05-31 PROCEDURE — 85007 BL SMEAR W/DIFF WBC COUNT: CPT | Performed by: NURSE PRACTITIONER

## 2021-05-31 PROCEDURE — 1159F PR MEDICATION LIST DOCUMENTED IN MEDICAL RECORD: ICD-10-PCS | Mod: S$GLB,,, | Performed by: NURSE PRACTITIONER

## 2021-05-31 PROCEDURE — 99213 OFFICE O/P EST LOW 20 MIN: CPT | Mod: S$GLB,,, | Performed by: NURSE PRACTITIONER

## 2021-05-31 PROCEDURE — 3288F FALL RISK ASSESSMENT DOCD: CPT | Mod: CPTII,S$GLB,, | Performed by: NURSE PRACTITIONER

## 2021-05-31 PROCEDURE — 85027 COMPLETE CBC AUTOMATED: CPT | Performed by: NURSE PRACTITIONER

## 2021-05-31 PROCEDURE — 1101F PR PT FALLS ASSESS DOC 0-1 FALLS W/OUT INJ PAST YR: ICD-10-PCS | Mod: CPTII,S$GLB,, | Performed by: NURSE PRACTITIONER

## 2021-05-31 PROCEDURE — 99213 PR OFFICE/OUTPT VISIT, EST, LEVL III, 20-29 MIN: ICD-10-PCS | Mod: S$GLB,,, | Performed by: NURSE PRACTITIONER

## 2021-05-31 PROCEDURE — 3288F PR FALLS RISK ASSESSMENT DOCUMENTED: ICD-10-PCS | Mod: CPTII,S$GLB,, | Performed by: NURSE PRACTITIONER

## 2021-05-31 PROCEDURE — 86900 BLOOD TYPING SEROLOGIC ABO: CPT | Performed by: INTERNAL MEDICINE

## 2021-05-31 PROCEDURE — 1126F PR PAIN SEVERITY QUANTIFIED, NO PAIN PRESENT: ICD-10-PCS | Mod: S$GLB,,, | Performed by: NURSE PRACTITIONER

## 2021-05-31 PROCEDURE — 85027 COMPLETE CBC AUTOMATED: CPT | Mod: PN | Performed by: NURSE PRACTITIONER

## 2021-06-01 ENCOUNTER — DOCUMENTATION ONLY (OUTPATIENT)
Dept: HEMATOLOGY/ONCOLOGY | Facility: CLINIC | Age: 81
End: 2021-06-01

## 2021-06-01 DIAGNOSIS — D46.Z MDS (MYELODYSPLASTIC SYNDROME), HIGH GRADE: ICD-10-CM

## 2021-06-03 ENCOUNTER — OFFICE VISIT (OUTPATIENT)
Dept: HEMATOLOGY/ONCOLOGY | Facility: CLINIC | Age: 81
End: 2021-06-03
Payer: MEDICARE

## 2021-06-03 ENCOUNTER — LAB VISIT (OUTPATIENT)
Dept: LAB | Facility: HOSPITAL | Age: 81
End: 2021-06-03
Attending: INTERNAL MEDICINE
Payer: MEDICARE

## 2021-06-03 ENCOUNTER — TELEPHONE (OUTPATIENT)
Dept: HEMATOLOGY/ONCOLOGY | Facility: CLINIC | Age: 81
End: 2021-06-03

## 2021-06-03 VITALS
BODY MASS INDEX: 26.79 KG/M2 | OXYGEN SATURATION: 96 % | DIASTOLIC BLOOD PRESSURE: 60 MMHG | SYSTOLIC BLOOD PRESSURE: 120 MMHG | HEART RATE: 81 BPM | TEMPERATURE: 98 F | WEIGHT: 171.06 LBS

## 2021-06-03 DIAGNOSIS — D61.818 PANCYTOPENIA: ICD-10-CM

## 2021-06-03 DIAGNOSIS — D64.9 SYMPTOMATIC ANEMIA: ICD-10-CM

## 2021-06-03 DIAGNOSIS — D46.Z MDS (MYELODYSPLASTIC SYNDROME), HIGH GRADE: Primary | ICD-10-CM

## 2021-06-03 DIAGNOSIS — D64.9 TRANSFUSION-DEPENDENT ANEMIA: ICD-10-CM

## 2021-06-03 LAB
ABO + RH BLD: NORMAL
ANISOCYTOSIS BLD QL SMEAR: SLIGHT
BASOPHILS NFR BLD: 0 % (ref 0–1.9)
BLASTS NFR BLD MANUAL: 43 %
BLD GP AB SCN CELLS X3 SERPL QL: NORMAL
DACRYOCYTES BLD QL SMEAR: ABNORMAL
DIFFERENTIAL METHOD: ABNORMAL
EOSINOPHIL NFR BLD: 2 % (ref 0–8)
ERYTHROCYTE [DISTWIDTH] IN BLOOD BY AUTOMATED COUNT: 19.4 % (ref 11.5–14.5)
HCT VFR BLD AUTO: 27.2 % (ref 40–54)
HGB BLD-MCNC: 7.8 G/DL (ref 14–18)
HYPOCHROMIA BLD QL SMEAR: ABNORMAL
IMM GRANULOCYTES # BLD AUTO: ABNORMAL K/UL (ref 0–0.04)
IMM GRANULOCYTES NFR BLD AUTO: ABNORMAL % (ref 0–0.5)
LYMPHOCYTES NFR BLD: 36 % (ref 18–48)
MCH RBC QN AUTO: 25.7 PG (ref 27–31)
MCHC RBC AUTO-ENTMCNC: 28.7 G/DL (ref 32–36)
MCV RBC AUTO: 90 FL (ref 82–98)
MONOCYTES NFR BLD: 8 % (ref 4–15)
NEUTROPHILS # BLD AUTO: 0.3 K/UL (ref 1.8–7.7)
NEUTROPHILS NFR BLD: 10 % (ref 38–73)
NEUTS BAND NFR BLD MANUAL: 1 %
NRBC BLD-RTO: 17 /100 WBC
OVALOCYTES BLD QL SMEAR: ABNORMAL
PLATELET # BLD AUTO: 21 K/UL (ref 150–450)
PLATELET BLD QL SMEAR: ABNORMAL
PMV BLD AUTO: ABNORMAL FL (ref 9.2–12.9)
POIKILOCYTOSIS BLD QL SMEAR: SLIGHT
POLYCHROMASIA BLD QL SMEAR: ABNORMAL
RBC # BLD AUTO: 3.03 M/UL (ref 4.6–6.2)
SCHISTOCYTES BLD QL SMEAR: ABNORMAL
WBC # BLD AUTO: 2.73 K/UL (ref 3.9–12.7)

## 2021-06-03 PROCEDURE — 36415 COLL VENOUS BLD VENIPUNCTURE: CPT | Mod: PN | Performed by: INTERNAL MEDICINE

## 2021-06-03 PROCEDURE — 1126F AMNT PAIN NOTED NONE PRSNT: CPT | Mod: S$GLB,,, | Performed by: NURSE PRACTITIONER

## 2021-06-03 PROCEDURE — 3288F PR FALLS RISK ASSESSMENT DOCUMENTED: ICD-10-PCS | Mod: CPTII,S$GLB,, | Performed by: NURSE PRACTITIONER

## 2021-06-03 PROCEDURE — 1159F PR MEDICATION LIST DOCUMENTED IN MEDICAL RECORD: ICD-10-PCS | Mod: S$GLB,,, | Performed by: NURSE PRACTITIONER

## 2021-06-03 PROCEDURE — 3288F FALL RISK ASSESSMENT DOCD: CPT | Mod: CPTII,S$GLB,, | Performed by: NURSE PRACTITIONER

## 2021-06-03 PROCEDURE — 99214 PR OFFICE/OUTPT VISIT, EST, LEVL IV, 30-39 MIN: ICD-10-PCS | Mod: S$GLB,,, | Performed by: NURSE PRACTITIONER

## 2021-06-03 PROCEDURE — 99999 PR PBB SHADOW E&M-EST. PATIENT-LVL IV: ICD-10-PCS | Mod: PBBFAC,,, | Performed by: NURSE PRACTITIONER

## 2021-06-03 PROCEDURE — 86900 BLOOD TYPING SEROLOGIC ABO: CPT | Mod: PN | Performed by: INTERNAL MEDICINE

## 2021-06-03 PROCEDURE — 99999 PR PBB SHADOW E&M-EST. PATIENT-LVL IV: CPT | Mod: PBBFAC,,, | Performed by: NURSE PRACTITIONER

## 2021-06-03 PROCEDURE — 86900 BLOOD TYPING SEROLOGIC ABO: CPT | Performed by: INTERNAL MEDICINE

## 2021-06-03 PROCEDURE — 1159F MED LIST DOCD IN RCRD: CPT | Mod: S$GLB,,, | Performed by: NURSE PRACTITIONER

## 2021-06-03 PROCEDURE — 85007 BL SMEAR W/DIFF WBC COUNT: CPT | Mod: PN | Performed by: INTERNAL MEDICINE

## 2021-06-03 PROCEDURE — 85027 COMPLETE CBC AUTOMATED: CPT | Performed by: INTERNAL MEDICINE

## 2021-06-03 PROCEDURE — 85007 BL SMEAR W/DIFF WBC COUNT: CPT | Performed by: INTERNAL MEDICINE

## 2021-06-03 PROCEDURE — 1101F PR PT FALLS ASSESS DOC 0-1 FALLS W/OUT INJ PAST YR: ICD-10-PCS | Mod: CPTII,S$GLB,, | Performed by: NURSE PRACTITIONER

## 2021-06-03 PROCEDURE — 99214 OFFICE O/P EST MOD 30 MIN: CPT | Mod: S$GLB,,, | Performed by: NURSE PRACTITIONER

## 2021-06-03 PROCEDURE — 1126F PR PAIN SEVERITY QUANTIFIED, NO PAIN PRESENT: ICD-10-PCS | Mod: S$GLB,,, | Performed by: NURSE PRACTITIONER

## 2021-06-03 PROCEDURE — 85027 COMPLETE CBC AUTOMATED: CPT | Mod: PN | Performed by: INTERNAL MEDICINE

## 2021-06-03 PROCEDURE — 1101F PT FALLS ASSESS-DOCD LE1/YR: CPT | Mod: CPTII,S$GLB,, | Performed by: NURSE PRACTITIONER

## 2021-06-03 RX ORDER — FUROSEMIDE 10 MG/ML
20 INJECTION INTRAMUSCULAR; INTRAVENOUS ONCE
Status: CANCELLED | OUTPATIENT
Start: 2021-06-03

## 2021-06-03 RX ORDER — LEVOTHYROXINE SODIUM 50 UG/1
TABLET ORAL
Qty: 90 TABLET | Refills: 3 | Status: ON HOLD | OUTPATIENT
Start: 2021-06-03 | End: 2021-10-14 | Stop reason: HOSPADM

## 2021-06-03 RX ORDER — ACETAMINOPHEN 325 MG/1
650 TABLET ORAL ONCE
Status: CANCELLED | OUTPATIENT
Start: 2021-06-03

## 2021-06-03 RX ORDER — DIPHENHYDRAMINE HCL 25 MG
25 CAPSULE ORAL ONCE
Status: CANCELLED | OUTPATIENT
Start: 2021-06-03

## 2021-06-03 RX ORDER — HYDROCODONE BITARTRATE AND ACETAMINOPHEN 500; 5 MG/1; MG/1
TABLET ORAL ONCE
Status: CANCELLED | OUTPATIENT
Start: 2021-06-03 | End: 2021-06-03

## 2021-06-07 ENCOUNTER — LAB VISIT (OUTPATIENT)
Dept: LAB | Facility: HOSPITAL | Age: 81
End: 2021-06-07
Attending: INTERNAL MEDICINE
Payer: MEDICARE

## 2021-06-07 DIAGNOSIS — D64.9 SYMPTOMATIC ANEMIA: ICD-10-CM

## 2021-06-07 LAB
ABO + RH BLD: NORMAL
ANISOCYTOSIS BLD QL SMEAR: SLIGHT
BASOPHILS NFR BLD: 0 % (ref 0–1.9)
BLASTS NFR BLD MANUAL: 37 %
BLD GP AB SCN CELLS X3 SERPL QL: NORMAL
DACRYOCYTES BLD QL SMEAR: ABNORMAL
DIFFERENTIAL METHOD: ABNORMAL
EOSINOPHIL NFR BLD: 3 % (ref 0–8)
ERYTHROCYTE [DISTWIDTH] IN BLOOD BY AUTOMATED COUNT: 19.2 % (ref 11.5–14.5)
HCT VFR BLD AUTO: 32.9 % (ref 40–54)
HGB BLD-MCNC: 9.7 G/DL (ref 14–18)
HYPOCHROMIA BLD QL SMEAR: ABNORMAL
IMM GRANULOCYTES # BLD AUTO: ABNORMAL K/UL (ref 0–0.04)
IMM GRANULOCYTES NFR BLD AUTO: ABNORMAL % (ref 0–0.5)
LYMPHOCYTES NFR BLD: 40 % (ref 18–48)
MCH RBC QN AUTO: 26.4 PG (ref 27–31)
MCHC RBC AUTO-ENTMCNC: 29.5 G/DL (ref 32–36)
MCV RBC AUTO: 89 FL (ref 82–98)
MONOCYTES NFR BLD: 10 % (ref 4–15)
NEUTROPHILS # BLD AUTO: 0.3 K/UL (ref 1.8–7.7)
NEUTROPHILS NFR BLD: 10 % (ref 38–73)
NRBC BLD-RTO: 16 /100 WBC
OVALOCYTES BLD QL SMEAR: ABNORMAL
PLATELET # BLD AUTO: 22 K/UL (ref 150–450)
PLATELET BLD QL SMEAR: ABNORMAL
PMV BLD AUTO: ABNORMAL FL (ref 9.2–12.9)
POIKILOCYTOSIS BLD QL SMEAR: SLIGHT
POLYCHROMASIA BLD QL SMEAR: ABNORMAL
RBC # BLD AUTO: 3.68 M/UL (ref 4.6–6.2)
SCHISTOCYTES BLD QL SMEAR: ABNORMAL
WBC # BLD AUTO: 3.21 K/UL (ref 3.9–12.7)

## 2021-06-07 PROCEDURE — 85007 BL SMEAR W/DIFF WBC COUNT: CPT | Performed by: INTERNAL MEDICINE

## 2021-06-07 PROCEDURE — 85027 COMPLETE CBC AUTOMATED: CPT | Performed by: INTERNAL MEDICINE

## 2021-06-07 PROCEDURE — 85007 BL SMEAR W/DIFF WBC COUNT: CPT | Mod: PN | Performed by: INTERNAL MEDICINE

## 2021-06-07 PROCEDURE — 86900 BLOOD TYPING SEROLOGIC ABO: CPT | Mod: PN | Performed by: INTERNAL MEDICINE

## 2021-06-07 PROCEDURE — 85027 COMPLETE CBC AUTOMATED: CPT | Mod: PN | Performed by: INTERNAL MEDICINE

## 2021-06-07 PROCEDURE — 86900 BLOOD TYPING SEROLOGIC ABO: CPT | Performed by: INTERNAL MEDICINE

## 2021-06-07 PROCEDURE — 36415 COLL VENOUS BLD VENIPUNCTURE: CPT | Mod: PN | Performed by: INTERNAL MEDICINE

## 2021-06-09 ENCOUNTER — LAB VISIT (OUTPATIENT)
Dept: LAB | Facility: HOSPITAL | Age: 81
End: 2021-06-09
Attending: INTERNAL MEDICINE
Payer: MEDICARE

## 2021-06-09 DIAGNOSIS — D64.9 SYMPTOMATIC ANEMIA: ICD-10-CM

## 2021-06-09 LAB
ABO + RH BLD: NORMAL
ANISOCYTOSIS BLD QL SMEAR: SLIGHT
BASOPHILS NFR BLD: 0 % (ref 0–1.9)
BLASTS NFR BLD MANUAL: 23 %
BLD GP AB SCN CELLS X3 SERPL QL: NORMAL
DACRYOCYTES BLD QL SMEAR: ABNORMAL
DIFFERENTIAL METHOD: ABNORMAL
EOSINOPHIL NFR BLD: 2 % (ref 0–8)
ERYTHROCYTE [DISTWIDTH] IN BLOOD BY AUTOMATED COUNT: 19.3 % (ref 11.5–14.5)
HCT VFR BLD AUTO: 33.6 % (ref 40–54)
HGB BLD-MCNC: 9.8 G/DL (ref 14–18)
HYPOCHROMIA BLD QL SMEAR: ABNORMAL
IMM GRANULOCYTES # BLD AUTO: ABNORMAL K/UL (ref 0–0.04)
IMM GRANULOCYTES NFR BLD AUTO: ABNORMAL % (ref 0–0.5)
LYMPHOCYTES NFR BLD: 45 % (ref 18–48)
MCH RBC QN AUTO: 26.1 PG (ref 27–31)
MCHC RBC AUTO-ENTMCNC: 29.2 G/DL (ref 32–36)
MCV RBC AUTO: 89 FL (ref 82–98)
METAMYELOCYTES NFR BLD MANUAL: 1 %
MONOCYTES NFR BLD: 15 % (ref 4–15)
NEUTROPHILS # BLD AUTO: 0.4 K/UL (ref 1.8–7.7)
NEUTROPHILS NFR BLD: 14 % (ref 38–73)
NRBC BLD-RTO: 23 /100 WBC
OVALOCYTES BLD QL SMEAR: ABNORMAL
PLATELET # BLD AUTO: 30 K/UL (ref 150–450)
PLATELET BLD QL SMEAR: ABNORMAL
PMV BLD AUTO: ABNORMAL FL (ref 9.2–12.9)
POIKILOCYTOSIS BLD QL SMEAR: SLIGHT
POLYCHROMASIA BLD QL SMEAR: ABNORMAL
RBC # BLD AUTO: 3.76 M/UL (ref 4.6–6.2)
SCHISTOCYTES BLD QL SMEAR: PRESENT
WBC # BLD AUTO: 2.65 K/UL (ref 3.9–12.7)

## 2021-06-09 PROCEDURE — 85027 COMPLETE CBC AUTOMATED: CPT | Mod: PN | Performed by: INTERNAL MEDICINE

## 2021-06-09 PROCEDURE — 86900 BLOOD TYPING SEROLOGIC ABO: CPT | Mod: PN | Performed by: INTERNAL MEDICINE

## 2021-06-09 PROCEDURE — 85007 BL SMEAR W/DIFF WBC COUNT: CPT | Performed by: INTERNAL MEDICINE

## 2021-06-09 PROCEDURE — 85007 BL SMEAR W/DIFF WBC COUNT: CPT | Mod: PN | Performed by: INTERNAL MEDICINE

## 2021-06-09 PROCEDURE — 85027 COMPLETE CBC AUTOMATED: CPT | Performed by: INTERNAL MEDICINE

## 2021-06-09 PROCEDURE — 86900 BLOOD TYPING SEROLOGIC ABO: CPT | Performed by: INTERNAL MEDICINE

## 2021-06-10 ENCOUNTER — SPECIALTY PHARMACY (OUTPATIENT)
Dept: PHARMACY | Facility: CLINIC | Age: 81
End: 2021-06-10

## 2021-06-10 ENCOUNTER — TELEPHONE (OUTPATIENT)
Dept: HEMATOLOGY/ONCOLOGY | Facility: CLINIC | Age: 81
End: 2021-06-10

## 2021-06-14 ENCOUNTER — TELEPHONE (OUTPATIENT)
Dept: HEMATOLOGY/ONCOLOGY | Facility: CLINIC | Age: 81
End: 2021-06-14

## 2021-06-14 ENCOUNTER — LAB VISIT (OUTPATIENT)
Dept: LAB | Facility: HOSPITAL | Age: 81
End: 2021-06-14
Attending: INTERNAL MEDICINE
Payer: MEDICARE

## 2021-06-14 DIAGNOSIS — D64.9 SYMPTOMATIC ANEMIA: ICD-10-CM

## 2021-06-14 DIAGNOSIS — D46.Z MDS (MYELODYSPLASTIC SYNDROME), HIGH GRADE: Primary | ICD-10-CM

## 2021-06-14 LAB
ABO + RH BLD: NORMAL
ANISOCYTOSIS BLD QL SMEAR: SLIGHT
BASOPHILS NFR BLD: 0 % (ref 0–1.9)
BLASTS NFR BLD MANUAL: 11 %
BLD GP AB SCN CELLS X3 SERPL QL: NORMAL
DACRYOCYTES BLD QL SMEAR: ABNORMAL
DIFFERENTIAL METHOD: ABNORMAL
EOSINOPHIL NFR BLD: 2 % (ref 0–8)
ERYTHROCYTE [DISTWIDTH] IN BLOOD BY AUTOMATED COUNT: 19.9 % (ref 11.5–14.5)
HCT VFR BLD AUTO: 31.1 % (ref 40–54)
HGB BLD-MCNC: 9.1 G/DL (ref 14–18)
HYPOCHROMIA BLD QL SMEAR: ABNORMAL
IMM GRANULOCYTES # BLD AUTO: ABNORMAL K/UL (ref 0–0.04)
IMM GRANULOCYTES NFR BLD AUTO: ABNORMAL % (ref 0–0.5)
LYMPHOCYTES NFR BLD: 51 % (ref 18–48)
MCH RBC QN AUTO: 25.8 PG (ref 27–31)
MCHC RBC AUTO-ENTMCNC: 29.3 G/DL (ref 32–36)
MCV RBC AUTO: 88 FL (ref 82–98)
METAMYELOCYTES NFR BLD MANUAL: 1 %
MONOCYTES NFR BLD: 17 % (ref 4–15)
NEUTROPHILS NFR BLD: 17 % (ref 38–73)
NEUTS BAND NFR BLD MANUAL: 1 %
NRBC BLD-RTO: 16 /100 WBC
OVALOCYTES BLD QL SMEAR: ABNORMAL
PLATELET # BLD AUTO: 44 K/UL (ref 150–450)
PLATELET BLD QL SMEAR: ABNORMAL
PMV BLD AUTO: ABNORMAL FL (ref 9.2–12.9)
POIKILOCYTOSIS BLD QL SMEAR: SLIGHT
POLYCHROMASIA BLD QL SMEAR: ABNORMAL
RBC # BLD AUTO: 3.53 M/UL (ref 4.6–6.2)
SCHISTOCYTES BLD QL SMEAR: PRESENT
WBC # BLD AUTO: 2.47 K/UL (ref 3.9–12.7)

## 2021-06-14 PROCEDURE — 85025 COMPLETE CBC W/AUTO DIFF WBC: CPT | Mod: PN | Performed by: INTERNAL MEDICINE

## 2021-06-14 PROCEDURE — 36415 COLL VENOUS BLD VENIPUNCTURE: CPT | Mod: PN | Performed by: INTERNAL MEDICINE

## 2021-06-14 PROCEDURE — 86900 BLOOD TYPING SEROLOGIC ABO: CPT | Performed by: INTERNAL MEDICINE

## 2021-06-14 PROCEDURE — 86900 BLOOD TYPING SEROLOGIC ABO: CPT | Mod: PN | Performed by: INTERNAL MEDICINE

## 2021-06-17 ENCOUNTER — PATIENT MESSAGE (OUTPATIENT)
Dept: HEMATOLOGY/ONCOLOGY | Facility: CLINIC | Age: 81
End: 2021-06-17

## 2021-06-17 ENCOUNTER — LAB VISIT (OUTPATIENT)
Dept: LAB | Facility: HOSPITAL | Age: 81
End: 2021-06-17
Attending: INTERNAL MEDICINE
Payer: MEDICARE

## 2021-06-17 DIAGNOSIS — D64.9 SYMPTOMATIC ANEMIA: ICD-10-CM

## 2021-06-17 LAB
ABO + RH BLD: NORMAL
ANISOCYTOSIS BLD QL SMEAR: SLIGHT
BASO STIPL BLD QL SMEAR: ABNORMAL
BASOPHILS NFR BLD: 1 % (ref 0–1.9)
BLASTS NFR BLD MANUAL: 1 %
BLD GP AB SCN CELLS X3 SERPL QL: NORMAL
DACRYOCYTES BLD QL SMEAR: ABNORMAL
DIFFERENTIAL METHOD: ABNORMAL
EOSINOPHIL NFR BLD: 1 % (ref 0–8)
ERYTHROCYTE [DISTWIDTH] IN BLOOD BY AUTOMATED COUNT: 19.9 % (ref 11.5–14.5)
GIANT PLATELETS BLD QL SMEAR: PRESENT
HCT VFR BLD AUTO: 29.8 % (ref 40–54)
HGB BLD-MCNC: 8.7 G/DL (ref 14–18)
HYPOCHROMIA BLD QL SMEAR: ABNORMAL
IMM GRANULOCYTES # BLD AUTO: ABNORMAL K/UL (ref 0–0.04)
IMM GRANULOCYTES NFR BLD AUTO: ABNORMAL % (ref 0–0.5)
LYMPHOCYTES NFR BLD: 82 % (ref 18–48)
MCH RBC QN AUTO: 25.8 PG (ref 27–31)
MCHC RBC AUTO-ENTMCNC: 29.2 G/DL (ref 32–36)
MCV RBC AUTO: 88 FL (ref 82–98)
MONOCYTES NFR BLD: 6 % (ref 4–15)
NEUTROPHILS NFR BLD: 6 % (ref 38–73)
NRBC BLD-RTO: 14 /100 WBC
OVALOCYTES BLD QL SMEAR: ABNORMAL
PATH REV BLD -IMP: NORMAL
PLATELET # BLD AUTO: 47 K/UL (ref 150–450)
PLATELET BLD QL SMEAR: ABNORMAL
PMV BLD AUTO: ABNORMAL FL (ref 9.2–12.9)
POIKILOCYTOSIS BLD QL SMEAR: SLIGHT
POLYCHROMASIA BLD QL SMEAR: ABNORMAL
RBC # BLD AUTO: 3.37 M/UL (ref 4.6–6.2)
SCHISTOCYTES BLD QL SMEAR: PRESENT
WBC # BLD AUTO: 2.14 K/UL (ref 3.9–12.7)
WBC OTHER NFR BLD MANUAL: 3 %

## 2021-06-17 PROCEDURE — 85007 BL SMEAR W/DIFF WBC COUNT: CPT | Performed by: INTERNAL MEDICINE

## 2021-06-17 PROCEDURE — 85027 COMPLETE CBC AUTOMATED: CPT | Performed by: INTERNAL MEDICINE

## 2021-06-17 PROCEDURE — 85060 BLOOD SMEAR INTERPRETATION: CPT | Mod: ,,, | Performed by: PATHOLOGY

## 2021-06-17 PROCEDURE — 36415 COLL VENOUS BLD VENIPUNCTURE: CPT | Mod: PN | Performed by: INTERNAL MEDICINE

## 2021-06-17 PROCEDURE — 86900 BLOOD TYPING SEROLOGIC ABO: CPT | Mod: PN | Performed by: INTERNAL MEDICINE

## 2021-06-17 PROCEDURE — 85027 COMPLETE CBC AUTOMATED: CPT | Mod: PN | Performed by: INTERNAL MEDICINE

## 2021-06-17 PROCEDURE — 85007 BL SMEAR W/DIFF WBC COUNT: CPT | Mod: PN | Performed by: INTERNAL MEDICINE

## 2021-06-17 PROCEDURE — 86900 BLOOD TYPING SEROLOGIC ABO: CPT | Performed by: INTERNAL MEDICINE

## 2021-06-17 PROCEDURE — 85060 PATHOLOGIST REVIEW: ICD-10-PCS | Mod: ,,, | Performed by: PATHOLOGY

## 2021-06-21 ENCOUNTER — INFUSION (OUTPATIENT)
Dept: INFUSION THERAPY | Facility: HOSPITAL | Age: 81
End: 2021-06-21
Attending: INTERNAL MEDICINE
Payer: MEDICARE

## 2021-06-21 ENCOUNTER — OFFICE VISIT (OUTPATIENT)
Dept: HEMATOLOGY/ONCOLOGY | Facility: CLINIC | Age: 81
End: 2021-06-21
Payer: MEDICARE

## 2021-06-21 ENCOUNTER — PATIENT MESSAGE (OUTPATIENT)
Dept: HEMATOLOGY/ONCOLOGY | Facility: CLINIC | Age: 81
End: 2021-06-21

## 2021-06-21 ENCOUNTER — SPECIALTY PHARMACY (OUTPATIENT)
Dept: PHARMACY | Facility: CLINIC | Age: 81
End: 2021-06-21

## 2021-06-21 VITALS
RESPIRATION RATE: 18 BRPM | WEIGHT: 167.31 LBS | HEART RATE: 72 BPM | DIASTOLIC BLOOD PRESSURE: 73 MMHG | SYSTOLIC BLOOD PRESSURE: 126 MMHG | BODY MASS INDEX: 26.21 KG/M2 | TEMPERATURE: 98 F

## 2021-06-21 VITALS
DIASTOLIC BLOOD PRESSURE: 63 MMHG | BODY MASS INDEX: 26.26 KG/M2 | RESPIRATION RATE: 18 BRPM | HEIGHT: 67 IN | HEART RATE: 80 BPM | OXYGEN SATURATION: 97 % | SYSTOLIC BLOOD PRESSURE: 110 MMHG | WEIGHT: 167.31 LBS

## 2021-06-21 DIAGNOSIS — D61.818 PANCYTOPENIA: ICD-10-CM

## 2021-06-21 DIAGNOSIS — D46.Z MDS (MYELODYSPLASTIC SYNDROME), HIGH GRADE: Primary | ICD-10-CM

## 2021-06-21 DIAGNOSIS — D45 POLYCYTHEMIA VERA: ICD-10-CM

## 2021-06-21 DIAGNOSIS — I48.0 PAROXYSMAL ATRIAL FIBRILLATION: ICD-10-CM

## 2021-06-21 PROCEDURE — 99999 PR PBB SHADOW E&M-EST. PATIENT-LVL IV: ICD-10-PCS | Mod: PBBFAC,,, | Performed by: INTERNAL MEDICINE

## 2021-06-21 PROCEDURE — 25000003 PHARM REV CODE 250: Mod: PN | Performed by: INTERNAL MEDICINE

## 2021-06-21 PROCEDURE — 99499 UNLISTED E&M SERVICE: CPT | Mod: S$GLB,,, | Performed by: INTERNAL MEDICINE

## 2021-06-21 PROCEDURE — 99999 PR PBB SHADOW E&M-EST. PATIENT-LVL IV: CPT | Mod: PBBFAC,,, | Performed by: INTERNAL MEDICINE

## 2021-06-21 PROCEDURE — 99214 OFFICE O/P EST MOD 30 MIN: CPT | Mod: S$GLB,,, | Performed by: INTERNAL MEDICINE

## 2021-06-21 PROCEDURE — 99214 PR OFFICE/OUTPT VISIT, EST, LEVL IV, 30-39 MIN: ICD-10-PCS | Mod: S$GLB,,, | Performed by: INTERNAL MEDICINE

## 2021-06-21 PROCEDURE — 63600175 PHARM REV CODE 636 W HCPCS: Mod: JG,PN | Performed by: INTERNAL MEDICINE

## 2021-06-21 PROCEDURE — 1159F MED LIST DOCD IN RCRD: CPT | Mod: S$GLB,,, | Performed by: INTERNAL MEDICINE

## 2021-06-21 PROCEDURE — 1159F PR MEDICATION LIST DOCUMENTED IN MEDICAL RECORD: ICD-10-PCS | Mod: S$GLB,,, | Performed by: INTERNAL MEDICINE

## 2021-06-21 PROCEDURE — 1126F PR PAIN SEVERITY QUANTIFIED, NO PAIN PRESENT: ICD-10-PCS | Mod: S$GLB,,, | Performed by: INTERNAL MEDICINE

## 2021-06-21 PROCEDURE — 96409 CHEMO IV PUSH SNGL DRUG: CPT | Mod: PN

## 2021-06-21 PROCEDURE — 3288F FALL RISK ASSESSMENT DOCD: CPT | Mod: CPTII,S$GLB,, | Performed by: INTERNAL MEDICINE

## 2021-06-21 PROCEDURE — 1101F PR PT FALLS ASSESS DOC 0-1 FALLS W/OUT INJ PAST YR: ICD-10-PCS | Mod: CPTII,S$GLB,, | Performed by: INTERNAL MEDICINE

## 2021-06-21 PROCEDURE — 96375 TX/PRO/DX INJ NEW DRUG ADDON: CPT | Mod: PN

## 2021-06-21 PROCEDURE — 1101F PT FALLS ASSESS-DOCD LE1/YR: CPT | Mod: CPTII,S$GLB,, | Performed by: INTERNAL MEDICINE

## 2021-06-21 PROCEDURE — 1126F AMNT PAIN NOTED NONE PRSNT: CPT | Mod: S$GLB,,, | Performed by: INTERNAL MEDICINE

## 2021-06-21 PROCEDURE — 99499 RISK ADDL DX/OHS AUDIT: ICD-10-PCS | Mod: S$GLB,,, | Performed by: INTERNAL MEDICINE

## 2021-06-21 PROCEDURE — 3288F PR FALLS RISK ASSESSMENT DOCUMENTED: ICD-10-PCS | Mod: CPTII,S$GLB,, | Performed by: INTERNAL MEDICINE

## 2021-06-21 RX ORDER — SODIUM CHLORIDE 0.9 % (FLUSH) 0.9 %
10 SYRINGE (ML) INJECTION
Status: CANCELLED | OUTPATIENT
Start: 2021-06-22

## 2021-06-21 RX ORDER — ONDANSETRON 2 MG/ML
8 INJECTION INTRAMUSCULAR; INTRAVENOUS ONCE
Status: COMPLETED | OUTPATIENT
Start: 2021-06-21 | End: 2021-06-21

## 2021-06-21 RX ORDER — HEPARIN 100 UNIT/ML
500 SYRINGE INTRAVENOUS
Status: DISCONTINUED | OUTPATIENT
Start: 2021-06-21 | End: 2021-06-21 | Stop reason: HOSPADM

## 2021-06-21 RX ORDER — HEPARIN 100 UNIT/ML
500 SYRINGE INTRAVENOUS
Status: CANCELLED | OUTPATIENT
Start: 2021-06-23

## 2021-06-21 RX ORDER — ONDANSETRON 2 MG/ML
8 INJECTION INTRAMUSCULAR; INTRAVENOUS ONCE
Status: CANCELLED
Start: 2021-06-22 | End: 2021-06-22

## 2021-06-21 RX ORDER — HEPARIN 100 UNIT/ML
500 SYRINGE INTRAVENOUS
Status: CANCELLED | OUTPATIENT
Start: 2021-06-22

## 2021-06-21 RX ORDER — SODIUM CHLORIDE 0.9 % (FLUSH) 0.9 %
10 SYRINGE (ML) INJECTION
Status: CANCELLED | OUTPATIENT
Start: 2021-06-24

## 2021-06-21 RX ORDER — SODIUM CHLORIDE 0.9 % (FLUSH) 0.9 %
10 SYRINGE (ML) INJECTION
Status: DISCONTINUED | OUTPATIENT
Start: 2021-06-21 | End: 2021-06-21 | Stop reason: HOSPADM

## 2021-06-21 RX ORDER — SODIUM CHLORIDE 0.9 % (FLUSH) 0.9 %
10 SYRINGE (ML) INJECTION
Status: CANCELLED | OUTPATIENT
Start: 2021-06-21

## 2021-06-21 RX ORDER — ONDANSETRON 2 MG/ML
8 INJECTION INTRAMUSCULAR; INTRAVENOUS ONCE
Status: CANCELLED
Start: 2021-06-23 | End: 2021-06-23

## 2021-06-21 RX ORDER — HEPARIN 100 UNIT/ML
500 SYRINGE INTRAVENOUS
Status: CANCELLED | OUTPATIENT
Start: 2021-06-21

## 2021-06-21 RX ORDER — ONDANSETRON 2 MG/ML
8 INJECTION INTRAMUSCULAR; INTRAVENOUS ONCE
Status: CANCELLED
Start: 2021-06-24 | End: 2021-06-24

## 2021-06-21 RX ORDER — SODIUM CHLORIDE 0.9 % (FLUSH) 0.9 %
10 SYRINGE (ML) INJECTION
Status: CANCELLED | OUTPATIENT
Start: 2021-06-25

## 2021-06-21 RX ORDER — HEPARIN 100 UNIT/ML
500 SYRINGE INTRAVENOUS
Status: CANCELLED | OUTPATIENT
Start: 2021-06-25

## 2021-06-21 RX ORDER — SODIUM CHLORIDE 0.9 % (FLUSH) 0.9 %
10 SYRINGE (ML) INJECTION
Status: CANCELLED | OUTPATIENT
Start: 2021-06-23

## 2021-06-21 RX ORDER — ONDANSETRON 2 MG/ML
8 INJECTION INTRAMUSCULAR; INTRAVENOUS ONCE
Status: CANCELLED
Start: 2021-06-25 | End: 2021-06-25

## 2021-06-21 RX ORDER — ONDANSETRON 2 MG/ML
8 INJECTION INTRAMUSCULAR; INTRAVENOUS ONCE
Status: CANCELLED
Start: 2021-06-21 | End: 2021-06-21

## 2021-06-21 RX ORDER — HEPARIN 100 UNIT/ML
500 SYRINGE INTRAVENOUS
Status: CANCELLED | OUTPATIENT
Start: 2021-06-24

## 2021-06-21 RX ADMIN — SODIUM CHLORIDE: 0.9 INJECTION, SOLUTION INTRAVENOUS at 04:06

## 2021-06-21 RX ADMIN — AZACITIDINE 140 MG: 100 INJECTION, POWDER, LYOPHILIZED, FOR SOLUTION INTRAVENOUS; SUBCUTANEOUS at 04:06

## 2021-06-21 RX ADMIN — ONDANSETRON 8 MG: 2 INJECTION INTRAMUSCULAR; INTRAVENOUS at 04:06

## 2021-06-22 ENCOUNTER — INFUSION (OUTPATIENT)
Dept: INFUSION THERAPY | Facility: HOSPITAL | Age: 81
End: 2021-06-22
Attending: INTERNAL MEDICINE
Payer: MEDICARE

## 2021-06-22 VITALS
RESPIRATION RATE: 18 BRPM | HEART RATE: 77 BPM | DIASTOLIC BLOOD PRESSURE: 62 MMHG | TEMPERATURE: 98 F | SYSTOLIC BLOOD PRESSURE: 108 MMHG

## 2021-06-22 DIAGNOSIS — D46.Z MDS (MYELODYSPLASTIC SYNDROME), HIGH GRADE: Primary | ICD-10-CM

## 2021-06-22 PROCEDURE — 96409 CHEMO IV PUSH SNGL DRUG: CPT | Mod: PN

## 2021-06-22 PROCEDURE — A4216 STERILE WATER/SALINE, 10 ML: HCPCS | Mod: PN | Performed by: INTERNAL MEDICINE

## 2021-06-22 PROCEDURE — 63600175 PHARM REV CODE 636 W HCPCS: Mod: JG,PN | Performed by: INTERNAL MEDICINE

## 2021-06-22 PROCEDURE — 96375 TX/PRO/DX INJ NEW DRUG ADDON: CPT | Mod: PN

## 2021-06-22 PROCEDURE — 25000003 PHARM REV CODE 250: Mod: PN | Performed by: INTERNAL MEDICINE

## 2021-06-22 RX ORDER — HEPARIN 100 UNIT/ML
500 SYRINGE INTRAVENOUS
Status: DISCONTINUED | OUTPATIENT
Start: 2021-06-22 | End: 2021-06-22 | Stop reason: HOSPADM

## 2021-06-22 RX ORDER — SODIUM CHLORIDE 0.9 % (FLUSH) 0.9 %
10 SYRINGE (ML) INJECTION
Status: DISCONTINUED | OUTPATIENT
Start: 2021-06-22 | End: 2021-06-22 | Stop reason: HOSPADM

## 2021-06-22 RX ORDER — ONDANSETRON 2 MG/ML
8 INJECTION INTRAMUSCULAR; INTRAVENOUS ONCE
Status: COMPLETED | OUTPATIENT
Start: 2021-06-22 | End: 2021-06-22

## 2021-06-22 RX ADMIN — AZACITIDINE 140 MG: 100 INJECTION, POWDER, LYOPHILIZED, FOR SOLUTION INTRAVENOUS; SUBCUTANEOUS at 03:06

## 2021-06-22 RX ADMIN — Medication 10 ML: at 03:06

## 2021-06-22 RX ADMIN — SODIUM CHLORIDE: 900 INJECTION, SOLUTION INTRAVENOUS at 02:06

## 2021-06-22 RX ADMIN — ONDANSETRON 8 MG: 2 INJECTION INTRAMUSCULAR; INTRAVENOUS at 02:06

## 2021-06-23 ENCOUNTER — INFUSION (OUTPATIENT)
Dept: INFUSION THERAPY | Facility: HOSPITAL | Age: 81
End: 2021-06-23
Attending: INTERNAL MEDICINE
Payer: MEDICARE

## 2021-06-23 ENCOUNTER — TELEPHONE (OUTPATIENT)
Dept: HEMATOLOGY/ONCOLOGY | Facility: CLINIC | Age: 81
End: 2021-06-23

## 2021-06-23 VITALS
WEIGHT: 168 LBS | HEART RATE: 68 BPM | SYSTOLIC BLOOD PRESSURE: 106 MMHG | TEMPERATURE: 98 F | BODY MASS INDEX: 26.31 KG/M2 | RESPIRATION RATE: 18 BRPM | DIASTOLIC BLOOD PRESSURE: 70 MMHG

## 2021-06-23 DIAGNOSIS — D46.Z MDS (MYELODYSPLASTIC SYNDROME), HIGH GRADE: Primary | ICD-10-CM

## 2021-06-23 PROCEDURE — 96409 CHEMO IV PUSH SNGL DRUG: CPT | Mod: PN

## 2021-06-23 PROCEDURE — 96375 TX/PRO/DX INJ NEW DRUG ADDON: CPT | Mod: PN

## 2021-06-23 PROCEDURE — 63600175 PHARM REV CODE 636 W HCPCS: Mod: PN | Performed by: INTERNAL MEDICINE

## 2021-06-23 PROCEDURE — 25000003 PHARM REV CODE 250: Mod: PN | Performed by: INTERNAL MEDICINE

## 2021-06-23 RX ORDER — ONDANSETRON 2 MG/ML
8 INJECTION INTRAMUSCULAR; INTRAVENOUS ONCE
Status: COMPLETED | OUTPATIENT
Start: 2021-06-23 | End: 2021-06-23

## 2021-06-23 RX ADMIN — ONDANSETRON 8 MG: 2 INJECTION INTRAMUSCULAR; INTRAVENOUS at 02:06

## 2021-06-23 RX ADMIN — AZACITIDINE 140 MG: 100 INJECTION, POWDER, LYOPHILIZED, FOR SOLUTION INTRAVENOUS; SUBCUTANEOUS at 02:06

## 2021-06-23 RX ADMIN — SODIUM CHLORIDE: 0.9 INJECTION, SOLUTION INTRAVENOUS at 02:06

## 2021-06-24 ENCOUNTER — INFUSION (OUTPATIENT)
Dept: INFUSION THERAPY | Facility: HOSPITAL | Age: 81
End: 2021-06-24
Attending: INTERNAL MEDICINE
Payer: MEDICARE

## 2021-06-24 VITALS
SYSTOLIC BLOOD PRESSURE: 121 MMHG | WEIGHT: 167.56 LBS | TEMPERATURE: 98 F | DIASTOLIC BLOOD PRESSURE: 70 MMHG | BODY MASS INDEX: 26.24 KG/M2 | HEART RATE: 75 BPM | RESPIRATION RATE: 18 BRPM

## 2021-06-24 DIAGNOSIS — D46.Z MDS (MYELODYSPLASTIC SYNDROME), HIGH GRADE: Primary | ICD-10-CM

## 2021-06-24 PROCEDURE — 25000003 PHARM REV CODE 250: Mod: PN | Performed by: INTERNAL MEDICINE

## 2021-06-24 PROCEDURE — 63600175 PHARM REV CODE 636 W HCPCS: Mod: JG,PN | Performed by: INTERNAL MEDICINE

## 2021-06-24 PROCEDURE — 96409 CHEMO IV PUSH SNGL DRUG: CPT | Mod: PN

## 2021-06-24 PROCEDURE — 96375 TX/PRO/DX INJ NEW DRUG ADDON: CPT | Mod: PN

## 2021-06-24 RX ORDER — SODIUM CHLORIDE 0.9 % (FLUSH) 0.9 %
10 SYRINGE (ML) INJECTION
Status: DISCONTINUED | OUTPATIENT
Start: 2021-06-24 | End: 2021-06-24 | Stop reason: HOSPADM

## 2021-06-24 RX ORDER — ONDANSETRON 2 MG/ML
8 INJECTION INTRAMUSCULAR; INTRAVENOUS ONCE
Status: COMPLETED | OUTPATIENT
Start: 2021-06-24 | End: 2021-06-24

## 2021-06-24 RX ORDER — HEPARIN 100 UNIT/ML
500 SYRINGE INTRAVENOUS
Status: DISCONTINUED | OUTPATIENT
Start: 2021-06-24 | End: 2021-06-24 | Stop reason: HOSPADM

## 2021-06-24 RX ADMIN — ONDANSETRON 8 MG: 2 INJECTION INTRAMUSCULAR; INTRAVENOUS at 02:06

## 2021-06-24 RX ADMIN — SODIUM CHLORIDE: 0.9 INJECTION, SOLUTION INTRAVENOUS at 02:06

## 2021-06-24 RX ADMIN — AZACITIDINE 140 MG: 100 INJECTION, POWDER, LYOPHILIZED, FOR SOLUTION INTRAVENOUS; SUBCUTANEOUS at 03:06

## 2021-06-25 ENCOUNTER — INFUSION (OUTPATIENT)
Dept: INFUSION THERAPY | Facility: HOSPITAL | Age: 81
End: 2021-06-25
Attending: INTERNAL MEDICINE
Payer: MEDICARE

## 2021-06-25 VITALS
SYSTOLIC BLOOD PRESSURE: 114 MMHG | TEMPERATURE: 98 F | HEART RATE: 68 BPM | BODY MASS INDEX: 26.3 KG/M2 | WEIGHT: 167.56 LBS | HEIGHT: 67 IN | DIASTOLIC BLOOD PRESSURE: 65 MMHG | RESPIRATION RATE: 18 BRPM

## 2021-06-25 DIAGNOSIS — D46.Z MDS (MYELODYSPLASTIC SYNDROME), HIGH GRADE: Primary | ICD-10-CM

## 2021-06-25 PROCEDURE — 96409 CHEMO IV PUSH SNGL DRUG: CPT | Mod: PN

## 2021-06-25 PROCEDURE — 96375 TX/PRO/DX INJ NEW DRUG ADDON: CPT | Mod: PN

## 2021-06-25 PROCEDURE — 25000003 PHARM REV CODE 250: Mod: PN | Performed by: INTERNAL MEDICINE

## 2021-06-25 PROCEDURE — A4216 STERILE WATER/SALINE, 10 ML: HCPCS | Mod: PN | Performed by: INTERNAL MEDICINE

## 2021-06-25 PROCEDURE — 63600175 PHARM REV CODE 636 W HCPCS: Mod: PN | Performed by: INTERNAL MEDICINE

## 2021-06-25 RX ORDER — ONDANSETRON 2 MG/ML
8 INJECTION INTRAMUSCULAR; INTRAVENOUS
Status: COMPLETED | OUTPATIENT
Start: 2021-06-25 | End: 2021-06-25

## 2021-06-25 RX ORDER — HEPARIN 100 UNIT/ML
500 SYRINGE INTRAVENOUS
Status: DISCONTINUED | OUTPATIENT
Start: 2021-06-25 | End: 2021-06-25 | Stop reason: HOSPADM

## 2021-06-25 RX ORDER — SODIUM CHLORIDE 0.9 % (FLUSH) 0.9 %
10 SYRINGE (ML) INJECTION
Status: DISCONTINUED | OUTPATIENT
Start: 2021-06-25 | End: 2021-06-25 | Stop reason: HOSPADM

## 2021-06-25 RX ADMIN — SODIUM CHLORIDE: 9 INJECTION, SOLUTION INTRAVENOUS at 02:06

## 2021-06-25 RX ADMIN — AZACITIDINE 140 MG: 100 INJECTION, POWDER, LYOPHILIZED, FOR SOLUTION INTRAVENOUS; SUBCUTANEOUS at 03:06

## 2021-06-25 RX ADMIN — ONDANSETRON 8 MG: 2 INJECTION INTRAMUSCULAR; INTRAVENOUS at 02:06

## 2021-06-25 RX ADMIN — Medication 10 ML: at 02:06

## 2021-06-28 ENCOUNTER — LAB VISIT (OUTPATIENT)
Dept: LAB | Facility: HOSPITAL | Age: 81
End: 2021-06-28
Payer: MEDICARE

## 2021-06-28 ENCOUNTER — SPECIALTY PHARMACY (OUTPATIENT)
Dept: PHARMACY | Facility: CLINIC | Age: 81
End: 2021-06-28

## 2021-06-28 DIAGNOSIS — D64.9 SYMPTOMATIC ANEMIA: ICD-10-CM

## 2021-06-28 LAB
ABO + RH BLD: NORMAL
ANISOCYTOSIS BLD QL SMEAR: SLIGHT
BASOPHILS # BLD AUTO: ABNORMAL K/UL (ref 0–0.2)
BASOPHILS NFR BLD: 0 % (ref 0–1.9)
BLASTS NFR BLD MANUAL: 7 %
BLD GP AB SCN CELLS X3 SERPL QL: NORMAL
DACRYOCYTES BLD QL SMEAR: ABNORMAL
DIFFERENTIAL METHOD: ABNORMAL
EOSINOPHIL # BLD AUTO: ABNORMAL K/UL (ref 0–0.5)
EOSINOPHIL NFR BLD: 0 % (ref 0–8)
ERYTHROCYTE [DISTWIDTH] IN BLOOD BY AUTOMATED COUNT: 19.1 % (ref 11.5–14.5)
HCT VFR BLD AUTO: 24.4 % (ref 40–54)
HGB BLD-MCNC: 7.2 G/DL (ref 14–18)
IMM GRANULOCYTES # BLD AUTO: ABNORMAL K/UL (ref 0–0.04)
IMM GRANULOCYTES NFR BLD AUTO: ABNORMAL % (ref 0–0.5)
LYMPHOCYTES # BLD AUTO: ABNORMAL K/UL (ref 1–4.8)
LYMPHOCYTES NFR BLD: 76 % (ref 18–48)
MCH RBC QN AUTO: 25.5 PG (ref 27–31)
MCHC RBC AUTO-ENTMCNC: 29.5 G/DL (ref 32–36)
MCV RBC AUTO: 87 FL (ref 82–98)
METAMYELOCYTES NFR BLD MANUAL: 3 %
MONOCYTES # BLD AUTO: ABNORMAL K/UL (ref 0.3–1)
MONOCYTES NFR BLD: 7 % (ref 4–15)
MYELOCYTES NFR BLD MANUAL: 2 %
NEUTROPHILS NFR BLD: 4 % (ref 38–73)
NEUTS BAND NFR BLD MANUAL: 1 %
NRBC BLD-RTO: 12 /100 WBC
OVALOCYTES BLD QL SMEAR: ABNORMAL
PLATELET # BLD AUTO: 35 K/UL (ref 150–450)
PLATELET BLD QL SMEAR: ABNORMAL
PMV BLD AUTO: ABNORMAL FL (ref 9.2–12.9)
POIKILOCYTOSIS BLD QL SMEAR: SLIGHT
POLYCHROMASIA BLD QL SMEAR: ABNORMAL
RBC # BLD AUTO: 2.82 M/UL (ref 4.6–6.2)
WBC # BLD AUTO: 1.79 K/UL (ref 3.9–12.7)

## 2021-06-28 PROCEDURE — 36415 COLL VENOUS BLD VENIPUNCTURE: CPT | Mod: PN | Performed by: INTERNAL MEDICINE

## 2021-06-28 PROCEDURE — 86900 BLOOD TYPING SEROLOGIC ABO: CPT | Mod: PN | Performed by: INTERNAL MEDICINE

## 2021-06-28 PROCEDURE — 85007 BL SMEAR W/DIFF WBC COUNT: CPT | Mod: PN | Performed by: INTERNAL MEDICINE

## 2021-06-28 PROCEDURE — 86900 BLOOD TYPING SEROLOGIC ABO: CPT | Performed by: INTERNAL MEDICINE

## 2021-06-28 PROCEDURE — 85027 COMPLETE CBC AUTOMATED: CPT | Mod: PN | Performed by: INTERNAL MEDICINE

## 2021-07-01 ENCOUNTER — LAB VISIT (OUTPATIENT)
Dept: LAB | Facility: HOSPITAL | Age: 81
End: 2021-07-01
Attending: INTERNAL MEDICINE
Payer: MEDICARE

## 2021-07-01 ENCOUNTER — TELEPHONE (OUTPATIENT)
Dept: HEMATOLOGY/ONCOLOGY | Facility: CLINIC | Age: 81
End: 2021-07-01

## 2021-07-01 DIAGNOSIS — D64.9 SYMPTOMATIC ANEMIA: Primary | ICD-10-CM

## 2021-07-01 DIAGNOSIS — D64.9 SYMPTOMATIC ANEMIA: ICD-10-CM

## 2021-07-01 LAB
ABO + RH BLD: NORMAL
ANISOCYTOSIS BLD QL SMEAR: ABNORMAL
BASO STIPL BLD QL SMEAR: ABNORMAL
BASOPHILS # BLD AUTO: ABNORMAL K/UL (ref 0–0.2)
BASOPHILS NFR BLD: 0 % (ref 0–1.9)
BLASTS NFR BLD MANUAL: 9 %
BLD GP AB SCN CELLS X3 SERPL QL: NORMAL
DACRYOCYTES BLD QL SMEAR: ABNORMAL
DIFFERENTIAL METHOD: ABNORMAL
EOSINOPHIL # BLD AUTO: ABNORMAL K/UL (ref 0–0.5)
EOSINOPHIL NFR BLD: 0 % (ref 0–8)
ERYTHROCYTE [DISTWIDTH] IN BLOOD BY AUTOMATED COUNT: 20 % (ref 11.5–14.5)
GIANT PLATELETS BLD QL SMEAR: PRESENT
HCT VFR BLD AUTO: 22.7 % (ref 40–54)
HGB BLD-MCNC: 6.6 G/DL (ref 14–18)
HYPOCHROMIA BLD QL SMEAR: ABNORMAL
IMM GRANULOCYTES # BLD AUTO: ABNORMAL K/UL (ref 0–0.04)
IMM GRANULOCYTES NFR BLD AUTO: ABNORMAL % (ref 0–0.5)
LYMPHOCYTES # BLD AUTO: ABNORMAL K/UL (ref 1–4.8)
LYMPHOCYTES NFR BLD: 64 % (ref 18–48)
MCH RBC QN AUTO: 25.8 PG (ref 27–31)
MCHC RBC AUTO-ENTMCNC: 29.1 G/DL (ref 32–36)
MCV RBC AUTO: 89 FL (ref 82–98)
MONOCYTES # BLD AUTO: ABNORMAL K/UL (ref 0.3–1)
MONOCYTES NFR BLD: 3 % (ref 4–15)
NEUTROPHILS NFR BLD: 11 % (ref 38–73)
NEUTS BAND NFR BLD MANUAL: 1 %
NRBC BLD-RTO: 20 /100 WBC
PLATELET # BLD AUTO: 27 K/UL (ref 150–450)
PLATELET BLD QL SMEAR: ABNORMAL
PMV BLD AUTO: ABNORMAL FL (ref 9.2–12.9)
POIKILOCYTOSIS BLD QL SMEAR: SLIGHT
POLYCHROMASIA BLD QL SMEAR: ABNORMAL
RBC # BLD AUTO: 2.56 M/UL (ref 4.6–6.2)
TARGETS BLD QL SMEAR: ABNORMAL
WBC # BLD AUTO: 1.91 K/UL (ref 3.9–12.7)
WBC OTHER NFR BLD MANUAL: 12 %

## 2021-07-01 PROCEDURE — 85027 COMPLETE CBC AUTOMATED: CPT | Mod: PN | Performed by: INTERNAL MEDICINE

## 2021-07-01 PROCEDURE — 85007 BL SMEAR W/DIFF WBC COUNT: CPT | Mod: PN | Performed by: INTERNAL MEDICINE

## 2021-07-01 PROCEDURE — 86900 BLOOD TYPING SEROLOGIC ABO: CPT | Performed by: INTERNAL MEDICINE

## 2021-07-01 PROCEDURE — 86900 BLOOD TYPING SEROLOGIC ABO: CPT | Mod: PN | Performed by: INTERNAL MEDICINE

## 2021-07-01 PROCEDURE — 36415 COLL VENOUS BLD VENIPUNCTURE: CPT | Mod: PN | Performed by: INTERNAL MEDICINE

## 2021-07-01 RX ORDER — FUROSEMIDE 10 MG/ML
20 INJECTION INTRAMUSCULAR; INTRAVENOUS ONCE
Status: CANCELLED | OUTPATIENT
Start: 2021-07-01

## 2021-07-01 RX ORDER — ACETAMINOPHEN 325 MG/1
650 TABLET ORAL ONCE
Status: CANCELLED | OUTPATIENT
Start: 2021-07-01

## 2021-07-01 RX ORDER — HYDROCODONE BITARTRATE AND ACETAMINOPHEN 500; 5 MG/1; MG/1
TABLET ORAL ONCE
Status: CANCELLED | OUTPATIENT
Start: 2021-07-01 | End: 2021-07-01

## 2021-07-01 RX ORDER — DIPHENHYDRAMINE HCL 25 MG
25 CAPSULE ORAL ONCE
Status: CANCELLED | OUTPATIENT
Start: 2021-07-01

## 2021-07-08 ENCOUNTER — LAB VISIT (OUTPATIENT)
Dept: LAB | Facility: HOSPITAL | Age: 81
End: 2021-07-08
Attending: INTERNAL MEDICINE
Payer: MEDICARE

## 2021-07-08 ENCOUNTER — DOCUMENTATION ONLY (OUTPATIENT)
Dept: HEMATOLOGY/ONCOLOGY | Facility: CLINIC | Age: 81
End: 2021-07-08

## 2021-07-08 DIAGNOSIS — D64.9 SYMPTOMATIC ANEMIA: ICD-10-CM

## 2021-07-08 LAB
ABO + RH BLD: NORMAL
ANISOCYTOSIS BLD QL SMEAR: ABNORMAL
BASO STIPL BLD QL SMEAR: ABNORMAL
BASOPHILS NFR BLD: 0 % (ref 0–1.9)
BLASTS NFR BLD MANUAL: 19 %
BLD GP AB SCN CELLS X3 SERPL QL: NORMAL
DACRYOCYTES BLD QL SMEAR: ABNORMAL
DIFFERENTIAL METHOD: ABNORMAL
EOSINOPHIL NFR BLD: 1 % (ref 0–8)
ERYTHROCYTE [DISTWIDTH] IN BLOOD BY AUTOMATED COUNT: 19.8 % (ref 11.5–14.5)
GIANT PLATELETS BLD QL SMEAR: PRESENT
HCT VFR BLD AUTO: 30.7 % (ref 40–54)
HGB BLD-MCNC: 9.1 G/DL (ref 14–18)
HYPOCHROMIA BLD QL SMEAR: ABNORMAL
IMM GRANULOCYTES # BLD AUTO: ABNORMAL K/UL (ref 0–0.04)
IMM GRANULOCYTES NFR BLD AUTO: ABNORMAL % (ref 0–0.5)
LYMPHOCYTES NFR BLD: 44 % (ref 18–48)
MCH RBC QN AUTO: 26.8 PG (ref 27–31)
MCHC RBC AUTO-ENTMCNC: 29.6 G/DL (ref 32–36)
MCV RBC AUTO: 91 FL (ref 82–98)
METAMYELOCYTES NFR BLD MANUAL: 1 %
MONOCYTES NFR BLD: 23 % (ref 4–15)
NEUTROPHILS NFR BLD: 4 % (ref 38–73)
NEUTS BAND NFR BLD MANUAL: 2 %
NRBC BLD-RTO: 31 /100 WBC
OVALOCYTES BLD QL SMEAR: ABNORMAL
PLATELET # BLD AUTO: 33 K/UL (ref 150–450)
PLATELET BLD QL SMEAR: ABNORMAL
PMV BLD AUTO: ABNORMAL FL (ref 9.2–12.9)
POIKILOCYTOSIS BLD QL SMEAR: ABNORMAL
POLYCHROMASIA BLD QL SMEAR: ABNORMAL
PROMYELOCYTES NFR BLD MANUAL: 1 %
RBC # BLD AUTO: 3.39 M/UL (ref 4.6–6.2)
SCHISTOCYTES BLD QL SMEAR: PRESENT
WBC # BLD AUTO: 1.74 K/UL (ref 3.9–12.7)
WBC OTHER NFR BLD MANUAL: 5 %

## 2021-07-08 PROCEDURE — 36415 COLL VENOUS BLD VENIPUNCTURE: CPT | Mod: PN | Performed by: INTERNAL MEDICINE

## 2021-07-08 PROCEDURE — 86900 BLOOD TYPING SEROLOGIC ABO: CPT | Performed by: INTERNAL MEDICINE

## 2021-07-08 PROCEDURE — 85007 BL SMEAR W/DIFF WBC COUNT: CPT | Mod: PN | Performed by: INTERNAL MEDICINE

## 2021-07-08 PROCEDURE — 85027 COMPLETE CBC AUTOMATED: CPT | Mod: PN | Performed by: INTERNAL MEDICINE

## 2021-07-08 PROCEDURE — 86900 BLOOD TYPING SEROLOGIC ABO: CPT | Mod: PN | Performed by: INTERNAL MEDICINE

## 2021-07-09 ENCOUNTER — PATIENT MESSAGE (OUTPATIENT)
Dept: HEMATOLOGY/ONCOLOGY | Facility: CLINIC | Age: 81
End: 2021-07-09

## 2021-07-12 ENCOUNTER — LAB VISIT (OUTPATIENT)
Dept: LAB | Facility: HOSPITAL | Age: 81
End: 2021-07-12
Payer: MEDICARE

## 2021-07-12 ENCOUNTER — DOCUMENTATION ONLY (OUTPATIENT)
Dept: HEMATOLOGY/ONCOLOGY | Facility: CLINIC | Age: 81
End: 2021-07-12

## 2021-07-12 DIAGNOSIS — D64.9 SYMPTOMATIC ANEMIA: Primary | ICD-10-CM

## 2021-07-12 DIAGNOSIS — D64.9 SYMPTOMATIC ANEMIA: ICD-10-CM

## 2021-07-12 LAB
ABO + RH BLD: NORMAL
ANISOCYTOSIS BLD QL SMEAR: ABNORMAL
BASO STIPL BLD QL SMEAR: ABNORMAL
BASOPHILS # BLD AUTO: ABNORMAL K/UL (ref 0–0.2)
BASOPHILS NFR BLD: 0 % (ref 0–1.9)
BLASTS NFR BLD MANUAL: 16 %
BLD GP AB SCN CELLS X3 SERPL QL: NORMAL
DACRYOCYTES BLD QL SMEAR: ABNORMAL
DIFFERENTIAL METHOD: ABNORMAL
EOSINOPHIL # BLD AUTO: ABNORMAL K/UL (ref 0–0.5)
EOSINOPHIL NFR BLD: 2 % (ref 0–8)
ERYTHROCYTE [DISTWIDTH] IN BLOOD BY AUTOMATED COUNT: 19.5 % (ref 11.5–14.5)
GIANT PLATELETS BLD QL SMEAR: PRESENT
HCT VFR BLD AUTO: 28.3 % (ref 40–54)
HGB BLD-MCNC: 8.4 G/DL (ref 14–18)
HYPOCHROMIA BLD QL SMEAR: ABNORMAL
IMM GRANULOCYTES # BLD AUTO: ABNORMAL K/UL (ref 0–0.04)
IMM GRANULOCYTES NFR BLD AUTO: ABNORMAL % (ref 0–0.5)
LYMPHOCYTES # BLD AUTO: ABNORMAL K/UL (ref 1–4.8)
LYMPHOCYTES NFR BLD: 58 % (ref 18–48)
MCH RBC QN AUTO: 26.4 PG (ref 27–31)
MCHC RBC AUTO-ENTMCNC: 29.7 G/DL (ref 32–36)
MCV RBC AUTO: 89 FL (ref 82–98)
MONOCYTES # BLD AUTO: ABNORMAL K/UL (ref 0.3–1)
MONOCYTES NFR BLD: 6 % (ref 4–15)
NEUTROPHILS NFR BLD: 14 % (ref 38–73)
NRBC BLD-RTO: 17 /100 WBC
OVALOCYTES BLD QL SMEAR: ABNORMAL
PLATELET # BLD AUTO: 38 K/UL (ref 150–450)
PLATELET BLD QL SMEAR: ABNORMAL
PMV BLD AUTO: ABNORMAL FL (ref 9.2–12.9)
POIKILOCYTOSIS BLD QL SMEAR: SLIGHT
POLYCHROMASIA BLD QL SMEAR: ABNORMAL
RBC # BLD AUTO: 3.18 M/UL (ref 4.6–6.2)
WBC # BLD AUTO: 1.17 K/UL (ref 3.9–12.7)
WBC OTHER NFR BLD MANUAL: 4 %

## 2021-07-12 PROCEDURE — 85007 BL SMEAR W/DIFF WBC COUNT: CPT | Performed by: INTERNAL MEDICINE

## 2021-07-12 PROCEDURE — 86900 BLOOD TYPING SEROLOGIC ABO: CPT | Performed by: INTERNAL MEDICINE

## 2021-07-12 PROCEDURE — 85027 COMPLETE CBC AUTOMATED: CPT | Performed by: INTERNAL MEDICINE

## 2021-07-12 PROCEDURE — 86900 BLOOD TYPING SEROLOGIC ABO: CPT | Mod: PN | Performed by: INTERNAL MEDICINE

## 2021-07-12 PROCEDURE — 36415 COLL VENOUS BLD VENIPUNCTURE: CPT | Mod: PN | Performed by: INTERNAL MEDICINE

## 2021-07-12 RX ORDER — DIPHENHYDRAMINE HCL 25 MG
25 CAPSULE ORAL ONCE
Status: CANCELLED | OUTPATIENT
Start: 2021-07-12

## 2021-07-12 RX ORDER — HYDROCODONE BITARTRATE AND ACETAMINOPHEN 500; 5 MG/1; MG/1
TABLET ORAL ONCE
Status: CANCELLED | OUTPATIENT
Start: 2021-07-12 | End: 2021-07-12

## 2021-07-12 RX ORDER — FUROSEMIDE 10 MG/ML
20 INJECTION INTRAMUSCULAR; INTRAVENOUS ONCE
Status: CANCELLED | OUTPATIENT
Start: 2021-07-12

## 2021-07-12 RX ORDER — ACETAMINOPHEN 325 MG/1
650 TABLET ORAL ONCE
Status: CANCELLED | OUTPATIENT
Start: 2021-07-12

## 2021-07-13 ENCOUNTER — DOCUMENTATION ONLY (OUTPATIENT)
Dept: HEMATOLOGY/ONCOLOGY | Facility: CLINIC | Age: 81
End: 2021-07-13

## 2021-07-15 ENCOUNTER — SPECIALTY PHARMACY (OUTPATIENT)
Dept: PHARMACY | Facility: CLINIC | Age: 81
End: 2021-07-15

## 2021-07-19 ENCOUNTER — OFFICE VISIT (OUTPATIENT)
Dept: HEMATOLOGY/ONCOLOGY | Facility: CLINIC | Age: 81
End: 2021-07-19
Payer: MEDICARE

## 2021-07-19 ENCOUNTER — PATIENT MESSAGE (OUTPATIENT)
Dept: HEMATOLOGY/ONCOLOGY | Facility: CLINIC | Age: 81
End: 2021-07-19

## 2021-07-19 ENCOUNTER — DOCUMENTATION ONLY (OUTPATIENT)
Dept: INFUSION THERAPY | Facility: HOSPITAL | Age: 81
End: 2021-07-19

## 2021-07-19 ENCOUNTER — TELEPHONE (OUTPATIENT)
Dept: FAMILY MEDICINE | Facility: CLINIC | Age: 81
End: 2021-07-19

## 2021-07-19 ENCOUNTER — INFUSION (OUTPATIENT)
Dept: INFUSION THERAPY | Facility: HOSPITAL | Age: 81
End: 2021-07-19
Attending: INTERNAL MEDICINE
Payer: MEDICARE

## 2021-07-19 ENCOUNTER — DOCUMENTATION ONLY (OUTPATIENT)
Dept: HEMATOLOGY/ONCOLOGY | Facility: CLINIC | Age: 81
End: 2021-07-19

## 2021-07-19 VITALS
RESPIRATION RATE: 18 BRPM | SYSTOLIC BLOOD PRESSURE: 124 MMHG | TEMPERATURE: 98 F | HEART RATE: 88 BPM | DIASTOLIC BLOOD PRESSURE: 71 MMHG | BODY MASS INDEX: 25.33 KG/M2 | OXYGEN SATURATION: 98 % | HEIGHT: 67 IN | WEIGHT: 161.38 LBS

## 2021-07-19 VITALS
HEIGHT: 67 IN | OXYGEN SATURATION: 98 % | HEART RATE: 88 BPM | DIASTOLIC BLOOD PRESSURE: 60 MMHG | WEIGHT: 161.38 LBS | BODY MASS INDEX: 25.33 KG/M2 | TEMPERATURE: 98 F | RESPIRATION RATE: 18 BRPM | SYSTOLIC BLOOD PRESSURE: 110 MMHG

## 2021-07-19 DIAGNOSIS — D46.Z MDS (MYELODYSPLASTIC SYNDROME), HIGH GRADE: Primary | ICD-10-CM

## 2021-07-19 DIAGNOSIS — D61.818 PANCYTOPENIA: ICD-10-CM

## 2021-07-19 DIAGNOSIS — D45 POLYCYTHEMIA VERA: ICD-10-CM

## 2021-07-19 PROCEDURE — 63600175 PHARM REV CODE 636 W HCPCS: Mod: PN | Performed by: INTERNAL MEDICINE

## 2021-07-19 PROCEDURE — 99999 PR PBB SHADOW E&M-EST. PATIENT-LVL IV: CPT | Mod: PBBFAC,,, | Performed by: INTERNAL MEDICINE

## 2021-07-19 PROCEDURE — 99499 UNLISTED E&M SERVICE: CPT | Mod: S$GLB,,, | Performed by: INTERNAL MEDICINE

## 2021-07-19 PROCEDURE — 1159F MED LIST DOCD IN RCRD: CPT | Mod: CPTII,S$GLB,, | Performed by: INTERNAL MEDICINE

## 2021-07-19 PROCEDURE — 99999 PR PBB SHADOW E&M-EST. PATIENT-LVL IV: ICD-10-PCS | Mod: PBBFAC,,, | Performed by: INTERNAL MEDICINE

## 2021-07-19 PROCEDURE — 96409 CHEMO IV PUSH SNGL DRUG: CPT | Mod: PN

## 2021-07-19 PROCEDURE — 3288F FALL RISK ASSESSMENT DOCD: CPT | Mod: CPTII,S$GLB,, | Performed by: INTERNAL MEDICINE

## 2021-07-19 PROCEDURE — 1101F PR PT FALLS ASSESS DOC 0-1 FALLS W/OUT INJ PAST YR: ICD-10-PCS | Mod: CPTII,S$GLB,, | Performed by: INTERNAL MEDICINE

## 2021-07-19 PROCEDURE — 1159F PR MEDICATION LIST DOCUMENTED IN MEDICAL RECORD: ICD-10-PCS | Mod: CPTII,S$GLB,, | Performed by: INTERNAL MEDICINE

## 2021-07-19 PROCEDURE — 3288F PR FALLS RISK ASSESSMENT DOCUMENTED: ICD-10-PCS | Mod: CPTII,S$GLB,, | Performed by: INTERNAL MEDICINE

## 2021-07-19 PROCEDURE — 1101F PT FALLS ASSESS-DOCD LE1/YR: CPT | Mod: CPTII,S$GLB,, | Performed by: INTERNAL MEDICINE

## 2021-07-19 PROCEDURE — 99214 PR OFFICE/OUTPT VISIT, EST, LEVL IV, 30-39 MIN: ICD-10-PCS | Mod: S$GLB,,, | Performed by: INTERNAL MEDICINE

## 2021-07-19 PROCEDURE — 25000003 PHARM REV CODE 250: Mod: PN | Performed by: INTERNAL MEDICINE

## 2021-07-19 PROCEDURE — 99214 OFFICE O/P EST MOD 30 MIN: CPT | Mod: S$GLB,,, | Performed by: INTERNAL MEDICINE

## 2021-07-19 PROCEDURE — 1125F PR PAIN SEVERITY QUANTIFIED, PAIN PRESENT: ICD-10-PCS | Mod: CPTII,S$GLB,, | Performed by: INTERNAL MEDICINE

## 2021-07-19 PROCEDURE — 96375 TX/PRO/DX INJ NEW DRUG ADDON: CPT | Mod: PN

## 2021-07-19 PROCEDURE — 99499 RISK ADDL DX/OHS AUDIT: ICD-10-PCS | Mod: S$GLB,,, | Performed by: INTERNAL MEDICINE

## 2021-07-19 PROCEDURE — 1125F AMNT PAIN NOTED PAIN PRSNT: CPT | Mod: CPTII,S$GLB,, | Performed by: INTERNAL MEDICINE

## 2021-07-19 PROCEDURE — A4216 STERILE WATER/SALINE, 10 ML: HCPCS | Mod: PN | Performed by: INTERNAL MEDICINE

## 2021-07-19 RX ORDER — TRIAMCINOLONE ACETONIDE 1 MG/G
CREAM TOPICAL
COMMUNITY
End: 2021-09-05

## 2021-07-19 RX ORDER — ONDANSETRON 2 MG/ML
8 INJECTION INTRAMUSCULAR; INTRAVENOUS ONCE
Status: CANCELLED
Start: 2021-07-23 | End: 2021-07-23

## 2021-07-19 RX ORDER — SODIUM CHLORIDE 0.9 % (FLUSH) 0.9 %
10 SYRINGE (ML) INJECTION
Status: CANCELLED | OUTPATIENT
Start: 2021-07-20

## 2021-07-19 RX ORDER — SODIUM CHLORIDE 0.9 % (FLUSH) 0.9 %
10 SYRINGE (ML) INJECTION
Status: CANCELLED | OUTPATIENT
Start: 2021-07-22

## 2021-07-19 RX ORDER — ONDANSETRON 2 MG/ML
8 INJECTION INTRAMUSCULAR; INTRAVENOUS ONCE
Status: CANCELLED
Start: 2021-07-21 | End: 2021-07-21

## 2021-07-19 RX ORDER — HEPARIN 100 UNIT/ML
500 SYRINGE INTRAVENOUS
Status: CANCELLED | OUTPATIENT
Start: 2021-07-19

## 2021-07-19 RX ORDER — HEPARIN 100 UNIT/ML
500 SYRINGE INTRAVENOUS
Status: CANCELLED | OUTPATIENT
Start: 2021-07-22

## 2021-07-19 RX ORDER — SODIUM CHLORIDE 0.9 % (FLUSH) 0.9 %
10 SYRINGE (ML) INJECTION
Status: CANCELLED | OUTPATIENT
Start: 2021-07-23

## 2021-07-19 RX ORDER — GLYCOPYRROLATE 1 MG/1
TABLET ORAL
COMMUNITY
End: 2021-09-05

## 2021-07-19 RX ORDER — SODIUM CHLORIDE 0.9 % (FLUSH) 0.9 %
10 SYRINGE (ML) INJECTION
Status: DISCONTINUED | OUTPATIENT
Start: 2021-07-19 | End: 2021-07-19 | Stop reason: HOSPADM

## 2021-07-19 RX ORDER — ONDANSETRON 2 MG/ML
8 INJECTION INTRAMUSCULAR; INTRAVENOUS ONCE
Status: COMPLETED | OUTPATIENT
Start: 2021-07-19 | End: 2021-07-19

## 2021-07-19 RX ORDER — BENZONATATE 100 MG/1
CAPSULE ORAL
COMMUNITY
End: 2021-09-05

## 2021-07-19 RX ORDER — SODIUM CHLORIDE 0.9 % (FLUSH) 0.9 %
10 SYRINGE (ML) INJECTION
Status: CANCELLED | OUTPATIENT
Start: 2021-07-19

## 2021-07-19 RX ORDER — ONDANSETRON 2 MG/ML
8 INJECTION INTRAMUSCULAR; INTRAVENOUS ONCE
Status: CANCELLED
Start: 2021-07-20 | End: 2021-07-20

## 2021-07-19 RX ORDER — SODIUM CHLORIDE 0.9 % (FLUSH) 0.9 %
10 SYRINGE (ML) INJECTION
Status: CANCELLED | OUTPATIENT
Start: 2021-07-21

## 2021-07-19 RX ORDER — HEPARIN 100 UNIT/ML
500 SYRINGE INTRAVENOUS
Status: CANCELLED | OUTPATIENT
Start: 2021-07-20

## 2021-07-19 RX ORDER — ONDANSETRON 2 MG/ML
8 INJECTION INTRAMUSCULAR; INTRAVENOUS ONCE
Status: CANCELLED
Start: 2021-07-22 | End: 2021-07-22

## 2021-07-19 RX ORDER — ONDANSETRON 2 MG/ML
8 INJECTION INTRAMUSCULAR; INTRAVENOUS ONCE
Status: CANCELLED
Start: 2021-07-19 | End: 2021-07-19

## 2021-07-19 RX ORDER — HEPARIN 100 UNIT/ML
500 SYRINGE INTRAVENOUS
Status: CANCELLED | OUTPATIENT
Start: 2021-07-23

## 2021-07-19 RX ORDER — HEPARIN 100 UNIT/ML
500 SYRINGE INTRAVENOUS
Status: CANCELLED | OUTPATIENT
Start: 2021-07-21

## 2021-07-19 RX ADMIN — AZACITIDINE 140 MG: 100 INJECTION, POWDER, LYOPHILIZED, FOR SOLUTION INTRAVENOUS; SUBCUTANEOUS at 02:07

## 2021-07-19 RX ADMIN — SODIUM CHLORIDE: 0.9 INJECTION, SOLUTION INTRAVENOUS at 02:07

## 2021-07-19 RX ADMIN — ONDANSETRON 8 MG: 2 INJECTION INTRAMUSCULAR; INTRAVENOUS at 02:07

## 2021-07-19 RX ADMIN — Medication 10 ML: at 03:07

## 2021-07-20 ENCOUNTER — OFFICE VISIT (OUTPATIENT)
Dept: FAMILY MEDICINE | Facility: CLINIC | Age: 81
End: 2021-07-20
Payer: MEDICARE

## 2021-07-20 ENCOUNTER — DOCUMENTATION ONLY (OUTPATIENT)
Dept: HEMATOLOGY/ONCOLOGY | Facility: CLINIC | Age: 81
End: 2021-07-20

## 2021-07-20 ENCOUNTER — INFUSION (OUTPATIENT)
Dept: INFUSION THERAPY | Facility: HOSPITAL | Age: 81
End: 2021-07-20
Attending: INTERNAL MEDICINE
Payer: MEDICARE

## 2021-07-20 VITALS
SYSTOLIC BLOOD PRESSURE: 106 MMHG | WEIGHT: 161.63 LBS | BODY MASS INDEX: 25.37 KG/M2 | DIASTOLIC BLOOD PRESSURE: 62 MMHG | HEART RATE: 79 BPM | HEIGHT: 67 IN | TEMPERATURE: 99 F | OXYGEN SATURATION: 97 %

## 2021-07-20 VITALS
BODY MASS INDEX: 25.74 KG/M2 | RESPIRATION RATE: 20 BRPM | HEART RATE: 94 BPM | DIASTOLIC BLOOD PRESSURE: 61 MMHG | WEIGHT: 164 LBS | SYSTOLIC BLOOD PRESSURE: 136 MMHG | HEIGHT: 67 IN | TEMPERATURE: 99 F

## 2021-07-20 DIAGNOSIS — G47.00 INSOMNIA, UNSPECIFIED TYPE: Primary | ICD-10-CM

## 2021-07-20 DIAGNOSIS — D46.Z MDS (MYELODYSPLASTIC SYNDROME), HIGH GRADE: Primary | ICD-10-CM

## 2021-07-20 DIAGNOSIS — E55.9 VITAMIN D DEFICIENCY: ICD-10-CM

## 2021-07-20 PROCEDURE — A4216 STERILE WATER/SALINE, 10 ML: HCPCS | Mod: PN | Performed by: INTERNAL MEDICINE

## 2021-07-20 PROCEDURE — 3078F PR MOST RECENT DIASTOLIC BLOOD PRESSURE < 80 MM HG: ICD-10-PCS | Mod: CPTII,S$GLB,, | Performed by: PHYSICIAN ASSISTANT

## 2021-07-20 PROCEDURE — 3074F PR MOST RECENT SYSTOLIC BLOOD PRESSURE < 130 MM HG: ICD-10-PCS | Mod: CPTII,S$GLB,, | Performed by: PHYSICIAN ASSISTANT

## 2021-07-20 PROCEDURE — 1101F PR PT FALLS ASSESS DOC 0-1 FALLS W/OUT INJ PAST YR: ICD-10-PCS | Mod: CPTII,S$GLB,, | Performed by: PHYSICIAN ASSISTANT

## 2021-07-20 PROCEDURE — 63600175 PHARM REV CODE 636 W HCPCS: Mod: PN | Performed by: INTERNAL MEDICINE

## 2021-07-20 PROCEDURE — 99999 PR PBB SHADOW E&M-EST. PATIENT-LVL V: CPT | Mod: PBBFAC,,, | Performed by: PHYSICIAN ASSISTANT

## 2021-07-20 PROCEDURE — 3288F FALL RISK ASSESSMENT DOCD: CPT | Mod: CPTII,S$GLB,, | Performed by: PHYSICIAN ASSISTANT

## 2021-07-20 PROCEDURE — 25000003 PHARM REV CODE 250: Mod: PN | Performed by: INTERNAL MEDICINE

## 2021-07-20 PROCEDURE — 99213 PR OFFICE/OUTPT VISIT, EST, LEVL III, 20-29 MIN: ICD-10-PCS | Mod: S$GLB,,, | Performed by: PHYSICIAN ASSISTANT

## 2021-07-20 PROCEDURE — 1159F MED LIST DOCD IN RCRD: CPT | Mod: CPTII,S$GLB,, | Performed by: PHYSICIAN ASSISTANT

## 2021-07-20 PROCEDURE — 1159F PR MEDICATION LIST DOCUMENTED IN MEDICAL RECORD: ICD-10-PCS | Mod: CPTII,S$GLB,, | Performed by: PHYSICIAN ASSISTANT

## 2021-07-20 PROCEDURE — 3288F PR FALLS RISK ASSESSMENT DOCUMENTED: ICD-10-PCS | Mod: CPTII,S$GLB,, | Performed by: PHYSICIAN ASSISTANT

## 2021-07-20 PROCEDURE — 1125F PR PAIN SEVERITY QUANTIFIED, PAIN PRESENT: ICD-10-PCS | Mod: CPTII,S$GLB,, | Performed by: PHYSICIAN ASSISTANT

## 2021-07-20 PROCEDURE — 99999 PR PBB SHADOW E&M-EST. PATIENT-LVL V: ICD-10-PCS | Mod: PBBFAC,,, | Performed by: PHYSICIAN ASSISTANT

## 2021-07-20 PROCEDURE — 96375 TX/PRO/DX INJ NEW DRUG ADDON: CPT | Mod: PN

## 2021-07-20 PROCEDURE — 96409 CHEMO IV PUSH SNGL DRUG: CPT | Mod: PN

## 2021-07-20 PROCEDURE — 1101F PT FALLS ASSESS-DOCD LE1/YR: CPT | Mod: CPTII,S$GLB,, | Performed by: PHYSICIAN ASSISTANT

## 2021-07-20 PROCEDURE — 99213 OFFICE O/P EST LOW 20 MIN: CPT | Mod: S$GLB,,, | Performed by: PHYSICIAN ASSISTANT

## 2021-07-20 PROCEDURE — 1125F AMNT PAIN NOTED PAIN PRSNT: CPT | Mod: CPTII,S$GLB,, | Performed by: PHYSICIAN ASSISTANT

## 2021-07-20 PROCEDURE — 3074F SYST BP LT 130 MM HG: CPT | Mod: CPTII,S$GLB,, | Performed by: PHYSICIAN ASSISTANT

## 2021-07-20 PROCEDURE — 3078F DIAST BP <80 MM HG: CPT | Mod: CPTII,S$GLB,, | Performed by: PHYSICIAN ASSISTANT

## 2021-07-20 RX ORDER — ONDANSETRON 2 MG/ML
8 INJECTION INTRAMUSCULAR; INTRAVENOUS ONCE
Status: COMPLETED | OUTPATIENT
Start: 2021-07-20 | End: 2021-07-20

## 2021-07-20 RX ORDER — SODIUM CHLORIDE 0.9 % (FLUSH) 0.9 %
10 SYRINGE (ML) INJECTION
Status: DISCONTINUED | OUTPATIENT
Start: 2021-07-20 | End: 2021-07-20 | Stop reason: HOSPADM

## 2021-07-20 RX ORDER — TRAZODONE HYDROCHLORIDE 50 MG/1
50 TABLET ORAL NIGHTLY
Qty: 30 TABLET | Refills: 1 | Status: SHIPPED | OUTPATIENT
Start: 2021-07-20 | End: 2022-07-20

## 2021-07-20 RX ADMIN — AZACITIDINE 140 MG: 100 INJECTION, POWDER, LYOPHILIZED, FOR SOLUTION INTRAVENOUS; SUBCUTANEOUS at 03:07

## 2021-07-20 RX ADMIN — SODIUM CHLORIDE: 0.9 INJECTION, SOLUTION INTRAVENOUS at 03:07

## 2021-07-20 RX ADMIN — Medication 10 ML: at 03:07

## 2021-07-20 RX ADMIN — ONDANSETRON 8 MG: 2 INJECTION INTRAMUSCULAR; INTRAVENOUS at 03:07

## 2021-07-21 ENCOUNTER — INFUSION (OUTPATIENT)
Dept: INFUSION THERAPY | Facility: HOSPITAL | Age: 81
End: 2021-07-21
Attending: INTERNAL MEDICINE
Payer: MEDICARE

## 2021-07-21 VITALS
RESPIRATION RATE: 18 BRPM | SYSTOLIC BLOOD PRESSURE: 122 MMHG | HEART RATE: 82 BPM | TEMPERATURE: 98 F | DIASTOLIC BLOOD PRESSURE: 56 MMHG

## 2021-07-21 DIAGNOSIS — D46.Z MDS (MYELODYSPLASTIC SYNDROME), HIGH GRADE: Primary | ICD-10-CM

## 2021-07-21 PROCEDURE — 96375 TX/PRO/DX INJ NEW DRUG ADDON: CPT | Mod: PN

## 2021-07-21 PROCEDURE — 25000003 PHARM REV CODE 250: Mod: PN | Performed by: INTERNAL MEDICINE

## 2021-07-21 PROCEDURE — 96409 CHEMO IV PUSH SNGL DRUG: CPT | Mod: PN

## 2021-07-21 PROCEDURE — 63600175 PHARM REV CODE 636 W HCPCS: Mod: PN | Performed by: INTERNAL MEDICINE

## 2021-07-21 RX ORDER — SODIUM CHLORIDE 0.9 % (FLUSH) 0.9 %
10 SYRINGE (ML) INJECTION
Status: DISCONTINUED | OUTPATIENT
Start: 2021-07-21 | End: 2021-07-21 | Stop reason: HOSPADM

## 2021-07-21 RX ORDER — ONDANSETRON 2 MG/ML
8 INJECTION INTRAMUSCULAR; INTRAVENOUS ONCE
Status: COMPLETED | OUTPATIENT
Start: 2021-07-21 | End: 2021-07-21

## 2021-07-21 RX ADMIN — ONDANSETRON 8 MG: 2 INJECTION INTRAMUSCULAR; INTRAVENOUS at 02:07

## 2021-07-21 RX ADMIN — AZACITIDINE 140 MG: 100 INJECTION, POWDER, LYOPHILIZED, FOR SOLUTION INTRAVENOUS; SUBCUTANEOUS at 02:07

## 2021-07-21 RX ADMIN — SODIUM CHLORIDE: 0.9 INJECTION, SOLUTION INTRAVENOUS at 02:07

## 2021-07-22 ENCOUNTER — INFUSION (OUTPATIENT)
Dept: INFUSION THERAPY | Facility: HOSPITAL | Age: 81
End: 2021-07-22
Attending: INTERNAL MEDICINE
Payer: MEDICARE

## 2021-07-22 VITALS
RESPIRATION RATE: 18 BRPM | HEART RATE: 80 BPM | WEIGHT: 161.19 LBS | TEMPERATURE: 98 F | SYSTOLIC BLOOD PRESSURE: 115 MMHG | DIASTOLIC BLOOD PRESSURE: 65 MMHG | BODY MASS INDEX: 25.3 KG/M2 | HEIGHT: 67 IN

## 2021-07-22 DIAGNOSIS — D46.Z MDS (MYELODYSPLASTIC SYNDROME), HIGH GRADE: Primary | ICD-10-CM

## 2021-07-22 PROCEDURE — 96375 TX/PRO/DX INJ NEW DRUG ADDON: CPT | Mod: PN

## 2021-07-22 PROCEDURE — 25000003 PHARM REV CODE 250: Mod: PN | Performed by: INTERNAL MEDICINE

## 2021-07-22 PROCEDURE — 96409 CHEMO IV PUSH SNGL DRUG: CPT | Mod: PN

## 2021-07-22 PROCEDURE — 63600175 PHARM REV CODE 636 W HCPCS: Mod: JG,PN | Performed by: INTERNAL MEDICINE

## 2021-07-22 PROCEDURE — A4216 STERILE WATER/SALINE, 10 ML: HCPCS | Mod: PN | Performed by: INTERNAL MEDICINE

## 2021-07-22 RX ORDER — SODIUM CHLORIDE 0.9 % (FLUSH) 0.9 %
10 SYRINGE (ML) INJECTION
Status: DISCONTINUED | OUTPATIENT
Start: 2021-07-22 | End: 2021-07-22 | Stop reason: HOSPADM

## 2021-07-22 RX ORDER — ONDANSETRON 2 MG/ML
8 INJECTION INTRAMUSCULAR; INTRAVENOUS ONCE
Status: COMPLETED | OUTPATIENT
Start: 2021-07-22 | End: 2021-07-22

## 2021-07-22 RX ADMIN — SODIUM CHLORIDE: 0.9 INJECTION, SOLUTION INTRAVENOUS at 02:07

## 2021-07-22 RX ADMIN — AZACITIDINE 140 MG: 100 INJECTION, POWDER, LYOPHILIZED, FOR SOLUTION INTRAVENOUS; SUBCUTANEOUS at 03:07

## 2021-07-22 RX ADMIN — Medication 10 ML: at 03:07

## 2021-07-22 RX ADMIN — ONDANSETRON 8 MG: 2 INJECTION INTRAMUSCULAR; INTRAVENOUS at 02:07

## 2021-07-23 ENCOUNTER — INFUSION (OUTPATIENT)
Dept: INFUSION THERAPY | Facility: HOSPITAL | Age: 81
End: 2021-07-23
Attending: INTERNAL MEDICINE
Payer: MEDICARE

## 2021-07-23 VITALS
RESPIRATION RATE: 18 BRPM | TEMPERATURE: 98 F | DIASTOLIC BLOOD PRESSURE: 65 MMHG | HEART RATE: 80 BPM | SYSTOLIC BLOOD PRESSURE: 116 MMHG

## 2021-07-23 DIAGNOSIS — D46.Z MDS (MYELODYSPLASTIC SYNDROME), HIGH GRADE: Primary | ICD-10-CM

## 2021-07-23 PROCEDURE — 63600175 PHARM REV CODE 636 W HCPCS: Mod: PN | Performed by: INTERNAL MEDICINE

## 2021-07-23 PROCEDURE — 96375 TX/PRO/DX INJ NEW DRUG ADDON: CPT | Mod: PN

## 2021-07-23 PROCEDURE — 25000003 PHARM REV CODE 250: Mod: PN | Performed by: INTERNAL MEDICINE

## 2021-07-23 PROCEDURE — 96409 CHEMO IV PUSH SNGL DRUG: CPT | Mod: PN

## 2021-07-23 PROCEDURE — A4216 STERILE WATER/SALINE, 10 ML: HCPCS | Mod: PN | Performed by: INTERNAL MEDICINE

## 2021-07-23 RX ORDER — SODIUM CHLORIDE 0.9 % (FLUSH) 0.9 %
10 SYRINGE (ML) INJECTION
Status: DISCONTINUED | OUTPATIENT
Start: 2021-07-23 | End: 2021-07-23 | Stop reason: HOSPADM

## 2021-07-23 RX ORDER — HEPARIN 100 UNIT/ML
500 SYRINGE INTRAVENOUS
Status: DISCONTINUED | OUTPATIENT
Start: 2021-07-23 | End: 2021-07-23 | Stop reason: HOSPADM

## 2021-07-23 RX ORDER — ONDANSETRON 2 MG/ML
8 INJECTION INTRAMUSCULAR; INTRAVENOUS ONCE
Status: COMPLETED | OUTPATIENT
Start: 2021-07-23 | End: 2021-07-23

## 2021-07-23 RX ADMIN — AZACITIDINE 140 MG: 100 INJECTION, POWDER, LYOPHILIZED, FOR SOLUTION INTRAVENOUS; SUBCUTANEOUS at 03:07

## 2021-07-23 RX ADMIN — SODIUM CHLORIDE: 0.9 INJECTION, SOLUTION INTRAVENOUS at 02:07

## 2021-07-23 RX ADMIN — ONDANSETRON 8 MG: 2 INJECTION INTRAMUSCULAR; INTRAVENOUS at 03:07

## 2021-07-23 RX ADMIN — Medication 10 ML: at 03:07

## 2021-07-26 ENCOUNTER — DOCUMENTATION ONLY (OUTPATIENT)
Dept: HEMATOLOGY/ONCOLOGY | Facility: CLINIC | Age: 81
End: 2021-07-26

## 2021-07-26 ENCOUNTER — TELEPHONE (OUTPATIENT)
Dept: HEMATOLOGY/ONCOLOGY | Facility: CLINIC | Age: 81
End: 2021-07-26

## 2021-07-26 ENCOUNTER — LAB VISIT (OUTPATIENT)
Dept: LAB | Facility: HOSPITAL | Age: 81
End: 2021-07-26
Attending: INTERNAL MEDICINE
Payer: MEDICARE

## 2021-07-26 DIAGNOSIS — D64.9 SYMPTOMATIC ANEMIA: ICD-10-CM

## 2021-07-26 DIAGNOSIS — D46.Z MDS (MYELODYSPLASTIC SYNDROME), HIGH GRADE: ICD-10-CM

## 2021-07-26 DIAGNOSIS — D61.818 PANCYTOPENIA: ICD-10-CM

## 2021-07-26 DIAGNOSIS — D45 POLYCYTHEMIA VERA: Primary | ICD-10-CM

## 2021-07-26 LAB
ABO + RH BLD: NORMAL
ANISOCYTOSIS BLD QL SMEAR: SLIGHT
BASO STIPL BLD QL SMEAR: ABNORMAL
BASOPHILS NFR BLD: 0 % (ref 0–1.9)
BLASTS NFR BLD MANUAL: 18 %
BLD GP AB SCN CELLS X3 SERPL QL: NORMAL
DACRYOCYTES BLD QL SMEAR: ABNORMAL
DIFFERENTIAL METHOD: ABNORMAL
EOSINOPHIL NFR BLD: 0 % (ref 0–8)
ERYTHROCYTE [DISTWIDTH] IN BLOOD BY AUTOMATED COUNT: 18.2 % (ref 11.5–14.5)
HCT VFR BLD AUTO: 24.8 % (ref 40–54)
HGB BLD-MCNC: 7.7 G/DL (ref 14–18)
HYPOCHROMIA BLD QL SMEAR: ABNORMAL
IMM GRANULOCYTES # BLD AUTO: ABNORMAL K/UL (ref 0–0.04)
IMM GRANULOCYTES NFR BLD AUTO: ABNORMAL % (ref 0–0.5)
LYMPHOCYTES NFR BLD: 48 % (ref 18–48)
MCH RBC QN AUTO: 27 PG (ref 27–31)
MCHC RBC AUTO-ENTMCNC: 31 G/DL (ref 32–36)
MCV RBC AUTO: 87 FL (ref 82–98)
MONOCYTES NFR BLD: 8 % (ref 4–15)
NEUTROPHILS NFR BLD: 22 % (ref 38–73)
NEUTS BAND NFR BLD MANUAL: 4 %
NRBC BLD-RTO: 12 /100 WBC
OVALOCYTES BLD QL SMEAR: ABNORMAL
PLATELET # BLD AUTO: 16 K/UL (ref 150–450)
PLATELET BLD QL SMEAR: ABNORMAL
PMV BLD AUTO: ABNORMAL FL (ref 9.2–12.9)
POIKILOCYTOSIS BLD QL SMEAR: SLIGHT
POLYCHROMASIA BLD QL SMEAR: ABNORMAL
RBC # BLD AUTO: 2.85 M/UL (ref 4.6–6.2)
WBC # BLD AUTO: 0.91 K/UL (ref 3.9–12.7)

## 2021-07-26 PROCEDURE — 86900 BLOOD TYPING SEROLOGIC ABO: CPT | Mod: PN | Performed by: INTERNAL MEDICINE

## 2021-07-26 PROCEDURE — 86900 BLOOD TYPING SEROLOGIC ABO: CPT | Performed by: INTERNAL MEDICINE

## 2021-07-26 PROCEDURE — 85027 COMPLETE CBC AUTOMATED: CPT | Mod: PN | Performed by: NURSE PRACTITIONER

## 2021-07-26 PROCEDURE — 36415 COLL VENOUS BLD VENIPUNCTURE: CPT | Mod: PN | Performed by: INTERNAL MEDICINE

## 2021-07-26 PROCEDURE — 85007 BL SMEAR W/DIFF WBC COUNT: CPT | Mod: PN | Performed by: NURSE PRACTITIONER

## 2021-07-26 RX ORDER — DIPHENHYDRAMINE HCL 25 MG
25 CAPSULE ORAL
Status: CANCELLED | OUTPATIENT
Start: 2021-07-26

## 2021-07-26 RX ORDER — ACETAMINOPHEN 325 MG/1
650 TABLET ORAL
Status: CANCELLED | OUTPATIENT
Start: 2021-07-26

## 2021-07-26 RX ORDER — HYDROCODONE BITARTRATE AND ACETAMINOPHEN 500; 5 MG/1; MG/1
TABLET ORAL ONCE
Status: CANCELLED | OUTPATIENT
Start: 2021-07-26 | End: 2021-07-26

## 2021-08-02 ENCOUNTER — LAB VISIT (OUTPATIENT)
Dept: LAB | Facility: HOSPITAL | Age: 81
End: 2021-08-02
Attending: INTERNAL MEDICINE
Payer: MEDICARE

## 2021-08-02 DIAGNOSIS — D64.9 SYMPTOMATIC ANEMIA: ICD-10-CM

## 2021-08-02 LAB
ABO + RH BLD: NORMAL
ANISOCYTOSIS BLD QL SMEAR: SLIGHT
BASO STIPL BLD QL SMEAR: ABNORMAL
BASOPHILS NFR BLD: 0 % (ref 0–1.9)
BLASTS NFR BLD MANUAL: 19 %
BLD GP AB SCN CELLS X3 SERPL QL: NORMAL
DACRYOCYTES BLD QL SMEAR: ABNORMAL
DIFFERENTIAL METHOD: ABNORMAL
EOSINOPHIL NFR BLD: 1 % (ref 0–8)
ERYTHROCYTE [DISTWIDTH] IN BLOOD BY AUTOMATED COUNT: 18.3 % (ref 11.5–14.5)
HCT VFR BLD AUTO: 25.2 % (ref 40–54)
HGB BLD-MCNC: 7.7 G/DL (ref 14–18)
HYPOCHROMIA BLD QL SMEAR: ABNORMAL
IMM GRANULOCYTES # BLD AUTO: ABNORMAL K/UL (ref 0–0.04)
IMM GRANULOCYTES NFR BLD AUTO: ABNORMAL % (ref 0–0.5)
LYMPHOCYTES NFR BLD: 42 % (ref 18–48)
MCH RBC QN AUTO: 26.9 PG (ref 27–31)
MCHC RBC AUTO-ENTMCNC: 30.6 G/DL (ref 32–36)
MCV RBC AUTO: 88 FL (ref 82–98)
METAMYELOCYTES NFR BLD MANUAL: 5 %
MONOCYTES NFR BLD: 11 % (ref 4–15)
MYELOCYTES NFR BLD MANUAL: 2 %
NEUTROPHILS NFR BLD: 20 % (ref 38–73)
NRBC BLD-RTO: 28 /100 WBC
OVALOCYTES BLD QL SMEAR: ABNORMAL
PLATELET # BLD AUTO: 17 K/UL (ref 150–450)
PLATELET BLD QL SMEAR: ABNORMAL
PMV BLD AUTO: ABNORMAL FL (ref 9.2–12.9)
POIKILOCYTOSIS BLD QL SMEAR: SLIGHT
POLYCHROMASIA BLD QL SMEAR: ABNORMAL
RBC # BLD AUTO: 2.86 M/UL (ref 4.6–6.2)
SCHISTOCYTES BLD QL SMEAR: PRESENT
WBC # BLD AUTO: 1.03 K/UL (ref 3.9–12.7)

## 2021-08-02 PROCEDURE — 36415 COLL VENOUS BLD VENIPUNCTURE: CPT | Mod: PN | Performed by: INTERNAL MEDICINE

## 2021-08-02 PROCEDURE — 86900 BLOOD TYPING SEROLOGIC ABO: CPT | Mod: PN | Performed by: INTERNAL MEDICINE

## 2021-08-02 PROCEDURE — 85007 BL SMEAR W/DIFF WBC COUNT: CPT | Mod: PN | Performed by: INTERNAL MEDICINE

## 2021-08-02 PROCEDURE — 86900 BLOOD TYPING SEROLOGIC ABO: CPT | Performed by: INTERNAL MEDICINE

## 2021-08-02 PROCEDURE — 85027 COMPLETE CBC AUTOMATED: CPT | Mod: PN | Performed by: INTERNAL MEDICINE

## 2021-08-09 ENCOUNTER — DOCUMENTATION ONLY (OUTPATIENT)
Dept: INFUSION THERAPY | Facility: HOSPITAL | Age: 81
End: 2021-08-09

## 2021-08-09 ENCOUNTER — LAB VISIT (OUTPATIENT)
Dept: LAB | Facility: HOSPITAL | Age: 81
End: 2021-08-09
Attending: INTERNAL MEDICINE
Payer: MEDICARE

## 2021-08-09 DIAGNOSIS — D64.9 SYMPTOMATIC ANEMIA: Primary | ICD-10-CM

## 2021-08-09 DIAGNOSIS — D64.9 SYMPTOMATIC ANEMIA: ICD-10-CM

## 2021-08-09 LAB
ABO + RH BLD: NORMAL
ANISOCYTOSIS BLD QL SMEAR: SLIGHT
BASO STIPL BLD QL SMEAR: ABNORMAL
BASOPHILS # BLD AUTO: ABNORMAL K/UL (ref 0–0.2)
BASOPHILS NFR BLD: 0 % (ref 0–1.9)
BLASTS NFR BLD MANUAL: 10 %
BLD GP AB SCN CELLS X3 SERPL QL: NORMAL
DACRYOCYTES BLD QL SMEAR: ABNORMAL
DIFFERENTIAL METHOD: ABNORMAL
EOSINOPHIL # BLD AUTO: ABNORMAL K/UL (ref 0–0.5)
EOSINOPHIL NFR BLD: 0 % (ref 0–8)
ERYTHROCYTE [DISTWIDTH] IN BLOOD BY AUTOMATED COUNT: 19 % (ref 11.5–14.5)
HCT VFR BLD AUTO: 22.2 % (ref 40–54)
HGB BLD-MCNC: 6.8 G/DL (ref 14–18)
HYPOCHROMIA BLD QL SMEAR: ABNORMAL
IMM GRANULOCYTES # BLD AUTO: ABNORMAL K/UL (ref 0–0.04)
IMM GRANULOCYTES NFR BLD AUTO: ABNORMAL % (ref 0–0.5)
LYMPHOCYTES # BLD AUTO: ABNORMAL K/UL (ref 1–4.8)
LYMPHOCYTES NFR BLD: 68 % (ref 18–48)
MCH RBC QN AUTO: 26.9 PG (ref 27–31)
MCHC RBC AUTO-ENTMCNC: 30.6 G/DL (ref 32–36)
MCV RBC AUTO: 88 FL (ref 82–98)
MONOCYTES # BLD AUTO: ABNORMAL K/UL (ref 0.3–1)
MONOCYTES NFR BLD: 6 % (ref 4–15)
NEUTROPHILS NFR BLD: 10 % (ref 38–73)
NRBC BLD-RTO: 24 /100 WBC
OVALOCYTES BLD QL SMEAR: ABNORMAL
PLATELET # BLD AUTO: 20 K/UL (ref 150–450)
PLATELET BLD QL SMEAR: ABNORMAL
PMV BLD AUTO: ABNORMAL FL (ref 9.2–12.9)
POIKILOCYTOSIS BLD QL SMEAR: SLIGHT
POLYCHROMASIA BLD QL SMEAR: ABNORMAL
RBC # BLD AUTO: 2.53 M/UL (ref 4.6–6.2)
SCHISTOCYTES BLD QL SMEAR: PRESENT
WBC # BLD AUTO: 0.7 K/UL (ref 3.9–12.7)
WBC OTHER NFR BLD MANUAL: 6 %

## 2021-08-09 PROCEDURE — 85027 COMPLETE CBC AUTOMATED: CPT | Mod: PN | Performed by: INTERNAL MEDICINE

## 2021-08-09 PROCEDURE — 86900 BLOOD TYPING SEROLOGIC ABO: CPT | Mod: PN | Performed by: INTERNAL MEDICINE

## 2021-08-09 PROCEDURE — 85060 PATHOLOGIST REVIEW: ICD-10-PCS | Mod: ,,, | Performed by: PATHOLOGY

## 2021-08-09 PROCEDURE — 85060 BLOOD SMEAR INTERPRETATION: CPT | Mod: ,,, | Performed by: PATHOLOGY

## 2021-08-09 PROCEDURE — 85007 BL SMEAR W/DIFF WBC COUNT: CPT | Mod: PN | Performed by: INTERNAL MEDICINE

## 2021-08-09 PROCEDURE — 86900 BLOOD TYPING SEROLOGIC ABO: CPT | Performed by: INTERNAL MEDICINE

## 2021-08-09 RX ORDER — ACETAMINOPHEN 325 MG/1
650 TABLET ORAL ONCE
Status: CANCELLED | OUTPATIENT
Start: 2021-08-09

## 2021-08-09 RX ORDER — DIPHENHYDRAMINE HCL 25 MG
25 CAPSULE ORAL ONCE
Status: CANCELLED | OUTPATIENT
Start: 2021-08-09

## 2021-08-09 RX ORDER — HYDROCODONE BITARTRATE AND ACETAMINOPHEN 500; 5 MG/1; MG/1
TABLET ORAL ONCE
Status: CANCELLED | OUTPATIENT
Start: 2021-08-09 | End: 2021-08-09

## 2021-08-09 RX ORDER — FUROSEMIDE 10 MG/ML
20 INJECTION INTRAMUSCULAR; INTRAVENOUS ONCE
Status: CANCELLED | OUTPATIENT
Start: 2021-08-09

## 2021-08-11 LAB — PATH REV BLD -IMP: NORMAL

## 2021-08-12 ENCOUNTER — SPECIALTY PHARMACY (OUTPATIENT)
Dept: PHARMACY | Facility: CLINIC | Age: 81
End: 2021-08-12

## 2021-08-16 ENCOUNTER — PATIENT MESSAGE (OUTPATIENT)
Dept: HEMATOLOGY/ONCOLOGY | Facility: CLINIC | Age: 81
End: 2021-08-16

## 2021-08-16 ENCOUNTER — DOCUMENTATION ONLY (OUTPATIENT)
Dept: HEMATOLOGY/ONCOLOGY | Facility: CLINIC | Age: 81
End: 2021-08-16

## 2021-08-16 ENCOUNTER — DOCUMENTATION ONLY (OUTPATIENT)
Dept: INFUSION THERAPY | Facility: HOSPITAL | Age: 81
End: 2021-08-16

## 2021-08-16 ENCOUNTER — OFFICE VISIT (OUTPATIENT)
Dept: HEMATOLOGY/ONCOLOGY | Facility: CLINIC | Age: 81
End: 2021-08-16
Payer: MEDICARE

## 2021-08-16 ENCOUNTER — INFUSION (OUTPATIENT)
Dept: INFUSION THERAPY | Facility: HOSPITAL | Age: 81
End: 2021-08-16
Attending: INTERNAL MEDICINE
Payer: MEDICARE

## 2021-08-16 VITALS
SYSTOLIC BLOOD PRESSURE: 101 MMHG | HEART RATE: 80 BPM | TEMPERATURE: 98 F | HEIGHT: 67 IN | DIASTOLIC BLOOD PRESSURE: 55 MMHG | OXYGEN SATURATION: 99 % | WEIGHT: 152.13 LBS | RESPIRATION RATE: 18 BRPM | BODY MASS INDEX: 23.88 KG/M2

## 2021-08-16 VITALS
SYSTOLIC BLOOD PRESSURE: 132 MMHG | OXYGEN SATURATION: 93 % | DIASTOLIC BLOOD PRESSURE: 74 MMHG | HEART RATE: 93 BPM | BODY MASS INDEX: 23.88 KG/M2 | HEIGHT: 67 IN | RESPIRATION RATE: 18 BRPM | WEIGHT: 152.13 LBS | TEMPERATURE: 98 F

## 2021-08-16 DIAGNOSIS — I48.0 PAROXYSMAL ATRIAL FIBRILLATION: ICD-10-CM

## 2021-08-16 DIAGNOSIS — D45 POLYCYTHEMIA VERA: Primary | ICD-10-CM

## 2021-08-16 DIAGNOSIS — D46.Z MDS (MYELODYSPLASTIC SYNDROME), HIGH GRADE: ICD-10-CM

## 2021-08-16 DIAGNOSIS — D61.818 PANCYTOPENIA: ICD-10-CM

## 2021-08-16 DIAGNOSIS — D46.Z MDS (MYELODYSPLASTIC SYNDROME), HIGH GRADE: Primary | ICD-10-CM

## 2021-08-16 PROCEDURE — 3078F PR MOST RECENT DIASTOLIC BLOOD PRESSURE < 80 MM HG: ICD-10-PCS | Mod: CPTII,S$GLB,, | Performed by: INTERNAL MEDICINE

## 2021-08-16 PROCEDURE — 1101F PT FALLS ASSESS-DOCD LE1/YR: CPT | Mod: CPTII,S$GLB,, | Performed by: INTERNAL MEDICINE

## 2021-08-16 PROCEDURE — 3288F PR FALLS RISK ASSESSMENT DOCUMENTED: ICD-10-PCS | Mod: CPTII,S$GLB,, | Performed by: INTERNAL MEDICINE

## 2021-08-16 PROCEDURE — 25000003 PHARM REV CODE 250: Mod: PN | Performed by: INTERNAL MEDICINE

## 2021-08-16 PROCEDURE — 99999 PR PBB SHADOW E&M-EST. PATIENT-LVL IV: ICD-10-PCS | Mod: PBBFAC,,, | Performed by: INTERNAL MEDICINE

## 2021-08-16 PROCEDURE — 3075F PR MOST RECENT SYSTOLIC BLOOD PRESS GE 130-139MM HG: ICD-10-PCS | Mod: CPTII,S$GLB,, | Performed by: INTERNAL MEDICINE

## 2021-08-16 PROCEDURE — 99214 OFFICE O/P EST MOD 30 MIN: CPT | Mod: S$GLB,,, | Performed by: INTERNAL MEDICINE

## 2021-08-16 PROCEDURE — 3288F FALL RISK ASSESSMENT DOCD: CPT | Mod: CPTII,S$GLB,, | Performed by: INTERNAL MEDICINE

## 2021-08-16 PROCEDURE — 1126F PR PAIN SEVERITY QUANTIFIED, NO PAIN PRESENT: ICD-10-PCS | Mod: CPTII,S$GLB,, | Performed by: INTERNAL MEDICINE

## 2021-08-16 PROCEDURE — 99999 PR PBB SHADOW E&M-EST. PATIENT-LVL IV: CPT | Mod: PBBFAC,,, | Performed by: INTERNAL MEDICINE

## 2021-08-16 PROCEDURE — 1160F RVW MEDS BY RX/DR IN RCRD: CPT | Mod: CPTII,S$GLB,, | Performed by: INTERNAL MEDICINE

## 2021-08-16 PROCEDURE — 1159F MED LIST DOCD IN RCRD: CPT | Mod: CPTII,S$GLB,, | Performed by: INTERNAL MEDICINE

## 2021-08-16 PROCEDURE — 99499 UNLISTED E&M SERVICE: CPT | Mod: S$GLB,,, | Performed by: INTERNAL MEDICINE

## 2021-08-16 PROCEDURE — 99499 RISK ADDL DX/OHS AUDIT: ICD-10-PCS | Mod: S$GLB,,, | Performed by: INTERNAL MEDICINE

## 2021-08-16 PROCEDURE — 1160F PR REVIEW ALL MEDS BY PRESCRIBER/CLIN PHARMACIST DOCUMENTED: ICD-10-PCS | Mod: CPTII,S$GLB,, | Performed by: INTERNAL MEDICINE

## 2021-08-16 PROCEDURE — 63600175 PHARM REV CODE 636 W HCPCS: Mod: PN | Performed by: INTERNAL MEDICINE

## 2021-08-16 PROCEDURE — 99214 PR OFFICE/OUTPT VISIT, EST, LEVL IV, 30-39 MIN: ICD-10-PCS | Mod: S$GLB,,, | Performed by: INTERNAL MEDICINE

## 2021-08-16 PROCEDURE — 1159F PR MEDICATION LIST DOCUMENTED IN MEDICAL RECORD: ICD-10-PCS | Mod: CPTII,S$GLB,, | Performed by: INTERNAL MEDICINE

## 2021-08-16 PROCEDURE — 1101F PR PT FALLS ASSESS DOC 0-1 FALLS W/OUT INJ PAST YR: ICD-10-PCS | Mod: CPTII,S$GLB,, | Performed by: INTERNAL MEDICINE

## 2021-08-16 PROCEDURE — 3078F DIAST BP <80 MM HG: CPT | Mod: CPTII,S$GLB,, | Performed by: INTERNAL MEDICINE

## 2021-08-16 PROCEDURE — 1126F AMNT PAIN NOTED NONE PRSNT: CPT | Mod: CPTII,S$GLB,, | Performed by: INTERNAL MEDICINE

## 2021-08-16 PROCEDURE — 96409 CHEMO IV PUSH SNGL DRUG: CPT | Mod: PN

## 2021-08-16 PROCEDURE — 96375 TX/PRO/DX INJ NEW DRUG ADDON: CPT | Mod: PN

## 2021-08-16 PROCEDURE — 3075F SYST BP GE 130 - 139MM HG: CPT | Mod: CPTII,S$GLB,, | Performed by: INTERNAL MEDICINE

## 2021-08-16 RX ORDER — SODIUM CHLORIDE 0.9 % (FLUSH) 0.9 %
10 SYRINGE (ML) INJECTION
Status: CANCELLED | OUTPATIENT
Start: 2021-08-20

## 2021-08-16 RX ORDER — HEPARIN 100 UNIT/ML
500 SYRINGE INTRAVENOUS
Status: CANCELLED | OUTPATIENT
Start: 2021-08-17

## 2021-08-16 RX ORDER — SODIUM CHLORIDE 0.9 % (FLUSH) 0.9 %
10 SYRINGE (ML) INJECTION
Status: CANCELLED | OUTPATIENT
Start: 2021-08-18

## 2021-08-16 RX ORDER — HEPARIN 100 UNIT/ML
500 SYRINGE INTRAVENOUS
Status: CANCELLED | OUTPATIENT
Start: 2021-08-19

## 2021-08-16 RX ORDER — ONDANSETRON 2 MG/ML
8 INJECTION INTRAMUSCULAR; INTRAVENOUS ONCE
Status: CANCELLED
Start: 2021-08-16 | End: 2021-08-16

## 2021-08-16 RX ORDER — ONDANSETRON 2 MG/ML
8 INJECTION INTRAMUSCULAR; INTRAVENOUS ONCE
Status: CANCELLED
Start: 2021-08-19 | End: 2021-08-19

## 2021-08-16 RX ORDER — HEPARIN 100 UNIT/ML
500 SYRINGE INTRAVENOUS
Status: CANCELLED | OUTPATIENT
Start: 2021-08-16

## 2021-08-16 RX ORDER — SODIUM CHLORIDE 0.9 % (FLUSH) 0.9 %
10 SYRINGE (ML) INJECTION
Status: CANCELLED | OUTPATIENT
Start: 2021-08-19

## 2021-08-16 RX ORDER — SODIUM CHLORIDE 0.9 % (FLUSH) 0.9 %
10 SYRINGE (ML) INJECTION
Status: DISCONTINUED | OUTPATIENT
Start: 2021-08-16 | End: 2021-08-16 | Stop reason: HOSPADM

## 2021-08-16 RX ORDER — HEPARIN 100 UNIT/ML
500 SYRINGE INTRAVENOUS
Status: CANCELLED | OUTPATIENT
Start: 2021-08-18

## 2021-08-16 RX ORDER — HEPARIN 100 UNIT/ML
500 SYRINGE INTRAVENOUS
Status: CANCELLED | OUTPATIENT
Start: 2021-08-20

## 2021-08-16 RX ORDER — SODIUM CHLORIDE 0.9 % (FLUSH) 0.9 %
10 SYRINGE (ML) INJECTION
Status: CANCELLED | OUTPATIENT
Start: 2021-08-16

## 2021-08-16 RX ORDER — SODIUM CHLORIDE 0.9 % (FLUSH) 0.9 %
10 SYRINGE (ML) INJECTION
Status: CANCELLED | OUTPATIENT
Start: 2021-08-17

## 2021-08-16 RX ORDER — ONDANSETRON 2 MG/ML
8 INJECTION INTRAMUSCULAR; INTRAVENOUS ONCE
Status: COMPLETED | OUTPATIENT
Start: 2021-08-16 | End: 2021-08-16

## 2021-08-16 RX ORDER — ONDANSETRON 2 MG/ML
8 INJECTION INTRAMUSCULAR; INTRAVENOUS ONCE
Status: CANCELLED
Start: 2021-08-20 | End: 2021-08-20

## 2021-08-16 RX ORDER — ONDANSETRON 2 MG/ML
8 INJECTION INTRAMUSCULAR; INTRAVENOUS ONCE
Status: CANCELLED
Start: 2021-08-18 | End: 2021-08-18

## 2021-08-16 RX ORDER — ONDANSETRON 2 MG/ML
8 INJECTION INTRAMUSCULAR; INTRAVENOUS ONCE
Status: CANCELLED
Start: 2021-08-17 | End: 2021-08-17

## 2021-08-16 RX ADMIN — SODIUM CHLORIDE: 0.9 INJECTION, SOLUTION INTRAVENOUS at 03:08

## 2021-08-16 RX ADMIN — AZACITIDINE 140 MG: 100 INJECTION, POWDER, LYOPHILIZED, FOR SOLUTION INTRAVENOUS; SUBCUTANEOUS at 04:08

## 2021-08-16 RX ADMIN — ONDANSETRON 8 MG: 2 INJECTION INTRAMUSCULAR; INTRAVENOUS at 03:08

## 2021-08-17 ENCOUNTER — INFUSION (OUTPATIENT)
Dept: INFUSION THERAPY | Facility: HOSPITAL | Age: 81
End: 2021-08-17
Attending: INTERNAL MEDICINE
Payer: MEDICARE

## 2021-08-17 ENCOUNTER — DOCUMENTATION ONLY (OUTPATIENT)
Dept: INFUSION THERAPY | Facility: HOSPITAL | Age: 81
End: 2021-08-17

## 2021-08-17 VITALS
SYSTOLIC BLOOD PRESSURE: 99 MMHG | RESPIRATION RATE: 18 BRPM | WEIGHT: 152.13 LBS | DIASTOLIC BLOOD PRESSURE: 52 MMHG | HEIGHT: 67 IN | TEMPERATURE: 99 F | BODY MASS INDEX: 23.88 KG/M2 | HEART RATE: 84 BPM

## 2021-08-17 DIAGNOSIS — D46.Z MDS (MYELODYSPLASTIC SYNDROME), HIGH GRADE: Primary | ICD-10-CM

## 2021-08-17 PROCEDURE — 63600175 PHARM REV CODE 636 W HCPCS: Mod: JG,PN | Performed by: INTERNAL MEDICINE

## 2021-08-17 PROCEDURE — A4216 STERILE WATER/SALINE, 10 ML: HCPCS | Mod: PN | Performed by: INTERNAL MEDICINE

## 2021-08-17 PROCEDURE — 25000003 PHARM REV CODE 250: Mod: PN | Performed by: INTERNAL MEDICINE

## 2021-08-17 PROCEDURE — 96375 TX/PRO/DX INJ NEW DRUG ADDON: CPT | Mod: PN

## 2021-08-17 PROCEDURE — 96409 CHEMO IV PUSH SNGL DRUG: CPT | Mod: PN

## 2021-08-17 RX ORDER — ONDANSETRON 2 MG/ML
8 INJECTION INTRAMUSCULAR; INTRAVENOUS ONCE
Status: COMPLETED | OUTPATIENT
Start: 2021-08-17 | End: 2021-08-17

## 2021-08-17 RX ORDER — SODIUM CHLORIDE 0.9 % (FLUSH) 0.9 %
10 SYRINGE (ML) INJECTION
Status: DISCONTINUED | OUTPATIENT
Start: 2021-08-17 | End: 2021-08-17 | Stop reason: HOSPADM

## 2021-08-17 RX ADMIN — Medication 10 ML: at 03:08

## 2021-08-17 RX ADMIN — AZACITIDINE 140 MG: 100 INJECTION, POWDER, LYOPHILIZED, FOR SOLUTION INTRAVENOUS; SUBCUTANEOUS at 03:08

## 2021-08-17 RX ADMIN — ONDANSETRON 8 MG: 2 INJECTION INTRAMUSCULAR; INTRAVENOUS at 02:08

## 2021-08-17 RX ADMIN — SODIUM CHLORIDE: 0.9 INJECTION, SOLUTION INTRAVENOUS at 02:08

## 2021-08-18 ENCOUNTER — INFUSION (OUTPATIENT)
Dept: INFUSION THERAPY | Facility: HOSPITAL | Age: 81
End: 2021-08-18
Attending: INTERNAL MEDICINE
Payer: MEDICARE

## 2021-08-18 ENCOUNTER — DOCUMENTATION ONLY (OUTPATIENT)
Dept: INFUSION THERAPY | Facility: HOSPITAL | Age: 81
End: 2021-08-18

## 2021-08-18 ENCOUNTER — TELEPHONE (OUTPATIENT)
Dept: HEMATOLOGY/ONCOLOGY | Facility: CLINIC | Age: 81
End: 2021-08-18

## 2021-08-18 VITALS
TEMPERATURE: 97 F | HEIGHT: 67 IN | BODY MASS INDEX: 23.88 KG/M2 | WEIGHT: 152.13 LBS | DIASTOLIC BLOOD PRESSURE: 62 MMHG | HEART RATE: 78 BPM | RESPIRATION RATE: 18 BRPM | SYSTOLIC BLOOD PRESSURE: 99 MMHG

## 2021-08-18 DIAGNOSIS — D46.Z MDS (MYELODYSPLASTIC SYNDROME), HIGH GRADE: Primary | ICD-10-CM

## 2021-08-18 PROCEDURE — 96409 CHEMO IV PUSH SNGL DRUG: CPT | Mod: PN

## 2021-08-18 PROCEDURE — 96375 TX/PRO/DX INJ NEW DRUG ADDON: CPT | Mod: PN

## 2021-08-18 PROCEDURE — 25000003 PHARM REV CODE 250: Mod: PN | Performed by: INTERNAL MEDICINE

## 2021-08-18 PROCEDURE — A4216 STERILE WATER/SALINE, 10 ML: HCPCS | Mod: PN | Performed by: INTERNAL MEDICINE

## 2021-08-18 PROCEDURE — 63600175 PHARM REV CODE 636 W HCPCS: Mod: PN | Performed by: INTERNAL MEDICINE

## 2021-08-18 RX ORDER — SODIUM CHLORIDE 0.9 % (FLUSH) 0.9 %
10 SYRINGE (ML) INJECTION
Status: DISCONTINUED | OUTPATIENT
Start: 2021-08-18 | End: 2021-08-18 | Stop reason: HOSPADM

## 2021-08-18 RX ORDER — ONDANSETRON 2 MG/ML
8 INJECTION INTRAMUSCULAR; INTRAVENOUS ONCE
Status: COMPLETED | OUTPATIENT
Start: 2021-08-18 | End: 2021-08-18

## 2021-08-18 RX ADMIN — SODIUM CHLORIDE: 0.9 INJECTION, SOLUTION INTRAVENOUS at 02:08

## 2021-08-18 RX ADMIN — AZACITIDINE 140 MG: 100 INJECTION, POWDER, LYOPHILIZED, FOR SOLUTION INTRAVENOUS; SUBCUTANEOUS at 02:08

## 2021-08-18 RX ADMIN — Medication 10 ML: at 03:08

## 2021-08-18 RX ADMIN — ONDANSETRON 8 MG: 2 INJECTION INTRAMUSCULAR; INTRAVENOUS at 02:08

## 2021-08-19 ENCOUNTER — INFUSION (OUTPATIENT)
Dept: INFUSION THERAPY | Facility: HOSPITAL | Age: 81
End: 2021-08-19
Attending: INTERNAL MEDICINE
Payer: MEDICARE

## 2021-08-19 ENCOUNTER — DOCUMENTATION ONLY (OUTPATIENT)
Dept: INFUSION THERAPY | Facility: HOSPITAL | Age: 81
End: 2021-08-19

## 2021-08-19 VITALS
TEMPERATURE: 98 F | HEIGHT: 67 IN | RESPIRATION RATE: 18 BRPM | BODY MASS INDEX: 23.88 KG/M2 | SYSTOLIC BLOOD PRESSURE: 100 MMHG | HEART RATE: 86 BPM | DIASTOLIC BLOOD PRESSURE: 58 MMHG | WEIGHT: 152.13 LBS

## 2021-08-19 DIAGNOSIS — D46.Z MDS (MYELODYSPLASTIC SYNDROME), HIGH GRADE: Primary | ICD-10-CM

## 2021-08-19 PROCEDURE — 96375 TX/PRO/DX INJ NEW DRUG ADDON: CPT | Mod: PN

## 2021-08-19 PROCEDURE — 96409 CHEMO IV PUSH SNGL DRUG: CPT | Mod: PN

## 2021-08-19 PROCEDURE — 63600175 PHARM REV CODE 636 W HCPCS: Mod: PN | Performed by: INTERNAL MEDICINE

## 2021-08-19 PROCEDURE — 25000003 PHARM REV CODE 250: Mod: PN | Performed by: INTERNAL MEDICINE

## 2021-08-19 PROCEDURE — A4216 STERILE WATER/SALINE, 10 ML: HCPCS | Mod: PN | Performed by: INTERNAL MEDICINE

## 2021-08-19 RX ORDER — SODIUM CHLORIDE 0.9 % (FLUSH) 0.9 %
10 SYRINGE (ML) INJECTION
Status: DISCONTINUED | OUTPATIENT
Start: 2021-08-19 | End: 2021-08-19 | Stop reason: HOSPADM

## 2021-08-19 RX ORDER — ONDANSETRON 2 MG/ML
8 INJECTION INTRAMUSCULAR; INTRAVENOUS ONCE
Status: COMPLETED | OUTPATIENT
Start: 2021-08-19 | End: 2021-08-19

## 2021-08-19 RX ADMIN — SODIUM CHLORIDE: 0.9 INJECTION, SOLUTION INTRAVENOUS at 02:08

## 2021-08-19 RX ADMIN — Medication 10 ML: at 03:08

## 2021-08-19 RX ADMIN — AZACITIDINE 140 MG: 100 INJECTION, POWDER, LYOPHILIZED, FOR SOLUTION INTRAVENOUS; SUBCUTANEOUS at 03:08

## 2021-08-19 RX ADMIN — ONDANSETRON 8 MG: 2 INJECTION INTRAMUSCULAR; INTRAVENOUS at 02:08

## 2021-08-20 ENCOUNTER — INFUSION (OUTPATIENT)
Dept: INFUSION THERAPY | Facility: HOSPITAL | Age: 81
End: 2021-08-20
Attending: INTERNAL MEDICINE
Payer: MEDICARE

## 2021-08-20 VITALS
RESPIRATION RATE: 18 BRPM | HEART RATE: 72 BPM | SYSTOLIC BLOOD PRESSURE: 111 MMHG | BODY MASS INDEX: 23.88 KG/M2 | DIASTOLIC BLOOD PRESSURE: 56 MMHG | TEMPERATURE: 99 F | HEIGHT: 67 IN | WEIGHT: 152.13 LBS

## 2021-08-20 DIAGNOSIS — D46.Z MDS (MYELODYSPLASTIC SYNDROME), HIGH GRADE: Primary | ICD-10-CM

## 2021-08-20 PROCEDURE — 96375 TX/PRO/DX INJ NEW DRUG ADDON: CPT | Mod: PN

## 2021-08-20 PROCEDURE — A4216 STERILE WATER/SALINE, 10 ML: HCPCS | Mod: PN | Performed by: INTERNAL MEDICINE

## 2021-08-20 PROCEDURE — 25000003 PHARM REV CODE 250: Mod: PN | Performed by: INTERNAL MEDICINE

## 2021-08-20 PROCEDURE — 96409 CHEMO IV PUSH SNGL DRUG: CPT | Mod: PN

## 2021-08-20 PROCEDURE — 63600175 PHARM REV CODE 636 W HCPCS: Mod: JW,JG,PN | Performed by: INTERNAL MEDICINE

## 2021-08-20 RX ORDER — SODIUM CHLORIDE 0.9 % (FLUSH) 0.9 %
10 SYRINGE (ML) INJECTION
Status: DISCONTINUED | OUTPATIENT
Start: 2021-08-20 | End: 2021-08-20 | Stop reason: HOSPADM

## 2021-08-20 RX ORDER — ONDANSETRON 2 MG/ML
8 INJECTION INTRAMUSCULAR; INTRAVENOUS ONCE
Status: COMPLETED | OUTPATIENT
Start: 2021-08-20 | End: 2021-08-20

## 2021-08-20 RX ADMIN — SODIUM CHLORIDE: 0.9 INJECTION, SOLUTION INTRAVENOUS at 03:08

## 2021-08-20 RX ADMIN — AZACITIDINE 140 MG: 100 INJECTION, POWDER, LYOPHILIZED, FOR SOLUTION INTRAVENOUS; SUBCUTANEOUS at 03:08

## 2021-08-20 RX ADMIN — ONDANSETRON 8 MG: 2 INJECTION INTRAMUSCULAR; INTRAVENOUS at 03:08

## 2021-08-20 RX ADMIN — Medication 10 ML: at 03:08

## 2021-08-23 ENCOUNTER — LAB VISIT (OUTPATIENT)
Dept: LAB | Facility: HOSPITAL | Age: 81
End: 2021-08-23
Attending: INTERNAL MEDICINE
Payer: MEDICARE

## 2021-08-23 DIAGNOSIS — D64.9 SYMPTOMATIC ANEMIA: Primary | ICD-10-CM

## 2021-08-23 DIAGNOSIS — D46.Z MDS (MYELODYSPLASTIC SYNDROME), HIGH GRADE: ICD-10-CM

## 2021-08-23 DIAGNOSIS — D64.9 SYMPTOMATIC ANEMIA: ICD-10-CM

## 2021-08-23 LAB
ABO + RH BLD: NORMAL
ANISOCYTOSIS BLD QL SMEAR: SLIGHT
BASOPHILS NFR BLD: 0 % (ref 0–1.9)
BLASTS NFR BLD MANUAL: 8 %
BLD GP AB SCN CELLS X3 SERPL QL: NORMAL
DACRYOCYTES BLD QL SMEAR: ABNORMAL
DIFFERENTIAL METHOD: ABNORMAL
EOSINOPHIL NFR BLD: 1 % (ref 0–8)
ERYTHROCYTE [DISTWIDTH] IN BLOOD BY AUTOMATED COUNT: 17.4 % (ref 11.5–14.5)
HCT VFR BLD AUTO: 19.7 % (ref 40–54)
HGB BLD-MCNC: 6.3 G/DL (ref 14–18)
IMM GRANULOCYTES # BLD AUTO: ABNORMAL K/UL (ref 0–0.04)
IMM GRANULOCYTES NFR BLD AUTO: ABNORMAL % (ref 0–0.5)
LYMPHOCYTES NFR BLD: 53 % (ref 18–48)
MCH RBC QN AUTO: 27.8 PG (ref 27–31)
MCHC RBC AUTO-ENTMCNC: 32 G/DL (ref 32–36)
MCV RBC AUTO: 87 FL (ref 82–98)
METAMYELOCYTES NFR BLD MANUAL: 5 %
MONOCYTES NFR BLD: 3 % (ref 4–15)
MYELOCYTES NFR BLD MANUAL: 6 %
NEUTROPHILS NFR BLD: 24 % (ref 38–73)
NRBC BLD-RTO: 10 /100 WBC
OVALOCYTES BLD QL SMEAR: ABNORMAL
PLATELET # BLD AUTO: 14 K/UL (ref 150–450)
PLATELET BLD QL SMEAR: ABNORMAL
PMV BLD AUTO: ABNORMAL FL (ref 9.2–12.9)
POIKILOCYTOSIS BLD QL SMEAR: SLIGHT
POLYCHROMASIA BLD QL SMEAR: ABNORMAL
RBC # BLD AUTO: 2.27 M/UL (ref 4.6–6.2)
WBC # BLD AUTO: 0.95 K/UL (ref 3.9–12.7)

## 2021-08-23 PROCEDURE — 85027 COMPLETE CBC AUTOMATED: CPT | Mod: PN | Performed by: INTERNAL MEDICINE

## 2021-08-23 PROCEDURE — 86900 BLOOD TYPING SEROLOGIC ABO: CPT | Performed by: NURSE PRACTITIONER

## 2021-08-23 PROCEDURE — 36415 COLL VENOUS BLD VENIPUNCTURE: CPT | Mod: PN | Performed by: NURSE PRACTITIONER

## 2021-08-23 PROCEDURE — 85007 BL SMEAR W/DIFF WBC COUNT: CPT | Mod: PN | Performed by: INTERNAL MEDICINE

## 2021-08-23 PROCEDURE — 86900 BLOOD TYPING SEROLOGIC ABO: CPT | Mod: PN | Performed by: NURSE PRACTITIONER

## 2021-08-23 RX ORDER — FUROSEMIDE 10 MG/ML
20 INJECTION INTRAMUSCULAR; INTRAVENOUS ONCE
Status: CANCELLED | OUTPATIENT
Start: 2021-08-23

## 2021-08-23 RX ORDER — ACETAMINOPHEN 325 MG/1
650 TABLET ORAL ONCE
Status: CANCELLED | OUTPATIENT
Start: 2021-08-23

## 2021-08-23 RX ORDER — HYDROCODONE BITARTRATE AND ACETAMINOPHEN 500; 5 MG/1; MG/1
TABLET ORAL ONCE
Status: CANCELLED | OUTPATIENT
Start: 2021-08-23 | End: 2021-08-23

## 2021-08-23 RX ORDER — DIPHENHYDRAMINE HCL 25 MG
25 CAPSULE ORAL ONCE
Status: CANCELLED | OUTPATIENT
Start: 2021-08-23

## 2021-08-25 ENCOUNTER — DOCUMENTATION ONLY (OUTPATIENT)
Dept: INFUSION THERAPY | Facility: HOSPITAL | Age: 81
End: 2021-08-25

## 2021-08-28 ENCOUNTER — HOSPITAL ENCOUNTER (INPATIENT)
Facility: HOSPITAL | Age: 81
LOS: 3 days | Discharge: HOME | DRG: 809 | End: 2021-08-31
Attending: EMERGENCY MEDICINE | Admitting: FAMILY MEDICINE
Payer: MEDICARE

## 2021-08-28 DIAGNOSIS — D61.818 PANCYTOPENIA WITH FEVER: ICD-10-CM

## 2021-08-28 DIAGNOSIS — K61.1 RECTAL ABSCESS: ICD-10-CM

## 2021-08-28 DIAGNOSIS — R50.81 PANCYTOPENIA WITH FEVER: ICD-10-CM

## 2021-08-28 DIAGNOSIS — D45 POLYCYTHEMIA VERA: ICD-10-CM

## 2021-08-28 DIAGNOSIS — D61.818 PANCYTOPENIA: Primary | ICD-10-CM

## 2021-08-28 DIAGNOSIS — D46.9 MDS (MYELODYSPLASTIC SYNDROME): ICD-10-CM

## 2021-08-28 PROBLEM — R50.9 FEVER: Status: RESOLVED | Noted: 2021-01-15 | Resolved: 2021-08-28

## 2021-08-28 PROBLEM — E65 ABDOMINAL OBESITY: Status: RESOLVED | Noted: 2019-03-22 | Resolved: 2021-08-28

## 2021-08-28 PROBLEM — G92.9 ENCEPHALOPATHY, TOXIC: Status: RESOLVED | Noted: 2020-12-12 | Resolved: 2021-08-28

## 2021-08-28 PROBLEM — R79.89 ELEVATED TSH: Status: RESOLVED | Noted: 2020-03-12 | Resolved: 2021-08-28

## 2021-08-28 PROBLEM — D70.9 SEVERE NEUTROPENIA: Status: ACTIVE | Noted: 2021-08-28

## 2021-08-28 PROBLEM — N17.9 AKI (ACUTE KIDNEY INJURY): Status: RESOLVED | Noted: 2020-12-12 | Resolved: 2021-08-28

## 2021-08-28 LAB
ALBUMIN SERPL BCP-MCNC: 3.4 G/DL (ref 3.5–5.2)
ALP SERPL-CCNC: 109 U/L (ref 55–135)
ALT SERPL W/O P-5'-P-CCNC: 50 U/L (ref 10–44)
ANION GAP SERPL CALC-SCNC: 11 MMOL/L (ref 8–16)
ANISOCYTOSIS BLD QL SMEAR: SLIGHT
AST SERPL-CCNC: 53 U/L (ref 10–40)
BASOPHILS # BLD AUTO: ABNORMAL K/UL (ref 0–0.2)
BASOPHILS NFR BLD: 0 % (ref 0–1.9)
BILIRUB SERPL-MCNC: 2.6 MG/DL (ref 0.1–1)
BILIRUB UR QL STRIP: NEGATIVE
BUN SERPL-MCNC: 14 MG/DL (ref 8–23)
CALCIUM SERPL-MCNC: 8.9 MG/DL (ref 8.7–10.5)
CHLORIDE SERPL-SCNC: 97 MMOL/L (ref 95–110)
CLARITY UR: CLEAR
CO2 SERPL-SCNC: 22 MMOL/L (ref 23–29)
COLOR UR: YELLOW
CREAT SERPL-MCNC: 1.2 MG/DL (ref 0.5–1.4)
DIFFERENTIAL METHOD: ABNORMAL
EOSINOPHIL # BLD AUTO: ABNORMAL K/UL (ref 0–0.5)
EOSINOPHIL NFR BLD: 0 % (ref 0–8)
ERYTHROCYTE [DISTWIDTH] IN BLOOD BY AUTOMATED COUNT: 16.7 % (ref 11.5–14.5)
EST. GFR  (AFRICAN AMERICAN): >60 ML/MIN/1.73 M^2
EST. GFR  (NON AFRICAN AMERICAN): 56.8 ML/MIN/1.73 M^2
GLUCOSE SERPL-MCNC: 124 MG/DL (ref 70–110)
GLUCOSE UR QL STRIP: NEGATIVE
HCT VFR BLD AUTO: 23.6 % (ref 40–54)
HGB BLD-MCNC: 7.6 G/DL (ref 14–18)
HGB UR QL STRIP: NEGATIVE
HYPOCHROMIA BLD QL SMEAR: ABNORMAL
IMM GRANULOCYTES # BLD AUTO: ABNORMAL K/UL (ref 0–0.04)
IMM GRANULOCYTES NFR BLD AUTO: ABNORMAL % (ref 0–0.5)
KETONES UR QL STRIP: NEGATIVE
LEUKOCYTE ESTERASE UR QL STRIP: NEGATIVE
LIPASE SERPL-CCNC: 27 U/L (ref 4–60)
LYMPHOCYTES # BLD AUTO: ABNORMAL K/UL (ref 1–4.8)
LYMPHOCYTES NFR BLD: 24 % (ref 18–48)
MCH RBC QN AUTO: 28.1 PG (ref 27–31)
MCHC RBC AUTO-ENTMCNC: 32.2 G/DL (ref 32–36)
MCV RBC AUTO: 87 FL (ref 82–98)
MONOCYTES # BLD AUTO: ABNORMAL K/UL (ref 0.3–1)
MONOCYTES NFR BLD: 48 % (ref 4–15)
NEUTROPHILS # BLD AUTO: ABNORMAL K/UL (ref 1.8–7.7)
NEUTROPHILS NFR BLD: 24 % (ref 38–73)
NITRITE UR QL STRIP: NEGATIVE
NRBC BLD-RTO: 23 /100 WBC
PH UR STRIP: 7 [PH] (ref 5–8)
PLATELET # BLD AUTO: 11 K/UL (ref 150–450)
PLATELET BLD QL SMEAR: ABNORMAL
PMV BLD AUTO: ABNORMAL FL (ref 9.2–12.9)
POIKILOCYTOSIS BLD QL SMEAR: SLIGHT
POLYCHROMASIA BLD QL SMEAR: ABNORMAL
POTASSIUM SERPL-SCNC: 4.5 MMOL/L (ref 3.5–5.1)
PROCALCITONIN SERPL IA-MCNC: 0.16 NG/ML (ref 0–0.5)
PROT SERPL-MCNC: 7.3 G/DL (ref 6–8.4)
PROT UR QL STRIP: ABNORMAL
RBC # BLD AUTO: 2.7 M/UL (ref 4.6–6.2)
SARS-COV-2 RDRP RESP QL NAA+PROBE: NEGATIVE
SODIUM SERPL-SCNC: 130 MMOL/L (ref 136–145)
SP GR UR STRIP: 1.01 (ref 1–1.03)
URN SPEC COLLECT METH UR: ABNORMAL
UROBILINOGEN UR STRIP-ACNC: ABNORMAL EU/DL
WBC # BLD AUTO: 0.94 K/UL (ref 3.9–12.7)
WBC OTHER NFR BLD MANUAL: 4 %

## 2021-08-28 PROCEDURE — 63600175 PHARM REV CODE 636 W HCPCS: Performed by: EMERGENCY MEDICINE

## 2021-08-28 PROCEDURE — 83690 ASSAY OF LIPASE: CPT | Performed by: EMERGENCY MEDICINE

## 2021-08-28 PROCEDURE — 25000003 PHARM REV CODE 250: Performed by: EMERGENCY MEDICINE

## 2021-08-28 PROCEDURE — 96374 THER/PROPH/DIAG INJ IV PUSH: CPT

## 2021-08-28 PROCEDURE — 99285 EMERGENCY DEPT VISIT HI MDM: CPT | Mod: 25

## 2021-08-28 PROCEDURE — 84145 PROCALCITONIN (PCT): CPT | Performed by: NURSE PRACTITIONER

## 2021-08-28 PROCEDURE — 96375 TX/PRO/DX INJ NEW DRUG ADDON: CPT

## 2021-08-28 PROCEDURE — 12000002 HC ACUTE/MED SURGE SEMI-PRIVATE ROOM

## 2021-08-28 PROCEDURE — 85007 BL SMEAR W/DIFF WBC COUNT: CPT | Performed by: EMERGENCY MEDICINE

## 2021-08-28 PROCEDURE — U0002 COVID-19 LAB TEST NON-CDC: HCPCS | Performed by: NURSE PRACTITIONER

## 2021-08-28 PROCEDURE — 81003 URINALYSIS AUTO W/O SCOPE: CPT | Performed by: EMERGENCY MEDICINE

## 2021-08-28 PROCEDURE — 25500020 PHARM REV CODE 255: Performed by: EMERGENCY MEDICINE

## 2021-08-28 PROCEDURE — 85027 COMPLETE CBC AUTOMATED: CPT | Performed by: EMERGENCY MEDICINE

## 2021-08-28 PROCEDURE — 80053 COMPREHEN METABOLIC PANEL: CPT | Performed by: EMERGENCY MEDICINE

## 2021-08-28 RX ORDER — FAMOTIDINE 20 MG/1
40 TABLET, FILM COATED ORAL DAILY
Status: DISCONTINUED | OUTPATIENT
Start: 2021-08-29 | End: 2021-08-31 | Stop reason: HOSPADM

## 2021-08-28 RX ORDER — ONDANSETRON 2 MG/ML
4 INJECTION INTRAMUSCULAR; INTRAVENOUS
Status: COMPLETED | OUTPATIENT
Start: 2021-08-28 | End: 2021-08-28

## 2021-08-28 RX ORDER — LACTULOSE 10 G/15ML
20 SOLUTION ORAL ONCE AS NEEDED
Status: DISCONTINUED | OUTPATIENT
Start: 2021-08-28 | End: 2021-08-31 | Stop reason: HOSPADM

## 2021-08-28 RX ORDER — AMOXICILLIN 250 MG
1 CAPSULE ORAL 2 TIMES DAILY
Status: DISCONTINUED | OUTPATIENT
Start: 2021-08-29 | End: 2021-08-31 | Stop reason: HOSPADM

## 2021-08-28 RX ORDER — LANOLIN ALCOHOL/MO/W.PET/CERES
800 CREAM (GRAM) TOPICAL
Status: DISCONTINUED | OUTPATIENT
Start: 2021-08-28 | End: 2021-08-31 | Stop reason: HOSPADM

## 2021-08-28 RX ORDER — ONDANSETRON 2 MG/ML
4 INJECTION INTRAMUSCULAR; INTRAVENOUS EVERY 8 HOURS PRN
Status: DISCONTINUED | OUTPATIENT
Start: 2021-08-28 | End: 2021-08-31 | Stop reason: HOSPADM

## 2021-08-28 RX ORDER — SODIUM CHLORIDE 0.9 % (FLUSH) 0.9 %
10 SYRINGE (ML) INJECTION
Status: DISCONTINUED | OUTPATIENT
Start: 2021-08-28 | End: 2021-08-31 | Stop reason: HOSPADM

## 2021-08-28 RX ORDER — POTASSIUM CHLORIDE 20 MEQ/1
40 TABLET, EXTENDED RELEASE ORAL
Status: DISCONTINUED | OUTPATIENT
Start: 2021-08-28 | End: 2021-08-31 | Stop reason: HOSPADM

## 2021-08-28 RX ORDER — MAGNESIUM SULFATE HEPTAHYDRATE 40 MG/ML
4 INJECTION, SOLUTION INTRAVENOUS
Status: DISCONTINUED | OUTPATIENT
Start: 2021-08-28 | End: 2021-08-31 | Stop reason: HOSPADM

## 2021-08-28 RX ORDER — ATORVASTATIN CALCIUM 20 MG/1
80 TABLET, FILM COATED ORAL NIGHTLY
Status: DISCONTINUED | OUTPATIENT
Start: 2021-08-29 | End: 2021-08-31 | Stop reason: HOSPADM

## 2021-08-28 RX ORDER — ASCORBIC ACID 500 MG
500 TABLET ORAL 2 TIMES DAILY
Status: DISCONTINUED | OUTPATIENT
Start: 2021-08-29 | End: 2021-08-31 | Stop reason: HOSPADM

## 2021-08-28 RX ORDER — MAGNESIUM SULFATE HEPTAHYDRATE 40 MG/ML
2 INJECTION, SOLUTION INTRAVENOUS
Status: DISCONTINUED | OUTPATIENT
Start: 2021-08-28 | End: 2021-08-31 | Stop reason: HOSPADM

## 2021-08-28 RX ORDER — TALC
6 POWDER (GRAM) TOPICAL NIGHTLY PRN
Status: DISCONTINUED | OUTPATIENT
Start: 2021-08-28 | End: 2021-08-31 | Stop reason: HOSPADM

## 2021-08-28 RX ORDER — MONTELUKAST SODIUM 10 MG/1
10 TABLET ORAL NIGHTLY
Status: DISCONTINUED | OUTPATIENT
Start: 2021-08-29 | End: 2021-08-31 | Stop reason: HOSPADM

## 2021-08-28 RX ORDER — MORPHINE SULFATE 4 MG/ML
4 INJECTION, SOLUTION INTRAMUSCULAR; INTRAVENOUS
Status: COMPLETED | OUTPATIENT
Start: 2021-08-28 | End: 2021-08-28

## 2021-08-28 RX ORDER — TRAZODONE HYDROCHLORIDE 50 MG/1
50 TABLET ORAL NIGHTLY
Status: DISCONTINUED | OUTPATIENT
Start: 2021-08-29 | End: 2021-08-31 | Stop reason: HOSPADM

## 2021-08-28 RX ORDER — TAMSULOSIN HYDROCHLORIDE 0.4 MG/1
0.4 CAPSULE ORAL DAILY
Status: DISCONTINUED | OUTPATIENT
Start: 2021-08-29 | End: 2021-08-31 | Stop reason: HOSPADM

## 2021-08-28 RX ORDER — POTASSIUM CHLORIDE 7.45 MG/ML
20 INJECTION INTRAVENOUS
Status: DISCONTINUED | OUTPATIENT
Start: 2021-08-28 | End: 2021-08-31 | Stop reason: HOSPADM

## 2021-08-28 RX ORDER — POTASSIUM CHLORIDE 7.45 MG/ML
40 INJECTION INTRAVENOUS
Status: DISCONTINUED | OUTPATIENT
Start: 2021-08-28 | End: 2021-08-31 | Stop reason: HOSPADM

## 2021-08-28 RX ORDER — POTASSIUM CHLORIDE 20 MEQ/1
20 TABLET, EXTENDED RELEASE ORAL
Status: DISCONTINUED | OUTPATIENT
Start: 2021-08-28 | End: 2021-08-31 | Stop reason: HOSPADM

## 2021-08-28 RX ORDER — METOPROLOL SUCCINATE 25 MG/1
50 TABLET, EXTENDED RELEASE ORAL DAILY
Status: DISCONTINUED | OUTPATIENT
Start: 2021-08-29 | End: 2021-08-31 | Stop reason: HOSPADM

## 2021-08-28 RX ORDER — BENZONATATE 100 MG/1
100 CAPSULE ORAL 3 TIMES DAILY PRN
Status: DISCONTINUED | OUTPATIENT
Start: 2021-08-28 | End: 2021-08-31 | Stop reason: HOSPADM

## 2021-08-28 RX ORDER — ALBUTEROL SULFATE 90 UG/1
2 AEROSOL, METERED RESPIRATORY (INHALATION) 2 TIMES DAILY
Status: DISCONTINUED | OUTPATIENT
Start: 2021-08-28 | End: 2021-08-31 | Stop reason: HOSPADM

## 2021-08-28 RX ORDER — LEVOTHYROXINE SODIUM 25 UG/1
50 TABLET ORAL
Status: DISCONTINUED | OUTPATIENT
Start: 2021-08-29 | End: 2021-08-31 | Stop reason: HOSPADM

## 2021-08-28 RX ORDER — ACETAMINOPHEN 325 MG/1
650 TABLET ORAL EVERY 4 HOURS PRN
Status: DISCONTINUED | OUTPATIENT
Start: 2021-08-28 | End: 2021-08-31 | Stop reason: HOSPADM

## 2021-08-28 RX ORDER — OXYCODONE HYDROCHLORIDE 5 MG/1
5 TABLET ORAL EVERY 6 HOURS PRN
Status: DISCONTINUED | OUTPATIENT
Start: 2021-08-28 | End: 2021-08-31 | Stop reason: HOSPADM

## 2021-08-28 RX ORDER — CHOLECALCIFEROL (VITAMIN D3) 25 MCG
1000 TABLET ORAL DAILY
Status: DISCONTINUED | OUTPATIENT
Start: 2021-08-29 | End: 2021-08-31 | Stop reason: HOSPADM

## 2021-08-28 RX ORDER — MAGNESIUM SULFATE 1 G/100ML
1 INJECTION INTRAVENOUS
Status: DISCONTINUED | OUTPATIENT
Start: 2021-08-28 | End: 2021-08-31 | Stop reason: HOSPADM

## 2021-08-28 RX ORDER — LACTULOSE 10 G/15ML
20 SOLUTION ORAL ONCE
Status: COMPLETED | OUTPATIENT
Start: 2021-08-28 | End: 2021-08-29

## 2021-08-28 RX ADMIN — SODIUM CHLORIDE 1000 ML: 0.9 INJECTION, SOLUTION INTRAVENOUS at 05:08

## 2021-08-28 RX ADMIN — MORPHINE SULFATE 4 MG: 4 INJECTION, SOLUTION INTRAMUSCULAR; INTRAVENOUS at 05:08

## 2021-08-28 RX ADMIN — ONDANSETRON 4 MG: 2 INJECTION INTRAMUSCULAR; INTRAVENOUS at 05:08

## 2021-08-28 RX ADMIN — PIPERACILLIN AND TAZOBACTAM 4.5 G: 4; .5 INJECTION, POWDER, LYOPHILIZED, FOR SOLUTION INTRAVENOUS; PARENTERAL at 07:08

## 2021-08-28 RX ADMIN — IOHEXOL 100 ML: 350 INJECTION, SOLUTION INTRAVENOUS at 07:08

## 2021-08-29 LAB
ABO + RH BLD: NORMAL
ALBUMIN SERPL BCP-MCNC: 3 G/DL (ref 3.5–5.2)
ALP SERPL-CCNC: 92 U/L (ref 55–135)
ALT SERPL W/O P-5'-P-CCNC: 51 U/L (ref 10–44)
ANION GAP SERPL CALC-SCNC: 9 MMOL/L (ref 8–16)
AST SERPL-CCNC: 45 U/L (ref 10–40)
BASOPHILS NFR BLD: 0 % (ref 0–1.9)
BILIRUB SERPL-MCNC: 2.4 MG/DL (ref 0.1–1)
BLD GP AB SCN CELLS X3 SERPL QL: NORMAL
BLD PROD TYP BPU: NORMAL
BLOOD UNIT EXPIRATION DATE: NORMAL
BLOOD UNIT TYPE CODE: 5100
BLOOD UNIT TYPE CODE: 5100
BLOOD UNIT TYPE CODE: 6200
BLOOD UNIT TYPE: NORMAL
BUN SERPL-MCNC: 13 MG/DL (ref 8–23)
CALCIUM SERPL-MCNC: 8.7 MG/DL (ref 8.7–10.5)
CHLORIDE SERPL-SCNC: 102 MMOL/L (ref 95–110)
CO2 SERPL-SCNC: 24 MMOL/L (ref 23–29)
CODING SYSTEM: NORMAL
CREAT SERPL-MCNC: 1.1 MG/DL (ref 0.5–1.4)
DIFFERENTIAL METHOD: ABNORMAL
DISPENSE STATUS: NORMAL
EOSINOPHIL NFR BLD: 0 % (ref 0–8)
ERYTHROCYTE [DISTWIDTH] IN BLOOD BY AUTOMATED COUNT: 16.7 % (ref 11.5–14.5)
EST. GFR  (AFRICAN AMERICAN): >60 ML/MIN/1.73 M^2
EST. GFR  (NON AFRICAN AMERICAN): >60 ML/MIN/1.73 M^2
GLUCOSE SERPL-MCNC: 130 MG/DL (ref 70–110)
HCT VFR BLD AUTO: 21.5 % (ref 40–54)
HGB BLD-MCNC: 6.8 G/DL (ref 14–18)
IMM GRANULOCYTES # BLD AUTO: ABNORMAL K/UL (ref 0–0.04)
IMM GRANULOCYTES NFR BLD AUTO: ABNORMAL % (ref 0–0.5)
LYMPHOCYTES NFR BLD: 76 % (ref 18–48)
MAGNESIUM SERPL-MCNC: 2 MG/DL (ref 1.6–2.6)
MCH RBC QN AUTO: 28.6 PG (ref 27–31)
MCHC RBC AUTO-ENTMCNC: 31.6 G/DL (ref 32–36)
MCV RBC AUTO: 90 FL (ref 82–98)
MONOCYTES NFR BLD: 12 % (ref 4–15)
NEUTROPHILS NFR BLD: 12 % (ref 38–73)
NRBC BLD-RTO: 28 /100 WBC
NUM UNITS TRANS PACKED RBC: NORMAL
NUM UNITS TRANS PACKED RBC: NORMAL
PLATELET # BLD AUTO: 9 K/UL (ref 150–450)
PLATELET BLD QL SMEAR: ABNORMAL
PMV BLD AUTO: ABNORMAL FL (ref 9.2–12.9)
POTASSIUM SERPL-SCNC: 4.4 MMOL/L (ref 3.5–5.1)
PROT SERPL-MCNC: 6.6 G/DL (ref 6–8.4)
RBC # BLD AUTO: 2.38 M/UL (ref 4.6–6.2)
SODIUM SERPL-SCNC: 135 MMOL/L (ref 136–145)
UNIT NUMBER: NORMAL
WBC # BLD AUTO: 0.82 K/UL (ref 3.9–12.7)

## 2021-08-29 PROCEDURE — P9073 PLATELETS PHERESIS PATH REDU: HCPCS | Performed by: HOSPITALIST

## 2021-08-29 PROCEDURE — 83735 ASSAY OF MAGNESIUM: CPT | Performed by: NURSE PRACTITIONER

## 2021-08-29 PROCEDURE — 86920 COMPATIBILITY TEST SPIN: CPT | Performed by: HOSPITALIST

## 2021-08-29 PROCEDURE — 25000242 PHARM REV CODE 250 ALT 637 W/ HCPCS: Performed by: NURSE PRACTITIONER

## 2021-08-29 PROCEDURE — 63600175 PHARM REV CODE 636 W HCPCS: Performed by: INTERNAL MEDICINE

## 2021-08-29 PROCEDURE — 99900035 HC TECH TIME PER 15 MIN (STAT)

## 2021-08-29 PROCEDURE — 25000003 PHARM REV CODE 250: Performed by: HOSPITALIST

## 2021-08-29 PROCEDURE — 25000003 PHARM REV CODE 250: Performed by: INTERNAL MEDICINE

## 2021-08-29 PROCEDURE — 85007 BL SMEAR W/DIFF WBC COUNT: CPT | Performed by: NURSE PRACTITIONER

## 2021-08-29 PROCEDURE — 25000003 PHARM REV CODE 250: Performed by: NURSE PRACTITIONER

## 2021-08-29 PROCEDURE — 80053 COMPREHEN METABOLIC PANEL: CPT | Performed by: NURSE PRACTITIONER

## 2021-08-29 PROCEDURE — 99233 PR SUBSEQUENT HOSPITAL CARE,LEVL III: ICD-10-PCS | Mod: ,,, | Performed by: INTERNAL MEDICINE

## 2021-08-29 PROCEDURE — 94640 AIRWAY INHALATION TREATMENT: CPT

## 2021-08-29 PROCEDURE — 12000002 HC ACUTE/MED SURGE SEMI-PRIVATE ROOM

## 2021-08-29 PROCEDURE — P9040 RBC LEUKOREDUCED IRRADIATED: HCPCS | Performed by: HOSPITALIST

## 2021-08-29 PROCEDURE — 94761 N-INVAS EAR/PLS OXIMETRY MLT: CPT

## 2021-08-29 PROCEDURE — 99233 SBSQ HOSP IP/OBS HIGH 50: CPT | Mod: ,,, | Performed by: INTERNAL MEDICINE

## 2021-08-29 PROCEDURE — 99900031 HC PATIENT EDUCATION (STAT)

## 2021-08-29 PROCEDURE — 86900 BLOOD TYPING SEROLOGIC ABO: CPT | Performed by: NURSE PRACTITIONER

## 2021-08-29 PROCEDURE — 85027 COMPLETE CBC AUTOMATED: CPT | Performed by: NURSE PRACTITIONER

## 2021-08-29 PROCEDURE — 63600175 PHARM REV CODE 636 W HCPCS: Performed by: NURSE PRACTITIONER

## 2021-08-29 RX ORDER — HYDROCODONE BITARTRATE AND ACETAMINOPHEN 500; 5 MG/1; MG/1
TABLET ORAL
Status: DISCONTINUED | OUTPATIENT
Start: 2021-08-29 | End: 2021-08-29 | Stop reason: SDUPTHER

## 2021-08-29 RX ORDER — FUROSEMIDE 10 MG/ML
20 INJECTION INTRAMUSCULAR; INTRAVENOUS ONCE AS NEEDED
Status: DISCONTINUED | OUTPATIENT
Start: 2021-08-29 | End: 2021-08-31 | Stop reason: HOSPADM

## 2021-08-29 RX ORDER — ACETAMINOPHEN 325 MG/1
650 TABLET ORAL ONCE
Status: COMPLETED | OUTPATIENT
Start: 2021-08-29 | End: 2021-08-29

## 2021-08-29 RX ORDER — DIPHENHYDRAMINE HYDROCHLORIDE 50 MG/ML
25 INJECTION INTRAMUSCULAR; INTRAVENOUS ONCE
Status: COMPLETED | OUTPATIENT
Start: 2021-08-29 | End: 2021-08-29

## 2021-08-29 RX ORDER — FUROSEMIDE 10 MG/ML
20 INJECTION INTRAMUSCULAR; INTRAVENOUS ONCE
Status: COMPLETED | OUTPATIENT
Start: 2021-08-29 | End: 2021-08-29

## 2021-08-29 RX ORDER — HYDROCODONE BITARTRATE AND ACETAMINOPHEN 500; 5 MG/1; MG/1
TABLET ORAL
Status: DISCONTINUED | OUTPATIENT
Start: 2021-08-29 | End: 2021-08-31 | Stop reason: HOSPADM

## 2021-08-29 RX ORDER — POLYETHYLENE GLYCOL 3350 17 G/17G
17 POWDER, FOR SOLUTION ORAL 2 TIMES DAILY
Status: DISCONTINUED | OUTPATIENT
Start: 2021-08-29 | End: 2021-08-31 | Stop reason: HOSPADM

## 2021-08-29 RX ORDER — DEXAMETHASONE SODIUM PHOSPHATE 4 MG/ML
4 INJECTION, SOLUTION INTRA-ARTICULAR; INTRALESIONAL; INTRAMUSCULAR; INTRAVENOUS; SOFT TISSUE ONCE
Status: COMPLETED | OUTPATIENT
Start: 2021-08-29 | End: 2021-08-29

## 2021-08-29 RX ADMIN — MONTELUKAST 10 MG: 10 TABLET, FILM COATED ORAL at 12:08

## 2021-08-29 RX ADMIN — LACTULOSE 20 G: 20 SOLUTION ORAL at 12:08

## 2021-08-29 RX ADMIN — LEVOTHYROXINE SODIUM 50 MCG: 25 TABLET ORAL at 05:08

## 2021-08-29 RX ADMIN — ACETAMINOPHEN 650 MG: 325 TABLET, FILM COATED ORAL at 12:08

## 2021-08-29 RX ADMIN — Medication 1000 UNITS: at 10:08

## 2021-08-29 RX ADMIN — FAMOTIDINE 40 MG: 20 TABLET ORAL at 10:08

## 2021-08-29 RX ADMIN — POLYETHYLENE GLYCOL 3350 17 G: 17 POWDER, FOR SOLUTION ORAL at 12:08

## 2021-08-29 RX ADMIN — MONTELUKAST 10 MG: 10 TABLET, FILM COATED ORAL at 10:08

## 2021-08-29 RX ADMIN — FUROSEMIDE 20 MG: 10 INJECTION, SOLUTION INTRAMUSCULAR; INTRAVENOUS at 07:08

## 2021-08-29 RX ADMIN — ALBUTEROL SULFATE 2 PUFF: 90 AEROSOL, METERED RESPIRATORY (INHALATION) at 07:08

## 2021-08-29 RX ADMIN — TRAZODONE HYDROCHLORIDE 50 MG: 50 TABLET ORAL at 12:08

## 2021-08-29 RX ADMIN — OXYCODONE HYDROCHLORIDE AND ACETAMINOPHEN 500 MG: 500 TABLET ORAL at 10:08

## 2021-08-29 RX ADMIN — TRAZODONE HYDROCHLORIDE 50 MG: 50 TABLET ORAL at 10:08

## 2021-08-29 RX ADMIN — ATORVASTATIN CALCIUM 80 MG: 20 TABLET, FILM COATED ORAL at 10:08

## 2021-08-29 RX ADMIN — PIPERACILLIN SODIUM AND TAZOBACTAM SODIUM 3.38 G: 3; .375 INJECTION, POWDER, LYOPHILIZED, FOR SOLUTION INTRAVENOUS at 04:08

## 2021-08-29 RX ADMIN — SENNOSIDES AND DOCUSATE SODIUM 1 TABLET: 8.6; 5 TABLET ORAL at 12:08

## 2021-08-29 RX ADMIN — POLYETHYLENE GLYCOL 3350 17 G: 17 POWDER, FOR SOLUTION ORAL at 10:08

## 2021-08-29 RX ADMIN — DIPHENHYDRAMINE HYDROCHLORIDE 25 MG: 50 INJECTION INTRAMUSCULAR; INTRAVENOUS at 12:08

## 2021-08-29 RX ADMIN — SENNOSIDES AND DOCUSATE SODIUM 1 TABLET: 8.6; 5 TABLET ORAL at 10:08

## 2021-08-29 RX ADMIN — DEXAMETHASONE SODIUM PHOSPHATE 4 MG: 4 INJECTION, SOLUTION INTRA-ARTICULAR; INTRALESIONAL; INTRAMUSCULAR; INTRAVENOUS; SOFT TISSUE at 12:08

## 2021-08-29 RX ADMIN — TAMSULOSIN HYDROCHLORIDE 0.4 MG: 0.4 CAPSULE ORAL at 10:08

## 2021-08-30 ENCOUNTER — PATIENT MESSAGE (OUTPATIENT)
Dept: INFUSION THERAPY | Facility: HOSPITAL | Age: 81
End: 2021-08-30

## 2021-08-30 LAB
ALBUMIN SERPL BCP-MCNC: 3.6 G/DL (ref 3.5–5.2)
ALP SERPL-CCNC: 103 U/L (ref 55–135)
ALT SERPL W/O P-5'-P-CCNC: 63 U/L (ref 10–44)
ANION GAP SERPL CALC-SCNC: 9 MMOL/L (ref 8–16)
ANISOCYTOSIS BLD QL SMEAR: SLIGHT
AST SERPL-CCNC: 46 U/L (ref 10–40)
BASOPHILS NFR BLD: 0 % (ref 0–1.9)
BILIRUB SERPL-MCNC: 3.4 MG/DL (ref 0.1–1)
BUN SERPL-MCNC: 16 MG/DL (ref 8–23)
CALCIUM SERPL-MCNC: 9 MG/DL (ref 8.7–10.5)
CHLORIDE SERPL-SCNC: 104 MMOL/L (ref 95–110)
CO2 SERPL-SCNC: 24 MMOL/L (ref 23–29)
CREAT SERPL-MCNC: 1 MG/DL (ref 0.5–1.4)
DIFFERENTIAL METHOD: ABNORMAL
EOSINOPHIL NFR BLD: 0 % (ref 0–8)
ERYTHROCYTE [DISTWIDTH] IN BLOOD BY AUTOMATED COUNT: 16.4 % (ref 11.5–14.5)
EST. GFR  (AFRICAN AMERICAN): >60 ML/MIN/1.73 M^2
EST. GFR  (NON AFRICAN AMERICAN): >60 ML/MIN/1.73 M^2
GLUCOSE SERPL-MCNC: 147 MG/DL (ref 70–110)
HCT VFR BLD AUTO: 30.4 % (ref 40–54)
HGB BLD-MCNC: 9.9 G/DL (ref 14–18)
IMM GRANULOCYTES # BLD AUTO: ABNORMAL K/UL (ref 0–0.04)
IMM GRANULOCYTES NFR BLD AUTO: ABNORMAL % (ref 0–0.5)
LYMPHOCYTES NFR BLD: 70 % (ref 18–48)
MAGNESIUM SERPL-MCNC: 2.4 MG/DL (ref 1.6–2.6)
MCH RBC QN AUTO: 28.9 PG (ref 27–31)
MCHC RBC AUTO-ENTMCNC: 32.6 G/DL (ref 32–36)
MCV RBC AUTO: 89 FL (ref 82–98)
MONOCYTES NFR BLD: 2.5 % (ref 4–15)
NEUTROPHILS NFR BLD: 22.5 % (ref 38–73)
NEUTS BAND NFR BLD MANUAL: 5 %
NRBC BLD-RTO: 52 /100 WBC
PLATELET # BLD AUTO: 31 K/UL (ref 150–450)
PLATELET BLD QL SMEAR: ABNORMAL
PMV BLD AUTO: ABNORMAL FL (ref 9.2–12.9)
POIKILOCYTOSIS BLD QL SMEAR: SLIGHT
POLYCHROMASIA BLD QL SMEAR: ABNORMAL
POTASSIUM SERPL-SCNC: 4.1 MMOL/L (ref 3.5–5.1)
PROT SERPL-MCNC: 7.7 G/DL (ref 6–8.4)
RBC # BLD AUTO: 3.43 M/UL (ref 4.6–6.2)
SODIUM SERPL-SCNC: 137 MMOL/L (ref 136–145)
WBC # BLD AUTO: 0.66 K/UL (ref 3.9–12.7)

## 2021-08-30 PROCEDURE — 83735 ASSAY OF MAGNESIUM: CPT | Performed by: NURSE PRACTITIONER

## 2021-08-30 PROCEDURE — 85027 COMPLETE CBC AUTOMATED: CPT | Performed by: NURSE PRACTITIONER

## 2021-08-30 PROCEDURE — 63600175 PHARM REV CODE 636 W HCPCS: Performed by: NURSE PRACTITIONER

## 2021-08-30 PROCEDURE — 99900035 HC TECH TIME PER 15 MIN (STAT)

## 2021-08-30 PROCEDURE — 99900031 HC PATIENT EDUCATION (STAT)

## 2021-08-30 PROCEDURE — 36415 COLL VENOUS BLD VENIPUNCTURE: CPT | Performed by: NURSE PRACTITIONER

## 2021-08-30 PROCEDURE — 12000002 HC ACUTE/MED SURGE SEMI-PRIVATE ROOM

## 2021-08-30 PROCEDURE — 85007 BL SMEAR W/DIFF WBC COUNT: CPT | Performed by: NURSE PRACTITIONER

## 2021-08-30 PROCEDURE — 25000003 PHARM REV CODE 250: Performed by: HOSPITALIST

## 2021-08-30 PROCEDURE — 25000003 PHARM REV CODE 250: Performed by: NURSE PRACTITIONER

## 2021-08-30 PROCEDURE — 80053 COMPREHEN METABOLIC PANEL: CPT | Performed by: NURSE PRACTITIONER

## 2021-08-30 RX ADMIN — PIPERACILLIN SODIUM AND TAZOBACTAM SODIUM 3.38 G: 3; .375 INJECTION, POWDER, LYOPHILIZED, FOR SOLUTION INTRAVENOUS at 09:08

## 2021-08-30 RX ADMIN — TAMSULOSIN HYDROCHLORIDE 0.4 MG: 0.4 CAPSULE ORAL at 09:08

## 2021-08-30 RX ADMIN — TRAZODONE HYDROCHLORIDE 50 MG: 50 TABLET ORAL at 09:08

## 2021-08-30 RX ADMIN — ATORVASTATIN CALCIUM 80 MG: 20 TABLET, FILM COATED ORAL at 09:08

## 2021-08-30 RX ADMIN — SENNOSIDES AND DOCUSATE SODIUM 1 TABLET: 8.6; 5 TABLET ORAL at 09:08

## 2021-08-30 RX ADMIN — Medication 1000 UNITS: at 09:08

## 2021-08-30 RX ADMIN — MONTELUKAST 10 MG: 10 TABLET, FILM COATED ORAL at 09:08

## 2021-08-30 RX ADMIN — OXYCODONE HYDROCHLORIDE AND ACETAMINOPHEN 500 MG: 500 TABLET ORAL at 09:08

## 2021-08-30 RX ADMIN — FAMOTIDINE 40 MG: 20 TABLET ORAL at 09:08

## 2021-08-30 RX ADMIN — POLYETHYLENE GLYCOL 3350 17 G: 17 POWDER, FOR SOLUTION ORAL at 09:08

## 2021-08-31 VITALS
TEMPERATURE: 98 F | HEART RATE: 84 BPM | SYSTOLIC BLOOD PRESSURE: 121 MMHG | OXYGEN SATURATION: 97 % | WEIGHT: 147.06 LBS | HEIGHT: 67 IN | RESPIRATION RATE: 17 BRPM | DIASTOLIC BLOOD PRESSURE: 73 MMHG | BODY MASS INDEX: 23.08 KG/M2

## 2021-08-31 LAB
ALBUMIN SERPL BCP-MCNC: 3.1 G/DL (ref 3.5–5.2)
ALP SERPL-CCNC: 93 U/L (ref 55–135)
ALT SERPL W/O P-5'-P-CCNC: 50 U/L (ref 10–44)
ANION GAP SERPL CALC-SCNC: 8 MMOL/L (ref 8–16)
ANISOCYTOSIS BLD QL SMEAR: ABNORMAL
AST SERPL-CCNC: 29 U/L (ref 10–40)
BASOPHILS NFR BLD: 0 % (ref 0–1.9)
BILIRUB SERPL-MCNC: 2.8 MG/DL (ref 0.1–1)
BUN SERPL-MCNC: 16 MG/DL (ref 8–23)
CALCIUM SERPL-MCNC: 8.9 MG/DL (ref 8.7–10.5)
CHLORIDE SERPL-SCNC: 105 MMOL/L (ref 95–110)
CO2 SERPL-SCNC: 24 MMOL/L (ref 23–29)
CREAT SERPL-MCNC: 0.9 MG/DL (ref 0.5–1.4)
DACRYOCYTES BLD QL SMEAR: ABNORMAL
DIFFERENTIAL METHOD: ABNORMAL
EOSINOPHIL NFR BLD: 0 % (ref 0–8)
ERYTHROCYTE [DISTWIDTH] IN BLOOD BY AUTOMATED COUNT: 16.4 % (ref 11.5–14.5)
EST. GFR  (AFRICAN AMERICAN): >60 ML/MIN/1.73 M^2
EST. GFR  (NON AFRICAN AMERICAN): >60 ML/MIN/1.73 M^2
GLUCOSE SERPL-MCNC: 90 MG/DL (ref 70–110)
HCT VFR BLD AUTO: 26.8 % (ref 40–54)
HGB BLD-MCNC: 8.7 G/DL (ref 14–18)
HYPOCHROMIA BLD QL SMEAR: ABNORMAL
IMM GRANULOCYTES # BLD AUTO: ABNORMAL K/UL (ref 0–0.04)
IMM GRANULOCYTES NFR BLD AUTO: ABNORMAL % (ref 0–0.5)
LYMPHOCYTES NFR BLD: 65 % (ref 18–48)
MAGNESIUM SERPL-MCNC: 2.1 MG/DL (ref 1.6–2.6)
MCH RBC QN AUTO: 28.6 PG (ref 27–31)
MCHC RBC AUTO-ENTMCNC: 32.5 G/DL (ref 32–36)
MCV RBC AUTO: 88 FL (ref 82–98)
MONOCYTES NFR BLD: 20 % (ref 4–15)
NEUTROPHILS NFR BLD: 15 % (ref 38–73)
NRBC BLD-RTO: 39 /100 WBC
OVALOCYTES BLD QL SMEAR: ABNORMAL
PLATELET # BLD AUTO: 18 K/UL (ref 150–450)
PLATELET BLD QL SMEAR: ABNORMAL
PMV BLD AUTO: ABNORMAL FL (ref 9.2–12.9)
POIKILOCYTOSIS BLD QL SMEAR: SLIGHT
POTASSIUM SERPL-SCNC: 3.8 MMOL/L (ref 3.5–5.1)
PROT SERPL-MCNC: 6.7 G/DL (ref 6–8.4)
RBC # BLD AUTO: 3.04 M/UL (ref 4.6–6.2)
SCHISTOCYTES BLD QL SMEAR: ABNORMAL
SODIUM SERPL-SCNC: 137 MMOL/L (ref 136–145)
WBC # BLD AUTO: 0.65 K/UL (ref 3.9–12.7)

## 2021-08-31 PROCEDURE — 94640 AIRWAY INHALATION TREATMENT: CPT

## 2021-08-31 PROCEDURE — 85007 BL SMEAR W/DIFF WBC COUNT: CPT | Performed by: NURSE PRACTITIONER

## 2021-08-31 PROCEDURE — 36415 COLL VENOUS BLD VENIPUNCTURE: CPT | Performed by: NURSE PRACTITIONER

## 2021-08-31 PROCEDURE — 80053 COMPREHEN METABOLIC PANEL: CPT | Performed by: NURSE PRACTITIONER

## 2021-08-31 PROCEDURE — 99900035 HC TECH TIME PER 15 MIN (STAT)

## 2021-08-31 PROCEDURE — 83735 ASSAY OF MAGNESIUM: CPT | Performed by: NURSE PRACTITIONER

## 2021-08-31 PROCEDURE — 25000003 PHARM REV CODE 250: Performed by: NURSE PRACTITIONER

## 2021-08-31 PROCEDURE — 94761 N-INVAS EAR/PLS OXIMETRY MLT: CPT

## 2021-08-31 PROCEDURE — 85027 COMPLETE CBC AUTOMATED: CPT | Performed by: NURSE PRACTITIONER

## 2021-08-31 RX ORDER — AMOXICILLIN AND CLAVULANATE POTASSIUM 875; 125 MG/1; MG/1
1 TABLET, FILM COATED ORAL 2 TIMES DAILY
Qty: 10 TABLET | Refills: 0 | Status: ON HOLD | OUTPATIENT
Start: 2021-08-31 | End: 2021-09-09 | Stop reason: HOSPADM

## 2021-08-31 RX ORDER — POLYETHYLENE GLYCOL 3350 17 G/17G
17 POWDER, FOR SOLUTION ORAL 2 TIMES DAILY
Qty: 30 EACH | Refills: 0 | Status: SHIPPED | OUTPATIENT
Start: 2021-08-31 | End: 2021-09-15

## 2021-08-31 RX ADMIN — ALBUTEROL SULFATE 2 PUFF: 90 AEROSOL, METERED RESPIRATORY (INHALATION) at 08:08

## 2021-08-31 RX ADMIN — SENNOSIDES AND DOCUSATE SODIUM 1 TABLET: 8.6; 5 TABLET ORAL at 10:08

## 2021-08-31 RX ADMIN — METOPROLOL SUCCINATE 50 MG: 25 TABLET, FILM COATED, EXTENDED RELEASE ORAL at 10:08

## 2021-08-31 RX ADMIN — LEVOTHYROXINE SODIUM 50 MCG: 25 TABLET ORAL at 05:08

## 2021-08-31 RX ADMIN — FAMOTIDINE 40 MG: 20 TABLET ORAL at 10:08

## 2021-08-31 RX ADMIN — OXYCODONE HYDROCHLORIDE AND ACETAMINOPHEN 500 MG: 500 TABLET ORAL at 10:08

## 2021-08-31 RX ADMIN — TAMSULOSIN HYDROCHLORIDE 0.4 MG: 0.4 CAPSULE ORAL at 10:08

## 2021-08-31 RX ADMIN — Medication 1000 UNITS: at 10:08

## 2021-09-05 ENCOUNTER — HOSPITAL ENCOUNTER (INPATIENT)
Facility: HOSPITAL | Age: 81
LOS: 4 days | Discharge: HOME HEALTH | DRG: 378 | End: 2021-09-09
Attending: EMERGENCY MEDICINE | Admitting: INTERNAL MEDICINE
Payer: MEDICARE

## 2021-09-05 DIAGNOSIS — K61.1 PERI-RECTAL ABSCESS: ICD-10-CM

## 2021-09-05 DIAGNOSIS — K62.89 PROCTITIS: ICD-10-CM

## 2021-09-05 DIAGNOSIS — R07.9 CHEST PAIN: ICD-10-CM

## 2021-09-05 DIAGNOSIS — D46.9 MDS (MYELODYSPLASTIC SYNDROME): ICD-10-CM

## 2021-09-05 DIAGNOSIS — K62.5 RECTAL BLEEDING: ICD-10-CM

## 2021-09-05 DIAGNOSIS — L03.90 CELLULITIS, UNSPECIFIED CELLULITIS SITE: Primary | ICD-10-CM

## 2021-09-05 LAB
ALBUMIN SERPL BCP-MCNC: 3.2 G/DL (ref 3.5–5.2)
ALP SERPL-CCNC: 99 U/L (ref 55–135)
ALT SERPL W/O P-5'-P-CCNC: 71 U/L (ref 10–44)
ANION GAP SERPL CALC-SCNC: 13 MMOL/L (ref 8–16)
ANISOCYTOSIS BLD QL SMEAR: ABNORMAL
AST SERPL-CCNC: 45 U/L (ref 10–40)
BACTERIA #/AREA URNS HPF: NEGATIVE /HPF
BASOPHILS NFR BLD: 0 % (ref 0–1.9)
BILIRUB SERPL-MCNC: 3.2 MG/DL (ref 0.1–1)
BILIRUB UR QL STRIP: NEGATIVE
BLD PROD TYP BPU: NORMAL
BLOOD UNIT EXPIRATION DATE: NORMAL
BLOOD UNIT TYPE CODE: 5100
BLOOD UNIT TYPE: NORMAL
BUN SERPL-MCNC: 13 MG/DL (ref 8–23)
CALCIUM SERPL-MCNC: 9.3 MG/DL (ref 8.7–10.5)
CHLORIDE SERPL-SCNC: 99 MMOL/L (ref 95–110)
CLARITY UR: CLEAR
CO2 SERPL-SCNC: 22 MMOL/L (ref 23–29)
CODING SYSTEM: NORMAL
COLOR UR: ABNORMAL
CREAT SERPL-MCNC: 1.1 MG/DL (ref 0.5–1.4)
DIFFERENTIAL METHOD: ABNORMAL
DISPENSE STATUS: NORMAL
EOSINOPHIL NFR BLD: 0 % (ref 0–8)
ERYTHROCYTE [DISTWIDTH] IN BLOOD BY AUTOMATED COUNT: 16.1 % (ref 11.5–14.5)
EST. GFR  (AFRICAN AMERICAN): >60 ML/MIN/1.73 M^2
EST. GFR  (NON AFRICAN AMERICAN): >60 ML/MIN/1.73 M^2
GLUCOSE SERPL-MCNC: 152 MG/DL (ref 70–110)
GLUCOSE UR QL STRIP: NEGATIVE
HCT VFR BLD AUTO: 21.2 % (ref 40–54)
HGB BLD-MCNC: 7.1 G/DL (ref 14–18)
HGB UR QL STRIP: NEGATIVE
HYALINE CASTS #/AREA URNS LPF: 8 /LPF
HYPOCHROMIA BLD QL SMEAR: ABNORMAL
IMM GRANULOCYTES # BLD AUTO: ABNORMAL K/UL (ref 0–0.04)
IMM GRANULOCYTES NFR BLD AUTO: ABNORMAL % (ref 0–0.5)
KETONES UR QL STRIP: NEGATIVE
LACTATE SERPL-SCNC: 1.5 MMOL/L (ref 0.5–1.9)
LEUKOCYTE ESTERASE UR QL STRIP: NEGATIVE
LYMPHOCYTES NFR BLD: 40 % (ref 18–48)
MCH RBC QN AUTO: 29.1 PG (ref 27–31)
MCHC RBC AUTO-ENTMCNC: 33.5 G/DL (ref 32–36)
MCV RBC AUTO: 87 FL (ref 82–98)
MICROSCOPIC COMMENT: ABNORMAL
MONOCYTES NFR BLD: 45 % (ref 4–15)
NEUTROPHILS NFR BLD: 15 % (ref 38–73)
NITRITE UR QL STRIP: NEGATIVE
NRBC BLD-RTO: 21 /100 WBC
NUM UNITS TRANS WBC-POOR PLATPHERESIS: NORMAL
OVALOCYTES BLD QL SMEAR: ABNORMAL
PH UR STRIP: 6 [PH] (ref 5–8)
PLATELET # BLD AUTO: 2 K/UL (ref 150–450)
PLATELET BLD QL SMEAR: ABNORMAL
PMV BLD AUTO: ABNORMAL FL (ref 9.2–12.9)
POIKILOCYTOSIS BLD QL SMEAR: ABNORMAL
POTASSIUM SERPL-SCNC: 4.3 MMOL/L (ref 3.5–5.1)
PROT SERPL-MCNC: 7.1 G/DL (ref 6–8.4)
PROT UR QL STRIP: ABNORMAL
RBC # BLD AUTO: 2.44 M/UL (ref 4.6–6.2)
RBC #/AREA URNS HPF: 3 /HPF (ref 0–4)
SARS-COV-2 RDRP RESP QL NAA+PROBE: NEGATIVE
SODIUM SERPL-SCNC: 134 MMOL/L (ref 136–145)
SP GR UR STRIP: 1.01 (ref 1–1.03)
SQUAMOUS #/AREA URNS HPF: 1 /HPF
URN SPEC COLLECT METH UR: ABNORMAL
UROBILINOGEN UR STRIP-ACNC: NEGATIVE EU/DL
WBC # BLD AUTO: 0.48 K/UL (ref 3.9–12.7)
WBC #/AREA URNS HPF: 2 /HPF (ref 0–5)

## 2021-09-05 PROCEDURE — 25500020 PHARM REV CODE 255: Performed by: EMERGENCY MEDICINE

## 2021-09-05 PROCEDURE — 81001 URINALYSIS AUTO W/SCOPE: CPT | Performed by: EMERGENCY MEDICINE

## 2021-09-05 PROCEDURE — S0030 INJECTION, METRONIDAZOLE: HCPCS | Performed by: EMERGENCY MEDICINE

## 2021-09-05 PROCEDURE — 99285 EMERGENCY DEPT VISIT HI MDM: CPT | Mod: 25

## 2021-09-05 PROCEDURE — 20000000 HC ICU ROOM

## 2021-09-05 PROCEDURE — 93005 ELECTROCARDIOGRAM TRACING: CPT | Performed by: GENERAL PRACTICE

## 2021-09-05 PROCEDURE — 63600175 PHARM REV CODE 636 W HCPCS: Performed by: EMERGENCY MEDICINE

## 2021-09-05 PROCEDURE — 87040 BLOOD CULTURE FOR BACTERIA: CPT | Performed by: EMERGENCY MEDICINE

## 2021-09-05 PROCEDURE — 86901 BLOOD TYPING SEROLOGIC RH(D): CPT | Performed by: INTERNAL MEDICINE

## 2021-09-05 PROCEDURE — U0002 COVID-19 LAB TEST NON-CDC: HCPCS | Performed by: EMERGENCY MEDICINE

## 2021-09-05 PROCEDURE — 93010 EKG 12-LEAD: ICD-10-PCS | Mod: ,,, | Performed by: GENERAL PRACTICE

## 2021-09-05 PROCEDURE — 86900 BLOOD TYPING SEROLOGIC ABO: CPT | Performed by: INTERNAL MEDICINE

## 2021-09-05 PROCEDURE — 96365 THER/PROPH/DIAG IV INF INIT: CPT

## 2021-09-05 PROCEDURE — 85027 COMPLETE CBC AUTOMATED: CPT | Performed by: EMERGENCY MEDICINE

## 2021-09-05 PROCEDURE — 25000003 PHARM REV CODE 250: Performed by: EMERGENCY MEDICINE

## 2021-09-05 PROCEDURE — 93010 ELECTROCARDIOGRAM REPORT: CPT | Mod: ,,, | Performed by: GENERAL PRACTICE

## 2021-09-05 PROCEDURE — 80053 COMPREHEN METABOLIC PANEL: CPT | Performed by: EMERGENCY MEDICINE

## 2021-09-05 PROCEDURE — P9037 PLATE PHERES LEUKOREDU IRRAD: HCPCS | Performed by: EMERGENCY MEDICINE

## 2021-09-05 PROCEDURE — 83605 ASSAY OF LACTIC ACID: CPT | Performed by: EMERGENCY MEDICINE

## 2021-09-05 PROCEDURE — 96366 THER/PROPH/DIAG IV INF ADDON: CPT

## 2021-09-05 PROCEDURE — 85007 BL SMEAR W/DIFF WBC COUNT: CPT | Performed by: EMERGENCY MEDICINE

## 2021-09-05 PROCEDURE — 86850 RBC ANTIBODY SCREEN: CPT | Performed by: INTERNAL MEDICINE

## 2021-09-05 PROCEDURE — 96367 TX/PROPH/DG ADDL SEQ IV INF: CPT

## 2021-09-05 RX ORDER — ESCITALOPRAM OXALATE 10 MG/1
10 TABLET ORAL DAILY
Status: DISCONTINUED | OUTPATIENT
Start: 2021-09-06 | End: 2021-09-09 | Stop reason: HOSPADM

## 2021-09-05 RX ORDER — TAMSULOSIN HYDROCHLORIDE 0.4 MG/1
0.4 CAPSULE ORAL DAILY
Status: DISCONTINUED | OUTPATIENT
Start: 2021-09-06 | End: 2021-09-09 | Stop reason: HOSPADM

## 2021-09-05 RX ORDER — TALC
6 POWDER (GRAM) TOPICAL NIGHTLY PRN
Status: DISCONTINUED | OUTPATIENT
Start: 2021-09-05 | End: 2021-09-09 | Stop reason: HOSPADM

## 2021-09-05 RX ORDER — SODIUM CHLORIDE 0.9 % (FLUSH) 0.9 %
10 SYRINGE (ML) INJECTION
Status: DISCONTINUED | OUTPATIENT
Start: 2021-09-05 | End: 2021-09-09 | Stop reason: HOSPADM

## 2021-09-05 RX ORDER — HYDROCODONE BITARTRATE AND ACETAMINOPHEN 500; 5 MG/1; MG/1
TABLET ORAL
Status: DISCONTINUED | OUTPATIENT
Start: 2021-09-05 | End: 2021-09-06 | Stop reason: SDUPTHER

## 2021-09-05 RX ORDER — TRAZODONE HYDROCHLORIDE 50 MG/1
50 TABLET ORAL NIGHTLY
Status: DISCONTINUED | OUTPATIENT
Start: 2021-09-05 | End: 2021-09-09 | Stop reason: HOSPADM

## 2021-09-05 RX ORDER — ALBUTEROL SULFATE 90 UG/1
2 AEROSOL, METERED RESPIRATORY (INHALATION) 2 TIMES DAILY
Status: DISCONTINUED | OUTPATIENT
Start: 2021-09-05 | End: 2021-09-09 | Stop reason: HOSPADM

## 2021-09-05 RX ORDER — METRONIDAZOLE 500 MG/100ML
500 INJECTION, SOLUTION INTRAVENOUS
Status: COMPLETED | OUTPATIENT
Start: 2021-09-05 | End: 2021-09-05

## 2021-09-05 RX ORDER — MULTIVITAMIN
1 TABLET ORAL DAILY
Status: ON HOLD | COMMUNITY
End: 2021-10-14 | Stop reason: HOSPADM

## 2021-09-05 RX ORDER — LIDOCAINE HYDROCHLORIDE 20 MG/ML
10 JELLY TOPICAL
Status: COMPLETED | OUTPATIENT
Start: 2021-09-05 | End: 2021-09-05

## 2021-09-05 RX ORDER — LEVOTHYROXINE SODIUM 25 UG/1
50 TABLET ORAL
Status: DISCONTINUED | OUTPATIENT
Start: 2021-09-06 | End: 2021-09-09 | Stop reason: HOSPADM

## 2021-09-05 RX ORDER — METOPROLOL SUCCINATE 50 MG/1
50 TABLET, EXTENDED RELEASE ORAL DAILY
Status: DISCONTINUED | OUTPATIENT
Start: 2021-09-06 | End: 2021-09-09 | Stop reason: HOSPADM

## 2021-09-05 RX ORDER — ONDANSETRON 4 MG/1
8 TABLET, ORALLY DISINTEGRATING ORAL EVERY 8 HOURS PRN
Status: DISCONTINUED | OUTPATIENT
Start: 2021-09-05 | End: 2021-09-09 | Stop reason: HOSPADM

## 2021-09-05 RX ORDER — ATORVASTATIN CALCIUM 40 MG/1
40 TABLET, FILM COATED ORAL DAILY
Status: DISCONTINUED | OUTPATIENT
Start: 2021-09-06 | End: 2021-09-09 | Stop reason: HOSPADM

## 2021-09-05 RX ORDER — IBUPROFEN 200 MG
24 TABLET ORAL
Status: DISCONTINUED | OUTPATIENT
Start: 2021-09-05 | End: 2021-09-09 | Stop reason: HOSPADM

## 2021-09-05 RX ORDER — METRONIDAZOLE 250 MG/1
500 TABLET ORAL EVERY 8 HOURS
Status: DISCONTINUED | OUTPATIENT
Start: 2021-09-05 | End: 2021-09-09 | Stop reason: HOSPADM

## 2021-09-05 RX ORDER — ESCITALOPRAM OXALATE 10 MG/1
10 TABLET ORAL DAILY
COMMUNITY

## 2021-09-05 RX ORDER — LEVOFLOXACIN 5 MG/ML
500 INJECTION, SOLUTION INTRAVENOUS
Status: COMPLETED | OUTPATIENT
Start: 2021-09-05 | End: 2021-09-05

## 2021-09-05 RX ORDER — ACETAMINOPHEN 500 MG
500 TABLET ORAL EVERY 4 HOURS PRN
Status: DISCONTINUED | OUTPATIENT
Start: 2021-09-05 | End: 2021-09-09 | Stop reason: HOSPADM

## 2021-09-05 RX ORDER — IBUPROFEN 200 MG
16 TABLET ORAL
Status: DISCONTINUED | OUTPATIENT
Start: 2021-09-05 | End: 2021-09-09 | Stop reason: HOSPADM

## 2021-09-05 RX ORDER — ACETAMINOPHEN 325 MG/1
650 TABLET ORAL EVERY 8 HOURS PRN
Status: DISCONTINUED | OUTPATIENT
Start: 2021-09-05 | End: 2021-09-05

## 2021-09-05 RX ORDER — PROCHLORPERAZINE EDISYLATE 5 MG/ML
5 INJECTION INTRAMUSCULAR; INTRAVENOUS EVERY 6 HOURS PRN
Status: DISCONTINUED | OUTPATIENT
Start: 2021-09-05 | End: 2021-09-09 | Stop reason: HOSPADM

## 2021-09-05 RX ORDER — GLUCAGON 1 MG
1 KIT INJECTION
Status: DISCONTINUED | OUTPATIENT
Start: 2021-09-05 | End: 2021-09-09 | Stop reason: HOSPADM

## 2021-09-05 RX ADMIN — LIDOCAINE HYDROCHLORIDE 10 ML: 20 JELLY TOPICAL at 03:09

## 2021-09-05 RX ADMIN — LEVOFLOXACIN 500 MG: 500 INJECTION, SOLUTION INTRAVENOUS at 12:09

## 2021-09-05 RX ADMIN — IOHEXOL 100 ML: 350 INJECTION, SOLUTION INTRAVENOUS at 01:09

## 2021-09-05 RX ADMIN — METRONIDAZOLE 500 MG: 500 INJECTION, SOLUTION INTRAVENOUS at 02:09

## 2021-09-06 LAB
ABO GROUP BLD: NORMAL
ALBUMIN SERPL BCP-MCNC: 3 G/DL (ref 3.5–5.2)
ALP SERPL-CCNC: 99 U/L (ref 55–135)
ALT SERPL W/O P-5'-P-CCNC: 67 U/L (ref 10–44)
ANION GAP SERPL CALC-SCNC: 12 MMOL/L (ref 8–16)
ANISOCYTOSIS BLD QL SMEAR: ABNORMAL
AST SERPL-CCNC: 42 U/L (ref 10–40)
BASOPHILS NFR BLD: 0 % (ref 0–1.9)
BILIRUB SERPL-MCNC: 2.1 MG/DL (ref 0.1–1)
BLD GP AB SCN CELLS X3 SERPL QL: NORMAL
BLD PROD TYP BPU: NORMAL
BLD PROD TYP BPU: NORMAL
BLOOD UNIT EXPIRATION DATE: NORMAL
BLOOD UNIT EXPIRATION DATE: NORMAL
BLOOD UNIT TYPE CODE: 5100
BLOOD UNIT TYPE CODE: 5100
BLOOD UNIT TYPE: NORMAL
BLOOD UNIT TYPE: NORMAL
BUN SERPL-MCNC: 12 MG/DL (ref 8–23)
CALCIUM SERPL-MCNC: 9.2 MG/DL (ref 8.7–10.5)
CHLORIDE SERPL-SCNC: 98 MMOL/L (ref 95–110)
CO2 SERPL-SCNC: 25 MMOL/L (ref 23–29)
CODING SYSTEM: NORMAL
CODING SYSTEM: NORMAL
CREAT SERPL-MCNC: 1 MG/DL (ref 0.5–1.4)
DACRYOCYTES BLD QL SMEAR: ABNORMAL
DIFFERENTIAL METHOD: ABNORMAL
DISPENSE STATUS: NORMAL
DISPENSE STATUS: NORMAL
EOSINOPHIL NFR BLD: 0 % (ref 0–8)
ERYTHROCYTE [DISTWIDTH] IN BLOOD BY AUTOMATED COUNT: 16.6 % (ref 11.5–14.5)
EST. GFR  (AFRICAN AMERICAN): >60 ML/MIN/1.73 M^2
EST. GFR  (NON AFRICAN AMERICAN): >60 ML/MIN/1.73 M^2
GLUCOSE SERPL-MCNC: 100 MG/DL (ref 70–110)
HCT VFR BLD AUTO: 20 % (ref 40–54)
HGB BLD-MCNC: 6.4 G/DL (ref 14–18)
IMM GRANULOCYTES # BLD AUTO: ABNORMAL K/UL (ref 0–0.04)
IMM GRANULOCYTES NFR BLD AUTO: ABNORMAL % (ref 0–0.5)
LYMPHOCYTES NFR BLD: 50 % (ref 18–48)
MCH RBC QN AUTO: 28.2 PG (ref 27–31)
MCHC RBC AUTO-ENTMCNC: 32 G/DL (ref 32–36)
MCV RBC AUTO: 88 FL (ref 82–98)
MONOCYTES NFR BLD: 35 % (ref 4–15)
NEUTROPHILS NFR BLD: 15 % (ref 38–73)
NRBC BLD-RTO: 14 /100 WBC
NUM UNITS TRANS PACKED RBC: NORMAL
NUM UNITS TRANS PACKED RBC: NORMAL
OVALOCYTES BLD QL SMEAR: ABNORMAL
PLATELET # BLD AUTO: 15 K/UL (ref 150–450)
PLATELET BLD QL SMEAR: ABNORMAL
PMV BLD AUTO: ABNORMAL FL (ref 9.2–12.9)
POIKILOCYTOSIS BLD QL SMEAR: ABNORMAL
POTASSIUM SERPL-SCNC: 3.7 MMOL/L (ref 3.5–5.1)
PROT SERPL-MCNC: 6.9 G/DL (ref 6–8.4)
RBC # BLD AUTO: 2.27 M/UL (ref 4.6–6.2)
RH BLD: NORMAL
SODIUM SERPL-SCNC: 135 MMOL/L (ref 136–145)
TROPONIN I SERPL DL<=0.01 NG/ML-MCNC: <0.03 NG/ML
TROPONIN I SERPL DL<=0.01 NG/ML-MCNC: <0.03 NG/ML
WBC # BLD AUTO: 0.43 K/UL (ref 3.9–12.7)

## 2021-09-06 PROCEDURE — 99233 PR SUBSEQUENT HOSPITAL CARE,LEVL III: ICD-10-PCS | Mod: ,,, | Performed by: INTERNAL MEDICINE

## 2021-09-06 PROCEDURE — 63600175 PHARM REV CODE 636 W HCPCS: Performed by: INTERNAL MEDICINE

## 2021-09-06 PROCEDURE — 94761 N-INVAS EAR/PLS OXIMETRY MLT: CPT

## 2021-09-06 PROCEDURE — 99233 SBSQ HOSP IP/OBS HIGH 50: CPT | Mod: ,,, | Performed by: INTERNAL MEDICINE

## 2021-09-06 PROCEDURE — 85027 COMPLETE CBC AUTOMATED: CPT | Performed by: INTERNAL MEDICINE

## 2021-09-06 PROCEDURE — 12000002 HC ACUTE/MED SURGE SEMI-PRIVATE ROOM

## 2021-09-06 PROCEDURE — 25000003 PHARM REV CODE 250

## 2021-09-06 PROCEDURE — P9040 RBC LEUKOREDUCED IRRADIATED: HCPCS | Performed by: INTERNAL MEDICINE

## 2021-09-06 PROCEDURE — 25000003 PHARM REV CODE 250: Performed by: INTERNAL MEDICINE

## 2021-09-06 PROCEDURE — 85007 BL SMEAR W/DIFF WBC COUNT: CPT | Performed by: INTERNAL MEDICINE

## 2021-09-06 PROCEDURE — 84484 ASSAY OF TROPONIN QUANT: CPT | Performed by: INTERNAL MEDICINE

## 2021-09-06 PROCEDURE — 36415 COLL VENOUS BLD VENIPUNCTURE: CPT | Performed by: INTERNAL MEDICINE

## 2021-09-06 PROCEDURE — 94640 AIRWAY INHALATION TREATMENT: CPT

## 2021-09-06 PROCEDURE — 80053 COMPREHEN METABOLIC PANEL: CPT | Performed by: INTERNAL MEDICINE

## 2021-09-06 PROCEDURE — 25000242 PHARM REV CODE 250 ALT 637 W/ HCPCS: Performed by: INTERNAL MEDICINE

## 2021-09-06 PROCEDURE — 86920 COMPATIBILITY TEST SPIN: CPT | Performed by: INTERNAL MEDICINE

## 2021-09-06 RX ORDER — MUPIROCIN 20 MG/G
OINTMENT TOPICAL 2 TIMES DAILY
Status: DISCONTINUED | OUTPATIENT
Start: 2021-09-06 | End: 2021-09-09 | Stop reason: HOSPADM

## 2021-09-06 RX ORDER — CHLORHEXIDINE GLUCONATE ORAL RINSE 1.2 MG/ML
15 SOLUTION DENTAL 2 TIMES DAILY
Status: DISCONTINUED | OUTPATIENT
Start: 2021-09-06 | End: 2021-09-09 | Stop reason: HOSPADM

## 2021-09-06 RX ORDER — HYDROCODONE BITARTRATE AND ACETAMINOPHEN 500; 5 MG/1; MG/1
TABLET ORAL
Status: DISCONTINUED | OUTPATIENT
Start: 2021-09-06 | End: 2021-09-07

## 2021-09-06 RX ADMIN — CHLORHEXIDINE GLUCONATE 15 ML: 1.2 RINSE ORAL at 10:09

## 2021-09-06 RX ADMIN — CHLORHEXIDINE GLUCONATE 15 ML: 1.2 RINSE ORAL at 03:09

## 2021-09-06 RX ADMIN — TAMSULOSIN HYDROCHLORIDE 0.4 MG: 0.4 CAPSULE ORAL at 09:09

## 2021-09-06 RX ADMIN — TRAZODONE HYDROCHLORIDE 50 MG: 50 TABLET ORAL at 12:09

## 2021-09-06 RX ADMIN — TRAZODONE HYDROCHLORIDE 50 MG: 50 TABLET ORAL at 10:09

## 2021-09-06 RX ADMIN — METRONIDAZOLE 500 MG: 250 TABLET ORAL at 12:09

## 2021-09-06 RX ADMIN — METRONIDAZOLE 500 MG: 250 TABLET ORAL at 09:09

## 2021-09-06 RX ADMIN — ESCITALOPRAM OXALATE 10 MG: 10 TABLET ORAL at 09:09

## 2021-09-06 RX ADMIN — MUPIROCIN: 20 OINTMENT TOPICAL at 03:09

## 2021-09-06 RX ADMIN — METOPROLOL SUCCINATE 50 MG: 50 TABLET, FILM COATED, EXTENDED RELEASE ORAL at 09:09

## 2021-09-06 RX ADMIN — CEFTRIAXONE 1 G: 1 INJECTION, SOLUTION INTRAVENOUS at 10:09

## 2021-09-06 RX ADMIN — ALBUTEROL SULFATE 2 PUFF: 90 AEROSOL, METERED RESPIRATORY (INHALATION) at 08:09

## 2021-09-06 RX ADMIN — MUPIROCIN: 20 OINTMENT TOPICAL at 10:09

## 2021-09-06 RX ADMIN — LEVOTHYROXINE SODIUM 50 MCG: 25 TABLET ORAL at 07:09

## 2021-09-06 RX ADMIN — CEFTRIAXONE 1 G: 1 INJECTION, SOLUTION INTRAVENOUS at 12:09

## 2021-09-06 RX ADMIN — METRONIDAZOLE 500 MG: 250 TABLET ORAL at 10:09

## 2021-09-06 RX ADMIN — METRONIDAZOLE 500 MG: 250 TABLET ORAL at 03:09

## 2021-09-06 RX ADMIN — ATORVASTATIN CALCIUM 40 MG: 40 TABLET, FILM COATED ORAL at 09:09

## 2021-09-07 ENCOUNTER — ANESTHESIA EVENT (OUTPATIENT)
Dept: SURGERY | Facility: HOSPITAL | Age: 81
DRG: 378 | End: 2021-09-07
Payer: MEDICARE

## 2021-09-07 PROBLEM — K61.1 PERIRECTAL ABSCESS: Status: ACTIVE | Noted: 2021-09-07

## 2021-09-07 LAB
ALBUMIN SERPL BCP-MCNC: 2.8 G/DL (ref 3.5–5.2)
ALP SERPL-CCNC: 88 U/L (ref 55–135)
ALT SERPL W/O P-5'-P-CCNC: 52 U/L (ref 10–44)
ANION GAP SERPL CALC-SCNC: 13 MMOL/L (ref 8–16)
ANISOCYTOSIS BLD QL SMEAR: ABNORMAL
AST SERPL-CCNC: 30 U/L (ref 10–40)
BASOPHILS NFR BLD: 0 % (ref 0–1.9)
BILIRUB SERPL-MCNC: 2.3 MG/DL (ref 0.1–1)
BLD PROD TYP BPU: NORMAL
BLD PROD TYP BPU: NORMAL
BLOOD UNIT EXPIRATION DATE: NORMAL
BLOOD UNIT EXPIRATION DATE: NORMAL
BLOOD UNIT TYPE CODE: 5100
BLOOD UNIT TYPE CODE: 5100
BLOOD UNIT TYPE: NORMAL
BLOOD UNIT TYPE: NORMAL
BUN SERPL-MCNC: 13 MG/DL (ref 8–23)
CALCIUM SERPL-MCNC: 8.9 MG/DL (ref 8.7–10.5)
CHLORIDE SERPL-SCNC: 102 MMOL/L (ref 95–110)
CO2 SERPL-SCNC: 22 MMOL/L (ref 23–29)
CODING SYSTEM: NORMAL
CODING SYSTEM: NORMAL
CREAT SERPL-MCNC: 0.9 MG/DL (ref 0.5–1.4)
DACRYOCYTES BLD QL SMEAR: ABNORMAL
DIFFERENTIAL METHOD: ABNORMAL
DISPENSE STATUS: NORMAL
DISPENSE STATUS: NORMAL
EOSINOPHIL NFR BLD: 2 % (ref 0–8)
ERYTHROCYTE [DISTWIDTH] IN BLOOD BY AUTOMATED COUNT: 14.9 % (ref 11.5–14.5)
EST. GFR  (AFRICAN AMERICAN): >60 ML/MIN/1.73 M^2
EST. GFR  (NON AFRICAN AMERICAN): >60 ML/MIN/1.73 M^2
GLUCOSE SERPL-MCNC: 84 MG/DL (ref 70–110)
HCT VFR BLD AUTO: 24.7 % (ref 40–54)
HGB BLD-MCNC: 8.3 G/DL (ref 14–18)
HYPOCHROMIA BLD QL SMEAR: ABNORMAL
IMM GRANULOCYTES # BLD AUTO: ABNORMAL K/UL (ref 0–0.04)
IMM GRANULOCYTES NFR BLD AUTO: ABNORMAL % (ref 0–0.5)
LYMPHOCYTES NFR BLD: 65 % (ref 18–48)
MCH RBC QN AUTO: 29.1 PG (ref 27–31)
MCHC RBC AUTO-ENTMCNC: 33.6 G/DL (ref 32–36)
MCV RBC AUTO: 87 FL (ref 82–98)
MONOCYTES NFR BLD: 7 % (ref 4–15)
NEUTROPHILS NFR BLD: 26 % (ref 38–73)
NRBC BLD-RTO: 30 /100 WBC
NUM UNITS TRANS WBC-POOR PLATPHERESIS: NORMAL
NUM UNITS TRANS WBC-POOR PLATPHERESIS: NORMAL
OVALOCYTES BLD QL SMEAR: ABNORMAL
PLATELET # BLD AUTO: 5 K/UL (ref 150–450)
PLATELET BLD QL SMEAR: ABNORMAL
PMV BLD AUTO: ABNORMAL FL (ref 9.2–12.9)
POIKILOCYTOSIS BLD QL SMEAR: ABNORMAL
POLYCHROMASIA BLD QL SMEAR: ABNORMAL
POTASSIUM SERPL-SCNC: 3.7 MMOL/L (ref 3.5–5.1)
PROT SERPL-MCNC: 6.4 G/DL (ref 6–8.4)
RBC # BLD AUTO: 2.85 M/UL (ref 4.6–6.2)
SODIUM SERPL-SCNC: 137 MMOL/L (ref 136–145)
WBC # BLD AUTO: 0.47 K/UL (ref 3.9–12.7)

## 2021-09-07 PROCEDURE — 99223 1ST HOSP IP/OBS HIGH 75: CPT | Mod: ,,, | Performed by: SURGERY

## 2021-09-07 PROCEDURE — P9037 PLATE PHERES LEUKOREDU IRRAD: HCPCS | Performed by: INTERNAL MEDICINE

## 2021-09-07 PROCEDURE — 94761 N-INVAS EAR/PLS OXIMETRY MLT: CPT

## 2021-09-07 PROCEDURE — 25000003 PHARM REV CODE 250: Performed by: INTERNAL MEDICINE

## 2021-09-07 PROCEDURE — 94640 AIRWAY INHALATION TREATMENT: CPT

## 2021-09-07 PROCEDURE — 99900031 HC PATIENT EDUCATION (STAT)

## 2021-09-07 PROCEDURE — 80053 COMPREHEN METABOLIC PANEL: CPT | Performed by: INTERNAL MEDICINE

## 2021-09-07 PROCEDURE — 85007 BL SMEAR W/DIFF WBC COUNT: CPT | Performed by: INTERNAL MEDICINE

## 2021-09-07 PROCEDURE — 36415 COLL VENOUS BLD VENIPUNCTURE: CPT | Performed by: INTERNAL MEDICINE

## 2021-09-07 PROCEDURE — 99223 PR INITIAL HOSPITAL CARE,LEVL III: ICD-10-PCS | Mod: ,,, | Performed by: SURGERY

## 2021-09-07 PROCEDURE — 12000002 HC ACUTE/MED SURGE SEMI-PRIVATE ROOM

## 2021-09-07 PROCEDURE — 99900035 HC TECH TIME PER 15 MIN (STAT)

## 2021-09-07 PROCEDURE — 85027 COMPLETE CBC AUTOMATED: CPT | Performed by: INTERNAL MEDICINE

## 2021-09-07 PROCEDURE — 25000003 PHARM REV CODE 250

## 2021-09-07 PROCEDURE — 63600175 PHARM REV CODE 636 W HCPCS: Performed by: INTERNAL MEDICINE

## 2021-09-07 RX ORDER — LEVOFLOXACIN 5 MG/ML
500 INJECTION, SOLUTION INTRAVENOUS
Status: DISCONTINUED | OUTPATIENT
Start: 2021-09-07 | End: 2021-09-09 | Stop reason: HOSPADM

## 2021-09-07 RX ORDER — HYDROCODONE BITARTRATE AND ACETAMINOPHEN 500; 5 MG/1; MG/1
TABLET ORAL
Status: DISCONTINUED | OUTPATIENT
Start: 2021-09-07 | End: 2021-09-08

## 2021-09-07 RX ADMIN — METRONIDAZOLE 500 MG: 250 TABLET ORAL at 06:09

## 2021-09-07 RX ADMIN — METRONIDAZOLE 500 MG: 250 TABLET ORAL at 10:09

## 2021-09-07 RX ADMIN — ATORVASTATIN CALCIUM 40 MG: 40 TABLET, FILM COATED ORAL at 08:09

## 2021-09-07 RX ADMIN — ALBUTEROL SULFATE 2 PUFF: 90 AEROSOL, METERED RESPIRATORY (INHALATION) at 07:09

## 2021-09-07 RX ADMIN — TRAZODONE HYDROCHLORIDE 50 MG: 50 TABLET ORAL at 08:09

## 2021-09-07 RX ADMIN — LEVOFLOXACIN 500 MG: 500 INJECTION, SOLUTION INTRAVENOUS at 08:09

## 2021-09-07 RX ADMIN — CHLORHEXIDINE GLUCONATE 15 ML: 1.2 RINSE ORAL at 08:09

## 2021-09-07 RX ADMIN — LEVOTHYROXINE SODIUM 50 MCG: 25 TABLET ORAL at 06:09

## 2021-09-07 RX ADMIN — ESCITALOPRAM OXALATE 10 MG: 10 TABLET ORAL at 08:09

## 2021-09-07 RX ADMIN — METOPROLOL SUCCINATE 50 MG: 50 TABLET, FILM COATED, EXTENDED RELEASE ORAL at 08:09

## 2021-09-07 RX ADMIN — MUPIROCIN: 20 OINTMENT TOPICAL at 08:09

## 2021-09-07 RX ADMIN — TAMSULOSIN HYDROCHLORIDE 0.4 MG: 0.4 CAPSULE ORAL at 08:09

## 2021-09-07 RX ADMIN — METRONIDAZOLE 500 MG: 250 TABLET ORAL at 01:09

## 2021-09-08 ENCOUNTER — ANESTHESIA (OUTPATIENT)
Dept: SURGERY | Facility: HOSPITAL | Age: 81
DRG: 378 | End: 2021-09-08
Payer: MEDICARE

## 2021-09-08 LAB
ALBUMIN SERPL BCP-MCNC: 3.1 G/DL (ref 3.5–5.2)
ALP SERPL-CCNC: 92 U/L (ref 55–135)
ALT SERPL W/O P-5'-P-CCNC: 44 U/L (ref 10–44)
ANION GAP SERPL CALC-SCNC: 11 MMOL/L (ref 8–16)
ANISOCYTOSIS BLD QL SMEAR: ABNORMAL
AST SERPL-CCNC: 32 U/L (ref 10–40)
BASOPHILS NFR BLD: 0 % (ref 0–1.9)
BILIRUB SERPL-MCNC: 2.2 MG/DL (ref 0.1–1)
BLD PROD TYP BPU: NORMAL
BLD PROD TYP BPU: NORMAL
BLOOD UNIT EXPIRATION DATE: NORMAL
BLOOD UNIT EXPIRATION DATE: NORMAL
BLOOD UNIT TYPE CODE: 5100
BLOOD UNIT TYPE CODE: 5100
BLOOD UNIT TYPE: NORMAL
BLOOD UNIT TYPE: NORMAL
BUN SERPL-MCNC: 13 MG/DL (ref 8–23)
CALCIUM SERPL-MCNC: 8.9 MG/DL (ref 8.7–10.5)
CHLORIDE SERPL-SCNC: 104 MMOL/L (ref 95–110)
CO2 SERPL-SCNC: 22 MMOL/L (ref 23–29)
CODING SYSTEM: NORMAL
CODING SYSTEM: NORMAL
CREAT SERPL-MCNC: 0.9 MG/DL (ref 0.5–1.4)
DACRYOCYTES BLD QL SMEAR: ABNORMAL
DIFFERENTIAL METHOD: ABNORMAL
DISPENSE STATUS: NORMAL
DISPENSE STATUS: NORMAL
EOSINOPHIL NFR BLD: 0 % (ref 0–8)
ERYTHROCYTE [DISTWIDTH] IN BLOOD BY AUTOMATED COUNT: 15.6 % (ref 11.5–14.5)
EST. GFR  (AFRICAN AMERICAN): >60 ML/MIN/1.73 M^2
EST. GFR  (NON AFRICAN AMERICAN): >60 ML/MIN/1.73 M^2
GLUCOSE SERPL-MCNC: 101 MG/DL (ref 70–110)
HCT VFR BLD AUTO: 27.4 % (ref 40–54)
HGB BLD-MCNC: 9.1 G/DL (ref 14–18)
HYPOCHROMIA BLD QL SMEAR: ABNORMAL
IMM GRANULOCYTES # BLD AUTO: ABNORMAL K/UL (ref 0–0.04)
IMM GRANULOCYTES NFR BLD AUTO: ABNORMAL % (ref 0–0.5)
LYMPHOCYTES NFR BLD: 90 % (ref 18–48)
MCH RBC QN AUTO: 28.8 PG (ref 27–31)
MCHC RBC AUTO-ENTMCNC: 33.2 G/DL (ref 32–36)
MCV RBC AUTO: 87 FL (ref 82–98)
MONOCYTES NFR BLD: 2 % (ref 4–15)
NEUTROPHILS NFR BLD: 6 % (ref 38–73)
NEUTS BAND NFR BLD MANUAL: 2 %
NRBC BLD-RTO: 16 /100 WBC
NUM UNITS TRANS WBC-POOR PLATPHERESIS: NORMAL
NUM UNITS TRANS WBC-POOR PLATPHERESIS: NORMAL
OVALOCYTES BLD QL SMEAR: ABNORMAL
PLATELET # BLD AUTO: 17 K/UL (ref 150–450)
PLATELET BLD QL SMEAR: ABNORMAL
PMV BLD AUTO: ABNORMAL FL (ref 9.2–12.9)
POIKILOCYTOSIS BLD QL SMEAR: ABNORMAL
POLYCHROMASIA BLD QL SMEAR: ABNORMAL
POTASSIUM SERPL-SCNC: 3.5 MMOL/L (ref 3.5–5.1)
PROT SERPL-MCNC: 6.9 G/DL (ref 6–8.4)
RBC # BLD AUTO: 3.16 M/UL (ref 4.6–6.2)
SODIUM SERPL-SCNC: 137 MMOL/L (ref 136–145)
WBC # BLD AUTO: 0.77 K/UL (ref 3.9–12.7)

## 2021-09-08 PROCEDURE — 85027 COMPLETE CBC AUTOMATED: CPT | Performed by: INTERNAL MEDICINE

## 2021-09-08 PROCEDURE — 88304 TISSUE EXAM BY PATHOLOGIST: CPT | Mod: TC

## 2021-09-08 PROCEDURE — 99900035 HC TECH TIME PER 15 MIN (STAT)

## 2021-09-08 PROCEDURE — 25000003 PHARM REV CODE 250: Performed by: SURGERY

## 2021-09-08 PROCEDURE — 25000003 PHARM REV CODE 250: Performed by: NURSE ANESTHETIST, CERTIFIED REGISTERED

## 2021-09-08 PROCEDURE — 27000657 HC HEADREST, PRONE: Performed by: STUDENT IN AN ORGANIZED HEALTH CARE EDUCATION/TRAINING PROGRAM

## 2021-09-08 PROCEDURE — 94640 AIRWAY INHALATION TREATMENT: CPT

## 2021-09-08 PROCEDURE — P9037 PLATE PHERES LEUKOREDU IRRAD: HCPCS | Performed by: INTERNAL MEDICINE

## 2021-09-08 PROCEDURE — 63600175 PHARM REV CODE 636 W HCPCS: Performed by: INTERNAL MEDICINE

## 2021-09-08 PROCEDURE — 46040 PR I&D PERIRECTAL ABSCESS: ICD-10-PCS | Mod: ,,, | Performed by: SURGERY

## 2021-09-08 PROCEDURE — 27000656 HC EYE GOGGLES: Performed by: STUDENT IN AN ORGANIZED HEALTH CARE EDUCATION/TRAINING PROGRAM

## 2021-09-08 PROCEDURE — 27202105 HC BIS BILATERAL SENSOR: Performed by: STUDENT IN AN ORGANIZED HEALTH CARE EDUCATION/TRAINING PROGRAM

## 2021-09-08 PROCEDURE — 37000008 HC ANESTHESIA 1ST 15 MINUTES: Performed by: SURGERY

## 2021-09-08 PROCEDURE — 99900031 HC PATIENT EDUCATION (STAT)

## 2021-09-08 PROCEDURE — 85007 BL SMEAR W/DIFF WBC COUNT: CPT | Performed by: INTERNAL MEDICINE

## 2021-09-08 PROCEDURE — 25000003 PHARM REV CODE 250: Performed by: INTERNAL MEDICINE

## 2021-09-08 PROCEDURE — 27000673 HC TUBING BLOOD Y: Performed by: STUDENT IN AN ORGANIZED HEALTH CARE EDUCATION/TRAINING PROGRAM

## 2021-09-08 PROCEDURE — 94760 N-INVAS EAR/PLS OXIMETRY 1: CPT

## 2021-09-08 PROCEDURE — 63600175 PHARM REV CODE 636 W HCPCS: Performed by: NURSE ANESTHETIST, CERTIFIED REGISTERED

## 2021-09-08 PROCEDURE — 27201107 HC STYLET, STANDARD: Performed by: STUDENT IN AN ORGANIZED HEALTH CARE EDUCATION/TRAINING PROGRAM

## 2021-09-08 PROCEDURE — 36000704 HC OR TIME LEV I 1ST 15 MIN: Performed by: SURGERY

## 2021-09-08 PROCEDURE — 27000671 HC TUBING MICROBORE EXT: Performed by: STUDENT IN AN ORGANIZED HEALTH CARE EDUCATION/TRAINING PROGRAM

## 2021-09-08 PROCEDURE — 80053 COMPREHEN METABOLIC PANEL: CPT | Performed by: INTERNAL MEDICINE

## 2021-09-08 PROCEDURE — 46040 I&D ISCHIORCT&/PERIRCT ABSC: CPT | Mod: ,,, | Performed by: SURGERY

## 2021-09-08 PROCEDURE — 25000003 PHARM REV CODE 250

## 2021-09-08 PROCEDURE — 20000000 HC ICU ROOM

## 2021-09-08 PROCEDURE — 36000705 HC OR TIME LEV I EA ADD 15 MIN: Performed by: SURGERY

## 2021-09-08 PROCEDURE — 37000009 HC ANESTHESIA EA ADD 15 MINS: Performed by: SURGERY

## 2021-09-08 PROCEDURE — 27201423 OPTIME MED/SURG SUP & DEVICES STERILE SUPPLY: Performed by: SURGERY

## 2021-09-08 PROCEDURE — 27000663 HC PAD, ARM CRADLE PRONE: Performed by: STUDENT IN AN ORGANIZED HEALTH CARE EDUCATION/TRAINING PROGRAM

## 2021-09-08 RX ORDER — SUCCINYLCHOLINE CHLORIDE 20 MG/ML
INJECTION INTRAMUSCULAR; INTRAVENOUS
Status: DISCONTINUED | OUTPATIENT
Start: 2021-09-08 | End: 2021-09-08

## 2021-09-08 RX ORDER — FENTANYL CITRATE 50 UG/ML
INJECTION, SOLUTION INTRAMUSCULAR; INTRAVENOUS
Status: DISCONTINUED | OUTPATIENT
Start: 2021-09-08 | End: 2021-09-08

## 2021-09-08 RX ORDER — HYDROCODONE BITARTRATE AND ACETAMINOPHEN 5; 325 MG/1; MG/1
1 TABLET ORAL EVERY 4 HOURS PRN
Status: DISCONTINUED | OUTPATIENT
Start: 2021-09-08 | End: 2021-09-09 | Stop reason: HOSPADM

## 2021-09-08 RX ORDER — NEOSTIGMINE METHYLSULFATE 1 MG/ML
INJECTION, SOLUTION INTRAVENOUS
Status: DISCONTINUED | OUTPATIENT
Start: 2021-09-08 | End: 2021-09-08

## 2021-09-08 RX ORDER — ONDANSETRON 2 MG/ML
INJECTION INTRAMUSCULAR; INTRAVENOUS
Status: DISCONTINUED | OUTPATIENT
Start: 2021-09-08 | End: 2021-09-08

## 2021-09-08 RX ORDER — LIDOCAINE HCL/PF 100 MG/5ML
SYRINGE (ML) INTRAVENOUS
Status: DISCONTINUED | OUTPATIENT
Start: 2021-09-08 | End: 2021-09-08

## 2021-09-08 RX ORDER — BUPIVACAINE HYDROCHLORIDE 2.5 MG/ML
INJECTION, SOLUTION EPIDURAL; INFILTRATION; INTRACAUDAL
Status: DISCONTINUED | OUTPATIENT
Start: 2021-09-08 | End: 2021-09-08 | Stop reason: HOSPADM

## 2021-09-08 RX ORDER — MUPIROCIN 20 MG/G
OINTMENT TOPICAL
Status: DISCONTINUED | OUTPATIENT
Start: 2021-09-08 | End: 2021-09-08 | Stop reason: HOSPADM

## 2021-09-08 RX ORDER — ROCURONIUM BROMIDE 10 MG/ML
INJECTION, SOLUTION INTRAVENOUS
Status: DISCONTINUED | OUTPATIENT
Start: 2021-09-08 | End: 2021-09-08

## 2021-09-08 RX ORDER — HYDROCODONE BITARTRATE AND ACETAMINOPHEN 500; 5 MG/1; MG/1
TABLET ORAL
Status: DISCONTINUED | OUTPATIENT
Start: 2021-09-08 | End: 2021-09-09 | Stop reason: HOSPADM

## 2021-09-08 RX ORDER — PROPOFOL 10 MG/ML
VIAL (ML) INTRAVENOUS
Status: DISCONTINUED | OUTPATIENT
Start: 2021-09-08 | End: 2021-09-08

## 2021-09-08 RX ADMIN — LIDOCAINE HYDROCHLORIDE 100 MG: 20 INJECTION, SOLUTION INTRAVENOUS at 09:09

## 2021-09-08 RX ADMIN — MUPIROCIN: 20 OINTMENT TOPICAL at 12:09

## 2021-09-08 RX ADMIN — GLYCOPYRROLATE 0.4 MG: 0.2 INJECTION, SOLUTION INTRAMUSCULAR; INTRAVITREAL at 10:09

## 2021-09-08 RX ADMIN — TAMSULOSIN HYDROCHLORIDE 0.4 MG: 0.4 CAPSULE ORAL at 12:09

## 2021-09-08 RX ADMIN — NEOSTIGMINE 3 MG: 1 INJECTION INTRAVENOUS at 10:09

## 2021-09-08 RX ADMIN — PROPOFOL 100 MG: 10 INJECTION, EMULSION INTRAVENOUS at 09:09

## 2021-09-08 RX ADMIN — METRONIDAZOLE 500 MG: 250 TABLET ORAL at 08:09

## 2021-09-08 RX ADMIN — TRAZODONE HYDROCHLORIDE 50 MG: 50 TABLET ORAL at 08:09

## 2021-09-08 RX ADMIN — HYDROCODONE BITARTRATE AND ACETAMINOPHEN 1 TABLET: 5; 325 TABLET ORAL at 08:09

## 2021-09-08 RX ADMIN — ROCURONIUM BROMIDE 20 MG: 10 INJECTION, SOLUTION INTRAVENOUS at 09:09

## 2021-09-08 RX ADMIN — FENTANYL CITRATE 50 MCG: 50 INJECTION INTRAMUSCULAR; INTRAVENOUS at 09:09

## 2021-09-08 RX ADMIN — CHLORHEXIDINE GLUCONATE 15 ML: 1.2 RINSE ORAL at 08:09

## 2021-09-08 RX ADMIN — MUPIROCIN: 20 OINTMENT TOPICAL at 08:09

## 2021-09-08 RX ADMIN — ONDANSETRON 4 MG: 2 INJECTION INTRAMUSCULAR; INTRAVENOUS at 09:09

## 2021-09-08 RX ADMIN — SUCCINYLCHOLINE CHLORIDE 140 MG: 20 INJECTION, SOLUTION INTRAMUSCULAR; INTRAVENOUS at 09:09

## 2021-09-08 RX ADMIN — ROCURONIUM BROMIDE 5 MG: 10 INJECTION, SOLUTION INTRAVENOUS at 09:09

## 2021-09-08 RX ADMIN — METOPROLOL SUCCINATE 50 MG: 50 TABLET, FILM COATED, EXTENDED RELEASE ORAL at 12:09

## 2021-09-08 RX ADMIN — ESCITALOPRAM OXALATE 10 MG: 10 TABLET ORAL at 12:09

## 2021-09-08 RX ADMIN — ALBUTEROL SULFATE 2 PUFF: 90 AEROSOL, METERED RESPIRATORY (INHALATION) at 12:09

## 2021-09-08 RX ADMIN — LEVOFLOXACIN 500 MG: 500 INJECTION, SOLUTION INTRAVENOUS at 09:09

## 2021-09-08 RX ADMIN — ATORVASTATIN CALCIUM 40 MG: 40 TABLET, FILM COATED ORAL at 12:09

## 2021-09-08 RX ADMIN — METRONIDAZOLE 500 MG: 250 TABLET ORAL at 04:09

## 2021-09-08 RX ADMIN — SODIUM CHLORIDE, SODIUM LACTATE, POTASSIUM CHLORIDE, AND CALCIUM CHLORIDE: .6; .31; .03; .02 INJECTION, SOLUTION INTRAVENOUS at 08:09

## 2021-09-08 RX ADMIN — CHLORHEXIDINE GLUCONATE 15 ML: 1.2 RINSE ORAL at 12:09

## 2021-09-09 ENCOUNTER — SPECIALTY PHARMACY (OUTPATIENT)
Dept: PHARMACY | Facility: CLINIC | Age: 81
End: 2021-09-09

## 2021-09-09 ENCOUNTER — TELEPHONE (OUTPATIENT)
Dept: HEMATOLOGY/ONCOLOGY | Facility: CLINIC | Age: 81
End: 2021-09-09

## 2021-09-09 VITALS
TEMPERATURE: 98 F | HEART RATE: 75 BPM | OXYGEN SATURATION: 98 % | BODY MASS INDEX: 23.07 KG/M2 | RESPIRATION RATE: 18 BRPM | WEIGHT: 147 LBS | HEIGHT: 67 IN | SYSTOLIC BLOOD PRESSURE: 108 MMHG | DIASTOLIC BLOOD PRESSURE: 64 MMHG

## 2021-09-09 PROBLEM — K61.1 PERIRECTAL ABSCESS: Status: RESOLVED | Noted: 2021-09-07 | Resolved: 2021-09-09

## 2021-09-09 PROBLEM — L03.90 CELLULITIS: Status: RESOLVED | Noted: 2021-09-05 | Resolved: 2021-09-09

## 2021-09-09 LAB
ALBUMIN SERPL BCP-MCNC: 2.8 G/DL (ref 3.5–5.2)
ALP SERPL-CCNC: 87 U/L (ref 55–135)
ALT SERPL W/O P-5'-P-CCNC: 33 U/L (ref 10–44)
ANION GAP SERPL CALC-SCNC: 10 MMOL/L (ref 8–16)
AST SERPL-CCNC: 22 U/L (ref 10–40)
BASOPHILS NFR BLD: 0 % (ref 0–1.9)
BILIRUB SERPL-MCNC: 1.6 MG/DL (ref 0.1–1)
BLASTS NFR BLD MANUAL: 1 %
BUN SERPL-MCNC: 12 MG/DL (ref 8–23)
CALCIUM SERPL-MCNC: 9 MG/DL (ref 8.7–10.5)
CHLORIDE SERPL-SCNC: 104 MMOL/L (ref 95–110)
CO2 SERPL-SCNC: 24 MMOL/L (ref 23–29)
CREAT SERPL-MCNC: 0.9 MG/DL (ref 0.5–1.4)
DIFFERENTIAL METHOD: ABNORMAL
EOSINOPHIL NFR BLD: 0 % (ref 0–8)
ERYTHROCYTE [DISTWIDTH] IN BLOOD BY AUTOMATED COUNT: 15.8 % (ref 11.5–14.5)
EST. GFR  (AFRICAN AMERICAN): >60 ML/MIN/1.73 M^2
EST. GFR  (NON AFRICAN AMERICAN): >60 ML/MIN/1.73 M^2
GLUCOSE SERPL-MCNC: 91 MG/DL (ref 70–110)
HCT VFR BLD AUTO: 25.3 % (ref 40–54)
HGB BLD-MCNC: 8.1 G/DL (ref 14–18)
IMM GRANULOCYTES # BLD AUTO: ABNORMAL K/UL (ref 0–0.04)
IMM GRANULOCYTES NFR BLD AUTO: ABNORMAL % (ref 0–0.5)
LYMPHOCYTES NFR BLD: 61 % (ref 18–48)
MCH RBC QN AUTO: 28.4 PG (ref 27–31)
MCHC RBC AUTO-ENTMCNC: 32 G/DL (ref 32–36)
MCV RBC AUTO: 89 FL (ref 82–98)
MONOCYTES NFR BLD: 17 % (ref 4–15)
NEUTROPHILS NFR BLD: 21 % (ref 38–73)
NRBC BLD-RTO: 18 /100 WBC
PLATELET # BLD AUTO: 18 K/UL (ref 150–450)
PMV BLD AUTO: ABNORMAL FL (ref 9.2–12.9)
POIKILOCYTOSIS BLD QL SMEAR: SLIGHT
POLYCHROMASIA BLD QL SMEAR: ABNORMAL
POTASSIUM SERPL-SCNC: 3.6 MMOL/L (ref 3.5–5.1)
PROT SERPL-MCNC: 6.7 G/DL (ref 6–8.4)
RBC # BLD AUTO: 2.85 M/UL (ref 4.6–6.2)
SODIUM SERPL-SCNC: 138 MMOL/L (ref 136–145)
WBC # BLD AUTO: 0.51 K/UL (ref 3.9–12.7)

## 2021-09-09 PROCEDURE — 25000003 PHARM REV CODE 250: Performed by: SURGERY

## 2021-09-09 PROCEDURE — 25000003 PHARM REV CODE 250: Performed by: INTERNAL MEDICINE

## 2021-09-09 PROCEDURE — 99900035 HC TECH TIME PER 15 MIN (STAT)

## 2021-09-09 PROCEDURE — 85027 COMPLETE CBC AUTOMATED: CPT | Performed by: SURGERY

## 2021-09-09 PROCEDURE — 85007 BL SMEAR W/DIFF WBC COUNT: CPT | Performed by: SURGERY

## 2021-09-09 PROCEDURE — 80053 COMPREHEN METABOLIC PANEL: CPT | Performed by: SURGERY

## 2021-09-09 PROCEDURE — 63600175 PHARM REV CODE 636 W HCPCS: Performed by: SURGERY

## 2021-09-09 PROCEDURE — 36415 COLL VENOUS BLD VENIPUNCTURE: CPT | Performed by: SURGERY

## 2021-09-09 RX ORDER — LIDOCAINE HYDROCHLORIDE 20 MG/ML
JELLY TOPICAL
Status: DISCONTINUED | OUTPATIENT
Start: 2021-09-09 | End: 2021-09-09 | Stop reason: HOSPADM

## 2021-09-09 RX ORDER — HYDROCODONE BITARTRATE AND ACETAMINOPHEN 5; 325 MG/1; MG/1
1 TABLET ORAL EVERY 12 HOURS PRN
Qty: 12 TABLET | Refills: 0 | Status: SHIPPED | OUTPATIENT
Start: 2021-09-09 | End: 2021-09-27 | Stop reason: CLARIF

## 2021-09-09 RX ORDER — LIDOCAINE HYDROCHLORIDE 20 MG/ML
JELLY TOPICAL
Qty: 30 ML | Refills: 0 | Status: SHIPPED | OUTPATIENT
Start: 2021-09-09

## 2021-09-09 RX ORDER — METRONIDAZOLE 500 MG/1
500 TABLET ORAL EVERY 8 HOURS
Qty: 21 TABLET | Refills: 0 | Status: SHIPPED | OUTPATIENT
Start: 2021-09-09 | End: 2021-09-16

## 2021-09-09 RX ORDER — LEVOFLOXACIN 500 MG/1
500 TABLET, FILM COATED ORAL DAILY
Qty: 7 TABLET | Refills: 0 | Status: SHIPPED | OUTPATIENT
Start: 2021-09-09 | End: 2021-09-16

## 2021-09-09 RX ADMIN — HYDROCODONE BITARTRATE AND ACETAMINOPHEN 1 TABLET: 5; 325 TABLET ORAL at 11:09

## 2021-09-09 RX ADMIN — CHLORHEXIDINE GLUCONATE 15 ML: 1.2 RINSE ORAL at 08:09

## 2021-09-09 RX ADMIN — ESCITALOPRAM OXALATE 10 MG: 10 TABLET ORAL at 08:09

## 2021-09-09 RX ADMIN — LEVOFLOXACIN 500 MG: 500 INJECTION, SOLUTION INTRAVENOUS at 08:09

## 2021-09-09 RX ADMIN — METRONIDAZOLE 500 MG: 250 TABLET ORAL at 02:09

## 2021-09-09 RX ADMIN — MUPIROCIN: 20 OINTMENT TOPICAL at 08:09

## 2021-09-09 RX ADMIN — ALBUTEROL SULFATE 2 PUFF: 90 AEROSOL, METERED RESPIRATORY (INHALATION) at 08:09

## 2021-09-09 RX ADMIN — LEVOTHYROXINE SODIUM 50 MCG: 25 TABLET ORAL at 06:09

## 2021-09-09 RX ADMIN — TAMSULOSIN HYDROCHLORIDE 0.4 MG: 0.4 CAPSULE ORAL at 08:09

## 2021-09-09 RX ADMIN — LIDOCAINE HYDROCHLORIDE: 20 JELLY TOPICAL at 02:09

## 2021-09-09 RX ADMIN — ATORVASTATIN CALCIUM 40 MG: 40 TABLET, FILM COATED ORAL at 08:09

## 2021-09-09 RX ADMIN — METRONIDAZOLE 500 MG: 250 TABLET ORAL at 06:09

## 2021-09-09 RX ADMIN — METOPROLOL SUCCINATE 50 MG: 50 TABLET, FILM COATED, EXTENDED RELEASE ORAL at 08:09

## 2021-09-10 LAB — BACTERIA BLD CULT: NORMAL

## 2021-09-11 PROCEDURE — G0180 PR HOME HEALTH MD CERTIFICATION: ICD-10-PCS | Mod: ,,, | Performed by: FAMILY MEDICINE

## 2021-09-11 PROCEDURE — G0180 MD CERTIFICATION HHA PATIENT: HCPCS | Mod: ,,, | Performed by: FAMILY MEDICINE

## 2021-09-13 ENCOUNTER — SPECIALTY PHARMACY (OUTPATIENT)
Dept: PHARMACY | Facility: CLINIC | Age: 81
End: 2021-09-13

## 2021-09-13 ENCOUNTER — DOCUMENTATION ONLY (OUTPATIENT)
Dept: HEMATOLOGY/ONCOLOGY | Facility: CLINIC | Age: 81
End: 2021-09-13

## 2021-09-13 ENCOUNTER — OFFICE VISIT (OUTPATIENT)
Dept: HEMATOLOGY/ONCOLOGY | Facility: CLINIC | Age: 81
End: 2021-09-13
Payer: MEDICARE

## 2021-09-13 ENCOUNTER — INFUSION (OUTPATIENT)
Dept: INFUSION THERAPY | Facility: HOSPITAL | Age: 81
End: 2021-09-13
Attending: INTERNAL MEDICINE
Payer: MEDICARE

## 2021-09-13 VITALS
DIASTOLIC BLOOD PRESSURE: 59 MMHG | TEMPERATURE: 99 F | HEIGHT: 67 IN | RESPIRATION RATE: 18 BRPM | WEIGHT: 142.19 LBS | SYSTOLIC BLOOD PRESSURE: 108 MMHG | HEART RATE: 78 BPM | BODY MASS INDEX: 22.32 KG/M2

## 2021-09-13 VITALS
BODY MASS INDEX: 22.32 KG/M2 | HEART RATE: 86 BPM | TEMPERATURE: 98 F | RESPIRATION RATE: 18 BRPM | OXYGEN SATURATION: 94 % | SYSTOLIC BLOOD PRESSURE: 109 MMHG | HEIGHT: 67 IN | WEIGHT: 142.19 LBS | DIASTOLIC BLOOD PRESSURE: 74 MMHG

## 2021-09-13 DIAGNOSIS — D45 POLYCYTHEMIA VERA: Primary | ICD-10-CM

## 2021-09-13 DIAGNOSIS — D61.818 PANCYTOPENIA: ICD-10-CM

## 2021-09-13 DIAGNOSIS — D64.9 TRANSFUSION-DEPENDENT ANEMIA: ICD-10-CM

## 2021-09-13 DIAGNOSIS — D46.Z MDS (MYELODYSPLASTIC SYNDROME), HIGH GRADE: ICD-10-CM

## 2021-09-13 DIAGNOSIS — I48.0 PAROXYSMAL ATRIAL FIBRILLATION: ICD-10-CM

## 2021-09-13 DIAGNOSIS — D46.Z MDS (MYELODYSPLASTIC SYNDROME), HIGH GRADE: Primary | ICD-10-CM

## 2021-09-13 PROCEDURE — 3288F FALL RISK ASSESSMENT DOCD: CPT | Mod: CPTII,S$GLB,, | Performed by: INTERNAL MEDICINE

## 2021-09-13 PROCEDURE — 99214 PR OFFICE/OUTPT VISIT, EST, LEVL IV, 30-39 MIN: ICD-10-PCS | Mod: S$GLB,,, | Performed by: INTERNAL MEDICINE

## 2021-09-13 PROCEDURE — 1111F DSCHRG MED/CURRENT MED MERGE: CPT | Mod: CPTII,S$GLB,, | Performed by: INTERNAL MEDICINE

## 2021-09-13 PROCEDURE — 3074F PR MOST RECENT SYSTOLIC BLOOD PRESSURE < 130 MM HG: ICD-10-PCS | Mod: CPTII,S$GLB,, | Performed by: INTERNAL MEDICINE

## 2021-09-13 PROCEDURE — 96409 CHEMO IV PUSH SNGL DRUG: CPT | Mod: PN

## 2021-09-13 PROCEDURE — 1126F AMNT PAIN NOTED NONE PRSNT: CPT | Mod: CPTII,S$GLB,, | Performed by: INTERNAL MEDICINE

## 2021-09-13 PROCEDURE — 63600175 PHARM REV CODE 636 W HCPCS: Mod: PN | Performed by: INTERNAL MEDICINE

## 2021-09-13 PROCEDURE — 99214 OFFICE O/P EST MOD 30 MIN: CPT | Mod: S$GLB,,, | Performed by: INTERNAL MEDICINE

## 2021-09-13 PROCEDURE — 3078F DIAST BP <80 MM HG: CPT | Mod: CPTII,S$GLB,, | Performed by: INTERNAL MEDICINE

## 2021-09-13 PROCEDURE — 25000003 PHARM REV CODE 250: Mod: PN | Performed by: INTERNAL MEDICINE

## 2021-09-13 PROCEDURE — 1126F PR PAIN SEVERITY QUANTIFIED, NO PAIN PRESENT: ICD-10-PCS | Mod: CPTII,S$GLB,, | Performed by: INTERNAL MEDICINE

## 2021-09-13 PROCEDURE — 1159F PR MEDICATION LIST DOCUMENTED IN MEDICAL RECORD: ICD-10-PCS | Mod: CPTII,S$GLB,, | Performed by: INTERNAL MEDICINE

## 2021-09-13 PROCEDURE — A4216 STERILE WATER/SALINE, 10 ML: HCPCS | Mod: PN | Performed by: INTERNAL MEDICINE

## 2021-09-13 PROCEDURE — 99999 PR PBB SHADOW E&M-EST. PATIENT-LVL IV: CPT | Mod: PBBFAC,,, | Performed by: INTERNAL MEDICINE

## 2021-09-13 PROCEDURE — 3078F PR MOST RECENT DIASTOLIC BLOOD PRESSURE < 80 MM HG: ICD-10-PCS | Mod: CPTII,S$GLB,, | Performed by: INTERNAL MEDICINE

## 2021-09-13 PROCEDURE — 96375 TX/PRO/DX INJ NEW DRUG ADDON: CPT | Mod: PN

## 2021-09-13 PROCEDURE — 99499 UNLISTED E&M SERVICE: CPT | Mod: S$GLB,,, | Performed by: INTERNAL MEDICINE

## 2021-09-13 PROCEDURE — 1111F PR DISCHARGE MEDS RECONCILED W/ CURRENT OUTPATIENT MED LIST: ICD-10-PCS | Mod: CPTII,S$GLB,, | Performed by: INTERNAL MEDICINE

## 2021-09-13 PROCEDURE — 1159F MED LIST DOCD IN RCRD: CPT | Mod: CPTII,S$GLB,, | Performed by: INTERNAL MEDICINE

## 2021-09-13 PROCEDURE — 3288F PR FALLS RISK ASSESSMENT DOCUMENTED: ICD-10-PCS | Mod: CPTII,S$GLB,, | Performed by: INTERNAL MEDICINE

## 2021-09-13 PROCEDURE — 1101F PR PT FALLS ASSESS DOC 0-1 FALLS W/OUT INJ PAST YR: ICD-10-PCS | Mod: CPTII,S$GLB,, | Performed by: INTERNAL MEDICINE

## 2021-09-13 PROCEDURE — 99499 RISK ADDL DX/OHS AUDIT: ICD-10-PCS | Mod: S$GLB,,, | Performed by: INTERNAL MEDICINE

## 2021-09-13 PROCEDURE — 99999 PR PBB SHADOW E&M-EST. PATIENT-LVL IV: ICD-10-PCS | Mod: PBBFAC,,, | Performed by: INTERNAL MEDICINE

## 2021-09-13 PROCEDURE — 1101F PT FALLS ASSESS-DOCD LE1/YR: CPT | Mod: CPTII,S$GLB,, | Performed by: INTERNAL MEDICINE

## 2021-09-13 PROCEDURE — 3074F SYST BP LT 130 MM HG: CPT | Mod: CPTII,S$GLB,, | Performed by: INTERNAL MEDICINE

## 2021-09-13 PROCEDURE — 1160F RVW MEDS BY RX/DR IN RCRD: CPT | Mod: CPTII,S$GLB,, | Performed by: INTERNAL MEDICINE

## 2021-09-13 PROCEDURE — 1160F PR REVIEW ALL MEDS BY PRESCRIBER/CLIN PHARMACIST DOCUMENTED: ICD-10-PCS | Mod: CPTII,S$GLB,, | Performed by: INTERNAL MEDICINE

## 2021-09-13 RX ORDER — SODIUM CHLORIDE 0.9 % (FLUSH) 0.9 %
10 SYRINGE (ML) INJECTION
Status: CANCELLED | OUTPATIENT
Start: 2021-09-14

## 2021-09-13 RX ORDER — ONDANSETRON 2 MG/ML
8 INJECTION INTRAMUSCULAR; INTRAVENOUS ONCE
Status: CANCELLED
Start: 2021-09-16 | End: 2021-09-16

## 2021-09-13 RX ORDER — SODIUM CHLORIDE 0.9 % (FLUSH) 0.9 %
10 SYRINGE (ML) INJECTION
Status: CANCELLED | OUTPATIENT
Start: 2021-09-16

## 2021-09-13 RX ORDER — HEPARIN 100 UNIT/ML
500 SYRINGE INTRAVENOUS
Status: CANCELLED | OUTPATIENT
Start: 2021-09-17

## 2021-09-13 RX ORDER — SODIUM CHLORIDE 0.9 % (FLUSH) 0.9 %
10 SYRINGE (ML) INJECTION
Status: CANCELLED | OUTPATIENT
Start: 2021-09-13

## 2021-09-13 RX ORDER — ONDANSETRON 2 MG/ML
8 INJECTION INTRAMUSCULAR; INTRAVENOUS ONCE
Status: COMPLETED | OUTPATIENT
Start: 2021-09-13 | End: 2021-09-13

## 2021-09-13 RX ORDER — ONDANSETRON 2 MG/ML
8 INJECTION INTRAMUSCULAR; INTRAVENOUS ONCE
Status: CANCELLED
Start: 2021-09-17 | End: 2021-09-17

## 2021-09-13 RX ORDER — SODIUM CHLORIDE 0.9 % (FLUSH) 0.9 %
10 SYRINGE (ML) INJECTION
Status: DISCONTINUED | OUTPATIENT
Start: 2021-09-13 | End: 2021-09-13 | Stop reason: HOSPADM

## 2021-09-13 RX ORDER — HEPARIN 100 UNIT/ML
500 SYRINGE INTRAVENOUS
Status: CANCELLED | OUTPATIENT
Start: 2021-09-15

## 2021-09-13 RX ORDER — ONDANSETRON 2 MG/ML
8 INJECTION INTRAMUSCULAR; INTRAVENOUS ONCE
Status: CANCELLED
Start: 2021-09-13 | End: 2021-09-13

## 2021-09-13 RX ORDER — ONDANSETRON 2 MG/ML
8 INJECTION INTRAMUSCULAR; INTRAVENOUS ONCE
Status: CANCELLED
Start: 2021-09-14 | End: 2021-09-14

## 2021-09-13 RX ORDER — HEPARIN 100 UNIT/ML
500 SYRINGE INTRAVENOUS
Status: CANCELLED | OUTPATIENT
Start: 2021-09-14

## 2021-09-13 RX ORDER — SODIUM CHLORIDE 0.9 % (FLUSH) 0.9 %
10 SYRINGE (ML) INJECTION
Status: CANCELLED | OUTPATIENT
Start: 2021-09-15

## 2021-09-13 RX ORDER — HEPARIN 100 UNIT/ML
500 SYRINGE INTRAVENOUS
Status: CANCELLED | OUTPATIENT
Start: 2021-09-13

## 2021-09-13 RX ORDER — HEPARIN 100 UNIT/ML
500 SYRINGE INTRAVENOUS
Status: CANCELLED | OUTPATIENT
Start: 2021-09-16

## 2021-09-13 RX ORDER — ONDANSETRON 2 MG/ML
8 INJECTION INTRAMUSCULAR; INTRAVENOUS ONCE
Status: CANCELLED
Start: 2021-09-15 | End: 2021-09-15

## 2021-09-13 RX ORDER — SODIUM CHLORIDE 0.9 % (FLUSH) 0.9 %
10 SYRINGE (ML) INJECTION
Status: CANCELLED | OUTPATIENT
Start: 2021-09-17

## 2021-09-13 RX ADMIN — SODIUM CHLORIDE: 9 INJECTION, SOLUTION INTRAVENOUS at 03:09

## 2021-09-13 RX ADMIN — Medication 10 ML: at 03:09

## 2021-09-13 RX ADMIN — ONDANSETRON 8 MG: 2 INJECTION INTRAMUSCULAR; INTRAVENOUS at 03:09

## 2021-09-13 RX ADMIN — AZACITIDINE 140 MG: 100 INJECTION, POWDER, LYOPHILIZED, FOR SOLUTION INTRAVENOUS; SUBCUTANEOUS at 03:09

## 2021-09-14 ENCOUNTER — DOCUMENTATION ONLY (OUTPATIENT)
Dept: INFUSION THERAPY | Facility: HOSPITAL | Age: 81
End: 2021-09-14

## 2021-09-14 ENCOUNTER — TELEPHONE (OUTPATIENT)
Dept: FAMILY MEDICINE | Facility: CLINIC | Age: 81
End: 2021-09-14

## 2021-09-14 ENCOUNTER — INFUSION (OUTPATIENT)
Dept: INFUSION THERAPY | Facility: HOSPITAL | Age: 81
End: 2021-09-14
Attending: INTERNAL MEDICINE
Payer: MEDICARE

## 2021-09-14 ENCOUNTER — SPECIALTY PHARMACY (OUTPATIENT)
Dept: PHARMACY | Facility: CLINIC | Age: 81
End: 2021-09-14

## 2021-09-14 ENCOUNTER — OFFICE VISIT (OUTPATIENT)
Dept: FAMILY MEDICINE | Facility: CLINIC | Age: 81
End: 2021-09-14
Attending: FAMILY MEDICINE
Payer: MEDICARE

## 2021-09-14 VITALS
OXYGEN SATURATION: 98 % | SYSTOLIC BLOOD PRESSURE: 94 MMHG | BODY MASS INDEX: 21.97 KG/M2 | WEIGHT: 140 LBS | HEIGHT: 67 IN | DIASTOLIC BLOOD PRESSURE: 54 MMHG | HEART RATE: 85 BPM | TEMPERATURE: 98 F

## 2021-09-14 VITALS
SYSTOLIC BLOOD PRESSURE: 115 MMHG | WEIGHT: 140 LBS | TEMPERATURE: 99 F | DIASTOLIC BLOOD PRESSURE: 62 MMHG | RESPIRATION RATE: 18 BRPM | BODY MASS INDEX: 21.97 KG/M2 | HEIGHT: 67 IN | HEART RATE: 77 BPM

## 2021-09-14 DIAGNOSIS — R53.81 PHYSICAL DECONDITIONING: ICD-10-CM

## 2021-09-14 DIAGNOSIS — E03.9 ACQUIRED HYPOTHYROIDISM: ICD-10-CM

## 2021-09-14 DIAGNOSIS — M79.10 MYALGIA: ICD-10-CM

## 2021-09-14 DIAGNOSIS — I95.2 HYPOTENSION DUE TO DRUGS: ICD-10-CM

## 2021-09-14 DIAGNOSIS — I48.0 PAROXYSMAL ATRIAL FIBRILLATION: ICD-10-CM

## 2021-09-14 DIAGNOSIS — R50.81 PANCYTOPENIA WITH FEVER: ICD-10-CM

## 2021-09-14 DIAGNOSIS — D46.Z MDS (MYELODYSPLASTIC SYNDROME), HIGH GRADE: ICD-10-CM

## 2021-09-14 DIAGNOSIS — E78.5 DYSLIPIDEMIA (HIGH LDL; LOW HDL): ICD-10-CM

## 2021-09-14 DIAGNOSIS — I10 ESSENTIAL HYPERTENSION: ICD-10-CM

## 2021-09-14 DIAGNOSIS — R16.1 SPLENOMEGALY: ICD-10-CM

## 2021-09-14 DIAGNOSIS — K61.1 PERIRECTAL ABSCESS: Primary | ICD-10-CM

## 2021-09-14 DIAGNOSIS — D46.Z MDS (MYELODYSPLASTIC SYNDROME), HIGH GRADE: Primary | ICD-10-CM

## 2021-09-14 DIAGNOSIS — D70.9 SEVERE NEUTROPENIA: ICD-10-CM

## 2021-09-14 DIAGNOSIS — D61.818 PANCYTOPENIA WITH FEVER: ICD-10-CM

## 2021-09-14 DIAGNOSIS — R53.1 WEAKNESS: ICD-10-CM

## 2021-09-14 DIAGNOSIS — R63.4 ABNORMAL WEIGHT LOSS: ICD-10-CM

## 2021-09-14 PROCEDURE — 3288F FALL RISK ASSESSMENT DOCD: CPT | Mod: CPTII,S$GLB,, | Performed by: FAMILY MEDICINE

## 2021-09-14 PROCEDURE — A4216 STERILE WATER/SALINE, 10 ML: HCPCS | Mod: PN | Performed by: INTERNAL MEDICINE

## 2021-09-14 PROCEDURE — 1160F RVW MEDS BY RX/DR IN RCRD: CPT | Mod: CPTII,S$GLB,, | Performed by: FAMILY MEDICINE

## 2021-09-14 PROCEDURE — 1126F AMNT PAIN NOTED NONE PRSNT: CPT | Mod: CPTII,S$GLB,, | Performed by: FAMILY MEDICINE

## 2021-09-14 PROCEDURE — 99214 PR OFFICE/OUTPT VISIT, EST, LEVL IV, 30-39 MIN: ICD-10-PCS | Mod: S$GLB,,, | Performed by: FAMILY MEDICINE

## 2021-09-14 PROCEDURE — 3074F PR MOST RECENT SYSTOLIC BLOOD PRESSURE < 130 MM HG: ICD-10-PCS | Mod: CPTII,S$GLB,, | Performed by: FAMILY MEDICINE

## 2021-09-14 PROCEDURE — 1160F PR REVIEW ALL MEDS BY PRESCRIBER/CLIN PHARMACIST DOCUMENTED: ICD-10-PCS | Mod: CPTII,S$GLB,, | Performed by: FAMILY MEDICINE

## 2021-09-14 PROCEDURE — 1126F PR PAIN SEVERITY QUANTIFIED, NO PAIN PRESENT: ICD-10-PCS | Mod: CPTII,S$GLB,, | Performed by: FAMILY MEDICINE

## 2021-09-14 PROCEDURE — 25000003 PHARM REV CODE 250: Mod: PN | Performed by: INTERNAL MEDICINE

## 2021-09-14 PROCEDURE — 3078F DIAST BP <80 MM HG: CPT | Mod: CPTII,S$GLB,, | Performed by: FAMILY MEDICINE

## 2021-09-14 PROCEDURE — 3078F PR MOST RECENT DIASTOLIC BLOOD PRESSURE < 80 MM HG: ICD-10-PCS | Mod: CPTII,S$GLB,, | Performed by: FAMILY MEDICINE

## 2021-09-14 PROCEDURE — 63600175 PHARM REV CODE 636 W HCPCS: Mod: PN | Performed by: INTERNAL MEDICINE

## 2021-09-14 PROCEDURE — 96409 CHEMO IV PUSH SNGL DRUG: CPT | Mod: PN

## 2021-09-14 PROCEDURE — 1159F PR MEDICATION LIST DOCUMENTED IN MEDICAL RECORD: ICD-10-PCS | Mod: CPTII,S$GLB,, | Performed by: FAMILY MEDICINE

## 2021-09-14 PROCEDURE — 1159F MED LIST DOCD IN RCRD: CPT | Mod: CPTII,S$GLB,, | Performed by: FAMILY MEDICINE

## 2021-09-14 PROCEDURE — 1101F PT FALLS ASSESS-DOCD LE1/YR: CPT | Mod: CPTII,S$GLB,, | Performed by: FAMILY MEDICINE

## 2021-09-14 PROCEDURE — 99214 OFFICE O/P EST MOD 30 MIN: CPT | Mod: S$GLB,,, | Performed by: FAMILY MEDICINE

## 2021-09-14 PROCEDURE — 1111F PR DISCHARGE MEDS RECONCILED W/ CURRENT OUTPATIENT MED LIST: ICD-10-PCS | Mod: CPTII,S$GLB,, | Performed by: FAMILY MEDICINE

## 2021-09-14 PROCEDURE — 96375 TX/PRO/DX INJ NEW DRUG ADDON: CPT | Mod: PN

## 2021-09-14 PROCEDURE — 3074F SYST BP LT 130 MM HG: CPT | Mod: CPTII,S$GLB,, | Performed by: FAMILY MEDICINE

## 2021-09-14 PROCEDURE — 99999 PR PBB SHADOW E&M-EST. PATIENT-LVL V: CPT | Mod: PBBFAC,,, | Performed by: FAMILY MEDICINE

## 2021-09-14 PROCEDURE — 1101F PR PT FALLS ASSESS DOC 0-1 FALLS W/OUT INJ PAST YR: ICD-10-PCS | Mod: CPTII,S$GLB,, | Performed by: FAMILY MEDICINE

## 2021-09-14 PROCEDURE — 99999 PR PBB SHADOW E&M-EST. PATIENT-LVL V: ICD-10-PCS | Mod: PBBFAC,,, | Performed by: FAMILY MEDICINE

## 2021-09-14 PROCEDURE — 3288F PR FALLS RISK ASSESSMENT DOCUMENTED: ICD-10-PCS | Mod: CPTII,S$GLB,, | Performed by: FAMILY MEDICINE

## 2021-09-14 PROCEDURE — 1111F DSCHRG MED/CURRENT MED MERGE: CPT | Mod: CPTII,S$GLB,, | Performed by: FAMILY MEDICINE

## 2021-09-14 RX ORDER — METOPROLOL SUCCINATE 50 MG/1
25 TABLET, EXTENDED RELEASE ORAL DAILY
Qty: 90 TABLET | Refills: 3
Start: 2021-09-14 | End: 2021-10-26

## 2021-09-14 RX ORDER — SODIUM CHLORIDE 0.9 % (FLUSH) 0.9 %
10 SYRINGE (ML) INJECTION
Status: DISCONTINUED | OUTPATIENT
Start: 2021-09-14 | End: 2021-09-14 | Stop reason: HOSPADM

## 2021-09-14 RX ORDER — ONDANSETRON 2 MG/ML
8 INJECTION INTRAMUSCULAR; INTRAVENOUS ONCE
Status: COMPLETED | OUTPATIENT
Start: 2021-09-14 | End: 2021-09-14

## 2021-09-14 RX ORDER — CALCIUM CARBONATE 160(400)MG
TABLET,CHEWABLE ORAL
Qty: 1 EACH | Refills: 0 | Status: SHIPPED | OUTPATIENT
Start: 2021-09-14

## 2021-09-14 RX ADMIN — Medication 10 ML: at 04:09

## 2021-09-14 RX ADMIN — ONDANSETRON 8 MG: 2 INJECTION INTRAMUSCULAR; INTRAVENOUS at 03:09

## 2021-09-14 RX ADMIN — SODIUM CHLORIDE: 9 INJECTION, SOLUTION INTRAVENOUS at 03:09

## 2021-09-14 RX ADMIN — AZACITIDINE 140 MG: 100 INJECTION, POWDER, LYOPHILIZED, FOR SOLUTION INTRAVENOUS; SUBCUTANEOUS at 03:09

## 2021-09-15 ENCOUNTER — SPECIALTY PHARMACY (OUTPATIENT)
Dept: PHARMACY | Facility: CLINIC | Age: 81
End: 2021-09-15

## 2021-09-15 ENCOUNTER — INFUSION (OUTPATIENT)
Dept: INFUSION THERAPY | Facility: HOSPITAL | Age: 81
End: 2021-09-15
Attending: INTERNAL MEDICINE
Payer: MEDICARE

## 2021-09-15 VITALS
WEIGHT: 142.19 LBS | DIASTOLIC BLOOD PRESSURE: 63 MMHG | HEART RATE: 75 BPM | HEIGHT: 67 IN | TEMPERATURE: 98 F | SYSTOLIC BLOOD PRESSURE: 102 MMHG | RESPIRATION RATE: 18 BRPM | BODY MASS INDEX: 22.32 KG/M2

## 2021-09-15 DIAGNOSIS — D46.Z MDS (MYELODYSPLASTIC SYNDROME), HIGH GRADE: Primary | ICD-10-CM

## 2021-09-15 PROCEDURE — A4216 STERILE WATER/SALINE, 10 ML: HCPCS | Mod: PN | Performed by: INTERNAL MEDICINE

## 2021-09-15 PROCEDURE — 96375 TX/PRO/DX INJ NEW DRUG ADDON: CPT | Mod: PN

## 2021-09-15 PROCEDURE — 63600175 PHARM REV CODE 636 W HCPCS: Mod: PN | Performed by: INTERNAL MEDICINE

## 2021-09-15 PROCEDURE — 25000003 PHARM REV CODE 250: Mod: PN | Performed by: INTERNAL MEDICINE

## 2021-09-15 PROCEDURE — 96409 CHEMO IV PUSH SNGL DRUG: CPT | Mod: PN

## 2021-09-15 RX ORDER — ONDANSETRON 2 MG/ML
8 INJECTION INTRAMUSCULAR; INTRAVENOUS ONCE
Status: COMPLETED | OUTPATIENT
Start: 2021-09-15 | End: 2021-09-15

## 2021-09-15 RX ORDER — SODIUM CHLORIDE 0.9 % (FLUSH) 0.9 %
10 SYRINGE (ML) INJECTION
Status: DISCONTINUED | OUTPATIENT
Start: 2021-09-15 | End: 2021-09-15 | Stop reason: HOSPADM

## 2021-09-15 RX ADMIN — ONDANSETRON 8 MG: 2 INJECTION INTRAMUSCULAR; INTRAVENOUS at 04:09

## 2021-09-15 RX ADMIN — AZACITIDINE 140 MG: 100 INJECTION, POWDER, LYOPHILIZED, FOR SOLUTION INTRAVENOUS; SUBCUTANEOUS at 04:09

## 2021-09-15 RX ADMIN — Medication 10 ML: at 04:09

## 2021-09-15 RX ADMIN — SODIUM CHLORIDE: 9 INJECTION, SOLUTION INTRAVENOUS at 04:09

## 2021-09-16 ENCOUNTER — DOCUMENTATION ONLY (OUTPATIENT)
Dept: INFUSION THERAPY | Facility: HOSPITAL | Age: 81
End: 2021-09-16

## 2021-09-16 ENCOUNTER — INFUSION (OUTPATIENT)
Dept: INFUSION THERAPY | Facility: HOSPITAL | Age: 81
End: 2021-09-16
Attending: INTERNAL MEDICINE
Payer: MEDICARE

## 2021-09-16 VITALS
DIASTOLIC BLOOD PRESSURE: 56 MMHG | BODY MASS INDEX: 22.32 KG/M2 | SYSTOLIC BLOOD PRESSURE: 102 MMHG | HEART RATE: 87 BPM | WEIGHT: 142.19 LBS | HEIGHT: 67 IN | RESPIRATION RATE: 18 BRPM | TEMPERATURE: 98 F

## 2021-09-16 DIAGNOSIS — D46.Z MDS (MYELODYSPLASTIC SYNDROME), HIGH GRADE: Primary | ICD-10-CM

## 2021-09-16 PROCEDURE — 63600175 PHARM REV CODE 636 W HCPCS: Mod: PN | Performed by: INTERNAL MEDICINE

## 2021-09-16 PROCEDURE — 96375 TX/PRO/DX INJ NEW DRUG ADDON: CPT | Mod: PN

## 2021-09-16 PROCEDURE — 96409 CHEMO IV PUSH SNGL DRUG: CPT | Mod: PN

## 2021-09-16 PROCEDURE — A4216 STERILE WATER/SALINE, 10 ML: HCPCS | Mod: PN | Performed by: INTERNAL MEDICINE

## 2021-09-16 PROCEDURE — 25000003 PHARM REV CODE 250: Mod: PN | Performed by: INTERNAL MEDICINE

## 2021-09-16 RX ORDER — ONDANSETRON 2 MG/ML
8 INJECTION INTRAMUSCULAR; INTRAVENOUS ONCE
Status: COMPLETED | OUTPATIENT
Start: 2021-09-16 | End: 2021-09-16

## 2021-09-16 RX ORDER — SODIUM CHLORIDE 0.9 % (FLUSH) 0.9 %
10 SYRINGE (ML) INJECTION
Status: DISCONTINUED | OUTPATIENT
Start: 2021-09-16 | End: 2021-09-16 | Stop reason: HOSPADM

## 2021-09-16 RX ADMIN — SODIUM CHLORIDE: 9 INJECTION, SOLUTION INTRAVENOUS at 02:09

## 2021-09-16 RX ADMIN — AZACITIDINE 140 MG: 100 INJECTION, POWDER, LYOPHILIZED, FOR SOLUTION INTRAVENOUS; SUBCUTANEOUS at 02:09

## 2021-09-16 RX ADMIN — ONDANSETRON 8 MG: 2 INJECTION INTRAMUSCULAR; INTRAVENOUS at 02:09

## 2021-09-16 RX ADMIN — Medication 10 ML: at 02:09

## 2021-09-17 ENCOUNTER — INFUSION (OUTPATIENT)
Dept: INFUSION THERAPY | Facility: HOSPITAL | Age: 81
End: 2021-09-17
Attending: INTERNAL MEDICINE
Payer: MEDICARE

## 2021-09-17 VITALS
WEIGHT: 142.19 LBS | BODY MASS INDEX: 22.32 KG/M2 | SYSTOLIC BLOOD PRESSURE: 98 MMHG | RESPIRATION RATE: 16 BRPM | HEIGHT: 67 IN | DIASTOLIC BLOOD PRESSURE: 54 MMHG | HEART RATE: 94 BPM | TEMPERATURE: 98 F

## 2021-09-17 DIAGNOSIS — D46.Z MDS (MYELODYSPLASTIC SYNDROME), HIGH GRADE: Primary | ICD-10-CM

## 2021-09-17 PROCEDURE — 63600175 PHARM REV CODE 636 W HCPCS: Mod: PN | Performed by: INTERNAL MEDICINE

## 2021-09-17 PROCEDURE — 96409 CHEMO IV PUSH SNGL DRUG: CPT | Mod: PN

## 2021-09-17 PROCEDURE — 25000003 PHARM REV CODE 250: Mod: PN | Performed by: INTERNAL MEDICINE

## 2021-09-17 PROCEDURE — A4216 STERILE WATER/SALINE, 10 ML: HCPCS | Mod: PN | Performed by: INTERNAL MEDICINE

## 2021-09-17 PROCEDURE — 96375 TX/PRO/DX INJ NEW DRUG ADDON: CPT | Mod: PN

## 2021-09-17 RX ORDER — SODIUM CHLORIDE 0.9 % (FLUSH) 0.9 %
10 SYRINGE (ML) INJECTION
Status: DISCONTINUED | OUTPATIENT
Start: 2021-09-17 | End: 2021-09-17 | Stop reason: HOSPADM

## 2021-09-17 RX ORDER — ONDANSETRON 2 MG/ML
8 INJECTION INTRAMUSCULAR; INTRAVENOUS ONCE
Status: COMPLETED | OUTPATIENT
Start: 2021-09-17 | End: 2021-09-17

## 2021-09-17 RX ADMIN — ONDANSETRON 8 MG: 2 INJECTION INTRAMUSCULAR; INTRAVENOUS at 02:09

## 2021-09-17 RX ADMIN — AZACITIDINE 140 MG: 100 INJECTION, POWDER, LYOPHILIZED, FOR SOLUTION INTRAVENOUS; SUBCUTANEOUS at 03:09

## 2021-09-17 RX ADMIN — SODIUM CHLORIDE: 9 INJECTION, SOLUTION INTRAVENOUS at 02:09

## 2021-09-17 RX ADMIN — Medication 10 ML: at 03:09

## 2021-09-20 ENCOUNTER — TELEPHONE (OUTPATIENT)
Dept: HEMATOLOGY/ONCOLOGY | Facility: CLINIC | Age: 81
End: 2021-09-20

## 2021-09-20 ENCOUNTER — DOCUMENTATION ONLY (OUTPATIENT)
Dept: HEMATOLOGY/ONCOLOGY | Facility: CLINIC | Age: 81
End: 2021-09-20

## 2021-09-20 ENCOUNTER — LAB VISIT (OUTPATIENT)
Dept: LAB | Facility: HOSPITAL | Age: 81
End: 2021-09-20
Attending: INTERNAL MEDICINE
Payer: MEDICARE

## 2021-09-20 ENCOUNTER — TELEPHONE (OUTPATIENT)
Dept: SURGERY | Facility: CLINIC | Age: 81
End: 2021-09-20

## 2021-09-20 DIAGNOSIS — D46.Z MDS (MYELODYSPLASTIC SYNDROME), HIGH GRADE: Primary | ICD-10-CM

## 2021-09-20 DIAGNOSIS — D45 POLYCYTHEMIA VERA: ICD-10-CM

## 2021-09-20 DIAGNOSIS — D64.9 SYMPTOMATIC ANEMIA: Primary | ICD-10-CM

## 2021-09-20 LAB
ANISOCYTOSIS BLD QL SMEAR: ABNORMAL
BASOPHILS NFR BLD: 0 % (ref 0–1.9)
BLASTS NFR BLD MANUAL: 14 %
DACRYOCYTES BLD QL SMEAR: ABNORMAL
DIFFERENTIAL METHOD: ABNORMAL
EOSINOPHIL NFR BLD: 0 % (ref 0–8)
ERYTHROCYTE [DISTWIDTH] IN BLOOD BY AUTOMATED COUNT: 17.1 % (ref 11.5–14.5)
HCT VFR BLD AUTO: 21.6 % (ref 40–54)
HGB BLD-MCNC: 6.8 G/DL (ref 14–18)
IMM GRANULOCYTES # BLD AUTO: ABNORMAL K/UL (ref 0–0.04)
IMM GRANULOCYTES NFR BLD AUTO: ABNORMAL % (ref 0–0.5)
LYMPHOCYTES NFR BLD: 68 % (ref 18–48)
MCH RBC QN AUTO: 27.9 PG (ref 27–31)
MCHC RBC AUTO-ENTMCNC: 31.5 G/DL (ref 32–36)
MCV RBC AUTO: 89 FL (ref 82–98)
MONOCYTES NFR BLD: 2 % (ref 4–15)
NEUTROPHILS NFR BLD: 12 % (ref 38–73)
NEUTS BAND NFR BLD MANUAL: 4 %
NRBC BLD-RTO: 14 /100 WBC
OVALOCYTES BLD QL SMEAR: ABNORMAL
PLATELET # BLD AUTO: 9 K/UL (ref 150–450)
PLATELET BLD QL SMEAR: ABNORMAL
PMV BLD AUTO: ABNORMAL FL (ref 9.2–12.9)
POIKILOCYTOSIS BLD QL SMEAR: SLIGHT
POLYCHROMASIA BLD QL SMEAR: ABNORMAL
RBC # BLD AUTO: 2.44 M/UL (ref 4.6–6.2)
SCHISTOCYTES BLD QL SMEAR: ABNORMAL
WBC # BLD AUTO: 0.65 K/UL (ref 3.9–12.7)

## 2021-09-20 PROCEDURE — 36415 COLL VENOUS BLD VENIPUNCTURE: CPT | Mod: PN | Performed by: INTERNAL MEDICINE

## 2021-09-20 PROCEDURE — 85007 BL SMEAR W/DIFF WBC COUNT: CPT | Mod: PN | Performed by: INTERNAL MEDICINE

## 2021-09-20 PROCEDURE — 85027 COMPLETE CBC AUTOMATED: CPT | Mod: PN | Performed by: INTERNAL MEDICINE

## 2021-09-20 RX ORDER — DEXAMETHASONE SODIUM PHOSPHATE 4 MG/ML
4 INJECTION, SOLUTION INTRA-ARTICULAR; INTRALESIONAL; INTRAMUSCULAR; INTRAVENOUS; SOFT TISSUE ONCE
Status: CANCELLED | OUTPATIENT
Start: 2021-09-20

## 2021-09-20 RX ORDER — HYDROCODONE BITARTRATE AND ACETAMINOPHEN 500; 5 MG/1; MG/1
TABLET ORAL ONCE
Status: CANCELLED | OUTPATIENT
Start: 2021-09-20 | End: 2021-09-20

## 2021-09-20 RX ORDER — ACETAMINOPHEN 325 MG/1
650 TABLET ORAL ONCE
Status: CANCELLED | OUTPATIENT
Start: 2021-09-20

## 2021-09-20 RX ORDER — DIPHENHYDRAMINE HCL 25 MG
25 CAPSULE ORAL ONCE
Status: CANCELLED | OUTPATIENT
Start: 2021-09-20

## 2021-09-21 ENCOUNTER — EXTERNAL HOME HEALTH (OUTPATIENT)
Dept: HOME HEALTH SERVICES | Facility: HOSPITAL | Age: 81
End: 2021-09-21
Payer: MEDICARE

## 2021-09-27 ENCOUNTER — LAB VISIT (OUTPATIENT)
Dept: LAB | Facility: HOSPITAL | Age: 81
End: 2021-09-27
Attending: INTERNAL MEDICINE
Payer: MEDICARE

## 2021-09-27 ENCOUNTER — TELEPHONE (OUTPATIENT)
Dept: HEMATOLOGY/ONCOLOGY | Facility: CLINIC | Age: 81
End: 2021-09-27

## 2021-09-27 ENCOUNTER — DOCUMENTATION ONLY (OUTPATIENT)
Dept: HEMATOLOGY/ONCOLOGY | Facility: CLINIC | Age: 81
End: 2021-09-27

## 2021-09-27 DIAGNOSIS — D69.6 THROMBOCYTOPENIA: Primary | ICD-10-CM

## 2021-09-27 DIAGNOSIS — D64.9 SYMPTOMATIC ANEMIA: ICD-10-CM

## 2021-09-27 PROBLEM — T78.40XA ALLERGIC REACTION: Status: ACTIVE | Noted: 2021-09-27

## 2021-09-27 PROBLEM — E87.1 HYPONATREMIA: Status: ACTIVE | Noted: 2021-09-27

## 2021-09-27 PROBLEM — F32.A DEPRESSION: Status: ACTIVE | Noted: 2021-09-27

## 2021-09-27 LAB
ANISOCYTOSIS BLD QL SMEAR: ABNORMAL
BASOPHILS NFR BLD: 4 % (ref 0–1.9)
DIFFERENTIAL METHOD: ABNORMAL
EOSINOPHIL NFR BLD: 0 % (ref 0–8)
ERYTHROCYTE [DISTWIDTH] IN BLOOD BY AUTOMATED COUNT: 16.5 % (ref 11.5–14.5)
HCT VFR BLD AUTO: 22.5 % (ref 40–54)
HGB BLD-MCNC: 7.1 G/DL (ref 14–18)
IMM GRANULOCYTES # BLD AUTO: ABNORMAL K/UL (ref 0–0.04)
IMM GRANULOCYTES NFR BLD AUTO: ABNORMAL % (ref 0–0.5)
LYMPHOCYTES NFR BLD: 54 % (ref 18–48)
MCH RBC QN AUTO: 28.7 PG (ref 27–31)
MCHC RBC AUTO-ENTMCNC: 31.6 G/DL (ref 32–36)
MCV RBC AUTO: 91 FL (ref 82–98)
METAMYELOCYTES NFR BLD MANUAL: 6 %
MONOCYTES NFR BLD: 6 % (ref 4–15)
MYELOCYTES NFR BLD MANUAL: 6 %
NEUTROPHILS NFR BLD: 24 % (ref 38–73)
NRBC BLD-RTO: 28 /100 WBC
OVALOCYTES BLD QL SMEAR: ABNORMAL
PLATELET # BLD AUTO: 9 K/UL (ref 150–450)
PLATELET BLD QL SMEAR: ABNORMAL
PMV BLD AUTO: ABNORMAL FL (ref 9.2–12.9)
POIKILOCYTOSIS BLD QL SMEAR: SLIGHT
POLYCHROMASIA BLD QL SMEAR: ABNORMAL
RBC # BLD AUTO: 2.47 M/UL (ref 4.6–6.2)
WBC # BLD AUTO: 0.58 K/UL (ref 3.9–12.7)

## 2021-09-27 PROCEDURE — 85027 COMPLETE CBC AUTOMATED: CPT | Mod: PN | Performed by: INTERNAL MEDICINE

## 2021-09-27 PROCEDURE — 36415 COLL VENOUS BLD VENIPUNCTURE: CPT | Mod: PN | Performed by: INTERNAL MEDICINE

## 2021-09-27 PROCEDURE — 85007 BL SMEAR W/DIFF WBC COUNT: CPT | Mod: PN | Performed by: INTERNAL MEDICINE

## 2021-09-27 RX ORDER — HYDROCODONE BITARTRATE AND ACETAMINOPHEN 500; 5 MG/1; MG/1
TABLET ORAL ONCE
Status: CANCELLED | OUTPATIENT
Start: 2021-09-27 | End: 2021-09-27

## 2021-09-27 RX ORDER — DIPHENHYDRAMINE HCL 25 MG
25 CAPSULE ORAL
Status: CANCELLED | OUTPATIENT
Start: 2021-09-27

## 2021-09-27 RX ORDER — ACETAMINOPHEN 325 MG/1
650 TABLET ORAL
Status: CANCELLED | OUTPATIENT
Start: 2021-09-27

## 2021-09-30 ENCOUNTER — OFFICE VISIT (OUTPATIENT)
Dept: SURGERY | Facility: CLINIC | Age: 81
End: 2021-09-30
Payer: MEDICARE

## 2021-09-30 ENCOUNTER — DOCUMENT SCAN (OUTPATIENT)
Dept: HOME HEALTH SERVICES | Facility: HOSPITAL | Age: 81
End: 2021-09-30
Payer: MEDICARE

## 2021-09-30 VITALS — SYSTOLIC BLOOD PRESSURE: 114 MMHG | TEMPERATURE: 97 F | DIASTOLIC BLOOD PRESSURE: 74 MMHG | HEART RATE: 92 BPM

## 2021-09-30 DIAGNOSIS — Z98.890 POST-OPERATIVE STATE: Primary | ICD-10-CM

## 2021-09-30 PROCEDURE — 1126F PR PAIN SEVERITY QUANTIFIED, NO PAIN PRESENT: ICD-10-PCS | Mod: CPTII,S$GLB,, | Performed by: PHYSICIAN ASSISTANT

## 2021-09-30 PROCEDURE — 1126F AMNT PAIN NOTED NONE PRSNT: CPT | Mod: CPTII,S$GLB,, | Performed by: PHYSICIAN ASSISTANT

## 2021-09-30 PROCEDURE — 3288F FALL RISK ASSESSMENT DOCD: CPT | Mod: CPTII,S$GLB,, | Performed by: PHYSICIAN ASSISTANT

## 2021-09-30 PROCEDURE — 99024 PR POST-OP FOLLOW-UP VISIT: ICD-10-PCS | Mod: S$GLB,,, | Performed by: PHYSICIAN ASSISTANT

## 2021-09-30 PROCEDURE — 3074F PR MOST RECENT SYSTOLIC BLOOD PRESSURE < 130 MM HG: ICD-10-PCS | Mod: CPTII,S$GLB,, | Performed by: PHYSICIAN ASSISTANT

## 2021-09-30 PROCEDURE — 1101F PT FALLS ASSESS-DOCD LE1/YR: CPT | Mod: CPTII,S$GLB,, | Performed by: PHYSICIAN ASSISTANT

## 2021-09-30 PROCEDURE — 3078F PR MOST RECENT DIASTOLIC BLOOD PRESSURE < 80 MM HG: ICD-10-PCS | Mod: CPTII,S$GLB,, | Performed by: PHYSICIAN ASSISTANT

## 2021-09-30 PROCEDURE — 1101F PR PT FALLS ASSESS DOC 0-1 FALLS W/OUT INJ PAST YR: ICD-10-PCS | Mod: CPTII,S$GLB,, | Performed by: PHYSICIAN ASSISTANT

## 2021-09-30 PROCEDURE — 3074F SYST BP LT 130 MM HG: CPT | Mod: CPTII,S$GLB,, | Performed by: PHYSICIAN ASSISTANT

## 2021-09-30 PROCEDURE — 99024 POSTOP FOLLOW-UP VISIT: CPT | Mod: S$GLB,,, | Performed by: PHYSICIAN ASSISTANT

## 2021-09-30 PROCEDURE — 3288F PR FALLS RISK ASSESSMENT DOCUMENTED: ICD-10-PCS | Mod: CPTII,S$GLB,, | Performed by: PHYSICIAN ASSISTANT

## 2021-09-30 PROCEDURE — 3078F DIAST BP <80 MM HG: CPT | Mod: CPTII,S$GLB,, | Performed by: PHYSICIAN ASSISTANT

## 2021-09-30 PROCEDURE — 1159F PR MEDICATION LIST DOCUMENTED IN MEDICAL RECORD: ICD-10-PCS | Mod: CPTII,S$GLB,, | Performed by: PHYSICIAN ASSISTANT

## 2021-09-30 PROCEDURE — 1159F MED LIST DOCD IN RCRD: CPT | Mod: CPTII,S$GLB,, | Performed by: PHYSICIAN ASSISTANT

## 2021-10-04 ENCOUNTER — DOCUMENTATION ONLY (OUTPATIENT)
Dept: HEMATOLOGY/ONCOLOGY | Facility: CLINIC | Age: 81
End: 2021-10-04

## 2021-10-04 ENCOUNTER — LAB VISIT (OUTPATIENT)
Dept: LAB | Facility: HOSPITAL | Age: 81
End: 2021-10-04
Attending: INTERNAL MEDICINE
Payer: MEDICARE

## 2021-10-04 DIAGNOSIS — D64.9 SYMPTOMATIC ANEMIA: Primary | ICD-10-CM

## 2021-10-04 DIAGNOSIS — D64.9 SYMPTOMATIC ANEMIA: ICD-10-CM

## 2021-10-04 LAB
ABO + RH BLD: NORMAL
ANISOCYTOSIS BLD QL SMEAR: SLIGHT
BASOPHILS NFR BLD: 1.3 % (ref 0–1.9)
BLASTS NFR BLD MANUAL: 4 %
BLD GP AB SCN CELLS X3 SERPL QL: NORMAL
DIFFERENTIAL METHOD: ABNORMAL
EOSINOPHIL NFR BLD: 0 % (ref 0–8)
ERYTHROCYTE [DISTWIDTH] IN BLOOD BY AUTOMATED COUNT: 16.2 % (ref 11.5–14.5)
HCT VFR BLD AUTO: 20.6 % (ref 40–54)
HGB BLD-MCNC: 6.7 G/DL (ref 14–18)
IMM GRANULOCYTES # BLD AUTO: ABNORMAL K/UL (ref 0–0.04)
IMM GRANULOCYTES NFR BLD AUTO: ABNORMAL % (ref 0–0.5)
LYMPHOCYTES NFR BLD: 57.4 % (ref 18–48)
MCH RBC QN AUTO: 29 PG (ref 27–31)
MCHC RBC AUTO-ENTMCNC: 32.5 G/DL (ref 32–36)
MCV RBC AUTO: 89 FL (ref 82–98)
METAMYELOCYTES NFR BLD MANUAL: 4 %
MONOCYTES NFR BLD: 13.3 % (ref 4–15)
MYELOCYTES NFR BLD MANUAL: 6.7 %
NEUTROPHILS NFR BLD: 12 % (ref 38–73)
NRBC BLD-RTO: 13 /100 WBC
OVALOCYTES BLD QL SMEAR: ABNORMAL
PLATELET # BLD AUTO: 9 K/UL (ref 150–450)
PLATELET BLD QL SMEAR: ABNORMAL
PMV BLD AUTO: ABNORMAL FL (ref 9.2–12.9)
POIKILOCYTOSIS BLD QL SMEAR: SLIGHT
POLYCHROMASIA BLD QL SMEAR: ABNORMAL
PROMYELOCYTES NFR BLD MANUAL: 1.3 %
RBC # BLD AUTO: 2.31 M/UL (ref 4.6–6.2)
WBC # BLD AUTO: 0.48 K/UL (ref 3.9–12.7)

## 2021-10-04 PROCEDURE — 85007 BL SMEAR W/DIFF WBC COUNT: CPT | Mod: PN | Performed by: INTERNAL MEDICINE

## 2021-10-04 PROCEDURE — 86900 BLOOD TYPING SEROLOGIC ABO: CPT | Mod: PN | Performed by: INTERNAL MEDICINE

## 2021-10-04 PROCEDURE — 86900 BLOOD TYPING SEROLOGIC ABO: CPT | Performed by: INTERNAL MEDICINE

## 2021-10-04 PROCEDURE — 85027 COMPLETE CBC AUTOMATED: CPT | Mod: PN | Performed by: INTERNAL MEDICINE

## 2021-10-04 PROCEDURE — 36415 COLL VENOUS BLD VENIPUNCTURE: CPT | Mod: PN | Performed by: INTERNAL MEDICINE

## 2021-10-04 RX ORDER — DIPHENHYDRAMINE HCL 25 MG
25 CAPSULE ORAL ONCE
Status: CANCELLED | OUTPATIENT
Start: 2021-10-04

## 2021-10-04 RX ORDER — HYDROCODONE BITARTRATE AND ACETAMINOPHEN 500; 5 MG/1; MG/1
TABLET ORAL ONCE
Status: CANCELLED | OUTPATIENT
Start: 2021-10-04 | End: 2021-10-04

## 2021-10-04 RX ORDER — ACETAMINOPHEN 325 MG/1
650 TABLET ORAL ONCE
Status: CANCELLED | OUTPATIENT
Start: 2021-10-04

## 2021-10-04 RX ORDER — DEXAMETHASONE SODIUM PHOSPHATE 4 MG/ML
4 INJECTION, SOLUTION INTRA-ARTICULAR; INTRALESIONAL; INTRAMUSCULAR; INTRAVENOUS; SOFT TISSUE ONCE
Status: CANCELLED | OUTPATIENT
Start: 2021-10-04

## 2021-10-06 ENCOUNTER — TELEPHONE (OUTPATIENT)
Dept: SURGERY | Facility: CLINIC | Age: 81
End: 2021-10-06

## 2021-10-06 ENCOUNTER — HOSPITAL ENCOUNTER (INPATIENT)
Facility: HOSPITAL | Age: 81
LOS: 7 days | Discharge: HOSPICE | DRG: 394 | End: 2021-10-14
Attending: EMERGENCY MEDICINE | Admitting: FAMILY MEDICINE
Payer: MEDICARE

## 2021-10-06 DIAGNOSIS — D61.818 PANCYTOPENIA: Primary | ICD-10-CM

## 2021-10-06 DIAGNOSIS — D70.8 OTHER NEUTROPENIA: ICD-10-CM

## 2021-10-06 DIAGNOSIS — A41.52 SEPSIS DUE TO PSEUDOMONAS SPECIES WITHOUT ACUTE ORGAN DYSFUNCTION: ICD-10-CM

## 2021-10-06 DIAGNOSIS — D61.818 PANCYTOPENIA WITH FEVER: ICD-10-CM

## 2021-10-06 DIAGNOSIS — R50.81 PANCYTOPENIA WITH FEVER: ICD-10-CM

## 2021-10-06 DIAGNOSIS — R07.9 CHEST PAIN: ICD-10-CM

## 2021-10-06 DIAGNOSIS — K62.89 PROCTITIS: ICD-10-CM

## 2021-10-06 DIAGNOSIS — J18.9 CAVITARY PNEUMONIA: ICD-10-CM

## 2021-10-06 DIAGNOSIS — N40.1 BENIGN PROSTATIC HYPERPLASIA WITH NOCTURIA: ICD-10-CM

## 2021-10-06 DIAGNOSIS — D46.9 MYELODYSPLASTIC SYNDROME: ICD-10-CM

## 2021-10-06 DIAGNOSIS — K64.1 GRADE II HEMORRHOIDS: ICD-10-CM

## 2021-10-06 DIAGNOSIS — R78.81 GRAM-NEGATIVE BACTEREMIA: ICD-10-CM

## 2021-10-06 DIAGNOSIS — R35.1 BENIGN PROSTATIC HYPERPLASIA WITH NOCTURIA: ICD-10-CM

## 2021-10-06 DIAGNOSIS — D50.8 OTHER IRON DEFICIENCY ANEMIA: ICD-10-CM

## 2021-10-06 DIAGNOSIS — D64.9 TRANSFUSION-DEPENDENT ANEMIA: ICD-10-CM

## 2021-10-06 DIAGNOSIS — D46.Z MDS (MYELODYSPLASTIC SYNDROME), HIGH GRADE: ICD-10-CM

## 2021-10-06 DIAGNOSIS — J98.4 CAVITARY PNEUMONIA: ICD-10-CM

## 2021-10-06 DIAGNOSIS — D62 ACUTE BLOOD LOSS ANEMIA: ICD-10-CM

## 2021-10-06 LAB
ABO + RH BLD: NORMAL
ALBUMIN SERPL BCP-MCNC: 3.1 G/DL (ref 3.5–5.2)
ALP SERPL-CCNC: 105 U/L (ref 55–135)
ALT SERPL W/O P-5'-P-CCNC: 30 U/L (ref 10–44)
ANION GAP SERPL CALC-SCNC: 9 MMOL/L (ref 8–16)
ANISOCYTOSIS BLD QL SMEAR: SLIGHT
AST SERPL-CCNC: 21 U/L (ref 10–40)
BASOPHILS NFR BLD: 0 % (ref 0–1.9)
BILIRUB SERPL-MCNC: 2.9 MG/DL (ref 0.1–1)
BILIRUB UR QL STRIP: NEGATIVE
BLD GP AB SCN CELLS X3 SERPL QL: NORMAL
BUN SERPL-MCNC: 12 MG/DL (ref 8–23)
CALCIUM SERPL-MCNC: 8.6 MG/DL (ref 8.7–10.5)
CHLORIDE SERPL-SCNC: 100 MMOL/L (ref 95–110)
CLARITY UR: CLEAR
CO2 SERPL-SCNC: 23 MMOL/L (ref 23–29)
COLOR UR: YELLOW
CREAT SERPL-MCNC: 0.8 MG/DL (ref 0.5–1.4)
DACRYOCYTES BLD QL SMEAR: ABNORMAL
DIFFERENTIAL METHOD: ABNORMAL
EOSINOPHIL NFR BLD: 0 % (ref 0–8)
ERYTHROCYTE [DISTWIDTH] IN BLOOD BY AUTOMATED COUNT: 15.9 % (ref 11.5–14.5)
EST. GFR  (AFRICAN AMERICAN): >60 ML/MIN/1.73 M^2
EST. GFR  (NON AFRICAN AMERICAN): >60 ML/MIN/1.73 M^2
GLUCOSE SERPL-MCNC: 108 MG/DL (ref 70–110)
GLUCOSE UR QL STRIP: NEGATIVE
HCT VFR BLD AUTO: 19.5 % (ref 40–54)
HGB BLD-MCNC: 6.3 G/DL (ref 14–18)
HGB UR QL STRIP: NEGATIVE
IMM GRANULOCYTES # BLD AUTO: ABNORMAL K/UL (ref 0–0.04)
IMM GRANULOCYTES NFR BLD AUTO: ABNORMAL % (ref 0–0.5)
KETONES UR QL STRIP: NEGATIVE
LACTATE SERPL-SCNC: 1 MMOL/L (ref 0.5–1.9)
LEUKOCYTE ESTERASE UR QL STRIP: NEGATIVE
LIPASE SERPL-CCNC: 26 U/L (ref 4–60)
LYMPHOCYTES NFR BLD: 64 % (ref 18–48)
MCH RBC QN AUTO: 28.1 PG (ref 27–31)
MCHC RBC AUTO-ENTMCNC: 32.3 G/DL (ref 32–36)
MCV RBC AUTO: 87 FL (ref 82–98)
MONOCYTES NFR BLD: 16 % (ref 4–15)
NEUTROPHILS NFR BLD: 20 % (ref 38–73)
NITRITE UR QL STRIP: NEGATIVE
NRBC BLD-RTO: 17 /100 WBC
PH UR STRIP: 7 [PH] (ref 5–8)
PLATELET # BLD AUTO: 24 K/UL (ref 150–450)
PLATELET BLD QL SMEAR: ABNORMAL
PMV BLD AUTO: ABNORMAL FL (ref 9.2–12.9)
POIKILOCYTOSIS BLD QL SMEAR: SLIGHT
POTASSIUM SERPL-SCNC: 3.6 MMOL/L (ref 3.5–5.1)
PROT SERPL-MCNC: 6.6 G/DL (ref 6–8.4)
PROT UR QL STRIP: ABNORMAL
RBC # BLD AUTO: 2.24 M/UL (ref 4.6–6.2)
SARS-COV-2 RDRP RESP QL NAA+PROBE: NEGATIVE
SODIUM SERPL-SCNC: 132 MMOL/L (ref 136–145)
SP GR UR STRIP: 1.01 (ref 1–1.03)
URN SPEC COLLECT METH UR: ABNORMAL
UROBILINOGEN UR STRIP-ACNC: ABNORMAL EU/DL
WBC # BLD AUTO: 0.53 K/UL (ref 3.9–12.7)

## 2021-10-06 PROCEDURE — S0030 INJECTION, METRONIDAZOLE: HCPCS | Performed by: FAMILY MEDICINE

## 2021-10-06 PROCEDURE — 81003 URINALYSIS AUTO W/O SCOPE: CPT | Performed by: EMERGENCY MEDICINE

## 2021-10-06 PROCEDURE — 96365 THER/PROPH/DIAG IV INF INIT: CPT

## 2021-10-06 PROCEDURE — 87040 BLOOD CULTURE FOR BACTERIA: CPT | Performed by: EMERGENCY MEDICINE

## 2021-10-06 PROCEDURE — 25500020 PHARM REV CODE 255: Performed by: EMERGENCY MEDICINE

## 2021-10-06 PROCEDURE — 85007 BL SMEAR W/DIFF WBC COUNT: CPT | Performed by: EMERGENCY MEDICINE

## 2021-10-06 PROCEDURE — 85027 COMPLETE CBC AUTOMATED: CPT | Performed by: EMERGENCY MEDICINE

## 2021-10-06 PROCEDURE — 83690 ASSAY OF LIPASE: CPT | Performed by: EMERGENCY MEDICINE

## 2021-10-06 PROCEDURE — 25000003 PHARM REV CODE 250: Performed by: FAMILY MEDICINE

## 2021-10-06 PROCEDURE — 96366 THER/PROPH/DIAG IV INF ADDON: CPT

## 2021-10-06 PROCEDURE — 83605 ASSAY OF LACTIC ACID: CPT | Performed by: EMERGENCY MEDICINE

## 2021-10-06 PROCEDURE — 86900 BLOOD TYPING SEROLOGIC ABO: CPT | Performed by: FAMILY MEDICINE

## 2021-10-06 PROCEDURE — G0378 HOSPITAL OBSERVATION PER HR: HCPCS

## 2021-10-06 PROCEDURE — 86920 COMPATIBILITY TEST SPIN: CPT | Performed by: FAMILY MEDICINE

## 2021-10-06 PROCEDURE — 96361 HYDRATE IV INFUSION ADD-ON: CPT

## 2021-10-06 PROCEDURE — 93010 EKG 12-LEAD: ICD-10-PCS | Mod: ,,, | Performed by: INTERNAL MEDICINE

## 2021-10-06 PROCEDURE — 99285 EMERGENCY DEPT VISIT HI MDM: CPT | Mod: 25

## 2021-10-06 PROCEDURE — 63600175 PHARM REV CODE 636 W HCPCS: Performed by: EMERGENCY MEDICINE

## 2021-10-06 PROCEDURE — 93010 ELECTROCARDIOGRAM REPORT: CPT | Mod: ,,, | Performed by: INTERNAL MEDICINE

## 2021-10-06 PROCEDURE — 80053 COMPREHEN METABOLIC PANEL: CPT | Performed by: EMERGENCY MEDICINE

## 2021-10-06 PROCEDURE — 93005 ELECTROCARDIOGRAM TRACING: CPT | Performed by: INTERNAL MEDICINE

## 2021-10-06 PROCEDURE — 87186 SC STD MICRODIL/AGAR DIL: CPT | Performed by: EMERGENCY MEDICINE

## 2021-10-06 PROCEDURE — 96375 TX/PRO/DX INJ NEW DRUG ADDON: CPT

## 2021-10-06 PROCEDURE — 63600175 PHARM REV CODE 636 W HCPCS: Performed by: FAMILY MEDICINE

## 2021-10-06 PROCEDURE — 96368 THER/DIAG CONCURRENT INF: CPT

## 2021-10-06 PROCEDURE — 87077 CULTURE AEROBIC IDENTIFY: CPT | Performed by: EMERGENCY MEDICINE

## 2021-10-06 PROCEDURE — 25000003 PHARM REV CODE 250: Performed by: EMERGENCY MEDICINE

## 2021-10-06 PROCEDURE — U0002 COVID-19 LAB TEST NON-CDC: HCPCS | Performed by: EMERGENCY MEDICINE

## 2021-10-06 RX ORDER — MORPHINE SULFATE 4 MG/ML
4 INJECTION, SOLUTION INTRAMUSCULAR; INTRAVENOUS
Status: COMPLETED | OUTPATIENT
Start: 2021-10-06 | End: 2021-10-06

## 2021-10-06 RX ORDER — IBUPROFEN 200 MG
24 TABLET ORAL
Status: DISCONTINUED | OUTPATIENT
Start: 2021-10-06 | End: 2021-10-14 | Stop reason: HOSPADM

## 2021-10-06 RX ORDER — ASCORBIC ACID 500 MG
1000 TABLET ORAL DAILY
Status: DISCONTINUED | OUTPATIENT
Start: 2021-10-07 | End: 2021-10-14 | Stop reason: HOSPADM

## 2021-10-06 RX ORDER — MORPHINE SULFATE 4 MG/ML
4 INJECTION, SOLUTION INTRAMUSCULAR; INTRAVENOUS EVERY 4 HOURS PRN
Status: DISCONTINUED | OUTPATIENT
Start: 2021-10-06 | End: 2021-10-14 | Stop reason: HOSPADM

## 2021-10-06 RX ORDER — IBUPROFEN 200 MG
16 TABLET ORAL
Status: DISCONTINUED | OUTPATIENT
Start: 2021-10-06 | End: 2021-10-14 | Stop reason: HOSPADM

## 2021-10-06 RX ORDER — POLYETHYLENE GLYCOL 3350 17 G/17G
17 POWDER, FOR SOLUTION ORAL 2 TIMES DAILY
Status: DISCONTINUED | OUTPATIENT
Start: 2021-10-06 | End: 2021-10-14 | Stop reason: HOSPADM

## 2021-10-06 RX ORDER — SODIUM CHLORIDE 0.9 % (FLUSH) 0.9 %
10 SYRINGE (ML) INJECTION
Status: DISCONTINUED | OUTPATIENT
Start: 2021-10-06 | End: 2021-10-14 | Stop reason: HOSPADM

## 2021-10-06 RX ORDER — HYDROCODONE BITARTRATE AND ACETAMINOPHEN 5; 325 MG/1; MG/1
1 TABLET ORAL EVERY 6 HOURS PRN
COMMUNITY

## 2021-10-06 RX ORDER — ACETAMINOPHEN 325 MG/1
650 TABLET ORAL EVERY 8 HOURS PRN
Status: DISCONTINUED | OUTPATIENT
Start: 2021-10-06 | End: 2021-10-14 | Stop reason: HOSPADM

## 2021-10-06 RX ORDER — HYDROCODONE BITARTRATE AND ACETAMINOPHEN 500; 5 MG/1; MG/1
TABLET ORAL
Status: DISCONTINUED | OUTPATIENT
Start: 2021-10-06 | End: 2021-10-10

## 2021-10-06 RX ORDER — AMOXICILLIN 250 MG
1 CAPSULE ORAL 2 TIMES DAILY PRN
Status: DISCONTINUED | OUTPATIENT
Start: 2021-10-06 | End: 2021-10-06

## 2021-10-06 RX ORDER — FAMOTIDINE 20 MG/1
40 TABLET, FILM COATED ORAL DAILY
Status: DISCONTINUED | OUTPATIENT
Start: 2021-10-07 | End: 2021-10-14 | Stop reason: HOSPADM

## 2021-10-06 RX ORDER — FAMOTIDINE 40 MG/1
40 TABLET, FILM COATED ORAL DAILY
Status: ON HOLD | COMMUNITY
End: 2021-10-14 | Stop reason: HOSPADM

## 2021-10-06 RX ORDER — SODIUM CHLORIDE 9 MG/ML
INJECTION, SOLUTION INTRAVENOUS CONTINUOUS
Status: DISCONTINUED | OUTPATIENT
Start: 2021-10-06 | End: 2021-10-09

## 2021-10-06 RX ORDER — LEVOTHYROXINE SODIUM 25 UG/1
50 TABLET ORAL
Status: DISCONTINUED | OUTPATIENT
Start: 2021-10-07 | End: 2021-10-14 | Stop reason: HOSPADM

## 2021-10-06 RX ORDER — HYDROCORTISONE ACETATE 25 MG/1
25 SUPPOSITORY RECTAL 2 TIMES DAILY
Status: DISCONTINUED | OUTPATIENT
Start: 2021-10-06 | End: 2021-10-06

## 2021-10-06 RX ORDER — ESCITALOPRAM OXALATE 10 MG/1
10 TABLET ORAL DAILY
Status: DISCONTINUED | OUTPATIENT
Start: 2021-10-07 | End: 2021-10-14 | Stop reason: HOSPADM

## 2021-10-06 RX ORDER — AMOXICILLIN 250 MG
1 CAPSULE ORAL 2 TIMES DAILY
Status: DISCONTINUED | OUTPATIENT
Start: 2021-10-06 | End: 2021-10-14 | Stop reason: HOSPADM

## 2021-10-06 RX ORDER — HYDROCODONE BITARTRATE AND ACETAMINOPHEN 5; 325 MG/1; MG/1
1 TABLET ORAL EVERY 4 HOURS PRN
Status: DISCONTINUED | OUTPATIENT
Start: 2021-10-06 | End: 2021-10-14 | Stop reason: HOSPADM

## 2021-10-06 RX ORDER — METOPROLOL SUCCINATE 50 MG/1
50 TABLET, EXTENDED RELEASE ORAL NIGHTLY
Status: DISCONTINUED | OUTPATIENT
Start: 2021-10-06 | End: 2021-10-10

## 2021-10-06 RX ORDER — TAMSULOSIN HYDROCHLORIDE 0.4 MG/1
0.4 CAPSULE ORAL NIGHTLY
Status: DISCONTINUED | OUTPATIENT
Start: 2021-10-06 | End: 2021-10-14 | Stop reason: HOSPADM

## 2021-10-06 RX ORDER — TALC
6 POWDER (GRAM) TOPICAL NIGHTLY PRN
Status: DISCONTINUED | OUTPATIENT
Start: 2021-10-06 | End: 2021-10-14 | Stop reason: HOSPADM

## 2021-10-06 RX ORDER — GLUCAGON 1 MG
1 KIT INJECTION
Status: DISCONTINUED | OUTPATIENT
Start: 2021-10-06 | End: 2021-10-14 | Stop reason: HOSPADM

## 2021-10-06 RX ORDER — TRAZODONE HYDROCHLORIDE 50 MG/1
50 TABLET ORAL NIGHTLY PRN
Status: DISCONTINUED | OUTPATIENT
Start: 2021-10-06 | End: 2021-10-14 | Stop reason: HOSPADM

## 2021-10-06 RX ORDER — POLYETHYLENE GLYCOL 3350 17 G/17G
17 POWDER, FOR SOLUTION ORAL 2 TIMES DAILY PRN
Status: DISCONTINUED | OUTPATIENT
Start: 2021-10-06 | End: 2021-10-06

## 2021-10-06 RX ORDER — LEVOFLOXACIN 5 MG/ML
750 INJECTION, SOLUTION INTRAVENOUS
Status: COMPLETED | OUTPATIENT
Start: 2021-10-06 | End: 2021-10-06

## 2021-10-06 RX ORDER — METRONIDAZOLE 500 MG/100ML
500 INJECTION, SOLUTION INTRAVENOUS
Status: COMPLETED | OUTPATIENT
Start: 2021-10-06 | End: 2021-10-06

## 2021-10-06 RX ORDER — IPRATROPIUM BROMIDE AND ALBUTEROL SULFATE 2.5; .5 MG/3ML; MG/3ML
3 SOLUTION RESPIRATORY (INHALATION) EVERY 4 HOURS PRN
Status: DISCONTINUED | OUTPATIENT
Start: 2021-10-06 | End: 2021-10-14 | Stop reason: HOSPADM

## 2021-10-06 RX ORDER — LIDOCAINE HYDROCHLORIDE 20 MG/ML
2 JELLY TOPICAL
Status: DISCONTINUED | OUTPATIENT
Start: 2021-10-06 | End: 2021-10-14 | Stop reason: HOSPADM

## 2021-10-06 RX ORDER — MAG HYDROX/ALUMINUM HYD/SIMETH 200-200-20
30 SUSPENSION, ORAL (FINAL DOSE FORM) ORAL 4 TIMES DAILY PRN
Status: DISCONTINUED | OUTPATIENT
Start: 2021-10-06 | End: 2021-10-14 | Stop reason: HOSPADM

## 2021-10-06 RX ORDER — PROCHLORPERAZINE EDISYLATE 5 MG/ML
5 INJECTION INTRAMUSCULAR; INTRAVENOUS EVERY 6 HOURS PRN
Status: DISCONTINUED | OUTPATIENT
Start: 2021-10-06 | End: 2021-10-14 | Stop reason: HOSPADM

## 2021-10-06 RX ORDER — SIMETHICONE 80 MG
1 TABLET,CHEWABLE ORAL 4 TIMES DAILY PRN
Status: DISCONTINUED | OUTPATIENT
Start: 2021-10-06 | End: 2021-10-14 | Stop reason: HOSPADM

## 2021-10-06 RX ORDER — ONDANSETRON 2 MG/ML
4 INJECTION INTRAMUSCULAR; INTRAVENOUS EVERY 6 HOURS PRN
Status: DISCONTINUED | OUTPATIENT
Start: 2021-10-06 | End: 2021-10-14 | Stop reason: HOSPADM

## 2021-10-06 RX ORDER — NALOXONE HCL 0.4 MG/ML
0.02 VIAL (ML) INJECTION
Status: DISCONTINUED | OUTPATIENT
Start: 2021-10-06 | End: 2021-10-14 | Stop reason: HOSPADM

## 2021-10-06 RX ADMIN — HYDROCORTISONE ACETATE 25 MG: 25 SUPPOSITORY RECTAL at 02:10

## 2021-10-06 RX ADMIN — PIPERACILLIN AND TAZOBACTAM 3.38 G: 3; .375 INJECTION, POWDER, LYOPHILIZED, FOR SOLUTION INTRAVENOUS; PARENTERAL at 09:10

## 2021-10-06 RX ADMIN — MORPHINE SULFATE 4 MG: 4 INJECTION, SOLUTION INTRAMUSCULAR; INTRAVENOUS at 01:10

## 2021-10-06 RX ADMIN — SODIUM CHLORIDE 1000 ML: 0.9 INJECTION, SOLUTION INTRAVENOUS at 01:10

## 2021-10-06 RX ADMIN — METRONIDAZOLE 500 MG: 500 INJECTION, SOLUTION INTRAVENOUS at 06:10

## 2021-10-06 RX ADMIN — IOHEXOL 100 ML: 350 INJECTION, SOLUTION INTRAVENOUS at 03:10

## 2021-10-06 RX ADMIN — LEVOFLOXACIN 750 MG: 750 INJECTION, SOLUTION INTRAVENOUS at 07:10

## 2021-10-07 PROBLEM — R78.81 GRAM-NEGATIVE BACTEREMIA: Status: ACTIVE | Noted: 2021-10-07

## 2021-10-07 LAB
ANION GAP SERPL CALC-SCNC: 6 MMOL/L (ref 8–16)
ANISOCYTOSIS BLD QL SMEAR: SLIGHT
APTT PPP: 42.1 SEC (ref 25.6–35.8)
BASOPHILS NFR BLD: 0 % (ref 0–1.9)
BLASTS NFR BLD MANUAL: 1 %
BLD PROD TYP BPU: NORMAL
BLD PROD TYP BPU: NORMAL
BLOOD UNIT EXPIRATION DATE: NORMAL
BLOOD UNIT EXPIRATION DATE: NORMAL
BLOOD UNIT TYPE CODE: 5100
BLOOD UNIT TYPE CODE: 5100
BLOOD UNIT TYPE: NORMAL
BLOOD UNIT TYPE: NORMAL
BUN SERPL-MCNC: 13 MG/DL (ref 8–23)
CALCIUM SERPL-MCNC: 8.5 MG/DL (ref 8.7–10.5)
CHLORIDE SERPL-SCNC: 104 MMOL/L (ref 95–110)
CO2 SERPL-SCNC: 24 MMOL/L (ref 23–29)
CODING SYSTEM: NORMAL
CODING SYSTEM: NORMAL
CREAT SERPL-MCNC: 1 MG/DL (ref 0.5–1.4)
DIFFERENTIAL METHOD: ABNORMAL
DISPENSE STATUS: NORMAL
DISPENSE STATUS: NORMAL
EOSINOPHIL NFR BLD: 0 % (ref 0–8)
ERYTHROCYTE [DISTWIDTH] IN BLOOD BY AUTOMATED COUNT: 15.1 % (ref 11.5–14.5)
EST. GFR  (AFRICAN AMERICAN): >60 ML/MIN/1.73 M^2
EST. GFR  (NON AFRICAN AMERICAN): >60 ML/MIN/1.73 M^2
GLUCOSE SERPL-MCNC: 120 MG/DL (ref 70–110)
HCT VFR BLD AUTO: 22.2 % (ref 40–54)
HGB BLD-MCNC: 7.4 G/DL (ref 14–18)
IMM GRANULOCYTES # BLD AUTO: ABNORMAL K/UL (ref 0–0.04)
IMM GRANULOCYTES NFR BLD AUTO: ABNORMAL % (ref 0–0.5)
INR PPP: 1.5
LACTATE SERPL-SCNC: 0.9 MMOL/L (ref 0.5–1.9)
LDH SERPL L TO P-CCNC: 345 U/L (ref 110–260)
LYMPHOCYTES NFR BLD: 60 % (ref 18–48)
MAGNESIUM SERPL-MCNC: 1.8 MG/DL (ref 1.6–2.6)
MCH RBC QN AUTO: 28.5 PG (ref 27–31)
MCHC RBC AUTO-ENTMCNC: 33.3 G/DL (ref 32–36)
MCV RBC AUTO: 85 FL (ref 82–98)
METAMYELOCYTES NFR BLD MANUAL: 2 %
MONOCYTES NFR BLD: 10 % (ref 4–15)
MYELOCYTES NFR BLD MANUAL: 3 %
NEUTROPHILS NFR BLD: 20 % (ref 38–73)
NEUTS BAND NFR BLD MANUAL: 3 %
NRBC BLD-RTO: 14 /100 WBC
NUM UNITS TRANS PACKED RBC: NORMAL
PLATELET # BLD AUTO: 32 K/UL (ref 150–450)
PMV BLD AUTO: 10.4 FL (ref 9.2–12.9)
POIKILOCYTOSIS BLD QL SMEAR: SLIGHT
POTASSIUM SERPL-SCNC: 3.6 MMOL/L (ref 3.5–5.1)
PROCALCITONIN SERPL IA-MCNC: 0.72 NG/ML (ref 0–0.5)
PROMYELOCYTES NFR BLD MANUAL: 1 %
PROTHROMBIN TIME: 17.1 SEC (ref 11.8–14.3)
RBC # BLD AUTO: 2.6 M/UL (ref 4.6–6.2)
SODIUM SERPL-SCNC: 134 MMOL/L (ref 136–145)
UNIT NUMBER: NORMAL
WBC # BLD AUTO: 0.49 K/UL (ref 3.9–12.7)

## 2021-10-07 PROCEDURE — 25000003 PHARM REV CODE 250: Performed by: SURGERY

## 2021-10-07 PROCEDURE — 87449 NOS EACH ORGANISM AG IA: CPT | Performed by: INTERNAL MEDICINE

## 2021-10-07 PROCEDURE — 85007 BL SMEAR W/DIFF WBC COUNT: CPT | Performed by: FAMILY MEDICINE

## 2021-10-07 PROCEDURE — 36430 TRANSFUSION BLD/BLD COMPNT: CPT

## 2021-10-07 PROCEDURE — 99223 1ST HOSP IP/OBS HIGH 75: CPT | Mod: ,,, | Performed by: INTERNAL MEDICINE

## 2021-10-07 PROCEDURE — 99900035 HC TECH TIME PER 15 MIN (STAT)

## 2021-10-07 PROCEDURE — P9073 PLATELETS PHERESIS PATH REDU: HCPCS | Performed by: FAMILY MEDICINE

## 2021-10-07 PROCEDURE — 85610 PROTHROMBIN TIME: CPT | Performed by: HOSPITALIST

## 2021-10-07 PROCEDURE — 84145 PROCALCITONIN (PCT): CPT | Performed by: HOSPITALIST

## 2021-10-07 PROCEDURE — 36415 COLL VENOUS BLD VENIPUNCTURE: CPT | Performed by: HOSPITALIST

## 2021-10-07 PROCEDURE — 80048 BASIC METABOLIC PNL TOTAL CA: CPT | Performed by: FAMILY MEDICINE

## 2021-10-07 PROCEDURE — 12000002 HC ACUTE/MED SURGE SEMI-PRIVATE ROOM

## 2021-10-07 PROCEDURE — 94799 UNLISTED PULMONARY SVC/PX: CPT

## 2021-10-07 PROCEDURE — 63600175 PHARM REV CODE 636 W HCPCS: Mod: JG | Performed by: INTERNAL MEDICINE

## 2021-10-07 PROCEDURE — 25000003 PHARM REV CODE 250: Performed by: FAMILY MEDICINE

## 2021-10-07 PROCEDURE — P9040 RBC LEUKOREDUCED IRRADIATED: HCPCS | Performed by: FAMILY MEDICINE

## 2021-10-07 PROCEDURE — 83735 ASSAY OF MAGNESIUM: CPT | Performed by: FAMILY MEDICINE

## 2021-10-07 PROCEDURE — 96376 TX/PRO/DX INJ SAME DRUG ADON: CPT

## 2021-10-07 PROCEDURE — 99223 PR INITIAL HOSPITAL CARE,LEVL III: ICD-10-PCS | Mod: ,,, | Performed by: INTERNAL MEDICINE

## 2021-10-07 PROCEDURE — 94761 N-INVAS EAR/PLS OXIMETRY MLT: CPT

## 2021-10-07 PROCEDURE — 87040 BLOOD CULTURE FOR BACTERIA: CPT | Performed by: HOSPITALIST

## 2021-10-07 PROCEDURE — 85730 THROMBOPLASTIN TIME PARTIAL: CPT | Performed by: HOSPITALIST

## 2021-10-07 PROCEDURE — 87385 HISTOPLASMA CAPSUL AG IA: CPT | Performed by: INTERNAL MEDICINE

## 2021-10-07 PROCEDURE — 99900031 HC PATIENT EDUCATION (STAT)

## 2021-10-07 PROCEDURE — 99223 PR INITIAL HOSPITAL CARE,LEVL III: ICD-10-PCS | Mod: 24,,, | Performed by: SURGERY

## 2021-10-07 PROCEDURE — 83615 LACTATE (LD) (LDH) ENZYME: CPT | Performed by: HOSPITALIST

## 2021-10-07 PROCEDURE — 83605 ASSAY OF LACTIC ACID: CPT | Performed by: FAMILY MEDICINE

## 2021-10-07 PROCEDURE — 63600175 PHARM REV CODE 636 W HCPCS: Performed by: FAMILY MEDICINE

## 2021-10-07 PROCEDURE — 85027 COMPLETE CBC AUTOMATED: CPT | Performed by: FAMILY MEDICINE

## 2021-10-07 PROCEDURE — 99223 1ST HOSP IP/OBS HIGH 75: CPT | Mod: 24,,, | Performed by: SURGERY

## 2021-10-07 RX ORDER — HYDROCORTISONE 25 MG/G
CREAM TOPICAL 2 TIMES DAILY
Status: DISCONTINUED | OUTPATIENT
Start: 2021-10-07 | End: 2021-10-14 | Stop reason: HOSPADM

## 2021-10-07 RX ADMIN — THERA TABS 1 TABLET: TAB at 09:10

## 2021-10-07 RX ADMIN — PIPERACILLIN AND TAZOBACTAM 3.38 G: 3; .375 INJECTION, POWDER, LYOPHILIZED, FOR SOLUTION INTRAVENOUS; PARENTERAL at 05:10

## 2021-10-07 RX ADMIN — Medication 1000 MG: at 09:10

## 2021-10-07 RX ADMIN — FAMOTIDINE 40 MG: 20 TABLET ORAL at 09:10

## 2021-10-07 RX ADMIN — SENNOSIDES AND DOCUSATE SODIUM 1 TABLET: 8.6; 5 TABLET ORAL at 10:10

## 2021-10-07 RX ADMIN — POLYETHYLENE GLYCOL 3350 17 G: 17 POWDER, FOR SOLUTION ORAL at 09:10

## 2021-10-07 RX ADMIN — ACETAMINOPHEN 650 MG: 325 TABLET, FILM COATED ORAL at 01:10

## 2021-10-07 RX ADMIN — TAMSULOSIN HYDROCHLORIDE 0.4 MG: 0.4 CAPSULE ORAL at 09:10

## 2021-10-07 RX ADMIN — HYDROCORTISONE: 25 CREAM TOPICAL at 10:10

## 2021-10-07 RX ADMIN — PIPERACILLIN AND TAZOBACTAM 3.38 G: 3; .375 INJECTION, POWDER, LYOPHILIZED, FOR SOLUTION INTRAVENOUS; PARENTERAL at 10:10

## 2021-10-07 RX ADMIN — FILGRASTIM-SNDZ 300 MCG: 300 INJECTION, SOLUTION INTRAVENOUS; SUBCUTANEOUS at 09:10

## 2021-10-07 RX ADMIN — LEVOTHYROXINE SODIUM 50 MCG: 25 TABLET ORAL at 06:10

## 2021-10-07 RX ADMIN — SENNOSIDES AND DOCUSATE SODIUM 1 TABLET: 8.6; 5 TABLET ORAL at 09:10

## 2021-10-07 RX ADMIN — ESCITALOPRAM OXALATE 10 MG: 10 TABLET ORAL at 09:10

## 2021-10-08 ENCOUNTER — DOCUMENT SCAN (OUTPATIENT)
Dept: HOME HEALTH SERVICES | Facility: HOSPITAL | Age: 81
End: 2021-10-08
Payer: MEDICARE

## 2021-10-08 LAB
ANION GAP SERPL CALC-SCNC: 10 MMOL/L (ref 8–16)
ANISOCYTOSIS BLD QL SMEAR: SLIGHT
BASOPHILS NFR BLD: 0 % (ref 0–1.9)
BUN SERPL-MCNC: 13 MG/DL (ref 8–23)
CALCIUM SERPL-MCNC: 8.6 MG/DL (ref 8.7–10.5)
CHLORIDE SERPL-SCNC: 105 MMOL/L (ref 95–110)
CO2 SERPL-SCNC: 21 MMOL/L (ref 23–29)
CREAT SERPL-MCNC: 0.9 MG/DL (ref 0.5–1.4)
DIFFERENTIAL METHOD: ABNORMAL
EOSINOPHIL NFR BLD: 0 % (ref 0–8)
ERYTHROCYTE [DISTWIDTH] IN BLOOD BY AUTOMATED COUNT: 15.5 % (ref 11.5–14.5)
EST. GFR  (AFRICAN AMERICAN): >60 ML/MIN/1.73 M^2
EST. GFR  (NON AFRICAN AMERICAN): >60 ML/MIN/1.73 M^2
GLUCOSE SERPL-MCNC: 115 MG/DL (ref 70–110)
HCT VFR BLD AUTO: 22.6 % (ref 40–54)
HGB BLD-MCNC: 7.3 G/DL (ref 14–18)
IMM GRANULOCYTES # BLD AUTO: ABNORMAL K/UL (ref 0–0.04)
IMM GRANULOCYTES NFR BLD AUTO: ABNORMAL % (ref 0–0.5)
KOH PREP SPEC: NORMAL
LYMPHOCYTES NFR BLD: 54 % (ref 18–48)
MAGNESIUM SERPL-MCNC: 1.9 MG/DL (ref 1.6–2.6)
MCH RBC QN AUTO: 28.4 PG (ref 27–31)
MCHC RBC AUTO-ENTMCNC: 32.3 G/DL (ref 32–36)
MCV RBC AUTO: 88 FL (ref 82–98)
MONOCYTES NFR BLD: 4 % (ref 4–15)
NEUTROPHILS NFR BLD: 21 % (ref 38–73)
NEUTS BAND NFR BLD MANUAL: 5 %
NRBC BLD-RTO: 12 /100 WBC
PLATELET # BLD AUTO: 24 K/UL (ref 150–450)
PMV BLD AUTO: ABNORMAL FL (ref 9.2–12.9)
POIKILOCYTOSIS BLD QL SMEAR: SLIGHT
POLYCHROMASIA BLD QL SMEAR: ABNORMAL
POTASSIUM SERPL-SCNC: 3.4 MMOL/L (ref 3.5–5.1)
RBC # BLD AUTO: 2.57 M/UL (ref 4.6–6.2)
SODIUM SERPL-SCNC: 136 MMOL/L (ref 136–145)
WBC # BLD AUTO: 0.57 K/UL (ref 3.9–12.7)
WBC OTHER NFR BLD MANUAL: 16 %

## 2021-10-08 PROCEDURE — 87102 FUNGUS ISOLATION CULTURE: CPT | Performed by: INTERNAL MEDICINE

## 2021-10-08 PROCEDURE — 63600175 PHARM REV CODE 636 W HCPCS: Performed by: INTERNAL MEDICINE

## 2021-10-08 PROCEDURE — 86480 TB TEST CELL IMMUN MEASURE: CPT | Performed by: INTERNAL MEDICINE

## 2021-10-08 PROCEDURE — 12000002 HC ACUTE/MED SURGE SEMI-PRIVATE ROOM

## 2021-10-08 PROCEDURE — 87116 MYCOBACTERIA CULTURE: CPT | Performed by: INTERNAL MEDICINE

## 2021-10-08 PROCEDURE — 99232 PR SUBSEQUENT HOSPITAL CARE,LEVL II: ICD-10-PCS | Mod: ,,, | Performed by: INTERNAL MEDICINE

## 2021-10-08 PROCEDURE — 85027 COMPLETE CBC AUTOMATED: CPT | Performed by: FAMILY MEDICINE

## 2021-10-08 PROCEDURE — 85007 BL SMEAR W/DIFF WBC COUNT: CPT | Performed by: FAMILY MEDICINE

## 2021-10-08 PROCEDURE — 30000890 LABCORP MISCELLANEOUS TEST: Mod: 91 | Performed by: INTERNAL MEDICINE

## 2021-10-08 PROCEDURE — 36415 COLL VENOUS BLD VENIPUNCTURE: CPT | Performed by: INTERNAL MEDICINE

## 2021-10-08 PROCEDURE — 31624 PR BRONCHOSCOPY,DIAG2STIC W LAVAGE: ICD-10-PCS | Mod: RT,,, | Performed by: INTERNAL MEDICINE

## 2021-10-08 PROCEDURE — 87118 MYCOBACTERIC IDENTIFICATION: CPT | Performed by: INTERNAL MEDICINE

## 2021-10-08 PROCEDURE — 99900031 HC PATIENT EDUCATION (STAT)

## 2021-10-08 PROCEDURE — 87449 NOS EACH ORGANISM AG IA: CPT | Mod: 91 | Performed by: INTERNAL MEDICINE

## 2021-10-08 PROCEDURE — 83735 ASSAY OF MAGNESIUM: CPT | Performed by: FAMILY MEDICINE

## 2021-10-08 PROCEDURE — 30000890 LABCORP MISCELLANEOUS TEST: Performed by: INTERNAL MEDICINE

## 2021-10-08 PROCEDURE — 87205 SMEAR GRAM STAIN: CPT | Performed by: INTERNAL MEDICINE

## 2021-10-08 PROCEDURE — 87305 ASPERGILLUS AG IA: CPT | Mod: 91 | Performed by: INTERNAL MEDICINE

## 2021-10-08 PROCEDURE — 31624 DX BRONCHOSCOPE/LAVAGE: CPT | Performed by: INTERNAL MEDICINE

## 2021-10-08 PROCEDURE — 87798 DETECT AGENT NOS DNA AMP: CPT

## 2021-10-08 PROCEDURE — 80048 BASIC METABOLIC PNL TOTAL CA: CPT | Performed by: FAMILY MEDICINE

## 2021-10-08 PROCEDURE — 87556 M.TUBERCULO DNA AMP PROBE: CPT | Performed by: INTERNAL MEDICINE

## 2021-10-08 PROCEDURE — 87070 CULTURE OTHR SPECIMN AEROBIC: CPT | Performed by: INTERNAL MEDICINE

## 2021-10-08 PROCEDURE — 99232 SBSQ HOSP IP/OBS MODERATE 35: CPT | Mod: ,,, | Performed by: INTERNAL MEDICINE

## 2021-10-08 PROCEDURE — 87899 AGENT NOS ASSAY W/OPTIC: CPT | Performed by: INTERNAL MEDICINE

## 2021-10-08 PROCEDURE — 88305 TISSUE EXAM BY PATHOLOGIST: CPT | Mod: TC

## 2021-10-08 PROCEDURE — 25000003 PHARM REV CODE 250: Performed by: FAMILY MEDICINE

## 2021-10-08 PROCEDURE — 30000890 HC MISC. SEND OUT TEST

## 2021-10-08 PROCEDURE — 25000003 PHARM REV CODE 250: Performed by: INTERNAL MEDICINE

## 2021-10-08 PROCEDURE — 87305 ASPERGILLUS AG IA: CPT | Performed by: INTERNAL MEDICINE

## 2021-10-08 PROCEDURE — 87206 SMEAR FLUORESCENT/ACID STAI: CPT | Performed by: INTERNAL MEDICINE

## 2021-10-08 PROCEDURE — 31624 DX BRONCHOSCOPE/LAVAGE: CPT | Mod: RT,,, | Performed by: INTERNAL MEDICINE

## 2021-10-08 PROCEDURE — 99900035 HC TECH TIME PER 15 MIN (STAT)

## 2021-10-08 PROCEDURE — 87210 SMEAR WET MOUNT SALINE/INK: CPT | Performed by: INTERNAL MEDICINE

## 2021-10-08 PROCEDURE — 94761 N-INVAS EAR/PLS OXIMETRY MLT: CPT

## 2021-10-08 RX ORDER — LIDOCAINE HYDROCHLORIDE 10 MG/ML
INJECTION INFILTRATION; PERINEURAL
Status: COMPLETED | OUTPATIENT
Start: 2021-10-08 | End: 2021-10-08

## 2021-10-08 RX ORDER — FENTANYL CITRATE 50 UG/ML
INJECTION, SOLUTION INTRAMUSCULAR; INTRAVENOUS
Status: COMPLETED | OUTPATIENT
Start: 2021-10-08 | End: 2021-10-08

## 2021-10-08 RX ORDER — LIDOCAINE HYDROCHLORIDE 20 MG/ML
JELLY TOPICAL
Status: COMPLETED | OUTPATIENT
Start: 2021-10-08 | End: 2021-10-08

## 2021-10-08 RX ORDER — MIDAZOLAM HYDROCHLORIDE 1 MG/ML
INJECTION INTRAMUSCULAR; INTRAVENOUS
Status: COMPLETED | OUTPATIENT
Start: 2021-10-08 | End: 2021-10-08

## 2021-10-08 RX ADMIN — TAMSULOSIN HYDROCHLORIDE 0.4 MG: 0.4 CAPSULE ORAL at 09:10

## 2021-10-08 RX ADMIN — LIDOCAINE HYDROCHLORIDE 8 ML: 10 INJECTION, SOLUTION INFILTRATION; PERINEURAL at 10:10

## 2021-10-08 RX ADMIN — PIPERACILLIN AND TAZOBACTAM 4.5 G: 4; .5 INJECTION, POWDER, LYOPHILIZED, FOR SOLUTION INTRAVENOUS; PARENTERAL at 02:10

## 2021-10-08 RX ADMIN — HYDROCORTISONE: 25 CREAM TOPICAL at 09:10

## 2021-10-08 RX ADMIN — LIDOCAINE HYDROCHLORIDE 6 ML: 20 JELLY TOPICAL at 10:10

## 2021-10-08 RX ADMIN — PIPERACILLIN AND TAZOBACTAM 4.5 G: 4; .5 INJECTION, POWDER, LYOPHILIZED, FOR SOLUTION INTRAVENOUS; PARENTERAL at 09:10

## 2021-10-08 RX ADMIN — SENNOSIDES AND DOCUSATE SODIUM 1 TABLET: 8.6; 5 TABLET ORAL at 09:10

## 2021-10-08 RX ADMIN — MIDAZOLAM HYDROCHLORIDE 2 MG: 1 INJECTION, SOLUTION INTRAMUSCULAR; INTRAVENOUS at 10:10

## 2021-10-08 RX ADMIN — LEVOTHYROXINE SODIUM 50 MCG: 25 TABLET ORAL at 05:10

## 2021-10-08 RX ADMIN — FENTANYL CITRATE 50 MCG: 50 INJECTION INTRAMUSCULAR; INTRAVENOUS at 10:10

## 2021-10-09 LAB
ANION GAP SERPL CALC-SCNC: 8 MMOL/L (ref 8–16)
ANISOCYTOSIS BLD QL SMEAR: ABNORMAL
BACTERIA BLD CULT: ABNORMAL
BASOPHILS NFR BLD: 0 % (ref 0–1.9)
BLASTS NFR BLD MANUAL: 10 %
BUN SERPL-MCNC: 10 MG/DL (ref 8–23)
CALCIUM SERPL-MCNC: 8.3 MG/DL (ref 8.7–10.5)
CHLORIDE SERPL-SCNC: 107 MMOL/L (ref 95–110)
CO2 SERPL-SCNC: 21 MMOL/L (ref 23–29)
CREAT SERPL-MCNC: 0.8 MG/DL (ref 0.5–1.4)
DACRYOCYTES BLD QL SMEAR: ABNORMAL
DIFFERENTIAL METHOD: ABNORMAL
EOSINOPHIL NFR BLD: 0 % (ref 0–8)
ERYTHROCYTE [DISTWIDTH] IN BLOOD BY AUTOMATED COUNT: 15.7 % (ref 11.5–14.5)
EST. GFR  (AFRICAN AMERICAN): >60 ML/MIN/1.73 M^2
EST. GFR  (NON AFRICAN AMERICAN): >60 ML/MIN/1.73 M^2
GLUCOSE SERPL-MCNC: 118 MG/DL (ref 70–110)
HCT VFR BLD AUTO: 21.2 % (ref 40–54)
HGB BLD-MCNC: 6.8 G/DL (ref 14–18)
HYPOCHROMIA BLD QL SMEAR: ABNORMAL
IMM GRANULOCYTES # BLD AUTO: ABNORMAL K/UL (ref 0–0.04)
IMM GRANULOCYTES NFR BLD AUTO: ABNORMAL % (ref 0–0.5)
LYMPHOCYTES NFR BLD: 40 % (ref 18–48)
MAGNESIUM SERPL-MCNC: 2 MG/DL (ref 1.6–2.6)
MCH RBC QN AUTO: 28.6 PG (ref 27–31)
MCHC RBC AUTO-ENTMCNC: 32.1 G/DL (ref 32–36)
MCV RBC AUTO: 89 FL (ref 82–98)
MONOCYTES NFR BLD: 25 % (ref 4–15)
MYELOCYTES NFR BLD MANUAL: 5 %
NEUTROPHILS NFR BLD: 5 % (ref 38–73)
NEUTS BAND NFR BLD MANUAL: 15 %
NRBC BLD-RTO: 13 /100 WBC
OVALOCYTES BLD QL SMEAR: ABNORMAL
PLATELET # BLD AUTO: 14 K/UL (ref 150–450)
PLATELET BLD QL SMEAR: ABNORMAL
PMV BLD AUTO: ABNORMAL FL (ref 9.2–12.9)
POIKILOCYTOSIS BLD QL SMEAR: ABNORMAL
POLYCHROMASIA BLD QL SMEAR: ABNORMAL
POTASSIUM SERPL-SCNC: 3.3 MMOL/L (ref 3.5–5.1)
RBC # BLD AUTO: 2.38 M/UL (ref 4.6–6.2)
SCHISTOCYTES BLD QL SMEAR: PRESENT
SODIUM SERPL-SCNC: 136 MMOL/L (ref 136–145)
SPHEROCYTES BLD QL SMEAR: ABNORMAL
WBC # BLD AUTO: 0.68 K/UL (ref 3.9–12.7)

## 2021-10-09 PROCEDURE — 85007 BL SMEAR W/DIFF WBC COUNT: CPT | Performed by: FAMILY MEDICINE

## 2021-10-09 PROCEDURE — 99900031 HC PATIENT EDUCATION (STAT)

## 2021-10-09 PROCEDURE — 80048 BASIC METABOLIC PNL TOTAL CA: CPT | Performed by: FAMILY MEDICINE

## 2021-10-09 PROCEDURE — 99233 SBSQ HOSP IP/OBS HIGH 50: CPT | Mod: ,,, | Performed by: INTERNAL MEDICINE

## 2021-10-09 PROCEDURE — 99233 PR SUBSEQUENT HOSPITAL CARE,LEVL III: ICD-10-PCS | Mod: ,,, | Performed by: INTERNAL MEDICINE

## 2021-10-09 PROCEDURE — 63600175 PHARM REV CODE 636 W HCPCS: Mod: JG | Performed by: INTERNAL MEDICINE

## 2021-10-09 PROCEDURE — 25000003 PHARM REV CODE 250: Performed by: STUDENT IN AN ORGANIZED HEALTH CARE EDUCATION/TRAINING PROGRAM

## 2021-10-09 PROCEDURE — 12000002 HC ACUTE/MED SURGE SEMI-PRIVATE ROOM

## 2021-10-09 PROCEDURE — 99233 PR SUBSEQUENT HOSPITAL CARE,LEVL III: ICD-10-PCS | Mod: ,,, | Performed by: STUDENT IN AN ORGANIZED HEALTH CARE EDUCATION/TRAINING PROGRAM

## 2021-10-09 PROCEDURE — 85027 COMPLETE CBC AUTOMATED: CPT | Performed by: FAMILY MEDICINE

## 2021-10-09 PROCEDURE — 63600175 PHARM REV CODE 636 W HCPCS: Performed by: STUDENT IN AN ORGANIZED HEALTH CARE EDUCATION/TRAINING PROGRAM

## 2021-10-09 PROCEDURE — 99233 SBSQ HOSP IP/OBS HIGH 50: CPT | Mod: ,,, | Performed by: STUDENT IN AN ORGANIZED HEALTH CARE EDUCATION/TRAINING PROGRAM

## 2021-10-09 PROCEDURE — 63600175 PHARM REV CODE 636 W HCPCS: Performed by: INTERNAL MEDICINE

## 2021-10-09 PROCEDURE — 99900035 HC TECH TIME PER 15 MIN (STAT)

## 2021-10-09 PROCEDURE — 94761 N-INVAS EAR/PLS OXIMETRY MLT: CPT

## 2021-10-09 PROCEDURE — 83735 ASSAY OF MAGNESIUM: CPT | Performed by: FAMILY MEDICINE

## 2021-10-09 PROCEDURE — 25000003 PHARM REV CODE 250: Performed by: INTERNAL MEDICINE

## 2021-10-09 PROCEDURE — 25000003 PHARM REV CODE 250: Performed by: FAMILY MEDICINE

## 2021-10-09 RX ADMIN — FAMOTIDINE 40 MG: 20 TABLET ORAL at 09:10

## 2021-10-09 RX ADMIN — PIPERACILLIN AND TAZOBACTAM 4.5 G: 4; .5 INJECTION, POWDER, LYOPHILIZED, FOR SOLUTION INTRAVENOUS; PARENTERAL at 03:10

## 2021-10-09 RX ADMIN — LEVOTHYROXINE SODIUM 50 MCG: 25 TABLET ORAL at 05:10

## 2021-10-09 RX ADMIN — SENNOSIDES AND DOCUSATE SODIUM 1 TABLET: 8.6; 5 TABLET ORAL at 09:10

## 2021-10-09 RX ADMIN — ESCITALOPRAM OXALATE 10 MG: 10 TABLET ORAL at 09:10

## 2021-10-09 RX ADMIN — PIPERACILLIN AND TAZOBACTAM 4.5 G: 4; .5 INJECTION, POWDER, LYOPHILIZED, FOR SOLUTION INTRAVENOUS; PARENTERAL at 05:10

## 2021-10-09 RX ADMIN — Medication 1000 MG: at 09:10

## 2021-10-09 RX ADMIN — THERA TABS 1 TABLET: TAB at 09:10

## 2021-10-09 RX ADMIN — TAMSULOSIN HYDROCHLORIDE 0.4 MG: 0.4 CAPSULE ORAL at 09:10

## 2021-10-09 RX ADMIN — FILGRASTIM-SNDZ 480 MCG: 480 INJECTION, SOLUTION INTRAVENOUS; SUBCUTANEOUS at 03:10

## 2021-10-09 RX ADMIN — PIPERACILLIN AND TAZOBACTAM 4.5 G: 4; .5 INJECTION, POWDER, LYOPHILIZED, FOR SOLUTION INTRAVENOUS; PARENTERAL at 09:10

## 2021-10-09 RX ADMIN — HYDROCORTISONE: 25 CREAM TOPICAL at 09:10

## 2021-10-10 LAB
ANION GAP SERPL CALC-SCNC: 10 MMOL/L (ref 8–16)
ANISOCYTOSIS BLD QL SMEAR: ABNORMAL
BACTERIA SPEC AEROBE CULT: NO GROWTH
BASOPHILS NFR BLD: 0 % (ref 0–1.9)
BLD PROD TYP BPU: NORMAL
BLOOD UNIT EXPIRATION DATE: NORMAL
BLOOD UNIT TYPE CODE: 6200
BLOOD UNIT TYPE: NORMAL
BUN SERPL-MCNC: 8 MG/DL (ref 8–23)
CALCIUM SERPL-MCNC: 9.1 MG/DL (ref 8.7–10.5)
CHLORIDE SERPL-SCNC: 108 MMOL/L (ref 95–110)
CO2 SERPL-SCNC: 23 MMOL/L (ref 23–29)
CODING SYSTEM: NORMAL
CREAT SERPL-MCNC: 0.7 MG/DL (ref 0.5–1.4)
DACRYOCYTES BLD QL SMEAR: ABNORMAL
DIFFERENTIAL METHOD: ABNORMAL
DISPENSE STATUS: NORMAL
EOSINOPHIL NFR BLD: 0 % (ref 0–8)
ERYTHROCYTE [DISTWIDTH] IN BLOOD BY AUTOMATED COUNT: 15.7 % (ref 11.5–14.5)
EST. GFR  (AFRICAN AMERICAN): >60 ML/MIN/1.73 M^2
EST. GFR  (NON AFRICAN AMERICAN): >60 ML/MIN/1.73 M^2
GLUCOSE SERPL-MCNC: 130 MG/DL (ref 70–110)
GRAM STN SPEC: NORMAL
HCT VFR BLD AUTO: 25.3 % (ref 40–54)
HGB BLD-MCNC: 8.1 G/DL (ref 14–18)
HYPOCHROMIA BLD QL SMEAR: ABNORMAL
IMM GRANULOCYTES # BLD AUTO: ABNORMAL K/UL (ref 0–0.04)
IMM GRANULOCYTES NFR BLD AUTO: ABNORMAL % (ref 0–0.5)
LYMPHOCYTES NFR BLD: 38 % (ref 18–48)
MAGNESIUM SERPL-MCNC: 2.1 MG/DL (ref 1.6–2.6)
MCH RBC QN AUTO: 28.3 PG (ref 27–31)
MCHC RBC AUTO-ENTMCNC: 32 G/DL (ref 32–36)
MCV RBC AUTO: 89 FL (ref 82–98)
METAMYELOCYTES NFR BLD MANUAL: 2 %
MONOCYTES NFR BLD: 30 % (ref 4–15)
NEUTROPHILS NFR BLD: 26 % (ref 38–73)
NEUTS BAND NFR BLD MANUAL: 4 %
NRBC BLD-RTO: 10 /100 WBC
OVALOCYTES BLD QL SMEAR: ABNORMAL
PLATELET # BLD AUTO: 4 K/UL (ref 150–450)
PLATELET BLD QL SMEAR: ABNORMAL
PMV BLD AUTO: ABNORMAL FL (ref 9.2–12.9)
POIKILOCYTOSIS BLD QL SMEAR: ABNORMAL
POLYCHROMASIA BLD QL SMEAR: ABNORMAL
POTASSIUM SERPL-SCNC: 3.3 MMOL/L (ref 3.5–5.1)
RBC # BLD AUTO: 2.86 M/UL (ref 4.6–6.2)
SODIUM SERPL-SCNC: 141 MMOL/L (ref 136–145)
UNIT NUMBER: NORMAL
WBC # BLD AUTO: 0.93 K/UL (ref 3.9–12.7)

## 2021-10-10 PROCEDURE — 25000003 PHARM REV CODE 250: Performed by: FAMILY MEDICINE

## 2021-10-10 PROCEDURE — 94761 N-INVAS EAR/PLS OXIMETRY MLT: CPT

## 2021-10-10 PROCEDURE — 99233 PR SUBSEQUENT HOSPITAL CARE,LEVL III: ICD-10-PCS | Mod: ,,, | Performed by: STUDENT IN AN ORGANIZED HEALTH CARE EDUCATION/TRAINING PROGRAM

## 2021-10-10 PROCEDURE — 99900031 HC PATIENT EDUCATION (STAT)

## 2021-10-10 PROCEDURE — 63600175 PHARM REV CODE 636 W HCPCS: Performed by: STUDENT IN AN ORGANIZED HEALTH CARE EDUCATION/TRAINING PROGRAM

## 2021-10-10 PROCEDURE — 99900035 HC TECH TIME PER 15 MIN (STAT)

## 2021-10-10 PROCEDURE — 80048 BASIC METABOLIC PNL TOTAL CA: CPT | Performed by: FAMILY MEDICINE

## 2021-10-10 PROCEDURE — 85027 COMPLETE CBC AUTOMATED: CPT | Performed by: FAMILY MEDICINE

## 2021-10-10 PROCEDURE — 25000003 PHARM REV CODE 250: Performed by: STUDENT IN AN ORGANIZED HEALTH CARE EDUCATION/TRAINING PROGRAM

## 2021-10-10 PROCEDURE — 63600175 PHARM REV CODE 636 W HCPCS: Mod: JG | Performed by: INTERNAL MEDICINE

## 2021-10-10 PROCEDURE — 36415 COLL VENOUS BLD VENIPUNCTURE: CPT | Performed by: FAMILY MEDICINE

## 2021-10-10 PROCEDURE — 99233 PR SUBSEQUENT HOSPITAL CARE,LEVL III: ICD-10-PCS | Mod: ,,, | Performed by: INTERNAL MEDICINE

## 2021-10-10 PROCEDURE — 85007 BL SMEAR W/DIFF WBC COUNT: CPT | Performed by: FAMILY MEDICINE

## 2021-10-10 PROCEDURE — 25000003 PHARM REV CODE 250: Performed by: HOSPITALIST

## 2021-10-10 PROCEDURE — 99233 SBSQ HOSP IP/OBS HIGH 50: CPT | Mod: ,,, | Performed by: STUDENT IN AN ORGANIZED HEALTH CARE EDUCATION/TRAINING PROGRAM

## 2021-10-10 PROCEDURE — 99233 SBSQ HOSP IP/OBS HIGH 50: CPT | Mod: ,,, | Performed by: INTERNAL MEDICINE

## 2021-10-10 PROCEDURE — P9037 PLATE PHERES LEUKOREDU IRRAD: HCPCS | Performed by: HOSPITALIST

## 2021-10-10 PROCEDURE — 83735 ASSAY OF MAGNESIUM: CPT | Performed by: FAMILY MEDICINE

## 2021-10-10 PROCEDURE — 12000002 HC ACUTE/MED SURGE SEMI-PRIVATE ROOM

## 2021-10-10 RX ORDER — METOPROLOL SUCCINATE 25 MG/1
25 TABLET, EXTENDED RELEASE ORAL NIGHTLY
Status: DISCONTINUED | OUTPATIENT
Start: 2021-10-10 | End: 2021-10-14 | Stop reason: HOSPADM

## 2021-10-10 RX ORDER — HYDROCODONE BITARTRATE AND ACETAMINOPHEN 500; 5 MG/1; MG/1
TABLET ORAL
Status: DISCONTINUED | OUTPATIENT
Start: 2021-10-10 | End: 2021-10-12

## 2021-10-10 RX ADMIN — HYDROCORTISONE: 25 CREAM TOPICAL at 09:10

## 2021-10-10 RX ADMIN — HYDROCORTISONE: 25 CREAM TOPICAL at 10:10

## 2021-10-10 RX ADMIN — FAMOTIDINE 40 MG: 20 TABLET ORAL at 10:10

## 2021-10-10 RX ADMIN — POLYETHYLENE GLYCOL 3350 17 G: 17 POWDER, FOR SOLUTION ORAL at 08:10

## 2021-10-10 RX ADMIN — PIPERACILLIN AND TAZOBACTAM 4.5 G: 4; .5 INJECTION, POWDER, LYOPHILIZED, FOR SOLUTION INTRAVENOUS; PARENTERAL at 04:10

## 2021-10-10 RX ADMIN — PIPERACILLIN AND TAZOBACTAM 4.5 G: 4; .5 INJECTION, POWDER, LYOPHILIZED, FOR SOLUTION INTRAVENOUS; PARENTERAL at 10:10

## 2021-10-10 RX ADMIN — POTASSIUM BICARBONATE 50 MEQ: 977.5 TABLET, EFFERVESCENT ORAL at 08:10

## 2021-10-10 RX ADMIN — THERA TABS 1 TABLET: TAB at 10:10

## 2021-10-10 RX ADMIN — LEVOTHYROXINE SODIUM 50 MCG: 25 TABLET ORAL at 05:10

## 2021-10-10 RX ADMIN — SENNOSIDES AND DOCUSATE SODIUM 1 TABLET: 8.6; 5 TABLET ORAL at 08:10

## 2021-10-10 RX ADMIN — SENNOSIDES AND DOCUSATE SODIUM 1 TABLET: 8.6; 5 TABLET ORAL at 10:10

## 2021-10-10 RX ADMIN — TAMSULOSIN HYDROCHLORIDE 0.4 MG: 0.4 CAPSULE ORAL at 08:10

## 2021-10-10 RX ADMIN — ESCITALOPRAM OXALATE 10 MG: 10 TABLET ORAL at 10:10

## 2021-10-10 RX ADMIN — PIPERACILLIN AND TAZOBACTAM 4.5 G: 4; .5 INJECTION, POWDER, LYOPHILIZED, FOR SOLUTION INTRAVENOUS; PARENTERAL at 05:10

## 2021-10-10 RX ADMIN — FILGRASTIM-SNDZ 480 MCG: 480 INJECTION, SOLUTION INTRAVENOUS; SUBCUTANEOUS at 10:10

## 2021-10-10 RX ADMIN — POTASSIUM BICARBONATE 50 MEQ: 977.5 TABLET, EFFERVESCENT ORAL at 10:10

## 2021-10-10 RX ADMIN — Medication 1000 MG: at 10:10

## 2021-10-11 ENCOUNTER — DOCUMENTATION ONLY (OUTPATIENT)
Dept: INFUSION THERAPY | Facility: HOSPITAL | Age: 81
End: 2021-10-11

## 2021-10-11 PROBLEM — R78.81 GRAM-NEGATIVE BACTEREMIA: Status: RESOLVED | Noted: 2021-10-07 | Resolved: 2021-10-11

## 2021-10-11 LAB
ANION GAP SERPL CALC-SCNC: 6 MMOL/L (ref 8–16)
ANISOCYTOSIS BLD QL SMEAR: SLIGHT
BASOPHILS NFR BLD: 0 % (ref 0–1.9)
BLD PROD TYP BPU: NORMAL
BLD PROD TYP BPU: NORMAL
BLOOD UNIT EXPIRATION DATE: NORMAL
BLOOD UNIT EXPIRATION DATE: NORMAL
BLOOD UNIT TYPE CODE: 5100
BLOOD UNIT TYPE CODE: 5100
BLOOD UNIT TYPE: NORMAL
BLOOD UNIT TYPE: NORMAL
BUN SERPL-MCNC: 7 MG/DL (ref 8–23)
CALCIUM SERPL-MCNC: 8.7 MG/DL (ref 8.7–10.5)
CHLORIDE SERPL-SCNC: 108 MMOL/L (ref 95–110)
CO2 SERPL-SCNC: 25 MMOL/L (ref 23–29)
CODING SYSTEM: NORMAL
CODING SYSTEM: NORMAL
CREAT SERPL-MCNC: 0.9 MG/DL (ref 0.5–1.4)
CRYPTOC AG SER QL IA.RAPID: NEGATIVE
DIFFERENTIAL METHOD: ABNORMAL
DISPENSE STATUS: NORMAL
DISPENSE STATUS: NORMAL
EOSINOPHIL NFR BLD: 0 % (ref 0–8)
ERYTHROCYTE [DISTWIDTH] IN BLOOD BY AUTOMATED COUNT: 15.6 % (ref 11.5–14.5)
EST. GFR  (AFRICAN AMERICAN): >60 ML/MIN/1.73 M^2
EST. GFR  (NON AFRICAN AMERICAN): >60 ML/MIN/1.73 M^2
GLUCOSE SERPL-MCNC: 106 MG/DL (ref 70–110)
HCT VFR BLD AUTO: 21.7 % (ref 40–54)
HGB BLD-MCNC: 7 G/DL (ref 14–18)
IMM GRANULOCYTES # BLD AUTO: ABNORMAL K/UL (ref 0–0.04)
IMM GRANULOCYTES NFR BLD AUTO: ABNORMAL % (ref 0–0.5)
LYMPHOCYTES NFR BLD: 40 % (ref 18–48)
MAGNESIUM SERPL-MCNC: 2.1 MG/DL (ref 1.6–2.6)
MCH RBC QN AUTO: 28.6 PG (ref 27–31)
MCHC RBC AUTO-ENTMCNC: 32.3 G/DL (ref 32–36)
MCV RBC AUTO: 89 FL (ref 82–98)
MONOCYTES NFR BLD: 6 % (ref 4–15)
NEUTROPHILS NFR BLD: 30 % (ref 38–73)
NEUTS BAND NFR BLD MANUAL: 4 %
NRBC BLD-RTO: 9 /100 WBC
PLATELET # BLD AUTO: 5 K/UL (ref 150–450)
PLATELET BLD QL SMEAR: ABNORMAL
PMV BLD AUTO: ABNORMAL FL (ref 9.2–12.9)
POIKILOCYTOSIS BLD QL SMEAR: SLIGHT
POTASSIUM SERPL-SCNC: 3.5 MMOL/L (ref 3.5–5.1)
RBC # BLD AUTO: 2.45 M/UL (ref 4.6–6.2)
SODIUM SERPL-SCNC: 139 MMOL/L (ref 136–145)
UNIT NUMBER: NORMAL
UNIT NUMBER: NORMAL
WBC # BLD AUTO: 1.1 K/UL (ref 3.9–12.7)
WBC OTHER NFR BLD MANUAL: 20 %

## 2021-10-11 PROCEDURE — 94799 UNLISTED PULMONARY SVC/PX: CPT

## 2021-10-11 PROCEDURE — P9073 PLATELETS PHERESIS PATH REDU: HCPCS | Mod: 91 | Performed by: HOSPITALIST

## 2021-10-11 PROCEDURE — 83735 ASSAY OF MAGNESIUM: CPT | Performed by: FAMILY MEDICINE

## 2021-10-11 PROCEDURE — 36415 COLL VENOUS BLD VENIPUNCTURE: CPT | Performed by: FAMILY MEDICINE

## 2021-10-11 PROCEDURE — 12000002 HC ACUTE/MED SURGE SEMI-PRIVATE ROOM

## 2021-10-11 PROCEDURE — 85027 COMPLETE CBC AUTOMATED: CPT | Performed by: FAMILY MEDICINE

## 2021-10-11 PROCEDURE — 63600175 PHARM REV CODE 636 W HCPCS: Mod: JG | Performed by: INTERNAL MEDICINE

## 2021-10-11 PROCEDURE — 85007 BL SMEAR W/DIFF WBC COUNT: CPT | Performed by: FAMILY MEDICINE

## 2021-10-11 PROCEDURE — 25000003 PHARM REV CODE 250: Performed by: HOSPITALIST

## 2021-10-11 PROCEDURE — 99900035 HC TECH TIME PER 15 MIN (STAT)

## 2021-10-11 PROCEDURE — 25000003 PHARM REV CODE 250: Performed by: STUDENT IN AN ORGANIZED HEALTH CARE EDUCATION/TRAINING PROGRAM

## 2021-10-11 PROCEDURE — 25000003 PHARM REV CODE 250: Performed by: FAMILY MEDICINE

## 2021-10-11 PROCEDURE — 80048 BASIC METABOLIC PNL TOTAL CA: CPT | Performed by: FAMILY MEDICINE

## 2021-10-11 PROCEDURE — 63600175 PHARM REV CODE 636 W HCPCS: Performed by: STUDENT IN AN ORGANIZED HEALTH CARE EDUCATION/TRAINING PROGRAM

## 2021-10-11 RX ORDER — HYDROCODONE BITARTRATE AND ACETAMINOPHEN 500; 5 MG/1; MG/1
TABLET ORAL
Status: DISCONTINUED | OUTPATIENT
Start: 2021-10-11 | End: 2021-10-12

## 2021-10-11 RX ADMIN — Medication 1000 MG: at 10:10

## 2021-10-11 RX ADMIN — POTASSIUM BICARBONATE 50 MEQ: 977.5 TABLET, EFFERVESCENT ORAL at 10:10

## 2021-10-11 RX ADMIN — PIPERACILLIN AND TAZOBACTAM 4.5 G: 4; .5 INJECTION, POWDER, LYOPHILIZED, FOR SOLUTION INTRAVENOUS; PARENTERAL at 05:10

## 2021-10-11 RX ADMIN — HYDROCORTISONE: 25 CREAM TOPICAL at 10:10

## 2021-10-11 RX ADMIN — LEVOTHYROXINE SODIUM 50 MCG: 25 TABLET ORAL at 05:10

## 2021-10-11 RX ADMIN — METOPROLOL SUCCINATE 25 MG: 25 TABLET, FILM COATED, EXTENDED RELEASE ORAL at 08:10

## 2021-10-11 RX ADMIN — HYDROCORTISONE: 25 CREAM TOPICAL at 09:10

## 2021-10-11 RX ADMIN — PIPERACILLIN AND TAZOBACTAM 4.5 G: 4; .5 INJECTION, POWDER, LYOPHILIZED, FOR SOLUTION INTRAVENOUS; PARENTERAL at 10:10

## 2021-10-11 RX ADMIN — ESCITALOPRAM OXALATE 10 MG: 10 TABLET ORAL at 10:10

## 2021-10-11 RX ADMIN — TAMSULOSIN HYDROCHLORIDE 0.4 MG: 0.4 CAPSULE ORAL at 08:10

## 2021-10-11 RX ADMIN — PIPERACILLIN AND TAZOBACTAM 4.5 G: 4; .5 INJECTION, POWDER, LYOPHILIZED, FOR SOLUTION INTRAVENOUS; PARENTERAL at 02:10

## 2021-10-11 RX ADMIN — FILGRASTIM-SNDZ 480 MCG: 480 INJECTION, SOLUTION INTRAVENOUS; SUBCUTANEOUS at 10:10

## 2021-10-11 RX ADMIN — THERA TABS 1 TABLET: TAB at 10:10

## 2021-10-11 RX ADMIN — FAMOTIDINE 40 MG: 20 TABLET ORAL at 10:10

## 2021-10-12 LAB
ABO GROUP BLD: NORMAL
ANION GAP SERPL CALC-SCNC: 9 MMOL/L (ref 8–16)
ANISOCYTOSIS BLD QL SMEAR: SLIGHT
BACTERIA BLD CULT: NORMAL
BACTERIA BLD CULT: NORMAL
BASOPHILS NFR BLD: 0 % (ref 0–1.9)
BLD GP AB SCN CELLS X3 SERPL QL: NORMAL
BLD PROD TYP BPU: NORMAL
BLOOD UNIT EXPIRATION DATE: NORMAL
BLOOD UNIT TYPE CODE: 7300
BLOOD UNIT TYPE: NORMAL
BUN SERPL-MCNC: 8 MG/DL (ref 8–23)
CALCIUM SERPL-MCNC: 9 MG/DL (ref 8.7–10.5)
CHLORIDE SERPL-SCNC: 106 MMOL/L (ref 95–110)
CO2 SERPL-SCNC: 25 MMOL/L (ref 23–29)
CODING SYSTEM: NORMAL
CREAT SERPL-MCNC: 0.9 MG/DL (ref 0.5–1.4)
DIFFERENTIAL METHOD: ABNORMAL
DISPENSE STATUS: NORMAL
EOSINOPHIL NFR BLD: 0 % (ref 0–8)
ERYTHROCYTE [DISTWIDTH] IN BLOOD BY AUTOMATED COUNT: 15.5 % (ref 11.5–14.5)
EST. GFR  (AFRICAN AMERICAN): >60 ML/MIN/1.73 M^2
EST. GFR  (NON AFRICAN AMERICAN): >60 ML/MIN/1.73 M^2
GAMMA INTERFERON BACKGROUND BLD IA-ACNC: 0.02 IU/ML
GLUCOSE SERPL-MCNC: 104 MG/DL (ref 70–110)
H CAPSUL AG UR QL IA: <0.5
HCT VFR BLD AUTO: 20.7 % (ref 40–54)
HGB BLD-MCNC: 6.5 G/DL (ref 14–18)
HYPOCHROMIA BLD QL SMEAR: ABNORMAL
IMM GRANULOCYTES # BLD AUTO: ABNORMAL K/UL (ref 0–0.04)
IMM GRANULOCYTES NFR BLD AUTO: ABNORMAL % (ref 0–0.5)
LYMPHOCYTES NFR BLD: 8 % (ref 18–48)
Lab: NORMAL
M TB IFN-G BLD-IMP: NEGATIVE
M TB IFN-G CD4+ BCKGRND COR BLD-ACNC: 0.08 IU/ML
MAGNESIUM SERPL-MCNC: 2 MG/DL (ref 1.6–2.6)
MCH RBC QN AUTO: 27.8 PG (ref 27–31)
MCHC RBC AUTO-ENTMCNC: 31.4 G/DL (ref 32–36)
MCV RBC AUTO: 89 FL (ref 82–98)
METAMYELOCYTES NFR BLD MANUAL: 1 %
MITOGEN IGNF BLD-ACNC: 4.74 IU/ML
MONOCYTES NFR BLD: 0 % (ref 4–15)
NEUTROPHILS NFR BLD: 4 % (ref 38–73)
NEUTS BAND NFR BLD MANUAL: 3 %
NRBC BLD-RTO: 5 /100 WBC
NUM UNITS TRANS PACKED RBC: NORMAL
OVALOCYTES BLD QL SMEAR: ABNORMAL
PLATELET # BLD AUTO: 4 K/UL (ref 150–450)
PMV BLD AUTO: ABNORMAL FL (ref 9.2–12.9)
POTASSIUM SERPL-SCNC: 3.7 MMOL/L (ref 3.5–5.1)
QUANTIFERON CRITERIA: NORMAL
QUANTIFERON TB2 AG VALUE: 0.1 IU/ML
RBC # BLD AUTO: 2.34 M/UL (ref 4.6–6.2)
RH BLD: NORMAL
SODIUM SERPL-SCNC: 140 MMOL/L (ref 136–145)
WBC # BLD AUTO: 1.21 K/UL (ref 3.9–12.7)
WBC OTHER NFR BLD MANUAL: 4 %

## 2021-10-12 PROCEDURE — 63600175 PHARM REV CODE 636 W HCPCS: Mod: JG | Performed by: INTERNAL MEDICINE

## 2021-10-12 PROCEDURE — 63600175 PHARM REV CODE 636 W HCPCS: Performed by: STUDENT IN AN ORGANIZED HEALTH CARE EDUCATION/TRAINING PROGRAM

## 2021-10-12 PROCEDURE — 99233 SBSQ HOSP IP/OBS HIGH 50: CPT | Mod: ,,, | Performed by: INTERNAL MEDICINE

## 2021-10-12 PROCEDURE — 86901 BLOOD TYPING SEROLOGIC RH(D): CPT | Performed by: STUDENT IN AN ORGANIZED HEALTH CARE EDUCATION/TRAINING PROGRAM

## 2021-10-12 PROCEDURE — 94761 N-INVAS EAR/PLS OXIMETRY MLT: CPT

## 2021-10-12 PROCEDURE — 99232 PR SUBSEQUENT HOSPITAL CARE,LEVL II: ICD-10-PCS | Mod: ,,, | Performed by: INTERNAL MEDICINE

## 2021-10-12 PROCEDURE — 12000002 HC ACUTE/MED SURGE SEMI-PRIVATE ROOM

## 2021-10-12 PROCEDURE — 85027 COMPLETE CBC AUTOMATED: CPT | Performed by: FAMILY MEDICINE

## 2021-10-12 PROCEDURE — 86920 COMPATIBILITY TEST SPIN: CPT | Performed by: STUDENT IN AN ORGANIZED HEALTH CARE EDUCATION/TRAINING PROGRAM

## 2021-10-12 PROCEDURE — 94799 UNLISTED PULMONARY SVC/PX: CPT

## 2021-10-12 PROCEDURE — 99233 PR SUBSEQUENT HOSPITAL CARE,LEVL III: ICD-10-PCS | Mod: ,,, | Performed by: INTERNAL MEDICINE

## 2021-10-12 PROCEDURE — P9040 RBC LEUKOREDUCED IRRADIATED: HCPCS | Performed by: STUDENT IN AN ORGANIZED HEALTH CARE EDUCATION/TRAINING PROGRAM

## 2021-10-12 PROCEDURE — 36415 COLL VENOUS BLD VENIPUNCTURE: CPT | Performed by: FAMILY MEDICINE

## 2021-10-12 PROCEDURE — 36415 COLL VENOUS BLD VENIPUNCTURE: CPT | Performed by: STUDENT IN AN ORGANIZED HEALTH CARE EDUCATION/TRAINING PROGRAM

## 2021-10-12 PROCEDURE — 86850 RBC ANTIBODY SCREEN: CPT | Performed by: STUDENT IN AN ORGANIZED HEALTH CARE EDUCATION/TRAINING PROGRAM

## 2021-10-12 PROCEDURE — 80048 BASIC METABOLIC PNL TOTAL CA: CPT | Performed by: FAMILY MEDICINE

## 2021-10-12 PROCEDURE — 99232 SBSQ HOSP IP/OBS MODERATE 35: CPT | Mod: ,,, | Performed by: INTERNAL MEDICINE

## 2021-10-12 PROCEDURE — 85007 BL SMEAR W/DIFF WBC COUNT: CPT | Performed by: FAMILY MEDICINE

## 2021-10-12 PROCEDURE — 99900035 HC TECH TIME PER 15 MIN (STAT)

## 2021-10-12 PROCEDURE — 99900031 HC PATIENT EDUCATION (STAT)

## 2021-10-12 PROCEDURE — 25000003 PHARM REV CODE 250: Performed by: FAMILY MEDICINE

## 2021-10-12 PROCEDURE — 83735 ASSAY OF MAGNESIUM: CPT | Performed by: FAMILY MEDICINE

## 2021-10-12 PROCEDURE — 25000003 PHARM REV CODE 250: Performed by: STUDENT IN AN ORGANIZED HEALTH CARE EDUCATION/TRAINING PROGRAM

## 2021-10-12 PROCEDURE — 86900 BLOOD TYPING SEROLOGIC ABO: CPT | Performed by: STUDENT IN AN ORGANIZED HEALTH CARE EDUCATION/TRAINING PROGRAM

## 2021-10-12 PROCEDURE — 25000003 PHARM REV CODE 250: Performed by: HOSPITALIST

## 2021-10-12 RX ORDER — HYDROCODONE BITARTRATE AND ACETAMINOPHEN 500; 5 MG/1; MG/1
TABLET ORAL
Status: DISCONTINUED | OUTPATIENT
Start: 2021-10-12 | End: 2021-10-13

## 2021-10-12 RX ADMIN — HYDROCORTISONE: 25 CREAM TOPICAL at 10:10

## 2021-10-12 RX ADMIN — FAMOTIDINE 40 MG: 20 TABLET ORAL at 08:10

## 2021-10-12 RX ADMIN — PIPERACILLIN AND TAZOBACTAM 4.5 G: 4; .5 INJECTION, POWDER, LYOPHILIZED, FOR SOLUTION INTRAVENOUS; PARENTERAL at 06:10

## 2021-10-12 RX ADMIN — POLYETHYLENE GLYCOL 3350 17 G: 17 POWDER, FOR SOLUTION ORAL at 10:10

## 2021-10-12 RX ADMIN — METOPROLOL SUCCINATE 25 MG: 25 TABLET, FILM COATED, EXTENDED RELEASE ORAL at 10:10

## 2021-10-12 RX ADMIN — ESCITALOPRAM OXALATE 10 MG: 10 TABLET ORAL at 08:10

## 2021-10-12 RX ADMIN — Medication 1000 MG: at 08:10

## 2021-10-12 RX ADMIN — SENNOSIDES AND DOCUSATE SODIUM 1 TABLET: 8.6; 5 TABLET ORAL at 10:10

## 2021-10-12 RX ADMIN — SENNOSIDES AND DOCUSATE SODIUM 1 TABLET: 8.6; 5 TABLET ORAL at 08:10

## 2021-10-12 RX ADMIN — PIPERACILLIN AND TAZOBACTAM 4.5 G: 4; .5 INJECTION, POWDER, LYOPHILIZED, FOR SOLUTION INTRAVENOUS; PARENTERAL at 10:10

## 2021-10-12 RX ADMIN — FILGRASTIM-SNDZ 480 MCG: 480 INJECTION, SOLUTION INTRAVENOUS; SUBCUTANEOUS at 10:10

## 2021-10-12 RX ADMIN — THERA TABS 1 TABLET: TAB at 08:10

## 2021-10-12 RX ADMIN — PIPERACILLIN AND TAZOBACTAM 4.5 G: 4; .5 INJECTION, POWDER, LYOPHILIZED, FOR SOLUTION INTRAVENOUS; PARENTERAL at 04:10

## 2021-10-12 RX ADMIN — LEVOTHYROXINE SODIUM 50 MCG: 25 TABLET ORAL at 05:10

## 2021-10-12 RX ADMIN — TAMSULOSIN HYDROCHLORIDE 0.4 MG: 0.4 CAPSULE ORAL at 10:10

## 2021-10-12 RX ADMIN — HYDROCORTISONE: 25 CREAM TOPICAL at 09:10

## 2021-10-13 ENCOUNTER — SPECIALTY PHARMACY (OUTPATIENT)
Dept: PHARMACY | Facility: CLINIC | Age: 81
End: 2021-10-13

## 2021-10-13 DIAGNOSIS — J18.9 PNEUMONIA, UNSPECIFIED ORGANISM: ICD-10-CM

## 2021-10-13 DIAGNOSIS — J18.9 UNRESOLVED PNEUMONIA: Primary | ICD-10-CM

## 2021-10-13 DIAGNOSIS — J98.4 DISEASE OF LUNG: ICD-10-CM

## 2021-10-13 LAB
ANION GAP SERPL CALC-SCNC: 8 MMOL/L (ref 8–16)
ANISOCYTOSIS BLD QL SMEAR: SLIGHT
BASOPHILS NFR BLD: 0 % (ref 0–1.9)
BLD PROD TYP BPU: NORMAL
BLOOD UNIT EXPIRATION DATE: NORMAL
BLOOD UNIT TYPE CODE: 5100
BLOOD UNIT TYPE CODE: 600
BLOOD UNIT TYPE CODE: 6200
BLOOD UNIT TYPE CODE: 6200
BLOOD UNIT TYPE CODE: 7300
BLOOD UNIT TYPE: NORMAL
BUN SERPL-MCNC: 10 MG/DL (ref 8–23)
CALCIUM SERPL-MCNC: 9.1 MG/DL (ref 8.7–10.5)
CHLORIDE SERPL-SCNC: 105 MMOL/L (ref 95–110)
CO2 SERPL-SCNC: 25 MMOL/L (ref 23–29)
CODING SYSTEM: NORMAL
CREAT SERPL-MCNC: 0.9 MG/DL (ref 0.5–1.4)
DIFFERENTIAL METHOD: ABNORMAL
DISPENSE STATUS: NORMAL
EOSINOPHIL NFR BLD: 0 % (ref 0–8)
ERYTHROCYTE [DISTWIDTH] IN BLOOD BY AUTOMATED COUNT: 15.2 % (ref 11.5–14.5)
EST. GFR  (AFRICAN AMERICAN): >60 ML/MIN/1.73 M^2
EST. GFR  (NON AFRICAN AMERICAN): >60 ML/MIN/1.73 M^2
GALACTOMANNAN AG SPEC IA-ACNC: 0.62 INDEX (ref 0–0.49)
GLUCOSE SERPL-MCNC: 99 MG/DL (ref 70–110)
HCT VFR BLD AUTO: 23.6 % (ref 40–54)
HGB BLD-MCNC: 7.6 G/DL (ref 14–18)
IMM GRANULOCYTES # BLD AUTO: ABNORMAL K/UL (ref 0–0.04)
IMM GRANULOCYTES NFR BLD AUTO: ABNORMAL % (ref 0–0.5)
LABCORP MISC TEST CODE: NORMAL
LABCORP MISC TEST CODE: NORMAL
LABCORP MISC TEST NAME: NORMAL
LABCORP MISC TEST NAME: NORMAL
LABCORP MISCELLANEOUS TEST: NORMAL
LABCORP MISCELLANEOUS TEST: NORMAL
LYMPHOCYTES NFR BLD: 34 % (ref 18–48)
MAGNESIUM SERPL-MCNC: 2 MG/DL (ref 1.6–2.6)
MCH RBC QN AUTO: 28.6 PG (ref 27–31)
MCHC RBC AUTO-ENTMCNC: 32.2 G/DL (ref 32–36)
MCV RBC AUTO: 89 FL (ref 82–98)
MONOCYTES NFR BLD: 12 % (ref 4–15)
NEUTROPHILS NFR BLD: 29 % (ref 38–73)
NEUTS BAND NFR BLD MANUAL: 5 %
NRBC BLD-RTO: 5 /100 WBC
NUM UNITS TRANS PACKED RBC: NORMAL
PLATELET # BLD AUTO: 1 K/UL (ref 150–450)
PMV BLD AUTO: ABNORMAL FL (ref 9.2–12.9)
POIKILOCYTOSIS BLD QL SMEAR: SLIGHT
POLYCHROMASIA BLD QL SMEAR: ABNORMAL
POTASSIUM SERPL-SCNC: 3.6 MMOL/L (ref 3.5–5.1)
RBC # BLD AUTO: 2.66 M/UL (ref 4.6–6.2)
SODIUM SERPL-SCNC: 138 MMOL/L (ref 136–145)
UNIT NUMBER: NORMAL
WBC # BLD AUTO: 1.49 K/UL (ref 3.9–12.7)
WBC OTHER NFR BLD MANUAL: 20 %

## 2021-10-13 PROCEDURE — 63600175 PHARM REV CODE 636 W HCPCS: Mod: JG | Performed by: INTERNAL MEDICINE

## 2021-10-13 PROCEDURE — 99900035 HC TECH TIME PER 15 MIN (STAT)

## 2021-10-13 PROCEDURE — 80048 BASIC METABOLIC PNL TOTAL CA: CPT | Performed by: FAMILY MEDICINE

## 2021-10-13 PROCEDURE — 63600175 PHARM REV CODE 636 W HCPCS: Performed by: STUDENT IN AN ORGANIZED HEALTH CARE EDUCATION/TRAINING PROGRAM

## 2021-10-13 PROCEDURE — P9037 PLATE PHERES LEUKOREDU IRRAD: HCPCS | Performed by: STUDENT IN AN ORGANIZED HEALTH CARE EDUCATION/TRAINING PROGRAM

## 2021-10-13 PROCEDURE — 12000002 HC ACUTE/MED SURGE SEMI-PRIVATE ROOM

## 2021-10-13 PROCEDURE — P9073 PLATELETS PHERESIS PATH REDU: HCPCS | Performed by: STUDENT IN AN ORGANIZED HEALTH CARE EDUCATION/TRAINING PROGRAM

## 2021-10-13 PROCEDURE — 25000003 PHARM REV CODE 250: Performed by: FAMILY MEDICINE

## 2021-10-13 PROCEDURE — 85007 BL SMEAR W/DIFF WBC COUNT: CPT | Performed by: FAMILY MEDICINE

## 2021-10-13 PROCEDURE — 94799 UNLISTED PULMONARY SVC/PX: CPT

## 2021-10-13 PROCEDURE — 83735 ASSAY OF MAGNESIUM: CPT | Performed by: FAMILY MEDICINE

## 2021-10-13 PROCEDURE — 36415 COLL VENOUS BLD VENIPUNCTURE: CPT | Performed by: FAMILY MEDICINE

## 2021-10-13 PROCEDURE — 94761 N-INVAS EAR/PLS OXIMETRY MLT: CPT

## 2021-10-13 PROCEDURE — P9040 RBC LEUKOREDUCED IRRADIATED: HCPCS | Performed by: STUDENT IN AN ORGANIZED HEALTH CARE EDUCATION/TRAINING PROGRAM

## 2021-10-13 PROCEDURE — 85027 COMPLETE CBC AUTOMATED: CPT | Performed by: FAMILY MEDICINE

## 2021-10-13 PROCEDURE — 25000003 PHARM REV CODE 250: Performed by: HOSPITALIST

## 2021-10-13 PROCEDURE — 99900031 HC PATIENT EDUCATION (STAT)

## 2021-10-13 PROCEDURE — 25000003 PHARM REV CODE 250: Performed by: STUDENT IN AN ORGANIZED HEALTH CARE EDUCATION/TRAINING PROGRAM

## 2021-10-13 RX ORDER — HYDROCODONE BITARTRATE AND ACETAMINOPHEN 500; 5 MG/1; MG/1
TABLET ORAL
Status: DISCONTINUED | OUTPATIENT
Start: 2021-10-13 | End: 2021-10-14 | Stop reason: HOSPADM

## 2021-10-13 RX ADMIN — FAMOTIDINE 40 MG: 20 TABLET ORAL at 09:10

## 2021-10-13 RX ADMIN — PIPERACILLIN AND TAZOBACTAM 4.5 G: 4; .5 INJECTION, POWDER, LYOPHILIZED, FOR SOLUTION INTRAVENOUS; PARENTERAL at 05:10

## 2021-10-13 RX ADMIN — FILGRASTIM-SNDZ 480 MCG: 480 INJECTION, SOLUTION INTRAVENOUS; SUBCUTANEOUS at 11:10

## 2021-10-13 RX ADMIN — METOPROLOL SUCCINATE 25 MG: 25 TABLET, FILM COATED, EXTENDED RELEASE ORAL at 09:10

## 2021-10-13 RX ADMIN — SENNOSIDES AND DOCUSATE SODIUM 1 TABLET: 8.6; 5 TABLET ORAL at 09:10

## 2021-10-13 RX ADMIN — PIPERACILLIN AND TAZOBACTAM 4.5 G: 4; .5 INJECTION, POWDER, LYOPHILIZED, FOR SOLUTION INTRAVENOUS; PARENTERAL at 08:10

## 2021-10-13 RX ADMIN — ESCITALOPRAM OXALATE 10 MG: 10 TABLET ORAL at 09:10

## 2021-10-13 RX ADMIN — HYDROCORTISONE: 25 CREAM TOPICAL at 09:10

## 2021-10-13 RX ADMIN — THERA TABS 1 TABLET: TAB at 09:10

## 2021-10-13 RX ADMIN — LEVOTHYROXINE SODIUM 50 MCG: 25 TABLET ORAL at 05:10

## 2021-10-13 RX ADMIN — TAMSULOSIN HYDROCHLORIDE 0.4 MG: 0.4 CAPSULE ORAL at 09:10

## 2021-10-13 RX ADMIN — Medication 1000 MG: at 09:10

## 2021-10-14 VITALS
RESPIRATION RATE: 18 BRPM | HEART RATE: 80 BPM | BODY MASS INDEX: 21.63 KG/M2 | TEMPERATURE: 98 F | OXYGEN SATURATION: 99 % | HEIGHT: 67 IN | SYSTOLIC BLOOD PRESSURE: 120 MMHG | WEIGHT: 137.81 LBS | DIASTOLIC BLOOD PRESSURE: 74 MMHG

## 2021-10-14 LAB
ANION GAP SERPL CALC-SCNC: 9 MMOL/L (ref 8–16)
ANISOCYTOSIS BLD QL SMEAR: ABNORMAL
BASOPHILS NFR BLD: 0 % (ref 0–1.9)
BLASTS NFR BLD MANUAL: 15 %
BUN SERPL-MCNC: 9 MG/DL (ref 8–23)
CALCIUM SERPL-MCNC: 9.2 MG/DL (ref 8.7–10.5)
CHLORIDE SERPL-SCNC: 108 MMOL/L (ref 95–110)
CO2 SERPL-SCNC: 25 MMOL/L (ref 23–29)
CREAT SERPL-MCNC: 0.8 MG/DL (ref 0.5–1.4)
DACRYOCYTES BLD QL SMEAR: ABNORMAL
DIFFERENTIAL METHOD: ABNORMAL
EOSINOPHIL NFR BLD: 0 % (ref 0–8)
ERYTHROCYTE [DISTWIDTH] IN BLOOD BY AUTOMATED COUNT: 14.8 % (ref 11.5–14.5)
EST. GFR  (AFRICAN AMERICAN): >60 ML/MIN/1.73 M^2
EST. GFR  (NON AFRICAN AMERICAN): >60 ML/MIN/1.73 M^2
GALACTOMANNAN AG SPEC IA-ACNC: 0.03 INDEX (ref 0–0.49)
GLUCOSE SERPL-MCNC: 103 MG/DL (ref 70–110)
HCT VFR BLD AUTO: 26.5 % (ref 40–54)
HGB BLD-MCNC: 8.7 G/DL (ref 14–18)
HYPOCHROMIA BLD QL SMEAR: ABNORMAL
IMM GRANULOCYTES # BLD AUTO: ABNORMAL K/UL (ref 0–0.04)
IMM GRANULOCYTES NFR BLD AUTO: ABNORMAL % (ref 0–0.5)
LYMPHOCYTES NFR BLD: 25 % (ref 18–48)
MAGNESIUM SERPL-MCNC: 2.1 MG/DL (ref 1.6–2.6)
MCH RBC QN AUTO: 28.9 PG (ref 27–31)
MCHC RBC AUTO-ENTMCNC: 32.8 G/DL (ref 32–36)
MCV RBC AUTO: 88 FL (ref 82–98)
MONOCYTES NFR BLD: 20 % (ref 4–15)
NEUTROPHILS NFR BLD: 25 % (ref 38–73)
NEUTS BAND NFR BLD MANUAL: 15 %
NRBC BLD-RTO: 4 /100 WBC
OVALOCYTES BLD QL SMEAR: ABNORMAL
PLATELET # BLD AUTO: 4 K/UL (ref 150–450)
PLATELET BLD QL SMEAR: ABNORMAL
PMV BLD AUTO: ABNORMAL FL (ref 9.2–12.9)
POIKILOCYTOSIS BLD QL SMEAR: SLIGHT
POLYCHROMASIA BLD QL SMEAR: ABNORMAL
POTASSIUM SERPL-SCNC: 3.6 MMOL/L (ref 3.5–5.1)
RBC # BLD AUTO: 3.01 M/UL (ref 4.6–6.2)
SCHISTOCYTES BLD QL SMEAR: PRESENT
SODIUM SERPL-SCNC: 142 MMOL/L (ref 136–145)
WBC # BLD AUTO: 1.59 K/UL (ref 3.9–12.7)

## 2021-10-14 PROCEDURE — 36415 COLL VENOUS BLD VENIPUNCTURE: CPT | Performed by: FAMILY MEDICINE

## 2021-10-14 PROCEDURE — 25000003 PHARM REV CODE 250: Performed by: STUDENT IN AN ORGANIZED HEALTH CARE EDUCATION/TRAINING PROGRAM

## 2021-10-14 PROCEDURE — 25000003 PHARM REV CODE 250: Performed by: FAMILY MEDICINE

## 2021-10-14 PROCEDURE — 85007 BL SMEAR W/DIFF WBC COUNT: CPT | Performed by: FAMILY MEDICINE

## 2021-10-14 PROCEDURE — 99233 SBSQ HOSP IP/OBS HIGH 50: CPT | Mod: ,,, | Performed by: INTERNAL MEDICINE

## 2021-10-14 PROCEDURE — 63600175 PHARM REV CODE 636 W HCPCS: Performed by: STUDENT IN AN ORGANIZED HEALTH CARE EDUCATION/TRAINING PROGRAM

## 2021-10-14 PROCEDURE — 85027 COMPLETE CBC AUTOMATED: CPT | Performed by: FAMILY MEDICINE

## 2021-10-14 PROCEDURE — 94760 N-INVAS EAR/PLS OXIMETRY 1: CPT

## 2021-10-14 PROCEDURE — 99900035 HC TECH TIME PER 15 MIN (STAT)

## 2021-10-14 PROCEDURE — 80048 BASIC METABOLIC PNL TOTAL CA: CPT | Performed by: FAMILY MEDICINE

## 2021-10-14 PROCEDURE — 63600175 PHARM REV CODE 636 W HCPCS: Mod: JG | Performed by: INTERNAL MEDICINE

## 2021-10-14 PROCEDURE — 99233 PR SUBSEQUENT HOSPITAL CARE,LEVL III: ICD-10-PCS | Mod: ,,, | Performed by: INTERNAL MEDICINE

## 2021-10-14 PROCEDURE — 83735 ASSAY OF MAGNESIUM: CPT | Performed by: FAMILY MEDICINE

## 2021-10-14 RX ORDER — CIPROFLOXACIN 500 MG/1
500 TABLET ORAL EVERY 12 HOURS
Qty: 28 TABLET | Refills: 0 | Status: SHIPPED | OUTPATIENT
Start: 2021-10-14 | End: 2021-10-28

## 2021-10-14 RX ORDER — SIMETHICONE 80 MG
80 TABLET,CHEWABLE ORAL 4 TIMES DAILY PRN
Qty: 90 TABLET | Refills: 0 | Status: SHIPPED | OUTPATIENT
Start: 2021-10-14

## 2021-10-14 RX ORDER — HYDROCORTISONE 25 MG/G
CREAM TOPICAL 2 TIMES DAILY
Qty: 20 G | Refills: 1 | Status: SHIPPED | OUTPATIENT
Start: 2021-10-14

## 2021-10-14 RX ADMIN — Medication 1000 MG: at 09:10

## 2021-10-14 RX ADMIN — LEVOTHYROXINE SODIUM 50 MCG: 25 TABLET ORAL at 05:10

## 2021-10-14 RX ADMIN — FILGRASTIM-SNDZ 480 MCG: 480 INJECTION, SOLUTION INTRAVENOUS; SUBCUTANEOUS at 09:10

## 2021-10-14 RX ADMIN — PIPERACILLIN AND TAZOBACTAM 4.5 G: 4; .5 INJECTION, POWDER, LYOPHILIZED, FOR SOLUTION INTRAVENOUS; PARENTERAL at 01:10

## 2021-10-14 RX ADMIN — PIPERACILLIN AND TAZOBACTAM 4.5 G: 4; .5 INJECTION, POWDER, LYOPHILIZED, FOR SOLUTION INTRAVENOUS; PARENTERAL at 04:10

## 2021-10-14 RX ADMIN — FAMOTIDINE 40 MG: 20 TABLET ORAL at 09:10

## 2021-10-14 RX ADMIN — SENNOSIDES AND DOCUSATE SODIUM 1 TABLET: 8.6; 5 TABLET ORAL at 09:10

## 2021-10-14 RX ADMIN — SIMETHICONE CHEW TAB 80 MG 80 MG: 80 TABLET ORAL at 01:10

## 2021-10-14 RX ADMIN — ESCITALOPRAM OXALATE 10 MG: 10 TABLET ORAL at 09:10

## 2021-10-14 RX ADMIN — THERA TABS 1 TABLET: TAB at 09:10

## 2021-10-14 RX ADMIN — POLYETHYLENE GLYCOL 3350 17 G: 17 POWDER, FOR SOLUTION ORAL at 09:10

## 2021-10-14 RX ADMIN — HYDROCORTISONE: 25 CREAM TOPICAL at 09:10

## 2021-10-15 LAB
BLASTOMYCES AG FLUID SOURCE: NORMAL
BLASTOMYCES AG RESULT: NORMAL NG/ML

## 2021-10-15 RX ORDER — ROSUVASTATIN CALCIUM 40 MG/1
TABLET, COATED ORAL
Qty: 90 TABLET | Refills: 0 | OUTPATIENT
Start: 2021-10-15

## 2021-10-18 ENCOUNTER — DOCUMENTATION ONLY (OUTPATIENT)
Dept: HEMATOLOGY/ONCOLOGY | Facility: CLINIC | Age: 81
End: 2021-10-18

## 2021-10-18 ENCOUNTER — LAB VISIT (OUTPATIENT)
Dept: LAB | Facility: HOSPITAL | Age: 81
End: 2021-10-18
Attending: INTERNAL MEDICINE
Payer: MEDICARE

## 2021-10-18 ENCOUNTER — OFFICE VISIT (OUTPATIENT)
Dept: HEMATOLOGY/ONCOLOGY | Facility: CLINIC | Age: 81
End: 2021-10-18
Payer: MEDICARE

## 2021-10-18 VITALS
HEART RATE: 88 BPM | BODY MASS INDEX: 21.42 KG/M2 | OXYGEN SATURATION: 96 % | WEIGHT: 136.44 LBS | DIASTOLIC BLOOD PRESSURE: 78 MMHG | SYSTOLIC BLOOD PRESSURE: 114 MMHG | TEMPERATURE: 98 F | RESPIRATION RATE: 16 BRPM | HEIGHT: 67 IN

## 2021-10-18 DIAGNOSIS — D45 POLYCYTHEMIA VERA: ICD-10-CM

## 2021-10-18 DIAGNOSIS — D46.Z MDS (MYELODYSPLASTIC SYNDROME), HIGH GRADE: Primary | ICD-10-CM

## 2021-10-18 DIAGNOSIS — D64.9 SYMPTOMATIC ANEMIA: ICD-10-CM

## 2021-10-18 LAB
ALBUMIN SERPL BCP-MCNC: 3.1 G/DL (ref 3.5–5.2)
ALP SERPL-CCNC: 98 U/L (ref 55–135)
ALT SERPL W/O P-5'-P-CCNC: 17 U/L (ref 10–44)
ANION GAP SERPL CALC-SCNC: 11 MMOL/L (ref 8–16)
ANISOCYTOSIS BLD QL SMEAR: ABNORMAL
AST SERPL-CCNC: 12 U/L (ref 10–40)
BASO STIPL BLD QL SMEAR: ABNORMAL
BASOPHILS NFR BLD: 0 % (ref 0–1.9)
BILIRUB SERPL-MCNC: 1.7 MG/DL (ref 0.1–1)
BLASTS NFR BLD MANUAL: 21 %
BUN SERPL-MCNC: 16 MG/DL (ref 8–23)
CALCIUM SERPL-MCNC: 9.6 MG/DL (ref 8.7–10.5)
CHLORIDE SERPL-SCNC: 106 MMOL/L (ref 95–110)
CO2 SERPL-SCNC: 22 MMOL/L (ref 23–29)
CREAT SERPL-MCNC: 0.8 MG/DL (ref 0.5–1.4)
DACRYOCYTES BLD QL SMEAR: ABNORMAL
DIFFERENTIAL METHOD: ABNORMAL
EOSINOPHIL NFR BLD: 0 % (ref 0–8)
ERYTHROCYTE [DISTWIDTH] IN BLOOD BY AUTOMATED COUNT: 15.5 % (ref 11.5–14.5)
EST. GFR  (AFRICAN AMERICAN): >60 ML/MIN/1.73 M^2
EST. GFR  (NON AFRICAN AMERICAN): >60 ML/MIN/1.73 M^2
GLUCOSE SERPL-MCNC: 130 MG/DL (ref 70–110)
HCT VFR BLD AUTO: 22.3 % (ref 40–54)
HGB BLD-MCNC: 7.2 G/DL (ref 14–18)
IMM GRANULOCYTES # BLD AUTO: ABNORMAL K/UL (ref 0–0.04)
IMM GRANULOCYTES NFR BLD AUTO: ABNORMAL % (ref 0–0.5)
LDH SERPL L TO P-CCNC: 331 U/L (ref 110–260)
LYMPHOCYTES NFR BLD: 49 % (ref 18–48)
MAGNESIUM SERPL-MCNC: 1.9 MG/DL (ref 1.6–2.6)
MCH RBC QN AUTO: 28.3 PG (ref 27–31)
MCHC RBC AUTO-ENTMCNC: 32.3 G/DL (ref 32–36)
MCV RBC AUTO: 88 FL (ref 82–98)
MONOCYTES NFR BLD: 16 % (ref 4–15)
MYELOCYTES NFR BLD MANUAL: 1 %
NEUTROPHILS NFR BLD: 12 % (ref 38–73)
NEUTS BAND NFR BLD MANUAL: 1 %
NRBC BLD-RTO: 7 /100 WBC
OVALOCYTES BLD QL SMEAR: ABNORMAL
PLATELET # BLD AUTO: 1 K/UL (ref 150–450)
PLATELET BLD QL SMEAR: ABNORMAL
PMV BLD AUTO: ABNORMAL FL (ref 9.2–12.9)
POIKILOCYTOSIS BLD QL SMEAR: SLIGHT
POLYCHROMASIA BLD QL SMEAR: ABNORMAL
POTASSIUM SERPL-SCNC: 4 MMOL/L (ref 3.5–5.1)
PROT SERPL-MCNC: 7.1 G/DL (ref 6–8.4)
RBC # BLD AUTO: 2.54 M/UL (ref 4.6–6.2)
SODIUM SERPL-SCNC: 139 MMOL/L (ref 136–145)
WBC # BLD AUTO: 2.17 K/UL (ref 3.9–12.7)

## 2021-10-18 PROCEDURE — 99999 PR PBB SHADOW E&M-EST. PATIENT-LVL III: CPT | Mod: PBBFAC,,, | Performed by: INTERNAL MEDICINE

## 2021-10-18 PROCEDURE — 36415 COLL VENOUS BLD VENIPUNCTURE: CPT | Mod: PN | Performed by: INTERNAL MEDICINE

## 2021-10-18 PROCEDURE — 85060 BLOOD SMEAR INTERPRETATION: CPT | Mod: ,,, | Performed by: PATHOLOGY

## 2021-10-18 PROCEDURE — 99499 RISK ADDL DX/OHS AUDIT: ICD-10-PCS | Mod: S$GLB,,, | Performed by: INTERNAL MEDICINE

## 2021-10-18 PROCEDURE — 1111F PR DISCHARGE MEDS RECONCILED W/ CURRENT OUTPATIENT MED LIST: ICD-10-PCS | Mod: CPTII,S$GLB,, | Performed by: INTERNAL MEDICINE

## 2021-10-18 PROCEDURE — 80053 COMPREHEN METABOLIC PANEL: CPT | Mod: PN | Performed by: INTERNAL MEDICINE

## 2021-10-18 PROCEDURE — 3074F SYST BP LT 130 MM HG: CPT | Mod: CPTII,S$GLB,, | Performed by: INTERNAL MEDICINE

## 2021-10-18 PROCEDURE — 85007 BL SMEAR W/DIFF WBC COUNT: CPT | Mod: PN | Performed by: INTERNAL MEDICINE

## 2021-10-18 PROCEDURE — 1111F DSCHRG MED/CURRENT MED MERGE: CPT | Mod: CPTII,S$GLB,, | Performed by: INTERNAL MEDICINE

## 2021-10-18 PROCEDURE — 85060 PATHOLOGIST REVIEW: ICD-10-PCS | Mod: ,,, | Performed by: PATHOLOGY

## 2021-10-18 PROCEDURE — 3074F PR MOST RECENT SYSTOLIC BLOOD PRESSURE < 130 MM HG: ICD-10-PCS | Mod: CPTII,S$GLB,, | Performed by: INTERNAL MEDICINE

## 2021-10-18 PROCEDURE — 99499 UNLISTED E&M SERVICE: CPT | Mod: S$GLB,,, | Performed by: INTERNAL MEDICINE

## 2021-10-18 PROCEDURE — 1160F PR REVIEW ALL MEDS BY PRESCRIBER/CLIN PHARMACIST DOCUMENTED: ICD-10-PCS | Mod: CPTII,S$GLB,, | Performed by: INTERNAL MEDICINE

## 2021-10-18 PROCEDURE — 1126F PR PAIN SEVERITY QUANTIFIED, NO PAIN PRESENT: ICD-10-PCS | Mod: CPTII,S$GLB,, | Performed by: INTERNAL MEDICINE

## 2021-10-18 PROCEDURE — 1126F AMNT PAIN NOTED NONE PRSNT: CPT | Mod: CPTII,S$GLB,, | Performed by: INTERNAL MEDICINE

## 2021-10-18 PROCEDURE — 3078F DIAST BP <80 MM HG: CPT | Mod: CPTII,S$GLB,, | Performed by: INTERNAL MEDICINE

## 2021-10-18 PROCEDURE — 1159F MED LIST DOCD IN RCRD: CPT | Mod: CPTII,S$GLB,, | Performed by: INTERNAL MEDICINE

## 2021-10-18 PROCEDURE — 83735 ASSAY OF MAGNESIUM: CPT | Mod: PN | Performed by: INTERNAL MEDICINE

## 2021-10-18 PROCEDURE — 3078F PR MOST RECENT DIASTOLIC BLOOD PRESSURE < 80 MM HG: ICD-10-PCS | Mod: CPTII,S$GLB,, | Performed by: INTERNAL MEDICINE

## 2021-10-18 PROCEDURE — 3288F FALL RISK ASSESSMENT DOCD: CPT | Mod: CPTII,S$GLB,, | Performed by: INTERNAL MEDICINE

## 2021-10-18 PROCEDURE — 1159F PR MEDICATION LIST DOCUMENTED IN MEDICAL RECORD: ICD-10-PCS | Mod: CPTII,S$GLB,, | Performed by: INTERNAL MEDICINE

## 2021-10-18 PROCEDURE — 1101F PR PT FALLS ASSESS DOC 0-1 FALLS W/OUT INJ PAST YR: ICD-10-PCS | Mod: CPTII,S$GLB,, | Performed by: INTERNAL MEDICINE

## 2021-10-18 PROCEDURE — 1101F PT FALLS ASSESS-DOCD LE1/YR: CPT | Mod: CPTII,S$GLB,, | Performed by: INTERNAL MEDICINE

## 2021-10-18 PROCEDURE — 83615 LACTATE (LD) (LDH) ENZYME: CPT | Mod: PN | Performed by: INTERNAL MEDICINE

## 2021-10-18 PROCEDURE — 99214 PR OFFICE/OUTPT VISIT, EST, LEVL IV, 30-39 MIN: ICD-10-PCS | Mod: S$GLB,,, | Performed by: INTERNAL MEDICINE

## 2021-10-18 PROCEDURE — 99999 PR PBB SHADOW E&M-EST. PATIENT-LVL III: ICD-10-PCS | Mod: PBBFAC,,, | Performed by: INTERNAL MEDICINE

## 2021-10-18 PROCEDURE — 1160F RVW MEDS BY RX/DR IN RCRD: CPT | Mod: CPTII,S$GLB,, | Performed by: INTERNAL MEDICINE

## 2021-10-18 PROCEDURE — 85027 COMPLETE CBC AUTOMATED: CPT | Mod: PN | Performed by: INTERNAL MEDICINE

## 2021-10-18 PROCEDURE — 99214 OFFICE O/P EST MOD 30 MIN: CPT | Mod: S$GLB,,, | Performed by: INTERNAL MEDICINE

## 2021-10-18 PROCEDURE — 3288F PR FALLS RISK ASSESSMENT DOCUMENTED: ICD-10-PCS | Mod: CPTII,S$GLB,, | Performed by: INTERNAL MEDICINE

## 2021-10-19 ENCOUNTER — PATIENT MESSAGE (OUTPATIENT)
Dept: PHARMACY | Facility: CLINIC | Age: 81
End: 2021-10-19
Payer: MEDICARE

## 2021-10-19 ENCOUNTER — TELEPHONE (OUTPATIENT)
Dept: HEMATOLOGY/ONCOLOGY | Facility: CLINIC | Age: 81
End: 2021-10-19

## 2021-10-19 ENCOUNTER — SPECIALTY PHARMACY (OUTPATIENT)
Dept: PHARMACY | Facility: CLINIC | Age: 81
End: 2021-10-19

## 2021-10-19 LAB — PATH REV BLD -IMP: NORMAL

## 2021-10-20 ENCOUNTER — DOCUMENT SCAN (OUTPATIENT)
Dept: HOME HEALTH SERVICES | Facility: HOSPITAL | Age: 81
End: 2021-10-20
Payer: MEDICARE

## 2021-10-26 ENCOUNTER — OFFICE VISIT (OUTPATIENT)
Dept: FAMILY MEDICINE | Facility: CLINIC | Age: 81
End: 2021-10-26
Attending: FAMILY MEDICINE
Payer: MEDICARE

## 2021-10-26 VITALS
DIASTOLIC BLOOD PRESSURE: 55 MMHG | WEIGHT: 134.94 LBS | OXYGEN SATURATION: 98 % | TEMPERATURE: 98 F | HEIGHT: 67 IN | SYSTOLIC BLOOD PRESSURE: 84 MMHG | BODY MASS INDEX: 21.18 KG/M2 | RESPIRATION RATE: 18 BRPM | HEART RATE: 80 BPM

## 2021-10-26 DIAGNOSIS — R11.0 NAUSEA: Primary | ICD-10-CM

## 2021-10-26 DIAGNOSIS — I10 ESSENTIAL HYPERTENSION: ICD-10-CM

## 2021-10-26 DIAGNOSIS — R50.81 PANCYTOPENIA WITH FEVER: ICD-10-CM

## 2021-10-26 DIAGNOSIS — D64.9 TRANSFUSION-DEPENDENT ANEMIA: ICD-10-CM

## 2021-10-26 DIAGNOSIS — J18.9 CAVITARY PNEUMONIA: ICD-10-CM

## 2021-10-26 DIAGNOSIS — I48.0 PAROXYSMAL ATRIAL FIBRILLATION: ICD-10-CM

## 2021-10-26 DIAGNOSIS — D69.6 THROMBOCYTOPENIA: ICD-10-CM

## 2021-10-26 DIAGNOSIS — J98.4 CAVITARY PNEUMONIA: ICD-10-CM

## 2021-10-26 DIAGNOSIS — K62.89 PROCTITIS: ICD-10-CM

## 2021-10-26 DIAGNOSIS — D61.818 PANCYTOPENIA WITH FEVER: ICD-10-CM

## 2021-10-26 DIAGNOSIS — Z51.5 HOSPICE PROGRAM CARE: ICD-10-CM

## 2021-10-26 DIAGNOSIS — D46.Z MDS (MYELODYSPLASTIC SYNDROME), HIGH GRADE: ICD-10-CM

## 2021-10-26 PROCEDURE — 99999 PR PBB SHADOW E&M-EST. PATIENT-LVL IV: ICD-10-PCS | Mod: PBBFAC,,, | Performed by: FAMILY MEDICINE

## 2021-10-26 PROCEDURE — 3288F PR FALLS RISK ASSESSMENT DOCUMENTED: ICD-10-PCS | Mod: CPTII,S$GLB,, | Performed by: FAMILY MEDICINE

## 2021-10-26 PROCEDURE — 1160F PR REVIEW ALL MEDS BY PRESCRIBER/CLIN PHARMACIST DOCUMENTED: ICD-10-PCS | Mod: CPTII,S$GLB,, | Performed by: FAMILY MEDICINE

## 2021-10-26 PROCEDURE — 99999 PR PBB SHADOW E&M-EST. PATIENT-LVL IV: CPT | Mod: PBBFAC,,, | Performed by: FAMILY MEDICINE

## 2021-10-26 PROCEDURE — 3074F SYST BP LT 130 MM HG: CPT | Mod: CPTII,S$GLB,, | Performed by: FAMILY MEDICINE

## 2021-10-26 PROCEDURE — 1159F MED LIST DOCD IN RCRD: CPT | Mod: CPTII,S$GLB,, | Performed by: FAMILY MEDICINE

## 2021-10-26 PROCEDURE — 3078F PR MOST RECENT DIASTOLIC BLOOD PRESSURE < 80 MM HG: ICD-10-PCS | Mod: CPTII,S$GLB,, | Performed by: FAMILY MEDICINE

## 2021-10-26 PROCEDURE — 1126F AMNT PAIN NOTED NONE PRSNT: CPT | Mod: CPTII,S$GLB,, | Performed by: FAMILY MEDICINE

## 2021-10-26 PROCEDURE — 1101F PT FALLS ASSESS-DOCD LE1/YR: CPT | Mod: CPTII,S$GLB,, | Performed by: FAMILY MEDICINE

## 2021-10-26 PROCEDURE — 3078F DIAST BP <80 MM HG: CPT | Mod: CPTII,S$GLB,, | Performed by: FAMILY MEDICINE

## 2021-10-26 PROCEDURE — 1159F PR MEDICATION LIST DOCUMENTED IN MEDICAL RECORD: ICD-10-PCS | Mod: CPTII,S$GLB,, | Performed by: FAMILY MEDICINE

## 2021-10-26 PROCEDURE — 99214 OFFICE O/P EST MOD 30 MIN: CPT | Mod: S$GLB,,, | Performed by: FAMILY MEDICINE

## 2021-10-26 PROCEDURE — 1111F PR DISCHARGE MEDS RECONCILED W/ CURRENT OUTPATIENT MED LIST: ICD-10-PCS | Mod: CPTII,S$GLB,, | Performed by: FAMILY MEDICINE

## 2021-10-26 PROCEDURE — 3288F FALL RISK ASSESSMENT DOCD: CPT | Mod: CPTII,S$GLB,, | Performed by: FAMILY MEDICINE

## 2021-10-26 PROCEDURE — 99214 PR OFFICE/OUTPT VISIT, EST, LEVL IV, 30-39 MIN: ICD-10-PCS | Mod: S$GLB,,, | Performed by: FAMILY MEDICINE

## 2021-10-26 PROCEDURE — 1101F PR PT FALLS ASSESS DOC 0-1 FALLS W/OUT INJ PAST YR: ICD-10-PCS | Mod: CPTII,S$GLB,, | Performed by: FAMILY MEDICINE

## 2021-10-26 PROCEDURE — 3074F PR MOST RECENT SYSTOLIC BLOOD PRESSURE < 130 MM HG: ICD-10-PCS | Mod: CPTII,S$GLB,, | Performed by: FAMILY MEDICINE

## 2021-10-26 PROCEDURE — 1111F DSCHRG MED/CURRENT MED MERGE: CPT | Mod: CPTII,S$GLB,, | Performed by: FAMILY MEDICINE

## 2021-10-26 PROCEDURE — 1126F PR PAIN SEVERITY QUANTIFIED, NO PAIN PRESENT: ICD-10-PCS | Mod: CPTII,S$GLB,, | Performed by: FAMILY MEDICINE

## 2021-10-26 PROCEDURE — 1160F RVW MEDS BY RX/DR IN RCRD: CPT | Mod: CPTII,S$GLB,, | Performed by: FAMILY MEDICINE

## 2021-10-26 RX ORDER — METOPROLOL SUCCINATE 50 MG/1
25 TABLET, EXTENDED RELEASE ORAL DAILY
Qty: 90 TABLET | Refills: 3
Start: 2021-10-26

## 2021-10-26 RX ORDER — ONDANSETRON 4 MG/1
4 TABLET, ORALLY DISINTEGRATING ORAL EVERY 8 HOURS PRN
Qty: 20 TABLET | Refills: 2 | Status: SHIPPED | OUTPATIENT
Start: 2021-10-26

## 2021-10-28 ENCOUNTER — HOSPITAL ENCOUNTER (OUTPATIENT)
Dept: RADIOLOGY | Facility: HOSPITAL | Age: 81
Discharge: HOME | End: 2021-10-28
Attending: INTERNAL MEDICINE
Payer: MEDICARE

## 2021-10-28 DIAGNOSIS — J18.9 CAVITARY PNEUMONIA: Primary | ICD-10-CM

## 2021-10-28 DIAGNOSIS — J18.9 PNEUMONIA, UNSPECIFIED ORGANISM: ICD-10-CM

## 2021-10-28 DIAGNOSIS — J98.4 CAVITARY PNEUMONIA: Primary | ICD-10-CM

## 2021-10-28 PROCEDURE — 71250 CT THORAX DX C-: CPT | Mod: TC,PO

## 2021-10-28 RX ORDER — DOXYCYCLINE 100 MG/1
100 CAPSULE ORAL 2 TIMES DAILY
Qty: 28 CAPSULE | Refills: 0 | Status: SHIPPED | OUTPATIENT
Start: 2021-10-28

## 2021-10-28 RX ORDER — AMOXICILLIN AND CLAVULANATE POTASSIUM 875; 125 MG/1; MG/1
1 TABLET, FILM COATED ORAL 2 TIMES DAILY
Qty: 28 TABLET | Refills: 0 | Status: SHIPPED | OUTPATIENT
Start: 2021-10-28 | End: 2021-11-11

## 2021-11-01 ENCOUNTER — DOCUMENT SCAN (OUTPATIENT)
Dept: HOME HEALTH SERVICES | Facility: HOSPITAL | Age: 81
End: 2021-11-01
Payer: MEDICARE

## 2021-11-07 LAB — FUNGUS SPEC CULT: NORMAL

## 2021-11-26 LAB
ACID FAST MOD KINY STN SPEC: NORMAL
MYCOBACTERIUM SPEC QL CULT: NORMAL

## 2022-05-26 DIAGNOSIS — N40.1 BENIGN PROSTATIC HYPERPLASIA WITH NOCTURIA: ICD-10-CM

## 2022-05-26 DIAGNOSIS — R35.1 BENIGN PROSTATIC HYPERPLASIA WITH NOCTURIA: ICD-10-CM

## 2022-05-26 RX ORDER — TAMSULOSIN HYDROCHLORIDE 0.4 MG/1
CAPSULE ORAL
Qty: 90 CAPSULE | Refills: 3 | OUTPATIENT
Start: 2022-05-26

## 2022-08-29 NOTE — TELEPHONE ENCOUNTER
Spoke to wife- appt made 6/6/17-records from [facility] obtained.   [Dear  ___] : Dear  [unfilled],

## 2023-02-17 NOTE — PLAN OF CARE
12/12/20 1950   Patient Assessment/Suction   Level of Consciousness (AVPU) alert   Expansion/Accessory Muscles/Retractions no use of accessory muscles   Cough Frequency no cough   PRE-TX-O2   O2 Device (Oxygen Therapy) room air   SpO2 97 %   Pulse Oximetry Type Intermittent   $ Pulse Oximetry - Multiple Charge Pulse Oximetry - Multiple   Pulse 84   Resp 16     
   12/13/20 2015   Patient Assessment/Suction   Level of Consciousness (AVPU) alert   Expansion/Accessory Muscles/Retractions no use of accessory muscles   Cough Frequency infrequent   Cough Type nonproductive   PRE-TX-O2   O2 Device (Oxygen Therapy) room air   SpO2 96 %   Pulse Oximetry Type Intermittent   $ Pulse Oximetry - Multiple Charge Pulse Oximetry - Multiple   Pulse 81   Resp 20     
   12/14/20 0836   PRE-TX-O2   O2 Device (Oxygen Therapy) room air   SpO2 97 %   Pulse Oximetry Type Intermittent   $ Pulse Oximetry - Multiple Charge Pulse Oximetry - Multiple     
I completed the assessment with the pt at [phone]. He confirmed his home address and phone number.  He reports independence in ADL's including driving and denies HH and DME. Pharmacy of choice is [facility] [place]. Verfiied PCP as Dr. Escobar and oncologist as Dr. Johnson. Pt has [employer] insurance. The pt does not have any current discharge needs. CM following. Eve Ro, [facility]     12/13/20 0939   Discharge Assessment   Assessment Type Discharge Planning Assessment   Confirmed/corrected address and phone number on facesheet? Yes   Assessment information obtained from? Patient   Communicated expected length of stay with patient/caregiver no   Prior to hospitilization cognitive status: Alert/Oriented   Prior to hospitalization functional status: Independent   Current cognitive status: Alert/Oriented   Current Functional Status: Independent   Facility Arrived From: Home   Lives With spouse   Able to Return to Prior Arrangements yes   Is patient able to care for self after discharge? Yes   Who are your caregiver(s) and their phone number(s)? Wife - Xi [phone]   Patient currently being followed by outpatient case management? No   Patient currently receives any other outside agency services? No   Equipment Currently Used at Home none   Do you have any problems affording any of your prescribed medications? No   Is the patient taking medications as prescribed? yes   Does the patient have transportation home? Yes   Transportation Anticipated family or friend will provide   Does the patient receive services at the [facility]? No   Discharge Plan A Home with family   Discharge Plan B Home   DME Needed Upon Discharge  none   Patient/Family in Agreement with Plan yes   Does the patient have transportation to healthcare appointments? Yes     
POC discussed with patient, verbalized understanding. VSS. Afebrile. AAOx4. Pt independent,  ambulates to bathroom. Neuro checks WDL. Room air. PIV cdi saline locked.  Tele [phone] SR. SCD's removed per request. Bed low, Call light in reach, safety maintained. Will continue to monitor.   
POC discussed with patient. Pt verbalized understanding. AAO. VSS. Afebrile. PIV cdi infusing NS @ 50mls/hour. Neuro check WDL. SCD's maintained. Tele [phone] SR. Hx of Afib and HTN. Room air. Bed low. Call light in reach. Safety maintained. Will continue to monitor.   
POC reviewed with pt, pt verbalizes understanding. AAOx4. Standby assist. Pt sleeping most of shift. Denies pain. Denies n/v. NS @ 50 continued. Sats >95 on room air. NSR on tele [phone]. Regular diet. VSS. No complaints or requests at this time. Isolation precautions maintained. Safety maintained. Bed locked and low, bed alarm on, call light within reach, rounding for safety.   
Patient cleared for discharge from case management standpoint after receiving blood per NP Li MENDOZA       12/14/20 1443   Final Note   Assessment Type Final Discharge Note   Anticipated Discharge Disposition Home   Hospital Follow Up  Appt(s) scheduled? Yes   Discharge plans and expectations educations in teach back method with documentation complete? Yes   Right Care Referral Info   Post Acute Recommendation No Care      
Plan of care reviewed with patient. Safety precautions maintained. Isolation precautions maintained. Pt remains free from injury. Cardiac monitoring and continuous pulse ox maintained. No c/o pain. RA/99%. IV abx. Ambulates independently without difficulty. Low risk for VTE prevention. Frequent checks performed q4 hours. Neuro checks performed q4h. Vital signs stable. Afebrile. AAOx4. Bed locked and low. Siderails raised x2. Non-skid socks on. Call light within reach.     
Pt AAO, VSS. Plan of care reviewed with pt at beginning of shift.  Pt/family verbalized understanding. Pt denies pain. O2 sats showing 95-97% on room air. Cardiac monitor showing NSR. Hourly/ Q2 hourly rounds completed on this pt throughout shift.  Pain monitored, restroom offered, repositions independently, safety maintained.  Patient has remained free from fall/injury, no skin breakdown noted.  Side rails up x2, bed in locked and lowest position, call light kept within reach.  Needs attended to, will continue to monitor/ update as indicated.    
Pt aaox4. Plan of care reviewed with pt and he verbalized understanding. Sats 94-97 on RA. Transfused 1 unit platelets and PRBCs. Tolerated well. Bed in lowest position and locked with call bell within reach. Denies any needs  
Pt is cleared from  for D/C.       12/15/20 0928   Final Note   Assessment Type Final Discharge Note   Anticipated Discharge Disposition Home   Hospital Follow Up  Appt(s) scheduled? Yes     
SW delivered pulse ox to patient's hospital room.  Patient's nurse to give instruction and information to patient due to patient's diagnosis.       12/14/20 1436   Post-Acute Status   Post-Acute Authorization E   E Status Set-up Complete     
72

## 2025-01-27 NOTE — TELEPHONE ENCOUNTER
Spoke to pt in regards to message from NP as well as notifying pt of referral placed to Dr. Tran. Pt verbalized understanding and stated, someone has already called him fro Dr. Tran's office and he's already set up with an appt.   Additional Notes: Biopsy if any changes occur Render Risk Assessment In Note?: no Detail Level: Simple

## (undated) DEVICE — SEE MEDLINE ITEM 152622

## (undated) DEVICE — SEE MEDLINE ITEM 157128

## (undated) DEVICE — TRAY GENERAL SURGERY

## (undated) DEVICE — SPONGE SURGIFOAM 8X12.5 1974

## (undated) DEVICE — GLOVE BIOGELPI GOLD SIZE 7.5

## (undated) DEVICE — TRAY BONE MARROW BEK3411

## (undated) DEVICE — COVER LIGHT HANDLE LB53

## (undated) DEVICE — PAD ABD 5X9   7196D

## (undated) DEVICE — SOLUTION IRRI NS BOTTLE 1000ML R5200-01

## (undated) DEVICE — TAPE CLOTH 4 MEDIPORE 2864

## (undated) DEVICE — DRAPE LAPAROTOMY TRANSVERSE 89281

## (undated) DEVICE — SYSTEM PANNUS RETENTION

## (undated) DEVICE — GAUZE SPONGE 4X4 12PLY

## (undated) DEVICE — SYR SLIP TIP 10ML SHIELD

## (undated) DEVICE — PAD BOVIE ADULT